# Patient Record
Sex: FEMALE | Race: WHITE | NOT HISPANIC OR LATINO
[De-identification: names, ages, dates, MRNs, and addresses within clinical notes are randomized per-mention and may not be internally consistent; named-entity substitution may affect disease eponyms.]

---

## 2017-04-19 ENCOUNTER — APPOINTMENT (OUTPATIENT)
Dept: VASCULAR SURGERY | Facility: CLINIC | Age: 62
End: 2017-04-19

## 2017-04-19 VITALS
WEIGHT: 164 LBS | HEIGHT: 72 IN | TEMPERATURE: 98.4 F | BODY MASS INDEX: 22.21 KG/M2 | HEART RATE: 90 BPM | DIASTOLIC BLOOD PRESSURE: 82 MMHG | SYSTOLIC BLOOD PRESSURE: 131 MMHG

## 2017-11-16 ENCOUNTER — RX RENEWAL (OUTPATIENT)
Age: 62
End: 2017-11-16

## 2017-11-16 ENCOUNTER — APPOINTMENT (OUTPATIENT)
Dept: ORTHOPEDIC SURGERY | Facility: CLINIC | Age: 62
End: 2017-11-16
Payer: COMMERCIAL

## 2017-11-16 VITALS
WEIGHT: 165 LBS | HEIGHT: 63 IN | DIASTOLIC BLOOD PRESSURE: 79 MMHG | BODY MASS INDEX: 29.23 KG/M2 | SYSTOLIC BLOOD PRESSURE: 134 MMHG | HEART RATE: 88 BPM

## 2017-11-16 PROCEDURE — 99204 OFFICE O/P NEW MOD 45 MIN: CPT

## 2017-11-16 PROCEDURE — 72100 X-RAY EXAM L-S SPINE 2/3 VWS: CPT

## 2017-11-16 PROCEDURE — 73502 X-RAY EXAM HIP UNI 2-3 VIEWS: CPT | Mod: RT

## 2017-11-16 PROCEDURE — 73562 X-RAY EXAM OF KNEE 3: CPT | Mod: RT

## 2017-12-07 ENCOUNTER — APPOINTMENT (OUTPATIENT)
Dept: CT IMAGING | Facility: IMAGING CENTER | Age: 62
End: 2017-12-07

## 2017-12-19 ENCOUNTER — APPOINTMENT (OUTPATIENT)
Dept: VASCULAR SURGERY | Facility: CLINIC | Age: 62
End: 2017-12-19
Payer: COMMERCIAL

## 2017-12-19 VITALS
DIASTOLIC BLOOD PRESSURE: 78 MMHG | BODY MASS INDEX: 29.23 KG/M2 | TEMPERATURE: 98 F | HEART RATE: 90 BPM | HEIGHT: 63 IN | WEIGHT: 165 LBS | SYSTOLIC BLOOD PRESSURE: 145 MMHG

## 2017-12-19 DIAGNOSIS — I65.23 OCCLUSION AND STENOSIS OF BILATERAL CAROTID ARTERIES: ICD-10-CM

## 2017-12-19 PROCEDURE — 93880 EXTRACRANIAL BILAT STUDY: CPT

## 2017-12-19 PROCEDURE — 99212 OFFICE O/P EST SF 10 MIN: CPT

## 2017-12-26 ENCOUNTER — OUTPATIENT (OUTPATIENT)
Dept: OUTPATIENT SERVICES | Facility: HOSPITAL | Age: 62
LOS: 1 days | End: 2017-12-26
Payer: COMMERCIAL

## 2017-12-26 VITALS
HEART RATE: 84 BPM | SYSTOLIC BLOOD PRESSURE: 140 MMHG | WEIGHT: 169.98 LBS | DIASTOLIC BLOOD PRESSURE: 80 MMHG | HEIGHT: 60.5 IN | RESPIRATION RATE: 16 BRPM | TEMPERATURE: 99 F

## 2017-12-26 DIAGNOSIS — M19.90 UNSPECIFIED OSTEOARTHRITIS, UNSPECIFIED SITE: ICD-10-CM

## 2017-12-26 DIAGNOSIS — I77.9 DISORDER OF ARTERIES AND ARTERIOLES, UNSPECIFIED: ICD-10-CM

## 2017-12-26 DIAGNOSIS — M16.11 UNILATERAL PRIMARY OSTEOARTHRITIS, RIGHT HIP: ICD-10-CM

## 2017-12-26 DIAGNOSIS — I10 ESSENTIAL (PRIMARY) HYPERTENSION: ICD-10-CM

## 2017-12-26 DIAGNOSIS — R06.02 SHORTNESS OF BREATH: ICD-10-CM

## 2017-12-26 DIAGNOSIS — Z98.891 HISTORY OF UTERINE SCAR FROM PREVIOUS SURGERY: Chronic | ICD-10-CM

## 2017-12-26 LAB
APPEARANCE UR: SIGNIFICANT CHANGE UP
BACTERIA # UR AUTO: SIGNIFICANT CHANGE UP
BILIRUB UR-MCNC: NEGATIVE — SIGNIFICANT CHANGE UP
BLD GP AB SCN SERPL QL: NEGATIVE — SIGNIFICANT CHANGE UP
BLOOD UR QL VISUAL: NEGATIVE — SIGNIFICANT CHANGE UP
BUN SERPL-MCNC: 20 MG/DL — SIGNIFICANT CHANGE UP (ref 7–23)
CALCIUM SERPL-MCNC: 9.1 MG/DL — SIGNIFICANT CHANGE UP (ref 8.4–10.5)
CHLORIDE SERPL-SCNC: 99 MMOL/L — SIGNIFICANT CHANGE UP (ref 98–107)
CO2 SERPL-SCNC: 25 MMOL/L — SIGNIFICANT CHANGE UP (ref 22–31)
COLOR SPEC: YELLOW — SIGNIFICANT CHANGE UP
CREAT SERPL-MCNC: 0.92 MG/DL — SIGNIFICANT CHANGE UP (ref 0.5–1.3)
EPI CELLS # UR: SIGNIFICANT CHANGE UP
GLUCOSE SERPL-MCNC: 90 MG/DL — SIGNIFICANT CHANGE UP (ref 70–99)
GLUCOSE UR-MCNC: NEGATIVE — SIGNIFICANT CHANGE UP
HBA1C BLD-MCNC: 5.6 % — SIGNIFICANT CHANGE UP (ref 4–5.6)
HCT VFR BLD CALC: 40.8 % — SIGNIFICANT CHANGE UP (ref 34.5–45)
HGB BLD-MCNC: 13.9 G/DL — SIGNIFICANT CHANGE UP (ref 11.5–15.5)
KETONES UR-MCNC: NEGATIVE — SIGNIFICANT CHANGE UP
LEUKOCYTE ESTERASE UR-ACNC: HIGH
MCHC RBC-ENTMCNC: 32.7 PG — SIGNIFICANT CHANGE UP (ref 27–34)
MCHC RBC-ENTMCNC: 34.1 % — SIGNIFICANT CHANGE UP (ref 32–36)
MCV RBC AUTO: 96 FL — SIGNIFICANT CHANGE UP (ref 80–100)
MUCOUS THREADS # UR AUTO: SIGNIFICANT CHANGE UP
NITRITE UR-MCNC: NEGATIVE — SIGNIFICANT CHANGE UP
NRBC # FLD: 0 — SIGNIFICANT CHANGE UP
PH UR: 6.5 — SIGNIFICANT CHANGE UP (ref 4.6–8)
PLATELET # BLD AUTO: 288 K/UL — SIGNIFICANT CHANGE UP (ref 150–400)
PMV BLD: 10.5 FL — SIGNIFICANT CHANGE UP (ref 7–13)
POTASSIUM SERPL-MCNC: 4.6 MMOL/L — SIGNIFICANT CHANGE UP (ref 3.5–5.3)
POTASSIUM SERPL-SCNC: 4.6 MMOL/L — SIGNIFICANT CHANGE UP (ref 3.5–5.3)
PROT UR-MCNC: 30 MG/DL — HIGH
RBC # BLD: 4.25 M/UL — SIGNIFICANT CHANGE UP (ref 3.8–5.2)
RBC # FLD: 11.6 % — SIGNIFICANT CHANGE UP (ref 10.3–14.5)
RH IG SCN BLD-IMP: POSITIVE — SIGNIFICANT CHANGE UP
SODIUM SERPL-SCNC: 137 MMOL/L — SIGNIFICANT CHANGE UP (ref 135–145)
SP GR SPEC: 1.02 — SIGNIFICANT CHANGE UP (ref 1–1.04)
UROBILINOGEN FLD QL: NORMAL MG/DL — SIGNIFICANT CHANGE UP
WBC # BLD: 7.36 K/UL — SIGNIFICANT CHANGE UP (ref 3.8–10.5)
WBC # FLD AUTO: 7.36 K/UL — SIGNIFICANT CHANGE UP (ref 3.8–10.5)
WBC UR QL: HIGH (ref 0–?)

## 2017-12-26 PROCEDURE — 93010 ELECTROCARDIOGRAM REPORT: CPT

## 2017-12-26 NOTE — H&P PST ADULT - HISTORY OF PRESENT ILLNESS
62 year old female with c/o right hip pain worsening since 8/2017. Pt had MRI which revealed osteoarthritis. Pt presents today for presurgical evalution for ... 62 year old female with c/o right hip pain worsening since 8/2017. Pt had MRI which revealed osteoarthritis. Pt presents today for presurgical evaluation for Right Hip Replacement Direct Anterior Approach scheduled on 1/8/17.

## 2017-12-26 NOTE — H&P PST ADULT - NEGATIVE ENMT SYMPTOMS
no dysphagia/no ear pain/no vertigo/no sinus symptoms/no throat pain/no hearing difficulty/no tinnitus

## 2017-12-26 NOTE — H&P PST ADULT - RESPIRATORY AND THORAX COMMENTS
hx of pulmonary nodules - going for repeat CT tomorrow. Hx of emhysema - states she was told it is mild,  only uses inhalers when she is sick - does not have pulmonologist (managed by PMD) hx of pulmonary nodules - going for repeat CT tomorrow. Hx of emphysema - states she was told it is mild,  only uses inhalers when she is sick - does not have pulmonologist (managed by PMD)

## 2017-12-26 NOTE — H&P PST ADULT - PMH
Carotid artery disease    Emphysema    Hyperlipidemia    Hypertension    Pulmonary nodules  yearly CT scans

## 2017-12-26 NOTE — H&P PST ADULT - MUSCULOSKELETAL
details… detailed exam ROM intact/no joint warmth/no calf tenderness/no joint swelling/no joint erythema/diminished strength

## 2017-12-26 NOTE — H&P PST ADULT - PROBLEM SELECTOR PLAN 1
Right Hip Replacement Direct Anterior Approach scheduled on 1/8/17.  Pre-op instructions provided. Pt verbalized understanding.   Pepcid provided for GI prophylaxis.   Chlorhexidine wash provided and instructions given.

## 2017-12-26 NOTE — H&P PST ADULT - CARDIOVASCULAR COMMENTS
hx of corotid artery diease - went for carotid doppler last week hx of carotid artery diease - went for carotid doppler last week

## 2017-12-27 LAB
BACTERIA UR CULT: SIGNIFICANT CHANGE UP
SPECIMEN SOURCE: SIGNIFICANT CHANGE UP
SPECIMEN SOURCE: SIGNIFICANT CHANGE UP

## 2017-12-28 LAB — BACTERIA NPH CULT: SIGNIFICANT CHANGE UP

## 2018-01-03 ENCOUNTER — OTHER (OUTPATIENT)
Age: 63
End: 2018-01-03

## 2018-01-08 ENCOUNTER — APPOINTMENT (OUTPATIENT)
Dept: ORTHOPEDIC SURGERY | Facility: HOSPITAL | Age: 63
End: 2018-01-08

## 2018-01-09 ENCOUNTER — INPATIENT (INPATIENT)
Facility: HOSPITAL | Age: 63
LOS: 0 days | Discharge: HOME | End: 2018-01-10
Attending: INTERNAL MEDICINE | Admitting: INTERNAL MEDICINE
Payer: COMMERCIAL

## 2018-01-09 VITALS
SYSTOLIC BLOOD PRESSURE: 164 MMHG | TEMPERATURE: 98 F | DIASTOLIC BLOOD PRESSURE: 79 MMHG | HEART RATE: 92 BPM | RESPIRATION RATE: 16 BRPM | OXYGEN SATURATION: 99 %

## 2018-01-09 DIAGNOSIS — Z98.891 HISTORY OF UTERINE SCAR FROM PREVIOUS SURGERY: Chronic | ICD-10-CM

## 2018-01-09 DIAGNOSIS — I20.0 UNSTABLE ANGINA: ICD-10-CM

## 2018-01-09 LAB
ALBUMIN SERPL ELPH-MCNC: 4 G/DL — SIGNIFICANT CHANGE UP (ref 3.3–5)
ALP SERPL-CCNC: 87 U/L — SIGNIFICANT CHANGE UP (ref 40–120)
ALT FLD-CCNC: 18 U/L — SIGNIFICANT CHANGE UP (ref 4–33)
APTT BLD: 33.9 SEC — SIGNIFICANT CHANGE UP (ref 27.5–37.4)
AST SERPL-CCNC: 23 U/L — SIGNIFICANT CHANGE UP (ref 4–32)
BASOPHILS # BLD AUTO: 0.06 K/UL — SIGNIFICANT CHANGE UP (ref 0–0.2)
BASOPHILS NFR BLD AUTO: 0.7 % — SIGNIFICANT CHANGE UP (ref 0–2)
BILIRUB SERPL-MCNC: 0.3 MG/DL — SIGNIFICANT CHANGE UP (ref 0.2–1.2)
BLD GP AB SCN SERPL QL: NEGATIVE — SIGNIFICANT CHANGE UP
BUN SERPL-MCNC: 17 MG/DL — SIGNIFICANT CHANGE UP (ref 7–23)
CALCIUM SERPL-MCNC: 8.8 MG/DL — SIGNIFICANT CHANGE UP (ref 8.4–10.5)
CHLORIDE SERPL-SCNC: 102 MMOL/L — SIGNIFICANT CHANGE UP (ref 98–107)
CK MB BLD-MCNC: 1.96 NG/ML — SIGNIFICANT CHANGE UP (ref 1–4.7)
CK SERPL-CCNC: 54 U/L — SIGNIFICANT CHANGE UP (ref 25–170)
CO2 SERPL-SCNC: 25 MMOL/L — SIGNIFICANT CHANGE UP (ref 22–31)
CREAT SERPL-MCNC: 0.92 MG/DL — SIGNIFICANT CHANGE UP (ref 0.5–1.3)
EOSINOPHIL # BLD AUTO: 0.09 K/UL — SIGNIFICANT CHANGE UP (ref 0–0.5)
EOSINOPHIL NFR BLD AUTO: 1.1 % — SIGNIFICANT CHANGE UP (ref 0–6)
GLUCOSE SERPL-MCNC: 98 MG/DL — SIGNIFICANT CHANGE UP (ref 70–99)
HCT VFR BLD CALC: 39.1 % — SIGNIFICANT CHANGE UP (ref 34.5–45)
HGB BLD-MCNC: 13.8 G/DL — SIGNIFICANT CHANGE UP (ref 11.5–15.5)
IMM GRANULOCYTES # BLD AUTO: 0.02 # — SIGNIFICANT CHANGE UP
IMM GRANULOCYTES NFR BLD AUTO: 0.2 % — SIGNIFICANT CHANGE UP (ref 0–1.5)
INR BLD: 0.96 — SIGNIFICANT CHANGE UP (ref 0.88–1.17)
LYMPHOCYTES # BLD AUTO: 1.89 K/UL — SIGNIFICANT CHANGE UP (ref 1–3.3)
LYMPHOCYTES # BLD AUTO: 22.1 % — SIGNIFICANT CHANGE UP (ref 13–44)
MCHC RBC-ENTMCNC: 34.2 PG — HIGH (ref 27–34)
MCHC RBC-ENTMCNC: 35.3 % — SIGNIFICANT CHANGE UP (ref 32–36)
MCV RBC AUTO: 97 FL — SIGNIFICANT CHANGE UP (ref 80–100)
MONOCYTES # BLD AUTO: 0.6 K/UL — SIGNIFICANT CHANGE UP (ref 0–0.9)
MONOCYTES NFR BLD AUTO: 7 % — SIGNIFICANT CHANGE UP (ref 2–14)
NEUTROPHILS # BLD AUTO: 5.91 K/UL — SIGNIFICANT CHANGE UP (ref 1.8–7.4)
NEUTROPHILS NFR BLD AUTO: 68.9 % — SIGNIFICANT CHANGE UP (ref 43–77)
NRBC # FLD: 0 — SIGNIFICANT CHANGE UP
PLATELET # BLD AUTO: 246 K/UL — SIGNIFICANT CHANGE UP (ref 150–400)
PMV BLD: 10.4 FL — SIGNIFICANT CHANGE UP (ref 7–13)
POTASSIUM SERPL-MCNC: 4.4 MMOL/L — SIGNIFICANT CHANGE UP (ref 3.5–5.3)
POTASSIUM SERPL-SCNC: 4.4 MMOL/L — SIGNIFICANT CHANGE UP (ref 3.5–5.3)
PROT SERPL-MCNC: 7.6 G/DL — SIGNIFICANT CHANGE UP (ref 6–8.3)
PROTHROM AB SERPL-ACNC: 11 SEC — SIGNIFICANT CHANGE UP (ref 9.8–13.1)
RBC # BLD: 4.03 M/UL — SIGNIFICANT CHANGE UP (ref 3.8–5.2)
RBC # FLD: 11.4 % — SIGNIFICANT CHANGE UP (ref 10.3–14.5)
RH IG SCN BLD-IMP: POSITIVE — SIGNIFICANT CHANGE UP
SODIUM SERPL-SCNC: 139 MMOL/L — SIGNIFICANT CHANGE UP (ref 135–145)
TROPONIN T SERPL-MCNC: < 0.06 NG/ML — SIGNIFICANT CHANGE UP (ref 0–0.06)
WBC # BLD: 8.57 K/UL — SIGNIFICANT CHANGE UP (ref 3.8–10.5)
WBC # FLD AUTO: 8.57 K/UL — SIGNIFICANT CHANGE UP (ref 3.8–10.5)

## 2018-01-09 PROCEDURE — 93010 ELECTROCARDIOGRAM REPORT: CPT

## 2018-01-09 PROCEDURE — 71046 X-RAY EXAM CHEST 2 VIEWS: CPT | Mod: 26

## 2018-01-09 RX ORDER — LABETALOL HCL 100 MG
10 TABLET ORAL ONCE
Qty: 0 | Refills: 0 | Status: COMPLETED | OUTPATIENT
Start: 2018-01-09 | End: 2018-01-09

## 2018-01-09 RX ORDER — CLOPIDOGREL BISULFATE 75 MG/1
75 TABLET, FILM COATED ORAL DAILY
Qty: 0 | Refills: 0 | Status: DISCONTINUED | OUTPATIENT
Start: 2018-01-10 | End: 2018-01-10

## 2018-01-09 RX ORDER — FLUTICASONE FUROATE AND VILANTEROL TRIFENATATE 100; 25 UG/1; UG/1
1 POWDER RESPIRATORY (INHALATION)
Qty: 0 | Refills: 0 | COMMUNITY

## 2018-01-09 RX ORDER — VALSARTAN 80 MG/1
160 TABLET ORAL DAILY
Qty: 0 | Refills: 0 | Status: DISCONTINUED | OUTPATIENT
Start: 2018-01-09 | End: 2018-01-10

## 2018-01-09 RX ORDER — ASPIRIN/CALCIUM CARB/MAGNESIUM 324 MG
81 TABLET ORAL DAILY
Qty: 0 | Refills: 0 | Status: DISCONTINUED | OUTPATIENT
Start: 2018-01-10 | End: 2018-01-10

## 2018-01-09 RX ORDER — ASPIRIN/CALCIUM CARB/MAGNESIUM 324 MG
1 TABLET ORAL
Qty: 0 | Refills: 0 | COMMUNITY

## 2018-01-09 RX ORDER — ASPIRIN/CALCIUM CARB/MAGNESIUM 324 MG
324 TABLET ORAL ONCE
Qty: 0 | Refills: 0 | Status: COMPLETED | OUTPATIENT
Start: 2018-01-09 | End: 2018-01-09

## 2018-01-09 RX ORDER — DILTIAZEM HCL 120 MG
240 CAPSULE, EXT RELEASE 24 HR ORAL DAILY
Qty: 0 | Refills: 0 | Status: DISCONTINUED | OUTPATIENT
Start: 2018-01-09 | End: 2018-01-10

## 2018-01-09 RX ORDER — SIMVASTATIN 20 MG/1
40 TABLET, FILM COATED ORAL AT BEDTIME
Qty: 0 | Refills: 0 | Status: DISCONTINUED | OUTPATIENT
Start: 2018-01-09 | End: 2018-01-10

## 2018-01-09 RX ORDER — INFLUENZA VIRUS VACCINE 15; 15; 15; 15 UG/.5ML; UG/.5ML; UG/.5ML; UG/.5ML
0.5 SUSPENSION INTRAMUSCULAR ONCE
Qty: 0 | Refills: 0 | Status: DISCONTINUED | OUTPATIENT
Start: 2018-01-09 | End: 2018-01-10

## 2018-01-09 RX ADMIN — Medication 10 MILLIGRAM(S): at 17:30

## 2018-01-09 RX ADMIN — SIMVASTATIN 40 MILLIGRAM(S): 20 TABLET, FILM COATED ORAL at 21:57

## 2018-01-09 RX ADMIN — Medication 324 MILLIGRAM(S): at 10:22

## 2018-01-09 NOTE — H&P CARDIOLOGY - FAMILY HISTORY
Mother  Still living? Unknown  Family history of CHF (congestive heart failure), Age at diagnosis: Age Unknown     Aunt  Still living? No  Family history of CHF (congestive heart failure), Age at diagnosis: Age Unknown

## 2018-01-09 NOTE — H&P CARDIOLOGY - PMH
Carotid artery disease  monitored by vascluar docotor Doscher  Emphysema    Hip pain    Hyperlipidemia    Hypertension    Pulmonary nodules  yearly CT scans

## 2018-01-09 NOTE — ED PROVIDER NOTE - OBJECTIVE STATEMENT
ATTG NOTE DR. VILLARREAL 62F presents to the ED for abnormal stress test and requiring angiogram.  Pt denies any chest pain or SOB currently.  No diaphoresis, no vomiting, no dizziness, no palpitations.  Pt has been c/o SOB intermittently for some time now with exertion, and worse over the past 3 days.  Dr. Campbell at bedside requesting admission to his service for angiogram. 63 y/o F hx COPD (not on home O2), HTN, HLD, here c/o exertional shortness of breath x 3 days. Was seen at presurgical testing yesterday for clearance for hip replacement, was then referred to her cardiologist Dr. Porras for cardiac clearance. Pt had an abnormal stress test and was told to go to the ER at that time, however pt waited until this am. Currently denies chest pain or shortness of breath. No lower ext edema, calf pain, or any other complaints. Did not take her ASA today.    ATTG NOTE DR. VILLARREAL 62F presents to the ED for abnormal stress test and requiring angiogram.  Pt denies any chest pain or SOB currently.  No diaphoresis, no vomiting, no dizziness, no palpitations.  Pt has been c/o SOB intermittently for some time now with exertion, and worse over the past 3 days.  Dr. Campbell at bedside requesting admission to his service for angiogram.

## 2018-01-09 NOTE — ED ADULT TRIAGE NOTE - CHIEF COMPLAINT QUOTE
Pt underwent pre surgical testing for a right hip replacement, had stress test yesterday, told to come to the ED for failed stress test and get an angiogram.  pt c/o mild chest pain, cough, and shortness of breath x 2-3 day. pmhx of HTN, "mild emphysema"

## 2018-01-09 NOTE — CHART NOTE - NSCHARTNOTEFT_GEN_A_CORE
Status post Cath, Right radial and femoral sites without bleeding or hematoma.  Dressing are intact.  Positive Pulses.  Will continue to monitor.                                                                                                                                                  KAYLAN Mcghee, RPA-C [id number]

## 2018-01-09 NOTE — ED ADULT NURSE NOTE - OBJECTIVE STATEMENT
61 y/o female presents to ED for poss cardiac cath.  Pt states that she had a stress test yesterday as past of pre surgical testing for hip replacement.  Pt states that she was able to complete test but was told by her cardiologist to come to ED because the test was abnormal.  Pt states that she has had some increased exertional SOB and mild chest discomfort over the past 2 days but attributed the sx to her COPD and having a URI.  Pt rec'd awake alert in NAD speaking full sentences, denies CP SOB at this time, placed on CM, NSR, IV established labs drawn and sent, providers evaluating.

## 2018-01-09 NOTE — H&P CARDIOLOGY - ATTENDING COMMENTS
Patient seen and examined.  Agree with above.   -Admitted with worsening dyspnea and chest pain concerning for new onset angina  -Cath with severe stenosis in RCA now s/p ANETTE X 2.   -DAPT with asa and plavix  -dc home tomorrow if medically stable    Tip Hernandez MD  [facility]  [address], [address]  [place] [address]  office: [phone]  pager: [phone]

## 2018-01-09 NOTE — H&P CARDIOLOGY - HISTORY OF PRESENT ILLNESS
62 year old female current smoker with HTN, hyperlipidemia, PAD who presented to her Cardiologist for pre-operative cardiac clearance prior to hip replacement. Patient describes symptoms of 2-3 days of dyspnea and chest discomfort.  Underwent a Nuclear Stress test with reported new LV dysfunction with evidence of infarct and natividad-infarct ischemia.   She denies palpitations nor syncope. Subsequently presented to [facility] ED 1/9/18 with labs pending, tele in the ER show NSR without any ventricular ectopy.  In light of patients cardiac risk factors, symptoms and abnormal noninvasive test findings there is high suspicion for CAD. Patient is now referred for a cardiac catheterization with possible PTCA/stent.

## 2018-01-09 NOTE — CONSULT NOTE ADULT - SUBJECTIVE AND OBJECTIVE BOX
HISTORY OF PRESENT ILLNESS:  62 year old female with PMH HTN, HLD, tobacco abuse, known Carotid disease being followed, who has OA of the HIP and was planned for THR, but her pre-op NST was abnormal, so she was referred for cardiac cath.  Given her PMH and abnormal NST, R/B/A of cath explained to her and agreed to proceed.    PAST MEDICAL & SURGICAL HISTORY:  Hip pain  Emphysema  Carotid artery disease: monitored by vascular DR Baker  Pulmonary nodules: yearly CT scans  Hyperlipidemia  Hypertension  History of  section: x2      FAMILY HISTORY:  Family history of CHF (congestive heart failure) (Mother, Aunt)      SOCIAL HISTORY:    ( ) Non-smoker (x ) Smoker ( ) Alcohol Abuse ( ) IVDA    Allergies    penicillin (Short breath; Hives)  tetracycline (Short breath; Hives)    MEDICATIONS  (STANDING):  diltiazem    milliGRAM(s) Oral daily  simvastatin 40 milliGRAM(s) Oral at bedtime  valsartan 160 milliGRAM(s) Oral daily    REVIEW OF SYSTEMS:     CONSTITUTIONAL: No fever, chills, weight loss, or fatigue  RESPIRATORY: No cough, wheezing, chills or hemoptysis; No Shortness of Breath  CARDIOVASCULAR: see HPI  GASTROINTESTINAL: No abdominal pain. No nausea, vomiting, diarrhea or constipation. No melena or hematochezia.  GENITOURINARY: No dysuria, frequency, hematuria, or incontinence  NEUROLOGICAL: No headaches, memory loss, loss of strength, numbness, or tremors  SKIN: No itching, burning, rashes, or lesions   	  [x ] All others negative	  [ ] Unable to obtain    PHYSICAL EXAM:  Vital Signs Last 24 Hrs  T(C): 36.6 (2018 09:10), Max: 36.6 (2018 09:10)  T(F): 97.8 (2018 09:10), Max: 97.8 (2018 09:10)  HR: 92 (2018 09:10) (92 - 92)  BP: 164/79 (2018 09:10) (164/79 - 164/79)  RR: 16 (2018 09:10) (16 - 16)  SpO2: 99% (2018 09:10) (99% - 99%)    Cardiovascular:  S1 S2 RRR, No JVD, No murmurs  Respiratory: Lungs CTA B/L, No wheeze, rales, or rhonchi	  Psychiatry: A & O x 3, Mood & affect appropriate  Gastrointestinal:  Soft, Non-tender, + BS	  Extremities:  No clubbing, cyanosis or edema    DATA:    ECG:  	NSR    PREVIOUS DIAGNOSTIC TESTING:    ECHO in my office : Normal LV RV function  NST in my office 18:  Inferior wall MI with moderate infero-apical natividad-infarct ischemia. , EF 43%    LABS:	 	    CARDIAC MARKERS ( 2018 09:40 )  x     / < 0.06 ng/mL / 54 u/L / 1.96 ng/mL / x                          13.8   8.57  )-----------( 246      ( 2018 09:40 )             39.1     139  |  102  |  17  ----------------------------<  98  4.4   |  25  |  0.92    Ca    8.8      2018 09:40    TPro  7.6  /  Alb  4.0  /  TBili  0.3  /  DBili  x   /  AST  23  /  ALT  18  /  AlkPhos  87  01-09    PT/INR - ( 2018 09:40 )   PT: 11.0 SEC;   INR: 0.96     PTT - ( 2018 09:40 )  PTT:33.9 SEC    ASSESSMENT/PLAN: 	  62 year old female with PMH HTN, HLD, tobacco abuse, known Carotid disease being followed, who has OA of the HIP and was planned for THR, but her pre-op NST was abnormal, so she was referred for cardiac cath.      --Given her PMH and abnormal NST, R/B/A of cath explained to her and she agreed to proceed.  --further reccs pending above  --after DC, f/u Dr Benito on 1/15/18 at 11:00 AM    Kristan Fernández PA-C  [facility]   [address]   [place] [address]  office [phone]  pager [phone]
EP ATTENDING    History: She is a very pleasant 62 year old female with a past medical history of hypertension, hyperlipidemia, active smoker and PAD who is now admitted with 2-3 days of dyspnea and chest discomfort. A NST in the office showed new LV dysfunction with evidence of infarct and natividad-infarct ischemia. She denies palpitations nor syncope. EKG/tele in the ER show NSR without any ventricular ectopy.    PMH: as above, HTN, hyperlipidemia, PAD  PShx:  x 2  Medications: Aspirin 81 mg daily, simvastatin 40 mg QHS, diltiazem 240 mg daily, and valsartan 160 mg daily.   Allergies: penicillin and tetracycline  Review of systems: No fevers, chills. No dizziness or diplopia. No rashes. No depression. No anxiety. No abdominal pain. No BRBPR or melena. No confusion. No sore throat. No ear pain. Otherwise, negative.       T(C): 36.6 (18 @ 09:10), Max: 36.6 (18 @ 09:10)  HR: 92 (18 @ 09:10) (92 - 92)  BP: 164/79 (18 @ 09:10) (164/79 - 164/79)  RR: 16 (18 @ 09:10) (16 - 16)  SpO2: 99% (18 @ 09:10) (99% - 99%)  Wt(kg): --    no JVD  RRR, no murmurs  CTAB  soft nt/nd  no c/c/ce    labs pending    EKG: NSR, normal EKG    NST as above      A/P) She is a very pleasant 62 year old female with a past medical history of hypertension, hyperlipidemia, active smoker and PAD who is now admitted with 2-3 days of dyspnea and chest discomfort. A NST in the office showed new LV dysfunction with evidence of infarct and natividad-infarct ischemia. She denies palpitations nor syncope. EKG/tele in the ER show NSR without any ventricular ectopy.    -given the above (new LV dysfunction with abnormal NST) I agree with diagnostic cath  -get repeat echo to quantify LV dysfunction  -final EP reccs re LifeVest pending cath and echo  -admit to telemetry under David  -cath today as per Dr. Porras (interventional cardiology)

## 2018-01-09 NOTE — ED PROVIDER NOTE - PHYSICAL EXAMINATION
ATTENDING PHYSICAL EXAM DR. VILLARREAL ***GEN - NAD; well appearing; A+O x3 ***HEAD - NC/AT ***EYES/NOSE - PERRL, EOMI, mucous membranes moist, no discharge ***THROAT: Oral cavity and pharynx normal. No inflammation, swelling, exudate, or lesions.  ***NECK: Neck supple, non-tender without lymphadenopathy, no masses, no thyromegaly.   ***PULMONARY - CTA b/l, symmetric breath sounds. ***CARDIAC -s1s2, RRR, no M,G,R  ***ABDOMEN - +BS, ND, NT, soft, no guarding, no rebound, no masses   ***BACK - no CVA tenderness, Normal  spine ***EXTREMITIES - symmetric pulses, 2+ dp, capillary refill < 2 seconds, no clubbing, no cyanosis, no edema ***SKIN - no rash or bruising   ***NEUROLOGIC - alert

## 2018-01-09 NOTE — ED PROVIDER NOTE - MEDICAL DECISION MAKING DETAILS
SOB concerning for ACS with CAD risk factors and abnormal stress test  1) serial EKGs/CXR/ASA/cardiac monitor/LABS  2) reassess  3) Admit to telemetry

## 2018-01-10 ENCOUNTER — TRANSCRIPTION ENCOUNTER (OUTPATIENT)
Age: 63
End: 2018-01-10

## 2018-01-10 VITALS — WEIGHT: 163.8 LBS

## 2018-01-10 LAB
BUN SERPL-MCNC: 25 MG/DL — HIGH (ref 7–23)
CALCIUM SERPL-MCNC: 8.8 MG/DL — SIGNIFICANT CHANGE UP (ref 8.4–10.5)
CHLORIDE SERPL-SCNC: 101 MMOL/L — SIGNIFICANT CHANGE UP (ref 98–107)
CO2 SERPL-SCNC: 23 MMOL/L — SIGNIFICANT CHANGE UP (ref 22–31)
CREAT SERPL-MCNC: 1.03 MG/DL — SIGNIFICANT CHANGE UP (ref 0.5–1.3)
GLUCOSE SERPL-MCNC: 103 MG/DL — HIGH (ref 70–99)
HCT VFR BLD CALC: 37.9 % — SIGNIFICANT CHANGE UP (ref 34.5–45)
HGB BLD-MCNC: 12.7 G/DL — SIGNIFICANT CHANGE UP (ref 11.5–15.5)
MCHC RBC-ENTMCNC: 33.5 % — SIGNIFICANT CHANGE UP (ref 32–36)
MCHC RBC-ENTMCNC: 34.5 PG — HIGH (ref 27–34)
MCV RBC AUTO: 103 FL — HIGH (ref 80–100)
NRBC # FLD: 0 — SIGNIFICANT CHANGE UP
PLATELET # BLD AUTO: 221 K/UL — SIGNIFICANT CHANGE UP (ref 150–400)
PMV BLD: 10.7 FL — SIGNIFICANT CHANGE UP (ref 7–13)
POTASSIUM SERPL-MCNC: 4.2 MMOL/L — SIGNIFICANT CHANGE UP (ref 3.5–5.3)
POTASSIUM SERPL-SCNC: 4.2 MMOL/L — SIGNIFICANT CHANGE UP (ref 3.5–5.3)
RBC # BLD: 3.68 M/UL — LOW (ref 3.8–5.2)
RBC # FLD: 11.5 % — SIGNIFICANT CHANGE UP (ref 10.3–14.5)
SODIUM SERPL-SCNC: 138 MMOL/L — SIGNIFICANT CHANGE UP (ref 135–145)
WBC # BLD: 8.98 K/UL — SIGNIFICANT CHANGE UP (ref 3.8–10.5)
WBC # FLD AUTO: 8.98 K/UL — SIGNIFICANT CHANGE UP (ref 3.8–10.5)

## 2018-01-10 RX ORDER — METOPROLOL TARTRATE 50 MG
1 TABLET ORAL
Qty: 30 | Refills: 0
Start: 2018-01-10 | End: 2018-02-08

## 2018-01-10 RX ORDER — CLOPIDOGREL BISULFATE 75 MG/1
1 TABLET, FILM COATED ORAL
Qty: 30 | Refills: 0
Start: 2018-01-10 | End: 2018-02-08

## 2018-01-10 RX ORDER — DILTIAZEM HCL 120 MG
1 CAPSULE, EXT RELEASE 24 HR ORAL
Qty: 0 | Refills: 0 | COMMUNITY

## 2018-01-10 RX ADMIN — CLOPIDOGREL BISULFATE 75 MILLIGRAM(S): 75 TABLET, FILM COATED ORAL at 11:08

## 2018-01-10 RX ADMIN — VALSARTAN 160 MILLIGRAM(S): 80 TABLET ORAL at 06:15

## 2018-01-10 RX ADMIN — Medication 240 MILLIGRAM(S): at 06:15

## 2018-01-10 RX ADMIN — Medication 81 MILLIGRAM(S): at 11:08

## 2018-01-10 NOTE — DISCHARGE NOTE ADULT - MEDICATION SUMMARY - MEDICATIONS TO STOP TAKING
I will STOP taking the medications listed below when I get home from the hospital:    dilTIAZem 240 mg/24 hours oral capsule, extended release  -- 1 cap(s) by mouth once a day

## 2018-01-10 NOTE — DISCHARGE NOTE ADULT - NS AS ACTIVITY OBS
As tolerated/No Heavy lifting/straining/Do not drive or operate machinery/Bathing allowed/Walking-Outdoors allowed/Do not make important decisions/Showering allowed/Walking-Indoors allowed

## 2018-01-10 NOTE — DISCHARGE NOTE ADULT - HOSPITAL COURSE
61 y/o female with a PMHx of COPD, HTN and HLD presented to a cardiologist for pre-operative clearance where she was found to have an ejection fraction of 43% with mild left ventricular dysfunction. Pt was admitted to [facility] on 1/9 for cardiac cath, which revealed 100% RCA disease S/P successful stent placement. Pt was monitored on telemetry with no events. Pt can undergo repeat echocardiogram as outpatient. Cardizem was discontinued and Toprol was prescribed. Case discussed with cardiology attending and primary team on 1/10. Pt now medically stable for discharge home.

## 2018-01-10 NOTE — DISCHARGE NOTE ADULT - CARE PLAN
Principal Discharge DX:	CAD (coronary artery disease)  Goal:	Prevent progression of disease. Ensure compliance with medications. Continue Aspirin and Plavix. It is essential to continue your Plavix.  Instructions for follow-up, activity and diet:	Follow up with cardiologist within one week of discharge. Call for appointment. Return to ED for any concerning symptoms. Continue medications as prescribed. Low salt, low fat, low cholesterol diet.  Secondary Diagnosis:	CHF (congestive heart failure)  Goal:	Ensure compliance with medications. Start Toprol and continue Valsartan.  Instructions for follow-up, activity and diet:	Follow up with cardiologist and electrophysiologist within one week of discharge. Call for appointment. Return to ED for any concerning symptoms. Continue medications as prescribed. Low salt diet. You will need an echo in several months to see if your cardiac function has improved.  Secondary Diagnosis:	COPD (chronic obstructive pulmonary disease)  Goal:	Prevent exacerbations and continue inhalers as prescribed.  Instructions for follow-up, activity and diet:	Follow up with PCP and/or pulmonologist for ongoing medical management of your COPD. Continue inhalers.  Secondary Diagnosis:	HTN (hypertension)  Goal:	Maintain adequate control of your blood pressure. Goal BP < 130/80. Continue low sodium diet.  Instructions for follow-up, activity and diet:	Follow up with PCP and/or cardiologist for ongoing medical management of your hypertension. Continue medications as prescribed. Low salt diet.  Secondary Diagnosis:	HLD (hyperlipidemia)  Goal:	Maintain adequate control of your cholesterol levels. Goal LDL < 70.  Instructions for follow-up, activity and diet:	Follow up with PCP for ongoing medical management. Continue medications as prescribed. Low cholesterol diet.

## 2018-01-10 NOTE — DISCHARGE NOTE ADULT - PLAN OF CARE
Prevent progression of disease. Ensure compliance with medications. Continue Aspirin and Plavix. It is essential to continue your Plavix. Follow up with cardiologist within one week of discharge. Call for appointment. Return to ED for any concerning symptoms. Continue medications as prescribed. Low salt, low fat, low cholesterol diet. Ensure compliance with medications. Start Toprol and continue Valsartan. Follow up with cardiologist and electrophysiologist within one week of discharge. Call for appointment. Return to ED for any concerning symptoms. Continue medications as prescribed. Low salt diet. You will need an echo in several months to see if your cardiac function has improved. Prevent exacerbations and continue inhalers as prescribed. Follow up with PCP and/or pulmonologist for ongoing medical management of your COPD. Continue inhalers. Maintain adequate control of your blood pressure. Goal BP < 130/80. Continue low sodium diet. Follow up with PCP and/or cardiologist for ongoing medical management of your hypertension. Continue medications as prescribed. Low salt diet. Maintain adequate control of your cholesterol levels. Goal LDL < 70. Follow up with PCP for ongoing medical management. Continue medications as prescribed. Low cholesterol diet.

## 2018-01-10 NOTE — DISCHARGE NOTE ADULT - CARE PROVIDER_API CALL
Prince Benito), Cardiology  [address]  [place] [address]  Phone: [phone]  Fax: [phone]    Rodrigo Campbell), Cardiac Electrophysiology; Cardiovascular Disease; Internal Medicine; Nuclear Cardiology  [address]  [address]  [place] [address]  Phone: [phone]  Fax: [phone]

## 2018-01-10 NOTE — PROGRESS NOTE ADULT - SUBJECTIVE AND OBJECTIVE BOX
SUBJECTIVE: No CP or SOB    MEDICATIONS  (STANDING):  aspirin enteric coated 81 milliGRAM(s) Oral daily  clopidogrel Tablet 75 milliGRAM(s) Oral daily  diltiazem    milliGRAM(s) Oral daily  influenza   Vaccine 0.5 milliLiter(s) IntraMuscular once  simvastatin 40 milliGRAM(s) Oral at bedtime  valsartan 160 milliGRAM(s) Oral daily    LABS:                        12.7   8.98  )-----------( 221      ( 10 Kwame 2018 06:20 )             37.9     138  |  101  |  25<H>  ----------------------------<  103<H>  4.2   |  23  |  1.03    Ca    8.8      10 Kwame 2018 06:20    TPro  7.6  /  Alb  4.0  /  TBili  0.3  /  DBili  x   /  AST  23  /  ALT  18  /  AlkPhos  87  01-09    CARDIAC MARKERS ( 09 Jan 2018 09:40 )  x     / < 0.06 ng/mL / 54 u/L / 1.96 ng/mL / x        PHYSICAL EXAM:  Vital Signs Last 24 Hrs  T(C): 36.8 (10 Kwame 2018 06:16), Max: 36.8 (10 Kwame 2018 06:16)  T(F): 98.3 (10 Kwame 2018 06:16), Max: 98.3 (10 Kwame 2018 06:16)  HR: 85 (10 Kwame 2018 06:16) (74 - 85)  BP: 128/66 (10 Kawme 2018 06:16) (128/66 - 138/71)  RR: 18 (10 Kwame 2018 06:16) (18 - 18)  SpO2: 98% (10 Kwame 2018 06:16) (97% - 98%)    Cardiovascular:  S1S2 RRR, No JVD, 1/6 MIKO   Respiratory: Lungs clear to auscultation, normal effort  Gastrointestinal: Abdomen soft, ND, NT, +BS  Skin: Warm, dry, intact. No rash.  Musculoskeletal: Normal ROM, normal strength  Psychiatric: Appropriate Mood and Affect  Vascular: Peripheral pulses palpable 2+ B/L    DIAGNOSTIC DATA  TELEMETRY: SR    ASSESSMENT AND PLAN:  She is a very pleasant 62 year old female with a past medical history of hypertension, hyperlipidemia, active smoker and PAD who is now admitted with 2-3 days of dyspnea and chest discomfort. A NST in the office showed new LV dysfunction with evidence of infarct and natividad-infarct ischemia. She denies palpitations nor syncope. EKG/tele in the ER show NSR without any ventricular ectopy.  --Trop T negative  --s/p PCI to  of RCA   --check TTE in my office next week at her f/u appt with Dr Benito on 1/15/18 at 11:00 AM  --Repeat TTE in 3 months  --no plans for ICD or Lifevest at this time.    Kristan Fernández PA-C  [facility]  [address], [address]   [place] [address]  office [phone]  pager [phone] SUBJECTIVE: No CP or SOB    MEDICATIONS  (STANDING):  aspirin enteric coated 81 milliGRAM(s) Oral daily  clopidogrel Tablet 75 milliGRAM(s) Oral daily  diltiazem    milliGRAM(s) Oral daily  influenza   Vaccine 0.5 milliLiter(s) IntraMuscular once  simvastatin 40 milliGRAM(s) Oral at bedtime  valsartan 160 milliGRAM(s) Oral daily    LABS:                        12.7   8.98  )-----------( 221      ( 10 Kwame 2018 06:20 )             37.9     138  |  101  |  25<H>  ----------------------------<  103<H>  4.2   |  23  |  1.03    Ca    8.8      10 Kwame 2018 06:20    TPro  7.6  /  Alb  4.0  /  TBili  0.3  /  DBili  x   /  AST  23  /  ALT  18  /  AlkPhos  87  01-09    CARDIAC MARKERS ( 09 Jan 2018 09:40 )  x     / < 0.06 ng/mL / 54 u/L / 1.96 ng/mL / x        PHYSICAL EXAM:  Vital Signs Last 24 Hrs  T(C): 36.8 (10 Kwame 2018 06:16), Max: 36.8 (10 Kwame 2018 06:16)  T(F): 98.3 (10 Kwame 2018 06:16), Max: 98.3 (10 Kwame 2018 06:16)  HR: 85 (10 Kwame 2018 06:16) (74 - 85)  BP: 128/66 (10 Kwame 2018 06:16) (128/66 - 138/71)  RR: 18 (10 Kwame 2018 06:16) (18 - 18)  SpO2: 98% (10 Kwame 2018 06:16) (97% - 98%)    Cardiovascular:  S1S2 RRR, No JVD, 1/6 MIKO   Respiratory: Lungs clear to auscultation, normal effort  Gastrointestinal: Abdomen soft, ND, NT, +BS  Skin: Warm, dry, intact. No rash.  Musculoskeletal: Normal ROM, normal strength  Psychiatric: Appropriate Mood and Affect  Vascular: Peripheral pulses palpable 2+ B/L    DIAGNOSTIC DATA  TELEMETRY: SR    ASSESSMENT AND PLAN:  She is a very pleasant 62 year old female with a past medical history of hypertension, hyperlipidemia, active smoker and PAD who is now admitted with 2-3 days of dyspnea and chest discomfort. A NST in the office showed new LV dysfunction with evidence of infarct and natividad-infarct ischemia. She denies palpitations nor syncope. EKG/tele in the ER show NSR without any ventricular ectopy.  --Trop T negative  --s/p PCI to  of RCA   --f/u appt with Dr Benito on 1/15/18 at 11:00 AM  --Repeat TTE in 3 months  --no plans for ICD or Lifevest at this time.    Kristan Fernández PA-C  [facility]  [address], [address]   [place] [address]  office [phone]  pager [phone]

## 2018-01-10 NOTE — DISCHARGE NOTE ADULT - ADDITIONAL INSTRUCTIONS
Follow up with Dr. Benito on 1/11/18 at 11:00AM. Continue your medications as prescribed. Return to the ER for any concerning symptoms.

## 2018-01-10 NOTE — PROGRESS NOTE ADULT - SUBJECTIVE AND OBJECTIVE BOX
Subjective: No chest pain or sob   	  MEDICATIONS:  MEDICATIONS  (STANDING):  aspirin enteric coated 81 milliGRAM(s) Oral daily  clopidogrel Tablet 75 milliGRAM(s) Oral daily  diltiazem    milliGRAM(s) Oral daily  influenza   Vaccine 0.5 milliLiter(s) IntraMuscular once  simvastatin 40 milliGRAM(s) Oral at bedtime  valsartan 160 milliGRAM(s) Oral daily      LABS:	 	    CARDIAC MARKERS:  CARDIAC MARKERS ( 09 Jan 2018 09:40 )  x     / < 0.06 ng/mL / 54 u/L / 1.96 ng/mL / x                                    12.7   8.98  )-----------( 221      ( 10 Kwame 2018 06:20 )             37.9     01-10    138  |  101  |  25<H>  ----------------------------<  103<H>  4.2   |  23  |  1.03    Ca    8.8      10 Kwame 2018 06:20    TPro  7.6  /  Alb  4.0  /  TBili  0.3  /  DBili  x   /  AST  23  /  ALT  18  /  AlkPhos  87  01-09    proBNP:   Lipid Profile:   HgA1c:   TSH:       PHYSICAL EXAM:  T(C): 36.8 (01-10-18 @ 06:16), Max: 36.8 (01-10-18 @ 06:16)  HR: 85 (01-10-18 @ 06:16) (74 - 85)  BP: 128/66 (01-10-18 @ 06:16) (128/66 - 138/71)  RR: 18 (01-10-18 @ 06:16) (18 - 18)  SpO2: 98% (01-10-18 @ 06:16) (97% - 98%)  Wt(kg): --  I&O's Summary    Height (cm): 160.02 (01-10 @ 06:16)  Weight (kg): 74.8 (01-10 @ 06:16)  BMI (kg/m2): 29.2 (01-10 @ 06:16)  BSA (m2): 1.78 (01-10 @ 06:16)    Heart: normal S1, S2, RRR, no m/r/g  Lungs: cta b/l  Abd: soft nT, nD  Ext: no edema; 2+ pulses in right radial artery with no hematoma; no groin hematoma, 2+ pulses     TELEMETRY:SR 	    ECG:  	  RADIOLOGY:   DIAGNOSTIC TESTING:  [ ] Echocardiogram:  [ ]  Catheterization:  [ ] Stress Test:    OTHER: 	      ASSESSMENT/PLAN: 	62y Female with HTN, HLD admitted post ANETTE to RCA.   -Continue with dapt with asa and plavix  -continue with statin  -no further cardiac workup needed at this time  -dc home    Tip Hernandez MD  [facility]  [address] [place] [address], [id number]  [place] [address]  office: [phone]  pager: [phone]

## 2018-01-10 NOTE — DISCHARGE NOTE ADULT - PATIENT PORTAL LINK FT
“You can access the Follow[facility] Patient Portal, offered by [facility], by registering with the following website: [email or web address]”

## 2018-01-10 NOTE — PROGRESS NOTE ADULT - ATTENDING COMMENTS
EP ATTENDING    Agree with above. Now s/p PCI of  of RCA. Enzymes negative.    Plan  -medical therapy for CAD with low LVEF (42%) as per cardiology  -no indications for ICD nor LifeVest at this time  -repeat echo in 3 months after PCI to assure LVEF remains > 35%  -continue beta blockers

## 2018-01-10 NOTE — DISCHARGE NOTE ADULT - SECONDARY DIAGNOSIS.
CHF (congestive heart failure) COPD (chronic obstructive pulmonary disease) HTN (hypertension) HLD (hyperlipidemia)

## 2018-06-26 ENCOUNTER — APPOINTMENT (OUTPATIENT)
Dept: VASCULAR SURGERY | Facility: CLINIC | Age: 63
End: 2018-06-26

## 2018-10-02 PROBLEM — J43.9 EMPHYSEMA, UNSPECIFIED: Chronic | Status: ACTIVE | Noted: 2017-12-26

## 2018-10-02 PROBLEM — E78.5 HYPERLIPIDEMIA, UNSPECIFIED: Chronic | Status: ACTIVE | Noted: 2017-12-26

## 2018-10-02 PROBLEM — M25.559 PAIN IN UNSPECIFIED HIP: Chronic | Status: ACTIVE | Noted: 2018-01-09

## 2018-10-02 PROBLEM — I10 ESSENTIAL (PRIMARY) HYPERTENSION: Chronic | Status: ACTIVE | Noted: 2017-12-26

## 2018-10-15 ENCOUNTER — APPOINTMENT (OUTPATIENT)
Dept: THORACIC SURGERY | Facility: CLINIC | Age: 63
End: 2018-10-15
Payer: COMMERCIAL

## 2018-10-15 VITALS
DIASTOLIC BLOOD PRESSURE: 78 MMHG | BODY MASS INDEX: 31.89 KG/M2 | WEIGHT: 180 LBS | SYSTOLIC BLOOD PRESSURE: 160 MMHG | HEIGHT: 63 IN | HEART RATE: 80 BPM | RESPIRATION RATE: 16 BRPM | OXYGEN SATURATION: 96 %

## 2018-10-15 DIAGNOSIS — Z86.79 PERSONAL HISTORY OF OTHER DISEASES OF THE CIRCULATORY SYSTEM: ICD-10-CM

## 2018-10-15 DIAGNOSIS — Z87.09 PERSONAL HISTORY OF OTHER DISEASES OF THE RESPIRATORY SYSTEM: ICD-10-CM

## 2018-10-15 DIAGNOSIS — E78.00 PURE HYPERCHOLESTEROLEMIA, UNSPECIFIED: ICD-10-CM

## 2018-10-15 DIAGNOSIS — Z87.39 PERSONAL HISTORY OF OTHER DISEASES OF THE MUSCULOSKELETAL SYSTEM AND CONNECTIVE TISSUE: ICD-10-CM

## 2018-10-15 DIAGNOSIS — Z82.61 FAMILY HISTORY OF ARTHRITIS: ICD-10-CM

## 2018-10-15 DIAGNOSIS — Z87.891 PERSONAL HISTORY OF NICOTINE DEPENDENCE: ICD-10-CM

## 2018-10-15 DIAGNOSIS — Z86.39 PERSONAL HISTORY OF OTHER ENDOCRINE, NUTRITIONAL AND METABOLIC DISEASE: ICD-10-CM

## 2018-10-15 PROCEDURE — 99205 OFFICE O/P NEW HI 60 MIN: CPT

## 2018-10-15 RX ORDER — ATORVASTATIN CALCIUM 40 MG/1
40 TABLET, FILM COATED ORAL
Refills: 0 | Status: ACTIVE | COMMUNITY

## 2018-10-15 RX ORDER — TRAMADOL HYDROCHLORIDE 50 MG/1
50 TABLET, COATED ORAL
Qty: 90 | Refills: 0 | Status: DISCONTINUED | COMMUNITY
Start: 2017-11-16 | End: 2018-10-15

## 2018-10-15 RX ORDER — CIPROFLOXACIN HYDROCHLORIDE 500 MG/1
500 TABLET, FILM COATED ORAL
Qty: 10 | Refills: 0 | Status: DISCONTINUED | COMMUNITY
Start: 2018-01-03 | End: 2018-10-15

## 2018-10-15 RX ORDER — MUPIROCIN CALCIUM 20 MG/G
2 OINTMENT TOPICAL
Qty: 2 | Refills: 0 | Status: DISCONTINUED | COMMUNITY
Start: 2018-01-02 | End: 2018-10-15

## 2018-12-19 ENCOUNTER — OTHER (OUTPATIENT)
Age: 63
End: 2018-12-19

## 2019-01-02 PROBLEM — Z86.79 HISTORY OF CORONARY ARTERY DISEASE: Status: RESOLVED | Noted: 2019-01-02 | Resolved: 2019-01-02

## 2019-01-07 ENCOUNTER — APPOINTMENT (OUTPATIENT)
Dept: THORACIC SURGERY | Facility: CLINIC | Age: 64
End: 2019-01-07
Payer: COMMERCIAL

## 2019-01-07 VITALS
OXYGEN SATURATION: 98 % | HEART RATE: 78 BPM | SYSTOLIC BLOOD PRESSURE: 150 MMHG | BODY MASS INDEX: 33.49 KG/M2 | DIASTOLIC BLOOD PRESSURE: 70 MMHG | HEIGHT: 63 IN | WEIGHT: 189 LBS | RESPIRATION RATE: 16 BRPM

## 2019-01-07 DIAGNOSIS — Z86.79 PERSONAL HISTORY OF OTHER DISEASES OF THE CIRCULATORY SYSTEM: ICD-10-CM

## 2019-01-07 PROCEDURE — 99214 OFFICE O/P EST MOD 30 MIN: CPT

## 2019-04-18 ENCOUNTER — OTHER (OUTPATIENT)
Age: 64
End: 2019-04-18

## 2019-05-06 ENCOUNTER — APPOINTMENT (OUTPATIENT)
Dept: THORACIC SURGERY | Facility: CLINIC | Age: 64
End: 2019-05-06
Payer: COMMERCIAL

## 2019-05-06 VITALS
OXYGEN SATURATION: 97 % | HEART RATE: 84 BPM | SYSTOLIC BLOOD PRESSURE: 143 MMHG | RESPIRATION RATE: 18 BRPM | WEIGHT: 190 LBS | DIASTOLIC BLOOD PRESSURE: 79 MMHG | BODY MASS INDEX: 33.66 KG/M2

## 2019-05-06 PROCEDURE — 99215 OFFICE O/P EST HI 40 MIN: CPT

## 2019-05-06 NOTE — CONSULT LETTER
[Courtesy Letter:] : I had the pleasure of seeing your patient, [unfilled], in my office today. [Dear  ___] : Dear  [unfilled], [Please see my note below.] : Please see my note below. [Sincerely,] : Sincerely, [FreeTextEntry2] : Dr. Boyd (PCP)\par Dr. Garg (Cardiologist)\par Dr. Wesley Joshi (Pulm) \par   [FreeTextEntry3] : Yaw Clement MD\par Department of Cardiovascular and Thoracic Surgery\par  \par [facility] [employer] [facility]

## 2019-05-06 NOTE — DATA REVIEWED
[FreeTextEntry1] : Chest CT done 5/1/19 revealed:\par - mild progression of right paratracheal adenopathy compared to previous scans\par - RUL apical 10 x 8 x 9 mm irregular nodule, previously measured 9 x 7 x 7 mm on previous scan and currently appears more rounded\par - Stable RLL groundglass nodule

## 2019-05-06 NOTE — ASSESSMENT
[FreeTextEntry1] : 63 year old female with a history of lung nodules.\par \par Previous Chest CT done 1/4/2019 revealed:\par - 10 mm GGO in the RLL, unchanged\par - Stable GGO is noted laterally in the posterior segment of the RUL (series 5 image 150)\par - Interval enlargement of the right paratracheal lymph node, 1.7 x 2.4 cm (previously 1.2 x 1.4 cm)\par \par Chest CT done 5/1/19 revealed:\par - mild progression of right paratracheal adenopathy compared to previous scans\par - RUL apical 10 x 8 x 9 mm irregular nodule, previously measured 9 x 7 x 7 mm on previous scan and currently appears more rounded\par - Stable RLL groundglass nodule\par \par I have reviewed the patient's medical records and diagnostic images during the time of this office visit, and I have made the following recommendation:\par 1. CT finding reviewed with pt. Pt has recent sinus infection, RTC in 3 months with CT chest w/o contrast. \par \par Written by Hanh Sung NP, acting as a scribe for Dr. Yaw Bates. \par \par The documentation recorded by the scribe accurately reflects the service I personally performed and the decisions made by me. YAW BATES MD

## 2019-05-06 NOTE — PHYSICAL EXAM
[Auscultation Breath Sounds / Voice Sounds] : lungs were clear to auscultation bilaterally [Heart Rate And Rhythm] : heart rate was normal and rhythm regular [Murmurs] : no murmurs [Heart Sounds] : normal S1 and S2 [Heart Sounds Gallop] : no gallops [Chest Visual Inspection Thoracic Asymmetry] : no chest asymmetry [Heart Sounds Pericardial Friction Rub] : no pericardial rub [Examination Of The Chest] : the chest was normal in appearance [Diminished Respiratory Excursion] : normal chest expansion [2+] : left 2+ [Bowel Sounds] : normal bowel sounds [Abdomen Soft] : soft [Abdomen Tenderness] : non-tender [Cervical Lymph Nodes Enlarged Posterior Bilaterally] : posterior cervical [Abdomen Mass (___ Cm)] : no abdominal mass palpated [Cervical Lymph Nodes Enlarged Anterior Bilaterally] : anterior cervical [Supraclavicular Lymph Nodes Enlarged Bilaterally] : supraclavicular [No CVA Tenderness] : no ~M costovertebral angle tenderness [Abnormal Walk] : normal gait [No Spinal Tenderness] : no spinal tenderness [Musculoskeletal - Swelling] : no joint swelling seen [Nail Clubbing] : no clubbing  or cyanosis of the fingernails [Motor Tone] : muscle strength and tone were normal [Skin Color & Pigmentation] : normal skin color and pigmentation [] : no rash [Skin Turgor] : normal skin turgor [Sensation] : the sensory exam was normal to light touch and pinprick [Deep Tendon Reflexes (DTR)] : deep tendon reflexes were 2+ and symmetric [No Focal Deficits] : no focal deficits [Oriented To Time, Place, And Person] : oriented to person, place, and time [Impaired Insight] : insight and judgment were intact [Affect] : the affect was normal [Neck Appearance] : the appearance of the neck was normal [Neck Cervical Mass (___cm)] : no neck mass was observed [Jugular Venous Distention Increased] : there was no jugular-venous distention [Thyroid Diffuse Enlargement] : the thyroid was not enlarged [Thyroid Nodule] : there were no palpable thyroid nodules

## 2019-05-06 NOTE — REVIEW OF SYSTEMS
[Negative] : Heme/Lymph [As Noted in HPI] : as noted in HPI [SOB on Exertion] : shortness of breath during exertion [Cough] : no cough [Wheezing] : no wheezing

## 2019-05-06 NOTE — HISTORY OF PRESENT ILLNESS
[FreeTextEntry1] : 63 year old female with a history of lung nodules.\par \par Previous Chest CT done 1/4/2019 revealed:\par - 10 mm GGO in the RLL, unchanged\par - Stable GGO is noted laterally in the posterior segment of the RUL (series 5 image 150)\par - Interval enlargement of the right paratracheal lymph node, 1.7 x 2.4 cm (previously 1.2 x 1.4 cm)\par \par Chest CT done 5/1/19 revealed:\par - mild progression of right paratracheal adenopathy compared to previous scans\par - RUL apical 10 x 8 x 9 mm irregular nodule, previously measured 9 x 7 x 7 mm on previous scan and currently appears more rounded\par - Stable RLL groundglass nodule\par \par Pt presents today for follow up. Pt reports had recent sinus infection and SOB on exertion, denies fever, chills, cough or CP. \par

## 2019-06-17 ENCOUNTER — OTHER (OUTPATIENT)
Age: 64
End: 2019-06-17

## 2019-07-18 ENCOUNTER — OTHER (OUTPATIENT)
Age: 64
End: 2019-07-18

## 2019-08-05 ENCOUNTER — APPOINTMENT (OUTPATIENT)
Dept: THORACIC SURGERY | Facility: CLINIC | Age: 64
End: 2019-08-05
Payer: COMMERCIAL

## 2019-08-05 VITALS
RESPIRATION RATE: 17 BRPM | WEIGHT: 188 LBS | DIASTOLIC BLOOD PRESSURE: 80 MMHG | HEIGHT: 63 IN | TEMPERATURE: 97.9 F | OXYGEN SATURATION: 98 % | BODY MASS INDEX: 33.31 KG/M2 | SYSTOLIC BLOOD PRESSURE: 168 MMHG | HEART RATE: 84 BPM

## 2019-08-05 PROCEDURE — 99215 OFFICE O/P EST HI 40 MIN: CPT

## 2019-08-05 NOTE — ASSESSMENT
[FreeTextEntry1] : 63 year old female with a history of lung nodules.  Past medical history to include COPD, HTN, HLD, CAD, S/P PCT - Stent to the RCA 1/9/2018.\par \par CT Chest on 8/1/19 reveals:\par - RUL apical nodule with interveal enlargement, currently 1.7 x 0.9 cm, previously 1.0 x 0.8 x 0.9 cm in 5/2019 and 0.9 x 0.7 x 0.7 cm in  1/2019\par - Right paratracheal adenopathy, 2.6 cm, stable since 5/2019 (previously 1.1 cm in 09/2018)\par - Stable RUL groundglass nodule\par \par I have reviewed the patient's medical records and diagnostic images during the time of this office visit, and I have made the following recommendation:\par 1.  We discussed several treatment options including: a. continue to monitor, b, biopsy by either needle biopsy vs. navigational bronchoscopy c.  surgical resection.  After discussing these options, she decided to proceed with surgery and she will be booked for flexible bronchoscopy, right robotic assisted lung resection.  The risks, benefits, and alternatives to the procedure were discussed with the patient at length. She verbalized understanding, and are in agreement with the above treatment plan.  \par 2. Prior to surgery she will require both medical and cardiac clearance.  \par 3. She also needs Complete PFT's (with Dr. Wesley Richey).\par \par Written by Sydney Natarajan NP, acting as a scribe for Yaw Bates MD.\par \par The documentation recorded by the scribe accurately reflects the service I personally performed and the decisions made by me. YAW BATES MD\par  \par \par

## 2019-08-05 NOTE — HISTORY OF PRESENT ILLNESS
[FreeTextEntry1] : 63 year old female former smoker (1/2 PPD x 30 years, quit Jan 2018) returns today for follow up for history of lung nodules.  \par \par PMHx includes COPD, HTN, HLD, and CAD.  She is still awaiting right THR.  She was scheduled to have the THR in January 2019, but surgery was cancelled due to abnormal stress test which prompted Cardiac Cath S/P with Stent to the RCA on 1/9/2018.\par \par CT Chest on 8/1/19 reveals:\par - RUL apical nodule with interval enlargement, currently 1.7 x 0.9 cm, previously 1.0 x 0.8 x 0.9 cm in 5/2019 and 0.9 x 0.7 x 0.7 cm in  1/2019\par - Right paratracheal adenopathy, 2.6 cm, stable since 5/2019 (previously 1.1 cm in 09/2018)\par - Stable RUL groundglass nodule\par \par Previous Chest CT done 5/1/19 revealed:\par - mild progression of right paratracheal adenopathy compared to previous scans\par - RUL apical 10 x 8 x 9 mm irregular nodule, previously measured 9 x 7 x 7 mm on previous scan and currently appears more rounded\par - Stable RLL groundglass nodule\par \par She presents today with complaint of shortness of breath with exertion and a recent cold which caused her to have a sore throat, funny nose and chills.  She reports that her symptoms have improved.  She also complains of right hip pain, but does not require any pain medication.  She denies any fever, chills, cough, chest pain, or hemoptysis.

## 2019-08-05 NOTE — CONSULT LETTER
[Courtesy Letter:] : I had the pleasure of seeing your patient, [unfilled], in my office today. [Please see my note below.] : Please see my note below. [Sincerely,] : Sincerely, [Dear  ___] : Dear  [unfilled], [FreeTextEntry2] : Dr. Boyd (PCP)\par Dr. Porras (Cardiologist)\par Dr. Wesley Joshi (Pulm/Ref)\par \par  [FreeTextEntry3] : \par \par \par Yaw Clement MD \par Department of Cardiovascular and Thoracic Surgery \par  \par [facility]

## 2019-08-05 NOTE — REVIEW OF SYSTEMS
[Chills] : chills [Fever] : no fever [Feeling Tired] : feeling tired [Feeling Poorly] : not feeling poorly [Recent Weight Gain (___ Lbs)] : no recent weight gain [Heart Rate Is Slow] : the heart rate was not slow [Recent Weight Loss (___ Lbs)] : recent [unfilled] ~Ulb weight loss [Chest Pain] : no chest pain [Heart Rate Is Fast] : the heart rate was not fast [Palpitations] : no palpitations [Leg Claudication] : no intermittent leg claudication [Lower Ext Edema] : lower extremity edema [Wheezing] : no wheezing [Cough] : no cough [Arthralgias] : arthralgias [SOB on Exertion] : shortness of breath during exertion [Joint Pain] : joint pain [Joint Swelling] : joint swelling [Negative] : Heme/Lymph [FreeTextEntry2] : She intentionally lost 11 lbs (on Nutrasystem) [FreeTextEntry9] : Right hip pain

## 2019-08-05 NOTE — PHYSICAL EXAM
[Sclera] : the sclera and conjunctiva were normal [] : the neck was supple [Respiration, Rhythm And Depth] : normal respiratory rhythm and effort [Neck Cervical Mass (___cm)] : no neck mass was observed [Heart Rate And Rhythm] : heart rate was normal and rhythm regular [Auscultation Breath Sounds / Voice Sounds] : lungs were clear to auscultation bilaterally [Examination Of The Chest] : the chest was normal in appearance [Heart Sounds] : normal S1 and S2 [Abdomen Tenderness] : non-tender [Abdomen Soft] : soft [Cervical Lymph Nodes Enlarged Anterior Bilaterally] : anterior cervical [Cervical Lymph Nodes Enlarged Posterior Bilaterally] : posterior cervical [No CVA Tenderness] : no ~M costovertebral angle tenderness [Supraclavicular Lymph Nodes Enlarged Bilaterally] : supraclavicular [Skin Color & Pigmentation] : normal skin color and pigmentation [FreeTextEntry1] : ambulates with walker [No Focal Deficits] : no focal deficits [Oriented To Time, Place, And Person] : oriented to person, place, and time [Affect] : the affect was normal [Impaired Insight] : insight and judgment were intact

## 2019-08-21 ENCOUNTER — TRANSCRIPTION ENCOUNTER (OUTPATIENT)
Age: 64
End: 2019-08-21

## 2019-08-26 ENCOUNTER — OUTPATIENT (OUTPATIENT)
Dept: OUTPATIENT SERVICES | Facility: HOSPITAL | Age: 64
LOS: 1 days | End: 2019-08-26
Payer: COMMERCIAL

## 2019-08-26 VITALS
RESPIRATION RATE: 16 BRPM | DIASTOLIC BLOOD PRESSURE: 60 MMHG | HEIGHT: 65 IN | TEMPERATURE: 98 F | SYSTOLIC BLOOD PRESSURE: 146 MMHG | WEIGHT: 190.04 LBS | HEART RATE: 76 BPM

## 2019-08-26 DIAGNOSIS — Z98.891 HISTORY OF UTERINE SCAR FROM PREVIOUS SURGERY: Chronic | ICD-10-CM

## 2019-08-26 DIAGNOSIS — I25.10 ATHEROSCLEROTIC HEART DISEASE OF NATIVE CORONARY ARTERY WITHOUT ANGINA PECTORIS: ICD-10-CM

## 2019-08-26 DIAGNOSIS — I10 ESSENTIAL (PRIMARY) HYPERTENSION: ICD-10-CM

## 2019-08-26 DIAGNOSIS — R91.1 SOLITARY PULMONARY NODULE: ICD-10-CM

## 2019-08-26 DIAGNOSIS — T78.40XA ALLERGY, UNSPECIFIED, INITIAL ENCOUNTER: ICD-10-CM

## 2019-08-26 DIAGNOSIS — R91.8 OTHER NONSPECIFIC ABNORMAL FINDING OF LUNG FIELD: ICD-10-CM

## 2019-08-26 LAB
ANION GAP SERPL CALC-SCNC: 16 MMO/L — HIGH (ref 7–14)
BUN SERPL-MCNC: 28 MG/DL — HIGH (ref 7–23)
CALCIUM SERPL-MCNC: 9.9 MG/DL — SIGNIFICANT CHANGE UP (ref 8.4–10.5)
CHLORIDE SERPL-SCNC: 100 MMOL/L — SIGNIFICANT CHANGE UP (ref 98–107)
CO2 SERPL-SCNC: 24 MMOL/L — SIGNIFICANT CHANGE UP (ref 22–31)
CREAT SERPL-MCNC: 1.1 MG/DL — SIGNIFICANT CHANGE UP (ref 0.5–1.3)
GLUCOSE SERPL-MCNC: 91 MG/DL — SIGNIFICANT CHANGE UP (ref 70–99)
HCT VFR BLD CALC: 38.7 % — SIGNIFICANT CHANGE UP (ref 34.5–45)
HGB BLD-MCNC: 12.4 G/DL — SIGNIFICANT CHANGE UP (ref 11.5–15.5)
MCHC RBC-ENTMCNC: 31.1 PG — SIGNIFICANT CHANGE UP (ref 27–34)
MCHC RBC-ENTMCNC: 32 % — SIGNIFICANT CHANGE UP (ref 32–36)
MCV RBC AUTO: 97 FL — SIGNIFICANT CHANGE UP (ref 80–100)
NRBC # FLD: 0 K/UL — SIGNIFICANT CHANGE UP (ref 0–0)
PLATELET # BLD AUTO: 326 K/UL — SIGNIFICANT CHANGE UP (ref 150–400)
PMV BLD: 11 FL — SIGNIFICANT CHANGE UP (ref 7–13)
POTASSIUM SERPL-MCNC: 4.6 MMOL/L — SIGNIFICANT CHANGE UP (ref 3.5–5.3)
POTASSIUM SERPL-SCNC: 4.6 MMOL/L — SIGNIFICANT CHANGE UP (ref 3.5–5.3)
RBC # BLD: 3.99 M/UL — SIGNIFICANT CHANGE UP (ref 3.8–5.2)
RBC # FLD: 11.7 % — SIGNIFICANT CHANGE UP (ref 10.3–14.5)
SODIUM SERPL-SCNC: 140 MMOL/L — SIGNIFICANT CHANGE UP (ref 135–145)
WBC # BLD: 10.8 K/UL — HIGH (ref 3.8–10.5)
WBC # FLD AUTO: 10.8 K/UL — HIGH (ref 3.8–10.5)

## 2019-08-26 PROCEDURE — 93010 ELECTROCARDIOGRAM REPORT: CPT

## 2019-08-26 RX ORDER — SIMVASTATIN 20 MG/1
1 TABLET, FILM COATED ORAL
Qty: 0 | Refills: 0 | DISCHARGE

## 2019-08-26 RX ORDER — FLUTICASONE FUROATE AND VILANTEROL TRIFENATATE 100; 25 UG/1; UG/1
1 POWDER RESPIRATORY (INHALATION)
Qty: 0 | Refills: 0 | DISCHARGE

## 2019-08-26 RX ORDER — SODIUM CHLORIDE 9 MG/ML
1000 INJECTION, SOLUTION INTRAVENOUS
Refills: 0 | Status: DISCONTINUED | OUTPATIENT
Start: 2019-09-03 | End: 2019-09-08

## 2019-08-26 RX ORDER — IBUPROFEN 200 MG
1 TABLET ORAL
Qty: 0 | Refills: 0 | DISCHARGE

## 2019-08-26 RX ORDER — VALSARTAN 80 MG/1
1 TABLET ORAL
Qty: 0 | Refills: 0 | DISCHARGE

## 2019-08-26 NOTE — H&P PST ADULT - MUSCULOSKELETAL
details… detailed exam no joint swelling/diminished strength/no joint erythema/no joint warmth/ROM intact/no calf tenderness

## 2019-08-26 NOTE — H&P PST ADULT - RESPIRATORY AND THORAX COMMENTS
hx of pulmonary nodules -Hx of emphysema - states she was told it is mild,  only uses inhalers when she is sick - d

## 2019-08-26 NOTE — H&P PST ADULT - NSICDXFAMILYHX_GEN_ALL_CORE_FT
FAMILY HISTORY:  Mother  Still living? Unknown  Family history of CHF (congestive heart failure), Age at diagnosis: Age Unknown    Aunt  Still living? No  Family history of CHF (congestive heart failure), Age at diagnosis: Age Unknown

## 2019-08-26 NOTE — H&P PST ADULT - NSICDXPASTMEDICALHX_GEN_ALL_CORE_FT
PAST MEDICAL HISTORY:  Carotid artery disease monitored by javier hardyotor Doscher    Emphysema     Hip pain     Hyperlipidemia     Hypertension     Pulmonary nodules yearly CT scans PAST MEDICAL HISTORY:  Carotid artery disease monitored by vascluar doctor Doscher    Emphysema     Hip pain     Hyperlipidemia     Hypertension     Pulmonary nodules yearly CT scans

## 2019-08-26 NOTE — H&P PST ADULT - NSICDXPROBLEM_GEN_ALL_CORE_FT
PROBLEM DIAGNOSES  Problem: Pulmonary nodules  Assessment and Plan: scheduled for flexible bronchoscopy, right robotic assisted lung resection on 09/03/2019.   Verbal and written pre-op instructions provided to patient. Patient verbalized understanding.   Pepcid for GI prophylaxis provided.   patient given verbal and written instruction with teach back on chlorhexidine shampoo, and the patient verbalized understanding with return demonstration.     Problem: CAD (coronary artery disease)  Assessment and Plan: Pending Cardiac eval as per surgeon request-copy requested.   Continue ASA     Problem: Allergy  Assessment and Plan: OR booking notified of allergies.     Problem: Hypertension  Assessment and Plan: Pt. instructed to continue medications as prescribed.

## 2019-08-26 NOTE — H&P PST ADULT - NECK DETAILS
"Per report GENE Henderson received a verbal order from Dr. Sparks yesterday to discharge pt on 2/18/17 in the am. GENE Henderson placed a nursing communication order stating, \"Discharge in AM if patient stable (awake, no nausea or vomiting, pain control adequate for patient) Saline lock IV when taking in good PO intake\". Unable put in discharge orders per Dr. Sparks due to the admission order not being signed by Dr. Sparks. Pt stable and ready for discharge. Pt denies nausea and  vomiting and states pain is under control. Will discharge pt home with son today.   " supple

## 2019-08-26 NOTE — H&P PST ADULT - HISTORY OF PRESENT ILLNESS
64 yo female  former 38-pack year smoker with history of emphysema, HTN, HLD, CAD with chronic pulmonary nodules presents to PST unit with pre-op diagnosis of solitary pulmonary nodule scheduled for flexible bronchoscopy, right robotic assisted lung resection on 09/03/2019. She reports long term monitoring of lung nodules over 15 years with abnormality and lymphadenopathy on six months ago followed by CT scan 3 months later with enlarging nodule in the right lung.

## 2019-08-26 NOTE — H&P PST ADULT - NEGATIVE ENMT SYMPTOMS
no throat pain/no dysphagia/no hearing difficulty/no ear pain/no vertigo/no sinus symptoms/no tinnitus

## 2019-08-26 NOTE — H&P PST ADULT - ASSESSMENT
Osteoarthritis 62 yo female with  pre-op diagnosis of solitary pulmonary nodule scheduled for flexible bronchoscopy, right robotic assisted lung resection on 09/03/2019. S 62 yo female with  pre-op diagnosis of solitary pulmonary nodule scheduled for flexible bronchoscopy, right robotic assisted lung resection on 09/03/2019.

## 2019-08-27 LAB
BLD GP AB SCN SERPL QL: NEGATIVE — SIGNIFICANT CHANGE UP
RH IG SCN BLD-IMP: POSITIVE — SIGNIFICANT CHANGE UP

## 2019-08-30 NOTE — ASU PATIENT PROFILE, ADULT - PMH
Carotid artery disease  monitored by vascluar doctor Doscher  Emphysema    Hip pain    Hyperlipidemia    Hypertension    Pulmonary nodules  yearly CT scans

## 2019-09-02 ENCOUNTER — TRANSCRIPTION ENCOUNTER (OUTPATIENT)
Age: 64
End: 2019-09-02

## 2019-09-03 ENCOUNTER — RESULT REVIEW (OUTPATIENT)
Age: 64
End: 2019-09-03

## 2019-09-03 ENCOUNTER — APPOINTMENT (OUTPATIENT)
Dept: THORACIC SURGERY | Facility: HOSPITAL | Age: 64
End: 2019-09-03

## 2019-09-03 ENCOUNTER — INPATIENT (INPATIENT)
Facility: HOSPITAL | Age: 64
LOS: 4 days | Discharge: HOME | End: 2019-09-08
Attending: THORACIC SURGERY (CARDIOTHORACIC VASCULAR SURGERY) | Admitting: THORACIC SURGERY (CARDIOTHORACIC VASCULAR SURGERY)
Payer: COMMERCIAL

## 2019-09-03 VITALS
WEIGHT: 188.05 LBS | OXYGEN SATURATION: 96 % | HEART RATE: 83 BPM | TEMPERATURE: 98 F | RESPIRATION RATE: 14 BRPM | DIASTOLIC BLOOD PRESSURE: 72 MMHG | HEIGHT: 63 IN | SYSTOLIC BLOOD PRESSURE: 152 MMHG

## 2019-09-03 DIAGNOSIS — Z98.891 HISTORY OF UTERINE SCAR FROM PREVIOUS SURGERY: Chronic | ICD-10-CM

## 2019-09-03 DIAGNOSIS — R91.1 SOLITARY PULMONARY NODULE: ICD-10-CM

## 2019-09-03 LAB
GRAM STN WND: SIGNIFICANT CHANGE UP
SPECIMEN SOURCE: SIGNIFICANT CHANGE UP

## 2019-09-03 PROCEDURE — 88342 IMHCHEM/IMCYTCHM 1ST ANTB: CPT | Mod: 26,59

## 2019-09-03 PROCEDURE — 88313 SPECIAL STAINS GROUP 2: CPT | Mod: 26

## 2019-09-03 PROCEDURE — 31622 DX BRONCHOSCOPE/WASH: CPT

## 2019-09-03 PROCEDURE — 88341 IMHCHEM/IMCYTCHM EA ADD ANTB: CPT | Mod: 26

## 2019-09-03 PROCEDURE — 32663 THORACOSCOPY W/LOBECTOMY: CPT

## 2019-09-03 PROCEDURE — 88305 TISSUE EXAM BY PATHOLOGIST: CPT | Mod: 26

## 2019-09-03 PROCEDURE — 32674 THORACOSCOPY LYMPH NODE EXC: CPT | Mod: 80

## 2019-09-03 PROCEDURE — 88300 SURGICAL PATH GROSS: CPT | Mod: 26,59

## 2019-09-03 PROCEDURE — 88331 PATH CONSLTJ SURG 1 BLK 1SPC: CPT | Mod: 26

## 2019-09-03 PROCEDURE — 71045 X-RAY EXAM CHEST 1 VIEW: CPT | Mod: 26

## 2019-09-03 PROCEDURE — 32674 THORACOSCOPY LYMPH NODE EXC: CPT

## 2019-09-03 PROCEDURE — 88309 TISSUE EXAM BY PATHOLOGIST: CPT | Mod: 26

## 2019-09-03 PROCEDURE — 32663 THORACOSCOPY W/LOBECTOMY: CPT | Mod: 80

## 2019-09-03 PROCEDURE — 99233 SBSQ HOSP IP/OBS HIGH 50: CPT

## 2019-09-03 PROCEDURE — 88307 TISSUE EXAM BY PATHOLOGIST: CPT | Mod: 26

## 2019-09-03 RX ORDER — DOCUSATE SODIUM 100 MG
100 CAPSULE ORAL THREE TIMES A DAY
Refills: 0 | Status: DISCONTINUED | OUTPATIENT
Start: 2019-09-03 | End: 2019-09-08

## 2019-09-03 RX ORDER — NALOXONE HYDROCHLORIDE 4 MG/.1ML
0.1 SPRAY NASAL
Refills: 0 | Status: DISCONTINUED | OUTPATIENT
Start: 2019-09-03 | End: 2019-09-06

## 2019-09-03 RX ORDER — BUDESONIDE AND FORMOTEROL FUMARATE DIHYDRATE 160; 4.5 UG/1; UG/1
2 AEROSOL RESPIRATORY (INHALATION)
Refills: 0 | Status: DISCONTINUED | OUTPATIENT
Start: 2019-09-03 | End: 2019-09-08

## 2019-09-03 RX ORDER — IPRATROPIUM/ALBUTEROL SULFATE 18-103MCG
3 AEROSOL WITH ADAPTER (GRAM) INHALATION EVERY 6 HOURS
Refills: 0 | Status: COMPLETED | OUTPATIENT
Start: 2019-09-03 | End: 2019-09-06

## 2019-09-03 RX ORDER — ONDANSETRON 8 MG/1
4 TABLET, FILM COATED ORAL EVERY 6 HOURS
Refills: 0 | Status: DISCONTINUED | OUTPATIENT
Start: 2019-09-03 | End: 2019-09-06

## 2019-09-03 RX ORDER — HEPARIN SODIUM 5000 [USP'U]/ML
5000 INJECTION INTRAVENOUS; SUBCUTANEOUS EVERY 8 HOURS
Refills: 0 | Status: DISCONTINUED | OUTPATIENT
Start: 2019-09-03 | End: 2019-09-08

## 2019-09-03 RX ORDER — INFLUENZA VIRUS VACCINE 15; 15; 15; 15 UG/.5ML; UG/.5ML; UG/.5ML; UG/.5ML
0.5 SUSPENSION INTRAMUSCULAR ONCE
Refills: 0 | Status: DISCONTINUED | OUTPATIENT
Start: 2019-09-03 | End: 2019-09-07

## 2019-09-03 RX ORDER — ATORVASTATIN CALCIUM 80 MG/1
40 TABLET, FILM COATED ORAL AT BEDTIME
Refills: 0 | Status: DISCONTINUED | OUTPATIENT
Start: 2019-09-03 | End: 2019-09-08

## 2019-09-03 RX ORDER — HYDROMORPHONE HYDROCHLORIDE 2 MG/ML
30 INJECTION INTRAMUSCULAR; INTRAVENOUS; SUBCUTANEOUS
Refills: 0 | Status: DISCONTINUED | OUTPATIENT
Start: 2019-09-03 | End: 2019-09-06

## 2019-09-03 RX ORDER — ACETAMINOPHEN 500 MG
1000 TABLET ORAL ONCE
Refills: 0 | Status: COMPLETED | OUTPATIENT
Start: 2019-09-03 | End: 2019-09-03

## 2019-09-03 RX ORDER — HYDROMORPHONE HYDROCHLORIDE 2 MG/ML
0.5 INJECTION INTRAMUSCULAR; INTRAVENOUS; SUBCUTANEOUS
Refills: 0 | Status: DISCONTINUED | OUTPATIENT
Start: 2019-09-03 | End: 2019-09-06

## 2019-09-03 RX ORDER — ASPIRIN/CALCIUM CARB/MAGNESIUM 324 MG
81 TABLET ORAL DAILY
Refills: 0 | Status: DISCONTINUED | OUTPATIENT
Start: 2019-09-04 | End: 2019-09-08

## 2019-09-03 RX ORDER — PANTOPRAZOLE SODIUM 20 MG/1
40 TABLET, DELAYED RELEASE ORAL
Refills: 0 | Status: DISCONTINUED | OUTPATIENT
Start: 2019-09-03 | End: 2019-09-08

## 2019-09-03 RX ORDER — SENNA PLUS 8.6 MG/1
2 TABLET ORAL AT BEDTIME
Refills: 0 | Status: DISCONTINUED | OUTPATIENT
Start: 2019-09-03 | End: 2019-09-08

## 2019-09-03 RX ADMIN — SENNA PLUS 2 TABLET(S): 8.6 TABLET ORAL at 21:13

## 2019-09-03 RX ADMIN — Medication 400 MILLIGRAM(S): at 23:00

## 2019-09-03 RX ADMIN — Medication 100 MILLIGRAM(S): at 21:13

## 2019-09-03 RX ADMIN — Medication 3 MILLILITER(S): at 22:26

## 2019-09-03 RX ADMIN — HYDROMORPHONE HYDROCHLORIDE 30 MILLILITER(S): 2 INJECTION INTRAMUSCULAR; INTRAVENOUS; SUBCUTANEOUS at 21:08

## 2019-09-03 RX ADMIN — ATORVASTATIN CALCIUM 40 MILLIGRAM(S): 80 TABLET, FILM COATED ORAL at 21:14

## 2019-09-03 RX ADMIN — Medication 500 MILLILITER(S): at 23:00

## 2019-09-03 RX ADMIN — SODIUM CHLORIDE 30 MILLILITER(S): 9 INJECTION, SOLUTION INTRAVENOUS at 21:13

## 2019-09-03 RX ADMIN — HEPARIN SODIUM 5000 UNIT(S): 5000 INJECTION INTRAVENOUS; SUBCUTANEOUS at 21:13

## 2019-09-03 RX ADMIN — ONDANSETRON 4 MILLIGRAM(S): 8 TABLET, FILM COATED ORAL at 18:18

## 2019-09-03 RX ADMIN — Medication 1000 MILLIGRAM(S): at 23:30

## 2019-09-03 NOTE — BRIEF OPERATIVE NOTE - NSICDXBRIEFPROCEDURE_GEN_ALL_CORE_FT
PROCEDURES:  Thoracoscopic robotic assisted procedure 03-Sep-2019 16:23:44 Robotic right VATS converted to thoracotomy, RUL lobectomy, MLND, Repair of pulmonary artery x2 Ronda Garcia

## 2019-09-03 NOTE — BRIEF OPERATIVE NOTE - OPERATION/FINDINGS
RUL nodule - wedge resection positive for malignancy.  Injury to posterior segmental PA branches x2 repaired

## 2019-09-03 NOTE — PROGRESS NOTE ADULT - SUBJECTIVE AND OBJECTIVE BOX
ERIKA CORRIGAN                     [id number]    HPI:  64 yo female  former 38-pack year smoker with history of emphysema, HTN, HLD, CAD with chronic pulmonary nodules presents to PST unit with pre-op diagnosis of solitary pulmonary nodule scheduled for flexible bronchoscopy, right robotic assisted lung resection on 2019. She reports long term monitoring of lung nodules over 15 years with abnormality and lymphadenopathy on six months ago followed by CT scan 3 months later with enlarging nodule in the right lung. (26 Aug 2019 18:29)      Procedure: Thoracoscopic robotic assisted procedure 03-Sep-2019 16:23:44 Robotic right VATS converted to thoracotomy, RUL lobectomy, MLND, Repair of pulmonary artery x2      Issues:  Lung nodule   Hyperlipidemia  Hypertension  Post op pain  Emphysema      PAST MEDICAL & SURGICAL HISTORY:  Hip pain  Emphysema  Carotid artery disease: monitored by vascluar doctor Samuel  Pulmonary nodules: yearly CT scans  Hyperlipidemia  Hypertension  History of  section: x2            VITAL SIGNS:  Vital Signs Last 24 Hrs  T(C): 36.9 (03 Sep 2019 09:45), Max: 36.9 (03 Sep 2019 09:45)  T(F): 98.5 (03 Sep 2019 09:45), Max: 98.5 (03 Sep 2019 09:45)  HR: 83 (03 Sep 2019 09:45) (83 - 83)  BP: 152/72 (03 Sep 2019 09:45) (152/72 - 152/72)  BP(mean): --  RR: 14 (03 Sep 2019 09:45) (14 - 14)  SpO2: 96% (03 Sep 2019 09:45) (96% - 96%)    I/Os:   I&O's Detail      CAPILLARY BLOOD GLUCOSE          =======================MEDICATIONS===================  MEDICATIONS  (STANDING):  atorvastatin 40 milliGRAM(s) Oral at bedtime  docusate sodium 100 milliGRAM(s) Oral three times a day  heparin  Injectable 5000 Unit(s) SubCutaneous every 8 hours  HYDROmorphone PCA (1 mG/mL) 30 milliLiter(s) PCA Continuous PCA Continuous  lactated ringers. 1000 milliLiter(s) (30 mL/Hr) IV Continuous <Continuous>  senna 2 Tablet(s) Oral at bedtime    MEDICATIONS  (PRN):  HYDROmorphone PCA (1 mG/mL) Rescue Clinician Bolus 0.5 milliGRAM(s) IV Push every 15 minutes PRN for Pain Scale GREATER THAN 6  naloxone Injectable 0.1 milliGRAM(s) IV Push every 3 minutes PRN For ANY of the following changes in patient status:  A. RR LESS THAN 10 breaths per minute, B. Oxygen saturation LESS THAN 90%, C. Sedation score of 6  ondansetron Injectable 4 milliGRAM(s) IV Push every 6 hours PRN Nausea      =======================VENTILATOR SETTINGS===================      =======================PATIENT CARE ACCESS DEVICES===================  Peripheral IV  Arterial Line	R	L	PT	DP	Fem	Rad	Ax	Placed:		  Urinary Catheter, Date Placed:   Necessity of urinary, arterial, and venous catheters discussed    PHYSICAL EXAM============================  General:                         Awake, alert, not in any distress  Neuro:                            Moving all extremities to commands.   Respiratory:	Air entry fair and  bilateral conducted sounds                                           Effort even and unlabored.  CV:		Regular rate and rhythm. Normal S1/S2                                          Distal pulses present.  Abdomen:	                     Soft, non-distended. Bowel sounds present   Skin:		No rash.  Extremities:	Warm, no cyanosis or edema.  Palpable pulses        =============================NEUROLOGY============================  Pain control with PCA /  Tylenol IV     ==============================RESPIRATORY========================  Pt is on   2   L nasal canula   Comfortable, not in any distress.  Using incentive spirometry l  Monitor chest tube output  Chest tube to suction /	  Continue bronchodilators, pulmonary toilet    ============================CARDIOVASCULAR======================  Continue hemodynamic monitoring.  Not on any pressors  HTN: Restart home meds in AM  =====================RENAL===================  Continue LR 30CC/hr    Monitor I/Os and electrolytes    ====================GASTROINTESTINAL===================  On clears, tolerating  Continue GI prophylaxis with  Protonix  Continue Zofran / Reglan for nausea - PRN	    ========================HEMATOLOGIC/ONCOLOGIC====================  Monitor chest tube output. No signs of active bleeding.   Follow CBC in AM    ============================INFECTIOUS DISEASE========================  Monitor for fever / leukocytosis.  All surgical incision / chest tube  sites look clean      Pt is on GI & DVT prophylaxis  OOB & ambulate       Pertinent clinical, laboratory, radiographic, hemodynamic, echocardiographic, respiratory data, microbiologic data and chart were reviewed and analyzed frequently throughout the course of the day and night  Patient seen, examined and plan discussed with CT Surgery / CTICU team during rounds.    Pt's status discussed with family at bedside, updated status        Allie Buchanan DO FACEP

## 2019-09-03 NOTE — CONSULT NOTE ADULT - SUBJECTIVE AND OBJECTIVE BOX
HISTORY OF PRESENT ILLNESS: HPI:    62 yo female  former 38-pack year smoker with history of emphysema, HTN, HLD, CAD with chronic pulmonary nodules presents to [facility] for scheduled VATS of right lung,  solitary pulmonary nodule scheduled for flexible bronchoscopy, right robotic assisted lung resection. She reports long term monitoring of lung nodules over 15 years with abnormality and lymphadenopathy on six months ago followed by CT scan 3 months later with enlarging nodule in the right lung. Denies cp, sob, palpitations, n/v, abd pain, le edema or weakness. Cardiology consulted for post op tachycardia.     PAST MEDICAL & SURGICAL HISTORY:  Hip pain  Emphysema  Carotid artery disease: monitored by vascluar doctor Samuel  Pulmonary nodules: yearly CT scans  Hyperlipidemia  Hypertension  History of  section: x2    MEDICATIONS:  MEDICATIONS  (STANDING):  ALBUTerol/ipratropium for Nebulization 3 milliLiter(s) Nebulizer every 6 hours  atorvastatin 40 milliGRAM(s) Oral at bedtime  buDESOnide  80 MICROgram(s)/formoterol 4.5 MICROgram(s) Inhaler 2 Puff(s) Inhalation two times a day  docusate sodium 100 milliGRAM(s) Oral three times a day  heparin  Injectable 5000 Unit(s) SubCutaneous every 8 hours  HYDROmorphone PCA (1 mG/mL) 30 milliLiter(s) PCA Continuous PCA Continuous  lactated ringers. 1000 milliLiter(s) (30 mL/Hr) IV Continuous <Continuous>  pantoprazole    Tablet 40 milliGRAM(s) Oral before breakfast  senna 2 Tablet(s) Oral at bedtime      Allergies    penicillin (Short breath; Hives)  tetracycline (Short breath; Hives)    Intolerances        FAMILY HISTORY:  Family history of CHF (congestive heart failure) (Mother, Aunt)    Non-contributary for premature coronary disease or sudden cardiac death    SOCIAL HISTORY:    [X ] Non-smoker  [ ] Smoker  [ ] Alcohol      REVIEW OF SYSTEMS:  [ ]chest pain  [  ]shortness of breath  [  ]palpitations  [  ]syncope  [ ]near syncope [ ]upper extremity weakness   [ ] lower extremity weakness  [  ]diplopia  [  ]altered mental status   [  ]fevers  [ ]chills [ ]nausea  [ ]vomitting  [  ]dysphagia    [ ]abdominal pain  [ ]melena  [ ]BRBPR    [  ]epistaxis  [  ]rash     [X  Tachycardia   [ ]lower extremity edema        [ X] All others negative	  [ ] Unable to obtain    LABS:	 	    CARDIAC MARKERS:  Hb Trend:     Creatinine Trend: 1.10<--    Coags:      proBNP:   Lipid Profile:   HgA1c:   TSH:     PHYSICAL EXAM:  T(C): 36.9 (19 @ 09:45), Max: 36.9 (19 @ 09:45)  HR: 83 (19 @ 09:45) (83 - 83)  BP: 152/72 (19 @ 09:45) (152/72 - 152/72)  RR: 14 (19 @ 09:45) (14 - 14)  SpO2: 96% (19 @ 09:45) (96% - 96%)  Wt(kg): --  I&O's Summary      Gen: Appears well in NAD  HEENT:  (-)icterus (-)pallor  CV: N S1 S2 1/6 MIKO (+)2 Pulses B/l  Resp:  diminished B/L, normal effort  GI: (+) BS Soft, NT, ND  Lymph:  (-)Edema, (-)obvious lymphadenopathy  Skin: Warm to touch, Normal turgor  Psych: Appropriate mood and affect        TELEMETRY: 	  NSR     ECG:  	  < from: 12 Lead ECG (19 @ 18:45) >  Normal sinus rhythm  Normal ECG    < end of copied text >    RADIOLOGY:         CXR:       ASSESSMENT/PLAN:     62 yo female  former 38-pack year smoker with history of emphysema, HTN, HLD, CAD with chronic pulmonary nodules presents to [facility] for scheduled VATS of right lung,  solitary pulmonary nodule scheduled for flexible bronchoscopy, right robotic assisted lung resection. Cardiology consulted for post op tachycardia.     no cp or sob, tolerating nc   s/p VATS right lung, with nodule resection  chest tube in place x2  follow up per CTICU  appreciate care   pain management per primary team   will follow with you HISTORY OF PRESENT ILLNESS: HPI:    62 yo female  former 38-pack year smoker with history of emphysema, HTN, HLD, CAD with chronic pulmonary nodules presents to [facility] for scheduled VATS of right lung,  solitary pulmonary nodule scheduled for flexible bronchoscopy, right robotic assisted lung resection. She reports long term monitoring of lung nodules over 15 years with abnormality and lymphadenopathy on six months ago followed by CT scan 3 months later with enlarging nodule in the right lung. Denies cp, sob, palpitations, n/v, abd pain, le edema or weakness. Cardiology consulted for post op tachycardia.     PAST MEDICAL & SURGICAL HISTORY:  Hip pain  Emphysema  Carotid artery disease: monitored by vascluar doctor Samuel  Pulmonary nodules: yearly CT scans  Hyperlipidemia  Hypertension  History of  section: x2    MEDICATIONS:  MEDICATIONS  (STANDING):  ALBUTerol/ipratropium for Nebulization 3 milliLiter(s) Nebulizer every 6 hours  atorvastatin 40 milliGRAM(s) Oral at bedtime  buDESOnide  80 MICROgram(s)/formoterol 4.5 MICROgram(s) Inhaler 2 Puff(s) Inhalation two times a day  docusate sodium 100 milliGRAM(s) Oral three times a day  heparin  Injectable 5000 Unit(s) SubCutaneous every 8 hours  HYDROmorphone PCA (1 mG/mL) 30 milliLiter(s) PCA Continuous PCA Continuous  lactated ringers. 1000 milliLiter(s) (30 mL/Hr) IV Continuous <Continuous>  pantoprazole    Tablet 40 milliGRAM(s) Oral before breakfast  senna 2 Tablet(s) Oral at bedtime      Allergies    penicillin (Short breath; Hives)  tetracycline (Short breath; Hives)    Intolerances        FAMILY HISTORY:  Family history of CHF (congestive heart failure) (Mother, Aunt)    Non-contributary for premature coronary disease or sudden cardiac death    SOCIAL HISTORY:    [X ] Non-smoker  [ ] Smoker  [ ] Alcohol      REVIEW OF SYSTEMS:  [ ]chest pain  [  ]shortness of breath  [  ]palpitations  [  ]syncope  [ ]near syncope [ ]upper extremity weakness   [ ] lower extremity weakness  [  ]diplopia  [  ]altered mental status   [  ]fevers  [ ]chills [ ]nausea  [ ]vomitting  [  ]dysphagia    [ ]abdominal pain  [ ]melena  [ ]BRBPR    [  ]epistaxis  [  ]rash     [X  Tachycardia   [ ]lower extremity edema        [ X] All others negative	  [ ] Unable to obtain    LABS:	 	    CARDIAC MARKERS:  Hb Trend:     Creatinine Trend: 1.10<--    Coags:      proBNP:   Lipid Profile:   HgA1c:   TSH:     PHYSICAL EXAM:  T(C): 36.9 (19 @ 09:45), Max: 36.9 (19 @ 09:45)  HR: 83 (19 @ 09:45) (83 - 83)  BP: 152/72 (19 @ 09:45) (152/72 - 152/72)  RR: 14 (19 @ 09:45) (14 - 14)  SpO2: 96% (19 @ 09:45) (96% - 96%)  Wt(kg): --  I&O's Summary      Gen: Appears well in NAD  HEENT:  (-)icterus (-)pallor  CV: N S1 S2 1/6 MIKO (+)2 Pulses B/l  Resp:  diminished B/L, normal effort  GI: (+) BS Soft, NT, ND  Lymph:  (-)Edema, (-)obvious lymphadenopathy  Skin: Warm to touch, Normal turgor  Psych: Appropriate mood and affect        TELEMETRY: 	  NSR     ECG:  	  < from: 12 Lead ECG (19 @ 18:45) >  Normal sinus rhythm  Normal ECG    < end of copied text >    RADIOLOGY:         CXR:       ASSESSMENT/PLAN:     62 yo female  former 38-pack year smoker with history of emphysema, HTN, HLD, CAD with chronic pulmonary nodules presents to [facility] for scheduled VATS of right lung,  solitary pulmonary nodule scheduled for flexible bronchoscopy, right robotic assisted lung resection. Cardiology consulted for post op tachycardia.     no cp or sob, tolerating nc   would check EKG with cp or change in heart rhythm   s/p VATS right lung, with nodule resection  chest tube in place x2  follow up per CTICU  appreciate care   pain management per primary team   will follow with you HISTORY OF PRESENT ILLNESS: HPI:    62 yo female  former 38-pack year smoker with history of emphysema, HTN, HLD, CAD with chronic pulmonary nodules presents to [facility] for scheduled VATS of right lung,  solitary pulmonary nodule scheduled for flexible bronchoscopy, right robotic assisted lung resection. She reports long term monitoring of lung nodules over 15 years with abnormality and lymphadenopathy on six months ago followed by CT scan 3 months later with enlarging nodule in the right lung. Denies cp, sob, palpitations, n/v, abd pain, le edema or weakness. Cardiology consulted for post op tachycardia.     PAST MEDICAL & SURGICAL HISTORY:  Hip pain  Emphysema  Carotid artery disease: monitored by vascluar doctor Samuel  Pulmonary nodules: yearly CT scans  Hyperlipidemia  Hypertension  History of  section: x2    MEDICATIONS:  MEDICATIONS  (STANDING):  ALBUTerol/ipratropium for Nebulization 3 milliLiter(s) Nebulizer every 6 hours  atorvastatin 40 milliGRAM(s) Oral at bedtime  buDESOnide  80 MICROgram(s)/formoterol 4.5 MICROgram(s) Inhaler 2 Puff(s) Inhalation two times a day  docusate sodium 100 milliGRAM(s) Oral three times a day  heparin  Injectable 5000 Unit(s) SubCutaneous every 8 hours  HYDROmorphone PCA (1 mG/mL) 30 milliLiter(s) PCA Continuous PCA Continuous  lactated ringers. 1000 milliLiter(s) (30 mL/Hr) IV Continuous <Continuous>  pantoprazole    Tablet 40 milliGRAM(s) Oral before breakfast  senna 2 Tablet(s) Oral at bedtime      Allergies    penicillin (Short breath; Hives)  tetracycline (Short breath; Hives)    Intolerances        FAMILY HISTORY:  Family history of CHF (congestive heart failure) (Mother, Aunt)    Non-contributary for premature coronary disease or sudden cardiac death    SOCIAL HISTORY:    [X ] Non-smoker  [ ] Smoker  [ ] Alcohol      REVIEW OF SYSTEMS:  [ ]chest pain  [  ]shortness of breath  [  ]palpitations  [  ]syncope  [ ]near syncope [ ]upper extremity weakness   [ ] lower extremity weakness  [  ]diplopia  [  ]altered mental status   [  ]fevers  [ ]chills [ ]nausea  [ ]vomitting  [  ]dysphagia    [ ]abdominal pain  [ ]melena  [ ]BRBPR    [  ]epistaxis  [  ]rash     [X  Tachycardia   [ ]lower extremity edema        [ X] All others negative	  [ ] Unable to obtain    LABS:	 	    CARDIAC MARKERS:  Hb Trend:     Creatinine Trend: 1.10<--    Coags:      proBNP:   Lipid Profile:   HgA1c:   TSH:     PHYSICAL EXAM:  T(C): 36.9 (19 @ 09:45), Max: 36.9 (19 @ 09:45)  HR: 83 (19 @ 09:45) (83 - 83)  BP: 152/72 (19 @ 09:45) (152/72 - 152/72)  RR: 14 (19 @ 09:45) (14 - 14)  SpO2: 96% (19 @ 09:45) (96% - 96%)  Wt(kg): --  I&O's Summary      Gen: Appears well in NAD  HEENT:  (-)icterus (-)pallor  CV: N S1 S2 1/6 MIKO (+)2 Pulses B/l  Resp:  diminished B/L, normal effort  GI: (+) BS Soft, NT, ND  Lymph:  (-)Edema, (-)obvious lymphadenopathy  Skin: Warm to touch, Normal turgor  Psych: Appropriate mood and affect        TELEMETRY: 	  NSR     ECG:  	  < from: 12 Lead ECG (19 @ 18:45) >  Normal sinus rhythm  Normal ECG    < end of copied text >    RADIOLOGY:         CXR:       ASSESSMENT/PLAN:     62 yo female  former 38-pack year smoker with history of emphysema, HTN, HLD, CAD with chronic pulmonary nodules presents to [facility] for scheduled VATS of right lung,  solitary pulmonary nodule scheduled for flexible bronchoscopy, right robotic assisted lung resection. Cardiology consulted for post op tachycardia.     no cp or sob, tolerating nc   would check EKG with cp or change in heart rhythm   s/p VATS right lung, with nodule resection  right chest tube in place x2  follow up per CTICU  appreciate care   pain management per primary team   will follow with you

## 2019-09-04 DIAGNOSIS — J44.9 CHRONIC OBSTRUCTIVE PULMONARY DISEASE, UNSPECIFIED: ICD-10-CM

## 2019-09-04 LAB
ANION GAP SERPL CALC-SCNC: 15 MMO/L — HIGH (ref 7–14)
BASOPHILS # BLD AUTO: 0.02 K/UL — SIGNIFICANT CHANGE UP (ref 0–0.2)
BASOPHILS NFR BLD AUTO: 0.1 % — SIGNIFICANT CHANGE UP (ref 0–2)
BLD GP AB SCN SERPL QL: NEGATIVE — SIGNIFICANT CHANGE UP
BUN SERPL-MCNC: 28 MG/DL — HIGH (ref 7–23)
CALCIUM SERPL-MCNC: 8.5 MG/DL — SIGNIFICANT CHANGE UP (ref 8.4–10.5)
CHLORIDE SERPL-SCNC: 101 MMOL/L — SIGNIFICANT CHANGE UP (ref 98–107)
CO2 SERPL-SCNC: 19 MMOL/L — LOW (ref 22–31)
CREAT SERPL-MCNC: 1.15 MG/DL — SIGNIFICANT CHANGE UP (ref 0.5–1.3)
CULTURE - ACID FAST SMEAR CONCENTRATED: SIGNIFICANT CHANGE UP
EOSINOPHIL # BLD AUTO: 0.34 K/UL — SIGNIFICANT CHANGE UP (ref 0–0.5)
EOSINOPHIL NFR BLD AUTO: 2.1 % — SIGNIFICANT CHANGE UP (ref 0–6)
GLUCOSE SERPL-MCNC: 171 MG/DL — HIGH (ref 70–99)
HCT VFR BLD CALC: 35.5 % — SIGNIFICANT CHANGE UP (ref 34.5–45)
HGB BLD-MCNC: 12.1 G/DL — SIGNIFICANT CHANGE UP (ref 11.5–15.5)
IMM GRANULOCYTES NFR BLD AUTO: 0.2 % — SIGNIFICANT CHANGE UP (ref 0–1.5)
LYMPHOCYTES # BLD AUTO: 0.77 K/UL — LOW (ref 1–3.3)
LYMPHOCYTES # BLD AUTO: 4.8 % — LOW (ref 13–44)
MCHC RBC-ENTMCNC: 31.8 PG — SIGNIFICANT CHANGE UP (ref 27–34)
MCHC RBC-ENTMCNC: 34.1 % — SIGNIFICANT CHANGE UP (ref 32–36)
MCV RBC AUTO: 93.2 FL — SIGNIFICANT CHANGE UP (ref 80–100)
MONOCYTES # BLD AUTO: 0.78 K/UL — SIGNIFICANT CHANGE UP (ref 0–0.9)
MONOCYTES NFR BLD AUTO: 4.9 % — SIGNIFICANT CHANGE UP (ref 2–14)
NEUTROPHILS # BLD AUTO: 14.1 K/UL — HIGH (ref 1.8–7.4)
NEUTROPHILS NFR BLD AUTO: 87.9 % — HIGH (ref 43–77)
NRBC # FLD: 0 K/UL — SIGNIFICANT CHANGE UP (ref 0–0)
PLATELET # BLD AUTO: 231 K/UL — SIGNIFICANT CHANGE UP (ref 150–400)
PMV BLD: 10.5 FL — SIGNIFICANT CHANGE UP (ref 7–13)
POTASSIUM SERPL-MCNC: 5.3 MMOL/L — SIGNIFICANT CHANGE UP (ref 3.5–5.3)
POTASSIUM SERPL-SCNC: 5.3 MMOL/L — SIGNIFICANT CHANGE UP (ref 3.5–5.3)
RBC # BLD: 3.81 M/UL — SIGNIFICANT CHANGE UP (ref 3.8–5.2)
RBC # FLD: 13.5 % — SIGNIFICANT CHANGE UP (ref 10.3–14.5)
RH IG SCN BLD-IMP: POSITIVE — SIGNIFICANT CHANGE UP
SODIUM SERPL-SCNC: 135 MMOL/L — SIGNIFICANT CHANGE UP (ref 135–145)
SPECIMEN SOURCE: SIGNIFICANT CHANGE UP
WBC # BLD: 16.04 K/UL — HIGH (ref 3.8–10.5)
WBC # FLD AUTO: 16.04 K/UL — HIGH (ref 3.8–10.5)

## 2019-09-04 PROCEDURE — 99233 SBSQ HOSP IP/OBS HIGH 50: CPT

## 2019-09-04 PROCEDURE — 71045 X-RAY EXAM CHEST 1 VIEW: CPT | Mod: 26

## 2019-09-04 RX ORDER — ACETAMINOPHEN 500 MG
1000 TABLET ORAL ONCE
Refills: 0 | Status: COMPLETED | OUTPATIENT
Start: 2019-09-04 | End: 2019-09-04

## 2019-09-04 RX ORDER — ALBUMIN HUMAN 25 %
250 VIAL (ML) INTRAVENOUS ONCE
Refills: 0 | Status: COMPLETED | OUTPATIENT
Start: 2019-09-04 | End: 2019-09-04

## 2019-09-04 RX ORDER — ALBUMIN HUMAN 25 %
250 VIAL (ML) INTRAVENOUS ONCE
Refills: 0 | Status: COMPLETED | OUTPATIENT
Start: 2019-09-04 | End: 2019-09-03

## 2019-09-04 RX ORDER — BENZOCAINE AND MENTHOL 5; 1 G/100ML; G/100ML
1 LIQUID ORAL EVERY 4 HOURS
Refills: 0 | Status: DISCONTINUED | OUTPATIENT
Start: 2019-09-04 | End: 2019-09-04

## 2019-09-04 RX ORDER — ACETAMINOPHEN 500 MG
1000 TABLET ORAL ONCE
Refills: 0 | Status: DISCONTINUED | OUTPATIENT
Start: 2019-09-05 | End: 2019-09-06

## 2019-09-04 RX ORDER — BENZOCAINE AND MENTHOL 5; 1 G/100ML; G/100ML
1 LIQUID ORAL EVERY 4 HOURS
Refills: 0 | Status: DISCONTINUED | OUTPATIENT
Start: 2019-09-04 | End: 2019-09-08

## 2019-09-04 RX ADMIN — Medication 3 MILLILITER(S): at 04:50

## 2019-09-04 RX ADMIN — Medication 81 MILLIGRAM(S): at 11:45

## 2019-09-04 RX ADMIN — Medication 125 MILLILITER(S): at 22:50

## 2019-09-04 RX ADMIN — Medication 3 MILLILITER(S): at 16:13

## 2019-09-04 RX ADMIN — Medication 1000 MILLIGRAM(S): at 22:15

## 2019-09-04 RX ADMIN — BENZOCAINE AND MENTHOL 1 LOZENGE: 5; 1 LIQUID ORAL at 22:46

## 2019-09-04 RX ADMIN — ATORVASTATIN CALCIUM 40 MILLIGRAM(S): 80 TABLET, FILM COATED ORAL at 21:45

## 2019-09-04 RX ADMIN — Medication 100 MILLIGRAM(S): at 21:45

## 2019-09-04 RX ADMIN — Medication 100 MILLIGRAM(S): at 06:18

## 2019-09-04 RX ADMIN — HYDROMORPHONE HYDROCHLORIDE 30 MILLILITER(S): 2 INJECTION INTRAMUSCULAR; INTRAVENOUS; SUBCUTANEOUS at 01:12

## 2019-09-04 RX ADMIN — BUDESONIDE AND FORMOTEROL FUMARATE DIHYDRATE 2 PUFF(S): 160; 4.5 AEROSOL RESPIRATORY (INHALATION) at 23:07

## 2019-09-04 RX ADMIN — SENNA PLUS 2 TABLET(S): 8.6 TABLET ORAL at 21:44

## 2019-09-04 RX ADMIN — PANTOPRAZOLE SODIUM 40 MILLIGRAM(S): 20 TABLET, DELAYED RELEASE ORAL at 06:18

## 2019-09-04 RX ADMIN — BUDESONIDE AND FORMOTEROL FUMARATE DIHYDRATE 2 PUFF(S): 160; 4.5 AEROSOL RESPIRATORY (INHALATION) at 10:27

## 2019-09-04 RX ADMIN — HYDROMORPHONE HYDROCHLORIDE 30 MILLILITER(S): 2 INJECTION INTRAMUSCULAR; INTRAVENOUS; SUBCUTANEOUS at 20:55

## 2019-09-04 RX ADMIN — Medication 3 MILLILITER(S): at 10:29

## 2019-09-04 RX ADMIN — HEPARIN SODIUM 5000 UNIT(S): 5000 INJECTION INTRAVENOUS; SUBCUTANEOUS at 06:18

## 2019-09-04 RX ADMIN — HYDROMORPHONE HYDROCHLORIDE 0.5 MILLIGRAM(S): 2 INJECTION INTRAMUSCULAR; INTRAVENOUS; SUBCUTANEOUS at 20:09

## 2019-09-04 RX ADMIN — HYDROMORPHONE HYDROCHLORIDE 30 MILLILITER(S): 2 INJECTION INTRAMUSCULAR; INTRAVENOUS; SUBCUTANEOUS at 07:18

## 2019-09-04 RX ADMIN — Medication 400 MILLIGRAM(S): at 21:44

## 2019-09-04 RX ADMIN — Medication 500 MILLILITER(S): at 06:00

## 2019-09-04 RX ADMIN — Medication 3 MILLILITER(S): at 23:07

## 2019-09-04 RX ADMIN — Medication 100 MILLIGRAM(S): at 13:20

## 2019-09-04 RX ADMIN — SODIUM CHLORIDE 30 MILLILITER(S): 9 INJECTION, SOLUTION INTRAVENOUS at 07:20

## 2019-09-04 RX ADMIN — SODIUM CHLORIDE 30 MILLILITER(S): 9 INJECTION, SOLUTION INTRAVENOUS at 21:45

## 2019-09-04 RX ADMIN — HEPARIN SODIUM 5000 UNIT(S): 5000 INJECTION INTRAVENOUS; SUBCUTANEOUS at 13:20

## 2019-09-04 RX ADMIN — HEPARIN SODIUM 5000 UNIT(S): 5000 INJECTION INTRAVENOUS; SUBCUTANEOUS at 21:44

## 2019-09-04 NOTE — PROGRESS NOTE ADULT - SUBJECTIVE AND OBJECTIVE BOX
SUBJECTIVE: no CP or SOB      MEDICATIONS  (STANDING):  ALBUTerol/ipratropium for Nebulization 3 milliLiter(s) Nebulizer every 6 hours  aspirin enteric coated 81 milliGRAM(s) Oral daily  atorvastatin 40 milliGRAM(s) Oral at bedtime  buDESOnide  80 MICROgram(s)/formoterol 4.5 MICROgram(s) Inhaler 2 Puff(s) Inhalation two times a day  docusate sodium 100 milliGRAM(s) Oral three times a day  heparin  Injectable 5000 Unit(s) SubCutaneous every 8 hours  HYDROmorphone PCA (1 mG/mL) 30 milliLiter(s) PCA Continuous PCA Continuous  influenza   Vaccine 0.5 milliLiter(s) IntraMuscular once  lactated ringers. 1000 milliLiter(s) (30 mL/Hr) IV Continuous <Continuous>  pantoprazole    Tablet 40 milliGRAM(s) Oral before breakfast  senna 2 Tablet(s) Oral at bedtime    MEDICATIONS  (PRN):  HYDROmorphone PCA (1 mG/mL) Rescue Clinician Bolus 0.5 milliGRAM(s) IV Push every 15 minutes PRN for Pain Scale GREATER THAN 6  naloxone Injectable 0.1 milliGRAM(s) IV Push every 3 minutes PRN For ANY of the following changes in patient status:  A. RR LESS THAN 10 breaths per minute, B. Oxygen saturation LESS THAN 90%, C. Sedation score of 6  ondansetron Injectable 4 milliGRAM(s) IV Push every 6 hours PRN Nausea      LABS:                            12.1   16.04 )-----------( 231      ( 04 Sep 2019 03:40 )             35.5     135  |  101  |  28<H>  ----------------------------<  171<H>  5.3   |  19<L>  |  1.15    Ca    8.5      04 Sep 2019 03:40      PHYSICAL EXAM:  Vital Signs Last 24 Hrs  T(C): 37.2 (04 Sep 2019 16:00), Max: 37.2 (04 Sep 2019 16:00)  T(F): 98.9 (04 Sep 2019 16:00), Max: 98.9 (04 Sep 2019 16:00)  HR: 94 (04 Sep 2019 16:14) (68 - 95)  BP: 128/54 (04 Sep 2019 16:00) (99/59 - 149/50)  BP(mean): 71 (04 Sep 2019 16:00) (55 - 79)  RR: 20 (04 Sep 2019 16:00) (11 - 27)  SpO2: 100% (04 Sep 2019 16:00) (95% - 100%)    Cardiovascular:  S1S2 RRR, No JVD  Respiratory: Lungs clear to auscultation, normal effort  Gastrointestinal: Abdomen soft, ND, NT, +BS  Skin: Warm, dry, intact. No rash.  Musculoskeletal: Normal ROM, normal strength  Ext: No C/C/E B/L LE    DIAGNOSTIC DATA  TELEMETRY: SR    ASSESSMENT AND PLAN:  62 yo female, former 38-pack year smoker with history of emphysema, HTN, HLD, with chronic pulmonary nodules. admitted s/p elective R Vats, converted to thoracotomy, RUL lobectomy, repair of Pulm artery    --tolerated procedure well from CV perspective  --BP/HR stable  --no further cardiac work up planned post op  --will follow with you

## 2019-09-04 NOTE — PROGRESS NOTE ADULT - SUBJECTIVE AND OBJECTIVE BOX
ERIKA CORRIGAN                     [id number]    HPI:  62 yo female  former 38-pack year smoker with history of emphysema, HTN, HLD, CAD with chronic pulmonary nodules presents to [facility] unit with pre-op diagnosis of solitary pulmonary nodule scheduled for flexible bronchoscopy, right robotic assisted lung resection on 2019. She reports long term monitoring of lung nodules over 15 years with abnormality and lymphadenopathy on six months ago followed by CT scan 3 months later with enlarging nodule in the right lung. (26 Aug 2019 18:29)    Procedure: Thoracoscopic robotic assisted procedure 03-Sep-2019 16:23:44 Robotic right VATS converted to thoracotomy, RUL lobectomy, MLND, Repair of pulmonary artery x2      Issues:  Lung nodule   Hyperlipidemia  Hypertension  Post op pain  Emphysema        PAST MEDICAL & SURGICAL HISTORY:  Hip pain  Emphysema  Carotid artery disease: monitored by vascluar doctor Samuel  Pulmonary nodules: yearly CT scans  Hyperlipidemia  Hypertension  History of  section: x2            VITAL SIGNS:  Vital Signs Last 24 Hrs  T(C): 36.7 (04 Sep 2019 04:00), Max: 36.9 (03 Sep 2019 09:45)  T(F): 98 (04 Sep 2019 04:00), Max: 98.5 (03 Sep 2019 09:45)  HR: 72 (04 Sep 2019 06:00) (68 - 85)  BP: 100/38 (04 Sep 2019 06:00) (99/59 - 152/72)  BP(mean): 55 (04 Sep 2019 06:00) (55 - 79)  RR: 16 (04 Sep 2019 06:00) (10 - 22)  SpO2: 96% (04 Sep 2019 06:00) (95% - 100%)    I/Os:   I&O's Detail    03 Sep 2019 07:01  -  04 Sep 2019 07:00  --------------------------------------------------------  IN:    IV PiggyBack: 600 mL    lactated ringers.: 480 mL  Total IN: 1080 mL    OUT:    Chest Tube: 245 mL    Chest Tube: 130 mL    Indwelling Catheter - Urethral: 660 mL  Total OUT: 1035 mL    Total NET: 45 mL          CAPILLARY BLOOD GLUCOSE          =======================MEDICATIONS===================  MEDICATIONS  (STANDING):  ALBUTerol/ipratropium for Nebulization 3 milliLiter(s) Nebulizer every 6 hours  aspirin enteric coated 81 milliGRAM(s) Oral daily  atorvastatin 40 milliGRAM(s) Oral at bedtime  buDESOnide  80 MICROgram(s)/formoterol 4.5 MICROgram(s) Inhaler 2 Puff(s) Inhalation two times a day  docusate sodium 100 milliGRAM(s) Oral three times a day  heparin  Injectable 5000 Unit(s) SubCutaneous every 8 hours  HYDROmorphone PCA (1 mG/mL) 30 milliLiter(s) PCA Continuous PCA Continuous  influenza   Vaccine 0.5 milliLiter(s) IntraMuscular once  lactated ringers. 1000 milliLiter(s) (30 mL/Hr) IV Continuous <Continuous>  pantoprazole    Tablet 40 milliGRAM(s) Oral before breakfast  senna 2 Tablet(s) Oral at bedtime    MEDICATIONS  (PRN):  HYDROmorphone PCA (1 mG/mL) Rescue Clinician Bolus 0.5 milliGRAM(s) IV Push every 15 minutes PRN for Pain Scale GREATER THAN 6  naloxone Injectable 0.1 milliGRAM(s) IV Push every 3 minutes PRN For ANY of the following changes in patient status:  A. RR LESS THAN 10 breaths per minute, B. Oxygen saturation LESS THAN 90%, C. Sedation score of 6  ondansetron Injectable 4 milliGRAM(s) IV Push every 6 hours PRN Nausea        PHYSICAL EXAM============================  General:                         Awake, alert, not in any distress  Neuro:                            Moving all extremities to commands.   Respiratory:	Air entry fair and  bilateral conducted sounds                                           Effort even and unlabored.  CV:		Regular rate and rhythm. Normal S1/S2                                          Distal pulses present.  Abdomen:	                     Soft, non-distended. Bowel sounds present   Skin:		No rash.  Extremities:	Warm, no cyanosis or edema.  Palpable pulses    ============================LABS=========================                        12.1   16.04 )-----------( 231      ( 04 Sep 2019 03:40 )             35.5     09-04    135  |  101  |  28<H>  ----------------------------<  171<H>  5.3   |  19<L>  |  1.15    Ca    8.5      04 Sep 2019 03:40          Pt's status discussed with family at bedside, updated status    A/P:    =============================NEUROLOGY============================  Pain control with PCA /  Tylenol IV     ==============================RESPIRATORY========================  Pt is on   2   L nasal canula   Comfortable, not in any distress.  Using incentive spirometry l  Monitor chest tube output  Chest tube to suction /	  Continue bronchodilators, pulmonary toilet    ============================CARDIOVASCULAR======================  Continue hemodynamic monitoring.  Not on any pressors  HTN: Restart home meds in AM  =====================RENAL===================  Continue LR 30CC/hr    Monitor I/Os and electrolytes    ====================GASTROINTESTINAL===================  On regs, tolerating  Continue GI prophylaxis with  Protonix  Continue Zofran / Reglan for nausea - PRN	    ========================HEMATOLOGIC/ONCOLOGIC====================  Monitor chest tube output. No signs of active bleeding.   Follow CBC in AM    ============================INFECTIOUS DISEASE========================  Monitor for fever / leukocytosis.  All surgical incision / chest tube  sites look clean      Pt is on GI & DVT prophylaxis  OOB & ambulate       Pertinent clinical, laboratory, radiographic, hemodynamic, echocardiographic, respiratory data, microbiologic data and chart were reviewed and analyzed frequently throughout the course of the day and night  Patient seen, examined and plan discussed with CT Surgery / CTICU team during rounds.    Pt's status discussed with family at bedside, updated status        Allie SMITHP

## 2019-09-04 NOTE — PROGRESS NOTE ADULT - SUBJECTIVE AND OBJECTIVE BOX
Day __2_ of Anesthesia Pain Management Service    SUBJECTIVE:    Therapy:	  [ x] IV PCA	   [ ] Epidural           [ ] s/p Spinal Opoid              [ ] Postpartum infusion	  [ ] Patient controlled regional anesthesia (PCRA)    [ ] prn Analgesics    OBJECTIVE:   [x ] No new signs     [ ] Other:    Side Effects:  [x ] None			[ ] Other:    Assessment of Catheter Site:		[ ] Intact		[ ] Other:    ASSESSMENT/PLAN  [ x] Continue current therapy    [ ] Therapy changed to:    [ ] IV PCA       [ ] Epidural     [ ] prn Analgesics     Comments:

## 2019-09-04 NOTE — PROGRESS NOTE ADULT - SUBJECTIVE AND OBJECTIVE BOX
Anesthesia Pain Management Service    SUBJECTIVE: Patient is doing well with IV PCA and no significant problems reported.    Pain Scale Score	At rest: __3_ 	With Activity: ___ 	[X ] Refer to charted pain scores    THERAPY:    [ ] IV PCA Morphine		[ ] 5 mg/mL	[ ] 1 mg/mL  [X ] IV PCA Hydromorphone	[ ] 5 mg/mL	[X ] 1 mg/mL  [ ] IV PCA Fentanyl		[ ] 50 micrograms/mL    Demand dose __0.2_ lockout __6_ (minutes) Continuous Rate _0__ Total: _0.4__  mg used (in past 24 hours)      MEDICATIONS  (STANDING):  ALBUTerol/ipratropium for Nebulization 3 milliLiter(s) Nebulizer every 6 hours  aspirin enteric coated 81 milliGRAM(s) Oral daily  atorvastatin 40 milliGRAM(s) Oral at bedtime  buDESOnide  80 MICROgram(s)/formoterol 4.5 MICROgram(s) Inhaler 2 Puff(s) Inhalation two times a day  docusate sodium 100 milliGRAM(s) Oral three times a day  heparin  Injectable 5000 Unit(s) SubCutaneous every 8 hours  HYDROmorphone PCA (1 mG/mL) 30 milliLiter(s) PCA Continuous PCA Continuous  influenza   Vaccine 0.5 milliLiter(s) IntraMuscular once  lactated ringers. 1000 milliLiter(s) (30 mL/Hr) IV Continuous <Continuous>  pantoprazole    Tablet 40 milliGRAM(s) Oral before breakfast  senna 2 Tablet(s) Oral at bedtime    MEDICATIONS  (PRN):  HYDROmorphone PCA (1 mG/mL) Rescue Clinician Bolus 0.5 milliGRAM(s) IV Push every 15 minutes PRN for Pain Scale GREATER THAN 6  naloxone Injectable 0.1 milliGRAM(s) IV Push every 3 minutes PRN For ANY of the following changes in patient status:  A. RR LESS THAN 10 breaths per minute, B. Oxygen saturation LESS THAN 90%, C. Sedation score of 6  ondansetron Injectable 4 milliGRAM(s) IV Push every 6 hours PRN Nausea      OBJECTIVE: sitting in chair     Sedation Score:	[ X] Alert	[ ] Drowsy 	[ ] Arousable	[ ] Asleep	[ ] Unresponsive    Side Effects:	[X ] None	[ ] Nausea	[ ] Vomiting	[ ] Pruritus  		[ ] Other:    Vital Signs Last 24 Hrs  T(C): 36.5 (04 Sep 2019 08:00), Max: 36.7 (03 Sep 2019 16:35)  T(F): 97.7 (04 Sep 2019 08:00), Max: 98.1 (03 Sep 2019 16:35)  HR: 74 (04 Sep 2019 10:30) (68 - 85)  BP: 118/43 (04 Sep 2019 10:00) (99/59 - 140/59)  BP(mean): 60 (04 Sep 2019 10:00) (55 - 79)  RR: 18 (04 Sep 2019 10:00) (10 - 27)  SpO2: 95% (04 Sep 2019 10:00) (95% - 100%)    ASSESSMENT/ PLAN    Therapy to  be:	[ X] Continue   [ ] Discontinued   [ ] Change to prn Analgesics    Documentation and Verification of current medications:   [X] Done	[ ] Not done, not elligible    Comments: CT x2, continue current therapy    Progress Note written now but Patient was seen earlier.

## 2019-09-04 NOTE — CONSULT NOTE ADULT - ASSESSMENT
64 yo female  former 38-pack year smoker with history of emphysema, HTN, HLD, CAD with chronic pulmonary nodules presents to PST unit with pre-op diagnosis of solitary pulmonary nodule scheduled for flexible bronchoscopy, right robotic assisted lung resection on 09/03/2019. She reports long term monitoring of lung nodules over 15 years with abnormality and lymphadenopathy on six months ago followed by CT scan 3 months later with enlarging nodule in the right lung. (26 Aug 2019 18:29)    she had rul throacotomy aas she was found to have malignancy pulmonary nodule: Currently she is doing well: has chest pain at chest tube site:

## 2019-09-04 NOTE — CONSULT NOTE ADULT - SUBJECTIVE AND OBJECTIVE BOX
Patient is a 63y old  Female who presents with a chief complaint of VATS  Right Lung Resection (03 Sep 2019 16:51)      HPI:  62 yo female  former 38-pack year smoker with history of emphysema, HTN, HLD, CAD with chronic pulmonary nodules presents to PST unit with pre-op diagnosis of solitary pulmonary nodule scheduled for flexible bronchoscopy, right robotic assisted lung resection on 2019. She reports long term monitoring of lung nodules over 15 years with abnormality and lymphadenopathy on six months ago followed by CT scan 3 months later with enlarging nodule in the right lung. (26 Aug 2019 18:29)    she had rul throacotomy aas she was found to have malignancy pulmonary nodule: Currently she is doing well: has chest pain at chest tube site:       ?FOLLOWING PRESENT  [ x] Hx of PE/DVT, [y ] Hx COPD, [x ] Hx of Asthma, [x] Hx of Hospitalization, x[ ]  Hx of BiPAP/CPAP use, [ x] Hx of DOMINIC    Allergies    penicillin (Short breath; Hives)  tetracycline (Short breath; Hives)    Intolerances        PAST MEDICAL & SURGICAL HISTORY:  Hip pain  Emphysema  Carotid artery disease: monitored by vascluar doctor Samuel  Pulmonary nodules: yearly CT scans  Hyperlipidemia  Hypertension  History of  section: x2      FAMILY HISTORY:  Family history of CHF (congestive heart failure) (Mother, Aunt)      Social History: [ 30 pk years: quit 5 yars ago  ] TOBACCO                  [ x ] ETOH                                 [ x ] IVDA/DRUGS    REVIEW OF SYSTEMS      General:	x    Skin/Breast:x  	  Ophthalmologic:x  	  ENMT:	x    Respiratory and Thorax: c hest pain   	  Cardiovascular:	x    Gastrointestinal:	x    Genitourinary:	  x  Musculoskeletal:	x  x  Neurological:	    Psychiatric:	x    Hematology/Lymphatics:	x    Endocrine:	x    Allergic/Immunologic:x    MEDICATIONS  (STANDING):  ALBUTerol/ipratropium for Nebulization 3 milliLiter(s) Nebulizer every 6 hours  aspirin enteric coated 81 milliGRAM(s) Oral daily  atorvastatin 40 milliGRAM(s) Oral at bedtime  buDESOnide  80 MICROgram(s)/formoterol 4.5 MICROgram(s) Inhaler 2 Puff(s) Inhalation two times a day  docusate sodium 100 milliGRAM(s) Oral three times a day  heparin  Injectable 5000 Unit(s) SubCutaneous every 8 hours  HYDROmorphone PCA (1 mG/mL) 30 milliLiter(s) PCA Continuous PCA Continuous  influenza   Vaccine 0.5 milliLiter(s) IntraMuscular once  lactated ringers. 1000 milliLiter(s) (30 mL/Hr) IV Continuous <Continuous>  pantoprazole    Tablet 40 milliGRAM(s) Oral before breakfast  senna 2 Tablet(s) Oral at bedtime    MEDICATIONS  (PRN):  HYDROmorphone PCA (1 mG/mL) Rescue Clinician Bolus 0.5 milliGRAM(s) IV Push every 15 minutes PRN for Pain Scale GREATER THAN 6  naloxone Injectable 0.1 milliGRAM(s) IV Push every 3 minutes PRN For ANY of the following changes in patient status:  A. RR LESS THAN 10 breaths per minute, B. Oxygen saturation LESS THAN 90%, C. Sedation score of 6  ondansetron Injectable 4 milliGRAM(s) IV Push every 6 hours PRN Nausea       Vital Signs Last 24 Hrs  T(C): 36.5 (04 Sep 2019 08:00), Max: 36.7 (03 Sep 2019 16:35)  T(F): 97.7 (04 Sep 2019 08:00), Max: 98.1 (03 Sep 2019 16:35)  HR: 71 (04 Sep 2019 09:00) (68 - 85)  BP: 110/43 (04 Sep 2019 09:00) (99/59 - 140/59)  BP(mean): 59 (04 Sep 2019 09:00) (55 - 79)  RR: 18 (04 Sep 2019 09:00) (10 - 27)  SpO2: 95% (04 Sep 2019 09:00) (95% - 100%)        I&O's Summary    03 Sep 2019 07:  -  04 Sep 2019 07:00  --------------------------------------------------------  IN: 1080 mL / OUT: 1035 mL / NET: 45 mL    04 Sep 2019 07:  -  04 Sep 2019 10:15  --------------------------------------------------------  IN: 60 mL / OUT: 55 mL / NET: 5 mL        Physical Exam:   GENERAL: NAD, well-groomed, well-developed  HEENT: BAYLEE/   Atraumatic, Normocephalic  ENMT: No tonsillar erythema, exudates, or enlargement; Moist mucous membranes, Good dentition, No lesions  NECK: Supple, No JVD, Normal thyroid  CHEST/LUNG: Clear to auscultation bilaterally  CVS: Regular rate and rhythm; No murmurs, rubs, or gallops  GI: : Soft, Nontender, Nondistended; Bowel sounds present  NERVOUS SYSTEM:  Alert & Oriented X3  EXTREMITIES:  2+ Peripheral Pulses, No clubbing, cyanosis, or edema  LYMPH: No lymphadenopathy noted  SKIN: No rashes or lesions  ENDOCRINOLOGY: No Thyromegaly  PSYCH: Appropriate    Labs:                              12.1   16.04 )-----------( 231      ( 04 Sep 2019 03:40 )             35.5     09-04    135  |  101  |  28<H>  ----------------------------<  171<H>  5.3   |  19<L>  |  1.15    Ca    8.5      04 Sep 2019 03:40      CAPILLARY BLOOD GLUCOSE          < from: Xray Chest 2 Views PA/Lat (18 @ 10:30) >  EXAM:  XR CHEST PA LAT 2V        PROCEDURE DATE:  2018         INTERPRETATION:  EXAMINATION: XR CHEST PA LAT 2V    CLINICAL INDICATION:  shortness of breath.     TECHNIQUE: PA and lateral radiographs of the chest were obtained on   2018 at 10:20 AM     COMPARISON: Chest radiograph 2011 at 8:02 AM.    FINDINGS:     The cardiac silhouette is normal in size. There is no pleural effusion.   There is no pneumothorax. No focal consolidation identified. No acute   osseous abnormality is seen. Aortic calcifications are noted.     IMPRESSION:   Clear lungs.               TAYLOR AWAN M.D., RADIOLOGY RESIDENT  This document has been electronically signed.  BRUCE RED M.D., ATTENDING RADIOLOGIST  This document has been electronically signed. 2018 11:08AM    < end of copied text >      D DImer      Studies  Chest X-RAY  CT SCAN Chest   CT Abdomen  Venous Dopplers: LE:   Others

## 2019-09-04 NOTE — PROGRESS NOTE ADULT - SUBJECTIVE AND OBJECTIVE BOX
ANESTHESIA POSTOP CHECK    63y Female POSTOP DAY 1 S/P   [x ] General Anesthesia  [ ] [facility]  [ ] MAC    Vital Signs Last 24 Hrs  T(C): 36.5 (04 Sep 2019 08:00), Max: 36.9 (03 Sep 2019 09:45)  T(F): 97.7 (04 Sep 2019 08:00), Max: 98.5 (03 Sep 2019 09:45)  HR: 71 (04 Sep 2019 09:00) (68 - 85)  BP: 110/43 (04 Sep 2019 09:00) (99/59 - 152/72)  BP(mean): 59 (04 Sep 2019 09:00) (55 - 79)  RR: 18 (04 Sep 2019 09:00) (10 - 27)  SpO2: 95% (04 Sep 2019 09:00) (95% - 100%)  I&O's Summary    03 Sep 2019 07:01  -  04 Sep 2019 07:00  --------------------------------------------------------  IN: 1080 mL / OUT: 1035 mL / NET: 45 mL    04 Sep 2019 07:01  -  04 Sep 2019 09:21  --------------------------------------------------------  IN: 60 mL / OUT: 40 mL / NET: 20 mL        [x ] NO APPARENT ANESTHESIA COMPLICATIONS      Comments:

## 2019-09-05 ENCOUNTER — TRANSCRIPTION ENCOUNTER (OUTPATIENT)
Age: 64
End: 2019-09-05

## 2019-09-05 LAB
ANION GAP SERPL CALC-SCNC: 15 MMO/L — HIGH (ref 7–14)
BUN SERPL-MCNC: 29 MG/DL — HIGH (ref 7–23)
CALCIUM SERPL-MCNC: 8.4 MG/DL — SIGNIFICANT CHANGE UP (ref 8.4–10.5)
CHLORIDE SERPL-SCNC: 95 MMOL/L — LOW (ref 98–107)
CO2 SERPL-SCNC: 20 MMOL/L — LOW (ref 22–31)
CREAT SERPL-MCNC: 1.06 MG/DL — SIGNIFICANT CHANGE UP (ref 0.5–1.3)
GLUCOSE SERPL-MCNC: 142 MG/DL — HIGH (ref 70–99)
HCT VFR BLD CALC: 27.9 % — LOW (ref 34.5–45)
HGB BLD-MCNC: 9.3 G/DL — LOW (ref 11.5–15.5)
MCHC RBC-ENTMCNC: 31 PG — SIGNIFICANT CHANGE UP (ref 27–34)
MCHC RBC-ENTMCNC: 33.3 % — SIGNIFICANT CHANGE UP (ref 32–36)
MCV RBC AUTO: 93 FL — SIGNIFICANT CHANGE UP (ref 80–100)
NRBC # FLD: 0 K/UL — SIGNIFICANT CHANGE UP (ref 0–0)
PLATELET # BLD AUTO: 223 K/UL — SIGNIFICANT CHANGE UP (ref 150–400)
PMV BLD: 10.7 FL — SIGNIFICANT CHANGE UP (ref 7–13)
POTASSIUM SERPL-MCNC: 4.4 MMOL/L — SIGNIFICANT CHANGE UP (ref 3.5–5.3)
POTASSIUM SERPL-SCNC: 4.4 MMOL/L — SIGNIFICANT CHANGE UP (ref 3.5–5.3)
RBC # BLD: 3 M/UL — LOW (ref 3.8–5.2)
RBC # FLD: 13.5 % — SIGNIFICANT CHANGE UP (ref 10.3–14.5)
SODIUM SERPL-SCNC: 130 MMOL/L — LOW (ref 135–145)
SPECIMEN SOURCE: SIGNIFICANT CHANGE UP
WBC # BLD: 14.83 K/UL — HIGH (ref 3.8–10.5)
WBC # FLD AUTO: 14.83 K/UL — HIGH (ref 3.8–10.5)

## 2019-09-05 PROCEDURE — 99233 SBSQ HOSP IP/OBS HIGH 50: CPT

## 2019-09-05 PROCEDURE — 71045 X-RAY EXAM CHEST 1 VIEW: CPT | Mod: 26

## 2019-09-05 RX ORDER — ALBUMIN HUMAN 25 %
250 VIAL (ML) INTRAVENOUS ONCE
Refills: 0 | Status: COMPLETED | OUTPATIENT
Start: 2019-09-05 | End: 2019-09-05

## 2019-09-05 RX ADMIN — Medication 3 MILLILITER(S): at 03:28

## 2019-09-05 RX ADMIN — Medication 100 MILLIGRAM(S): at 20:48

## 2019-09-05 RX ADMIN — Medication 3 MILLILITER(S): at 21:46

## 2019-09-05 RX ADMIN — Medication 100 MILLIGRAM(S): at 13:12

## 2019-09-05 RX ADMIN — ATORVASTATIN CALCIUM 40 MILLIGRAM(S): 80 TABLET, FILM COATED ORAL at 20:48

## 2019-09-05 RX ADMIN — Medication 100 MILLIGRAM(S): at 05:35

## 2019-09-05 RX ADMIN — BENZOCAINE AND MENTHOL 1 LOZENGE: 5; 1 LIQUID ORAL at 09:14

## 2019-09-05 RX ADMIN — Medication 125 MILLILITER(S): at 10:12

## 2019-09-05 RX ADMIN — SENNA PLUS 2 TABLET(S): 8.6 TABLET ORAL at 20:48

## 2019-09-05 RX ADMIN — HEPARIN SODIUM 5000 UNIT(S): 5000 INJECTION INTRAVENOUS; SUBCUTANEOUS at 13:12

## 2019-09-05 RX ADMIN — PANTOPRAZOLE SODIUM 40 MILLIGRAM(S): 20 TABLET, DELAYED RELEASE ORAL at 06:03

## 2019-09-05 RX ADMIN — HYDROMORPHONE HYDROCHLORIDE 30 MILLILITER(S): 2 INJECTION INTRAMUSCULAR; INTRAVENOUS; SUBCUTANEOUS at 19:28

## 2019-09-05 RX ADMIN — Medication 3 MILLILITER(S): at 09:29

## 2019-09-05 RX ADMIN — BUDESONIDE AND FORMOTEROL FUMARATE DIHYDRATE 2 PUFF(S): 160; 4.5 AEROSOL RESPIRATORY (INHALATION) at 22:40

## 2019-09-05 RX ADMIN — HYDROMORPHONE HYDROCHLORIDE 30 MILLILITER(S): 2 INJECTION INTRAMUSCULAR; INTRAVENOUS; SUBCUTANEOUS at 07:18

## 2019-09-05 RX ADMIN — Medication 250 MILLILITER(S): at 03:10

## 2019-09-05 RX ADMIN — BUDESONIDE AND FORMOTEROL FUMARATE DIHYDRATE 2 PUFF(S): 160; 4.5 AEROSOL RESPIRATORY (INHALATION) at 09:29

## 2019-09-05 RX ADMIN — Medication 3 MILLILITER(S): at 15:58

## 2019-09-05 RX ADMIN — Medication 81 MILLIGRAM(S): at 12:47

## 2019-09-05 RX ADMIN — HEPARIN SODIUM 5000 UNIT(S): 5000 INJECTION INTRAVENOUS; SUBCUTANEOUS at 05:35

## 2019-09-05 RX ADMIN — HEPARIN SODIUM 5000 UNIT(S): 5000 INJECTION INTRAVENOUS; SUBCUTANEOUS at 20:48

## 2019-09-05 RX ADMIN — SODIUM CHLORIDE 30 MILLILITER(S): 9 INJECTION, SOLUTION INTRAVENOUS at 07:00

## 2019-09-05 NOTE — PROGRESS NOTE ADULT - SUBJECTIVE AND OBJECTIVE BOX
Anesthesia Pain Management Service    SUBJECTIVE: Pt doing well with IV PCA without problems reported.    Therapy:	  [ X] IV PCA	   [ ] Epidural           [ ] s/p Spinal Opoid              [ ] Postpartum infusion	  [ ] Patient controlled regional anesthesia (PCRA)    [ ] prn Analgesics    Allergies    penicillin (Short breath; Hives)  tetracycline (Short breath; Hives)    Intolerances      MEDICATIONS  (STANDING):  ALBUTerol/ipratropium for Nebulization 3 milliLiter(s) Nebulizer every 6 hours  aspirin enteric coated 81 milliGRAM(s) Oral daily  atorvastatin 40 milliGRAM(s) Oral at bedtime  buDESOnide  80 MICROgram(s)/formoterol 4.5 MICROgram(s) Inhaler 2 Puff(s) Inhalation two times a day  docusate sodium 100 milliGRAM(s) Oral three times a day  heparin  Injectable 5000 Unit(s) SubCutaneous every 8 hours  HYDROmorphone PCA (1 mG/mL) 30 milliLiter(s) PCA Continuous PCA Continuous  influenza   Vaccine 0.5 milliLiter(s) IntraMuscular once  lactated ringers. 1000 milliLiter(s) (30 mL/Hr) IV Continuous <Continuous>  pantoprazole    Tablet 40 milliGRAM(s) Oral before breakfast  senna 2 Tablet(s) Oral at bedtime    MEDICATIONS  (PRN):  acetaminophen  IVPB .. 1000 milliGRAM(s) IV Intermittent once PRN Temp greater or equal to 38C (100.4F), Moderate Pain (4 - 6)  benzocaine 15 mG/menthol 3.6 mG (Sugar-Free) Lozenge 1 Lozenge Oral every 4 hours PRN Sore Throat  HYDROmorphone PCA (1 mG/mL) Rescue Clinician Bolus 0.5 milliGRAM(s) IV Push every 15 minutes PRN for Pain Scale GREATER THAN 6  naloxone Injectable 0.1 milliGRAM(s) IV Push every 3 minutes PRN For ANY of the following changes in patient status:  A. RR LESS THAN 10 breaths per minute, B. Oxygen saturation LESS THAN 90%, C. Sedation score of 6  ondansetron Injectable 4 milliGRAM(s) IV Push every 6 hours PRN Nausea      OBJECTIVE:   [X] No new signs     [ ] Other:    Side Effects:  [X ] None			[ ] Other:    Assessment of Catheter Site:		[ ] Intact		[ ] Other:    ASSESSMENT/PLAN  [ X] Continue current therapy    [ ] Therapy changed to:    [ ] IV PCA       [ ] Epidural     [ ] prn Analgesics     Comments:

## 2019-09-05 NOTE — PROGRESS NOTE ADULT - SUBJECTIVE AND OBJECTIVE BOX
ERIKA CORRIGAN            MRN-[id number]         penicillin (Short breath; Hives)  tetracycline (Short breath; Hives)                 HPI:  62 yo female  former 38-pack year smoker with history of emphysema, HTN, HLD, CAD with chronic pulmonary nodules presents to PST unit with pre-op diagnosis of solitary pulmonary nodule scheduled for flexible bronchoscopy, right robotic assisted lung resection on 2019. She reports long term monitoring of lung nodules over 15 years with abnormality and lymphadenopathy on six months ago followed by CT scan 3 months later with enlarging nodule in the right lung. (26 Aug 2019 18:29)      Procedure: Robotic right VATS converted to thoracotomy, RUL lobectomy, MLND, Repair of pulmonary artery x2  9/3/2019                Issues:   Lung nodule    Hyperlipidemia   Hypertension   Post op pain   Emphysema               Home Medications:  amlodipine-olmesartan 5 mg-20 mg oral tablet: 1 tab(s) orally once a day (03 Sep 2019 10:06)  aspirin 81 mg oral delayed release tablet: 1 tab(s) orally once a day (03 Sep 2019 10:06)  atorvastatin 40 mg oral tablet: 1 tab(s) orally once a day (03 Sep 2019 10:06)  metoprolol succinate 50 mg oral tablet, extended release: 1 tab(s) orally once a day (03 Sep 2019 10:06)  Trelegy Ellipta inhalation powder: 1 puff(s) inhaled once a day (03 Sep 2019 10:06)      PAST MEDICAL & SURGICAL HISTORY:  Hip pain  Emphysema  Carotid artery disease: monitored by vascluar doctor Doscher  Pulmonary nodules: yearly CT scans  Hyperlipidemia  Hypertension  History of  section: x2        ICU Vital Signs Last 24 Hrs  T(C): 37.1 (05 Sep 2019 00:00), Max: 37.2 (04 Sep 2019 16:00)  T(F): 98.8 (05 Sep 2019 00:00), Max: 98.9 (04 Sep 2019 16:00)  HR: 98 (05 Sep 2019 04:00) (71 - 98)  BP: 95/51 (05 Sep 2019 04:00) (73/39 - 149/50)  BP(mean): 60 (05 Sep 2019 04:00) (48 - 74)  ABP: --  ABP(mean): --  RR: 21 (05 Sep 2019 04:00) (13 - 27)  SpO2: 97% (05 Sep 2019 04:00) (89% - 100%)    I&O's Detail    03 Sep 2019 07:01  -  04 Sep 2019 07:00  --------------------------------------------------------  IN:    IV PiggyBack: 600 mL    lactated ringers.: 480 mL  Total IN: 1080 mL    OUT:    Chest Tube: 130 mL    Chest Tube: 245 mL    Indwelling Catheter - Urethral: 660 mL  Total OUT: 1035 mL    Total NET: 45 mL      04 Sep 2019 07:01  -  05 Sep 2019 05:18  --------------------------------------------------------  IN:    IV PiggyBack: 500 mL    lactated ringers.: 630 mL    Oral Fluid: 720 mL  Total IN: 1850 mL    OUT:    Chest Tube: 20 mL    Chest Tube: 250 mL    Voided: 1150 mL  Total OUT: 1420 mL    Total NET: 430 mL        CAPILLARY BLOOD GLUCOSE          Home Medications:  amlodipine-olmesartan 5 mg-20 mg oral tablet: 1 tab(s) orally once a day (03 Sep 2019 10:06)  aspirin 81 mg oral delayed release tablet: 1 tab(s) orally once a day (03 Sep 2019 10:06)  atorvastatin 40 mg oral tablet: 1 tab(s) orally once a day (03 Sep 2019 10:06)  metoprolol succinate 50 mg oral tablet, extended release: 1 tab(s) orally once a day (03 Sep 2019 10:06)  Trelegy Ellipta inhalation powder: 1 puff(s) inhaled once a day (03 Sep 2019 10:06)      MEDICATIONS  (STANDING):  albumin human  5% IVPB 250 milliLiter(s) IV Intermittent once  ALBUTerol/ipratropium for Nebulization 3 milliLiter(s) Nebulizer every 6 hours  aspirin enteric coated 81 milliGRAM(s) Oral daily  atorvastatin 40 milliGRAM(s) Oral at bedtime  buDESOnide  80 MICROgram(s)/formoterol 4.5 MICROgram(s) Inhaler 2 Puff(s) Inhalation two times a day  docusate sodium 100 milliGRAM(s) Oral three times a day  heparin  Injectable 5000 Unit(s) SubCutaneous every 8 hours  HYDROmorphone PCA (1 mG/mL) 30 milliLiter(s) PCA Continuous PCA Continuous  influenza   Vaccine 0.5 milliLiter(s) IntraMuscular once  lactated ringers. 1000 milliLiter(s) (30 mL/Hr) IV Continuous <Continuous>  pantoprazole    Tablet 40 milliGRAM(s) Oral before breakfast  senna 2 Tablet(s) Oral at bedtime    MEDICATIONS  (PRN):  acetaminophen  IVPB .. 1000 milliGRAM(s) IV Intermittent once PRN Temp greater or equal to 38C (100.4F), Moderate Pain (4 - 6)  benzocaine 15 mG/menthol 3.6 mG (Sugar-Free) Lozenge 1 Lozenge Oral every 4 hours PRN Sore Throat  HYDROmorphone PCA (1 mG/mL) Rescue Clinician Bolus 0.5 milliGRAM(s) IV Push every 15 minutes PRN for Pain Scale GREATER THAN 6  naloxone Injectable 0.1 milliGRAM(s) IV Push every 3 minutes PRN For ANY of the following changes in patient status:  A. RR LESS THAN 10 breaths per minute, B. Oxygen saturation LESS THAN 90%, C. Sedation score of 6  ondansetron Injectable 4 milliGRAM(s) IV Push every 6 hours PRN Nausea          Physical exam:                             General:               Pt is awake, alert, appears to be in pain but not in distress                                                 Neuro:                  Nonfocal                             Cardiovascular:   S1 & S2, regular                           Respiratory:         Air entry is fair and equal on both sides, has bilateral conducted sounds                           GI:                          Soft, nondistended and nontender, Bowel sounds active                            Ext:                        No cyanosis or edema     Labs:                                                                           9.3    14.83 )-----------( 223      ( 05 Sep 2019 03:45 )             27.9             09-05    130<L>  |  95<L>  |  29<H>  ----------------------------<  142<H>  4.4   |  20<L>  |  1.06    Ca    8.4      05 Sep 2019 03:45                       CXR:    < from: Xray Chest 1 View AP/PA (19 @ 07:28) >  Right-sided chest tubes, one low down and the other has its tip overlying   the apex of this hemithorax. Lungs are free of focal consolidations.   Right apical pneumothorax may be present.     at 6:57 AM:   No interval change in the 2 right-sided chest tubes. Mild loss of volume   in the right lung. Lungs are clear. Pneumothorax not appreciated on this   exam.    COMPARISON:  Chest radiograph 2018      IMPRESSION:  Status post right thoracotomy with 2 chest tubes and no   complications.          Plan:    General: 63yFemale s/p Robotic right VATS converted to thoracotomy, RUL lobectomy, MLND, Repair of pulmonary artery x2  9/3/2019, experiencing  pain with deep breathing. Events overnight significant for transient hypotension that responded to Fluid bolus                            Neuro:                                         Pain control with PCA / Tylenol IV                            Cardiovascular:                                          Continue hemodynamic monitoring.    HTN: Hold Lopressor / Norvasc / ARB because of episodes of hypotension    HLD: On Lipitor.                            Respiratory:                                         Pt is on 2L  nasal canula, wean off as tolerated                                          Comfortable, not in any distress.                                         Using incentive spirometry                                          Monitor chest tube output                                         Chest tube to suction, no air leak                                                                 Emphysema: Continue bronchodilators, pulmonary toilet                            GI                                         On DASH diet as tolerated                                         Continue Zofran / Reglan for nausea - PRN	                                                                 Renal:                                         Continue LR 30cc/hr                                         Monitor I/Os and electrolytes                                                                                        Hem/ Onc:                                                                                  Monitor chest tube output &  signs of bleeding.                                          Follow CBC in AM                           Infectious disease:                                            No signs of infection. Monitor for fever / leukocytosis.                                          All surgical incision / chest tube  sites look clean                            Endocrine                                             Continue Accu-Checks with coverage.     Pt is on SQ Heparin and Venodyne boots for DVT prophylaxis.     Pertinent clinical, laboratory, radiographic, hemodynamic, echocardiographic, respiratory data, microbiologic data and chart were reviewed and analyzed frequently throughout the course of the day and night  Patient seen, examined and plan discussed with CT Surgeon Dr. Clement  / CTICU team during rounds.    Pt's status discussed with family at bedside, updated status          Papi Lopez MD

## 2019-09-05 NOTE — PROGRESS NOTE ADULT - SUBJECTIVE AND OBJECTIVE BOX
Anesthesia Pain Management Service    SUBJECTIVE: Patient is doing well with IV PCA and no significant problems reported.     Pain Scale Score	At rest: 8/10___ 	With Activity: ___ 	[X ] Refer to charted pain scores    THERAPY:    [ ] IV PCA Morphine		[ ] 5 mg/mL	[ ] 1 mg/mL  [X ] IV PCA Hydromorphone	[ ] 5 mg/mL	[X ] 1 mg/mL  [ ] IV PCA Fentanyl		[ ] 50 micrograms/mL    Demand dose __0.2_ lockout __6_ (minutes) Continuous Rate _0__ Total: _3.7__  mg used (in past 24 hours)      MEDICATIONS  (STANDING):  ALBUTerol/ipratropium for Nebulization 3 milliLiter(s) Nebulizer every 6 hours  aspirin enteric coated 81 milliGRAM(s) Oral daily  atorvastatin 40 milliGRAM(s) Oral at bedtime  buDESOnide  80 MICROgram(s)/formoterol 4.5 MICROgram(s) Inhaler 2 Puff(s) Inhalation two times a day  docusate sodium 100 milliGRAM(s) Oral three times a day  heparin  Injectable 5000 Unit(s) SubCutaneous every 8 hours  HYDROmorphone PCA (1 mG/mL) 30 milliLiter(s) PCA Continuous PCA Continuous  influenza   Vaccine 0.5 milliLiter(s) IntraMuscular once  lactated ringers. 1000 milliLiter(s) (30 mL/Hr) IV Continuous <Continuous>  pantoprazole    Tablet 40 milliGRAM(s) Oral before breakfast  senna 2 Tablet(s) Oral at bedtime    MEDICATIONS  (PRN):  acetaminophen  IVPB .. 1000 milliGRAM(s) IV Intermittent once PRN Temp greater or equal to 38C (100.4F), Moderate Pain (4 - 6)  benzocaine 15 mG/menthol 3.6 mG (Sugar-Free) Lozenge 1 Lozenge Oral every 4 hours PRN Sore Throat  HYDROmorphone PCA (1 mG/mL) Rescue Clinician Bolus 0.5 milliGRAM(s) IV Push every 15 minutes PRN for Pain Scale GREATER THAN 6  naloxone Injectable 0.1 milliGRAM(s) IV Push every 3 minutes PRN For ANY of the following changes in patient status:  A. RR LESS THAN 10 breaths per minute, B. Oxygen saturation LESS THAN 90%, C. Sedation score of 6  ondansetron Injectable 4 milliGRAM(s) IV Push every 6 hours PRN Nausea      OBJECTIVE:  Patient is sitting up in chair, with CT.    Sedation Score:	[ X] Alert	[ ] Drowsy 	[ ] Arousable	[ ] Asleep	[ ] Unresponsive    Side Effects:	[X ] None	[ ] Nausea	[ ] Vomiting	[ ] Pruritus  		[ ] Other:    Vital Signs Last 24 Hrs  T(C): 37.5 (05 Sep 2019 10:00), Max: 37.5 (05 Sep 2019 10:00)  T(F): 99.5 (05 Sep 2019 10:00), Max: 99.5 (05 Sep 2019 10:00)  HR: 110 (05 Sep 2019 11:00) (83 - 120)  BP: 122/45 (05 Sep 2019 11:00) (73/39 - 149/50)  BP(mean): 65 (05 Sep 2019 11:00) (48 - 74)  RR: 27 (05 Sep 2019 11:00) (13 - 27)  SpO2: 94% (05 Sep 2019 11:00) (89% - 100%)    ASSESSMENT/ PLAN    Therapy to  be:	[ X] Continue   [ ] Discontinued   [ ] Change to prn Analgesics    Documentation and Verification of current medications:   [X] Done	[ ] Not done, not elligible    Comments:  Discussed with CTICU Team and wants to continue pain regimen with IV PCA.

## 2019-09-05 NOTE — PROGRESS NOTE ADULT - SUBJECTIVE AND OBJECTIVE BOX
SUBJECTIVE: no CP or SOB      MEDICATIONS  (STANDING):  ALBUTerol/ipratropium for Nebulization 3 milliLiter(s) Nebulizer every 6 hours  aspirin enteric coated 81 milliGRAM(s) Oral daily  atorvastatin 40 milliGRAM(s) Oral at bedtime  buDESOnide  80 MICROgram(s)/formoterol 4.5 MICROgram(s) Inhaler 2 Puff(s) Inhalation two times a day  docusate sodium 100 milliGRAM(s) Oral three times a day  heparin  Injectable 5000 Unit(s) SubCutaneous every 8 hours  HYDROmorphone PCA (1 mG/mL) 30 milliLiter(s) PCA Continuous PCA Continuous  influenza   Vaccine 0.5 milliLiter(s) IntraMuscular once  lactated ringers. 1000 milliLiter(s) (30 mL/Hr) IV Continuous <Continuous>  pantoprazole    Tablet 40 milliGRAM(s) Oral before breakfast  senna 2 Tablet(s) Oral at bedtime    MEDICATIONS  (PRN):  acetaminophen  IVPB .. 1000 milliGRAM(s) IV Intermittent once PRN Temp greater or equal to 38C (100.4F), Moderate Pain (4 - 6)  benzocaine 15 mG/menthol 3.6 mG (Sugar-Free) Lozenge 1 Lozenge Oral every 4 hours PRN Sore Throat  HYDROmorphone PCA (1 mG/mL) Rescue Clinician Bolus 0.5 milliGRAM(s) IV Push every 15 minutes PRN for Pain Scale GREATER THAN 6  naloxone Injectable 0.1 milliGRAM(s) IV Push every 3 minutes PRN For ANY of the following changes in patient status:  A. RR LESS THAN 10 breaths per minute, B. Oxygen saturation LESS THAN 90%, C. Sedation score of 6  ondansetron Injectable 4 milliGRAM(s) IV Push every 6 hours PRN Nausea      LABS:                     9.3    14.83 )-----------( 223      ( 05 Sep 2019 03:45 )             27.9     130<L>  |  95<L>  |  29<H>  ----------------------------<  142<H>  4.4   |  20<L>  |  1.06    Ca    8.4      05 Sep 2019 03:45    Creatinine Trend: 1.06<--, 1.15<--, 1.10<--     PHYSICAL EXAM  Vital Signs Last 24 Hrs  T(C): 37.5 (05 Sep 2019 10:00), Max: 37.5 (05 Sep 2019 10:00)  T(F): 99.5 (05 Sep 2019 10:00), Max: 99.5 (05 Sep 2019 10:00)  HR: 109 (05 Sep 2019 12:00) (83 - 120)  BP: 156/53 (05 Sep 2019 12:00) (73/39 - 156/53)  BP(mean): 79 (05 Sep 2019 12:00) (48 - 79)  RR: 21 (05 Sep 2019 12:00) (13 - 27)  SpO2: 95% (05 Sep 2019 12:00) (89% - 100%)      Cardiovascular:  S1S2 RRR, No JVD  Respiratory: Lungs clear to auscultation, normal effort  Gastrointestinal: Abdomen soft, ND, NT, +BS  Skin: Warm, dry, intact. No rash.  Musculoskeletal: Normal ROM, normal strength  Ext: No C/C/E B/L LE    DIAGNOSTIC DATA  TELEMETRY: SR    ASSESSMENT AND PLAN:  62 yo female, former 38-pack year smoker with history of emphysema, HTN, HLD, with chronic pulmonary nodules. admitted s/p elective R Vats, converted to thoracotomy, RUL lobectomy, repair of Pulm artery    --doing well post op  --BP/HR stable  --no further cardiac work up planned   --will follow with you

## 2019-09-05 NOTE — PROGRESS NOTE ADULT - SUBJECTIVE AND OBJECTIVE BOX
Patient is a 63y old  Female who presents with a chief complaint of pulm nodule (04 Sep 2019 10:14)      Any change in ROS: Doingok : no SOB     MEDICATIONS  (STANDING):  ALBUTerol/ipratropium for Nebulization 3 milliLiter(s) Nebulizer every 6 hours  aspirin enteric coated 81 milliGRAM(s) Oral daily  atorvastatin 40 milliGRAM(s) Oral at bedtime  buDESOnide  80 MICROgram(s)/formoterol 4.5 MICROgram(s) Inhaler 2 Puff(s) Inhalation two times a day  docusate sodium 100 milliGRAM(s) Oral three times a day  heparin  Injectable 5000 Unit(s) SubCutaneous every 8 hours  HYDROmorphone PCA (1 mG/mL) 30 milliLiter(s) PCA Continuous PCA Continuous  influenza   Vaccine 0.5 milliLiter(s) IntraMuscular once  lactated ringers. 1000 milliLiter(s) (30 mL/Hr) IV Continuous <Continuous>  pantoprazole    Tablet 40 milliGRAM(s) Oral before breakfast  senna 2 Tablet(s) Oral at bedtime    MEDICATIONS  (PRN):  acetaminophen  IVPB .. 1000 milliGRAM(s) IV Intermittent once PRN Temp greater or equal to 38C (100.4F), Moderate Pain (4 - 6)  benzocaine 15 mG/menthol 3.6 mG (Sugar-Free) Lozenge 1 Lozenge Oral every 4 hours PRN Sore Throat  HYDROmorphone PCA (1 mG/mL) Rescue Clinician Bolus 0.5 milliGRAM(s) IV Push every 15 minutes PRN for Pain Scale GREATER THAN 6  naloxone Injectable 0.1 milliGRAM(s) IV Push every 3 minutes PRN For ANY of the following changes in patient status:  A. RR LESS THAN 10 breaths per minute, B. Oxygen saturation LESS THAN 90%, C. Sedation score of 6  ondansetron Injectable 4 milliGRAM(s) IV Push every 6 hours PRN Nausea    Vital Signs Last 24 Hrs  T(C): 37.5 (05 Sep 2019 10:00), Max: 37.5 (05 Sep 2019 10:00)  T(F): 99.5 (05 Sep 2019 10:00), Max: 99.5 (05 Sep 2019 10:00)  HR: 110 (05 Sep 2019 11:00) (83 - 120)  BP: 122/45 (05 Sep 2019 11:00) (73/39 - 149/50)  BP(mean): 65 (05 Sep 2019 11:00) (48 - 74)  RR: 27 (05 Sep 2019 11:00) (13 - 27)  SpO2: 94% (05 Sep 2019 11:00) (89% - 100%)    I&O's Summary    04 Sep 2019 07:01  -  05 Sep 2019 07:00  --------------------------------------------------------  IN: 1910 mL / OUT: 1970 mL / NET: -60 mL    05 Sep 2019 07:01  -  05 Sep 2019 11:32  --------------------------------------------------------  IN: 90 mL / OUT: 300 mL / NET: -210 mL          Physical Exam:   GENERAL: NAD, well-groomed, well-developed  HEENT: BAYLEE/   Atraumatic, Normocephalic  ENMT: No tonsillar erythema, exudates, or enlargement; Moist mucous membranes, Good dentition, No lesions  NECK: Supple, No JVD, Normal thyroid  CHEST/LUNG: Clear to auscultaions  CVS: Regular rate and rhythm; No murmurs, rubs, or gallops  GI: : Soft, Nontender, Nondistended; Bowel sounds present  NERVOUS SYSTEM:  Alert & Oriented X3  EXTREMITIES:  2+ Peripheral Pulses, No clubbing, cyanosis, or edema  LYMPH: No lymphadenopathy noted  SKIN: No rashes or lesions  ENDOCRINOLOGY: No Thyromegaly  PSYCH: Appropriate    Labs:                              9.3    14.83 )-----------( 223      ( 05 Sep 2019 03:45 )             27.9                         12.1   16.04 )-----------( 231      ( 04 Sep 2019 03:40 )             35.5     09-05    130<L>  |  95<L>  |  29<H>  ----------------------------<  142<H>  4.4   |  20<L>  |  1.06  09-04    135  |  101  |  28<H>  ----------------------------<  171<H>  5.3   |  19<L>  |  1.15    Ca    8.4      05 Sep 2019 03:45  Ca    8.5      04 Sep 2019 03:40      CAPILLARY BLOOD GLUCOSE        < from: Xray Chest 1 View- PORTABLE-Routine (09.05.19 @ 06:50) >    EXAM:  XR CHEST PORTABLE ROUTINE 1V        PROCEDURE DATE:  Sep  5 2019         INTERPRETATION:  TIME OF EXAM: September 5, 2019 at 6:25 AM    CLINICAL INFORMATION: Follow-up post right thoracotomy.    TECHNIQUE:   Portable chest    INTERPRETATION:    The upper of the 2 right-sided chest tubes have been removed since the   last study. Lungs remain free of focal consolidations, the heart is not   enlarged and no effusion or pneumothorax.      COMPARISON:  September 4      IMPRESSION:  Status post right thoracotomy with chest tube removal.                  DARRYL WHITTAKER M.D., ATTENDING RADIOLOGIST  This document has been electronically signed. Sep  5 2019  7:24AM        < end of copied text >                RECENT CULTURES:  09-03 @ 15:04 OTHER                        RESPIRATORY CULTURES:          Studies  Chest X-RAY  CT SCAN Chest   Venous Dopplers: LE:   CT Abdomen  Others

## 2019-09-06 LAB
ANION GAP SERPL CALC-SCNC: 12 MMO/L — SIGNIFICANT CHANGE UP (ref 7–14)
BUN SERPL-MCNC: 17 MG/DL — SIGNIFICANT CHANGE UP (ref 7–23)
CALCIUM SERPL-MCNC: 8.8 MG/DL — SIGNIFICANT CHANGE UP (ref 8.4–10.5)
CHLORIDE SERPL-SCNC: 98 MMOL/L — SIGNIFICANT CHANGE UP (ref 98–107)
CO2 SERPL-SCNC: 23 MMOL/L — SIGNIFICANT CHANGE UP (ref 22–31)
CREAT SERPL-MCNC: 0.85 MG/DL — SIGNIFICANT CHANGE UP (ref 0.5–1.3)
GLUCOSE SERPL-MCNC: 139 MG/DL — HIGH (ref 70–99)
HCT VFR BLD CALC: 29.5 % — LOW (ref 34.5–45)
HGB BLD-MCNC: 9.9 G/DL — LOW (ref 11.5–15.5)
MCHC RBC-ENTMCNC: 31.7 PG — SIGNIFICANT CHANGE UP (ref 27–34)
MCHC RBC-ENTMCNC: 33.6 % — SIGNIFICANT CHANGE UP (ref 32–36)
MCV RBC AUTO: 94.6 FL — SIGNIFICANT CHANGE UP (ref 80–100)
NRBC # FLD: 0.02 K/UL — SIGNIFICANT CHANGE UP (ref 0–0)
PLATELET # BLD AUTO: 265 K/UL — SIGNIFICANT CHANGE UP (ref 150–400)
PMV BLD: 10.6 FL — SIGNIFICANT CHANGE UP (ref 7–13)
POTASSIUM SERPL-MCNC: 4.4 MMOL/L — SIGNIFICANT CHANGE UP (ref 3.5–5.3)
POTASSIUM SERPL-SCNC: 4.4 MMOL/L — SIGNIFICANT CHANGE UP (ref 3.5–5.3)
RBC # BLD: 3.12 M/UL — LOW (ref 3.8–5.2)
RBC # FLD: 13.8 % — SIGNIFICANT CHANGE UP (ref 10.3–14.5)
SODIUM SERPL-SCNC: 133 MMOL/L — LOW (ref 135–145)
WBC # BLD: 14.28 K/UL — HIGH (ref 3.8–10.5)
WBC # FLD AUTO: 14.28 K/UL — HIGH (ref 3.8–10.5)

## 2019-09-06 PROCEDURE — 71045 X-RAY EXAM CHEST 1 VIEW: CPT | Mod: 26

## 2019-09-06 RX ORDER — OXYCODONE AND ACETAMINOPHEN 5; 325 MG/1; MG/1
1 TABLET ORAL EVERY 4 HOURS
Refills: 0 | Status: DISCONTINUED | OUTPATIENT
Start: 2019-09-06 | End: 2019-09-08

## 2019-09-06 RX ADMIN — OXYCODONE AND ACETAMINOPHEN 1 TABLET(S): 5; 325 TABLET ORAL at 12:55

## 2019-09-06 RX ADMIN — Medication 3 MILLILITER(S): at 09:47

## 2019-09-06 RX ADMIN — HEPARIN SODIUM 5000 UNIT(S): 5000 INJECTION INTRAVENOUS; SUBCUTANEOUS at 21:49

## 2019-09-06 RX ADMIN — OXYCODONE AND ACETAMINOPHEN 1 TABLET(S): 5; 325 TABLET ORAL at 19:51

## 2019-09-06 RX ADMIN — SODIUM CHLORIDE 30 MILLILITER(S): 9 INJECTION, SOLUTION INTRAVENOUS at 05:37

## 2019-09-06 RX ADMIN — ATORVASTATIN CALCIUM 40 MILLIGRAM(S): 80 TABLET, FILM COATED ORAL at 21:49

## 2019-09-06 RX ADMIN — OXYCODONE AND ACETAMINOPHEN 1 TABLET(S): 5; 325 TABLET ORAL at 20:00

## 2019-09-06 RX ADMIN — Medication 81 MILLIGRAM(S): at 12:06

## 2019-09-06 RX ADMIN — SODIUM CHLORIDE 30 MILLILITER(S): 9 INJECTION, SOLUTION INTRAVENOUS at 09:00

## 2019-09-06 RX ADMIN — Medication 100 MILLIGRAM(S): at 05:36

## 2019-09-06 RX ADMIN — PANTOPRAZOLE SODIUM 40 MILLIGRAM(S): 20 TABLET, DELAYED RELEASE ORAL at 05:37

## 2019-09-06 RX ADMIN — OXYCODONE AND ACETAMINOPHEN 1 TABLET(S): 5; 325 TABLET ORAL at 12:06

## 2019-09-06 RX ADMIN — BUDESONIDE AND FORMOTEROL FUMARATE DIHYDRATE 2 PUFF(S): 160; 4.5 AEROSOL RESPIRATORY (INHALATION) at 09:00

## 2019-09-06 RX ADMIN — HEPARIN SODIUM 5000 UNIT(S): 5000 INJECTION INTRAVENOUS; SUBCUTANEOUS at 05:36

## 2019-09-06 RX ADMIN — SENNA PLUS 2 TABLET(S): 8.6 TABLET ORAL at 21:49

## 2019-09-06 RX ADMIN — HEPARIN SODIUM 5000 UNIT(S): 5000 INJECTION INTRAVENOUS; SUBCUTANEOUS at 12:06

## 2019-09-06 RX ADMIN — Medication 3 MILLILITER(S): at 03:36

## 2019-09-06 RX ADMIN — HYDROMORPHONE HYDROCHLORIDE 30 MILLILITER(S): 2 INJECTION INTRAMUSCULAR; INTRAVENOUS; SUBCUTANEOUS at 07:29

## 2019-09-06 RX ADMIN — Medication 100 MILLIGRAM(S): at 21:49

## 2019-09-06 RX ADMIN — BUDESONIDE AND FORMOTEROL FUMARATE DIHYDRATE 2 PUFF(S): 160; 4.5 AEROSOL RESPIRATORY (INHALATION) at 21:49

## 2019-09-06 RX ADMIN — Medication 3 MILLILITER(S): at 16:11

## 2019-09-06 RX ADMIN — Medication 100 MILLIGRAM(S): at 12:06

## 2019-09-06 NOTE — PROGRESS NOTE ADULT - SUBJECTIVE AND OBJECTIVE BOX
Anesthesia Pain Management Service    SUBJECTIVE: Patient is doing well with IV PCA and no significant problems reported.    Pain Scale Score	At rest: _2__ 	With Activity: ___ 	[X ] Refer to charted pain scores    THERAPY:    [ ] IV PCA Morphine		[ ] 5 mg/mL	[ ] 1 mg/mL  [X ] IV PCA Hydromorphone	[ ] 5 mg/mL	[X ] 1 mg/mL  [ ] IV PCA Fentanyl		[ ] 50 micrograms/mL    Demand dose __0.2_ lockout __6_ (minutes) Continuous Rate _0__ Total: _3.4__   mg used (in past 24 hrs)      MEDICATIONS  (STANDING):  ALBUTerol/ipratropium for Nebulization 3 milliLiter(s) Nebulizer every 6 hours  aspirin enteric coated 81 milliGRAM(s) Oral daily  atorvastatin 40 milliGRAM(s) Oral at bedtime  buDESOnide  80 MICROgram(s)/formoterol 4.5 MICROgram(s) Inhaler 2 Puff(s) Inhalation two times a day  docusate sodium 100 milliGRAM(s) Oral three times a day  heparin  Injectable 5000 Unit(s) SubCutaneous every 8 hours  influenza   Vaccine 0.5 milliLiter(s) IntraMuscular once  lactated ringers. 1000 milliLiter(s) (30 mL/Hr) IV Continuous <Continuous>  pantoprazole    Tablet 40 milliGRAM(s) Oral before breakfast  senna 2 Tablet(s) Oral at bedtime    MEDICATIONS  (PRN):  benzocaine 15 mG/menthol 3.6 mG (Sugar-Free) Lozenge 1 Lozenge Oral every 4 hours PRN Sore Throat  oxyCODONE    5 mG/acetaminophen 325 mG 1 Tablet(s) Oral every 4 hours PRN Moderate Pain (4 - 6)      OBJECTIVE: laying in bed     Sedation Score:	[ X] Alert	[ ] Drowsy 	[ ] Arousable	[ ] Asleep	[ ] Unresponsive    Side Effects:	[X ] None	[ ] Nausea	[ ] Vomiting	[ ] Pruritus  		[ ] Other:    Vital Signs Last 24 Hrs  T(C): 37.1 (06 Sep 2019 07:53), Max: 37.8 (05 Sep 2019 12:00)  T(F): 98.7 (06 Sep 2019 07:53), Max: 100.1 (05 Sep 2019 12:00)  HR: 94 (06 Sep 2019 07:53) (92 - 115)  BP: 119/51 (06 Sep 2019 07:53) (97/59 - 156/53)  BP(mean): 90 (05 Sep 2019 15:00) (65 - 90)  RR: 17 (06 Sep 2019 07:53) (17 - 27)  SpO2: 96% (06 Sep 2019 07:53) (93% - 100%)    ASSESSMENT/ PLAN    Therapy to  be:	[ ] Continue   [ X] Discontinued   [X ] Change to prn Analgesics    Documentation and Verification of current medications:   [X] Done	[ ] Not done, not elligible    Comments: PRN Oral/IV opioids and/or Adjuvant medication to be ordered at this point. Orders written by team

## 2019-09-06 NOTE — PROGRESS NOTE ADULT - SUBJECTIVE AND OBJECTIVE BOX
Anesthesia Pain Management Service- Attending Addendum    SUBJECTIVE: Patient's pain control adequate    Therapy:	  [ X] IV PCA	   [ ] Epidural           [ ] s/p Spinal Opoid              [ ] Postpartum infusion	  [ ] Patient controlled regional anesthesia (PCRA)    [ ] prn Analgesics    Allergies    penicillin (Short breath; Hives)  tetracycline (Short breath; Hives)    Intolerances      MEDICATIONS  (STANDING):  ALBUTerol/ipratropium for Nebulization 3 milliLiter(s) Nebulizer every 6 hours  aspirin enteric coated 81 milliGRAM(s) Oral daily  atorvastatin 40 milliGRAM(s) Oral at bedtime  buDESOnide  80 MICROgram(s)/formoterol 4.5 MICROgram(s) Inhaler 2 Puff(s) Inhalation two times a day  docusate sodium 100 milliGRAM(s) Oral three times a day  heparin  Injectable 5000 Unit(s) SubCutaneous every 8 hours  influenza   Vaccine 0.5 milliLiter(s) IntraMuscular once  lactated ringers. 1000 milliLiter(s) (30 mL/Hr) IV Continuous <Continuous>  pantoprazole    Tablet 40 milliGRAM(s) Oral before breakfast  senna 2 Tablet(s) Oral at bedtime    MEDICATIONS  (PRN):  benzocaine 15 mG/menthol 3.6 mG (Sugar-Free) Lozenge 1 Lozenge Oral every 4 hours PRN Sore Throat  oxyCODONE    5 mG/acetaminophen 325 mG 1 Tablet(s) Oral every 4 hours PRN Moderate Pain (4 - 6)      OBJECTIVE:   [X] No new signs     [ ] Other:    Side Effects:  [X ] None			[ ] Other:      ASSESSMENT/PLAN  -Discontinue current therapy    [ ] Therapy changed to:    [ ] IV PCA       [ ] Epidural     [ X] prn Analgesics     Comments: Pain management per primary team, APS to sign off

## 2019-09-06 NOTE — PROVIDER CONTACT NOTE (OTHER) - ASSESSMENT
Pt sitting up in chair, dressing on R midaxillary is saturated with serous drainage, saturating gown as well. PA notified. Pt sitting up in chair, dressing on R midaxillary is saturated with serous drainage, saturating gown as well. Dsg changed.  PA notified.

## 2019-09-06 NOTE — PROGRESS NOTE ADULT - SUBJECTIVE AND OBJECTIVE BOX
Patient is a 63y old  Female who presents with a chief complaint of s/p thoracotomy: (05 Sep 2019 11:32)      Any change in ROS: Pt is doing well: had some trouble breathing last night with phlegm : got nebs and felt better     MEDICATIONS  (STANDING):  ALBUTerol/ipratropium for Nebulization 3 milliLiter(s) Nebulizer every 6 hours  aspirin enteric coated 81 milliGRAM(s) Oral daily  atorvastatin 40 milliGRAM(s) Oral at bedtime  buDESOnide  80 MICROgram(s)/formoterol 4.5 MICROgram(s) Inhaler 2 Puff(s) Inhalation two times a day  docusate sodium 100 milliGRAM(s) Oral three times a day  heparin  Injectable 5000 Unit(s) SubCutaneous every 8 hours  influenza   Vaccine 0.5 milliLiter(s) IntraMuscular once  lactated ringers. 1000 milliLiter(s) (30 mL/Hr) IV Continuous <Continuous>  pantoprazole    Tablet 40 milliGRAM(s) Oral before breakfast  senna 2 Tablet(s) Oral at bedtime    MEDICATIONS  (PRN):  benzocaine 15 mG/menthol 3.6 mG (Sugar-Free) Lozenge 1 Lozenge Oral every 4 hours PRN Sore Throat  oxyCODONE    5 mG/acetaminophen 325 mG 1 Tablet(s) Oral every 4 hours PRN Moderate Pain (4 - 6)    Vital Signs Last 24 Hrs  T(C): 37.1 (06 Sep 2019 07:53), Max: 37.8 (05 Sep 2019 12:00)  T(F): 98.7 (06 Sep 2019 07:53), Max: 100.1 (05 Sep 2019 12:00)  HR: 100 (06 Sep 2019 09:49) (92 - 109)  BP: 119/51 (06 Sep 2019 07:53) (112/40 - 156/53)  BP(mean): 90 (05 Sep 2019 15:00) (65 - 90)  RR: 17 (06 Sep 2019 07:53) (17 - 25)  SpO2: 99% (06 Sep 2019 09:49) (93% - 100%)    I&O's Summary    05 Sep 2019 07:01  -  06 Sep 2019 07:00  --------------------------------------------------------  IN: 210 mL / OUT: 1190 mL / NET: -980 mL    06 Sep 2019 07:01  -  06 Sep 2019 11:37  --------------------------------------------------------  IN: 540 mL / OUT: 20 mL / NET: 520 mL          Physical Exam:   GENERAL: NAD, well-groomed, well-developed  HEENT: BAYLEE/   Atraumatic, Normocephalic  ENMT: No tonsillar erythema, exudates, or enlargement; Moist mucous membranes, Good dentition, No lesions  NECK: Supple, No JVD, Normal thyroid  CHEST/LUNG: Clear to auscultaion  CVS: Regular rate and rhythm; No murmurs, rubs, or gallops  GI: : Soft, Nontender, Nondistended; Bowel sounds present  NERVOUS SYSTEM:  Alert & Oriented X3  EXTREMITIES:  2+ Peripheral Pulses, No clubbing, cyanosis, or edema  LYMPH: No lymphadenopathy noted  SKIN: No rashes or lesions  ENDOCRINOLOGY: No Thyromegaly  PSYCH: Appropriate    Labs:                              9.9    14.28 )-----------( 265      ( 06 Sep 2019 06:58 )             29.5                         9.3    14.83 )-----------( 223      ( 05 Sep 2019 03:45 )             27.9                         12.1   16.04 )-----------( 231      ( 04 Sep 2019 03:40 )             35.5     09-06    133<L>  |  98  |  17  ----------------------------<  139<H>  4.4   |  23  |  0.85  09-05    130<L>  |  95<L>  |  29<H>  ----------------------------<  142<H>  4.4   |  20<L>  |  1.06  09-04    135  |  101  |  28<H>  ----------------------------<  171<H>  5.3   |  19<L>  |  1.15    Ca    8.8      06 Sep 2019 06:58  Ca    8.4      05 Sep 2019 03:45      CAPILLARY BLOOD GLUCOSE              < from: Xray Chest 1 View- PORTABLE-Routine (09.06.19 @ 07:09) >      PROCEDURE DATE:  Sep  6 2019         INTERPRETATION:  CLINICAL INDICATION: Status post right VATS.    EXAM: Frontal view of the chest with comparison made to chest radiograph   on 9/5/2019    Impression:    Surgical clips overlie the right mid lung. Stable postsurgical changes   within the right midlung. Stable small bilateral pleural effusions. No   pneumothorax.     Unchanged position of a right-sided chest tube which terminates in the   right lower hemithorax.     Trace subcutaneous right chest wall emphysema. No abnormalities              ESTER SHELTON M.D., RADIOLOGY RESIDENT  This document has been electronically signed.  JOSH ARGUETA M.D., ATTENDING RADIOLOGIST  This document has been electronically signed. Sep  6 2019 11:20AM    < end of copied text >          RECENT CULTURES:  09-03 @ 15:04 OTHER     < from: Xray Chest 1 View- PORTABLE-Routine (09.06.19 @ 07:09) >  PROCEDURE DATE:  Sep  6 2019         INTERPRETATION:  CLINICAL INDICATION: Status post right VATS.    EXAM: Frontal view of the chest with comparison made to chest radiograph   on 9/5/2019    Impression:    Surgical clips overlie the right mid lung. Stable postsurgical changes   within the right midlung. Stable small bilateral pleural effusions. No   pneumothorax.     Unchanged position of a right-sided chest tube which terminates in the   right lower hemithorax.     Trace subcutaneous right chest wall emphysema. No abnormalities              ESTER SHELTON M.D., RADIOLOGY RESIDENT  This document has been electronically signed.  JOSH ARGUETA M.D., ATTENDING RADIOLOGIST  This document has been electronically signed. Sep  6 2019 11:20AM        < end of copied text >                     RESPIRATORY CULTURES:          Studies  Chest X-RAY  CT SCAN Chest   Venous Dopplers: LE:   CT Abdomen  Others

## 2019-09-06 NOTE — DISCHARGE NOTE PROVIDER - NSDCACTIVITY_GEN_ALL_CORE
Showering allowed/Sex allowed/Do not drive or operate machinery/Stairs allowed/Walking - Outdoors allowed/Do not make important decisions/Walking - Indoors allowed/No heavy lifting/straining

## 2019-09-06 NOTE — PROGRESS NOTE ADULT - SUBJECTIVE AND OBJECTIVE BOX
Patient is a 63y old  Female who presents with a chief complaint of Right lung resection (06 Sep 2019 12:20)      INTERVAL HPI/OVERNIGHT EVENTS:  T(C): 37.2 (09-06-19 @ 16:41), Max: 37.2 (09-06-19 @ 12:00)  HR: 103 (09-06-19 @ 16:41) (88 - 107)  BP: 123/48 (09-06-19 @ 16:41) (108/42 - 131/54)  RR: 18 (09-06-19 @ 16:41) (17 - 18)  SpO2: 94% (09-06-19 @ 16:41) (93% - 100%)  Wt(kg): --  I&O's Summary    05 Sep 2019 07:01  -  06 Sep 2019 07:00  --------------------------------------------------------  IN: 210 mL / OUT: 1190 mL / NET: -980 mL    06 Sep 2019 07:01  -  06 Sep 2019 18:25  --------------------------------------------------------  IN: 1500 mL / OUT: 570 mL / NET: 930 mL        LABS:                        9.9    14.28 )-----------( 265      ( 06 Sep 2019 06:58 )             29.5     09-06    133<L>  |  98  |  17  ----------------------------<  139<H>  4.4   |  23  |  0.85    Ca    8.8      06 Sep 2019 06:58          CAPILLARY BLOOD GLUCOSE                MEDICATIONS  (STANDING):  aspirin enteric coated 81 milliGRAM(s) Oral daily  atorvastatin 40 milliGRAM(s) Oral at bedtime  buDESOnide  80 MICROgram(s)/formoterol 4.5 MICROgram(s) Inhaler 2 Puff(s) Inhalation two times a day  docusate sodium 100 milliGRAM(s) Oral three times a day  heparin  Injectable 5000 Unit(s) SubCutaneous every 8 hours  influenza   Vaccine 0.5 milliLiter(s) IntraMuscular once  lactated ringers. 1000 milliLiter(s) (30 mL/Hr) IV Continuous <Continuous>  pantoprazole    Tablet 40 milliGRAM(s) Oral before breakfast  senna 2 Tablet(s) Oral at bedtime    MEDICATIONS  (PRN):  benzocaine 15 mG/menthol 3.6 mG (Sugar-Free) Lozenge 1 Lozenge Oral every 4 hours PRN Sore Throat  oxyCODONE    5 mG/acetaminophen 325 mG 1 Tablet(s) Oral every 4 hours PRN Moderate Pain (4 - 6)          PHYSICAL EXAM:  GENERAL: NAD, well-groomed, well-developed  HEAD:  Atraumatic, Normocephalic  CHEST/LUNG: Clear to percussion bilaterally; No rales, rhonchi, wheezing, or rubs, chest tube present   HEART: Regular rate and rhythm; No murmurs, rubs, or gallops  ABDOMEN: Soft, Nontender, Nondistended; Bowel sounds present  EXTREMITIES:  2+ Peripheral Pulses, No clubbing, cyanosis, or edema  LYMPH: No lymphadenopathy noted  SKIN: No rashes or lesions    Care Discussed with Consultants/Other Providers [ ] YES  [ ] NO

## 2019-09-06 NOTE — PROGRESS NOTE ADULT - SUBJECTIVE AND OBJECTIVE BOX
SUBJECTIVE: no CP or SOB        ALBUTerol/ipratropium for Nebulization 3 milliLiter(s) Nebulizer every 6 hours  aspirin enteric coated 81 milliGRAM(s) Oral daily  atorvastatin 40 milliGRAM(s) Oral at bedtime  benzocaine 15 mG/menthol 3.6 mG (Sugar-Free) Lozenge 1 Lozenge Oral every 4 hours PRN  buDESOnide  80 MICROgram(s)/formoterol 4.5 MICROgram(s) Inhaler 2 Puff(s) Inhalation two times a day  docusate sodium 100 milliGRAM(s) Oral three times a day  heparin  Injectable 5000 Unit(s) SubCutaneous every 8 hours  influenza   Vaccine 0.5 milliLiter(s) IntraMuscular once  lactated ringers. 1000 milliLiter(s) IV Continuous <Continuous>  oxyCODONE    5 mG/acetaminophen 325 mG 1 Tablet(s) Oral every 4 hours PRN  pantoprazole    Tablet 40 milliGRAM(s) Oral before breakfast  senna 2 Tablet(s) Oral at bedtime                            9.9    14.28 )-----------( 265      ( 06 Sep 2019 06:58 )             29.5       09-06    133<L>  |  98  |  17  ----------------------------<  139<H>  4.4   |  23  |  0.85    Ca    8.8      06 Sep 2019 06:58              T(C): 37.1 (09-06-19 @ 07:53), Max: 37.8 (09-05-19 @ 12:00)  HR: 100 (09-06-19 @ 09:49) (92 - 110)  BP: 119/51 (09-06-19 @ 07:53) (112/40 - 156/53)  RR: 17 (09-06-19 @ 07:53) (17 - 27)  SpO2: 99% (09-06-19 @ 09:49) (93% - 100%)  Wt(kg): --    I&O's Summary    05 Sep 2019 07:01  -  06 Sep 2019 07:00  --------------------------------------------------------  IN: 210 mL / OUT: 1190 mL / NET: -980 mL    06 Sep 2019 07:01  -  06 Sep 2019 10:16  --------------------------------------------------------  IN: 540 mL / OUT: 20 mL / NET: 520 mL          Cardiovascular:  S1S2 RRR, No JVD  Respiratory: Lungs clear to auscultation, normal effort  Gastrointestinal: Abdomen soft, ND, NT, +BS  Skin: Warm, dry, intact. No rash.  Musculoskeletal: Normal ROM, normal strength  Ext: No C/C/E B/L LE    DIAGNOSTIC DATA  TELEMETRY: SR    ASSESSMENT AND PLAN:  64 yo female, former 38-pack year smoker with history of emphysema, HTN, HLD, with chronic pulmonary nodules. admitted s/p elective R Vats, converted to thoracotomy, RUL lobectomy, repair of Pulm artery.    -tolerated procedure well in terms of cv perspective  -no clinical heart failure or anginal symptoms  -no further cardiac workup anticipated at this time  -drain management per thoracic surgery  -follow up with Dr. Benito on 09/16/19 at 2:15pm at [address] [facility] [address] [place], [place]    Tip Hernandez MD

## 2019-09-06 NOTE — DISCHARGE NOTE PROVIDER - NSDCCPTREATMENT_GEN_ALL_CORE_FT
PRINCIPAL PROCEDURE  Procedure: Thoracoscopic robotic assisted procedure  Findings and Treatment: Robotic right VATS converted to thoracotomy, RUL lobectomy, MLND, Repair of pulmonary artery x2

## 2019-09-06 NOTE — DISCHARGE NOTE PROVIDER - PROVIDER TOKENS
FREE:[LAST:[Urbano],FIRST:[Yaw],PHONE:[phone]],FAX:[phone]],ADDRESS:[[facility]  Oncology [facility]  Level C]]

## 2019-09-06 NOTE — DISCHARGE NOTE PROVIDER - NSDCFUADDINST_GEN_ALL_CORE_FT
Keep the wound clean with soap and water and allow it to dry.  Keep it open to air.  If you notice pus or increasing redness or fevers, call Dr Clement

## 2019-09-06 NOTE — DISCHARGE NOTE PROVIDER - CARE PROVIDER_API CALL
Yaw Clement  Oncology Building  Level C  Phone: [phone]  Fax: [phone]  Follow Up Time:

## 2019-09-06 NOTE — DISCHARGE NOTE PROVIDER - NSDCFUADDAPPT_GEN_ALL_CORE_FT
See your PCP  See Dr Clement in about a week- call for an appointment and bring a new chest X-ray when you come.

## 2019-09-06 NOTE — DISCHARGE NOTE PROVIDER - HOSPITAL COURSE
64 yo female  former 38-pack year smoker with history of emphysema, HTN, HLD, CAD with chronic pulmonary nodules underwent a flexible bronchoscopy, right VATS, RUL wedge converted to thoracotomy for control of bleeding, RULobectomy, MLND on 09/03/2019. She reports long term monitoring of lung nodules over 15 years with an abnormality and lymphadenopathy seen six months ago followed by a CT scan 3 months later that showed an enlarging nodule in the right lung.  In the OR, she required 2 u of PRBC for blood loss.  By 9/4, her straight chest tube was able to be removed.  and she was discharged home after the angled chest tube was removed

## 2019-09-06 NOTE — PROGRESS NOTE ADULT - SUBJECTIVE AND OBJECTIVE BOX
Subjective: "I have some pain and mucus in my throat"  No events overnight    Vital Signs:  Vital Signs Last 24 Hrs  T(C): 37.2 (19 @ 12:00), Max: 37.6 (19 @ 15:00)  T(F): 98.9 (19 @ 12:00), Max: 99.7 (19 @ 15:00)  HR: 107 (19 @ 12:00) (92 - 108)  BP: 108/42 (19 @ 12:00) (108/42 - 152/85)  RR: 17 (19 @ 12:00) (17 - 25)  SpO2: 97% (19 @ 12:00) (93% - 100%) on (O2)    Telemetry/Alarms:  General: WN/WD NAD  Neurology: Awake, nonfocal, ZENG x 4. Will d/c IVPCA and start Percocet Oral  Eyes: Scleras clear, PERRLA/ EOMI, Gross vision intact  ENT: Gross hearing intact, grossly patent pharynx, no stridor. Pt has some retained upper airway secretions  Neck: Neck supple, trachea midline, No JVD,   Respiratory: Decreased R side LS, No wheezing, rales, rhonchi  CV: RRR, S1S2, no murmurs, rubs or gallops  Abdominal: Soft, NT, ND +BS,   Extremities: No edema, + peripheral pulses  Skin: No Rashes, Hematoma, Ecchymosis  Lymphatic: No Neck, axilla, groin LAD  Psych: Oriented x 3, normal affect  Incisions: C/D/I  Tubes: Left CT draine 90ml of serosanguinous fluid overnight. Pt has a 1 chamber forced expiratory airleak. Will keep chest tube in today.  Relevant labs, radiology and Medications reviewed                        9.9    14.28 )-----------( 265      ( 06 Sep 2019 06:58 )             29.5     -    133<L>  |  98  |  17  ----------------------------<  139<H>  4.4   |  23  |  0.85    Ca    8.8      06 Sep 2019 06:58        MEDICATIONS  (STANDING):  ALBUTerol/ipratropium for Nebulization 3 milliLiter(s) Nebulizer every 6 hours  aspirin enteric coated 81 milliGRAM(s) Oral daily  atorvastatin 40 milliGRAM(s) Oral at bedtime  buDESOnide  80 MICROgram(s)/formoterol 4.5 MICROgram(s) Inhaler 2 Puff(s) Inhalation two times a day  docusate sodium 100 milliGRAM(s) Oral three times a day  heparin  Injectable 5000 Unit(s) SubCutaneous every 8 hours  influenza   Vaccine 0.5 milliLiter(s) IntraMuscular once  lactated ringers. 1000 milliLiter(s) (30 mL/Hr) IV Continuous <Continuous>  pantoprazole    Tablet 40 milliGRAM(s) Oral before breakfast  senna 2 Tablet(s) Oral at bedtime    MEDICATIONS  (PRN):  benzocaine 15 mG/menthol 3.6 mG (Sugar-Free) Lozenge 1 Lozenge Oral every 4 hours PRN Sore Throat  oxyCODONE    5 mG/acetaminophen 325 mG 1 Tablet(s) Oral every 4 hours PRN Moderate Pain (4 - 6)    Pertinent Physical Exam  I&O's Summary    05 Sep 2019 07:  -  06 Sep 2019 07:00  --------------------------------------------------------  IN: 210 mL / OUT: 1190 mL / NET: -980 mL    06 Sep 2019 07:  -  06 Sep 2019 12:21  --------------------------------------------------------  IN: 540 mL / OUT: 20 mL / NET: 520 mL        Assessment  63y Female  w/ PAST MEDICAL & SURGICAL HISTORY:  Hip pain  Emphysema  Carotid artery disease: monitored by vascluar doctor Samuel  Pulmonary nodules: yearly CT scans  Hyperlipidemia  Hypertension  History of  section: x2  admitted with complaints of Patient is a 63y old  Female who presents with a chief complaint of s/p throacotomy (06 Sep 2019 11:37)  .  On (Date), patient underwent Thoracoscopic robotic assisted procedure  . Postoperative course/issues:    PLAN  Neuro: Pain management  Pulm: Encourage coughing, deep breathing and use of incentive spirometry. Wean off supplemental oxygen as able. Daily CXR. Aggressive chest PT  Cardio: Monitor telemetry/alarms  GI: Tolerating diet. Continue stool softeners.  Renal: monitor urine output, supplement electrolytes as needed  Vasc: Heparin SC/SCDs for DVT prophylaxis  Heme: Stable H/H. .   ID: Off antibiotics. Stable.  Therapy: OOB/ambulate  Tubes: Monitor Chest tube output  Disposition: Aim to D/C to home when chest tube is removed and cleared for discharge  Discussed with Cardiothoracic Team at AM rounds.

## 2019-09-06 NOTE — DISCHARGE NOTE PROVIDER - NSDCCPCAREPLAN_GEN_ALL_CORE_FT
PRINCIPAL DISCHARGE DIAGNOSIS  Diagnosis: Pulmonary nodules  Assessment and Plan of Treatment: yearly CT scans

## 2019-09-07 LAB
ANION GAP SERPL CALC-SCNC: 12 MMO/L — SIGNIFICANT CHANGE UP (ref 7–14)
BUN SERPL-MCNC: 20 MG/DL — SIGNIFICANT CHANGE UP (ref 7–23)
CALCIUM SERPL-MCNC: 8.7 MG/DL — SIGNIFICANT CHANGE UP (ref 8.4–10.5)
CHLORIDE SERPL-SCNC: 98 MMOL/L — SIGNIFICANT CHANGE UP (ref 98–107)
CO2 SERPL-SCNC: 23 MMOL/L — SIGNIFICANT CHANGE UP (ref 22–31)
CREAT SERPL-MCNC: 0.88 MG/DL — SIGNIFICANT CHANGE UP (ref 0.5–1.3)
GLUCOSE SERPL-MCNC: 120 MG/DL — HIGH (ref 70–99)
HCT VFR BLD CALC: 29.1 % — LOW (ref 34.5–45)
HGB BLD-MCNC: 9.4 G/DL — LOW (ref 11.5–15.5)
MCHC RBC-ENTMCNC: 31.2 PG — SIGNIFICANT CHANGE UP (ref 27–34)
MCHC RBC-ENTMCNC: 32.3 % — SIGNIFICANT CHANGE UP (ref 32–36)
MCV RBC AUTO: 96.7 FL — SIGNIFICANT CHANGE UP (ref 80–100)
NRBC # FLD: 0.04 K/UL — SIGNIFICANT CHANGE UP (ref 0–0)
PLATELET # BLD AUTO: 273 K/UL — SIGNIFICANT CHANGE UP (ref 150–400)
PMV BLD: 10.9 FL — SIGNIFICANT CHANGE UP (ref 7–13)
POTASSIUM SERPL-MCNC: 4.3 MMOL/L — SIGNIFICANT CHANGE UP (ref 3.5–5.3)
POTASSIUM SERPL-SCNC: 4.3 MMOL/L — SIGNIFICANT CHANGE UP (ref 3.5–5.3)
RBC # BLD: 3.01 M/UL — LOW (ref 3.8–5.2)
RBC # FLD: 13.6 % — SIGNIFICANT CHANGE UP (ref 10.3–14.5)
SODIUM SERPL-SCNC: 133 MMOL/L — LOW (ref 135–145)
WBC # BLD: 10.17 K/UL — SIGNIFICANT CHANGE UP (ref 3.8–10.5)
WBC # FLD AUTO: 10.17 K/UL — SIGNIFICANT CHANGE UP (ref 3.8–10.5)

## 2019-09-07 PROCEDURE — 71045 X-RAY EXAM CHEST 1 VIEW: CPT | Mod: 26

## 2019-09-07 RX ORDER — FUROSEMIDE 40 MG
20 TABLET ORAL DAILY
Refills: 0 | Status: DISCONTINUED | OUTPATIENT
Start: 2019-09-07 | End: 2019-09-08

## 2019-09-07 RX ADMIN — HEPARIN SODIUM 5000 UNIT(S): 5000 INJECTION INTRAVENOUS; SUBCUTANEOUS at 21:04

## 2019-09-07 RX ADMIN — HEPARIN SODIUM 5000 UNIT(S): 5000 INJECTION INTRAVENOUS; SUBCUTANEOUS at 13:11

## 2019-09-07 RX ADMIN — Medication 81 MILLIGRAM(S): at 13:11

## 2019-09-07 RX ADMIN — BUDESONIDE AND FORMOTEROL FUMARATE DIHYDRATE 2 PUFF(S): 160; 4.5 AEROSOL RESPIRATORY (INHALATION) at 08:55

## 2019-09-07 RX ADMIN — Medication 100 MILLIGRAM(S): at 04:38

## 2019-09-07 RX ADMIN — PANTOPRAZOLE SODIUM 40 MILLIGRAM(S): 20 TABLET, DELAYED RELEASE ORAL at 04:38

## 2019-09-07 RX ADMIN — HEPARIN SODIUM 5000 UNIT(S): 5000 INJECTION INTRAVENOUS; SUBCUTANEOUS at 04:38

## 2019-09-07 RX ADMIN — Medication 100 MILLIGRAM(S): at 13:11

## 2019-09-07 RX ADMIN — Medication 100 MILLIGRAM(S): at 21:04

## 2019-09-07 RX ADMIN — OXYCODONE AND ACETAMINOPHEN 1 TABLET(S): 5; 325 TABLET ORAL at 13:11

## 2019-09-07 RX ADMIN — OXYCODONE AND ACETAMINOPHEN 1 TABLET(S): 5; 325 TABLET ORAL at 14:00

## 2019-09-07 RX ADMIN — OXYCODONE AND ACETAMINOPHEN 1 TABLET(S): 5; 325 TABLET ORAL at 21:04

## 2019-09-07 RX ADMIN — Medication 20 MILLIGRAM(S): at 15:25

## 2019-09-07 RX ADMIN — OXYCODONE AND ACETAMINOPHEN 1 TABLET(S): 5; 325 TABLET ORAL at 05:10

## 2019-09-07 RX ADMIN — OXYCODONE AND ACETAMINOPHEN 1 TABLET(S): 5; 325 TABLET ORAL at 04:40

## 2019-09-07 RX ADMIN — ATORVASTATIN CALCIUM 40 MILLIGRAM(S): 80 TABLET, FILM COATED ORAL at 21:04

## 2019-09-07 RX ADMIN — OXYCODONE AND ACETAMINOPHEN 1 TABLET(S): 5; 325 TABLET ORAL at 21:52

## 2019-09-07 RX ADMIN — BUDESONIDE AND FORMOTEROL FUMARATE DIHYDRATE 2 PUFF(S): 160; 4.5 AEROSOL RESPIRATORY (INHALATION) at 21:04

## 2019-09-07 RX ADMIN — SENNA PLUS 2 TABLET(S): 8.6 TABLET ORAL at 21:04

## 2019-09-07 NOTE — PROGRESS NOTE ADULT - ATTENDING COMMENTS
Agree with above  Tolerated procedure well in terms of cv perspective  Continue with supportive care per thoracic surgery    Tip Hernandez MD
Patient seen and examined, agree with above assessment and plan as transcribed above.    - Chest tubes per CTICU]    Vern Medrano MD, [facility]  BEEPER [phone]
doing ok: chest tube removed one:  9/7: Ambulation and cont IS
doing ok: chest tube removed one:

## 2019-09-07 NOTE — PROGRESS NOTE ADULT - PROBLEM SELECTOR PLAN 1
s/p resection: HOLLY lobectomy for malignant pulmonary nodule: seems to be doing great :
s/p resection: HOLLY lobectomy for malignant pulmonary nodule: seems to be doing great :  : doing ok : no SOB and no wheezin/7: s/p resection: follow final path
s/p resection: HOLLY lobectomy for malignant pulmonary nodule: seems to be doing great :  9/6: doing ok : no SOB and no wheezing:

## 2019-09-07 NOTE — PROGRESS NOTE ADULT - I WAS PHYSICALLY PRESENT FOR THE KEY PORTIONS OF THE EVALUATION AND MANAGEMENT (E/M) SERVICE PROVIDED.  I AGREE WITH THE ABOVE HISTORY, PHYSICAL, AND PLAN WHICH I HAVE REVIEWED AND EDITED WHERE APPROPRIATE
How Many Skin Cancers Have You Had?: one
What Is The Reason For Today's Visit?: History of Non-Melanoma Skin Cancer
Statement Selected

## 2019-09-07 NOTE — PROGRESS NOTE ADULT - SUBJECTIVE AND OBJECTIVE BOX
Subjective: "I'm coughing so much, trying to get up the phlegm. Sometimes I feel short of breath" pt using IS to 750. C/o pain at thoracotomy site, encouraged to take pain meds. No CP. Amb w minimal assist.     Vital Signs:  Vital Signs Last 24 Hrs  T(C): 37.1 (19 @ 12:56), Max: 37.4 (19 @ 19:47)  T(F): 98.7 (19 @ 12:56), Max: 99.4 (19 @ 19:47)  HR: 98 (19 @ 12:56) (88 - 108)  BP: 106/50 (19 @ 12:56) (106/50 - 123/55)  RR: 18 (19 @ 12:56) (18 - 18)  SpO2: 99% (19 @ 12:56) (94% - 99%) on (O2)    Telemetry/Alarms:  General: WN/WD NAD  Neurology: Awake, nonfocal, ZENG x 4  Eyes: Scleras clear, PERRLA/ EOMI, Gross vision intact  ENT:Gross hearing intact, grossly patent pharynx, no stridor  Neck: Neck supple, trachea midline, No JVD,   Respiratory:Coarse BS w wheeze and rhonchi to bilat UL  CV: RRR, S1S2, no murmurs, rubs or gallops  Abdominal: Soft, NT, ND +BS, +BM voiding.   Extremities: +pitting edema to bilat UE and throughout bilat LE, 2+ to ankles.  + peripheral pulses  Skin: No Rashes, Hematoma, Ecchymosis  Lymphatic: No Neck, axilla, groin LAD  Psych: Oriented x 3, normal affect  Incisions: Rt Thoracotomy BABAK< c/d/i.   Tubes: Rt CT 20cc/24hs on WS, no AL. Removed at bedside earlier today. FU CXR pending.   Relevant labs, radiology and Medications reviewed           CXR this am stable.              9.4    10.17 )-----------( 273      ( 07 Sep 2019 05:45 )             29.1     09-07    133<L>  |  98  |  20  ----------------------------<  120<H>  4.3   |  23  |  0.88    Ca    8.7      07 Sep 2019 05:45        MEDICATIONS  (STANDING):  aspirin enteric coated 81 milliGRAM(s) Oral daily  atorvastatin 40 milliGRAM(s) Oral at bedtime  buDESOnide  80 MICROgram(s)/formoterol 4.5 MICROgram(s) Inhaler 2 Puff(s) Inhalation two times a day  docusate sodium 100 milliGRAM(s) Oral three times a day  furosemide   Injectable 20 milliGRAM(s) IV Push daily  heparin  Injectable 5000 Unit(s) SubCutaneous every 8 hours  lactated ringers. 1000 milliLiter(s) (30 mL/Hr) IV Continuous <Continuous>  pantoprazole    Tablet 40 milliGRAM(s) Oral before breakfast  senna 2 Tablet(s) Oral at bedtime    MEDICATIONS  (PRN):  benzocaine 15 mG/menthol 3.6 mG (Sugar-Free) Lozenge 1 Lozenge Oral every 4 hours PRN Sore Throat  oxyCODONE    5 mG/acetaminophen 325 mG 1 Tablet(s) Oral every 4 hours PRN Moderate Pain (4 - 6)    Pertinent Physical Exam  I&O's Summary    06 Sep 2019 07:  -  07 Sep 2019 07:00  --------------------------------------------------------  IN: 1700 mL / OUT: 820 mL / NET: 880 mL    07 Sep 2019 07:  -  07 Sep 2019 15:08  --------------------------------------------------------  IN: 0 mL / OUT: 500 mL / NET: -500 mL        Assessment  63y Female  w/ PAST MEDICAL & SURGICAL HISTORY:  Hip pain  Emphysema  Carotid artery disease: monitored by javier doctor Samuel  Pulmonary nodules: yearly CT scans  Hyperlipidemia  Hypertension  History of  section: x2  admitted with complaints of Patient is a 63y old  Female who presents with a chief complaint of nodule (07 Sep 2019 14:19)  HPI:  64 yo female  former 38-pack year smoker with history of emphysema, HTN, HLD, CAD with chronic pulmonary nodules presents to PST unit with pre-op diagnosis of solitary pulmonary nodule scheduled for flexible bronchoscopy, right robotic assisted lung resection on 2019. She reports long term monitoring of lung nodules over 15 years with abnormality and lymphadenopathy on six months ago followed by CT scan 3 months later with enlarging nodule in the right lung. (26 Aug 2019 18:29)      Procedure: Robotic right VATS converted to thoracotomy, RUL lobectomy, MLND, Repair of pulmonary artery x2  9/3/2019                Issues:   Lung nodule    Hyperlipidemia   Hypertension   Post op pain   Emphysema           Post op required blood products, albumin. On  straight CT removed. On  pt with some tachycardia, hypotension. -hemodynamics improved. To 8Tower. Today -Angled CT removed. Started on daily diuretic for volume overload       PLAN  Neuro: Pain management  Pulm: Encourage coughing, deep breathing and use of incentive spirometry. Wean off supplemental oxygen as able. Daily CXR.   Cardio: Monitor telemetry/alarms. Agree with Cardiolgy ordering of Lasix IV daily.   GI: Tolerating diet. Continue stool softeners.  Renal: monitor urine output, supplement electrolytes as needed  Vasc: Heparin SC/SCDs for DVT prophylaxis  Heme: Stable H/H. .   ID: Off antibiotics. Stable.  Therapy: OOB/ambulate  Disposition: Aim to D/C to home tomorrow if pain and pulmonary status improved.   Discussed with Cardiothoracic Team at AM rounds.

## 2019-09-07 NOTE — PROGRESS NOTE ADULT - ASSESSMENT
62 yo female, former 38-pack year smoker with history of emphysema, HTN, HLD, with chronic pulmonary nodules. admitted s/p elective R Vats, converted to thoracotomy, RUL lobectomy, repair of Pulm artery  management as per CTS
64 yo female  former 38-pack year smoker with history of emphysema, HTN, HLD, CAD with chronic pulmonary nodules presents to PST unit with pre-op diagnosis of solitary pulmonary nodule scheduled for flexible bronchoscopy, right robotic assisted lung resection on 09/03/2019. She reports long term monitoring of lung nodules over 15 years with abnormality and lymphadenopathy on six months ago followed by CT scan 3 months later with enlarging nodule in the right lung. (26 Aug 2019 18:29)    she had rul throacotomy aas she was found to have malignancy pulmonary nodule: Currently she is doing well: has chest pain at chest tube site:
64 yo female  former 38-pack year smoker with history of emphysema, HTN, HLD, CAD with chronic pulmonary nodules presents to PST unit with pre-op diagnosis of solitary pulmonary nodule scheduled for flexible bronchoscopy, right robotic assisted lung resection on 09/03/2019. She reports long term monitoring of lung nodules over 15 years with abnormality and lymphadenopathy on six months ago followed by CT scan 3 months later with enlarging nodule in the right lung. (26 Aug 2019 18:29)    she had rul throacotomy aas she was found to have malignancy pulmonary nodule: Currently she is doing well: has chest pain at chest tube site:
62 yo female  former 38-pack year smoker with history of emphysema, HTN, HLD, CAD with chronic pulmonary nodules presents to PST unit with pre-op diagnosis of solitary pulmonary nodule scheduled for flexible bronchoscopy, right robotic assisted lung resection on 09/03/2019. She reports long term monitoring of lung nodules over 15 years with abnormality and lymphadenopathy on six months ago followed by CT scan 3 months later with enlarging nodule in the right lung. (26 Aug 2019 18:29)    she had rul throacotomy aas she was found to have malignancy pulmonary nodule: Currently she is doing well: has chest pain at chest tube site:

## 2019-09-07 NOTE — PROGRESS NOTE ADULT - SUBJECTIVE AND OBJECTIVE BOX
SUBJECTIVE: no CP or SOB        aspirin enteric coated 81 milliGRAM(s) Oral daily  atorvastatin 40 milliGRAM(s) Oral at bedtime  benzocaine 15 mG/menthol 3.6 mG (Sugar-Free) Lozenge 1 Lozenge Oral every 4 hours PRN  buDESOnide  80 MICROgram(s)/formoterol 4.5 MICROgram(s) Inhaler 2 Puff(s) Inhalation two times a day  docusate sodium 100 milliGRAM(s) Oral three times a day  heparin  Injectable 5000 Unit(s) SubCutaneous every 8 hours  influenza   Vaccine 0.5 milliLiter(s) IntraMuscular once  lactated ringers. 1000 milliLiter(s) IV Continuous <Continuous>  oxyCODONE    5 mG/acetaminophen 325 mG 1 Tablet(s) Oral every 4 hours PRN  pantoprazole    Tablet 40 milliGRAM(s) Oral before breakfast  senna 2 Tablet(s) Oral at bedtime                       9.4    10.17 )-----------( 273      ( 07 Sep 2019 05:45 )             29.1     Hemoglobin: 9.4 g/dL (09-07 @ 05:45)  Hemoglobin: 9.9 g/dL (09-06 @ 06:58)  Hemoglobin: 9.3 g/dL (09-05 @ 03:45)  Hemoglobin: 12.1 g/dL (09-04 @ 03:40)    09-07    133<L>  |  98  |  20  ----------------------------<  120<H>  4.3   |  23  |  0.88    Ca    8.7      07 Sep 2019 05:45    Creatinine Trend: 0.88<--, 0.85<--, 1.06<--, 1.15<--, 1.10<--    COAGS:     T(C): 36.8 (09-07-19 @ 04:35), Max: 37.4 (09-06-19 @ 19:47)  HR: 96 (09-07-19 @ 04:35) (88 - 108)  BP: 123/55 (09-07-19 @ 04:35) (108/42 - 123/55)  RR: 18 (09-07-19 @ 04:35) (17 - 18)  SpO2: 94% (09-07-19 @ 04:35) (94% - 97%)  Wt(kg): --    I&O's Summary    06 Sep 2019 07:01  -  07 Sep 2019 07:00  --------------------------------------------------------  IN: 1700 mL / OUT: 820 mL / NET: 880 mL    07 Sep 2019 07:01  -  07 Sep 2019 11:17  --------------------------------------------------------  IN: 0 mL / OUT: 500 mL / NET: -500 mL      Cardiovascular:  S1S2 RRR, No JVD  Respiratory: Lungs clear to auscultation, normal effort  Gastrointestinal: Abdomen soft, ND, NT, +BS  Skin: Warm, dry, intact. No rash.  Musculoskeletal: Normal ROM, normal strength  Ext: No C/C/E B/L LE    DIAGNOSTIC DATA  TELEMETRY: SR    ASSESSMENT AND PLAN:  64 yo female, former 38-pack year smoker with history of emphysema, HTN, HLD, with chronic pulmonary nodules. admitted s/p elective R Vats, converted to thoracotomy, RUL lobectomy, repair of Pulm artery.    -tolerated procedure well in terms of cv perspective  -no clinical heart failure or anginal symptoms  -no further cardiac workup anticipated at this time  -drain management per thoracic surgery  -appreciate CTS notes, chest tube management per team   -follow up with Dr. Benito on 09/16/19 at 2:15pm at [address] [facility] [address] [place], [place] SUBJECTIVE: no CP or SOB        aspirin enteric coated 81 milliGRAM(s) Oral daily  atorvastatin 40 milliGRAM(s) Oral at bedtime  benzocaine 15 mG/menthol 3.6 mG (Sugar-Free) Lozenge 1 Lozenge Oral every 4 hours PRN  buDESOnide  80 MICROgram(s)/formoterol 4.5 MICROgram(s) Inhaler 2 Puff(s) Inhalation two times a day  docusate sodium 100 milliGRAM(s) Oral three times a day  heparin  Injectable 5000 Unit(s) SubCutaneous every 8 hours  influenza   Vaccine 0.5 milliLiter(s) IntraMuscular once  lactated ringers. 1000 milliLiter(s) IV Continuous <Continuous>  oxyCODONE    5 mG/acetaminophen 325 mG 1 Tablet(s) Oral every 4 hours PRN  pantoprazole    Tablet 40 milliGRAM(s) Oral before breakfast  senna 2 Tablet(s) Oral at bedtime                       9.4    10.17 )-----------( 273      ( 07 Sep 2019 05:45 )             29.1     Hemoglobin: 9.4 g/dL (09-07 @ 05:45)  Hemoglobin: 9.9 g/dL (09-06 @ 06:58)  Hemoglobin: 9.3 g/dL (09-05 @ 03:45)  Hemoglobin: 12.1 g/dL (09-04 @ 03:40)    09-07    133<L>  |  98  |  20  ----------------------------<  120<H>  4.3   |  23  |  0.88    Ca    8.7      07 Sep 2019 05:45    Creatinine Trend: 0.88<--, 0.85<--, 1.06<--, 1.15<--, 1.10<--    COAGS:     T(C): 36.8 (09-07-19 @ 04:35), Max: 37.4 (09-06-19 @ 19:47)  HR: 96 (09-07-19 @ 04:35) (88 - 108)  BP: 123/55 (09-07-19 @ 04:35) (108/42 - 123/55)  RR: 18 (09-07-19 @ 04:35) (17 - 18)  SpO2: 94% (09-07-19 @ 04:35) (94% - 97%)  Wt(kg): --    I&O's Summary    06 Sep 2019 07:01  -  07 Sep 2019 07:00  --------------------------------------------------------  IN: 1700 mL / OUT: 820 mL / NET: 880 mL    07 Sep 2019 07:01  -  07 Sep 2019 11:17  --------------------------------------------------------  IN: 0 mL / OUT: 500 mL / NET: -500 mL      Cardiovascular:  S1S2 RRR, No JVD  Respiratory: Lungs clear to auscultation, normal effort  Gastrointestinal: Abdomen soft, ND, NT, +BS  Skin: Warm, dry, intact. No rash.  Musculoskeletal: Normal ROM, normal strength  Ext: No C/C/E B/L LE    DIAGNOSTIC DATA      TELEMETRY:   SR  - low      ASSESSMENT AND PLAN:  62 yo female, former 38-pack year smoker with history of emphysema, HTN, HLD, with chronic pulmonary nodules. admitted s/p elective R Vats, converted to thoracotomy, RUL lobectomy, repair of Pulm artery.    -tolerated procedure well in terms of cv perspective  -no clinical heart failure or anginal symptoms  -no further cardiac workup anticipated at this time  -drain management per thoracic surgery  -appreciate CTS notes, chest tube management per team   -follow up with Dr. Benito on 09/16/19 at 2:15pm at [address] [place], [place]

## 2019-09-07 NOTE — PROGRESS NOTE ADULT - PROBLEM SELECTOR PLAN 2
stable: no wheezing: Cont BD and wean off oxygen as tolerated
stable: no wheezing: Cont BD and wean off oxygen as tolerated  9/6: tolerated; cont BD  9/7: Cont BD
stable: no wheezing: Cont BD and wean off oxygen as tolerated  9/6: tolerated; cont BD

## 2019-09-07 NOTE — PROGRESS NOTE ADULT - SUBJECTIVE AND OBJECTIVE BOX
Patient is a 63y old  Female who presents with a chief complaint of Right lung resection (06 Sep 2019 12:20)      Any change in ROS: chest tube remvoed:     MEDICATIONS  (STANDING):  aspirin enteric coated 81 milliGRAM(s) Oral daily  atorvastatin 40 milliGRAM(s) Oral at bedtime  buDESOnide  80 MICROgram(s)/formoterol 4.5 MICROgram(s) Inhaler 2 Puff(s) Inhalation two times a day  docusate sodium 100 milliGRAM(s) Oral three times a day  furosemide   Injectable 20 milliGRAM(s) IV Push daily  heparin  Injectable 5000 Unit(s) SubCutaneous every 8 hours  lactated ringers. 1000 milliLiter(s) (30 mL/Hr) IV Continuous <Continuous>  pantoprazole    Tablet 40 milliGRAM(s) Oral before breakfast  senna 2 Tablet(s) Oral at bedtime    MEDICATIONS  (PRN):  benzocaine 15 mG/menthol 3.6 mG (Sugar-Free) Lozenge 1 Lozenge Oral every 4 hours PRN Sore Throat  oxyCODONE    5 mG/acetaminophen 325 mG 1 Tablet(s) Oral every 4 hours PRN Moderate Pain (4 - 6)    Vital Signs Last 24 Hrs  T(C): 37.1 (07 Sep 2019 12:56), Max: 37.4 (06 Sep 2019 19:47)  T(F): 98.7 (07 Sep 2019 12:56), Max: 99.4 (06 Sep 2019 19:47)  HR: 98 (07 Sep 2019 12:56) (88 - 108)  BP: 106/50 (07 Sep 2019 12:56) (106/50 - 123/55)  BP(mean): --  RR: 18 (07 Sep 2019 12:56) (18 - 18)  SpO2: 99% (07 Sep 2019 12:56) (94% - 99%)    I&O's Summary    06 Sep 2019 07:01  -  07 Sep 2019 07:00  --------------------------------------------------------  IN: 1700 mL / OUT: 820 mL / NET: 880 mL    07 Sep 2019 07:01  -  07 Sep 2019 14:19  --------------------------------------------------------  IN: 0 mL / OUT: 500 mL / NET: -500 mL          Physical Exam:   GENERAL: NAD, well-groomed, well-developed  HEENT: BAYLEE/   Atraumatic, Normocephalic  ENMT: No tonsillar erythema, exudates, or enlargement; Moist mucous membranes, Good dentition, No lesions  NECK: Supple, No JVD, Normal thyroid  CHEST/LUNG: CMild coarse rhonchi  CVS: Regular rate and rhythm; No murmurs, rubs, or gallops  GI: : Soft, Nontender, Nondistended; Bowel sounds present  NERVOUS SYSTEM:  Alert & Oriented [id number]  EXTREMITIES:  2+ Peripheral Pulses, No clubbing, cyanosis, or edema  LYMPH: No lymphadenopathy noted  SKIN: No rashes or lesions  ENDOCRINOLOGY: No Thyromegaly  PSYCH: Appropriate    Labs:                              9.4    10.17 )-----------( 273      ( 07 Sep 2019 05:45 )             29.1                         9.9    14.28 )-----------( 265      ( 06 Sep 2019 06:58 )             29.5                         9.3    14.83 )-----------( 223      ( 05 Sep 2019 03:45 )             27.9                         12.1   16.04 )-----------( 231      ( 04 Sep 2019 03:40 )             35.5     09-07    133<L>  |  98  |  20  ----------------------------<  120<H>  4.3   |  23  |  0.88  09-06    133<L>  |  98  |  17  ----------------------------<  139<H>  4.4   |  23  |  0.85  09-05    130<L>  |  95<L>  |  29<H>  ----------------------------<  142<H>  4.4   |  20<L>  |  1.06  09-04    135  |  101  |  28<H>  ----------------------------<  171<H>  5.3   |  19<L>  |  1.15    Ca    8.7      07 Sep 2019 05:45  Ca    8.8      06 Sep 2019 06:58      CAPILLARY BLOOD GLUCOSE            < from: Xray Chest 1 View- PORTABLE-Routine (09.06.19 @ 07:09) >    PROCEDURE DATE:  Sep  6 2019         INTERPRETATION:  CLINICAL INDICATION: Status post right VATS.    EXAM: Frontal view of the chest with comparison made to chest radiograph   on 9/5/2019    Impression:    Surgical clips overlie the right mid lung. Stable postsurgical changes   within the right midlung. Stable small bilateral pleural effusions. No   pneumothorax.     Unchanged position of a right-sided chest tube which terminates in the   right lower hemithorax.     Trace subcutaneous right chest wall emphysema. No abnormalities              ESTER SHELTON M.D., RADIOLOGY RESIDENT  This document has been electronically signed.  JOSH ARGUETA M.D., ATTENDING RADIOLOGIST  This document has been electronically signed. Sep  6 2019 11:20AM        < end of copied text >            RECENT CULTURES:  09-03 @ 15:04 OTHER                        RESPIRATORY CULTURES:          Studies  Chest X-RAY  CT SCAN Chest   Venous Dopplers: LE:   CT Abdomen  Others

## 2019-09-08 ENCOUNTER — TRANSCRIPTION ENCOUNTER (OUTPATIENT)
Age: 64
End: 2019-09-08

## 2019-09-08 VITALS
SYSTOLIC BLOOD PRESSURE: 121 MMHG | OXYGEN SATURATION: 99 % | TEMPERATURE: 98 F | HEART RATE: 90 BPM | DIASTOLIC BLOOD PRESSURE: 57 MMHG | RESPIRATION RATE: 18 BRPM

## 2019-09-08 PROCEDURE — 71045 X-RAY EXAM CHEST 1 VIEW: CPT | Mod: 26

## 2019-09-08 RX ORDER — METOPROLOL TARTRATE 50 MG
1 TABLET ORAL
Qty: 60 | Refills: 0
Start: 2019-09-08 | End: 2019-10-07

## 2019-09-08 RX ORDER — AMLODIPINE BESYLATE AND OLMESARTRAN MEDOXOMIL 10; 40 MG/1; MG/1
1 TABLET, FILM COATED ORAL
Qty: 0 | Refills: 0 | DISCHARGE

## 2019-09-08 RX ORDER — FUROSEMIDE 40 MG
1 TABLET ORAL
Qty: 5 | Refills: 0
Start: 2019-09-08 | End: 2019-09-12

## 2019-09-08 RX ORDER — SENNA PLUS 8.6 MG/1
2 TABLET ORAL
Qty: 0 | Refills: 0 | DISCHARGE
Start: 2019-09-08

## 2019-09-08 RX ORDER — METOPROLOL TARTRATE 50 MG
1 TABLET ORAL
Qty: 0 | Refills: 0 | DISCHARGE

## 2019-09-08 RX ORDER — FUROSEMIDE 40 MG
1 TABLET ORAL
Qty: 30 | Refills: 0
Start: 2019-09-08 | End: 2019-10-07

## 2019-09-08 RX ORDER — DOCUSATE SODIUM 100 MG
1 CAPSULE ORAL
Qty: 0 | Refills: 0 | DISCHARGE
Start: 2019-09-08

## 2019-09-08 RX ADMIN — OXYCODONE AND ACETAMINOPHEN 1 TABLET(S): 5; 325 TABLET ORAL at 06:36

## 2019-09-08 RX ADMIN — BUDESONIDE AND FORMOTEROL FUMARATE DIHYDRATE 2 PUFF(S): 160; 4.5 AEROSOL RESPIRATORY (INHALATION) at 09:45

## 2019-09-08 RX ADMIN — OXYCODONE AND ACETAMINOPHEN 1 TABLET(S): 5; 325 TABLET ORAL at 06:06

## 2019-09-08 RX ADMIN — Medication 100 MILLIGRAM(S): at 04:40

## 2019-09-08 RX ADMIN — BENZOCAINE AND MENTHOL 1 LOZENGE: 5; 1 LIQUID ORAL at 04:48

## 2019-09-08 RX ADMIN — PANTOPRAZOLE SODIUM 40 MILLIGRAM(S): 20 TABLET, DELAYED RELEASE ORAL at 04:40

## 2019-09-08 NOTE — PROGRESS NOTE ADULT - SUBJECTIVE AND OBJECTIVE BOX
SUBJECTIVE: no CP or SOB        aspirin enteric coated 81 milliGRAM(s) Oral daily  atorvastatin 40 milliGRAM(s) Oral at bedtime  benzocaine 15 mG/menthol 3.6 mG (Sugar-Free) Lozenge 1 Lozenge Oral every 4 hours PRN  buDESOnide  80 MICROgram(s)/formoterol 4.5 MICROgram(s) Inhaler 2 Puff(s) Inhalation two times a day  docusate sodium 100 milliGRAM(s) Oral three times a day  furosemide   Injectable 20 milliGRAM(s) IV Push daily  heparin  Injectable 5000 Unit(s) SubCutaneous every 8 hours  lactated ringers. 1000 milliLiter(s) IV Continuous <Continuous>  oxyCODONE    5 mG/acetaminophen 325 mG 1 Tablet(s) Oral every 4 hours PRN  pantoprazole    Tablet 40 milliGRAM(s) Oral before breakfast  senna 2 Tablet(s) Oral at bedtime                            9.4    10.17 )-----------( 273      ( 07 Sep 2019 05:45 )             29.1       Hemoglobin: 9.4 g/dL (09-07 @ 05:45)  Hemoglobin: 9.9 g/dL (09-06 @ 06:58)  Hemoglobin: 9.3 g/dL (09-05 @ 03:45)  Hemoglobin: 12.1 g/dL (09-04 @ 03:40)    09-07    133<L>  |  98  |  20  ----------------------------<  120<H>  4.3   |  23  |  0.88    Ca    8.7      07 Sep 2019 05:45      Creatinine Trend: 0.88<--, 0.85<--, 1.06<--, 1.15<--, 1.10<--    COAGS:     T(C): 36.9 (09-08-19 @ 04:38), Max: 37.1 (09-07-19 @ 12:56)  HR: 92 (09-08-19 @ 04:38) (92 - 100)  BP: 116/57 (09-08-19 @ 04:38) (100/49 - 116/57)  RR: 16 (09-08-19 @ 04:38) (16 - 18)  SpO2: 96% (09-08-19 @ 04:38) (94% - 99%)  Wt(kg): --    I&O's Summary    07 Sep 2019 07:01  -  08 Sep 2019 07:00  --------------------------------------------------------  IN: 200 mL / OUT: 500 mL / NET: -300 mL      Cardiovascular:  S1S2 RRR, No JVD  Respiratory: Lungs clear to auscultation, normal effort  Gastrointestinal: Abdomen soft, ND, NT, +BS  Skin: Warm, dry, intact. No rash.  Musculoskeletal: Normal ROM, normal strength  Ext: No C/C/E B/L LE    DIAGNOSTIC DATA      TELEMETRY:   SR  98, PVCs     ASSESSMENT AND PLAN:  64 yo female, former 38-pack year smoker with history of emphysema, HTN, HLD, with chronic pulmonary nodules. admitted s/p elective R Vats, converted to thoracotomy, RUL lobectomy, repair of Pulm artery.    -tolerated procedure well in terms of cv perspective  -no clinical heart failure or anginal symptoms  -no further cardiac workup anticipated at this time  -le edema responded well to iv lasix, will d/c on lasix 40 mg po daily x5 days, plan reviewed with CTS   -follow up with Dr. Benito on 09/16/19 at 2:15pm at [address] [place], [place]

## 2019-09-08 NOTE — PROGRESS NOTE ADULT - PROVIDER SPECIALTY LIST ADULT
Anesthesia
Anesthesia
CT Surgery
CT Surgery
Cardiology
Critical Care
Pain Medicine
Pulmonology
Cardiology
Hospitalist
Pain Medicine
Pulmonology
Pulmonology

## 2019-09-08 NOTE — DISCHARGE NOTE NURSING/CASE MANAGEMENT/SOCIAL WORK - NSDCPNINST_GEN_ALL_CORE
follow up with all doctors appointments ,report to your doctor any sign of infection such as fever,redness,pus at the surgical site.If you experience chest pain and shortness of breath call 911.

## 2019-09-08 NOTE — DISCHARGE NOTE NURSING/CASE MANAGEMENT/SOCIAL WORK - PATIENT PORTAL LINK FT
You can access the FollowMyHealth Patient Portal offered by [facility] by registering at the following website: [email or web address]. By joining [email or web address]’s FollowMyHealth portal, you will also be able to view your health information using other applications (apps) compatible with our system.

## 2019-09-09 LAB — BACTERIA SKIN AEROBE CULT: SIGNIFICANT CHANGE UP

## 2019-09-10 PROBLEM — I77.9 DISORDER OF ARTERIES AND ARTERIOLES, UNSPECIFIED: Chronic | Status: ACTIVE | Noted: 2017-12-26

## 2019-09-10 LAB — SPECIMEN SOURCE: SIGNIFICANT CHANGE UP

## 2019-09-12 ENCOUNTER — APPOINTMENT (OUTPATIENT)
Dept: THORACIC SURGERY | Facility: CLINIC | Age: 64
End: 2019-09-12
Payer: COMMERCIAL

## 2019-09-16 ENCOUNTER — APPOINTMENT (OUTPATIENT)
Dept: THORACIC SURGERY | Facility: CLINIC | Age: 64
End: 2019-09-16
Payer: COMMERCIAL

## 2019-09-16 VITALS
OXYGEN SATURATION: 97 % | RESPIRATION RATE: 16 BRPM | BODY MASS INDEX: 33.3 KG/M2 | SYSTOLIC BLOOD PRESSURE: 164 MMHG | WEIGHT: 188 LBS | TEMPERATURE: 98.9 F | DIASTOLIC BLOOD PRESSURE: 86 MMHG | HEART RATE: 97 BPM

## 2019-09-16 PROCEDURE — 99024 POSTOP FOLLOW-UP VISIT: CPT

## 2019-09-18 ENCOUNTER — INPATIENT (INPATIENT)
Facility: HOSPITAL | Age: 64
LOS: 2 days | Discharge: HOME | End: 2019-09-21
Attending: INTERNAL MEDICINE | Admitting: INTERNAL MEDICINE
Payer: COMMERCIAL

## 2019-09-18 VITALS
RESPIRATION RATE: 18 BRPM | OXYGEN SATURATION: 98 % | TEMPERATURE: 98 F | HEART RATE: 96 BPM | DIASTOLIC BLOOD PRESSURE: 88 MMHG | SYSTOLIC BLOOD PRESSURE: 168 MMHG

## 2019-09-18 DIAGNOSIS — I50.9 HEART FAILURE, UNSPECIFIED: ICD-10-CM

## 2019-09-18 DIAGNOSIS — Z98.891 HISTORY OF UTERINE SCAR FROM PREVIOUS SURGERY: Chronic | ICD-10-CM

## 2019-09-18 LAB
ALBUMIN SERPL ELPH-MCNC: 4 G/DL — SIGNIFICANT CHANGE UP (ref 3.3–5)
ALP SERPL-CCNC: 135 U/L — HIGH (ref 40–120)
ALT FLD-CCNC: 36 U/L — HIGH (ref 4–33)
ANION GAP SERPL CALC-SCNC: 14 MMO/L — SIGNIFICANT CHANGE UP (ref 7–14)
APTT BLD: 33.6 SEC — SIGNIFICANT CHANGE UP (ref 27.5–36.3)
AST SERPL-CCNC: 21 U/L — SIGNIFICANT CHANGE UP (ref 4–32)
BASOPHILS # BLD AUTO: 0.05 K/UL — SIGNIFICANT CHANGE UP (ref 0–0.2)
BASOPHILS NFR BLD AUTO: 0.5 % — SIGNIFICANT CHANGE UP (ref 0–2)
BILIRUB SERPL-MCNC: 0.4 MG/DL — SIGNIFICANT CHANGE UP (ref 0.2–1.2)
BUN SERPL-MCNC: 14 MG/DL — SIGNIFICANT CHANGE UP (ref 7–23)
CALCIUM SERPL-MCNC: 9.5 MG/DL — SIGNIFICANT CHANGE UP (ref 8.4–10.5)
CHLORIDE SERPL-SCNC: 94 MMOL/L — LOW (ref 98–107)
CO2 SERPL-SCNC: 27 MMOL/L — SIGNIFICANT CHANGE UP (ref 22–31)
CREAT SERPL-MCNC: 0.97 MG/DL — SIGNIFICANT CHANGE UP (ref 0.5–1.3)
EOSINOPHIL # BLD AUTO: 0.08 K/UL — SIGNIFICANT CHANGE UP (ref 0–0.5)
EOSINOPHIL NFR BLD AUTO: 0.7 % — SIGNIFICANT CHANGE UP (ref 0–6)
GLUCOSE SERPL-MCNC: 107 MG/DL — HIGH (ref 70–99)
HCT VFR BLD CALC: 34.5 % — SIGNIFICANT CHANGE UP (ref 34.5–45)
HGB BLD-MCNC: 11.1 G/DL — LOW (ref 11.5–15.5)
IMM GRANULOCYTES NFR BLD AUTO: 0.5 % — SIGNIFICANT CHANGE UP (ref 0–1.5)
INR BLD: 1.07 — SIGNIFICANT CHANGE UP (ref 0.88–1.17)
LYMPHOCYTES # BLD AUTO: 1.81 K/UL — SIGNIFICANT CHANGE UP (ref 1–3.3)
LYMPHOCYTES # BLD AUTO: 16.5 % — SIGNIFICANT CHANGE UP (ref 13–44)
MCHC RBC-ENTMCNC: 30.7 PG — SIGNIFICANT CHANGE UP (ref 27–34)
MCHC RBC-ENTMCNC: 32.2 % — SIGNIFICANT CHANGE UP (ref 32–36)
MCV RBC AUTO: 95.6 FL — SIGNIFICANT CHANGE UP (ref 80–100)
MONOCYTES # BLD AUTO: 0.84 K/UL — SIGNIFICANT CHANGE UP (ref 0–0.9)
MONOCYTES NFR BLD AUTO: 7.6 % — SIGNIFICANT CHANGE UP (ref 2–14)
NEUTROPHILS # BLD AUTO: 8.16 K/UL — HIGH (ref 1.8–7.4)
NEUTROPHILS NFR BLD AUTO: 74.2 % — SIGNIFICANT CHANGE UP (ref 43–77)
NRBC # FLD: 0 K/UL — SIGNIFICANT CHANGE UP (ref 0–0)
NT-PROBNP SERPL-SCNC: 673.4 PG/ML — SIGNIFICANT CHANGE UP
PLATELET # BLD AUTO: 438 K/UL — HIGH (ref 150–400)
PMV BLD: 9.9 FL — SIGNIFICANT CHANGE UP (ref 7–13)
POTASSIUM SERPL-MCNC: 3.8 MMOL/L — SIGNIFICANT CHANGE UP (ref 3.5–5.3)
POTASSIUM SERPL-SCNC: 3.8 MMOL/L — SIGNIFICANT CHANGE UP (ref 3.5–5.3)
PROT SERPL-MCNC: 7.8 G/DL — SIGNIFICANT CHANGE UP (ref 6–8.3)
PROTHROM AB SERPL-ACNC: 11.9 SEC — SIGNIFICANT CHANGE UP (ref 9.8–13.1)
RBC # BLD: 3.61 M/UL — LOW (ref 3.8–5.2)
RBC # FLD: 12.7 % — SIGNIFICANT CHANGE UP (ref 10.3–14.5)
SODIUM SERPL-SCNC: 135 MMOL/L — SIGNIFICANT CHANGE UP (ref 135–145)
TROPONIN T, HIGH SENSITIVITY: 35 NG/L — SIGNIFICANT CHANGE UP (ref ?–14)
TROPONIN T, HIGH SENSITIVITY: 44 NG/L — SIGNIFICANT CHANGE UP (ref ?–14)
WBC # BLD: 10.99 K/UL — HIGH (ref 3.8–10.5)
WBC # FLD AUTO: 10.99 K/UL — HIGH (ref 3.8–10.5)

## 2019-09-18 PROCEDURE — 71046 X-RAY EXAM CHEST 2 VIEWS: CPT | Mod: 26

## 2019-09-18 PROCEDURE — 99223 1ST HOSP IP/OBS HIGH 75: CPT

## 2019-09-18 RX ORDER — FUROSEMIDE 40 MG
40 TABLET ORAL ONCE
Refills: 0 | Status: COMPLETED | OUTPATIENT
Start: 2019-09-18 | End: 2019-09-18

## 2019-09-18 RX ORDER — OXYCODONE AND ACETAMINOPHEN 5; 325 MG/1; MG/1
1 TABLET ORAL ONCE
Refills: 0 | Status: DISCONTINUED | OUTPATIENT
Start: 2019-09-18 | End: 2019-09-18

## 2019-09-18 RX ORDER — HEPARIN SODIUM 5000 [USP'U]/ML
5000 INJECTION INTRAVENOUS; SUBCUTANEOUS EVERY 8 HOURS
Refills: 0 | Status: DISCONTINUED | OUTPATIENT
Start: 2019-09-18 | End: 2019-09-21

## 2019-09-18 RX ADMIN — Medication 40 MILLIGRAM(S): at 21:33

## 2019-09-18 RX ADMIN — OXYCODONE AND ACETAMINOPHEN 1 TABLET(S): 5; 325 TABLET ORAL at 21:39

## 2019-09-18 RX ADMIN — OXYCODONE AND ACETAMINOPHEN 1 TABLET(S): 5; 325 TABLET ORAL at 22:35

## 2019-09-18 NOTE — ED PROVIDER NOTE - OBJECTIVE STATEMENT
63 yoF, PMHx of coronary stents Jan 2018, recent lung surgery to remove nodule that had been growing, recently started on lasix, HTN, HLD, emphysema sent from cardiology office after rountine visit and bedside echo showed pericardial effusion. Sent in for formal echo and to r/o signs of tamponade. Of note pt noticed increasing BLE edema over last 2 weeks. Started on lasix 63 yoF, PMHx of coronary stents Jan 2018, recent lung surgery to remove nodule that had been growing, recently started on lasix, HTN, HLD, emphysema sent from cardiology office after routine visit and bedside echo showed pericardial effusion. Sent in for formal echo and to r/o signs of tamponade. Of note pt noticed increasing BLE edema over last 2 weeks. Started on lasix which is new for her. No formal dx of HF. No CP. No fever or cough. No SOB. Chronic back pain.  Cards: ALEX quach cards  PCP: Dr. Clement

## 2019-09-18 NOTE — ED PROVIDER NOTE - CARE PLAN
Principal Discharge DX:	CHF (congestive heart failure)  Secondary Diagnosis:	Pericardial effusion without cardiac tamponade

## 2019-09-18 NOTE — H&P ADULT - NSICDXFAMILYHX_GEN_ALL_CORE_FT
FAMILY HISTORY:  Aunt  Still living? No  Family history of CHF (congestive heart failure), Age at diagnosis: Age Unknown

## 2019-09-18 NOTE — ED PROVIDER NOTE - ATTENDING CONTRIBUTION TO CARE
63 yoF, PMHx of coronary stents Jan 2018, recent lobectomy with post op course c/b LE edema, recently started on lasix, HTN, HLD, emphysema sent to ED from cardiology office after routine visit and bedside echo showed pericardial effusion. On arrival, pt well appearing, HDS, EKG w/o e/o ischemia or electrical alternans. She denies chest pain or dyspnea but does note swelling of legs not responding to lasix. Bedside echo performed with residence showing no e/o tamponade, ?mild pericardial effusion and pulmonary B-lines. Labs, CXR ordered, will give lasix. Likely admission for diuresis, echo and further management.

## 2019-09-18 NOTE — ED PROVIDER NOTE - PHYSICAL EXAMINATION
PHYSICAL EXAM:  GENERAL: NAD, well-groomed, well-developed  HEAD:  Atraumatic, Normocephalic  EYES: EOMI, PERRLA, conjunctiva and sclera clear  ENMT: No tonsillar erythema, exudates, or enlargement; Moist mucous membranes  NECK: Supple, No JVD,  HEART: Regular rate and rhythm; No murmurs, rubs, or gallops  RESPIRATORY: CTA B/L, No W/R/R  ABDOMEN: Soft, Nontender, Nondistended  BACK: normal ROM, no cva/midline tenderness   NEURO: A&Ox3, nonfocal, moving all extremities  EXTREMITIES:  normal ROM, no cyanosis, 2+ pitting edema, b/l, to mid lower leg, palpable pulses   SKIN: warm, dry, normal color, no rash

## 2019-09-18 NOTE — H&P ADULT - NSICDXPASTMEDICALHX_GEN_ALL_CORE_FT
PAST MEDICAL HISTORY:  Carotid artery disease monitored by vascluar doctor Doscher    Emphysema     Hip pain     Hyperlipidemia     Hypertension     Pulmonary nodules yearly CT scans PAST MEDICAL HISTORY:  Carotid artery disease monitored by vascluar doctor Doscher    Edema of both legs     Emphysema     Hip pain     Hyperlipidemia     Hypertension     Lung cancer     Obesity     Pulmonary nodules yearly CT scans    Stented coronary artery

## 2019-09-18 NOTE — H&P ADULT - PROBLEM SELECTOR PLAN 1
on TELE, Cardiology Care in AM, Lasix 40 mg IVP Daily for now, ECHO, HX of Recent Lung Surgery ( Lobectomy ) for Lung Cancer, F/U CBC, BMP, BNP,    Fall/aspiration precaution,  PT consult,

## 2019-09-18 NOTE — H&P ADULT - ATTENDING COMMENTS
Pt was seen & examined by me, Dr. ROCIO Carrizales , Hospitalist on CALL, H+P was done by me on 9/18/19.    Dr. Medrano will Resume the care of pt in AM.

## 2019-09-18 NOTE — H&P ADULT - HISTORY OF PRESENT ILLNESS
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 Sent in for formal echo and to r/o signs of tamponade. Of note pt noticed increasing B/L LE edema over last 2 weeks. Started on Lasix  which is new for her. No formal dx of HF. No CP. No fever or cough. No SOB. Chronic back pain. 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 Sent in for formal echo and to r/o signs of tamponade. Of note pt noticed increasing B/L LE edema over last 2 weeks. Started on Lasix  which is new for her. No formal dx of HF. No CP. No fever or cough. No SOB. Chronic back pain. 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 Sent in for formal echo and to r/o signs of tamponade. Of note pt noticed increasing B/L LE edema over last 2 weeks. Started on Lasix  which is new for her. No formal dx of CHF. No fever or cough. No SOB. Chronic back pain. C/O COLÓN, uses cane/walker to ambulate, Pt had a Venous Doppler of legs today done by cardiology, NO DVT as per pt, pt may need Chemo for Newly  DX of Lung Cancer , has an appointment with Oncology  as outpatient , no signs of tamponade of bedside POCUS, no hypotension, as per ER note tonight, I called CT Surgery consult tonight, Pt awake, A+O x 4, no distress ,  no cough, no N/V, No HA, no dysuria, no diarrhea, no abdominal pain, + Constipation, No N/V, + Rt sided CP at the site of Recent surgery, Pt S/P IV Lasix 40 mg x 1 given by ER, Percocet x 1 for pain, elevated BP in the ER, I started the pt on Metoprolol 25 mg PO BID from tonight,     Labs: Troponin HS: 35/44, Na 135, K+ 3.8, BUN 14, Creatinine 0.97, glucose 107, alk Phos 135, ALT 36, .4, WBC 10.99, Hgb 11.1, platelet 438, PT 11.9, INR 1.07, PTT 33.6,     Vitals: Tem 98.1, , RR 16, /74 ,

## 2019-09-18 NOTE — ED ADULT NURSE NOTE - NSIMPLEMENTINTERV_GEN_ALL_ED
Implemented All Universal Safety Interventions:  Nettie to call system. Call bell, personal items and telephone within reach. Instruct patient to call for assistance. Room bathroom lighting operational. Non-slip footwear when patient is off stretcher. Physically safe environment: no spills, clutter or unnecessary equipment. Stretcher in lowest position, wheels locked, appropriate side rails in place.

## 2019-09-18 NOTE — H&P ADULT - NSICDXPASTSURGICALHX_GEN_ALL_CORE_FT
PAST SURGICAL HISTORY:  History of  section x2 PAST SURGICAL HISTORY:  H/O: lung cancer     History of  section x2    S/P lobectomy of lung

## 2019-09-18 NOTE — H&P ADULT - PROBLEM SELECTOR PLAN 2
S/P Recent Rt Lung Surgery, CT Surgery consult was called tonight by me,   Rt sided CP: Percocet PRN, Senna, Colace,

## 2019-09-18 NOTE — H&P ADULT - ASSESSMENT
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 Sent in for formal echo and to r/o signs of tamponade. Of note pt noticed increasing B/L LE edema over last 2 weeks. Started on Lasix  which is new for her. No formal dx of CHF. No fever or cough. No SOB. Chronic back pain. C/O COLÓN, uses cane/walker to ambulate, Pt had a Venous Doppler of legs today done by cardiology, NO DVT as per pt, pt may need Chemo for Newly  DX of Lung Cancer , has an appointment with Oncology  as outpatient , no signs of tamponade of bedside POCUS, no hypotension, as per ER note tonight, I called CT Surgery consult tonight, Pt awake, A+O x 4, no distress ,  no cough, no N/V, No HA, no dysuria, no diarrhea, no abdominal pain, + Constipation, No N/V, + Rt sided CP at the site of Recent surgery, Pt S/P IV Lasix 40 mg x 1 given by ER, Percocet x 1 for pain, elevated BP in the ER, I started the pt on Metoprolol 25 mg PO BID from tonight,

## 2019-09-18 NOTE — ED PROVIDER NOTE - PROGRESS NOTE DETAILS
Jeremiah PGY2- no signs of tamponade of bedside POCUS, no hypotension, signs of HF with peripheral edema, known CAD with stents, admit to hospital under Dr. ELENO Medrano service. Pt's cardiologist is Dr. ALEX Porras of [facility], spoke with overnight doctor Dr. Hobbs Jeremiah PGY2- no signs of tamponade of bedside POCUS, no hypotension, signs of HF with peripheral edema, known CAD with stents, admit to hospital under Dr. ELENO Medrano service. Pt's cardiologist is Dr. ALEX Porras of [facility], spoke with overnight doctor Dr. Hobbs (spelling?)

## 2019-09-18 NOTE — ED PROVIDER NOTE - CLINICAL SUMMARY MEDICAL DECISION MAKING FREE TEXT BOX
Haverty PGY2- sent for concern pericardial effusion on echo at cards office, no hypotension, no sig resp sx, recent VATS procedure from lung nodule removal  does not new BLE edema, recently started lasix, no dx of CHF, does have CAD with stenting in past, no resp distress  BLE edema on exam, lung sounds cta, no distress, abd soft, beside echo with ?anterior effusion, difficult to see posteriorly due to poor windows, no signs of RV collapse   likely admit for new HF, formal echo and CT consult in am

## 2019-09-18 NOTE — ED ADULT TRIAGE NOTE - CHIEF COMPLAINT QUOTE
patient c/o SOB since few days , sent in by PMD with pericardial effusion and r/o cardiac tamponade. h/o nodules in her right upper lobe with lobectomy done on September 3.

## 2019-09-18 NOTE — H&P ADULT - PROBLEM SELECTOR PLAN 8
DVT Prophylaxis: SQ Heparin,     PT consult,  Iron Studies, Ferritin, Vit B12, Folate, TSH, Free T4, UA, Fasting Lipid, Hep A,B,C profile,

## 2019-09-19 DIAGNOSIS — E78.5 HYPERLIPIDEMIA, UNSPECIFIED: ICD-10-CM

## 2019-09-19 DIAGNOSIS — I25.10 ATHEROSCLEROTIC HEART DISEASE OF NATIVE CORONARY ARTERY WITHOUT ANGINA PECTORIS: ICD-10-CM

## 2019-09-19 DIAGNOSIS — I10 ESSENTIAL (PRIMARY) HYPERTENSION: ICD-10-CM

## 2019-09-19 DIAGNOSIS — C34.90 MALIGNANT NEOPLASM OF UNSPECIFIED PART OF UNSPECIFIED BRONCHUS OR LUNG: ICD-10-CM

## 2019-09-19 DIAGNOSIS — C34.11 MALIGNANT NEOPLASM OF UPPER LOBE, RIGHT BRONCHUS OR LUNG: ICD-10-CM

## 2019-09-19 DIAGNOSIS — R60.0 LOCALIZED EDEMA: ICD-10-CM

## 2019-09-19 DIAGNOSIS — Z29.9 ENCOUNTER FOR PROPHYLACTIC MEASURES, UNSPECIFIED: ICD-10-CM

## 2019-09-19 DIAGNOSIS — J43.9 EMPHYSEMA, UNSPECIFIED: ICD-10-CM

## 2019-09-19 DIAGNOSIS — Z85.118 PERSONAL HISTORY OF OTHER MALIGNANT NEOPLASM OF BRONCHUS AND LUNG: Chronic | ICD-10-CM

## 2019-09-19 DIAGNOSIS — Z95.5 PRESENCE OF CORONARY ANGIOPLASTY IMPLANT AND GRAFT: ICD-10-CM

## 2019-09-19 DIAGNOSIS — Z90.2 ACQUIRED ABSENCE OF LUNG [PART OF]: Chronic | ICD-10-CM

## 2019-09-19 LAB
ALBUMIN SERPL ELPH-MCNC: 4.2 G/DL — SIGNIFICANT CHANGE UP (ref 3.3–5)
ALP SERPL-CCNC: 143 U/L — HIGH (ref 40–120)
ALT FLD-CCNC: 38 U/L — HIGH (ref 4–33)
ANION GAP SERPL CALC-SCNC: 17 MMO/L — HIGH (ref 7–14)
AST SERPL-CCNC: 20 U/L — SIGNIFICANT CHANGE UP (ref 4–32)
BASOPHILS # BLD AUTO: 0.07 K/UL — SIGNIFICANT CHANGE UP (ref 0–0.2)
BASOPHILS NFR BLD AUTO: 0.6 % — SIGNIFICANT CHANGE UP (ref 0–2)
BILIRUB SERPL-MCNC: 0.4 MG/DL — SIGNIFICANT CHANGE UP (ref 0.2–1.2)
BUN SERPL-MCNC: 14 MG/DL — SIGNIFICANT CHANGE UP (ref 7–23)
CALCIUM SERPL-MCNC: 9.7 MG/DL — SIGNIFICANT CHANGE UP (ref 8.4–10.5)
CHLORIDE SERPL-SCNC: 93 MMOL/L — LOW (ref 98–107)
CHOLEST SERPL-MCNC: 217 MG/DL — HIGH (ref 120–199)
CO2 SERPL-SCNC: 24 MMOL/L — SIGNIFICANT CHANGE UP (ref 22–31)
CREAT SERPL-MCNC: 1.06 MG/DL — SIGNIFICANT CHANGE UP (ref 0.5–1.3)
EOSINOPHIL # BLD AUTO: 0.07 K/UL — SIGNIFICANT CHANGE UP (ref 0–0.5)
EOSINOPHIL NFR BLD AUTO: 0.6 % — SIGNIFICANT CHANGE UP (ref 0–6)
FERRITIN SERPL-MCNC: 460.1 NG/ML — HIGH (ref 15–150)
FOLATE SERPL-MCNC: 15.3 NG/ML — SIGNIFICANT CHANGE UP (ref 4.7–20)
GLUCOSE SERPL-MCNC: 115 MG/DL — HIGH (ref 70–99)
HAV IGM SER-ACNC: NONREACTIVE — SIGNIFICANT CHANGE UP
HBA1C BLD-MCNC: 5.6 % — SIGNIFICANT CHANGE UP (ref 4–5.6)
HBV CORE IGM SER-ACNC: NONREACTIVE — SIGNIFICANT CHANGE UP
HBV SURFACE AG SER-ACNC: NONREACTIVE — SIGNIFICANT CHANGE UP
HCT VFR BLD CALC: 35.3 % — SIGNIFICANT CHANGE UP (ref 34.5–45)
HCV AB S/CO SERPL IA: 0.24 S/CO — SIGNIFICANT CHANGE UP (ref 0–0.99)
HCV AB SERPL-IMP: SIGNIFICANT CHANGE UP
HDLC SERPL-MCNC: 46 MG/DL — SIGNIFICANT CHANGE UP (ref 45–65)
HGB BLD-MCNC: 11.3 G/DL — LOW (ref 11.5–15.5)
IMM GRANULOCYTES NFR BLD AUTO: 0.6 % — SIGNIFICANT CHANGE UP (ref 0–1.5)
IRON SATN MFR SERPL: 285 UG/DL — SIGNIFICANT CHANGE UP (ref 140–530)
IRON SATN MFR SERPL: 47 UG/DL — SIGNIFICANT CHANGE UP (ref 30–160)
LIPID PNL WITH DIRECT LDL SERPL: 161 MG/DL — SIGNIFICANT CHANGE UP
LYMPHOCYTES # BLD AUTO: 1.79 K/UL — SIGNIFICANT CHANGE UP (ref 1–3.3)
LYMPHOCYTES # BLD AUTO: 16.5 % — SIGNIFICANT CHANGE UP (ref 13–44)
MAGNESIUM SERPL-MCNC: 2 MG/DL — SIGNIFICANT CHANGE UP (ref 1.6–2.6)
MCHC RBC-ENTMCNC: 31 PG — SIGNIFICANT CHANGE UP (ref 27–34)
MCHC RBC-ENTMCNC: 32 % — SIGNIFICANT CHANGE UP (ref 32–36)
MCV RBC AUTO: 96.7 FL — SIGNIFICANT CHANGE UP (ref 80–100)
MONOCYTES # BLD AUTO: 0.85 K/UL — SIGNIFICANT CHANGE UP (ref 0–0.9)
MONOCYTES NFR BLD AUTO: 7.8 % — SIGNIFICANT CHANGE UP (ref 2–14)
NEUTROPHILS # BLD AUTO: 8.04 K/UL — HIGH (ref 1.8–7.4)
NEUTROPHILS NFR BLD AUTO: 73.9 % — SIGNIFICANT CHANGE UP (ref 43–77)
NRBC # FLD: 0 K/UL — SIGNIFICANT CHANGE UP (ref 0–0)
NT-PROBNP SERPL-SCNC: 837.6 PG/ML — SIGNIFICANT CHANGE UP
PHOSPHATE SERPL-MCNC: 4.2 MG/DL — SIGNIFICANT CHANGE UP (ref 2.5–4.5)
PLATELET # BLD AUTO: 505 K/UL — HIGH (ref 150–400)
PMV BLD: 10.3 FL — SIGNIFICANT CHANGE UP (ref 7–13)
POTASSIUM SERPL-MCNC: 3.9 MMOL/L — SIGNIFICANT CHANGE UP (ref 3.5–5.3)
POTASSIUM SERPL-SCNC: 3.9 MMOL/L — SIGNIFICANT CHANGE UP (ref 3.5–5.3)
PROT SERPL-MCNC: 8 G/DL — SIGNIFICANT CHANGE UP (ref 6–8.3)
RBC # BLD: 3.65 M/UL — LOW (ref 3.8–5.2)
RBC # FLD: 12.7 % — SIGNIFICANT CHANGE UP (ref 10.3–14.5)
SODIUM SERPL-SCNC: 134 MMOL/L — LOW (ref 135–145)
T4 FREE SERPL-MCNC: 1.59 NG/DL — SIGNIFICANT CHANGE UP (ref 0.9–1.8)
TRIGL SERPL-MCNC: 146 MG/DL — SIGNIFICANT CHANGE UP (ref 10–149)
TROPONIN T, HIGH SENSITIVITY: 45 NG/L — SIGNIFICANT CHANGE UP (ref ?–14)
TSH SERPL-MCNC: 1.73 UIU/ML — SIGNIFICANT CHANGE UP (ref 0.27–4.2)
UIBC SERPL-MCNC: 237.9 UG/DL — SIGNIFICANT CHANGE UP (ref 110–370)
VIT B12 SERPL-MCNC: 704 PG/ML — SIGNIFICANT CHANGE UP (ref 200–900)
WBC # BLD: 10.88 K/UL — HIGH (ref 3.8–10.5)
WBC # FLD AUTO: 10.88 K/UL — HIGH (ref 3.8–10.5)

## 2019-09-19 PROCEDURE — 99253 IP/OBS CNSLTJ NEW/EST LOW 45: CPT

## 2019-09-19 PROCEDURE — 93306 TTE W/DOPPLER COMPLETE: CPT | Mod: 26

## 2019-09-19 RX ORDER — FUROSEMIDE 40 MG
40 TABLET ORAL DAILY
Refills: 0 | Status: DISCONTINUED | OUTPATIENT
Start: 2019-09-19 | End: 2019-09-20

## 2019-09-19 RX ORDER — ATORVASTATIN CALCIUM 80 MG/1
40 TABLET, FILM COATED ORAL AT BEDTIME
Refills: 0 | Status: DISCONTINUED | OUTPATIENT
Start: 2019-09-19 | End: 2019-09-21

## 2019-09-19 RX ORDER — SENNA PLUS 8.6 MG/1
2 TABLET ORAL AT BEDTIME
Refills: 0 | Status: DISCONTINUED | OUTPATIENT
Start: 2019-09-19 | End: 2019-09-21

## 2019-09-19 RX ORDER — OXYCODONE AND ACETAMINOPHEN 5; 325 MG/1; MG/1
2 TABLET ORAL EVERY 8 HOURS
Refills: 0 | Status: DISCONTINUED | OUTPATIENT
Start: 2019-09-19 | End: 2019-09-21

## 2019-09-19 RX ORDER — BUDESONIDE AND FORMOTEROL FUMARATE DIHYDRATE 160; 4.5 UG/1; UG/1
2 AEROSOL RESPIRATORY (INHALATION)
Refills: 0 | Status: DISCONTINUED | OUTPATIENT
Start: 2019-09-19 | End: 2019-09-21

## 2019-09-19 RX ORDER — DOCUSATE SODIUM 100 MG
100 CAPSULE ORAL THREE TIMES A DAY
Refills: 0 | Status: DISCONTINUED | OUTPATIENT
Start: 2019-09-19 | End: 2019-09-21

## 2019-09-19 RX ORDER — OXYCODONE AND ACETAMINOPHEN 5; 325 MG/1; MG/1
1 TABLET ORAL EVERY 8 HOURS
Refills: 0 | Status: DISCONTINUED | OUTPATIENT
Start: 2019-09-19 | End: 2019-09-21

## 2019-09-19 RX ORDER — METOPROLOL TARTRATE 50 MG
25 TABLET ORAL ONCE
Refills: 0 | Status: COMPLETED | OUTPATIENT
Start: 2019-09-19 | End: 2019-09-19

## 2019-09-19 RX ORDER — ASPIRIN/CALCIUM CARB/MAGNESIUM 324 MG
81 TABLET ORAL DAILY
Refills: 0 | Status: DISCONTINUED | OUTPATIENT
Start: 2019-09-19 | End: 2019-09-21

## 2019-09-19 RX ORDER — OXYCODONE AND ACETAMINOPHEN 5; 325 MG/1; MG/1
1 TABLET ORAL EVERY 8 HOURS
Refills: 0 | Status: DISCONTINUED | OUTPATIENT
Start: 2019-09-19 | End: 2019-09-19

## 2019-09-19 RX ORDER — OXYCODONE AND ACETAMINOPHEN 5; 325 MG/1; MG/1
2 TABLET ORAL EVERY 8 HOURS
Refills: 0 | Status: DISCONTINUED | OUTPATIENT
Start: 2019-09-19 | End: 2019-09-19

## 2019-09-19 RX ORDER — INFLUENZA VIRUS VACCINE 15; 15; 15; 15 UG/.5ML; UG/.5ML; UG/.5ML; UG/.5ML
0.5 SUSPENSION INTRAMUSCULAR ONCE
Refills: 0 | Status: DISCONTINUED | OUTPATIENT
Start: 2019-09-19 | End: 2019-09-21

## 2019-09-19 RX ORDER — METOPROLOL TARTRATE 50 MG
25 TABLET ORAL
Refills: 0 | Status: DISCONTINUED | OUTPATIENT
Start: 2019-09-19 | End: 2019-09-21

## 2019-09-19 RX ADMIN — Medication 81 MILLIGRAM(S): at 14:37

## 2019-09-19 RX ADMIN — ATORVASTATIN CALCIUM 40 MILLIGRAM(S): 80 TABLET, FILM COATED ORAL at 21:14

## 2019-09-19 RX ADMIN — OXYCODONE AND ACETAMINOPHEN 1 TABLET(S): 5; 325 TABLET ORAL at 15:11

## 2019-09-19 RX ADMIN — BUDESONIDE AND FORMOTEROL FUMARATE DIHYDRATE 2 PUFF(S): 160; 4.5 AEROSOL RESPIRATORY (INHALATION) at 10:00

## 2019-09-19 RX ADMIN — SENNA PLUS 2 TABLET(S): 8.6 TABLET ORAL at 21:14

## 2019-09-19 RX ADMIN — HEPARIN SODIUM 5000 UNIT(S): 5000 INJECTION INTRAVENOUS; SUBCUTANEOUS at 05:02

## 2019-09-19 RX ADMIN — HEPARIN SODIUM 5000 UNIT(S): 5000 INJECTION INTRAVENOUS; SUBCUTANEOUS at 21:15

## 2019-09-19 RX ADMIN — OXYCODONE AND ACETAMINOPHEN 1 TABLET(S): 5; 325 TABLET ORAL at 04:53

## 2019-09-19 RX ADMIN — Medication 25 MILLIGRAM(S): at 18:44

## 2019-09-19 RX ADMIN — Medication 25 MILLIGRAM(S): at 05:02

## 2019-09-19 RX ADMIN — Medication 100 MILLIGRAM(S): at 05:02

## 2019-09-19 RX ADMIN — Medication 25 MILLIGRAM(S): at 01:26

## 2019-09-19 RX ADMIN — Medication 100 MILLIGRAM(S): at 21:15

## 2019-09-19 RX ADMIN — OXYCODONE AND ACETAMINOPHEN 1 TABLET(S): 5; 325 TABLET ORAL at 14:41

## 2019-09-19 RX ADMIN — Medication 40 MILLIGRAM(S): at 05:02

## 2019-09-19 RX ADMIN — BUDESONIDE AND FORMOTEROL FUMARATE DIHYDRATE 2 PUFF(S): 160; 4.5 AEROSOL RESPIRATORY (INHALATION) at 23:00

## 2019-09-19 NOTE — CONSULT NOTE ADULT - SUBJECTIVE AND OBJECTIVE BOX
Requesting Physician : Dr. Godwin     Reason for Consultation: CAD    HISTORY OF PRESENT ILLNESS:  64 yo F with history of CAD s/p ANETTE to proximal RCA in , lung adenocarcinoma s/p VATS/RUL lobectomy, HTN, HLD who is being seen for management of CAD.  The patient was sent to the ED afte routine TTE showed possible pericardial effusion.  The patient has been complaining of LE swelling over the last several weeks.  No dyspnea, orthopnea, or PND.  The patient denies chest pain or anginal symptoms.  The patient was admitted and TTE was performed showing hyperdynamic LV with no pericardial effusion.  Interventional cardiology consulted to evaluate CAD.       PAST MEDICAL & SURGICAL HISTORY:  Stented coronary artery  Lung cancer  Edema of both legs  Obesity  Hip pain  Emphysema  Carotid artery disease: monitored by javier doctor Samuel  Pulmonary nodules: yearly CT scans  Hyperlipidemia  Hypertension  S/P lobectomy of lung  H/O: lung cancer  History of  section: x2          MEDICATIONS:  MEDICATIONS  (STANDING):  aspirin enteric coated 81 milliGRAM(s) Oral daily  atorvastatin 40 milliGRAM(s) Oral at bedtime  buDESOnide 160 MICROgram(s)/formoterol 4.5 MICROgram(s) Inhaler 2 Puff(s) Inhalation two times a day  docusate sodium 100 milliGRAM(s) Oral three times a day  furosemide   Injectable 40 milliGRAM(s) IV Push daily  heparin  Injectable 5000 Unit(s) SubCutaneous every 8 hours  influenza   Vaccine 0.5 milliLiter(s) IntraMuscular once  metoprolol tartrate 25 milliGRAM(s) Oral two times a day  senna 2 Tablet(s) Oral at bedtime      Allergies    penicillin (Short breath; Hives)  tetracycline (Short breath; Hives)    Intolerances        FAMILY HISTORY:  Family history of CHF (congestive heart failure) (Aunt)    Non-contributary for premature coronary disease or sudden cardiac death    SOCIAL HISTORY:    [x ] Non-smoker  [ ] Smoker  [ ] Alcohol      REVIEW OF SYSTEMS:  [ ]chest pain  [  ]shortness of breath  [  ]palpitations  [  ]syncope  [ ]near syncope [ ]upper extremity weakness   [ ] lower extremity weakness  [  ]diplopia  [  ]altered mental status   [  ]fevers  [ ]chills [ ]nausea  [ ]vomitting  [  ]dysphagia    [ ]abdominal pain  [ ]melena  [ ]BRBPR    [  ]epistaxis  [  ]rash    [x ]lower extremity edema        [x ] All others negative	  [ ] Unable to obtain    PHYSICAL EXAM:  T(C): 36.8 (19 @ 06:28), Max: 36.8 (19 @ 18:38)  HR: 89 (19 @ 06:28) (89 - 104)  BP: 129/73 (19 @ 06:28) (123/86 - 182/77)  RR: 18 (19 @ 06:28) (16 - 18)  SpO2: 99% (19 @ 06:28) (98% - 100%)  Wt(kg): --  I&O's Summary        HEENT:   Normal oral mucosa, PERRL, EOMI	  Lymphatic: No lymphadenopathy , + edema in LE bilaterally   Cardiovascular: Normal S1 S2, No JVD, No murmurs , Peripheral pulses palpable 2+ bilaterally  Respiratory: Lungs clear to auscultation, normal effort 	  Gastrointestinal:  Soft, Non-tender, + BS	  Skin: No rashes, No ecchymoses, No cyanosis, warm to touch  Musculoskeletal: Normal range of motion, normal strength  Psychiatry:  Mood & affect appropriate      TELEMETRY: 	    ECG: < from: 12 Lead ECG (19 @ 18:45) >  Diagnosis Line Normal sinus rhythm  Normal ECG    < end of copied text >  	  RADIOLOGY:  OTHER:     DIAGNOSTIC TESTING:  [ ] Echocardiogram: < from: Transthoracic Echocardiogram (19 @ 10:37) >  CONCLUSIONS:  1. Mitral valve not well visualized, probably normal.  2. Hyperdynamic left ventricle.  3. The right ventricle is not well visualized; grossly  normal right ventricular systolic function.  4. Normal pericardium with no pericardial effusion.  5. Right pleural effusion.  6. Pericardial fat pad seen.    < end of copied text >    [ ]  Catheterization: < from: Cardiac Cath Lab - Adult (18 @ 12:31) >  LM:   --  LM: Angiography showed minor luminal irregularities with no flow  limiting lesions.  LAD:   --  Proximal LAD: Angiography showed minor luminal irregularities with  no flow limiting lesions.  --  Mid LAD: There was a discrete 20% stenosis.  --  Distal LAD: Angiography showed minor luminal irregularities with no flow  limiting lesions.  CX:   --  Proximal circumflex: Angiography showed mild atherosclerosis with no  flow limiting lesions.  --  Mid circumflex: There was a discrete 20 % stenosis.  --  Distal circumflex: Angiography showed minor luminal irregularities with no  flow limiting lesions. --  OM1: Angiography showed minor luminal irregularities  with no flow limiting lesions. --  OM2: Angiography showed minor luminal  irregularities with no flow limiting lesions.  RCA:   --  Proximal RCA: There was a 100 % stenosis with the distal vessel  being filled via collaterals. This lesion is a chronic total occlusion.  COMPLICATIONS: There were no complications.  DIAGNOSTIC RECOMMENDATIONS: Cardiac catheterization demonstrates a 100%  stenosis in the RCA that is the culprit of the patient's symptoms. Will  therefore perform PCI to the RCA.  INTERVENTIONAL RECOMMENDATIONS: S/p successful ANETTE to the RCA. The patient  should continue with dual antiplatelet therapy for at least 12 months.  Prepared and signed by  Tip Hernandez M.D.  Signed 01/10/2018 17:08:3    < end of copied text >    [ ] Stress Test:    	  	  LABS:	 	    CARDIAC MARKERS:                              11.3   10.88 )-----------( 505      ( 19 Sep 2019 05:46 )             35.3         134<L>  |  93<L>  |  14  ----------------------------<  115<H>  3.9   |  24  |  1.06    Ca    9.7      19 Sep 2019 05:46  Phos  4.2       Mg     2.0         TPro  8.0  /  Alb  4.2  /  TBili  0.4  /  DBili  x   /  AST  20  /  ALT  38<H>  /  AlkPhos  143<H>      proBNP: Serum Pro-Brain Natriuretic Peptide: 837.6 pg/mL ( @ 05:46)  Serum Pro-Brain Natriuretic Peptide: 673.4 pg/mL ( @ 21:10)    Lipid Profile:   HgA1c: Hemoglobin A1C, Whole Blood: 5.6 % ( @ 05:46)    TSH: Thyroid Stimulating Hormone, Serum: 1.73 uIU/mL ( @ 05:46)      ASSESSMENT/PLAN: 64 yo F with history of CAD s/p ANETTE to proximal RCA in , lung adenocarcinoma s/p VATS/RUL lobectomy, HTN, HLD who is being seen for management of CAD.    -pt. with no chest pain or anginal symptoms currently  -troponin indeterminate - would check CPK  -do not suspect acs at this time  -no urgent cath needed at this time  -would continue with medical management of cad - would continue with asa 81 for history of ANETTE > 1 year ago  -continue with beta blockers and statin  -IV lasix to keep O > I  -check LE dopplers  -further workup pending above    Tip Hernandez MD

## 2019-09-19 NOTE — PHYSICAL THERAPY INITIAL EVALUATION ADULT - PERTINENT HX OF CURRENT PROBLEM, REHAB EVAL
63 year old Female with ,PMH: of coronary stents Jan 2018, Obesity , S/P recent lung surgery to remove Lung nodule that had been growing, (  s/p elective R Vats, converted to thoracotomy, RUL lobectomy, repair of Pulm artery ), HTN, HLD, emphysema sent from cardiology office after routine visit and bedside echo showed pericardial effusion.

## 2019-09-19 NOTE — CONSULT NOTE ADULT - SUBJECTIVE AND OBJECTIVE BOX
HISTORY OF PRESENT ILLNESS: HPI:  62 yo female, hx CAD, PCI 2018, on Asa, former 38-pack year smoker with history of emphysema, HTN, HLD, with chronic pulmonary nodules s/p elective R Vats, converted to thoracotomy, RUL lobectomy, repair of Pulm artery on 9/3/19, HTN, HLD, emphysema, admitted with increasing LE edema and pericardial effusion found on office echo.  Denies CP, SOB, Palps.  Reportedly path c/w adenocarcinoma and she is scheduled to see Oncology as OP.  Denies fever, chills, Nausea, vomiting, bleeding, cough.    PAST MEDICAL & SURGICAL HISTORY:  Stented coronary artery  Lung cancer  Edema of both legs  Obesity  Hip pain  Emphysema  Carotid artery disease: monitored by vascluar doctor Samuel  Pulmonary nodules: yearly CT scans  Hyperlipidemia  Hypertension  S/P lobectomy of lung  H/O: lung cancer  History of  section: x2    MEDICATIONS  (STANDING):  aspirin enteric coated 81 milliGRAM(s) Oral daily  atorvastatin 40 milliGRAM(s) Oral at bedtime  buDESOnide 160 MICROgram(s)/formoterol 4.5 MICROgram(s) Inhaler 2 Puff(s) Inhalation two times a day  docusate sodium 100 milliGRAM(s) Oral three times a day  furosemide   Injectable 40 milliGRAM(s) IV Push daily  heparin  Injectable 5000 Unit(s) SubCutaneous every 8 hours  influenza   Vaccine 0.5 milliLiter(s) IntraMuscular once  metoprolol tartrate 25 milliGRAM(s) Oral two times a day  senna 2 Tablet(s) Oral at bedtime    Allergies  penicillin (Short breath; Hives)  tetracycline (Short breath; Hives)      FAMILY HISTORY:  Family history of CHF (congestive heart failure) (Aunt)  Noncontributory for premature coronary disease or sudden cardiac death    SOCIAL HISTORY:    [x ] Non-smoker  [ ] Smoker  [ ] Alcohol      REVIEW OF SYSTEMS:  [ ]chest pain  [ x ]shortness of breath  [  ]palpitations  [  ]syncope  [ ]near syncope [ ]upper extremity weakness   [ ] lower extremity weakness  [  ]diplopia  [  ]altered mental status   [  ]fevers  [ ]chills [ ]nausea  [ ]vomitting  [  ]dysphagia    [ ]abdominal pain  [ ]melena  [ ]BRBPR    [  ]epistaxis  [  ]rash    [x ]lower extremity edema        [ x] All others negative	  [ ] Unable to obtain      LABS:	 	                        11.3   10.88 )-----------( 505      ( 19 Sep 2019 05:46 )             35.3     134<L>  |  93<L>  |  14  ----------------------------<  115<H>  3.9   |  24  |  1.06    Ca    9.7      19 Sep 2019 05:46  Phos  4.2       Mg     2.0         TPro  8.0  /  Alb  4.2  /  TBili  0.4  /  DBili  x   /  AST  20  /  ALT  38<H>  /  AlkPhos  143<H>      Creatinine Trend: 1.06<--, 0.97<--, 0.88<--, 0.85<--, 1.06<--, 1.15<--    Coags:  PT/INR - ( 18 Sep 2019 21:10 )   PT: 11.9 SEC;   INR: 1.07       PTT - ( 18 Sep 2019 21:10 )  PTT:33.6 SEC    proBNP: Serum Pro-Brain Natriuretic Peptide: 837.6 pg/mL ( @ 05:46)  Serum Pro-Brain Natriuretic Peptide: 673.4 pg/mL ( 21:10)  HgA1c: Hemoglobin A1C, Whole Blood: 5.6 % ( @ 05:46)    TSH: Thyroid Stimulating Hormone, Serum: 1.73 uIU/mL ( @ 05:46)    PHYSICAL EXAM:  T(C): 36.7 (19 @ 12:00), Max: 36.8 (19 @ 18:38)  HR: 90 (19 @ 12:00) (89 - 104)  BP: 131/55 (19 @ 12:00) (123/86 - 182/77)  RR: 18 (19 @ 12:00) (16 - 18)  SpO2: 99% (19 @ 12:00) (98% - 100%)    Gen: Appears well in NAD  HEENT:  (-)icterus (-)pallor  CV: N S1 S2 1/6 MIKO (+)2 Pulses B/l  Resp:  Clear to ausculatation B/L, normal effort  GI: (+) BS Soft, NT, ND  Lymph:  (-)Edema, (-)obvious lymphadenopathy  Skin: Warm to touch, Normal turgor  Psych: Appropriate mood and affect    EKG: SR, vrate 93bpm, LAE, AZ 170ms, [id number], QTC 457ms      < from: Transthoracic Echocardiogram (19 @ 10:37) >  CONCLUSIONS:  1. Mitral valve not well visualized, probably normal.  2. Hyperdynamic left ventricle.  3. The right ventricle is not well visualized; grossly  normal right ventricular systolic function.  4. Normal pericardium with no pericardial effusion.  5. Right pleural effusion.  6. Pericardial fat pad seen.  ------------------------------------------------------------------------  Confirmed on  2019 - 13:28:53 by BENY Roman    < end of copied text >      ASSESSMENT/PLAN: 	62 yo female, hx CAD, PCI 2018, on Asa, former 38-pack year smoker with history of emphysema, HTN, HLD, with chronic pulmonary nodules s/p elective R Vats, converted to thoracotomy, RUL lobectomy, repair of Pulm artery on 9/3/19, HTN, HLD, emphysema, admitted with increasing LE edema and pericardial effusion found on office echo.    --TTE inpatient with Hyperdynamic LV, no pericardial effusion  --check LE dopplers  --TSH WNL  --suspect sinus tachycardia due to underlying medical problems HISTORY OF PRESENT ILLNESS: HPI:  64 yo female, hx CAD, PCI 2018, on Asa, former 38-pack year smoker with history of emphysema, HTN, HLD, with chronic pulmonary nodules s/p elective R Vats, converted to thoracotomy, RUL lobectomy, repair of Pulm artery on 9/3/19, HTN, HLD, emphysema, admitted with increasing LE edema and pericardial effusion found on office echo.  Denies CP, SOB, Palps.  Reportedly path c/w adenocarcinoma and she is scheduled to see Oncology as OP.  Denies fever, chills, Nausea, vomiting, bleeding, cough.    PAST MEDICAL & SURGICAL HISTORY:  Stented coronary artery  Lung cancer  Edema of both legs  Obesity  Hip pain  Emphysema  Carotid artery disease: monitored by vascluar doctor Samuel  Pulmonary nodules: yearly CT scans  Hyperlipidemia  Hypertension  S/P lobectomy of lung  H/O: lung cancer  History of  section: x2    MEDICATIONS  (STANDING):  aspirin enteric coated 81 milliGRAM(s) Oral daily  atorvastatin 40 milliGRAM(s) Oral at bedtime  buDESOnide 160 MICROgram(s)/formoterol 4.5 MICROgram(s) Inhaler 2 Puff(s) Inhalation two times a day  docusate sodium 100 milliGRAM(s) Oral three times a day  furosemide   Injectable 40 milliGRAM(s) IV Push daily  heparin  Injectable 5000 Unit(s) SubCutaneous every 8 hours  influenza   Vaccine 0.5 milliLiter(s) IntraMuscular once  metoprolol tartrate 25 milliGRAM(s) Oral two times a day  senna 2 Tablet(s) Oral at bedtime    Allergies  penicillin (Short breath; Hives)  tetracycline (Short breath; Hives)      FAMILY HISTORY:  Family history of CHF (congestive heart failure) (Aunt)  Noncontributory for premature coronary disease or sudden cardiac death    SOCIAL HISTORY:    [x ] Non-smoker  [ ] Smoker  [ ] Alcohol      REVIEW OF SYSTEMS:  [ ]chest pain  [ x ]shortness of breath  [  ]palpitations  [  ]syncope  [ ]near syncope [ ]upper extremity weakness   [ ] lower extremity weakness  [  ]diplopia  [  ]altered mental status   [  ]fevers  [ ]chills [ ]nausea  [ ]vomitting  [  ]dysphagia    [ ]abdominal pain  [ ]melena  [ ]BRBPR    [  ]epistaxis  [  ]rash    [x ]lower extremity edema        [ x] All others negative	  [ ] Unable to obtain      LABS:	 	                        11.3   10.88 )-----------( 505      ( 19 Sep 2019 05:46 )             35.3     134<L>  |  93<L>  |  14  ----------------------------<  115<H>  3.9   |  24  |  1.06    Ca    9.7      19 Sep 2019 05:46  Phos  4.2       Mg     2.0         TPro  8.0  /  Alb  4.2  /  TBili  0.4  /  DBili  x   /  AST  20  /  ALT  38<H>  /  AlkPhos  143<H>      Creatinine Trend: 1.06<--, 0.97<--, 0.88<--, 0.85<--, 1.06<--, 1.15<--    Coags:  PT/INR - ( 18 Sep 2019 21:10 )   PT: 11.9 SEC;   INR: 1.07       PTT - ( 18 Sep 2019 21:10 )  PTT:33.6 SEC    proBNP: Serum Pro-Brain Natriuretic Peptide: 837.6 pg/mL ( @ 05:46)  Serum Pro-Brain Natriuretic Peptide: 673.4 pg/mL ( 21:10)  HgA1c: Hemoglobin A1C, Whole Blood: 5.6 % ( @ 05:46)    TSH: Thyroid Stimulating Hormone, Serum: 1.73 uIU/mL ( @ 05:46)    PHYSICAL EXAM:  T(C): 36.7 (19 @ 12:00), Max: 36.8 (19 @ 18:38)  HR: 90 (19 @ 12:00) (89 - 104)  BP: 131/55 (19 @ 12:00) (123/86 - 182/77)  RR: 18 (19 @ 12:00) (16 - 18)  SpO2: 99% (19 @ 12:00) (98% - 100%)    Gen: Appears well in NAD  HEENT:  (-)icterus (-)pallor  CV: N S1 S2 1/6 MIKO (+)2 Pulses B/l  Resp:  Clear to ausculatation B/L, normal effort  GI: (+) BS Soft, NT, ND  Lymph:  (-)Edema, (-)obvious lymphadenopathy  Skin: Warm to touch, Normal turgor  Psych: Appropriate mood and affect    EKG: SR, vrate 93bpm, LAE, RI 170ms, [id number], QTC 457ms      < from: Transthoracic Echocardiogram (19 @ 10:37) >  CONCLUSIONS:  1. Mitral valve not well visualized, probably normal.  2. Hyperdynamic left ventricle.  3. The right ventricle is not well visualized; grossly  normal right ventricular systolic function.  4. Normal pericardium with no pericardial effusion.  5. Right pleural effusion.  6. Pericardial fat pad seen.  ------------------------------------------------------------------------  Confirmed on  2019 - 13:28:53 by Ruel Ceballos M.D. RPVI    < end of copied text >      ASSESSMENT/PLAN: 	64 yo female, hx CAD, PCI 2018, on Asa, former 38-pack year smoker with history of emphysema, HTN, HLD, with chronic pulmonary nodules s/p elective R Vats, converted to thoracotomy, RUL lobectomy, repair of Pulm artery on 9/3/19, HTN, HLD, emphysema, admitted with increasing LE edema and pericardial effusion found on office echo.    --TTE inpatient with Hyperdynamic LV, no pericardial effusion  --check LE dopplers  --TSH WNL  --suspect sinus tachycardia due to underlying medical problems  --keep net negative on Lasix

## 2019-09-19 NOTE — CONSULT NOTE ADULT - ATTENDING COMMENTS
Patient seen and examined, agree with above assessment and plan as transcribed above.    - IV lasix  - Lower extremity dopplers    Vern Medrano MD, [facility]  BEEPER [phone]

## 2019-09-19 NOTE — CONSULT NOTE ADULT - SUBJECTIVE AND OBJECTIVE BOX
64 y/o Female with , PMHx of coronary stents 2018 on ASA now, Obesity , S/P R Vats, converted to thoracotomy, RUL lobectomy, repair of Pulm artery,  recently started on Lasix for Legs edema post surgery ,   HTN, HLD, emphysema sent from cardiology office after routine visit and bedside echo showed pericardial effusion. Sent in for formal echo and to r/o signs of tamponade. Of note pt noticed increasing B/L LE edema over last 2 weeks.  No formal dx of CHF. No fever or cough. No SOB. Chronic back pain. C/O COLÓN, uses cane/walker to ambulate, Pt had a Venous Doppler of legs today done by cardiology, NO DVT as per pt, pt may need Chemo for Newly  DX of Lung Cancer , has an appointment with Oncology  as outpatient , no signs of tamponade of bedside POCUS, no hypotension, as per ER note tonigh+ Rt sided CP at the site of Recent surgery, Pt S/P IV Lasix 40 mg x 1 given by ER, Percocet x 1 for pain, elevated BP in the ER, started the pt on Metoprolol 25 mg PO BID  tonight.    PAST MEDICAL & SURGICAL HISTORY:  Stented coronary artery  Lung cancer  Edema of both legs  Obesity  Hip pain  Emphysema  Carotid artery disease: monitored by vascluar doctor Doselena  Pulmonary nodules: yearly CT scans  Hyperlipidemia  Hypertension  S/P lobectomy of lung  H/O: lung cancer  History of  section: x2    FAMILY HISTORY:  Family history of CHF (congestive heart failure) (Aunt)    Allergies    penicillin (Short breath; Hives)  tetracycline (Short breath; Hives)    Intolerances  Social History (marital status, living situation, occupation, tobacco use, alcohol and drug use, and sexual history): , 2 Children, Retired , EX smoker Stopped 5 years ago, NO ETOH,	      REVIEW OF SYSTEMS      General:No Weight change/ Fatigue/ HA/Dizzy	    Skin/Breast: No Rashes/ Lesions/ Masses  	  Ophthalmologic: No Blurry vision/ Glaucoma/ Blindness  	  ENMT: No Hearing loss/ Drainage/ Lesions	    Respiratory and Thorax: No Cough/ Wheezing/ SOB/ Hemoptysis/ Sputum production  	  Cardiovascular: +COLÓN    Gastrointestinal: No Nausea/ Vomiting/ Constipation/ Appetite Change	    Genitourinary: No Heamturia/ Dysuria/ Frequency change/ Impotence	    Musculoskeletal: No Pain/ Weakness/ Claudication	    Neurological: No Seizures/ TIA/CVA/ Parastesias	    Psychiatric: No Dementia/ Depression/ SI/HI	    Hematology/Lymphatics: No hx of bleeding/ + b/l lower extremity Edema	    Endocrine:	No Hyperglycemia/ Hypoglycemia    Allergic/Immunologic:	 No Anaphylaxis/ Intolerance/ Recent illnesses    OBJECTIVE:  Vital Signs Last 24 Hrs  T(C): 36.7 (19 Sep 2019 00:13), Max: 36.8 (18 Sep 2019 18:38)  T(F): 98.1 (19 Sep 2019 00:13), Max: 98.2 (18 Sep 2019 18:38)  HR: 104 (19 Sep 2019 00:13) (96 - 104)  BP: 167/74 (19 Sep 2019 00:13) (167/74 - 182/77)  BP(mean): --  RR: 16 (19 Sep 2019 00:13) (16 - 18)  SpO2: 98% (19 Sep 2019 00:13) (98% - 99%)  PHYSICAL EXAM:   General: WN/WD NAD  Neurology: A&Ox3, nonfocal, ZENG x 4  Eyes: PERRLA/ EOMI, Gross vision intact  ENT/Neck: Neck supple, trachea midline, No JVD, Gross hearing intact  Respiratory: CTA B/L, No wheezing, rales, rhonchi  CV: RRR, S1S2, no murmurs, rubs or gallops  Abdominal: Soft, NT, ND +BS,   Extremities: +B/L lower extremity edema, + peripheral pulses  Skin: No Rashes, Hematoma, Ecchymosis  Incisions: Right thoracotomy incision healing well, no redness or drainage noted.     ( @ 21:10)                      11.1  10.99 )-----------( 438                 34.5    Neutrophils = 8.16 (74.2%)  Lymphocytes = 1.81 (16.5%)  Eosinophils = 0.08 (0.7%)  Basophils = 0.05 (0.5%)  Monocytes = 0.84 (7.6%)  Bands = --%        135  |  94<L>  |  14  ----------------------------<  107<H>  3.8   |  27  |  0.97    Ca    9.5      18 Sep 2019 21:10    TPro  7.8  /  Alb  4.0  /  TBili  0.4  /  DBili  x   /  AST  21  /  ALT  36<H>  /  AlkPhos  135<H>      ( 18 Sep 2019 21:10 )   PT: 11.9 SEC;   INR: 1.07 ;       PTT:33.6 SEC

## 2019-09-19 NOTE — CONSULT NOTE ADULT - SUBJECTIVE AND OBJECTIVE BOX
Assessment/Plan:  1  Tarlov cyst, sacral  Ambulatory referral to Physical Therapy   2  Pain in the coccyx  Ambulatory referral to Physical Therapy       Stephanie Urias does appear to have a non operative cyst   She did have a coccyx issue as well but Neurosurgery has deemed both of those issues non operative  We will take a conservative stance and perform physical therapy  She still has some difficulty with frequency of urination and is still wearing a diaper for that  We will however continue to monitor that situation and she may end up requiring referral to Urology in the future if these issues do not improve  At this point, we will hopefully get her back to some sort of work in the next 4-8 weeks although it is difficult to tell based upon her severity of symptoms at this point  Subjective:   Janie Kumari is a 62 y o  female who presents with persistent low back pain that does radiate down the right leg  She was found to have a Tarlov cyst but Neurosurgery deemed non operative and stated that these are never operative  It did not think that this was the issue that needs to be fixed at this point although it certainly will was exacerbated by the fall  She states that her pain is improving somewhat although her urinary frequency has not changed that much  She still wears a diaper most of the time when she is out of the house  She denies any numbness today however she does intermittently gets some numbness into the right lower extremity  Review of Systems   Constitutional: Negative for chills, fever and unexpected weight change  HENT: Negative for hearing loss, nosebleeds and sore throat  Eyes: Negative for pain, redness and visual disturbance  Respiratory: Negative for cough, shortness of breath and wheezing  Cardiovascular: Negative for chest pain, palpitations and leg swelling  Gastrointestinal: Negative for abdominal pain, nausea and vomiting     Endocrine: Negative for polydipsia and polyuria  Genitourinary: Negative for dysuria and hematuria  Urinary frequency   Musculoskeletal:        See HPI   Skin: Negative for rash and wound  Neurological: Negative for dizziness, numbness and headaches  Psychiatric/Behavioral: Negative for decreased concentration and suicidal ideas  The patient is not nervous/anxious  Past Medical History:   Diagnosis Date    Abnormal findings on diagnostic imaging of breast     last assessed 4/17/14, resolved 5/18/16    Asthma     last assessed 6/5/15, resolved 11/5/15    Lyme disease     Migraine        Past Surgical History:   Procedure Laterality Date    ANTERIOR CRUCIATE LIGAMENT REPAIR      NASAL SEPTUM SURGERY      deviation repair, last assessed 5/12/15    ROTATOR CUFF REPAIR      last assessed 7/10/14    SHOULDER SURGERY Right     UVULECTOMY N/A     last assessed 5/12/15    VASCULAR SURGERY      VEIN LIGATION AND STRIPPING         Family History   Problem Relation Age of Onset    Breast cancer Mother     Diabetes Mother     Heart disease Mother     Pancreatic cancer Mother     Liver cancer Mother     Prostate cancer Father     Arthritis Family     Cancer Family     No Known Problems Sister     No Known Problems Brother     No Known Problems Maternal Aunt     No Known Problems Maternal Uncle     No Known Problems Paternal Aunt     No Known Problems Paternal Uncle     No Known Problems Maternal Grandmother     No Known Problems Maternal Grandfather     No Known Problems Paternal Grandmother     No Known Problems Paternal Grandfather     ADD / ADHD Neg Hx     Anesthesia problems Neg Hx     Clotting disorder Neg Hx     Collagen disease Neg Hx     Dislocations Neg Hx     Learning disabilities Neg Hx     Neurological problems Neg Hx     Osteoporosis Neg Hx     Rheumatologic disease Neg Hx     Scoliosis Neg Hx     Vascular Disease Neg Hx        Social History     Occupational History    Not on file       Social History Main Topics    Smoking status: Former Smoker    Smokeless tobacco: Never Used      Comment: Never smoker per [employer]    Alcohol use No      Comment: social drinker per [employer]     Drug use: No    Sexual activity: Not on file         Current Outpatient Prescriptions:     ADVAIR DISKUS 100-50 MCG/DOSE inhaler, USE 1 PUFF BY MOUTH DAILY (MAY INCREASE TO EVERY 12 HOURS WITH ASTHMA FLARE), Disp: , Rfl: 5    ALPRAZolam (XANAX) 1 mg tablet, Take 1 tablet (1 mg total) by mouth 3 (three) times a day as needed for anxiety, Disp: 270 tablet, Rfl: 0    fluticasone (FLONASE) 50 mcg/act nasal spray, 1 spray into each nostril daily, Disp: , Rfl:     Glucosamine-Chondroitin 250-200 MG TABS, daily, Disp: , Rfl:     methocarbamol (ROBAXIN) 750 mg tablet, Take 1 tablet by mouth 3 (three) times a day as needed, Disp: , Rfl:     Allergies   Allergen Reactions    Biaxin [Clarithromycin] GI Intolerance    Other Allergic Rhinitis       Objective:  Vitals:    02/01/19 1451   BP: 118/74   Pulse: 72       Back Exam     Range of Motion   Extension: abnormal   Flexion: abnormal     Muscle Strength   Right Quadriceps:  5/5   Left Quadriceps:  5/5   Right Hamstrings:  5/5   Left Hamstrings:  5/5     Comments:  Sensation light touch is intact throughout bilateral lower extremities from L2 through S1 which 5/5 strength in bilateral tibialis anterior gastrocsoleus and EHL  She does walk with a slightly hunched gait due to pain into her low back  Physical Exam   Constitutional: She is oriented to person, place, and time  She appears well-developed and well-nourished  HENT:   Head: Normocephalic and atraumatic  Eyes: Conjunctivae and EOM are normal    Neck: Normal range of motion  Cardiovascular: Intact distal pulses  Pulmonary/Chest: Effort normal  No respiratory distress  Neurological: She is alert and oriented to person, place, and time  Skin: Skin is warm and dry     Psychiatric: She has a normal mood and affect   Her behavior is normal  Patient is a 63y old  Female who presents with a chief complaint of Legs edema, R/O Cardiac Tamponade, (19 Sep 2019 16:24)      HPI:  62 y/o Female with , PMHx of coronary stents 2018 on ASA now, Obesity , S/P recent lung surgery to remove Lung nodule ( 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 Sent in for formal echo and to r/o signs of tamponade. Of note pt noticed increasing B/L LE edema over last 2 weeks. Started on Lasix  which is new for her. No formal dx of CHF. No fever or cough. No SOB. Chronic back pain. C/O COLÓN, uses cane/walker to ambulate, Pt had a Venous Doppler of legs today done by cardiology, NO DVT as per pt, pt may need Chemo for Newly  DX of Lung Cancer , has an appointment with Oncology  as outpatient , no signs of tamponade of bedside POCUS, no hypotension, as per ER note tonight, I called CT Surgery consult tonight, Pt awake, A+O x 4, no distress ,  no cough, no N/V, No HA, no dysuria, no diarrhea, no abdominal pain, + Constipation, No N/V, + Rt sided CP at the site of Recent surgery, Pt S/P IV Lasix 40 mg x 1 given by ER, Percocet x 1 for pain, elevated BP in the ER, I started the pt on Metoprolol 25 mg PO BID from tonight,     Labs: Troponin HS: 35/44, Na 135, K+ 3.8, BUN 14, Creatinine 0.97, glucose 107, alk Phos 135, ALT 36, .4, WBC 10.99, Hgb 11.1, platelet 438, PT 11.9, INR 1.07, PTT 33.6,     Vitals: Tem 98.1, , RR 16, /74 , (18 Sep 2019 23:15)      PAST MEDICAL & SURGICAL HISTORY:  Stented coronary artery  Lung cancer  Edema of both legs  Obesity  Hip pain  Emphysema  Carotid artery disease: monitored by vascluar doctor Samuel  Pulmonary nodules: yearly CT scans  Hyperlipidemia  Hypertension  S/P lobectomy of lung  H/O: lung cancer  History of  section: x2      Review of Systems:   CONSTITUTIONAL: No fever, weight loss, or fatigue  EYES: No eye pain, visual disturbances, or discharge  ENMT:  No difficulty hearing, tinnitus, vertigo; No sinus or throat pain  NECK: No pain or stiffness  BREASTS: No pain, masses, or nipple discharge  RESPIRATORY: No cough, wheezing, chills or hemoptysis; No shortness of breath  CARDIOVASCULAR: No chest pain, palpitations, dizziness, or leg swelling  GASTROINTESTINAL: No abdominal or epigastric pain. No nausea, vomiting, or hematemesis; No diarrhea or constipation. No melena or hematochezia.  GENITOURINARY: No dysuria, frequency, hematuria, or incontinence  NEUROLOGICAL: No headaches, memory loss, loss of strength, numbness, or tremors  SKIN: No itching, burning, rashes, or lesions   LYMPH NODES: No enlarged glands  ENDOCRINE: No heat or cold intolerance; No hair loss  MUSCULOSKELETAL: No joint pain or swelling; No muscle, back, or extremity pain  PSYCHIATRIC: No depression, anxiety, mood swings, or difficulty sleeping  HEME/LYMPH: No easy bruising, or bleeding gums  ALLERY AND IMMUNOLOGIC: No hives or eczema    Allergies    penicillin (Short breath; Hives)  tetracycline (Short breath; Hives)    Intolerances        Social History:     FAMILY HISTORY:  Family history of CHF (congestive heart failure) (Aunt)      MEDICATIONS  (STANDING):  aspirin enteric coated 81 milliGRAM(s) Oral daily  atorvastatin 40 milliGRAM(s) Oral at bedtime  buDESOnide 160 MICROgram(s)/formoterol 4.5 MICROgram(s) Inhaler 2 Puff(s) Inhalation two times a day  docusate sodium 100 milliGRAM(s) Oral three times a day  furosemide   Injectable 40 milliGRAM(s) IV Push daily  heparin  Injectable 5000 Unit(s) SubCutaneous every 8 hours  influenza   Vaccine 0.5 milliLiter(s) IntraMuscular once  metoprolol tartrate 25 milliGRAM(s) Oral two times a day  senna 2 Tablet(s) Oral at bedtime    MEDICATIONS  (PRN):  oxyCODONE    5 mG/acetaminophen 325 mG 1 Tablet(s) Oral every 8 hours PRN Moderate Pain (4 - 6)  oxyCODONE    5 mG/acetaminophen 325 mG 2 Tablet(s) Oral every 8 hours PRN Severe Pain (7 - 10)        CAPILLARY BLOOD GLUCOSE        I&O's Summary      PHYSICAL EXAM:  Vital Signs Last 24 Hrs  T(C): 36.6 (19 Sep 2019 18:00), Max: 36.8 (19 Sep 2019 04:21)  T(F): 97.9 (19 Sep 2019 18:00), Max: 98.3 (19 Sep 2019 06:28)  HR: 90 (19 Sep 2019 18:00) (89 - 104)  BP: 127/64 (19 Sep 2019 18:00) (123/86 - 182/77)  BP(mean): --  RR: 18 (19 Sep 2019 18:00) (16 - 18)  SpO2: 99% (19 Sep 2019 18:00) (98% - 100%)    GENERAL: NAD, well-developed  HEAD:  Atraumatic, Normocephalic  EYES: EOMI, PERRLA, conjunctiva and sclera clear  NECK: Supple, No JVD  CHEST/LUNG: Clear to auscultation bilaterally; No wheeze  HEART: Regular rate and rhythm; No murmurs, rubs, or gallops  ABDOMEN: Soft, Nontender, Nondistended; Bowel sounds present  EXTREMITIES:  2+ Peripheral Pulses, No clubbing, cyanosis, or edema  PSYCH: AAOx3  NEUROLOGY: non-focal  SKIN: No rashes or lesions    LABS:                        11.3   10.88 )-----------( 505      ( 19 Sep 2019 05:46 )             35.3         134<L>  |  93<L>  |  14  ----------------------------<  115<H>  3.9   |  24  |  1.06    Ca    9.7      19 Sep 2019 05:46  Phos  4.2       Mg     2.0         TPro  8.0  /  Alb  4.2  /  TBili  0.4  /  DBili  x   /  AST  20  /  ALT  38<H>  /  AlkPhos  143<H>      PT/INR - ( 18 Sep 2019 21:10 )   PT: 11.9 SEC;   INR: 1.07          PTT - ( 18 Sep 2019 21:10 )  PTT:33.6 SEC          RADIOLOGY & ADDITIONAL TESTS:    Imaging Personally Reviewed:    Consultant(s) Notes Reviewed:      Care Discussed with Consultants/Other Providers:

## 2019-09-19 NOTE — PHYSICAL THERAPY INITIAL EVALUATION ADULT - REHAB POTENTIAL, PT EVAL
none/Pt. not placed on skilled therapy services secondary to pt. performing at their baseline performance.

## 2019-09-20 LAB
ALBUMIN SERPL ELPH-MCNC: 3.9 G/DL — SIGNIFICANT CHANGE UP (ref 3.3–5)
ALP SERPL-CCNC: 133 U/L — HIGH (ref 40–120)
ALT FLD-CCNC: 36 U/L — HIGH (ref 4–33)
ANION GAP SERPL CALC-SCNC: 15 MMO/L — HIGH (ref 7–14)
APPEARANCE UR: CLEAR — SIGNIFICANT CHANGE UP
AST SERPL-CCNC: 23 U/L — SIGNIFICANT CHANGE UP (ref 4–32)
BACTERIA # UR AUTO: NEGATIVE — SIGNIFICANT CHANGE UP
BILIRUB SERPL-MCNC: 0.4 MG/DL — SIGNIFICANT CHANGE UP (ref 0.2–1.2)
BILIRUB UR-MCNC: NEGATIVE — SIGNIFICANT CHANGE UP
BLOOD UR QL VISUAL: NEGATIVE — SIGNIFICANT CHANGE UP
BUN SERPL-MCNC: 21 MG/DL — SIGNIFICANT CHANGE UP (ref 7–23)
CALCIUM SERPL-MCNC: 9.4 MG/DL — SIGNIFICANT CHANGE UP (ref 8.4–10.5)
CHLORIDE SERPL-SCNC: 94 MMOL/L — LOW (ref 98–107)
CK SERPL-CCNC: 44 U/L — SIGNIFICANT CHANGE UP (ref 25–170)
CO2 SERPL-SCNC: 26 MMOL/L — SIGNIFICANT CHANGE UP (ref 22–31)
COLOR SPEC: YELLOW — SIGNIFICANT CHANGE UP
CREAT SERPL-MCNC: 1.08 MG/DL — SIGNIFICANT CHANGE UP (ref 0.5–1.3)
GLUCOSE SERPL-MCNC: 122 MG/DL — HIGH (ref 70–99)
GLUCOSE UR-MCNC: NEGATIVE — SIGNIFICANT CHANGE UP
HYALINE CASTS # UR AUTO: NEGATIVE — SIGNIFICANT CHANGE UP
KETONES UR-MCNC: NEGATIVE — SIGNIFICANT CHANGE UP
LEUKOCYTE ESTERASE UR-ACNC: SIGNIFICANT CHANGE UP
MAGNESIUM SERPL-MCNC: 2.1 MG/DL — SIGNIFICANT CHANGE UP (ref 1.6–2.6)
NITRITE UR-MCNC: NEGATIVE — SIGNIFICANT CHANGE UP
PH UR: 6 — SIGNIFICANT CHANGE UP (ref 5–8)
PHOSPHATE SERPL-MCNC: 4.4 MG/DL — SIGNIFICANT CHANGE UP (ref 2.5–4.5)
POTASSIUM SERPL-MCNC: 3.8 MMOL/L — SIGNIFICANT CHANGE UP (ref 3.5–5.3)
POTASSIUM SERPL-SCNC: 3.8 MMOL/L — SIGNIFICANT CHANGE UP (ref 3.5–5.3)
PROT SERPL-MCNC: 7.5 G/DL — SIGNIFICANT CHANGE UP (ref 6–8.3)
PROT UR-MCNC: 10 — SIGNIFICANT CHANGE UP
RBC CASTS # UR COMP ASSIST: SIGNIFICANT CHANGE UP (ref 0–?)
SODIUM SERPL-SCNC: 135 MMOL/L — SIGNIFICANT CHANGE UP (ref 135–145)
SP GR SPEC: 1.02 — SIGNIFICANT CHANGE UP (ref 1–1.04)
SQUAMOUS # UR AUTO: SIGNIFICANT CHANGE UP
UROBILINOGEN FLD QL: NORMAL — SIGNIFICANT CHANGE UP
WBC UR QL: HIGH (ref 0–?)

## 2019-09-20 PROCEDURE — 93970 EXTREMITY STUDY: CPT | Mod: 26

## 2019-09-20 RX ORDER — FUROSEMIDE 40 MG
40 TABLET ORAL DAILY
Refills: 0 | Status: DISCONTINUED | OUTPATIENT
Start: 2019-09-21 | End: 2019-09-21

## 2019-09-20 RX ADMIN — Medication 100 MILLIGRAM(S): at 13:25

## 2019-09-20 RX ADMIN — ATORVASTATIN CALCIUM 40 MILLIGRAM(S): 80 TABLET, FILM COATED ORAL at 21:45

## 2019-09-20 RX ADMIN — Medication 40 MILLIGRAM(S): at 05:05

## 2019-09-20 RX ADMIN — HEPARIN SODIUM 5000 UNIT(S): 5000 INJECTION INTRAVENOUS; SUBCUTANEOUS at 05:04

## 2019-09-20 RX ADMIN — OXYCODONE AND ACETAMINOPHEN 1 TABLET(S): 5; 325 TABLET ORAL at 06:20

## 2019-09-20 RX ADMIN — HEPARIN SODIUM 5000 UNIT(S): 5000 INJECTION INTRAVENOUS; SUBCUTANEOUS at 13:26

## 2019-09-20 RX ADMIN — Medication 100 MILLIGRAM(S): at 21:45

## 2019-09-20 RX ADMIN — BUDESONIDE AND FORMOTEROL FUMARATE DIHYDRATE 2 PUFF(S): 160; 4.5 AEROSOL RESPIRATORY (INHALATION) at 22:23

## 2019-09-20 RX ADMIN — Medication 81 MILLIGRAM(S): at 12:01

## 2019-09-20 RX ADMIN — Medication 25 MILLIGRAM(S): at 19:44

## 2019-09-20 RX ADMIN — OXYCODONE AND ACETAMINOPHEN 1 TABLET(S): 5; 325 TABLET ORAL at 14:25

## 2019-09-20 RX ADMIN — OXYCODONE AND ACETAMINOPHEN 1 TABLET(S): 5; 325 TABLET ORAL at 05:20

## 2019-09-20 RX ADMIN — SENNA PLUS 2 TABLET(S): 8.6 TABLET ORAL at 21:45

## 2019-09-20 RX ADMIN — Medication 25 MILLIGRAM(S): at 05:05

## 2019-09-20 RX ADMIN — BUDESONIDE AND FORMOTEROL FUMARATE DIHYDRATE 2 PUFF(S): 160; 4.5 AEROSOL RESPIRATORY (INHALATION) at 08:00

## 2019-09-20 RX ADMIN — OXYCODONE AND ACETAMINOPHEN 2 TABLET(S): 5; 325 TABLET ORAL at 21:00

## 2019-09-20 RX ADMIN — OXYCODONE AND ACETAMINOPHEN 1 TABLET(S): 5; 325 TABLET ORAL at 13:25

## 2019-09-20 RX ADMIN — OXYCODONE AND ACETAMINOPHEN 2 TABLET(S): 5; 325 TABLET ORAL at 20:03

## 2019-09-20 RX ADMIN — Medication 100 MILLIGRAM(S): at 05:05

## 2019-09-20 NOTE — CONSULT NOTE ADULT - REASON FOR ADMISSION
Legs edema, R/O Cardiac Tamponade,

## 2019-09-20 NOTE — CONSULT NOTE ADULT - CONSULT REQUESTED DATE/TIME
19-Sep-2019 02:00
19-Sep-2019 13:57
19-Sep-2019 16:24
19-Sep-2019 16:30
19-Sep-2019
20-Sep-2019 13:20

## 2019-09-20 NOTE — PROGRESS NOTE ADULT - SUBJECTIVE AND OBJECTIVE BOX
Patient denies chest pain or shortness of breath.   Review of systems otherwise (-)  	  MEDICATIONS  (STANDING):  aspirin enteric coated 81 milliGRAM(s) Oral daily  atorvastatin 40 milliGRAM(s) Oral at bedtime  buDESOnide 160 MICROgram(s)/formoterol 4.5 MICROgram(s) Inhaler 2 Puff(s) Inhalation two times a day  docusate sodium 100 milliGRAM(s) Oral three times a day  furosemide   Injectable 40 milliGRAM(s) IV Push daily  heparin  Injectable 5000 Unit(s) SubCutaneous every 8 hours  influenza   Vaccine 0.5 milliLiter(s) IntraMuscular once  metoprolol tartrate 25 milliGRAM(s) Oral two times a day  senna 2 Tablet(s) Oral at bedtime    MEDICATIONS  (PRN):  oxyCODONE    5 mG/acetaminophen 325 mG 1 Tablet(s) Oral every 8 hours PRN Moderate Pain (4 - 6)  oxyCODONE    5 mG/acetaminophen 325 mG 2 Tablet(s) Oral every 8 hours PRN Severe Pain (7 - 10)        LABS:	 	                          11.3   10.88 )-----------( 505      ( 19 Sep 2019 05:46 )             35.3     Hemoglobin: 11.3 g/dL (09-19 @ 05:46)  Hemoglobin: 11.1 g/dL (09-18 @ 21:10)    09-20    135  |  94<L>  |  21  ----------------------------<  122<H>  3.8   |  26  |  1.08    Ca    9.4      20 Sep 2019 07:10  Phos  4.4     09-20  Mg     2.1     09-20    TPro  7.5  /  Alb  3.9  /  TBili  0.4  /  DBili  x   /  AST  23  /  ALT  36<H>  /  AlkPhos  133<H>  09-20    Creatinine Trend: 1.08<--, 1.06<--, 0.97<--, 0.88<--, 0.85<--, 1.06<--  COAGS:       proBNP:   Lipid Profile:   HgA1c:   TSH:     PHYSICAL EXAM:  T(C): 36.7 (09-20-19 @ 09:00), Max: 36.7 (09-20-19 @ 05:02)  HR: 86 (09-20-19 @ 09:00) (86 - 91)  BP: 130/80 (09-20-19 @ 09:00) (127/64 - 141/63)  RR: 18 (09-20-19 @ 09:00) (18 - 18)  SpO2: 100% (09-20-19 @ 09:00) (99% - 100%)  Wt(kg): --  I&O's Summary    19 Sep 2019 07:01  -  20 Sep 2019 07:00  --------------------------------------------------------  IN: 480 mL / OUT: 800 mL / NET: -320 mL      Height (cm): 160 (09-19 @ 21:02)    Gen: Appears well in NAD  HEENT:  (-)icterus (-)pallor  CV: N S1 S2 1/6 MIKO (+)2 Pulses B/l  Resp:  Clear to auscultation B/L, normal effort  GI: (+) BS Soft, NT, ND  Lymph:  (+) B/L LE pitting Edema, (-)obvious lymphadenopathy  Skin: Warm to touch, Normal turgor  Psych: Appropriate mood and affect      TELEMETRY: NSR 80s-90, PVC	      EKG: SR, vrate 93bpm, LAE, WY 170ms, UJK32pt, QTC 457ms    < from: Transthoracic Echocardiogram (09.19.19 @ 10:37) >  CONCLUSIONS:  1. Mitral valve not well visualized, probably normal.  2. Hyperdynamic left ventricle.  3. The right ventricle is not well visualized; grossly  normal right ventricular systolic function.  4. Normal pericardium with no pericardial effusion.  5. Right pleural effusion.  6. Pericardial fat pad seen.  ------------------------------------------------------------------------  Confirmed on  9/19/2019 - 13:28:53 by Badewattie Lin,  M.D. [facility]    < end of copied text >      ASSESSMENT/PLAN: 62 yo female, hx CAD, PCI 1/2018, on [facility], former 38-pack year smoker with history of emphysema, HTN, HLD, with chronic pulmonary nodules s/p elective R Vats, converted to thoracotomy, RUL lobectomy, repair of Pulm artery on 9/3/19, HTN, HLD, emphysema, admitted with increasing LE edema and pericardial effusion found on office echo.    --TTE inpatient with Hyperdynamic LV, no pericardial effusion  --LE dopplers negative for DVT  --TSH WNL  --Continue medical management of CAD - ASA/Statin/BB  --keep net negative on Lasix - transition to PO lasix  --Pulm/Heme consults appreciated  --No further inpatient cardiac w/u needed at this time  --Patient to Follow up with Dr. Porras on 10/01/19 at 3pm    Tyson Blanco PA-C  [facility]  Pager: [phone]

## 2019-09-20 NOTE — CONSULT NOTE ADULT - PROBLEM SELECTOR PROBLEM 3
Coronary artery disease involving native coronary artery of native heart without angina pectoris
Stented coronary artery

## 2019-09-20 NOTE — CONSULT NOTE ADULT - ASSESSMENT
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 Sent in for formal echo and to r/o signs of tamponade. Of note pt noticed increasing B/L LE edema over last 2 weeks. Started on Lasix  which is new for her. No formal dx of CHF. No fever or cough. No SOB. Chronic back pain. C/O COLÓN, uses cane/walker to ambulate, Pt had a Venous Doppler of legs today done by cardiology, NO DVT as per pt, pt may need Chemo for Newly  DX of Lung Cancer , has an appointment with Oncology  as outpatient , no signs of tamponade of bedside POCUS, no hypotension, as per ER note tonight, I called CT Surgery consult tonight, Pt awake, A+O x 4, no distress ,  no cough, no N/V, No HA, no dysuria, no diarrhea, no abdominal pain, + Constipation, No N/V, + Rt sided CP at the site of Recent surgery, Pt S/P IV Lasix 40 mg x 1 given by ER, Percocet x 1 for pain, elevated BP in the ER, I started the pt on Metoprolol 25 mg PO BID from tonight,
64 y/o Female with , PMHx of coronary stents Jan 2018 on ASA now, Obesity , S/P R Vats, converted to thoracotomy, RUL lobectomy, repair of Pulm artery,  recently started on Lasix for Legs edema post surgery ,   HTN, HLD, emphysema sent from cardiology office after routine visit and bedside echo showed pericardial effusion.     Plan:  Echo   Medical management   Dr. Clement aware of above   Continue to follow
63F with T2N1 adenocarcinoma of the lung, s/p resection, here with leg swelling and is being worked up by cardiology, clinically doing better, will recommend:  - continue current Rx  - venous dopplers of the legs  - incentive spirometry  - the role of adjuvant chemo for lung cancer discussed and questions answered, will recommend it once pt is stable  - DVT prophylaxis  - all other supportive Rx  - to f/u as out pt after discharge

## 2019-09-20 NOTE — PROGRESS NOTE ADULT - SUBJECTIVE AND OBJECTIVE BOX
Patient is a 63y old  Female who presents with a chief complaint of Legs edema, R/O Cardiac Tamponade, (19 Sep 2019 16:24)      Edema in legs + No SOB    Labs: Troponin HS: 35/44, Na 135, K+ 3.8, BUN 14, Creatinine 0.97, glucose 107, alk Phos 135, ALT 36, .4, WBC 10.99, Hgb 11.1, platelet 438, PT 11.9, INR 1.07, PTT 33.6,     Vitals: Tem 98.1, , RR 16, /74 , (18 Sep 2019 23:15)      PAST MEDICAL & SURGICAL HISTORY:  Stented coronary artery  Lung cancer  Edema of both legs  Obesity  Hip pain  Emphysema  Carotid artery disease: monitored by vascluar doctor Samuel  Pulmonary nodules: yearly CT scans  Hyperlipidemia  Hypertension  S/P lobectomy of lung  H/O: lung cancer  History of  section: x2      Review of Systems:   CONSTITUTIONAL: No fever, weight loss, or fatigue  EYES: No eye pain, visual disturbances, or discharge  ENMT:  No difficulty hearing, tinnitus, vertigo; No sinus or throat pain  NECK: No pain or stiffness  BREASTS: No pain, masses, or nipple discharge  RESPIRATORY: No cough, wheezing, chills or hemoptysis; No shortness of breath  CARDIOVASCULAR: No chest pain, palpitations, dizziness, or leg swelling  GASTROINTESTINAL: No abdominal or epigastric pain. No nausea, vomiting, or hematemesis; No diarrhea or constipation. No melena or hematochezia.  GENITOURINARY: No dysuria, frequency, hematuria, or incontinence  NEUROLOGICAL: No headaches, memory loss, loss of strength, numbness, or tremors  SKIN: No itching, burning, rashes, or lesions   LYMPH NODES: No enlarged glands  ENDOCRINE: No heat or cold intolerance; No hair loss  MUSCULOSKELETAL: No joint pain or swelling; No muscle, back, or extremity pain  PSYCHIATRIC: No depression, anxiety, mood swings, or difficulty sleeping  HEME/LYMPH: No easy bruising, or bleeding gums  ALLERY AND IMMUNOLOGIC: No hives or eczema    Allergies    penicillin (Short breath; Hives)  tetracycline (Short breath; Hives)    Intolerances        Social History:     FAMILY HISTORY:  Family history of CHF (congestive heart failure) (Aunt)      MEDICATIONS  (STANDING):  aspirin enteric coated 81 milliGRAM(s) Oral daily  atorvastatin 40 milliGRAM(s) Oral at bedtime  buDESOnide 160 MICROgram(s)/formoterol 4.5 MICROgram(s) Inhaler 2 Puff(s) Inhalation two times a day  docusate sodium 100 milliGRAM(s) Oral three times a day  furosemide   Injectable 40 milliGRAM(s) IV Push daily  heparin  Injectable 5000 Unit(s) SubCutaneous every 8 hours  influenza   Vaccine 0.5 milliLiter(s) IntraMuscular once  metoprolol tartrate 25 milliGRAM(s) Oral two times a day  senna 2 Tablet(s) Oral at bedtime    MEDICATIONS  (PRN):  oxyCODONE    5 mG/acetaminophen 325 mG 1 Tablet(s) Oral every 8 hours PRN Moderate Pain (4 - 6)  oxyCODONE    5 mG/acetaminophen 325 mG 2 Tablet(s) Oral every 8 hours PRN Severe Pain (7 - 10)        CAPILLARY BLOOD GLUCOSE        I&O's Summary      PHYSICAL EXAM:  Vital Signs Last 24 Hrs  T(C): 36.6 (19 Sep 2019 18:00), Max: 36.8 (19 Sep 2019 04:21)  T(F): 97.9 (19 Sep 2019 18:00), Max: 98.3 (19 Sep 2019 06:28)  HR: 90 (19 Sep 2019 18:00) (89 - 104)  BP: 127/64 (19 Sep 2019 18:00) (123/86 - 182/77)  BP(mean): --  RR: 18 (19 Sep 2019 18:00) (16 - 18)  SpO2: 99% (19 Sep 2019 18:00) (98% - 100%)    GENERAL: NAD, well-developed  HEAD:  Atraumatic, Normocephalic  EYES: EOMI, PERRLA, conjunctiva and sclera clear  NECK: Supple, No JVD  CHEST/LUNG: Clear to auscultation bilaterally; No wheeze  HEART: Regular rate and rhythm; No murmurs, rubs, or gallops  ABDOMEN: Soft, Nontender, Nondistended; Bowel sounds present  EXTREMITIES:  2+ Peripheral Pulses, No clubbing, cyanosis, or edema  PSYCH: AAOx3  NEUROLOGY: non-focal  SKIN: No rashes or lesions    LABS:                        11.3   10.88 )-----------( 505      ( 19 Sep 2019 05:46 )             35.3         134<L>  |  93<L>  |  14  ----------------------------<  115<H>  3.9   |  24  |  1.06    Ca    9.7      19 Sep 2019 05:46  Phos  4.2       Mg     2.0         TPro  8.0  /  Alb  4.2  /  TBili  0.4  /  DBili  x   /  AST  20  /  ALT  38<H>  /  AlkPhos  143<H>      PT/INR - ( 18 Sep 2019 21:10 )   PT: 11.9 SEC;   INR: 1.07          PTT - ( 18 Sep 2019 21:10 )  PTT:33.6 SEC          RADIOLOGY & ADDITIONAL TESTS:    Imaging Personally Reviewed:    Consultant(s) Notes Reviewed:      Care Discussed with Consultants/Other Providers:

## 2019-09-20 NOTE — CONSULT NOTE ADULT - SUBJECTIVE AND OBJECTIVE BOX
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 Sent in for formal echo and to r/o signs of tamponade. Of note pt noticed increasing B/L LE edema over last 2 weeks. Started on Lasix  which is new for her. No formal dx of CHF. No fever or cough. No SOB. Chronic back pain. C/O COLÓN, uses cane/walker to ambulate, Pt had a Venous Doppler of legs today done by cardiology, NO DVT as per pt, pt may need Chemo for Newly  DX of Lung Cancer , has an appointment with Oncology  as outpatient , no signs of tamponade of bedside POCUS, no hypotension, as per ER note tonight, I called CT Surgery consult tonight, Pt awake, A+O x 4, no distress ,  no cough, no N/V, No HA, no dysuria, no diarrhea, no abdominal pain, + Constipation, No N/V, + Rt sided CP at the site of Recent surgery, Pt S/P IV Lasix 40 mg x 1 given by ER, Percocet x 1 for pain, elevated BP in the ER, I started the pt on Metoprolol 25 mg PO BID from tonight,     Labs: Troponin HS: 35/44, Na 135, K+ 3.8, BUN 14, Creatinine 0.97, glucose 107, alk Phos 135, ALT 36, .4, WBC 10.99, Hgb 11.1, platelet 438, PT 11.9, INR 1.07, PTT 33.6,     Vitals: Tem 98.1, , RR 16, /74 , (18 Sep 2019 23:15)    she recently had lung surgery for lung caxcner: now coming here for pericardial effusion: she isnot SOB and has no infection      ?FOLLOWING PRESENT  [ x] Hx of PE/DVT, [y ] Hx COPD, [ x] Hx of Asthma, [y ] Hx of Hospitalization, [x ]  Hx of BiPAP/CPAP use, [x ] Hx of DOMINIC    Allergies    penicillin (Short breath; Hives)  tetracycline (Short breath; Hives)    Intolerances        PAST MEDICAL & SURGICAL HISTORY:  Stented coronary artery  Lung cancer  Edema of both legs  Obesity  Hip pain  Emphysema  Carotid artery disease: monitored by vascluar doctor Doselena  Pulmonary nodules: yearly CT scans  Hyperlipidemia  Hypertension  S/P lobectomy of lung  H/O: lung cancer  History of  section: x2      FAMILY HISTORY:  Family history of CHF (congestive heart failure) (Aunt)      Social History: [e smoker  ] TOBACCO                  [ x ] ETOH                                 [ x ] IVDA/DRUGS    REVIEW OF SYSTEMS      General:	x    Skin/Breast:x  	  Ophthalmologic:x  	  ENMT:	x    Respiratory and Thorax:x  	  Cardiovascular:	x    Gastrointestinal:	x    Genitourinary:	x    Musculoskeletal:	 leg swelling    Neurological:	x    Psychiatric:	x    Hematology/Lymphatics:	x    Endocrine:	x    Allergic/Immunologic:	x    MEDICATIONS  (STANDING):  aspirin enteric coated 81 milliGRAM(s) Oral daily  atorvastatin 40 milliGRAM(s) Oral at bedtime  buDESOnide 160 MICROgram(s)/formoterol 4.5 MICROgram(s) Inhaler 2 Puff(s) Inhalation two times a day  docusate sodium 100 milliGRAM(s) Oral three times a day  furosemide   Injectable 40 milliGRAM(s) IV Push daily  heparin  Injectable 5000 Unit(s) SubCutaneous every 8 hours  influenza   Vaccine 0.5 milliLiter(s) IntraMuscular once  metoprolol tartrate 25 milliGRAM(s) Oral two times a day  senna 2 Tablet(s) Oral at bedtime    MEDICATIONS  (PRN):  oxyCODONE    5 mG/acetaminophen 325 mG 1 Tablet(s) Oral every 8 hours PRN Moderate Pain (4 - 6)  oxyCODONE    5 mG/acetaminophen 325 mG 2 Tablet(s) Oral every 8 hours PRN Severe Pain (7 - 10)       Vital Signs Last 24 Hrs  T(C): 36.7 (20 Sep 2019 09:00), Max: 36.7 (20 Sep 2019 05:02)  T(F): 98.1 (20 Sep 2019 09:00), Max: 98.1 (20 Sep 2019 09:00)  HR: 86 (20 Sep 2019 09:00) (86 - 91)  BP: 130/80 (20 Sep 2019 09:00) (127/64 - 141/63)  BP(mean): --  RR: 18 (20 Sep 2019 09:00) (18 - 18)  SpO2: 100% (20 Sep 2019 09:00) (99% - 100%)        I&O's Summary    19 Sep 2019 07:01  -  20 Sep 2019 07:00  --------------------------------------------------------  IN: 480 mL / OUT: 800 mL / NET: -320 mL        Physical Exam:   GENERAL: NAD, well-groomed, well-developed  HEENT: BAYLEE/   Atraumatic, Normocephalic  ENMT: No tonsillar erythema, exudates, or enlargement; Moist mucous membranes, Good dentition, No lesions  NECK: Supple, No JVD, Normal thyroid  CHEST/LUNG: Clear to auscultation bilaterally; No rales, rhonchi, wheezing, or rubs  CVS: Regular rate and rhythm; No murmurs, rubs, or gallops  GI: : Soft, Nontender, Nondistended; Bowel sounds present  NERVOUS SYSTEM:  Alert & Oriented X3  EXTREMITIES:  2+ leg   edema  LYMPH: No lymphadenopathy noted  SKIN: No rashes or lesions  ENDOCRINOLOGY: No Thyromegaly  PSYCH: Appropriate    Labs:    CARDIAC MARKERS ( 20 Sep 2019 07:10 )  x     / x     / 44 u/L / x     / x                                11.3   10.88 )-----------( 505      ( 19 Sep 2019 05:46 )             35.3                         11.1   10.99 )-----------( 438      ( 18 Sep 2019 21:10 )             34.5     09-20    135  |  94<L>  |  21  ----------------------------<  122<H>  3.8   |  26  |  1.08  09-19    134<L>  |  93<L>  |  14  ----------------------------<  115<H>  3.9   |  24  |  1.06      135  |  94<L>  |  14  ----------------------------<  107<H>  3.8   |  27  |  0.97    Ca    9.4      20 Sep 2019 07:10  Ca    9.7      19 Sep 2019 05:46  Ca    9.5      18 Sep 2019 21:10  Phos  4.4       Phos  4.2       Mg     2.1       Mg     2.0         TPro  7.5  /  Alb  3.9  /  TBili  0.4  /  DBili  x   /  AST  23  /  ALT  36<H>  /  AlkPhos  133<H>    TPro  8.0  /  Alb  4.2  /  TBili  0.4  /  DBili  x   /  AST  20  /  ALT  38<H>  /  AlkPhos  143<H>    TPro  7.8  /  Alb  4.0  /  TBili  0.4  /  DBili  x   /  AST  21  /  ALT  36<H>  /  AlkPhos  135<H>      CAPILLARY BLOOD GLUCOSE        LIVER FUNCTIONS - ( 20 Sep 2019 07:10 )  Alb: 3.9 g/dL / Pro: 7.5 g/dL / ALK PHOS: 133 u/L / ALT: 36 u/L / AST: 23 u/L / GGT: x           PT/INR - ( 18 Sep 2019 21:10 )   PT: 11.9 SEC;   INR: 1.07          PTT - ( 18 Sep 2019 21:10 )  PTT:33.6 SEC    D DImer  Serum Pro-Brain Natriuretic Peptide: 837.6 pg/mL ( @ 05:46)  Serum Pro-Brain Natriuretic Peptide: 673.4 pg/mL ( @ 21:10)      Studies  Chest X-RAY  CT SCAN Chest   CT Abdomen  Venous Dopplers: LE:   Others      < from: US Duplex Venous Lower Ext Complete, Bilateral (19 @ 11:08) >    INTERPRETATION:  CLINICAL INFORMATION: Lower extremity swelling. Evaluate   for DVT.    COMPARISON: None available.    TECHNIQUE: Duplex sonography of the BILATERAL LOWER extremity veins with   color and spectral Doppler, with and without compression.      FINDINGS:    There is normal compressibility of the bilateral common femoral, femoral   and popliteal veins.     Doppler examination shows normal spontaneous and phasic flow.    No calf vein thrombosis is detected.    IMPRESSION:     No evidence of deep venous thrombosis in either lower extremity.      < end of copied text >      < from: Xray Chest 2 Views PA/Lat (19 @ 20:37) >  PROCEDURE DATE:  Sep 18 2019         INTERPRETATION:  CLINICAL INFORMATION: Bilateral lower extremity edema.   Pericardial effusion.    TECHNIQUE: Frontal and lateral radiographs of the chest dated 2019   8:37 PM.    COMPARISON: Chest radiograph 2019.    FINDINGS:  Right lung volume loss with postsurgical changes again noted. Coronary   artery stent.  No pleural effusions. No pneumothorax.  Cardiac size is within normal limits.     IMPRESSION: Clear lungs.    < end of copied text >

## 2019-09-20 NOTE — PROGRESS NOTE ADULT - SUBJECTIVE AND OBJECTIVE BOX
Subjective: no chest pain or sob; LE swelling improving; ROS otherwise negative   	  MEDICATIONS:  MEDICATIONS  (STANDING):  aspirin enteric coated 81 milliGRAM(s) Oral daily  atorvastatin 40 milliGRAM(s) Oral at bedtime  buDESOnide 160 MICROgram(s)/formoterol 4.5 MICROgram(s) Inhaler 2 Puff(s) Inhalation two times a day  docusate sodium 100 milliGRAM(s) Oral three times a day  furosemide   Injectable 40 milliGRAM(s) IV Push daily  heparin  Injectable 5000 Unit(s) SubCutaneous every 8 hours  influenza   Vaccine 0.5 milliLiter(s) IntraMuscular once  metoprolol tartrate 25 milliGRAM(s) Oral two times a day  senna 2 Tablet(s) Oral at bedtime      LABS:	 	    CARDIAC MARKERS:  CARDIAC MARKERS ( 20 Sep 2019 07:10 )  x     / x     / 44 u/L / x     / x                                    11.3   10.88 )-----------( 505      ( 19 Sep 2019 05:46 )             35.3     09-20    135  |  94<L>  |  21  ----------------------------<  122<H>  3.8   |  26  |  1.08    Ca    9.4      20 Sep 2019 07:10  Phos  4.4     09-20  Mg     2.1     09-20    TPro  7.5  /  Alb  3.9  /  TBili  0.4  /  DBili  x   /  AST  23  /  ALT  36<H>  /  AlkPhos  133<H>  09-20    proBNP:   Lipid Profile:   HgA1c:   TSH:       PHYSICAL EXAM:  T(C): 36.7 (09-20-19 @ 09:00), Max: 36.7 (09-20-19 @ 05:02)  HR: 86 (09-20-19 @ 09:00) (86 - 91)  BP: 130/80 (09-20-19 @ 09:00) (127/64 - 141/63)  RR: 18 (09-20-19 @ 09:00) (18 - 18)  SpO2: 100% (09-20-19 @ 09:00) (99% - 100%)  Wt(kg): --  I&O's Summary    19 Sep 2019 07:01  -  20 Sep 2019 07:00  --------------------------------------------------------  IN: 480 mL / OUT: 800 mL / NET: -320 mL      Height (cm): 160 (09-19 @ 21:02)    Appearance: Normal	  HEENT:   Normal oral mucosa, PERRL, EOMI	  Lymphatic: No lymphadenopathy , + edema in LE bilaterally   Cardiovascular: Normal S1 S2, No JVD, No murmurs , Peripheral pulses palpable 2+ bilaterally  Respiratory: decreased BS at right base 	  Gastrointestinal:  Soft, Non-tender, + BS	  Skin: No rashes, No ecchymoses, No cyanosis, warm to touch  Musculoskeletal: Normal range of motion, normal strength  Psychiatry:  Mood & affect appropriate    TELEMETRY: SR, couplets 	    ECG: < from: 12 Lead ECG (09.18.19 @ 18:51) >  Diagnosis Line Normal sinus rhythm  Possible Left atrial enlargement  Possible Inferior infarct , age undetermined  Abnormal ECG    < end of copied text >   	  RADIOLOGY:   DIAGNOSTIC TESTING:  [ ] Echocardiogram: < from: Transthoracic Echocardiogram (09.19.19 @ 10:37) >  CONCLUSIONS:  1. Mitral valve not well visualized, probably normal.  2. Hyperdynamic left ventricle.  3. The right ventricle is not well visualized; grossly  normal right ventricular systolic function.  4. Normal pericardium with no pericardial effusion.  5. Right pleural effusion.  6. Pericardial fat pad seen.    < end of copied text >    [ ]  Catheterization:  [ ] Stress Test:    OTHER: 	      ASSESSMENT/PLAN:  62 yo F with history of CAD s/p ANETTE to proximal RCA in Jan of 2018, lung adenocarcinoma s/p VATS/RUL lobectomy, HTN, HLD who is being seen for management of CAD.    -pt. with no chest pain or anginal symptoms currently  -troponin indeterminate with negative CPK inconsistent with acs  -no urgent cath needed at this time  -would continue with medical management of cad - would continue with asa 81 for history of ANETTE > 1 year ago  -continue with beta blockers and statin  -IV lasix to keep O > I - volume status improving  -LE dopplers negative for DVT  -No further interventional workup needed at this time  -Pulm eval with Dr. Joshi for pleural effusion (seen on TTE)  -Follow up thoracic  -DC home when euvolemic and ok with consultants  -Follow up with Dr. Porras on 10/01/19 at 3pm at [address] [phone]    Tip Hernandez MD Subjective: no chest pain or sob; LE swelling improving; ROS otherwise negative   	  MEDICATIONS:  MEDICATIONS  (STANDING):  aspirin enteric coated 81 milliGRAM(s) Oral daily  atorvastatin 40 milliGRAM(s) Oral at bedtime  buDESOnide 160 MICROgram(s)/formoterol 4.5 MICROgram(s) Inhaler 2 Puff(s) Inhalation two times a day  docusate sodium 100 milliGRAM(s) Oral three times a day  furosemide   Injectable 40 milliGRAM(s) IV Push daily  heparin  Injectable 5000 Unit(s) SubCutaneous every 8 hours  influenza   Vaccine 0.5 milliLiter(s) IntraMuscular once  metoprolol tartrate 25 milliGRAM(s) Oral two times a day  senna 2 Tablet(s) Oral at bedtime      LABS:	 	    CARDIAC MARKERS:  CARDIAC MARKERS ( 20 Sep 2019 07:10 )  x     / x     / 44 u/L / x     / x                                    11.3   10.88 )-----------( 505      ( 19 Sep 2019 05:46 )             35.3     09-20    135  |  94<L>  |  21  ----------------------------<  122<H>  3.8   |  26  |  1.08    Ca    9.4      20 Sep 2019 07:10  Phos  4.4     09-20  Mg     2.1     09-20    TPro  7.5  /  Alb  3.9  /  TBili  0.4  /  DBili  x   /  AST  23  /  ALT  36<H>  /  AlkPhos  133<H>  09-20    proBNP:   Lipid Profile:   HgA1c:   TSH:       PHYSICAL EXAM:  T(C): 36.7 (09-20-19 @ 09:00), Max: 36.7 (09-20-19 @ 05:02)  HR: 86 (09-20-19 @ 09:00) (86 - 91)  BP: 130/80 (09-20-19 @ 09:00) (127/64 - 141/63)  RR: 18 (09-20-19 @ 09:00) (18 - 18)  SpO2: 100% (09-20-19 @ 09:00) (99% - 100%)  Wt(kg): --  I&O's Summary    19 Sep 2019 07:01  -  20 Sep 2019 07:00  --------------------------------------------------------  IN: 480 mL / OUT: 800 mL / NET: -320 mL      Height (cm): 160 (09-19 @ 21:02)    Appearance: Normal	  HEENT:   Normal oral mucosa, PERRL, EOMI	  Lymphatic: No lymphadenopathy , + trace edema in LE bilaterally   Cardiovascular: Normal S1 S2, No JVD, No murmurs , Peripheral pulses palpable 2+ bilaterally  Respiratory: slightly decreased BS at right base 	  Gastrointestinal:  Soft, Non-tender, + BS	  Skin: No rashes, No ecchymoses, No cyanosis, warm to touch  Musculoskeletal: Normal range of motion, normal strength  Psychiatry:  Mood & affect appropriate    TELEMETRY: SR, couplets 	    ECG: < from: 12 Lead ECG (09.18.19 @ 18:51) >  Diagnosis Line Normal sinus rhythm  Possible Left atrial enlargement  Possible Inferior infarct , age undetermined  Abnormal ECG    < end of copied text >   	  RADIOLOGY:   DIAGNOSTIC TESTING:  [ ] Echocardiogram: < from: Transthoracic Echocardiogram (09.19.19 @ 10:37) >  CONCLUSIONS:  1. Mitral valve not well visualized, probably normal.  2. Hyperdynamic left ventricle.  3. The right ventricle is not well visualized; grossly  normal right ventricular systolic function.  4. Normal pericardium with no pericardial effusion.  5. Right pleural effusion.  6. Pericardial fat pad seen.    < end of copied text >    [ ]  Catheterization:  [ ] Stress Test:    OTHER: 	      ASSESSMENT/PLAN:  62 yo F with history of CAD s/p ANETTE to proximal RCA in Jan of 2018, lung adenocarcinoma s/p VATS/RUL lobectomy, HTN, HLD who is being seen for management of CAD.    -pt. with no chest pain or anginal symptoms currently  -troponin indeterminate with negative CPK inconsistent with acs  -no urgent cath needed at this time  -would continue with medical management of cad - would continue with asa 81 for history of ANETTE > 1 year ago  -continue with beta blockers and statin  -volume status has improved - can transition to oral lasix   -LE dopplers negative for DVT  -No further interventional workup needed at this time  -Pulm eval with Dr. Joshi for pleural effusion (seen on TTE)  -Follow up thoracic  -DC home if ok with consultants  -Follow up with Dr. Porras on 10/01/19 at 3pm at [address] [facility] [phone]    Tip Hernandez MD

## 2019-09-20 NOTE — CONSULT NOTE ADULT - SUBJECTIVE AND OBJECTIVE BOX
Patient is a 63y old  Female who presents with a chief complaint of Legs edema, R/O Cardiac Tamponade, (19 Sep 2019 19:30), s/p surgery on the lung for cancer, has h/o pulm nodules for a number of years and was being followed up, recent growth led to removal of the tumor. Pt is feeling better and is here with leg swelling. No cough, SOB and no weight loss, rest of the detailed ROS unremarkable.       PAST MEDICAL & SURGICAL HISTORY:  Stented coronary artery  Lung cancer  Edema of both legs  Obesity  Hip pain  Emphysema  Carotid artery disease: monitored by vascluar doctor Samuel  Pulmonary nodules: yearly CT scans  Hyperlipidemia  Hypertension  S/P lobectomy of lung  H/O: lung cancer  History of  section: x2      Meds:    aspirin enteric coated 81 milliGRAM(s) Oral daily  atorvastatin 40 milliGRAM(s) Oral at bedtime  buDESOnide 160 MICROgram(s)/formoterol 4.5 MICROgram(s) Inhaler 2 Puff(s) Inhalation two times a day  docusate sodium 100 milliGRAM(s) Oral three times a day  furosemide   Injectable 40 milliGRAM(s) IV Push daily  heparin  Injectable 5000 Unit(s) SubCutaneous every 8 hours  influenza   Vaccine 0.5 milliLiter(s) IntraMuscular once  metoprolol tartrate 25 milliGRAM(s) Oral two times a day  oxyCODONE    5 mG/acetaminophen 325 mG 1 Tablet(s) Oral every 8 hours PRN  oxyCODONE    5 mG/acetaminophen 325 mG 2 Tablet(s) Oral every 8 hours PRN  senna 2 Tablet(s) Oral at bedtime    Allergies:  penicillin (Short breath; Hives)  tetracycline (Short breath; Hives)      FAMILY HISTORY:  Family history of CHF (congestive heart failure) (Aunt)      Vital Signs Last 24 Hrs  T(C): 36.7 (20 Sep 2019 05:02), Max: 36.7 (19 Sep 2019 12:00)  T(F): 98 (20 Sep 2019 05:02), Max: 98 (19 Sep 2019 12:00)  HR: 91 (20 Sep 2019 05:02) (87 - 91)  BP: 127/77 (20 Sep 2019 05:02) (127/64 - 141/63)  BP(mean): --  RR: 18 (20 Sep 2019 05:02) (18 - 18)  SpO2: 99% (20 Sep 2019 05:02) (99% - 99%)                          11.3   10.88 )-----------( 505      ( 19 Sep 2019 05:46 )             35.3           135  |  94<L>  |  21  ----------------------------<  122<H>  3.8   |  26  |  1.08    Ca    9.4      20 Sep 2019 07:10  Phos  4.4       Mg     2.1         TPro  7.5  /  Alb  3.9  /  TBili  0.4  /  DBili  x   /  AST  23  /  ALT  36<H>  /  AlkPhos  133<H>        PT/INR - ( 18 Sep 2019 21:10 )   PT: 11.9 SEC;   INR: 1.07          PTT - ( 18 Sep 2019 21:10 )  PTT:33.6 SEC      Xray chest: FINDINGS:  Right lung volume loss with postsurgical changes again noted. Coronary   artery stent.  No pleural effusions. No pneumothorax.  Cardiac size is within normal limits.     IMPRESSION: Clear lungs.              BROOKE LOZOYA M.D., RADIOLOGY RESIDENT  This document has been electronically signed.  DARRYL WHITTAKER M.D., ATTENDING RADIOLOGIST  This document has been electronically signed. Sep 19 2019  9:28AM      Path: Surgical Pathology Report:   ACCESSION No:  [id number]    ERIKA CORRIGAN        Surgical Final Report          Final Diagnosis    1. Lung, right upper lobe, wedge resection  - Adenocarcinoma, solid predominant (see synoptic summary)    2. Lymph node,level 9, excision  - Negative for tumor (0/1)    3. Lymph node, level 7, excision  - Negative for tumor (0/1)    4. Lymph node, level 7#2, excision  - Negative for tumor (0/1)    5. Lymph node, level 7#3, excision  - Negative for tumor (0/1)    6. Lymph node, level 11, excision  - Negative for tumor (0/1)    7. Lymph node, level 11#2, excision  - Negative for tumor (0/1)    8. Lymph node, level 12, excision  - Positive for tumor (1/1)    9. Lymph node, level 12#2, excision  - Negative for tumor (0/1)    10.  Lung, right upper lobe, completion lobectomy  - Adenocarcinoma in situ (AIS) (see synoptic summary)  - One hilar lymph node, negative for tumor (0/1)    11. Rib, right, excision  -  Bone, gross examination only    Verified by: KRISTI CHANCE M.D.  (Electronic Signature)  Reported on: 09/10/19 15:47 EDT, [address] [place] [address]  [id number]  _________________________________________________________________    Intraoperative Consultation  1.  Right upper lobe wedge  - Non-small cellcarcinoma  By: Dr. CINDY Vines  _              ERIKA CORRIGAN                   5        Surgical Final Report          Frozen section Performed at [facility],  Department of Pathology, [address] [place] [address].    Synoptic Summary  Synoptic Summary Lung    Procedure:  Wedge resection and completion lobectomy  Specimen Laterality:  Right  Tumor Site: Right upper lobe  Tumor Size:  1.5cm and 0.5 cm  Total Tumor Size Inclusive of Invasive and Lepidic Components:  N/A  Invasive Tumor Size:  Not applicable  Tumor Focality: Multifocal  Histologic Type:  Adenocarcinoma, solid predominant with  additional acinar component (1.5 cm) and Adenocarcinoma in situ  (0.5 cm)  Histologic Grade:  G3  Spread Through Air Spaces (HYACINTH):  Present  Visceral Pleura Invasion:  Present  Lymph-Vascular Invasion: Absent  Direct Invasion of Adjacent Structures:  Not applicable  Margins:  Bronchial margin:  Negative  Vascular margin:  Negative  Parenchymal margin:  Not applicable  Parietal Pleural margin:  Not applicable  Chest wall margin:  Not applicable  Other attached tissue margin:  Not applicable  Distance to closest margin:  At least 1.3 cm  Treatment Effect: Not applicable  Lymph Nodes:  Hilar: 0/1  Level 7: 0/3  Level 9: 0/1  Level 11: 0/2  Level 12: 1/2  Lymph Node Extracapsular Extension: Absent  TNM Descriptor: m  Pathologic Staging (pTNM):  pT2a(m) N1  Additional Pathologic Findings: None  Ancillary Studies:  IHC is positive for TTF-1 in both the  glandular and solid components.  P40 is negative. This supports a  solid and acinar adenocarcinoma of the lung.  Comment(s): A focus of adenocarcinoma in situ (0.5 cm) is present  in slide 10D.              ERIKA CORRIGAN        SurgicalFinal Report          Slide(s) with built in immunohistochemical study control(s) and  negative control associated with this case has/have been verified  by the sign out pathologist.  For slide(s) without built in controls positive control slides  has/have been reviewed and approved by immunohistochemistry lab  These immunohistochemical/ in-situ hybridization tests have been  developed and their performance characteristics determined by  [facility], Department of Pathology,  Division of Immunopathology, [address] [place] [address].  It has not been cleared or approved by the U.[place]. Food  and Drug Administration.  The FDA has determined that such  clearance or approval is not necessary.  This test is used for  clinical purposes.  The laboratory is certified under the CLIA-88  as qualified to perform high complexity clinical testing.    Clinical History  63-year-old female with a history of smoking x38 years, robotic  converted upper lung resection, thoracotomy    Specimen(s) Submitted  1-Right upper lobe wedge  2-Level 9 lymph node  3-Level 7 lymph node  4-Level 7 # 2  5-Level 7 # 3  6-Level 11  7-Level 11 # 2  8-Level 12  9-Level 12 # 2  10-Completion of right upper lobectomy  11-Portion of right rib    Gross Description  1. The specimen is received fresh for intraoperative consultation  and the specimen container is labeled: Right upper lobe wedge  resection.  It consists of a 8.5 x 3.5 x 2 cm pulmonary wedge.  The margin is 8.5 cm long and is stapled (staple line removed and  pseudo-parenchymal margin inked blue).  Black ink is applied to  the pleura, which is tan to brown-purple, wrinkled and slightly  puckered. Sections reveal a 1.5 x 1 x 0.9 cm predominately well  circumscribed lesionwith a white to slightly gray firm cut  surface.  The lesion abuts the pleura in the area of puckering  and is measured to be 1.3 cm from the stapled margin at the time  of frozen section examination (upon examination within the gross  room, the lesion is grossly 0.5 cm from the pseudo-parenchymal  margin).  A portion of the lesion is submitted for frozen section  examination (cassette opened and tissue verified within).  The  uninvolved            ANJELICAERIKA LONGORIA        Surgical Final Report          pulmonary parenchyma is tan to brown and spongy with a diffuse  cystic and honeycomb appearance. Representative sections  submitted.  Four cassettes: 1FSA = frozen section remnant, EVG  stain ordered; 1A-1B = sections of 1.5 cm lesion with abutting  puckered pleura and closest pseudo-parenchymal margin, EVG stain  ordered; 1C = section of uninvolved parenchyma with cystic and  honeycomb appearance.  Specimen photographs are taken.    2. The specimen is received in formalin and the specimen  container is labeled: Level 9 lymph node.  It consists of a 1 x  0.5 x 0.3 cm gray lymph node with attached adipose tissue.  The  specimen is bisected and entirely submitted.  One cassette.    3. The specimen is received in formalin and the specimen  container is labeled: Level 7 lymph node.  It consists of a 0.7 x  0.4 x 0.3 cm gray lymph node with minimal attached adipose  tissue.  The specimen is bisected and entirely submitted.  One  cassette.    4. The specimen is received in formalin and the specimen  container is labeled: Level 7 #2.  It consists of a 0.7 x 0.5 x  0.2 cm tan-gray to yellow portion of soft and adipose tissue.  Entirely submitted.  One cassette.    5. The specimen is received in formalin and the specimen  container is labeled: Level 7 #3.  It consists of a 0.5 x 0.3 x  0.2 cm gray-black oval lymph node with minimal attached adipose  tissue.  Entirely submitted.  One cassette.    6. The specimen is received in formalin and the specimen  container is labeled: Level 11.  It consists of a 0.9 x 0.2 x 0.1  cm elongated fragment of gray-black to yellow soft and adipose  tissue.  Entirely submitted.  One cassette.    7. The specimen is received in formalin and the specimen  container is labeled: Level 11 #2.  It consists of a 0.6 x 0.3 x  0.2 cm gray-black to yellow fragment of soft and adipose tissue.  Entirely submitted.  One cassette.    8. The specimen is received in formalin and the specimen  container is labeled: Level 12.  It consists of a 0.4 x 0.2 x 0.2  cm gray-black portion of soft tissue and a 0.7 x 0.3 x 0.3 cm  yellow-gray portion of adipose and soft tissue.  The specimen is  entirely submitted.  One cassette.    9. The specimen is received in formalin and the specimen  container is labeled: Level 12 #2.  It consists of two gray-              ZARELLAHELD, ERIKA                   5        Surgical Final Report          black to focally yellow, irregular portions of soft tissue  measuring 0.7 x 0.3 x 0.2 cm and 0.7 x 0.5 x0.2 cm.  Entirely  submitted.  One cassette.    10. The specimen is received in formalin and the specimen  container is labeled: Completion of right upper lobectomy.  It  consists of a 17.5 x 8.5 x 7 cm, intact right upper pulmonary  lobe with a visible bronchovascular margin.  The pleura is gray-  purple, smooth to wrinkled and slightly congested.  There is a  4.5 cm staple line extending from the hilum and an 11 cm staple  line located 2.7 cm from the hilum (staple line is removed and  pseudo-parenchymal margin inked blue). The parenchyma is dark  brown to dark red, spongy and hemorrhagic with focal slight  cystic and honeycomb appearing areas.  No discrete mass is  identified.  A single possible hilar lymph node measuring 0.3 cm  in greatest dimension is identified.  Representative sections  submitted.  Four cassettes: 10A = en face bronchial margin; 10B =  en face vascular margins; 10C = possible hilar lymph node; 10D =  section of parenchyma including portion of blue inked pseudo-  parenchymal margin.    11. The specimen is received in formalin and the specimen  container is labeled: Portion of right rib.  It consists of a 2.7  x 1.5 x 1.4 cm rib segment with no visible lesion.  Diffuse  adherent soft and adipose tissue which is extensively cauterized  is present on the outer surface.  Gross examination only.  No  sections submitted.    In addition to other data that may appear on the specimen  containers, all labels have been inspected to confirm the  presence of the patient's name and date of birth.    Sarai Pace 2019 09:53 (19 @ 11:53)

## 2019-09-20 NOTE — CONSULT NOTE ADULT - PROBLEM SELECTOR RECOMMENDATION 9
No evidence of LV dysfunction. Suspect diastolic HF. Will FU with cardiology. Continue lasix for now
per cards: on lasix

## 2019-09-21 ENCOUNTER — TRANSCRIPTION ENCOUNTER (OUTPATIENT)
Age: 64
End: 2019-09-21

## 2019-09-21 VITALS
SYSTOLIC BLOOD PRESSURE: 135 MMHG | TEMPERATURE: 98 F | RESPIRATION RATE: 18 BRPM | OXYGEN SATURATION: 100 % | DIASTOLIC BLOOD PRESSURE: 60 MMHG | HEART RATE: 93 BPM

## 2019-09-21 LAB
ANION GAP SERPL CALC-SCNC: 17 MMO/L — HIGH (ref 7–14)
BUN SERPL-MCNC: 22 MG/DL — SIGNIFICANT CHANGE UP (ref 7–23)
CALCIUM SERPL-MCNC: 9.4 MG/DL — SIGNIFICANT CHANGE UP (ref 8.4–10.5)
CHLORIDE SERPL-SCNC: 91 MMOL/L — LOW (ref 98–107)
CO2 SERPL-SCNC: 28 MMOL/L — SIGNIFICANT CHANGE UP (ref 22–31)
CREAT SERPL-MCNC: 1.04 MG/DL — SIGNIFICANT CHANGE UP (ref 0.5–1.3)
GLUCOSE SERPL-MCNC: 114 MG/DL — HIGH (ref 70–99)
HCT VFR BLD CALC: 33.6 % — LOW (ref 34.5–45)
HGB BLD-MCNC: 10.6 G/DL — LOW (ref 11.5–15.5)
MAGNESIUM SERPL-MCNC: 2.2 MG/DL — SIGNIFICANT CHANGE UP (ref 1.6–2.6)
MCHC RBC-ENTMCNC: 30.6 PG — SIGNIFICANT CHANGE UP (ref 27–34)
MCHC RBC-ENTMCNC: 31.5 % — LOW (ref 32–36)
MCV RBC AUTO: 97.1 FL — SIGNIFICANT CHANGE UP (ref 80–100)
NRBC # FLD: 0 K/UL — SIGNIFICANT CHANGE UP (ref 0–0)
PLATELET # BLD AUTO: 398 K/UL — SIGNIFICANT CHANGE UP (ref 150–400)
PMV BLD: 10.5 FL — SIGNIFICANT CHANGE UP (ref 7–13)
POTASSIUM SERPL-MCNC: 3.6 MMOL/L — SIGNIFICANT CHANGE UP (ref 3.5–5.3)
POTASSIUM SERPL-SCNC: 3.6 MMOL/L — SIGNIFICANT CHANGE UP (ref 3.5–5.3)
RBC # BLD: 3.46 M/UL — LOW (ref 3.8–5.2)
RBC # FLD: 12.9 % — SIGNIFICANT CHANGE UP (ref 10.3–14.5)
SODIUM SERPL-SCNC: 136 MMOL/L — SIGNIFICANT CHANGE UP (ref 135–145)
WBC # BLD: 7.71 K/UL — SIGNIFICANT CHANGE UP (ref 3.8–10.5)
WBC # FLD AUTO: 7.71 K/UL — SIGNIFICANT CHANGE UP (ref 3.8–10.5)

## 2019-09-21 RX ORDER — ATORVASTATIN CALCIUM 80 MG/1
1 TABLET, FILM COATED ORAL
Qty: 30 | Refills: 0
Start: 2019-09-21 | End: 2019-10-20

## 2019-09-21 RX ORDER — POTASSIUM CHLORIDE 20 MEQ
40 PACKET (EA) ORAL ONCE
Refills: 0 | Status: COMPLETED | OUTPATIENT
Start: 2019-09-21 | End: 2019-09-21

## 2019-09-21 RX ORDER — FUROSEMIDE 40 MG
1 TABLET ORAL
Qty: 30 | Refills: 0
Start: 2019-09-21 | End: 2019-10-20

## 2019-09-21 RX ORDER — ATORVASTATIN CALCIUM 80 MG/1
1 TABLET, FILM COATED ORAL
Qty: 0 | Refills: 0 | DISCHARGE

## 2019-09-21 RX ADMIN — Medication 100 MILLIGRAM(S): at 05:46

## 2019-09-21 RX ADMIN — Medication 25 MILLIGRAM(S): at 05:46

## 2019-09-21 RX ADMIN — BUDESONIDE AND FORMOTEROL FUMARATE DIHYDRATE 2 PUFF(S): 160; 4.5 AEROSOL RESPIRATORY (INHALATION) at 09:40

## 2019-09-21 RX ADMIN — Medication 40 MILLIGRAM(S): at 05:46

## 2019-09-21 RX ADMIN — Medication 40 MILLIEQUIVALENT(S): at 09:43

## 2019-09-21 RX ADMIN — Medication 81 MILLIGRAM(S): at 11:40

## 2019-09-21 NOTE — PROGRESS NOTE ADULT - PROBLEM SELECTOR PLAN 7
NO WHEEZING: COTN SYMBICORT: Oxygenation is excellent.  9/21 stable. no wheezing. continue current medication

## 2019-09-21 NOTE — PROGRESS NOTE ADULT - SUBJECTIVE AND OBJECTIVE BOX
Patient denies chest pain or shortness of breath.   Review of systems otherwise (-)  	    MEDICATIONS  (STANDING):  aspirin enteric coated 81 milliGRAM(s) Oral daily  atorvastatin 40 milliGRAM(s) Oral at bedtime  buDESOnide 160 MICROgram(s)/formoterol 4.5 MICROgram(s) Inhaler 2 Puff(s) Inhalation two times a day  docusate sodium 100 milliGRAM(s) Oral three times a day  furosemide    Tablet 40 milliGRAM(s) Oral daily  heparin  Injectable 5000 Unit(s) SubCutaneous every 8 hours  influenza   Vaccine 0.5 milliLiter(s) IntraMuscular once  metoprolol tartrate 25 milliGRAM(s) Oral two times a day  senna 2 Tablet(s) Oral at bedtime    MEDICATIONS  (PRN):  oxyCODONE    5 mG/acetaminophen 325 mG 1 Tablet(s) Oral every 8 hours PRN Moderate Pain (4 - 6)  oxyCODONE    5 mG/acetaminophen 325 mG 2 Tablet(s) Oral every 8 hours PRN Severe Pain (7 - 10)      LABS:                            10.6   7.71  )-----------( 398      ( 21 Sep 2019 06:10 )             33.6     136  |  91<L>  |  22  ----------------------------<  114<H>  3.6   |  28  |  1.04    Ca    9.4      21 Sep 2019 06:10  Phos  4.4     09-20  Mg     2.2     09-21    TPro  7.5  /  Alb  3.9  /  TBili  0.4  /  DBili  x   /  AST  23  /  ALT  36<H>  /  AlkPhos  133<H>  09-20    Creatinine Trend: 1.04<--, 1.08<--, 1.06<--, 0.97<--, 0.88<--, 0.85<--     CARDIAC MARKERS ( 20 Sep 2019 07:10 )  x     / x     / 44 u/L / x     / x          PHYSICAL EXAM  Vital Signs Last 24 Hrs  T(C): 36.6 (21 Sep 2019 09:45), Max: 36.8 (20 Sep 2019 19:57)  T(F): 97.9 (21 Sep 2019 09:45), Max: 98.2 (20 Sep 2019 19:57)  HR: 99 (21 Sep 2019 09:45) (84 - 99)  BP: 139/64 (21 Sep 2019 09:45) (128/57 - 153/74)  BP(mean): 76 (20 Sep 2019 23:57) (76 - 76)  RR: 18 (21 Sep 2019 09:45) (16 - 18)  SpO2: 99% (21 Sep 2019 09:45) (96% - 99%)    Gen: Appears well in NAD  HEENT:  (-)icterus (-)pallor  CV: N S1 S2 1/6 MIKO (+)2 Pulses B/l  Resp:  Clear to auscultation B/L, normal effort  GI: (+) BS Soft, NT, ND  Lymph:  (+) B/L LE pitting Edema, (-)obvious lymphadenopathy  Skin: Warm to touch, Normal turgor  Psych: Appropriate mood and affect      TELEMETRY: NSR 80s-90, PVC	      EKG: SR, vrate 93bpm, LAE, SC 170ms, ADT75je, QTC 457ms    < from: Transthoracic Echocardiogram (09.19.19 @ 10:37) >  CONCLUSIONS:  1. Mitral valve not well visualized, probably normal.  2. Hyperdynamic left ventricle.  3. The right ventricle is not well visualized; grossly  normal right ventricular systolic function.  4. Normal pericardium with no pericardial effusion.  5. Right pleural effusion.  6. Pericardial fat pad seen.  ------------------------------------------------------------------------  Confirmed on  9/19/2019 - 13:28:53 by BENY Roman    < end of copied text >      ASSESSMENT/PLAN: 64 yo female, hx CAD, PCI 1/2018, on Asa, former 38-pack year smoker with history of emphysema, HTN, HLD, with chronic pulmonary nodules s/p elective R Vats, converted to thoracotomy, RUL lobectomy, repair of Pulm artery on 9/3/19, HTN, HLD, emphysema, admitted with increasing LE edema and pericardial effusion found on office echo.    --TTE inpatient with Hyperdynamic LV, no pericardial effusion  --LE dopplers negative for DVT  --TSH WNL  --Continue medical management of CAD - ASA/Statin/BB  --keep net negative on Lasix - transitioned to PO lasix  --Pulm/Heme consults appreciated  --No further inpatient cardiac w/u needed at this time  --Patient to Follow up with Dr. Porras on 10/01/19 at 3pm

## 2019-09-21 NOTE — DISCHARGE NOTE PROVIDER - NSDCCPCAREPLAN_GEN_ALL_CORE_FT
PRINCIPAL DISCHARGE DIAGNOSIS  Diagnosis: Pericardial effusion without cardiac tamponade  Assessment and Plan of Treatment: You were admitted for a pericardial effusion which is fluid in your lungs. You had an echo done that showed a pleural effusion. You had dopplers done that did not show a clot. You were given IV lasix and then transitioned to oral lasix. You will go home on lasix 40mg daily. Please follow up with Dr. Porras on 10/01/19 at 3pm at [address] [place] [address] [phone].      SECONDARY DISCHARGE DIAGNOSES  Diagnosis: Benign essential hypertension  Assessment and Plan of Treatment: Please continue to take Toprol 50mg at home for your blood pressure. Please monitor your blood pressures at home. Please follow up with Dr. Porras.    Diagnosis: Malignant neoplasm of upper lobe of right lung  Assessment and Plan of Treatment: You were being followed by Dr. Guevara (Oncologist) in the hospital. Please follow up with Dr. Guevara on Wednesday 9/25/19 at 11:30 am as scheduled.

## 2019-09-21 NOTE — PROGRESS NOTE ADULT - SUBJECTIVE AND OBJECTIVE BOX
Subjective: no chest pain or sob; LE swelling improved; ROS otherwise negative   	  aspirin enteric coated 81 milliGRAM(s) Oral daily  atorvastatin 40 milliGRAM(s) Oral at bedtime  buDESOnide 160 MICROgram(s)/formoterol 4.5 MICROgram(s) Inhaler 2 Puff(s) Inhalation two times a day  docusate sodium 100 milliGRAM(s) Oral three times a day  furosemide    Tablet 40 milliGRAM(s) Oral daily  heparin  Injectable 5000 Unit(s) SubCutaneous every 8 hours  influenza   Vaccine 0.5 milliLiter(s) IntraMuscular once  metoprolol tartrate 25 milliGRAM(s) Oral two times a day  oxyCODONE    5 mG/acetaminophen 325 mG 1 Tablet(s) Oral every 8 hours PRN  oxyCODONE    5 mG/acetaminophen 325 mG 2 Tablet(s) Oral every 8 hours PRN  senna 2 Tablet(s) Oral at bedtime                            10.6   7.71  )-----------( 398      ( 21 Sep 2019 06:10 )             33.6       09-21    136  |  91<L>  |  22  ----------------------------<  114<H>  3.6   |  28  |  1.04    Ca    9.4      21 Sep 2019 06:10  Phos  4.4     09-20  Mg     2.2     09-21    TPro  7.5  /  Alb  3.9  /  TBili  0.4  /  DBili  x   /  AST  23  /  ALT  36<H>  /  AlkPhos  133<H>  09-20      CARDIAC MARKERS ( 20 Sep 2019 07:10 )  x     / x     / 44 u/L / x     / x            T(C): 36.6 (09-21-19 @ 09:45), Max: 36.8 (09-20-19 @ 19:57)  HR: 99 (09-21-19 @ 09:45) (84 - 99)  BP: 139/64 (09-21-19 @ 09:45) (128/57 - 153/74)  RR: 18 (09-21-19 @ 09:45) (16 - 18)  SpO2: 99% (09-21-19 @ 09:45) (96% - 99%)  Wt(kg): --    I&O's Summary    20 Sep 2019 07:01  -  21 Sep 2019 07:00  --------------------------------------------------------  IN: 300 mL / OUT: 1440 mL / NET: -1140 mL    21 Sep 2019 07:01  -  21 Sep 2019 14:17  --------------------------------------------------------  IN: 0 mL / OUT: 1100 mL / NET: -1100 mL        Appearance: Normal	  HEENT:   Normal oral mucosa, PERRL, EOMI	  Lymphatic: No lymphadenopathy , no edema in LE bilaterally   Cardiovascular: Normal S1 S2, No JVD, No murmurs , Peripheral pulses palpable 2+ bilaterally  Respiratory: slightly decreased BS at right base 	  Gastrointestinal:  Soft, Non-tender, + BS	  Skin: No rashes, No ecchymoses, No cyanosis, warm to touch  Musculoskeletal: Normal range of motion, normal strength  Psychiatry:  Mood & affect appropriate    TELEMETRY: SR, couplets 	    ECG: < from: 12 Lead ECG (09.18.19 @ 18:51) >  Diagnosis Line Normal sinus rhythm  Possible Left atrial enlargement  Possible Inferior infarct , age undetermined  Abnormal ECG    < end of copied text >   	  RADIOLOGY:   DIAGNOSTIC TESTING:  [ ] Echocardiogram: < from: Transthoracic Echocardiogram (09.19.19 @ 10:37) >  CONCLUSIONS:  1. Mitral valve not well visualized, probably normal.  2. Hyperdynamic left ventricle.  3. The right ventricle is not well visualized; grossly  normal right ventricular systolic function.  4. Normal pericardium with no pericardial effusion.  5. Right pleural effusion.  6. Pericardial fat pad seen.    < end of copied text >    [ ]  Catheterization:  [ ] Stress Test:    OTHER: 	      ASSESSMENT/PLAN:  62 yo F with history of CAD s/p ANETTE to proximal RCA in Jan of 2018, lung adenocarcinoma s/p VATS/RUL lobectomy, HTN, HLD who is being seen for management of CAD.    -pt. with no chest pain or anginal symptoms currently  -troponin indeterminate with negative CPK inconsistent with acs  -no urgent cath needed at this time  -would continue with medical management of cad - would continue with asa 81 for history of ANETTE > 1 year ago  -continue with beta blockers and statin  -volume status has improved   -continue with po lasix  -LE dopplers negative for DVT  -No further interventional workup needed at this time  -no further pulmonary workup per pulm  -spoke with thoracic surgery - no further thoracic workup needed; no signs of infection around surgical site per thoracic   -DC home today   -Follow up with Dr. Porras on 10/01/19 at 3pm at [address] [facility] [phone]    Tip Hernandez MD

## 2019-09-21 NOTE — PROGRESS NOTE ADULT - ASSESSMENT
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 Sent in for formal echo and to r/o signs of tamponade. Of note pt noticed increasing B/L LE edema over last 2 weeks. Started on Lasix  which is new for her. No formal dx of CHF. No fever or cough. No SOB. Chronic back pain. C/O COLÓN, uses cane/walker to ambulate, Pt had a Venous Doppler of legs today done by cardiology, NO DVT as per pt, pt may need Chemo for Newly  DX of Lung Cancer , has an appointment with Oncology  as outpatient , no signs of tamponade of bedside POCUS, no hypotension, as per ER note tonight, I called CT Surgery consult tonight, Pt awake, A+O x 4, no distress ,  no cough, no N/V, No HA, no dysuria, no diarrhea, no abdominal pain, + Constipation, No N/V, + Rt sided CP at the site of Recent surgery, Pt S/P IV Lasix 40 mg x 1 given by ER, Percocet x 1 for pain, elevated BP in the ER, I started the pt on Metoprolol 25 mg PO BID from tonight,
63F with T2N1 adenocarcinoma of the lung, s/p resection, here with leg swelling and is being worked up by cardiology, clinically doing better, will recommend:  - continue current Rx as per medicine, cardiology  - venous dopplers of the legs - no DVT  - incentive spirometry  - the role of adjuvant chemo for lung cancer discussed and questions answered, will recommend it once pt is stable - will discuss with cardiology in detail as chemo comes with plenty of fluids necessary for cisplatin administartion  - DVT prophylaxis  - all other supportive Rx  - to f/u as out pt after discharge

## 2019-09-21 NOTE — PROGRESS NOTE ADULT - SUBJECTIVE AND OBJECTIVE BOX
Patient is a 63y old  Female who presents with a chief complaint of Legs edema, R/O Cardiac Tamponade, (21 Sep 2019 10:17)    Any change in ROS: feels good. denies 10 points ROS. on room air     MEDICATIONS  (STANDING):  aspirin enteric coated 81 milliGRAM(s) Oral daily  atorvastatin 40 milliGRAM(s) Oral at bedtime  buDESOnide 160 MICROgram(s)/formoterol 4.5 MICROgram(s) Inhaler 2 Puff(s) Inhalation two times a day  docusate sodium 100 milliGRAM(s) Oral three times a day  furosemide    Tablet 40 milliGRAM(s) Oral daily  heparin  Injectable 5000 Unit(s) SubCutaneous every 8 hours  influenza   Vaccine 0.5 milliLiter(s) IntraMuscular once  metoprolol tartrate 25 milliGRAM(s) Oral two times a day  senna 2 Tablet(s) Oral at bedtime    MEDICATIONS  (PRN):  oxyCODONE    5 mG/acetaminophen 325 mG 1 Tablet(s) Oral every 8 hours PRN Moderate Pain (4 - 6)  oxyCODONE    5 mG/acetaminophen 325 mG 2 Tablet(s) Oral every 8 hours PRN Severe Pain (7 - 10)    Vital Signs Last 24 Hrs  T(C): 36.6 (21 Sep 2019 09:45), Max: 36.8 (20 Sep 2019 19:57)  T(F): 97.9 (21 Sep 2019 09:45), Max: 98.2 (20 Sep 2019 19:57)  HR: 99 (21 Sep 2019 09:45) (84 - 99)  BP: 139/64 (21 Sep 2019 09:45) (128/57 - 153/74)  BP(mean): 76 (20 Sep 2019 23:57) (76 - 76)  RR: 18 (21 Sep 2019 09:45) (16 - 18)  SpO2: 99% (21 Sep 2019 09:45) (96% - 99%)    I&O's Summary    20 Sep 2019 07:  -  21 Sep 2019 07:00  --------------------------------------------------------  IN: 300 mL / OUT: 1440 mL / NET: -1140 mL    21 Sep 2019 07:01  -  21 Sep 2019 11:55  --------------------------------------------------------  IN: 0 mL / OUT: 600 mL / NET: -600 mL          Physical Exam:   GENERAL: The patient comfortable with no apparent distress.   HEENT: Head is normocephalic and atraumatic.    NECK: Supple with no elevated JVP.  LUNGS: Clear to auscultation without wheeze and rhonchi.  HEART: S1 and S2 present without murmur.  ABDOMEN: Soft, nontender, and nondistended.   EXTREMITIES: No edema or calf tenderness.  NEUROLOGIC: Grossly intact.    Labs:    CARDIAC MARKERS ( 20 Sep 2019 07:10 )  x     / x     / 44 u/L / x     / x                                10.6   7.71  )-----------( 398      ( 21 Sep 2019 06:10 )             33.6                         11.3   10.88 )-----------( 505      ( 19 Sep 2019 05:46 )             35.3                         11.1   10.99 )-----------( 438      ( 18 Sep 2019 21:10 )             34.5         136  |  91<L>  |  22  ----------------------------<  114<H>  3.6   |  28  |  1.04  -    135  |  94<L>  |  21  ----------------------------<  122<H>  3.8   |  26  |  1.08  -19    134<L>  |  93<L>  |  14  ----------------------------<  115<H>  3.9   |  24  |  1.06  -18    135  |  94<L>  |  14  ----------------------------<  107<H>  3.8   |  27  |  0.97    Ca    9.4      21 Sep 2019 06:10  Ca    9.4      20 Sep 2019 07:10  Phos  4.4       Mg     2.2       Mg     2.1         TPro  7.5  /  Alb  3.9  /  TBili  0.4  /  DBili  x   /  AST  23  /  ALT  36<H>  /  AlkPhos  133<H>    TPro  8.0  /  Alb  4.2  /  TBili  0.4  /  DBili  x   /  AST  20  /  ALT  38<H>  /  AlkPhos  143<H>    TPro  7.8  /  Alb  4.0  /  TBili  0.4  /  DBili  x   /  AST  21  /  ALT  36<H>  /  AlkPhos  135<H>      CAPILLARY BLOOD GLUCOSE          LIVER FUNCTIONS - ( 20 Sep 2019 07:10 )  Alb: 3.9 g/dL / Pro: 7.5 g/dL / ALK PHOS: 133 u/L / ALT: 36 u/L / AST: 23 u/L / GGT: x             Urinalysis Basic - ( 20 Sep 2019 20:45 )    Color: YELLOW / Appearance: CLEAR / S.024 / pH: 6.0  Gluc: NEGATIVE / Ketone: NEGATIVE  / Bili: NEGATIVE / Urobili: NORMAL   Blood: NEGATIVE / Protein: 10 / Nitrite: NEGATIVE   Leuk Esterase: MODERATE / RBC: 0-2 / WBC 26-50   Sq Epi: FEW / Non Sq Epi: x / Bacteria: NEGATIVE      Serum Pro-Brain Natriuretic Peptide: 837.6 pg/mL ( @ 05:46)  Serum Pro-Brain Natriuretic Peptide: 673.4 pg/mL ( @ 21:10)      Studies  Chest X-RAY  < from: Xray Chest 2 Views PA/Lat (19 @ 20:37) >  EXAM:  XR CHEST PA LAT 2V        PROCEDURE DATE:  Sep 18 2019         INTERPRETATION:  CLINICAL INFORMATION: Bilateral lower extremity edema.   Pericardial effusion.    TECHNIQUE: Frontal and lateral radiographs of the chest dated 2019   8:37 PM.    COMPARISON: Chest radiograph 2019.    FINDINGS:  Right lung volume loss with postsurgical changes again noted. Coronary   artery stent.  No pleural effusions. No pneumothorax.  Cardiac size is within normal limits.     IMPRESSION: Clear lungs.          < end of copied text >    CT SCAN Chest   n/a   Venous Dopplers: LE: < from: US Duplex Venous Lower Ext Complete, Bilateral (19 @ 11:08) >  EXAM:  US DPLX LWR EXT VEINS COMPL BI        PROCEDURE DATE:  Sep 20 2019         INTERPRETATION:  CLINICAL INFORMATION: Lower extremity swelling. Evaluate   for DVT.    COMPARISON: None available.    TECHNIQUE: Duplex sonography of the BILATERAL LOWER extremity veins with   color and spectral Doppler, with and without compression.      FINDINGS:    There is normal compressibility of the bilateral common femoral, femoral   and popliteal veins.     Doppler examination shows normal spontaneous and phasic flow.    No calf vein thrombosis is detected.    IMPRESSION:     No evidence of deep venous thrombosis in either lower extremity.    < end of copied text >    CT Abdomen  Others  < from: Transthoracic Echocardiogram (19 @ 10:37) >  Patient name: ERIKA CORRIGAN  YOB: 1955   Age: 63 (F)   MR#: [id number]  Study Date: 2019  Location: [id number]: Mesha Baca RDCS  Study quality: Technically good  Referring Physician: Vern Medrano MD  Blood Pressure: 136/75 mmHg  Height: 160 cm  Weight: 86 kg  BSA: 1.9 m2  ------------------------------------------------------------------------  PROCEDURE: Transthoracic echocardiogram with 2-D, M-Mode  and complete spectral and color flow Doppler.  INDICATION: Pericardial effusion (noninflammatory) (I31.3)  ------------------------------------------------------------------------  DIMENSIONS:  Dimensions:     Normal Values:  LA:     3.3 cm    2.0 - 4.0 cm  Ao:     2.9 cm    2.0 - 3.8 cm  SEPTUM: 0.7 cm  0.6 - 1.2 cm  PWT:    0.7 cm    0.6 - 1.1 cm  LVIDd:  4.8 cm    3.0 - 5.6 cm  LVIDs:    ---     1.8 - 4.0 cm  Derived Variables:  LVMI: 57 g/m2  RWT: 0.29  ------------------------------------------------------------------------  OBSERVATIONS:  Mitral Valve: Mitral valve not well visualized, probably  normal.  Aortic Root: Normal aortic root.  Aortic Valve: Aortic valve not well visualized.  Left Atrium: LA volume index = 14 cc/m2.  Left Ventricle: Hyperdynamic left ventricle. Normal left  ventricular internal dimensions and wall thicknesses.  Right Heart: Normal right atrium. The right ventricle is  not well visualized; grossly normal right ventricular  systolic function.  Pericardium/PleuraNormal pericardium with no pericardial  effusion.Right pleural effusion. Pericardial fat pad seen.  ------------------------------------------------------------------------  CONCLUSIONS:  1. Mitral valve not well visualized, probably normal.  2. Hyperdynamic left ventricle.  3. The right ventricle is not well visualized; grossly  normal right ventricular systolic function.  4. Normal pericardium with no pericardial effusion.  5. Right pleural effusion.  6. Pericardial fat pad seen.  ----------------------------------------------    < end of copied text >

## 2019-09-21 NOTE — PROGRESS NOTE ADULT - REASON FOR ADMISSION
Legs edema, R/O Cardiac Tamponade,

## 2019-09-21 NOTE — DISCHARGE NOTE PROVIDER - NSDCFUADDAPPT_GEN_ALL_CORE_FT
Please follow up with Dr. Porras on 10/01/19 at 3pm at [address].  Please follow up with Dr. Guevara on 9/25/19 at 11:30am as scheduled.  Please follow up with Dr. Joshi in 1-2 weeks.

## 2019-09-21 NOTE — DISCHARGE NOTE NURSING/CASE MANAGEMENT/SOCIAL WORK - PATIENT PORTAL LINK FT
You can access the FollowMyHealth Patient Portal offered by [facility] by registering at the following website: [email or web address]. By joining [facility]’s FollowMyHealth portal, you will also be able to view your health information using other applications (apps) compatible with our system.

## 2019-09-21 NOTE — DISCHARGE NOTE PROVIDER - PROVIDER TOKENS
PROVIDER:[TOKEN:[[id number]]],PROVIDER:[TOKEN:[[id number]]],PROVIDER:[TOKEN:[[id number]]]

## 2019-09-21 NOTE — PROGRESS NOTE ADULT - SUBJECTIVE AND OBJECTIVE BOX
Pt has been doing good, leg swelling is gone in am but comes back as the day goes by, no cough, SOB and rest of the ROS unchanged to unremarkable.       Meds:  aspirin enteric coated 81 milliGRAM(s) Oral daily  atorvastatin 40 milliGRAM(s) Oral at bedtime  buDESOnide 160 MICROgram(s)/formoterol 4.5 MICROgram(s) Inhaler 2 Puff(s) Inhalation two times a day  docusate sodium 100 milliGRAM(s) Oral three times a day  furosemide    Tablet 40 milliGRAM(s) Oral daily  heparin  Injectable 5000 Unit(s) SubCutaneous every 8 hours  influenza   Vaccine 0.5 milliLiter(s) IntraMuscular once  metoprolol tartrate 25 milliGRAM(s) Oral two times a day  oxyCODONE    5 mG/acetaminophen 325 mG 1 Tablet(s) Oral every 8 hours PRN  oxyCODONE    5 mG/acetaminophen 325 mG 2 Tablet(s) Oral every 8 hours PRN  senna 2 Tablet(s) Oral at bedtime      Vital Signs Last 24 Hrs  T(C): 36.6 (21 Sep 2019 09:45), Max: 36.8 (20 Sep 2019 19:57)  T(F): 97.9 (21 Sep 2019 09:45), Max: 98.2 (20 Sep 2019 19:57)  HR: 99 (21 Sep 2019 09:45) (84 - 99)  BP: 139/64 (21 Sep 2019 09:45) (128/57 - 153/74)  BP(mean): 76 (20 Sep 2019 23:57) (76 - 76)  RR: 18 (21 Sep 2019 09:45) (16 - 18)  SpO2: 99% (21 Sep 2019 09:45) (96% - 99%)                          10.6   7.71  )-----------( 398      ( 21 Sep 2019 06:10 )             33.6       09-21    136  |  91<L>  |  22  ----------------------------<  114<H>  3.6   |  28  |  1.04    Ca    9.4      21 Sep 2019 06:10  Phos  4.4     09-20  Mg     2.2     09-21    TPro  7.5  /  Alb  3.9  /  TBili  0.4  /  DBili  x   /  AST  23  /  ALT  36<H>  /  AlkPhos  133<H>  09-20          venous dopplers: IMPRESSION:     No evidence of deep venous thrombosis in either lower extremity.                        PRIMO PEÑALOZA M.D., ATTENDING RADIOLOGIST  This document has been electronically signed. Sep 20 2019 11:41AM      Echo: OBSERVATIONS:  Mitral Valve: Mitral valve not well visualized, probably  normal.  Aortic Root: Normal aortic root.  Aortic Valve: Aortic valve not well visualized.  Left Atrium: LA volume index = 14 cc/m2.  Left Ventricle: Hyperdynamic left ventricle. Normal left  ventricular internal dimensions and wall thicknesses.  Right Heart: Normal right atrium. The right ventricle is  not well visualized; grossly normal right ventricular  systolic function.  Pericardium/PleuraNormal pericardium with no pericardial  effusion.Right pleural effusion. Pericardial fat pad seen.  ------------------------------------------------------------------------  CONCLUSIONS:  1. Mitral valve not well visualized, probably normal.  2. Hyperdynamic left ventricle.  3. The right ventricle is not well visualized; grossly  normal right ventricular systolic function.  4. Normal pericardium with no pericardial effusion.  5. Right pleural effusion.  6. Pericardial fat pad seen.  ------------------------------------------------------------------------  Confirmed on  9/19/2019 - 13:28:53 by Ruel Ceballos M.D. RPVI  ------------------------------------------------------------------------

## 2019-09-21 NOTE — DISCHARGE NOTE PROVIDER - HOSPITAL COURSE
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 Sent in for formal echo and to r/o signs of tamponade. Of note pt noticed increasing B/L LE edema over last 2 weeks. Started on Lasix which is new for her. No signs of tamponade of bedside POCUS        1. Pericardial effusion- Pt was given IV lasix and then switched to PO. TTE (9/19) was consistent with R pleural effusion. LE doppler was negative.         2. Lung cancer - S/P Recent Rt Lung Surgery. CT surgery was following. Pt had right sided chest pain that was treated with  Percocet PRN, Senna, Colace. Oncology was following the patinet and stated that the role of adjuvant chemo for lung cancer will be recommended once patient is stable. Pt will follow as outpatient.         3. Hypertension.     - Started on Metoprolol 25 mg BID        Case discussed with Dr. Hernandez, pt medically stable for discharge. 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 Sent in for formal echo and to r/o signs of tamponade. Of note pt noticed increasing B/L LE edema over last 2 weeks. Started on Lasix which is new for her. No signs of tamponade of bedside POCUS        1. Pericardial effusion- Pt was given IV lasix and then switched to PO. TTE (9/19) was consistent with R pleural effusion. LE doppler was negative. Pt will go home on Lasix 40mg daily. Pt will follow up with Dr. Porras on 10/01/19 at 3pm at [address]        2. Lung cancer - S/P Recent Rt Lung Surgery. CT surgery was following. Pt had right sided chest pain that was treated with  Percocet PRN, Senna, Colace. Oncology was following the patinet and stated that the role of adjuvant chemo for lung cancer will be recommended once patient is stable. Pt will follow as outpatient with Dr. Guevara on 9/25/19 at 11:30am as scheduled.        3. Hypertension- pt takes Toprol 50mg daily at home. Pt will continue to take this.        Reviewed discharge medications with patient; no new medications required. Reviewed need for prescription for previous home medications and pt needed refills for Lasix and Atorvastatin. Refills sent to pharmacy of patients choice.        Case discussed with Dr. Hernandez, pt medically stable for discharge.

## 2019-09-21 NOTE — DISCHARGE NOTE PROVIDER - CARE PROVIDER_API CALL
Philomena Porras)  Interventional Cardiology  [address] [facility] [address]  [place] [address]  Phone: [phone]  Fax: [phone]  Follow Up Time:     Gera Guevara)  Hematology; Medical Oncology  [address]  [place] [address]  Phone: [phone]  Fax: [phone]  Follow Up Time:     Wesley Joshi)  Critical Care Medicine; Internal Medicine; Pulmonary Disease  [address] [place] [address]  Phone: [phone]  Fax: [phone]  Follow Up Time:

## 2019-10-02 LAB — FUNGUS SPEC QL CULT: SIGNIFICANT CHANGE UP

## 2019-10-07 ENCOUNTER — APPOINTMENT (OUTPATIENT)
Dept: THORACIC SURGERY | Facility: CLINIC | Age: 64
End: 2019-10-07

## 2019-10-15 LAB — ACID FAST STN SPEC: SIGNIFICANT CHANGE UP

## 2019-10-21 ENCOUNTER — APPOINTMENT (OUTPATIENT)
Dept: THORACIC SURGERY | Facility: CLINIC | Age: 64
End: 2019-10-21

## 2019-10-21 VITALS
OXYGEN SATURATION: 97 % | TEMPERATURE: 98.7 F | RESPIRATION RATE: 16 BRPM | WEIGHT: 188 LBS | BODY MASS INDEX: 33.3 KG/M2 | HEART RATE: 105 BPM | DIASTOLIC BLOOD PRESSURE: 84 MMHG | SYSTOLIC BLOOD PRESSURE: 139 MMHG

## 2019-10-21 PROBLEM — Z95.5 PRESENCE OF CORONARY ANGIOPLASTY IMPLANT AND GRAFT: Chronic | Status: ACTIVE | Noted: 2019-09-19

## 2019-10-21 PROBLEM — E66.9 OBESITY, UNSPECIFIED: Chronic | Status: ACTIVE | Noted: 2019-09-19

## 2019-10-21 PROBLEM — R60.0 LOCALIZED EDEMA: Chronic | Status: ACTIVE | Noted: 2019-09-19

## 2019-12-02 ENCOUNTER — APPOINTMENT (OUTPATIENT)
Dept: THORACIC SURGERY | Facility: CLINIC | Age: 64
End: 2019-12-02
Payer: COMMERCIAL

## 2019-12-02 VITALS
WEIGHT: 178 LBS | SYSTOLIC BLOOD PRESSURE: 117 MMHG | OXYGEN SATURATION: 97 % | RESPIRATION RATE: 17 BRPM | TEMPERATURE: 98.7 F | HEART RATE: 95 BPM | DIASTOLIC BLOOD PRESSURE: 73 MMHG | BODY MASS INDEX: 31.53 KG/M2

## 2019-12-02 PROCEDURE — 99024 POSTOP FOLLOW-UP VISIT: CPT

## 2019-12-02 RX ORDER — OXYCODONE AND ACETAMINOPHEN 5; 325 MG/1; MG/1
5-325 TABLET ORAL
Qty: 28 | Refills: 0 | Status: DISCONTINUED | COMMUNITY
Start: 2019-09-16 | End: 2019-12-02

## 2019-12-02 RX ORDER — FLUTICASONE FUROATE AND VILANTEROL TRIFENATATE 50; 25 UG/1; UG/1
POWDER RESPIRATORY (INHALATION)
Refills: 0 | Status: DISCONTINUED | COMMUNITY
End: 2019-12-02

## 2019-12-02 RX ORDER — FLUTICASONE FUROATE, UMECLIDINIUM BROMIDE AND VILANTEROL TRIFENATATE 100; 62.5; 25 UG/1; UG/1; UG/1
100-62.5-25 POWDER RESPIRATORY (INHALATION)
Refills: 0 | Status: ACTIVE | COMMUNITY

## 2020-01-06 ENCOUNTER — APPOINTMENT (OUTPATIENT)
Dept: THORACIC SURGERY | Facility: CLINIC | Age: 65
End: 2020-01-06
Payer: COMMERCIAL

## 2020-01-06 VITALS
SYSTOLIC BLOOD PRESSURE: 135 MMHG | TEMPERATURE: 98.4 F | DIASTOLIC BLOOD PRESSURE: 84 MMHG | RESPIRATION RATE: 16 BRPM | OXYGEN SATURATION: 98 % | BODY MASS INDEX: 30.65 KG/M2 | HEART RATE: 84 BPM | WEIGHT: 173 LBS

## 2020-01-06 PROCEDURE — 99213 OFFICE O/P EST LOW 20 MIN: CPT

## 2020-01-06 RX ORDER — FUROSEMIDE 40 MG/1
40 TABLET ORAL DAILY
Qty: 3 | Refills: 0 | Status: DISCONTINUED | COMMUNITY
Start: 2019-09-16 | End: 2020-01-06

## 2020-01-06 NOTE — HISTORY OF PRESENT ILLNESS
[FreeTextEntry1] : 64 year old female former smoker (1/2 PPD x 30 years, quit Jan 2018) returns today for follow up for history of lung nodules. \par \par PMHx includes COPD, HTN, HLD, and CAD. She is still awaiting right THR. She was scheduled to have the THR in January 2019, but surgery was cancelled due to abnormal stress test which prompted Cardiac Cath S/P with Stent to the RCA on 1/9/2018.\par \par CT Chest on 8/1/19 reveals:\par - RUL apical nodule with interval enlargement, currently 1.7 x 0.9 cm, previously 1.0 x 0.8 x 0.9 cm in 5/2019 and 0.9 x 0.7 x 0.7 cm in 1/2019\par - Right paratracheal adenopathy, 2.6 cm, stable since 5/2019 (previously 1.1 cm in 09/2018)\par - Stable RUL groundglass nodule\par \par S/p Flexible bronchoscopy, robotic-assisted, conversion to thoracotomy, RULobectomy with MLND on 9/3/19. Path of RUL wedge revealed AdenoCA, solid predominant and AdenoCA in situ, 1.5 cm, G3, +visceral pleura invasion and HYACINTH, (1/2) Levl 12 LNs are positive, pT2a(m)N1\par \par Pt finished chemo 12/10 with Dr. Guevara. Pt presents today for follow up. Patient reports increasing appetite and improving shortness of breath. The patient denies fever, chills, dysphagia or hemoptysis. \par \par

## 2020-01-06 NOTE — PHYSICAL EXAM
[Sclera] : the sclera and conjunctiva were normal [Extraocular Movements] : extraocular movements were intact [Neck Appearance] : the appearance of the neck was normal [Neck Cervical Mass (___cm)] : no neck mass was observed [Jugular Venous Distention Increased] : there was no jugular-venous distention [] : no respiratory distress [Respiration, Rhythm And Depth] : normal respiratory rhythm and effort [Exaggerated Use Of Accessory Muscles For Inspiration] : no accessory muscle use [Auscultation Breath Sounds / Voice Sounds] : lungs were clear to auscultation bilaterally [Heart Rate And Rhythm] : heart rate was normal and rhythm regular [Diminished Respiratory Excursion] : normal chest expansion [Bowel Sounds] : normal bowel sounds [Abdomen Soft] : soft [Abdomen Tenderness] : non-tender [Cervical Lymph Nodes Enlarged Posterior Bilaterally] : posterior cervical [Cervical Lymph Nodes Enlarged Anterior Bilaterally] : anterior cervical [Involuntary Movements] : no involuntary movements were seen [Skin Color & Pigmentation] : normal skin color and pigmentation [No Focal Deficits] : no focal deficits [Impaired Insight] : insight and judgment were intact [Oriented To Time, Place, And Person] : oriented to person, place, and time [Affect] : the affect was normal [Mood] : the mood was normal

## 2020-01-06 NOTE — ASSESSMENT
[FreeTextEntry1] : 64 year old female former smoker (1/2 PPD x 30 years, quit Jan 2018) returns today for follow up for history of lung nodules. \par \par PMHx includes COPD, HTN, HLD, and CAD. She is still awaiting right THR. She was scheduled to have the THR in January 2019, but surgery was cancelled due to abnormal stress test which prompted Cardiac Cath S/P with Stent to the RCA on 1/9/2018.\par \par CT Chest on 8/1/19 reveals:\par - RUL apical nodule with interval enlargement, currently 1.7 x 0.9 cm, previously 1.0 x 0.8 x 0.9 cm in 5/2019 and 0.9 x 0.7 x 0.7 cm in 1/2019\par - Right paratracheal adenopathy, 2.6 cm, stable since 5/2019 (previously 1.1 cm in 09/2018)\par - Stable RUL groundglass nodule\par \par S/p Flexible bronchoscopy, robotic-assisted, conversion to thoracotomy, RULobectomy with MLND on 9/3/19. Path of RUL wedge revealed AdenoCA, solid predominant and AdenoCA in situ, 1.5 cm, G3, +visceral pleura invasion and HYACINTH, (1/2) Levl 12 LNs are positive, pT2a(m)N1\par \par Pt finished chemo 12/10 with Dr. Guevara. Post treatment scan and f/u appt with Dr Guevara 1/30. \par \par I have reviewed the patient's medical records and diagnostic images at time of this office consultation and have made the following recommendation:\par 1. CT chest scan ordered by Oncology. RTO once completed approx the first week in February. \par \par Written by Lucinda Hernandez NP, acting as a scribe for Yaw Clement MD.\par The documentation recorded by the scribe accurately reflects the service I personally performed and the decisions made by me. Yaw Clement MD\par  \par  \par \par \par

## 2020-01-06 NOTE — REVIEW OF SYSTEMS
[As Noted in HPI] : as noted in HPI [Negative] : Heme/Lymph [FreeTextEntry9] : Right hip pending hip replacement in future

## 2020-01-06 NOTE — CONSULT LETTER
[FreeTextEntry2] : Wesley Joshi MD  [FreeTextEntry3] : Yaw Clement MD \par Department of Cardiovascular and Thoracic Surgery \par  \par [facility] [employer] [facility] \par \par \par

## 2020-02-03 ENCOUNTER — APPOINTMENT (OUTPATIENT)
Dept: THORACIC SURGERY | Facility: CLINIC | Age: 65
End: 2020-02-03
Payer: COMMERCIAL

## 2020-02-03 VITALS
BODY MASS INDEX: 30.83 KG/M2 | RESPIRATION RATE: 15 BRPM | SYSTOLIC BLOOD PRESSURE: 134 MMHG | WEIGHT: 174 LBS | HEART RATE: 88 BPM | DIASTOLIC BLOOD PRESSURE: 85 MMHG | TEMPERATURE: 98.7 F | OXYGEN SATURATION: 98 % | HEIGHT: 63 IN

## 2020-02-03 PROCEDURE — 99213 OFFICE O/P EST LOW 20 MIN: CPT

## 2020-02-04 NOTE — CONSULT LETTER
[Dear  ___] : Dear  [unfilled], [Courtesy Letter:] : I had the pleasure of seeing your patient, [unfilled], in my office today. [Please see my note below.] : Please see my note below. [Sincerely,] : Sincerely, [FreeTextEntry2] : Wesley Joshi MD  [FreeTextEntry3] : Yaw Clement MD\par Director of Thoracic, [facility]\par Cardiovascular & Thoracic Surgery\par [facility] \par [address] [place] [address]\[place] [address]

## 2020-02-04 NOTE — PHYSICAL EXAM
[Auscultation Breath Sounds / Voice Sounds] : lungs were clear to auscultation bilaterally [Respiration, Rhythm And Depth] : normal respiratory rhythm and effort [Sclera] : the sclera and conjunctiva were normal [PERRL With Normal Accommodation] : pupils were equal in size, round, and reactive to light [Neck Cervical Mass (___cm)] : no neck mass was observed [Neck Appearance] : the appearance of the neck was normal [Extraocular Movements] : extraocular movements were intact [Jugular Venous Distention Increased] : there was no jugular-venous distention [Thyroid Diffuse Enlargement] : the thyroid was not enlarged [Thyroid Nodule] : there were no palpable thyroid nodules [Heart Rate And Rhythm] : heart rate was normal and rhythm regular [FreeTextEntry1] : SOB on exertion. [Heart Sounds] : normal S1 and S2 [Murmurs] : no murmurs [Heart Sounds Pericardial Friction Rub] : no pericardial rub [Heart Sounds Gallop] : no gallops [Bowel Sounds] : normal bowel sounds [Abdomen Soft] : soft [Abdomen Tenderness] : non-tender [Cervical Lymph Nodes Enlarged Anterior Bilaterally] : anterior cervical [Supraclavicular Lymph Nodes Enlarged Bilaterally] : supraclavicular [Abdomen Mass (___ Cm)] : no abdominal mass palpated [Cervical Lymph Nodes Enlarged Posterior Bilaterally] : posterior cervical [Femoral Lymph Nodes Enlarged Bilaterally] : femoral [Inguinal Lymph Nodes Enlarged Bilaterally] : inguinal [Axillary Lymph Nodes Enlarged Bilaterally] : axillary [Abnormal Walk] : normal gait [Nail Clubbing] : no clubbing  or cyanosis of the fingernails [Motor Tone] : muscle strength and tone were normal [Skin Color & Pigmentation] : normal skin color and pigmentation [Musculoskeletal - Swelling] : no joint swelling seen [] : no rash [Skin Turgor] : normal skin turgor [Deep Tendon Reflexes (DTR)] : deep tendon reflexes were 2+ and symmetric [No Focal Deficits] : no focal deficits [Oriented To Time, Place, And Person] : oriented to person, place, and time [Sensation] : the sensory exam was normal to light touch and pinprick [Impaired Insight] : insight and judgment were intact [Affect] : the affect was normal

## 2020-02-04 NOTE — ASSESSMENT
[FreeTextEntry1] : 64 year old female former smoker with hx of Htn, HLD, CAD s/p coronary stent on 1/9/18, and COPD returns s/p FB; robotic-assisted, conversion to thoracotomy RULobectomy with MLND on 9/3/19.  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  Pathology of right rib excision was benign, revealed bone.  Visceral pleura invasion and HYACINTH present.  She completed chemotherapy on 12/10/19 with Dr. Guevara. \par \par CT Chest on 1/16/20 revealed:\par - s/p RULobectomy with associated postsurgical changes.  \par - Mild centrilobular emphysema.  \par - Subsegmental atelectasis in the medial aspect of the RML, increased from prior study.  \par - No consolidation.  No significant nodule or developing pulmonary mass.  \par - Scattered atelectatic changes in the right lung base adjacent to pleural fluid.  \par - Stable 10 x 9 groundglass nodule RLL posteriorly.\par - Small right pleural effusion, partially loculated, likely postsurgical.  No left pleural effusion.  \par - No mediastinal mass.  Previously noted pretracheal lymph node has decreased in size now measuring 13 mm, previously 24 mm.  No other significantly enlarged lymph nodes are noted. \par - Minimal right adrenal nodularity, stable.  Stable left adrenal nodularity.  \par \par I have reviewed the patient's medical records and diagnostic images during the time of this office visit, and I have made the following recommendation:\par 1. RTC in 3 months with noncontrast CT scan.\par \par I personally performed the services described in the documentation, reviewed the documentation recorded by the scribe in my presence and it accurately and completely records my words and actions.\par \par “I Gracie Wilkes RN am scribing for and the presence of Dr. Yaw Clement    the following sections , History of present illness , Past medical / surgical /  Family /social history ; review of systems; Vital signs; Physical examination and disposition. “\par \par

## 2020-02-04 NOTE — REVIEW OF SYSTEMS
[Feeling Tired] : feeling tired [Recent Weight Gain (___ Lbs)] : recent [unfilled] ~Ulb weight gain [Lower Ext Edema] : lower extremity edema [Shortness Of Breath] : shortness of breath [SOB on Exertion] : shortness of breath during exertion [Orthopnea] : orthopnea [Difficulty Walking] : difficulty walking [Negative] : Heme/Lymph [FreeTextEntry6] : 2 pillows to sleep. [de-identified] : Walks with a walker.

## 2020-02-04 NOTE — HISTORY OF PRESENT ILLNESS
[FreeTextEntry1] : 64 year old female returns today for follow up.  She is s/p FB; robotic-assisted, conversion to thoracotomy RULobectomy with MLND on 9/3/19.  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  Pathology of right rib excision was benign, revealed bone.  Visceral pleura invasion and HYACINTH present.  \par \par She completed chemotherapy on 12/10/19 with Dr. Guevara. \par \par She is a former smoker (1/2 PPD x 30 years, quit Jan 2018) and PMHx includes HTN, HLD, CAD (s/p coronary stenting), COPD and osteoporosis.  She is still awaiting right THR. She was scheduled to have the THR in January 2019, but surgery was cancelled due to abnormal stress test which prompted Cardiac Cath S/P with Stent to the RCA on 1/9/2018.\par \par She was last seen in the office on 1/6/20 and asked to return after CT Chest is done.  \par \par CT Chest on 1/16/20 revealed:\par - s/p RULobectomy with associated postsurgical changes.  \par - Mild centrilobular emphysema.  \par - Subsegmental atelectasis in the medial aspect of the RML, increased from prior study.  \par - No consolidation.  No significant nodule or developing pulmonary mass.  \par - Scattered atelectatic changes in the right lung base adjacent to pleural fluid.  \par - Stable 10 x 9 groundglass nodule RLL posteriorly.\par - Small right pleural effusion, partially loculated, likely postsurgical.  No left pleural effusion.  \par - No mediastinal mass.  Previously noted pretracheal lymph node has decreased in size now measuring 13 mm, previously 24 mm.  No other significantly enlarged lymph nodes are noted. \par - Minimal right adrenal nodularity, stable.  Stable left adrenal nodularity.  \par \par She presents today with shortness of breath with exertion. She denies chest pain, cough, hemoptysis, fever, palpitations, syncope, URI or recent illness.\par \par

## 2020-05-12 ENCOUNTER — APPOINTMENT (OUTPATIENT)
Dept: THORACIC SURGERY | Facility: CLINIC | Age: 65
End: 2020-05-12
Payer: COMMERCIAL

## 2020-05-12 PROCEDURE — 99213 OFFICE O/P EST LOW 20 MIN: CPT | Mod: 95

## 2020-05-12 NOTE — ASSESSMENT
[FreeTextEntry1] : 64 year old female returns today for follow up. She is s/p FB; robotic-assisted, conversion to thoracotomy RULobectomy with MLND on 9/3/19. 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 Pathology of right rib excision was benign, revealed bone. Visceral pleura invasion and HYACINTH present. \par \par She completed chemotherapy on 12/10/19 with Dr. Guevara. \par \par She is a former smoker (1/2 PPD x 30 years, quit Jan 2018) and PMHx includes HTN, HLD, CAD (s/p coronary stenting), COPD and osteoporosis. She is still awaiting right THR. She was scheduled to have the THR in January 2019, but surgery was cancelled due to abnormal stress test which prompted Cardiac Cath S/P with Stent to the RCA on 1/9/2018.\par \par CT Chest on 4/10/20:\par - stable or minimally increased in size 1cm ggo in RLL\par - post-op changes\par - stable prominent Rt paratracheal LN\par \par I have reviewed the patient's medical records and diagnostic images at time of this office consultation and have made the following recommendation:\par 1. CT reviewed with pt, stabel scan. RTC in 3 mons with CT Chest w/o contrast. \par \par \par I personally performed the services described in the documentation, reviewed the documentation recorded by the scribe in my presence and it accurately and completely records my words and actions. \par \par I, Hanh Sung, NP, am scribing for and the presence of Dr. Yaw Clement the following sections, HISTORY OF PRESENT ILLNESS, PAST MEDICAL/FAMILY/SOCIAL HISTORY; REVIEW OF SYSTEMS; VITAL SIGNS; PHYSICAL EXAM; DISPOSITION.\par \par \par \par

## 2020-05-12 NOTE — CONSULT LETTER
[Dear  ___] : Dear  [unfilled], [Consult Letter:] : I had the pleasure of evaluating your patient, [unfilled]. [( Thank you for referring [unfilled] for consultation for _____ )] : Thank you for referring [unfilled] for consultation for [unfilled] [Please see my note below.] : Please see my note below. [Consult Closing:] : Thank you very much for allowing me to participate in the care of this patient.  If you have any questions, please do not hesitate to contact me. [Sincerely,] : Sincerely, [FreeTextEntry2] : Wesley Joshi MD  [FreeTextEntry3] : Yaw Clement MD \par Department of Cardiovascular and Thoracic Surgery \par  \par [facility] \par \par \par

## 2020-05-12 NOTE — DATA REVIEWED
[FreeTextEntry1] : CT Chest on 4/10/20:\par - stable or minimally increased in size 1cm ggo in RLL\par - post-op changes\par - stable prominent Rt paratracheal LN\par

## 2020-05-12 NOTE — HISTORY OF PRESENT ILLNESS
[Home] : at home, [unfilled] , at the time of the visit. [Medical Office: ([facility])___] : at the medical office located in  [Patient] : the patient [Self] : self [FreeTextEntry4] : DIAMOND Schafer [FreeTextEntry1] : 64 year old female returns today for follow up. She is s/p FB; robotic-assisted, conversion to thoracotomy RULobectomy with MLND on 9/3/19. 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 Pathology of right rib excision was benign, revealed bone. Visceral pleura invasion and HYACITNH present. \par \par She completed chemotherapy on 12/10/19 with Dr. Guevara. \par \par She is a former smoker (1/2 PPD x 30 years, quit Jan 2018) and PMHx includes HTN, HLD, CAD (s/p coronary stenting), COPD and osteoporosis. She is still awaiting right THR. She was scheduled to have the THR in January 2019, but surgery was cancelled due to abnormal stress test which prompted Cardiac Cath S/P with Stent to the RCA on 1/9/2018.\par \par CT Chest on 1/16/20 revealed:\par - s/p RULobectomy with associated postsurgical changes. \par - Mild centrilobular emphysema. \par - Subsegmental atelectasis in the medial aspect of the RML, increased from prior study. \par - No consolidation. No significant nodule or developing pulmonary mass. \par - Scattered atelectatic changes in the right lung base adjacent to pleural fluid. \par - Stable 10 x 9 groundglass nodule RLL posteriorly.\par - Small right pleural effusion, partially loculated, likely postsurgical. No left pleural effusion. \par - No mediastinal mass. Previously noted pretracheal lymph node has decreased in size now measuring 13 mm, previously 24 mm. No other significantly enlarged lymph nodes are noted. \par - Minimal right adrenal nodularity, stable. Stable left adrenal nodularity. \par \par CT Chest on 4/10/20:\par - stable or minimally increased in size 1cm ggo in RLL\par - post-op changes\par - stable prominent Rt paratracheal LN\par \par Pt presents today for follow up via telehealth. Pt reports improvement nerve pain, denies SOB, cough or CP.\par

## 2020-06-03 NOTE — H&P PST ADULT - CENTRAL VENOUS CATHETER
CT shows marked improvement in the lung tissue changes with resolution of  most nodules and tissue changes of sarcoidosis compared to previous CT    June 2020                      May 2019:    Elisa Mooney  November 2018:  Elisa Renteria
Spoke to patient and was given results. Will discuss in more detail at next visit    Patient would like communication of their results via:        Home Phone: [phone] (home)  Okay to leave a message containing results?  Yes    Cell Phone:       Telephone Information:   Mobile [phone]
no

## 2020-08-11 ENCOUNTER — APPOINTMENT (OUTPATIENT)
Dept: THORACIC SURGERY | Facility: CLINIC | Age: 65
End: 2020-08-11
Payer: COMMERCIAL

## 2020-08-11 VITALS
OXYGEN SATURATION: 94 % | RESPIRATION RATE: 16 BRPM | WEIGHT: 185 LBS | SYSTOLIC BLOOD PRESSURE: 140 MMHG | HEART RATE: 82 BPM | BODY MASS INDEX: 32.77 KG/M2 | DIASTOLIC BLOOD PRESSURE: 80 MMHG

## 2020-08-11 PROCEDURE — 99213 OFFICE O/P EST LOW 20 MIN: CPT

## 2020-08-11 NOTE — HISTORY OF PRESENT ILLNESS
[FreeTextEntry1] : 64 year old female returns today for follow up. She is s/p FB; robotic-assisted, conversion to thoracotomy RULobectomy with MLND on 9/3/19. 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 Pathology of right rib excision was benign, revealed bone. Visceral pleura invasion and HYACINTH present. \par \par She completed chemotherapy on 12/10/19 with Dr. Guevara. \par \par She is a former smoker (1/2 PPD x 30 years, quit Jan 2018) and PMHx includes HTN, HLD, CAD (s/p coronary stenting), COPD and osteoporosis. She is still awaiting right THR. She was scheduled to have the THR in January 2019, but surgery was cancelled due to abnormal stress test which prompted Cardiac Cath S/P with Stent to the RCA on 1/9/2018.\par \par CT Chest on 1/16/20 revealed:\par - s/p RULobectomy with associated postsurgical changes. \par - Mild centrilobular emphysema. \par - Subsegmental atelectasis in the medial aspect of the RML, increased from prior study. \par - No consolidation. No significant nodule or developing pulmonary mass. \par - Scattered atelectatic changes in the right lung base adjacent to pleural fluid. \par - Stable 10 x 9 groundglass nodule RLL posteriorly.\par - Small right pleural effusion, partially loculated, likely postsurgical. No left pleural effusion. \par - No mediastinal mass. Previously noted pretracheal lymph node has decreased in size now measuring 13 mm, previously 24 mm. No other significantly enlarged lymph nodes are noted. \par - Minimal right adrenal nodularity, stable. Stable left adrenal nodularity. \par \par CT Chest on 4/10/20:\par - stable or minimally increased in size 1cm ggo in RLL\par - post-op changes\par - stable prominent Rt paratracheal LN\par \par CT Chest on 8/5/2020:\par - slightly decreased in size 9mm RLL ggo (6:235)\par - stable prominent mediastinal LN\par \par Pt presents today for follow up. Admits to chronic SOB 2/2 COPD. Denies CP or cough.

## 2020-08-11 NOTE — CONSULT LETTER
[Consult Letter:] : I had the pleasure of evaluating your patient, [unfilled]. [( Thank you for referring [unfilled] for consultation for _____ )] : Thank you for referring [unfilled] for consultation for [unfilled] [Please see my note below.] : Please see my note below. [Consult Closing:] : Thank you very much for allowing me to participate in the care of this patient.  If you have any questions, please do not hesitate to contact me. [Sincerely,] : Sincerely, [FreeTextEntry2] : Wesley Joshi MD  [FreeTextEntry3] : Yaw Clement MD\par Director of Thoracic, [facility]\par Cardiovascular & Thoracic Surgery\par \par [facility]\par [address]\par Oncology Building 3rd Fl\[place] [address]\par Tel: [phone]\par Fax: [phone]\par

## 2020-08-11 NOTE — PHYSICAL EXAM
[Auscultation Breath Sounds / Voice Sounds] : lungs were clear to auscultation bilaterally [Heart Rate And Rhythm] : heart rate was normal and rhythm regular [Heart Sounds] : normal S1 and S2 [Heart Sounds Gallop] : no gallops [Murmurs] : no murmurs [Heart Sounds Pericardial Friction Rub] : no pericardial rub [Diminished Respiratory Excursion] : normal chest expansion [Examination Of The Chest] : the chest was normal in appearance [Chest Visual Inspection Thoracic Asymmetry] : no chest asymmetry [Bowel Sounds] : normal bowel sounds [Abdomen Soft] : soft [Abdomen Tenderness] : non-tender [Abdomen Mass (___ Cm)] : no abdominal mass palpated [Skin Color & Pigmentation] : normal skin color and pigmentation [Skin Turgor] : normal skin turgor [] : no rash [Deep Tendon Reflexes (DTR)] : deep tendon reflexes were 2+ and symmetric [Sensation] : the sensory exam was normal to light touch and pinprick [No Focal Deficits] : no focal deficits [Impaired Insight] : insight and judgment were intact [Oriented To Time, Place, And Person] : oriented to person, place, and time [Affect] : the affect was normal

## 2020-08-11 NOTE — ASSESSMENT
[FreeTextEntry1] : 64 year old female returns today for follow up. She is s/p FB; robotic-assisted, conversion to thoracotomy RULobectomy with MLND on 9/3/19. 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 Pathology of right rib excision was benign, revealed bone. Visceral pleura invasion and HYACINTH present. \par \par She completed chemotherapy on 12/10/19 with Dr. Guevara. \par \par She is a former smoker (1/2 PPD x 30 years, quit Jan 2018) and PMHx includes HTN, HLD, CAD (s/p coronary stenting), COPD and osteoporosis. She is still awaiting right THR. She was scheduled to have the THR in January 2019, but surgery was cancelled due to abnormal stress test which prompted Cardiac Cath S/P with Stent to the RCA on 1/9/2018.\par \par CT Chest on 8/5/2020:\par - slightly decreased in size 9mm RLL ggo (6:235)\par - stable prominent mediastinal LN\par \par I have reviewed the patient's medical records and diagnostic images at time of this office consultation and have made the following recommendation:\par 1. CT scan showed no evidence of recurrence, recommended patient to return to office in 6mo with CT Chest w/o contrast\par \par \par I personally performed the services described in the documentation, reviewed the documentation recorded by the scribe in my presence and it accurately and completely records my words and actions.\par \par I, Shannan Roman, DIAMOND, am scribing for and the presence of STARR Kay, the following sections HISTORY OF PRESENT ILLNESS, PAST MEDICAL/FAMILY/SOCIAL HISTORY; REVIEW OF SYSTEMS; VITAL SIGNS; PHYSICAL EXAM; DISPOSITION.\par \par

## 2020-08-11 NOTE — DATA REVIEWED
[FreeTextEntry1] : CT Chest on 8/5/2020:\par - slightly decreased in size 9mm RLL ggo (6:235)\par - stable prominent mediastinal LN

## 2020-10-01 ENCOUNTER — APPOINTMENT (OUTPATIENT)
Dept: ORTHOPEDIC SURGERY | Facility: CLINIC | Age: 65
End: 2020-10-01
Payer: MEDICARE

## 2020-10-01 VITALS
SYSTOLIC BLOOD PRESSURE: 190 MMHG | WEIGHT: 190 LBS | BODY MASS INDEX: 37.3 KG/M2 | HEART RATE: 88 BPM | HEIGHT: 60 IN | DIASTOLIC BLOOD PRESSURE: 81 MMHG | TEMPERATURE: 97.1 F

## 2020-10-01 DIAGNOSIS — Z82.49 FAMILY HISTORY OF ISCHEMIC HEART DISEASE AND OTHER DISEASES OF THE CIRCULATORY SYSTEM: ICD-10-CM

## 2020-10-01 DIAGNOSIS — Z86.79 PERSONAL HISTORY OF OTHER DISEASES OF THE CIRCULATORY SYSTEM: ICD-10-CM

## 2020-10-01 DIAGNOSIS — M41.50 OTHER SECONDARY SCOLIOSIS, SITE UNSPECIFIED: ICD-10-CM

## 2020-10-01 DIAGNOSIS — Z78.9 OTHER SPECIFIED HEALTH STATUS: ICD-10-CM

## 2020-10-01 PROCEDURE — 72100 X-RAY EXAM L-S SPINE 2/3 VWS: CPT

## 2020-10-01 PROCEDURE — 73502 X-RAY EXAM HIP UNI 2-3 VIEWS: CPT | Mod: RT

## 2020-10-01 PROCEDURE — 99214 OFFICE O/P EST MOD 30 MIN: CPT

## 2020-10-01 NOTE — DISCUSSION/SUMMARY
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 \par The natural history and treatment of degenerative arthritis was discussed with the patient at length today. The spectrum of treatment including nonoperative modalities to surgical intervention was elucidated. Noninvasive and nonoperative treatment modalities include weight reduction, activity modification with low impact exercise,  as needed use of acetaminophen or anti-inflammatory medications if tolerated, glucosamine/chondroitin supplements, and physical therapy. Further treatments can include corticosteroid injection and the use of viscosupplementation with hyaluronic acid injections. Definitive surgical treatment can certainly include total joint arthroplasty also. The risks and benefits of each treatment options was discussed and all questions were answered.\par In view of lack of adequate pain relief with conservative (non-surgical) management protocol including physical therapy, home exercises, weight loss, activity modification, NSAIDS; the patient is recommended to consider a right Total Hip Replacement. \par \par The risks, benefits, alternatives, implications, complications including but not limited to pain, stiffness, bleeding, limp, wound breakdown, infection, limb length discrepancy, dislocation, bone fracture, nerve and vascular compromise, implant wear and durability issues and rehabilitation were discussed and relevant questions were addressed. The possibility of recurrent pain, no improvement in pain and actual worsening of the pain were also mentioned in conversation with the patient. Medical complications related to the patient's general medical health including deep vein thrombosis, pulmonary embolus, heart attack, stroke, death and other complications from anesthesia were discussed as well. Anticoagulation prophylaxis medication options to address risks of deep vein thrombosis and pulmonary embolism were discussed and weighed against the risks of bleeding and wound healing complications. The patient elected Ecotrin/Xarelto prophylaxis with mechanical modalities.\par \par I have reviewed the plan of care as well as a model of a total hip replacement implant equivalent to the one that will be used for their total hip replacement.  The patient agrees with the plan of care, as well as the use of implants for their total hip replacement.  The patient wishes to proceed and will undergo preoperative medical evaluation and clearance, attend the preoperative educational class and will schedule surgery appropriately.\par \par The patient was advised that a COVID PCR test would be required within 72 hours prior to surgery. \par \par She will need cardiac and pulmonary clearances due to medical history of CAD with stent along with lung ca history.

## 2020-10-01 NOTE — HISTORY OF PRESENT ILLNESS
[de-identified] : Ms. ERIKA CORRIGAN is a 64 year old female presents with right hip pain, present for more than three years, now worsening.  She localizes the pain to the groin and lateral aspect of the () hip.  The patient describes the pain as sharp and constant, and states it is worsened by activity. She states the pain is present when walking, climbing stairs, standing from a seated position, and deep flexion of the hip.  She admits to worsening stiffness of the hip, which is now interfering with ADLs such as dressing and toe nail care. She has been taking NSAIDs for pain with minimal relief. She admits to prior conservative management inclusive of physical therapy. She admits to  lower back pain, and admits/denies numbness and tingling of the lower extremities. The patient admits to limitations in her  quality of life, and is present to discuss options for treatment.\par She was indicated for a right total hip replacement in 2017, but was found to have cardiac issues. She had a stent placed. She also admits to lung CA, with partial lobectomy. \par She notes she was recently seen by her PCP/cardiologist, and advised she can proceed with total hip.  [Worsening] : worsening [8] : a current pain level of 8/10

## 2020-10-01 NOTE — PHYSICAL EXAM
[Poor Appearance] : well-appearing [Acute Distress] : not in acute distress [Obese] : not obese [Antalgic] : antalgic [LE] : Sensory: Intact in bilateral lower extremities [Knee] : patellar 2+ and symmetric bilaterally [Ankle] : ankle 2+ and symmetric bilaterally [DP] : dorsalis pedis 2+ and symmetric bilaterally [PT] : posterior tibial 2+ and symmetric bilaterally [FreeTextEntry2] : On general examination the patient is adequately groomed and nourished. The vital parameters are as recorded. \par There is no lymphedema or diffuse swelling, no varicose veins, no skin warmth/erythema/scars/swelling, no ulcers and no palpable lymph nodes or masses in both lower extremities. Bilateral pedal pulses are well palpable.\par Upper Extremity:\par Both right and left upper extremities are unremarkable in terms of skin rash, lesions, pigmentation, redness, tenderness, swelling, joint instability, abnormal deformity or crepitus. The overall range of motion, sensation, motor tone and strength testing are normal.\par \par Hip Exam:\par The gait is right stiff hip coxalgic.\par The patient has unequal leg lengths secondary to pelvic tilt. \par Puneet/Bernardo test is 12 inches on the right and 6 inches on the left. \par Active SLR is 40 degrees on the right and 60 degrees on the left. Both hips demonstrate no scars and the skin has no signs of inflammation or tenderness. \par Both Hips have a range of motion that is symmetrical in flexion and extension of:\par Hip flexion:             Right 60 degrees                Left 100 degrees\par Hip abduction:      Right 20 degrees                  Left 40 degrees\par Hip adduction:      Right 0 degrees                    Left 20 degrees\par Internal rotation:      Right 0 degrees                   Left 20 degrees\par External rotation:    Right 20 degrees                  Left 40 degrees\par There is no flexion contracture, deformity or instability. \par Labral impingement tests are negative.\par Right hip flexor, abductor and extensor power is grade 4+.\par Left hip flexor, abductor and extensor power is grade 5.\par \par Spine Exam:\par There is mild curvature of the spine with loss of normal lumbar lordosis. The skin is devoid of erythema, scars, pigmentation or rashes. There is mild lumbar spasm and tenderness especially at L4-L5 or L5-S1. \par The range of motion of the lumbar spine is limited by pain and spasm. Forward flexion is 80% normal, extension catch positive, lateral flexion and rotation 80% normal. There is no lumbar spine imbalance or instability detected.\par Bilateral passive SLR is right 40 degrees, left 40 degrees in supine and sitting positions. Lasegue's Test is negative.\par Neurology:\par The patient is alert and oriented in person, place and time. The mood is calm and affect is normal.\par Testing for coordination including Rhomberg's Test and Finger-Nose Test, sensation, motor tone and power and deep tendon reflexes in both lower extremities is normal.\par \par Spine Exam:\par There is a scoliotic curvature  of the spine with loss of normal lumbar lordosis. The skin is devoid of erythema, scars, pigmentation or rashes. There is mild lumbar spasm and tenderness especially at L4-L5 or L5-S1. \par The range of motion of the lumbar spine is limited by pain and spasm. Forward flexion is 80% normal, extension catch positive, lateral flexion and rotation 80% normal. There is no lumbar spine imbalance or instability detected.\par Bilateral passive SLR is right 40 degrees, left 40 degrees in supine and sitting positions. Lasegue's Test is negative.\par Neurology:\par The patient is alert and oriented in person, place and time. The mood is calm and affect is normal.\par Testing for coordination including Rhomberg's Test and Finger-Nose Test, sensation, motor tone and power and deep tendon reflexes in both lower extremities is normal.\par  [de-identified] : The following radiographs were ordered and read by me during this patients visit. I reviewed each radiograph in detail with the patient and discussed the findings as highlighted below. \par AP and false profile views of the right hip and AP view of the pelvis confirm advanced degenerative joint disease with joint space collapse osteophyte and cyst formation\par AP and lateral views of the lumbar spine reveal degenerative scoliosis. \par \par

## 2021-02-09 ENCOUNTER — APPOINTMENT (OUTPATIENT)
Dept: THORACIC SURGERY | Facility: CLINIC | Age: 66
End: 2021-02-09
Payer: MEDICARE

## 2021-02-09 VITALS
RESPIRATION RATE: 18 BRPM | BODY MASS INDEX: 35.5 KG/M2 | HEIGHT: 61 IN | TEMPERATURE: 98.4 F | DIASTOLIC BLOOD PRESSURE: 100 MMHG | SYSTOLIC BLOOD PRESSURE: 184 MMHG | WEIGHT: 188 LBS | OXYGEN SATURATION: 98 % | HEART RATE: 83 BPM

## 2021-02-09 PROCEDURE — 99215 OFFICE O/P EST HI 40 MIN: CPT

## 2021-02-09 RX ORDER — FUROSEMIDE 40 MG/1
40 TABLET ORAL
Refills: 0 | Status: ACTIVE | COMMUNITY

## 2021-02-09 RX ORDER — POTASSIUM 75 MG
TABLET ORAL
Refills: 0 | Status: ACTIVE | COMMUNITY

## 2021-02-09 NOTE — DATA REVIEWED
[FreeTextEntry1] : CT Chest w/o IV contrast on 2/4/2021:\par - s/p RULobectomy with emphysema\par - No new mass or nodules\par - Stable to slightly decreased in size, 9mm RLL gg nodule (10:234).\par - small loculated right pleural effusion\par \par PET/CT on 2/5/21 showed FDG-avid paratracheal LN.

## 2021-02-09 NOTE — PHYSICAL EXAM
[Auscultation Breath Sounds / Voice Sounds] : lungs were clear to auscultation bilaterally [Heart Rate And Rhythm] : heart rate was normal and rhythm regular [Heart Sounds] : normal S1 and S2 [Heart Sounds Gallop] : no gallops [Murmurs] : no murmurs [Heart Sounds Pericardial Friction Rub] : no pericardial rub [Examination Of The Chest] : the chest was normal in appearance [Chest Visual Inspection Thoracic Asymmetry] : no chest asymmetry [Diminished Respiratory Excursion] : normal chest expansion [Abdomen Soft] : soft [Bowel Sounds] : normal bowel sounds [Abdomen Tenderness] : non-tender [Abdomen Mass (___ Cm)] : no abdominal mass palpated [Skin Color & Pigmentation] : normal skin color and pigmentation [Skin Turgor] : normal skin turgor [] : no rash [Deep Tendon Reflexes (DTR)] : deep tendon reflexes were 2+ and symmetric [Sensation] : the sensory exam was normal to light touch and pinprick [No Focal Deficits] : no focal deficits [Oriented To Time, Place, And Person] : oriented to person, place, and time [Impaired Insight] : insight and judgment were intact [Affect] : the affect was normal

## 2021-02-09 NOTE — ASSESSMENT
[FreeTextEntry1] : 65 year old female returns today for follow up. She is s/p FB; robotic-assisted, conversion to thoracotomy RULobectomy with MLND on 9/3/19. 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 Pathology of right rib excision was benign, revealed bone. Visceral pleura invasion and HYACINTH present. \par \par She completed chemotherapy on 12/10/19 with Dr. Guevara. \par \par She is a former smoker (1/2 PPD x 30 years, quit Jan 2018) and PMHx includes HTN, HLD, CAD (s/p coronary stenting), COPD and osteoporosis. She is still awaiting right THR. She was scheduled to have the THR in January 2019, but surgery was cancelled due to abnormal stress test which prompted Cardiac Cath S/P with Stent to the RCA on 1/9/2018.\par \par CT Chest w/o IV contrast on 2/4/2021:\par - s/p RULobectomy with emphysema\par - No new mass or nodules\par - Stable to slightly decreased in size, 9mm RLL gg nodule (10:234).\par - small loculated right pleural effusion\par \par PET/CT on 2/5/21 showed FDG-avid paratracheal LN.\par \par I have reviewed the patient's medical records and diagnostic images at time of this office consultation and have made the following recommendation:\par 1. CT and PET scans reviewed, I recommended a Flex Bronch EBUS Bx on 3/9/21. Risks and benefits and alternatives explained to patient, all questions answered, patient agreed to proceed with procedure.\par 2. Medical clearance, PST and COVID testing prior.\par \par \par I personally performed the services described in the documentation, reviewed the documentation recorded by the scribe in my presence and it accurately and completely records my words and actions.\par \par I, Shannan Roman NP, am scribing for and the presence of STARR Kay, the following sections HISTORY OF PRESENT ILLNESS, PAST MEDICAL/FAMILY/SOCIAL HISTORY; REVIEW OF SYSTEMS; VITAL SIGNS; PHYSICAL EXAM; DISPOSITION.\par \par

## 2021-02-09 NOTE — HISTORY OF PRESENT ILLNESS
[FreeTextEntry1] : 65 year old female returns today for follow up. She is s/p FB; robotic-assisted, conversion to thoracotomy RULobectomy with MLND on 9/3/19. 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 Pathology of right rib excision was benign, revealed bone. Visceral pleura invasion and HYACINTH present. \par \par She completed chemotherapy on 12/10/19 with Dr. Guevara. \par \par She is a former smoker (1/2 PPD x 30 years, quit Jan 2018) and PMHx includes HTN, HLD, CAD (s/p coronary stenting), COPD and osteoporosis. She is still awaiting right THR. She was scheduled to have the THR in January 2019, but surgery was cancelled due to abnormal stress test which prompted Cardiac Cath S/P with Stent to the RCA on 1/9/2018.\par \par CT Chest on 8/5/2020:\par - slightly decreased in size 9mm RLL ggo (6:235)\par - stable prominent mediastinal LN\par \par CT Chest w/o IV contrast on 2/4/2021:\par - s/p RULobectomy with emphysema\par - No new mass or nodules\par - Stable to slightly decreased in size, 9mm RLL gg nodule (10:234).\par - small loculated right pleural effusion\par \par PET/CT on 2/5/21 showed FDG-avid paratracheal LN.\par \par Patient is here today for 6 month follow up CT Chest w/o contrast. Chronic SOB 2/2 COPD.\par \par

## 2021-02-09 NOTE — CONSULT LETTER
[Consult Letter:] : I had the pleasure of evaluating your patient, [unfilled]. [( Thank you for referring [unfilled] for consultation for _____ )] : Thank you for referring [unfilled] for consultation for [unfilled] [Please see my note below.] : Please see my note below. [Consult Closing:] : Thank you very much for allowing me to participate in the care of this patient.  If you have any questions, please do not hesitate to contact me. [Sincerely,] : Sincerely, [FreeTextEntry2] : Wesley Joshi MD  [FreeTextEntry3] : Yaw Clement MD\par Director of Thoracic, [facility]\par Cardiovascular & Thoracic Surgery\par \par [facility]\par [address]\par Oncology Building 3rd Fl\[place] [address]\par Tel: [phone]\par Fax: [phone]\par

## 2021-03-02 ENCOUNTER — OUTPATIENT (OUTPATIENT)
Dept: OUTPATIENT SERVICES | Facility: HOSPITAL | Age: 66
LOS: 1 days | End: 2021-03-02
Payer: MEDICARE

## 2021-03-02 VITALS
RESPIRATION RATE: 16 BRPM | SYSTOLIC BLOOD PRESSURE: 160 MMHG | TEMPERATURE: 97 F | OXYGEN SATURATION: 98 % | HEART RATE: 92 BPM | WEIGHT: 190.04 LBS | HEIGHT: 60 IN | DIASTOLIC BLOOD PRESSURE: 90 MMHG

## 2021-03-02 DIAGNOSIS — J44.9 CHRONIC OBSTRUCTIVE PULMONARY DISEASE, UNSPECIFIED: ICD-10-CM

## 2021-03-02 DIAGNOSIS — R59.9 ENLARGED LYMPH NODES, UNSPECIFIED: ICD-10-CM

## 2021-03-02 DIAGNOSIS — R59.0 LOCALIZED ENLARGED LYMPH NODES: ICD-10-CM

## 2021-03-02 DIAGNOSIS — Z90.2 ACQUIRED ABSENCE OF LUNG [PART OF]: Chronic | ICD-10-CM

## 2021-03-02 DIAGNOSIS — I10 ESSENTIAL (PRIMARY) HYPERTENSION: ICD-10-CM

## 2021-03-02 DIAGNOSIS — Z85.118 PERSONAL HISTORY OF OTHER MALIGNANT NEOPLASM OF BRONCHUS AND LUNG: Chronic | ICD-10-CM

## 2021-03-02 DIAGNOSIS — Z98.891 HISTORY OF UTERINE SCAR FROM PREVIOUS SURGERY: Chronic | ICD-10-CM

## 2021-03-02 LAB
ANION GAP SERPL CALC-SCNC: 13 MMOL/L — SIGNIFICANT CHANGE UP (ref 7–14)
BUN SERPL-MCNC: 24 MG/DL — HIGH (ref 7–23)
CALCIUM SERPL-MCNC: 9.7 MG/DL — SIGNIFICANT CHANGE UP (ref 8.4–10.5)
CHLORIDE SERPL-SCNC: 98 MMOL/L — SIGNIFICANT CHANGE UP (ref 98–107)
CO2 SERPL-SCNC: 26 MMOL/L — SIGNIFICANT CHANGE UP (ref 22–31)
CREAT SERPL-MCNC: 1.19 MG/DL — SIGNIFICANT CHANGE UP (ref 0.5–1.3)
GLUCOSE SERPL-MCNC: 109 MG/DL — HIGH (ref 70–99)
HCT VFR BLD CALC: 41.4 % — SIGNIFICANT CHANGE UP (ref 34.5–45)
HGB BLD-MCNC: 13.8 G/DL — SIGNIFICANT CHANGE UP (ref 11.5–15.5)
MCHC RBC-ENTMCNC: 30.6 PG — SIGNIFICANT CHANGE UP (ref 27–34)
MCHC RBC-ENTMCNC: 33.3 GM/DL — SIGNIFICANT CHANGE UP (ref 32–36)
MCV RBC AUTO: 91.8 FL — SIGNIFICANT CHANGE UP (ref 80–100)
NRBC # BLD: 0 /100 WBCS — SIGNIFICANT CHANGE UP
NRBC # FLD: 0 K/UL — SIGNIFICANT CHANGE UP
PLATELET # BLD AUTO: 343 K/UL — SIGNIFICANT CHANGE UP (ref 150–400)
POTASSIUM SERPL-MCNC: 3.2 MMOL/L — LOW (ref 3.5–5.3)
POTASSIUM SERPL-SCNC: 3.2 MMOL/L — LOW (ref 3.5–5.3)
RBC # BLD: 4.51 M/UL — SIGNIFICANT CHANGE UP (ref 3.8–5.2)
RBC # FLD: 11.9 % — SIGNIFICANT CHANGE UP (ref 10.3–14.5)
SODIUM SERPL-SCNC: 137 MMOL/L — SIGNIFICANT CHANGE UP (ref 135–145)
WBC # BLD: 9.69 K/UL — SIGNIFICANT CHANGE UP (ref 3.8–10.5)
WBC # FLD AUTO: 9.69 K/UL — SIGNIFICANT CHANGE UP (ref 3.8–10.5)

## 2021-03-02 PROCEDURE — 93010 ELECTROCARDIOGRAM REPORT: CPT

## 2021-03-02 RX ORDER — ACETAMINOPHEN 500 MG
2 TABLET ORAL
Qty: 0 | Refills: 0 | DISCHARGE

## 2021-03-02 RX ORDER — SODIUM CHLORIDE 9 MG/ML
1000 INJECTION, SOLUTION INTRAVENOUS
Refills: 0 | Status: DISCONTINUED | OUTPATIENT
Start: 2021-03-09 | End: 2021-03-23

## 2021-03-02 NOTE — H&P PST ADULT - NSICDXPASTMEDICALHX_GEN_ALL_CORE_FT
PAST MEDICAL HISTORY:  Carotid artery disease monitored by vascluar doctor Doscher    Emphysema     Hip pain     Hyperlipidemia     Hypertension     Pulmonary nodules yearly CT scans PAST MEDICAL HISTORY:  Carotid artery disease monitored by vascluar doctor Doscher    Edema of both legs     Emphysema     Hip pain     Hyperlipidemia     Hypertension     Localized enlarged lymph nodes     Lumbar disc disorder     Lung cancer right    Obesity     Pulmonary nodules yearly CT scans    Scoliosis     Stented coronary artery

## 2021-03-02 NOTE — H&P PST ADULT - VENOUS THROMBOEMBOLISM CURRENT STATUS
(1) other risk factor (includes escalating BMI, pack-years of smoking, diabetes requiring insulin, chemotherapy, female gender and length of surgery) (1) other risk factor (includes escalating BMI, pack-years of smoking, diabetes requiring insulin, chemotherapy, female gender and length of surgery)/(2) malignancy (present or previous)

## 2021-03-02 NOTE — H&P PST ADULT - HISTORY OF PRESENT ILLNESS
Pt is a 65 yr old female scheduled for Flexible Bronchoscopy Endobronchial U/S Biopsy with Cytology     62 yo female  former 38-pack year smoker with history of emphysema, HTN, HLD, CAD with chronic pulmonary nodules presents to PST unit with pre-op diagnosis of solitary pulmonary nodule scheduled for flexible bronchoscopy, right robotic assisted lung resection on 09/03/2019. She reports long term monitoring of lung nodules over 15 years with abnormality and lymphadenopathy on six months ago followed by CT scan 3 months later with enlarging nodule in the right lung.  Pt is a 65 yr old female scheduled for Flexible Bronchoscopy Endobronchial U/S Biopsy with Cytology with Dr Clement tentatively 3/9/21 - pt hx righ t lung resection 2019 for ca - chemo completed - pt found to have enlarged lymph node and now for biopsy. Pt hx CAD 2 stents placed 2018 - on ASA.   Pt denies COVID or recent travel   Pt to have COVID preop test

## 2021-03-02 NOTE — H&P PST ADULT - MUSCULOSKELETAL COMMENTS
Pt c/o of lumbar disc disorder and pain with walking - also right hip pain - to have replacement surgery in future Pt c/o of right hip pain and lower back pain with wt bearing Pt c/o of scoliosis and lumbar disc disorder and pain with walking - uses walker for support - also right hip pain - to have replacement surgery in future

## 2021-03-02 NOTE — H&P PST ADULT - RESPIRATORY AND THORAX COMMENTS
Hx COPD - pt symptoms controlled with med - pt denies use of Inhaler - hx of right lung surgery 2019 - chemo

## 2021-03-02 NOTE — H&P PST ADULT - NSICDXPROBLEM_GEN_ALL_CORE_FT
PROBLEM DIAGNOSES  Problem: Enlarged lymph node  Assessment and Plan: Pt scheduled for surgery and preop instructions including instructions for taking Famotidine on the day of surgery, given verbally and with use of  written materials, and patient confirming understanding of such instructions using  teach back   method.  OR Booking notified of sergio precautions , CAD stents x2 / ASA , allergy to pcn   Call to Dr Clement office to confirm pt to stay on ASA for surgery for stent protection and that patient is to see PCP for MC   CC requested by PST      Problem: HTN (hypertension)  Assessment and Plan: Pt to take Metoprolol am DOS    Problem: COPD, moderate  Assessment and Plan: Pt to use Trelegy am DOS

## 2021-03-02 NOTE — H&P PST ADULT - NSICDXFAMILYHX_GEN_ALL_CORE_FT
FAMILY HISTORY:  Mother  Still living? Unknown  Family history of CHF (congestive heart failure), Age at diagnosis: Age Unknown    Aunt  Still living? No  Family history of CHF (congestive heart failure), Age at diagnosis: Age Unknown FAMILY HISTORY:  Aunt  Still living? No  Family history of CHF (congestive heart failure), Age at diagnosis: Age Unknown

## 2021-03-03 LAB
APPEARANCE: CLEAR
BILIRUBIN URINE: NEGATIVE
BLOOD URINE: NEGATIVE
COLOR: YELLOW
GLUCOSE QUALITATIVE U: NEGATIVE
INR PPP: 1.04 RATIO
KETONES URINE: NEGATIVE
LEUKOCYTE ESTERASE URINE: NEGATIVE
NITRITE URINE: NEGATIVE
PH URINE: 6
PROTEIN URINE: ABNORMAL
PT BLD: 12.4 SEC
SPECIFIC GRAVITY URINE: 1.03
UROBILINOGEN URINE: NORMAL

## 2021-03-06 ENCOUNTER — APPOINTMENT (OUTPATIENT)
Dept: DISASTER EMERGENCY | Facility: CLINIC | Age: 66
End: 2021-03-06

## 2021-03-06 ENCOUNTER — LABORATORY RESULT (OUTPATIENT)
Age: 66
End: 2021-03-06

## 2021-03-08 NOTE — ASU PATIENT PROFILE, ADULT - PMH
Carotid artery disease  monitored by vascluar doctor Doscher  Edema of both legs    Emphysema    Hip pain    Hyperlipidemia    Hypertension    Localized enlarged lymph nodes    Lumbar disc disorder    Lung cancer  right  Obesity    Pulmonary nodules  yearly CT scans  Scoliosis    Stented coronary artery

## 2021-03-09 ENCOUNTER — APPOINTMENT (OUTPATIENT)
Dept: THORACIC SURGERY | Facility: HOSPITAL | Age: 66
End: 2021-03-09

## 2021-03-09 ENCOUNTER — RESULT REVIEW (OUTPATIENT)
Age: 66
End: 2021-03-09

## 2021-03-09 ENCOUNTER — OUTPATIENT (OUTPATIENT)
Dept: OUTPATIENT SERVICES | Facility: HOSPITAL | Age: 66
LOS: 1 days | Discharge: HOME | End: 2021-03-09
Payer: MEDICARE

## 2021-03-09 VITALS
RESPIRATION RATE: 16 BRPM | WEIGHT: 190.04 LBS | SYSTOLIC BLOOD PRESSURE: 177 MMHG | HEIGHT: 60 IN | OXYGEN SATURATION: 99 % | HEART RATE: 78 BPM | TEMPERATURE: 98 F | DIASTOLIC BLOOD PRESSURE: 67 MMHG

## 2021-03-09 VITALS
RESPIRATION RATE: 17 BRPM | HEART RATE: 74 BPM | TEMPERATURE: 98 F | DIASTOLIC BLOOD PRESSURE: 60 MMHG | SYSTOLIC BLOOD PRESSURE: 144 MMHG | OXYGEN SATURATION: 98 %

## 2021-03-09 DIAGNOSIS — Z90.2 ACQUIRED ABSENCE OF LUNG [PART OF]: Chronic | ICD-10-CM

## 2021-03-09 DIAGNOSIS — R59.0 LOCALIZED ENLARGED LYMPH NODES: ICD-10-CM

## 2021-03-09 DIAGNOSIS — Z85.118 PERSONAL HISTORY OF OTHER MALIGNANT NEOPLASM OF BRONCHUS AND LUNG: Chronic | ICD-10-CM

## 2021-03-09 DIAGNOSIS — Z98.891 HISTORY OF UTERINE SCAR FROM PREVIOUS SURGERY: Chronic | ICD-10-CM

## 2021-03-09 PROCEDURE — 88305 TISSUE EXAM BY PATHOLOGIST: CPT | Mod: 26

## 2021-03-09 PROCEDURE — 71045 X-RAY EXAM CHEST 1 VIEW: CPT | Mod: 26

## 2021-03-09 PROCEDURE — 88173 CYTOPATH EVAL FNA REPORT: CPT | Mod: 26

## 2021-03-09 PROCEDURE — 88112 CYTOPATH CELL ENHANCE TECH: CPT | Mod: 26,59

## 2021-03-09 PROCEDURE — 31653 BRONCH EBUS SAMPLNG 3/> NODE: CPT

## 2021-03-09 RX ORDER — FENTANYL CITRATE 50 UG/ML
25 INJECTION INTRAVENOUS
Refills: 0 | Status: DISCONTINUED | OUTPATIENT
Start: 2021-03-09 | End: 2021-03-09

## 2021-03-09 RX ORDER — METOCLOPRAMIDE HCL 10 MG
10 TABLET ORAL ONCE
Refills: 0 | Status: DISCONTINUED | OUTPATIENT
Start: 2021-03-09 | End: 2021-03-23

## 2021-03-09 RX ORDER — ONDANSETRON 8 MG/1
4 TABLET, FILM COATED ORAL ONCE
Refills: 0 | Status: DISCONTINUED | OUTPATIENT
Start: 2021-03-09 | End: 2021-03-23

## 2021-03-09 NOTE — ASU DISCHARGE PLAN (ADULT/PEDIATRIC) - CARE PROVIDER_API CALL
Yaw Clement)  Thoracic Surgery  [address] [place] [address]  Phone: [phone]  Fax: [phone]  Follow Up Time:

## 2021-03-09 NOTE — BRIEF OPERATIVE NOTE - NSICDXBRIEFPOSTOP_GEN_ALL_CORE_FT
POST-OP DIAGNOSIS:  Mediastinal lymphadenopathy 09-Mar-2021 10:02:27  Bhaskar Aguilar   DISPLAY PLAN FREE TEXT

## 2021-03-09 NOTE — ASU DISCHARGE PLAN (ADULT/PEDIATRIC) - CALL YOUR DOCTOR IF YOU HAVE ANY OF THE FOLLOWING:
Bleeding that does not stop/Swelling that gets worse/Pain not relieved by Medications/Fever greater than (need to indicate Fahrenheit or Celsius)/Nausea and vomiting that does not stop/Unable to urinate/Inability to tolerate liquids or foods/Increased irritability or sluggishness

## 2021-03-09 NOTE — BRIEF OPERATIVE NOTE - NSICDXBRIEFPREOP_GEN_ALL_CORE_FT
PRE-OP DIAGNOSIS:  Thoracic lymphadenopathy 09-Mar-2021 09:43:14  Bhaskar Aguilar   PRE-OP DIAGNOSIS:  Mediastinal lymphadenopathy 09-Mar-2021 10:03:32  Bhaskar Aguilar

## 2021-03-09 NOTE — BRIEF OPERATIVE NOTE - NSICDXBRIEFPROCEDURE_GEN_ALL_CORE_FT
PROCEDURES:  Bronchoscopy, with EBUS and bronchoalveolar lavage 09-Mar-2021 09:42:49  Bhaskar Aguilar

## 2021-03-11 PROBLEM — R59.0 LOCALIZED ENLARGED LYMPH NODES: Chronic | Status: ACTIVE | Noted: 2021-03-02

## 2021-03-11 PROBLEM — M51.9 UNSPECIFIED THORACIC, THORACOLUMBAR AND LUMBOSACRAL INTERVERTEBRAL DISC DISORDER: Chronic | Status: ACTIVE | Noted: 2021-03-02

## 2021-03-11 PROBLEM — M41.9 SCOLIOSIS, UNSPECIFIED: Chronic | Status: ACTIVE | Noted: 2021-03-02

## 2021-03-23 ENCOUNTER — APPOINTMENT (OUTPATIENT)
Dept: THORACIC SURGERY | Facility: CLINIC | Age: 66
End: 2021-03-23
Payer: MEDICARE

## 2021-03-23 VITALS
TEMPERATURE: 98.3 F | DIASTOLIC BLOOD PRESSURE: 91 MMHG | OXYGEN SATURATION: 96 % | WEIGHT: 188 LBS | BODY MASS INDEX: 35.5 KG/M2 | HEART RATE: 69 BPM | SYSTOLIC BLOOD PRESSURE: 174 MMHG | HEIGHT: 61 IN

## 2021-03-23 PROCEDURE — 99215 OFFICE O/P EST HI 40 MIN: CPT

## 2021-03-23 RX ORDER — METOPROLOL TARTRATE 50 MG/1
50 TABLET, FILM COATED ORAL
Refills: 0 | Status: DISCONTINUED | COMMUNITY
End: 2021-03-23

## 2021-03-23 RX ORDER — FOLIC ACID 20 MG
CAPSULE ORAL
Refills: 0 | Status: DISCONTINUED | COMMUNITY
End: 2021-03-23

## 2021-03-23 NOTE — PHYSICAL EXAM
[Auscultation Breath Sounds / Voice Sounds] : lungs were clear to auscultation bilaterally [Heart Rate And Rhythm] : heart rate was normal and rhythm regular [Heart Sounds] : normal S1 and S2 [Heart Sounds Gallop] : no gallops [Murmurs] : no murmurs [Heart Sounds Pericardial Friction Rub] : no pericardial rub [Examination Of The Chest] : the chest was normal in appearance [Chest Visual Inspection Thoracic Asymmetry] : no chest asymmetry [Diminished Respiratory Excursion] : normal chest expansion [Bowel Sounds] : normal bowel sounds [Abdomen Soft] : soft [Abdomen Tenderness] : non-tender [Abdomen Mass (___ Cm)] : no abdominal mass palpated [Skin Color & Pigmentation] : normal skin color and pigmentation [Skin Turgor] : normal skin turgor [] : no rash [Deep Tendon Reflexes (DTR)] : deep tendon reflexes were 2+ and symmetric [Sensation] : the sensory exam was normal to light touch and pinprick [No Focal Deficits] : no focal deficits [Oriented To Time, Place, And Person] : oriented to person, place, and time [Impaired Insight] : insight and judgment were intact [Affect] : the affect was normal [FreeTextEntry1] : ambulates with walker

## 2021-03-23 NOTE — HISTORY OF PRESENT ILLNESS
[FreeTextEntry1] : 65 year old female, former smoker (1/2 PPD x 30 years, quit Jan 2018), w/ hx of HTN, HLD, CAD (s/p coronary stenting to the RCA on 1/9/2018), COPD, osteoporosis. ?Right THR. \par \par Now 1.5yr s/p FB; robotic-assisted, conversion to thoracotomy RULobectomy with MLND on 9/3/19. 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 Pathology of right rib excision was benign, revealed bone. Visceral pleura invasion and HYACINTH present. \par \par She completed chemotherapy on 12/10/19 with Dr. Guevara. \par \par CT Chest on 8/5/2020:\par - slightly decreased in size 9mm RLL ggo (6:235)\par - stable prominent mediastinal LN\par \par CT Chest w/o IV contrast on 2/4/2021:\par - s/p RULobectomy with emphysema\par - No new mass or nodules\par - Stable to slightly decreased in size, 9mm RLL gg nodule (10:234).\par - small loculated right pleural effusion\par \par PET/CT on 2/5/21 showed FDG-avid paratracheal LN.\par \par Now s/p Flex Bronch EBUS Bx w/ BAL on 3/9/21. Path of subcarinal LN was negative malignancy; favor reactive LN. Rt paratracheal LN was non-diagnostic. BAL negative for malignancy.\par \par Patient is here today for a follow up. Subcutaneous emphysema to her face, neck and upper chest had resolved. Admits to increased sensation to Rt anterior chest wall.\par Received 1st dose of Pfizer on 3/12/21, wnd dose will be on 4/2.

## 2021-03-23 NOTE — ASSESSMENT
[FreeTextEntry1] : 65 year old female, former smoker (1/2 PPD x 30 years, quit Jan 2018), w/ hx of HTN, HLD, CAD (s/p coronary stenting to the RCA on 1/9/2018), COPD, osteoporosis. ?Right THR. \par \par Now 1.5yr s/p FB; robotic-assisted, conversion to thoracotomy RULobectomy with MLND on 9/3/19. 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 Pathology of right rib excision was benign, revealed bone. Visceral pleura invasion and HYACINTH present. \par She completed chemotherapy on 12/10/19 with Dr. Guevara. \par \par Now s/p Flex Bronch EBUS Bx w/ BAL on 3/9/21. Path of subcarinal LN was negative malignancy; favor reactive LN. Rt paratracheal LN was non-diagnostic. BAL negative for malignancy.\par \par I have reviewed the patient's medical records and diagnostic images at time of this office consultation and have made the following recommendation:\par 1. Path reviewed and explained to patient, I recommended patient to RTC in 3mo with CT Chest w/o contrast.\par \par \par I personally performed the services described in the documentation, reviewed the documentation recorded by the scribe in my presence and it accurately and completely records my words and actions.\par \par I, Shannan Roman NP, am scribing for and the presence of STARR Kay, the following sections HISTORY OF PRESENT ILLNESS, PAST MEDICAL/FAMILY/SOCIAL HISTORY; REVIEW OF SYSTEMS; VITAL SIGNS; PHYSICAL EXAM; DISPOSITION.\par \par

## 2021-03-23 NOTE — CONSULT LETTER
[Dear  ___] : Dear  [unfilled], [Consult Letter:] : I had the pleasure of evaluating your patient, [unfilled]. [( Thank you for referring [unfilled] for consultation for _____ )] : Thank you for referring [unfilled] for consultation for [unfilled] [Please see my note below.] : Please see my note below. [Consult Closing:] : Thank you very much for allowing me to participate in the care of this patient.  If you have any questions, please do not hesitate to contact me. [Sincerely,] : Sincerely, [FreeTextEntry2] : Wesley Joshi MD  [FreeTextEntry3] : Yaw Clement MD\par Director of Thoracic, [facility]\par Cardiovascular & Thoracic Surgery\par \par [facility]\par [address]\par Oncology Building 3rd Fl\[place] [address]\par Tel: [phone]\par Fax: [phone]\par

## 2021-03-23 NOTE — DATA REVIEWED
[FreeTextEntry1] : Flex Bronch EBUS Bx w/ BAL on 3/9/21. Path of subcarinal LN was negative malignancy; favor reactive LN. Rt paratracheal LN was non-diagnostic. BAL negative for malignancy.\par

## 2021-04-05 DIAGNOSIS — Z00.00 ENCOUNTER FOR GENERAL ADULT MEDICAL EXAMINATION W/OUT ABNORMAL FINDINGS: ICD-10-CM

## 2021-04-06 ENCOUNTER — APPOINTMENT (OUTPATIENT)
Dept: ORTHOPEDIC SURGERY | Facility: CLINIC | Age: 66
End: 2021-04-06
Payer: MEDICARE

## 2021-04-06 DIAGNOSIS — M17.11 UNILATERAL PRIMARY OSTEOARTHRITIS, RIGHT KNEE: ICD-10-CM

## 2021-04-06 PROCEDURE — 73564 X-RAY EXAM KNEE 4 OR MORE: CPT | Mod: RT

## 2021-04-06 PROCEDURE — 99214 OFFICE O/P EST MOD 30 MIN: CPT

## 2021-04-06 PROCEDURE — 73502 X-RAY EXAM HIP UNI 2-3 VIEWS: CPT | Mod: RT

## 2021-04-06 NOTE — REASON FOR VISIT
[Initial Visit] : an initial visit for [Hip Pain] : hip pain [Knee Pain] : knee pain [Osteoarthritis, Hip] : osteoarthritis, hip [Osteoarthritis, Knee] : osteoarthritis, knee

## 2021-04-06 NOTE — PHYSICAL EXAM
[de-identified] : Patient is well nourished, well-developed, in no acute distress, with appropriate mood and affect. The patient is oriented to time, place, and person. Respirations are even and unlabored. Gait evaluation reveals a limp. There is no inguinal adenopathy. Examination of the contralateral hip shows normal range of motion, strength, no tenderness, and intact skin. The affected limb is well-perfused, shows a grossly normal motor and sensory examination. Examination of the hip shows no skin lesions. Hip motion is reduced and causes pain. FADIR is positive and RICO is positive. Stinchfield test is positive. Leg lengths are approximately 2 cm longer on the right. Both hips are stable and muscle strength is normal.  Right knee motion is significantly reduced and does cause significant pain. The knee moves from 5-120 degrees. The knee is stable within that range-of-motion to AP and ML stress. The alignment of the knee is 5 degrees varus. Muscle strength is normal. Pedal pulses are palpable. [de-identified] : AP and lateral x-rays of the right hip, pelvis, and femur were ordered and taken in the office and demonstrate degenerative joint disease of the hip with joint space narrowing, osteophyte formation, and subchondral sclerosis.\par \par Long standing knee, AP knee, lateral knee, and patellar views of the right knee were ordered and taken in the office and demonstrate degenerative joint disease of the knee with joint space narrowing, osteophyte formation, and subchondral sclerosis.

## 2021-04-06 NOTE — DISCUSSION/SUMMARY
[de-identified] : This patient has right hip and right knee osteoarthritis.  She has failed a course of conservative management for the right hip and would like to proceed with a posterior lateral approach right total of arthroplasty using robotic navigation for assistance.  Robotic navigation will be required given her history of scoliosis in her lumbar spine.  This will facilitate acetabular component placement.\par \par The patient is an appropriate candidate for consideration of right total hip replacement. An extensive discussion was conducted of the natural history of the disease and the variety of surgical and non-surgical treatment options available to the patient. A risk/benefit analysis was discussed with the patient reviewing the advantages and disadvantages of surgical intervention at this time. Both the level and length of the patient's pain have made additional conservative treatment measures consisting of NSAIDs, physical therapy, and/or corticosteroids contraindicated. A full explanation was given of the nature and the purpose of the procedure and anesthesia, its benefits, possible alternative methods of diagnosis or treatment, the risks involved, the possibility of complications, the foreseeable consequences of the procedure and the possible results of the non-treatment. I reviewed the plan of care as well as used a model of a total hip implant equivalent to the one that will be used for their total hip joint replacement. The ability to secure the implant utilizing cement or cementless (press-fit) was discussed with the patient. The patient agrees with the plan of care, as well as the use of implants for their total hip replacement. \par \par No guarantee or assurance was made as to the results that may be obtained. Specifically, the risks were identified to include, but are not limited to the following: Infection, phlebitis, pulmonary embolism, death, paralysis, dislocation, pain, stiffness, instability, limp, weakness, breakage, leg-length inequality, uncontrolled bleeding, nerve injury, blood vessel injury, pressure sores, anesthetic risks, delayed healing of wound and bone, and wear and loosening. Further discussion was undertaken with the patient about the details of surgical preparation, treatment, and postoperative rehabilitation including medical clearance, autotransfusion, the hospital course, and the postoperative rehabilitation involved. All in all, I feel that this patient is a good candidate for surgical reconstruction.\par \par The patient and I discussed the current SARS-CoV-2 (COVID-19) pandemic which has affected our local hospitals. We discussed that our hospitals treat patients with COVID-19. All efforts will be made to avoid cohorting the patient with diagnosed or suspected COVID-19 patient. They also understand that we will screen them 24-48 hours prior to surgery. Despite our best efforts, there is a potential risk for iatrogenic transmission of COVID-19 to the patient during the perioperative period. [place] COVID-19 during the perioperative period may increase the patient´s risks of an adverse outcome including postoperative pneumonia, difficulty breathing, requirement for a breathing tube (general endotracheal intubation), and death. The patient is understanding of this risk, and is willing to proceed with surgery at this time.

## 2021-04-06 NOTE — HISTORY OF PRESENT ILLNESS
[de-identified] : This is very nice 65-year-old female experiencing right hip and right knee pain, which is severe in intensity. The pain substantially limits activities of daily living. Walking tolerance is reduced.  Severe groin pain.  Pain is been present for more than 2 years.  Has previously indicated for right total hip arthroplasty by Dr. Roque.  Care is now transferred to me.  I did review Dr. Roque past notes for this visit today.  She uses a walker.  Medication including NSAIDs and activity modification have been minimally effective for a period lasting greater than three months in duration.  Given the level of pain and dysfunction further physical therapy is contraindicated at this point.  Pain and restriction of function are intolerable at this time. The patient denies any radiation of the pain to the feet and it is not associated with numbness, tingling, or weakness.  She does have a history of cardiac stents and history of lobectomy for adenocarcinoma of her lung.

## 2021-04-14 NOTE — H&P PST ADULT - ALCOHOL USE HISTORY SINGLE SELECT
Occupational Therapy  Visit Type: initial evaluation  Precautions:  Medical precautions: ; standard precautions.   Lines:     Basic: capped IV and urinary catheter      Lines in chart and on patient reviewed, cautions maintained throughout session.  Lower Extremity:    Left:  weight bearing: as tolerated.    Right:  weight bearing: as tolerated.  Spine:     Lumbar: abdominal binder on for comfort, no bending, no twisting and no lifting greater than 10 #    SUBJECTIVE  Patient agreed to participate in therapy this date.  Patient in chair.  Reportedly completes ADL with mod indep at baseline, at home with aged father.  Pain  8/10  Patient / Family Goal: return to previous functional status    OBJECTIVE   Level of consciousness: alert    Oriented to appropriate for developmental age     Arousal alertness: appropriate responses to stimuli    Affect/Behavior: alert and appropriate  Patient activity tolerance: 1 to 1 activity to rest  Functional Communication/Cognition    Overall status:  Within functional limits    Form of communication:  Verbal  Balance  Sitting:    Static:  independent     Dynamic:  independent      Transfers:      Training completed:      Education details: patient demonstrates understanding and patient safety  Activities of Daily Living (ADLs):  Lower Body Dressing:     Footwear assistance: supervision    Footwear position: chair    Assist needed for: use of adaptive equipment    Equipment: sock aid, reacher and dressing stick      Interventions    Training provided: ADL training, body mechanics, compensatory techniques, safety training, positioning, use of adaptive equipment, activity tolerance and balance retraining  Skilled input: as detailed above  Verbal Consent: Writer verbally educated and received verbal consent for hand placement, positioning of patient, and techniques to be performed today from patient for clothing adjustments for techniques and therapist position for techniques as  described above and how they are pertinent to the patient's plan of care.        ASSESSMENT    Impairments: balance, pain, safety awareness, activity tolerance, bed mobility and body habitus  Functional Limitations: functional mobility, functional transfers, dressing, toileting and bathing  Personal Occupations Profile Affected: bathing/showering, lower body dressing, functional mobility/transfers, toileting/toilet hygiene  Discharge Recommendations:  Recommendations for Discharge: OT WI: Home      PT/OT Mobility Equipment for Discharge: owns 2ww, cane    Emphasis: OT POC, assessment of functional deficits, ADL training with AE intro, review home set-up & safety   Pt presents underlying ADL skills WFL with supervision-CGA.  Patient will benefit from a skill review session with OT 1x for reinforcement for optimal independence should patient remain overnight.  Anticipate pt to continue progressing with strength and functional skills while recovering.      Skilled therapy is required to address these limitations in attempt to maximize the patient's independence.  Progress: improving as expected  Clinical decision making: Moderate - Patient has several limitations (3-5), comorbidities and/or complexities, as noted in detailed assessment above, that impact their occupational profile.  Resulting in several treatment options and minimal to moderate task modification consistent with moderate clinical decision making complexity.    End of Session:   Location: in chair  Safety measures: call light within reach    PLAN    Interventions: ADL retraining, balance, body mechanics, patient education, safety training, compensatory technique education, functional transfer training, activity tolerance training, use of adaptive equipment, therapeutic activity, equipment eval/education, positioning, transfer training and patient/family training  Agreement to plan and goals: patient agrees with goals and treatment plan      GOALS  Review  Date: 4/16/2021  Long Term Goals: (to be met by time of discharge from hospital)  State precautions: Patient able to state precautions without cues.  Maintain precautions: Patient able to maintain precautions supervision.  Lower body dressing: Patient will complete lower body dressing in sitting supervision.  Toileting: Patient will complete toileting supervision and with minimal cues.  Toilet transfer: Patient will complete toilet transfer with 2-wheeled walker, supervision.         Documented in the chart in the following areas: Assessment. Plan.      Therapy procedure time and total treatment time can be found documented on the Time Entry flowsheet   never

## 2021-05-06 ENCOUNTER — NON-APPOINTMENT (OUTPATIENT)
Age: 66
End: 2021-05-06

## 2021-05-10 ENCOUNTER — RESULT REVIEW (OUTPATIENT)
Age: 66
End: 2021-05-10

## 2021-05-10 ENCOUNTER — OUTPATIENT (OUTPATIENT)
Dept: OUTPATIENT SERVICES | Facility: HOSPITAL | Age: 66
LOS: 1 days | End: 2021-05-10
Payer: MEDICARE

## 2021-05-10 VITALS
HEIGHT: 60 IN | TEMPERATURE: 98 F | HEART RATE: 80 BPM | SYSTOLIC BLOOD PRESSURE: 146 MMHG | DIASTOLIC BLOOD PRESSURE: 71 MMHG | OXYGEN SATURATION: 97 % | RESPIRATION RATE: 14 BRPM | WEIGHT: 199.96 LBS

## 2021-05-10 DIAGNOSIS — M16.11 UNILATERAL PRIMARY OSTEOARTHRITIS, RIGHT HIP: ICD-10-CM

## 2021-05-10 DIAGNOSIS — Z01.818 ENCOUNTER FOR OTHER PREPROCEDURAL EXAMINATION: ICD-10-CM

## 2021-05-10 DIAGNOSIS — Z90.2 ACQUIRED ABSENCE OF LUNG [PART OF]: Chronic | ICD-10-CM

## 2021-05-10 DIAGNOSIS — Z29.9 ENCOUNTER FOR PROPHYLACTIC MEASURES, UNSPECIFIED: ICD-10-CM

## 2021-05-10 DIAGNOSIS — Z95.5 PRESENCE OF CORONARY ANGIOPLASTY IMPLANT AND GRAFT: ICD-10-CM

## 2021-05-10 DIAGNOSIS — I77.9 DISORDER OF ARTERIES AND ARTERIOLES, UNSPECIFIED: ICD-10-CM

## 2021-05-10 DIAGNOSIS — Z98.891 HISTORY OF UTERINE SCAR FROM PREVIOUS SURGERY: Chronic | ICD-10-CM

## 2021-05-10 DIAGNOSIS — Z85.118 PERSONAL HISTORY OF OTHER MALIGNANT NEOPLASM OF BRONCHUS AND LUNG: Chronic | ICD-10-CM

## 2021-05-10 LAB
A1C WITH ESTIMATED AVERAGE GLUCOSE RESULT: 5.9 % — HIGH (ref 4–5.6)
ANION GAP SERPL CALC-SCNC: 17 MMOL/L — SIGNIFICANT CHANGE UP (ref 5–17)
BUN SERPL-MCNC: 19 MG/DL — SIGNIFICANT CHANGE UP (ref 7–23)
CALCIUM SERPL-MCNC: 9.6 MG/DL — SIGNIFICANT CHANGE UP (ref 8.4–10.5)
CHLORIDE SERPL-SCNC: 99 MMOL/L — SIGNIFICANT CHANGE UP (ref 96–108)
CO2 SERPL-SCNC: 23 MMOL/L — SIGNIFICANT CHANGE UP (ref 22–31)
CREAT SERPL-MCNC: 1.22 MG/DL — SIGNIFICANT CHANGE UP (ref 0.5–1.3)
ESTIMATED AVERAGE GLUCOSE: 123 MG/DL — HIGH (ref 68–114)
GLUCOSE SERPL-MCNC: 96 MG/DL — SIGNIFICANT CHANGE UP (ref 70–99)
HCT VFR BLD CALC: 38.9 % — SIGNIFICANT CHANGE UP (ref 34.5–45)
HGB BLD-MCNC: 13 G/DL — SIGNIFICANT CHANGE UP (ref 11.5–15.5)
MCHC RBC-ENTMCNC: 31 PG — SIGNIFICANT CHANGE UP (ref 27–34)
MCHC RBC-ENTMCNC: 33.4 GM/DL — SIGNIFICANT CHANGE UP (ref 32–36)
MCV RBC AUTO: 92.8 FL — SIGNIFICANT CHANGE UP (ref 80–100)
NRBC # BLD: 0 /100 WBCS — SIGNIFICANT CHANGE UP (ref 0–0)
PLATELET # BLD AUTO: 355 K/UL — SIGNIFICANT CHANGE UP (ref 150–400)
POTASSIUM SERPL-MCNC: 3.6 MMOL/L — SIGNIFICANT CHANGE UP (ref 3.5–5.3)
POTASSIUM SERPL-SCNC: 3.6 MMOL/L — SIGNIFICANT CHANGE UP (ref 3.5–5.3)
RBC # BLD: 4.19 M/UL — SIGNIFICANT CHANGE UP (ref 3.8–5.2)
RBC # FLD: 12.3 % — SIGNIFICANT CHANGE UP (ref 10.3–14.5)
SODIUM SERPL-SCNC: 139 MMOL/L — SIGNIFICANT CHANGE UP (ref 135–145)
WBC # BLD: 8.96 K/UL — SIGNIFICANT CHANGE UP (ref 3.8–10.5)
WBC # FLD AUTO: 8.96 K/UL — SIGNIFICANT CHANGE UP (ref 3.8–10.5)

## 2021-05-10 PROCEDURE — G1004: CPT

## 2021-05-10 PROCEDURE — 73700 CT LOWER EXTREMITY W/O DYE: CPT | Mod: 26,RT,MF

## 2021-05-10 PROCEDURE — 86900 BLOOD TYPING SEROLOGIC ABO: CPT

## 2021-05-10 PROCEDURE — 86850 RBC ANTIBODY SCREEN: CPT

## 2021-05-10 PROCEDURE — G0463: CPT

## 2021-05-10 PROCEDURE — 73700 CT LOWER EXTREMITY W/O DYE: CPT

## 2021-05-10 PROCEDURE — 86901 BLOOD TYPING SEROLOGIC RH(D): CPT

## 2021-05-10 RX ORDER — VANCOMYCIN HCL 1 G
1500 VIAL (EA) INTRAVENOUS ONCE
Refills: 0 | Status: COMPLETED | OUTPATIENT
Start: 2021-05-24 | End: 2021-05-24

## 2021-05-10 RX ORDER — LIDOCAINE HCL 20 MG/ML
0.2 VIAL (ML) INJECTION ONCE
Refills: 0 | Status: DISCONTINUED | OUTPATIENT
Start: 2021-05-24 | End: 2021-05-24

## 2021-05-10 RX ORDER — CARVEDILOL PHOSPHATE 80 MG/1
1 CAPSULE, EXTENDED RELEASE ORAL
Qty: 0 | Refills: 0 | DISCHARGE

## 2021-05-10 RX ORDER — SODIUM CHLORIDE 9 MG/ML
3 INJECTION INTRAMUSCULAR; INTRAVENOUS; SUBCUTANEOUS EVERY 8 HOURS
Refills: 0 | Status: DISCONTINUED | OUTPATIENT
Start: 2021-05-24 | End: 2021-05-24

## 2021-05-10 RX ORDER — CHLORHEXIDINE GLUCONATE 213 G/1000ML
1 SOLUTION TOPICAL ONCE
Refills: 0 | Status: COMPLETED | OUTPATIENT
Start: 2021-05-24 | End: 2021-05-24

## 2021-05-10 RX ORDER — TRAMADOL HYDROCHLORIDE 50 MG/1
0 TABLET ORAL
Qty: 0 | Refills: 0 | DISCHARGE

## 2021-05-10 RX ORDER — METOPROLOL TARTRATE 50 MG
1 TABLET ORAL
Qty: 0 | Refills: 0 | DISCHARGE

## 2021-05-10 NOTE — H&P PST ADULT - HISTORY OF PRESENT ILLNESS
Mrs. Parmar is a 65 year old man with PMH HTN, HLD, CAD s/p stent on ASA, COPD, Adenocarcinoma of R lung s/p surgery 9/3/2019 has serial CTs annually, former smoker, Scoliosis and OA of right hip scheduled for right total hip arthroplasty with PHOENIX robot posterior approach.  She c/o severe r hip pain as well as r knee pain, has difficulty walking uses a cane within the home and needs wheelchair for long distances.    Denies s/s of COVID,  Has had Pfizer vaccines, up to date.    COVID testing scheduled for 5/21 at [facility].

## 2021-05-10 NOTE — H&P PST ADULT - NSICDXPASTMEDICALHX_GEN_ALL_CORE_FT
PAST MEDICAL HISTORY:  Carotid artery disease monitored by vascluar doctor Doscher    Edema of both legs     Emphysema     Hip pain     Hyperlipidemia     Hypertension     Localized enlarged lymph nodes     Lumbar disc disorder     Lung cancer right, 2019    Obesity     Pulmonary nodules yearly CT scans    Scoliosis     Stented coronary artery

## 2021-05-10 NOTE — H&P PST ADULT - NSICDXPROBLEM_GEN_ALL_CORE_FT
PROBLEM DIAGNOSES  Problem: Unilateral primary osteoarthritis, right hip  Assessment and Plan: Scheduled for Right Arthroplasty with PHOENIX Robot posterior approach.  Labs pending-cbc, bmp, T&S, a1c, MRSA/MSSA  CT of right hip, no contrast, PH[place]ENIX  FS upon admission  Chlorhexidine to affected area  ABO upon arrival to [facility]  Medical and Cardio evaluations pending.    Problem: Need for prophylactic measure  Assessment and Plan: The Caprini score indicates that this patient is at high risk for a VTE event (score 6 or greater). Surgical patients in this group will benefit from both pharmacologic prophylaxis and intermittent compression devices.  The surgical team will determine the balance between VTE risk and bleeding risk, and other clinical considerations      Problem: Carotid artery disease  Assessment and Plan: Call cardio for most recent carotid dopplers.  To continue cardiac meds as prescribed, to take am doses DOS with small sips of water    Problem: Stented coronary artery  Assessment and Plan: Instructed to continue ASA.  Cardio evaluation for 5/12/21  Request results of recent echo, stress test.

## 2021-05-11 LAB
MRSA PCR RESULT.: SIGNIFICANT CHANGE UP
S AUREUS DNA NOSE QL NAA+PROBE: SIGNIFICANT CHANGE UP

## 2021-05-21 ENCOUNTER — OUTPATIENT (OUTPATIENT)
Dept: OUTPATIENT SERVICES | Facility: HOSPITAL | Age: 66
LOS: 1 days | End: 2021-05-21

## 2021-05-21 DIAGNOSIS — Z98.891 HISTORY OF UTERINE SCAR FROM PREVIOUS SURGERY: Chronic | ICD-10-CM

## 2021-05-21 DIAGNOSIS — Z11.52 ENCOUNTER FOR SCREENING FOR COVID-19: ICD-10-CM

## 2021-05-21 DIAGNOSIS — Z90.2 ACQUIRED ABSENCE OF LUNG [PART OF]: Chronic | ICD-10-CM

## 2021-05-21 LAB — SARS-COV-2 RNA SPEC QL NAA+PROBE: SIGNIFICANT CHANGE UP

## 2021-05-23 ENCOUNTER — TRANSCRIPTION ENCOUNTER (OUTPATIENT)
Age: 66
End: 2021-05-23

## 2021-05-24 ENCOUNTER — APPOINTMENT (OUTPATIENT)
Dept: ORTHOPEDIC SURGERY | Facility: HOSPITAL | Age: 66
End: 2021-05-24

## 2021-05-24 ENCOUNTER — OUTPATIENT (OUTPATIENT)
Dept: INPATIENT UNIT | Facility: HOSPITAL | Age: 66
LOS: 1 days | End: 2021-05-24
Payer: MEDICARE

## 2021-05-24 VITALS
HEIGHT: 60 IN | HEART RATE: 72 BPM | TEMPERATURE: 98 F | DIASTOLIC BLOOD PRESSURE: 87 MMHG | OXYGEN SATURATION: 99 % | RESPIRATION RATE: 20 BRPM | WEIGHT: 199.96 LBS | SYSTOLIC BLOOD PRESSURE: 175 MMHG

## 2021-05-24 DIAGNOSIS — Z90.2 ACQUIRED ABSENCE OF LUNG [PART OF]: Chronic | ICD-10-CM

## 2021-05-24 DIAGNOSIS — Z98.891 HISTORY OF UTERINE SCAR FROM PREVIOUS SURGERY: Chronic | ICD-10-CM

## 2021-05-24 DIAGNOSIS — M16.11 UNILATERAL PRIMARY OSTEOARTHRITIS, RIGHT HIP: ICD-10-CM

## 2021-05-24 RX ORDER — OXYCODONE HYDROCHLORIDE 5 MG/1
10 TABLET ORAL
Refills: 0 | Status: DISCONTINUED | OUTPATIENT
Start: 2021-05-24 | End: 2021-05-25

## 2021-05-24 RX ORDER — LABETALOL HCL 100 MG
200 TABLET ORAL
Refills: 0 | Status: DISCONTINUED | OUTPATIENT
Start: 2021-05-24 | End: 2021-05-25

## 2021-05-24 RX ORDER — CELECOXIB 200 MG/1
200 CAPSULE ORAL EVERY 12 HOURS
Refills: 0 | Status: DISCONTINUED | OUTPATIENT
Start: 2021-05-25 | End: 2021-05-25

## 2021-05-24 RX ORDER — ACETAMINOPHEN 500 MG
1000 TABLET ORAL ONCE
Refills: 0 | Status: DISCONTINUED | OUTPATIENT
Start: 2021-05-25 | End: 2021-05-25

## 2021-05-24 RX ORDER — POTASSIUM CHLORIDE 20 MEQ
10 PACKET (EA) ORAL
Refills: 0 | Status: DISCONTINUED | OUTPATIENT
Start: 2021-05-24 | End: 2021-05-25

## 2021-05-24 RX ORDER — TRAMADOL HYDROCHLORIDE 50 MG/1
50 TABLET ORAL EVERY 6 HOURS
Refills: 0 | Status: DISCONTINUED | OUTPATIENT
Start: 2021-05-24 | End: 2021-05-25

## 2021-05-24 RX ORDER — BENZOCAINE AND MENTHOL 5; 1 G/100ML; G/100ML
1 LIQUID ORAL
Refills: 0 | Status: DISCONTINUED | OUTPATIENT
Start: 2021-05-24 | End: 2021-05-25

## 2021-05-24 RX ORDER — MAGNESIUM HYDROXIDE 400 MG/1
30 TABLET, CHEWABLE ORAL DAILY
Refills: 0 | Status: DISCONTINUED | OUTPATIENT
Start: 2021-05-24 | End: 2021-05-25

## 2021-05-24 RX ORDER — OXYCODONE HYDROCHLORIDE 5 MG/1
5 TABLET ORAL
Refills: 0 | Status: DISCONTINUED | OUTPATIENT
Start: 2021-05-24 | End: 2021-05-25

## 2021-05-24 RX ORDER — TRAMADOL HYDROCHLORIDE 50 MG/1
50 TABLET ORAL ONCE
Refills: 0 | Status: DISCONTINUED | OUTPATIENT
Start: 2021-05-24 | End: 2021-05-24

## 2021-05-24 RX ORDER — ACETAMINOPHEN 500 MG
975 TABLET ORAL EVERY 8 HOURS
Refills: 0 | Status: DISCONTINUED | OUTPATIENT
Start: 2021-05-24 | End: 2021-05-24

## 2021-05-24 RX ORDER — FUROSEMIDE 40 MG
40 TABLET ORAL DAILY
Refills: 0 | Status: DISCONTINUED | OUTPATIENT
Start: 2021-05-24 | End: 2021-05-25

## 2021-05-24 RX ORDER — PANTOPRAZOLE SODIUM 20 MG/1
40 TABLET, DELAYED RELEASE ORAL
Refills: 0 | Status: DISCONTINUED | OUTPATIENT
Start: 2021-05-24 | End: 2021-05-25

## 2021-05-24 RX ORDER — ACETAMINOPHEN 500 MG
1000 TABLET ORAL ONCE
Refills: 0 | Status: COMPLETED | OUTPATIENT
Start: 2021-05-25 | End: 2021-05-25

## 2021-05-24 RX ORDER — SENNA PLUS 8.6 MG/1
2 TABLET ORAL AT BEDTIME
Refills: 0 | Status: DISCONTINUED | OUTPATIENT
Start: 2021-05-24 | End: 2021-05-25

## 2021-05-24 RX ORDER — ONDANSETRON 8 MG/1
4 TABLET, FILM COATED ORAL EVERY 6 HOURS
Refills: 0 | Status: DISCONTINUED | OUTPATIENT
Start: 2021-05-24 | End: 2021-05-25

## 2021-05-24 RX ORDER — ACETAMINOPHEN 500 MG
975 TABLET ORAL EVERY 8 HOURS
Refills: 0 | Status: DISCONTINUED | OUTPATIENT
Start: 2021-05-26 | End: 2021-05-25

## 2021-05-24 RX ORDER — ONDANSETRON 8 MG/1
4 TABLET, FILM COATED ORAL ONCE
Refills: 0 | Status: DISCONTINUED | OUTPATIENT
Start: 2021-05-24 | End: 2021-05-24

## 2021-05-24 RX ORDER — SODIUM CHLORIDE 9 MG/ML
1000 INJECTION INTRAMUSCULAR; INTRAVENOUS; SUBCUTANEOUS
Refills: 0 | Status: DISCONTINUED | OUTPATIENT
Start: 2021-05-24 | End: 2021-05-25

## 2021-05-24 RX ORDER — PANTOPRAZOLE SODIUM 20 MG/1
40 TABLET, DELAYED RELEASE ORAL ONCE
Refills: 0 | Status: COMPLETED | OUTPATIENT
Start: 2021-05-24 | End: 2021-05-24

## 2021-05-24 RX ORDER — POLYETHYLENE GLYCOL 3350 17 G/17G
17 POWDER, FOR SOLUTION ORAL AT BEDTIME
Refills: 0 | Status: DISCONTINUED | OUTPATIENT
Start: 2021-05-24 | End: 2021-05-25

## 2021-05-24 RX ORDER — FERROUS SULFATE 325(65) MG
325 TABLET ORAL DAILY
Refills: 0 | Status: DISCONTINUED | OUTPATIENT
Start: 2021-05-24 | End: 2021-05-25

## 2021-05-24 RX ORDER — ASCORBIC ACID 60 MG
500 TABLET,CHEWABLE ORAL
Refills: 0 | Status: DISCONTINUED | OUTPATIENT
Start: 2021-05-24 | End: 2021-05-25

## 2021-05-24 RX ORDER — SODIUM CHLORIDE 9 MG/ML
500 INJECTION INTRAMUSCULAR; INTRAVENOUS; SUBCUTANEOUS ONCE
Refills: 0 | Status: COMPLETED | OUTPATIENT
Start: 2021-05-24 | End: 2021-05-24

## 2021-05-24 RX ORDER — PANTOPRAZOLE SODIUM 20 MG/1
40 TABLET, DELAYED RELEASE ORAL ONCE
Refills: 0 | Status: DISCONTINUED | OUTPATIENT
Start: 2021-05-24 | End: 2021-05-24

## 2021-05-24 RX ORDER — SODIUM CHLORIDE 9 MG/ML
500 INJECTION INTRAMUSCULAR; INTRAVENOUS; SUBCUTANEOUS ONCE
Refills: 0 | Status: COMPLETED | OUTPATIENT
Start: 2021-05-24 | End: 2021-05-25

## 2021-05-24 RX ORDER — APIXABAN 2.5 MG/1
2.5 TABLET, FILM COATED ORAL
Refills: 0 | Status: DISCONTINUED | OUTPATIENT
Start: 2021-05-25 | End: 2021-05-25

## 2021-05-24 RX ORDER — FENTANYL CITRATE 50 UG/ML
25 INJECTION INTRAVENOUS
Refills: 0 | Status: DISCONTINUED | OUTPATIENT
Start: 2021-05-24 | End: 2021-05-24

## 2021-05-24 RX ORDER — VANCOMYCIN HCL 1 G
1250 VIAL (EA) INTRAVENOUS ONCE
Refills: 0 | Status: COMPLETED | OUTPATIENT
Start: 2021-05-24 | End: 2021-05-24

## 2021-05-24 RX ORDER — ATORVASTATIN CALCIUM 80 MG/1
40 TABLET, FILM COATED ORAL AT BEDTIME
Refills: 0 | Status: DISCONTINUED | OUTPATIENT
Start: 2021-05-24 | End: 2021-05-25

## 2021-05-24 RX ORDER — FOLIC ACID 0.8 MG
1 TABLET ORAL DAILY
Refills: 0 | Status: DISCONTINUED | OUTPATIENT
Start: 2021-05-24 | End: 2021-05-25

## 2021-05-24 RX ORDER — KETOROLAC TROMETHAMINE 30 MG/ML
15 SYRINGE (ML) INJECTION EVERY 6 HOURS
Refills: 0 | Status: COMPLETED | OUTPATIENT
Start: 2021-05-24 | End: 2021-05-25

## 2021-05-24 RX ORDER — SODIUM CHLORIDE 9 MG/ML
1000 INJECTION INTRAMUSCULAR; INTRAVENOUS; SUBCUTANEOUS
Refills: 0 | Status: DISCONTINUED | OUTPATIENT
Start: 2021-05-24 | End: 2021-05-24

## 2021-05-24 RX ORDER — ACETAMINOPHEN 500 MG
1000 TABLET ORAL ONCE
Refills: 0 | Status: DISCONTINUED | OUTPATIENT
Start: 2021-05-24 | End: 2021-05-24

## 2021-05-24 RX ORDER — HYDROMORPHONE HYDROCHLORIDE 2 MG/ML
0.25 INJECTION INTRAMUSCULAR; INTRAVENOUS; SUBCUTANEOUS ONCE
Refills: 0 | Status: DISCONTINUED | OUTPATIENT
Start: 2021-05-24 | End: 2021-05-24

## 2021-05-24 RX ORDER — LANOLIN ALCOHOL/MO/W.PET/CERES
3 CREAM (GRAM) TOPICAL AT BEDTIME
Refills: 0 | Status: DISCONTINUED | OUTPATIENT
Start: 2021-05-24 | End: 2021-05-25

## 2021-05-24 RX ADMIN — TRAMADOL HYDROCHLORIDE 50 MILLIGRAM(S): 50 TABLET ORAL at 13:44

## 2021-05-24 RX ADMIN — Medication 1 TABLET(S): at 22:48

## 2021-05-24 RX ADMIN — SODIUM CHLORIDE 100 MILLILITER(S): 9 INJECTION INTRAMUSCULAR; INTRAVENOUS; SUBCUTANEOUS at 21:46

## 2021-05-24 RX ADMIN — ATORVASTATIN CALCIUM 40 MILLIGRAM(S): 80 TABLET, FILM COATED ORAL at 21:44

## 2021-05-24 RX ADMIN — SODIUM CHLORIDE 100 MILLILITER(S): 9 INJECTION INTRAMUSCULAR; INTRAVENOUS; SUBCUTANEOUS at 20:20

## 2021-05-24 RX ADMIN — Medication 150 MILLIGRAM(S): at 13:46

## 2021-05-24 RX ADMIN — PANTOPRAZOLE SODIUM 40 MILLIGRAM(S): 20 TABLET, DELAYED RELEASE ORAL at 13:46

## 2021-05-24 RX ADMIN — Medication 300 MILLIGRAM(S): at 13:26

## 2021-05-24 RX ADMIN — SODIUM CHLORIDE 500 MILLILITER(S): 9 INJECTION INTRAMUSCULAR; INTRAVENOUS; SUBCUTANEOUS at 21:49

## 2021-05-24 RX ADMIN — Medication 15 MILLIGRAM(S): at 23:04

## 2021-05-24 RX ADMIN — SODIUM CHLORIDE 500 MILLILITER(S): 9 INJECTION INTRAMUSCULAR; INTRAVENOUS; SUBCUTANEOUS at 19:45

## 2021-05-24 RX ADMIN — SODIUM CHLORIDE 150 MILLILITER(S): 9 INJECTION INTRAMUSCULAR; INTRAVENOUS; SUBCUTANEOUS at 19:56

## 2021-05-24 RX ADMIN — CHLORHEXIDINE GLUCONATE 1 APPLICATION(S): 213 SOLUTION TOPICAL at 12:41

## 2021-05-24 RX ADMIN — Medication 166.67 MILLIGRAM(S): at 22:48

## 2021-05-24 RX ADMIN — SODIUM CHLORIDE 3 MILLILITER(S): 9 INJECTION INTRAMUSCULAR; INTRAVENOUS; SUBCUTANEOUS at 12:41

## 2021-05-24 NOTE — CHART NOTE - NSCHARTNOTEFT_GEN_A_CORE
Patient seen in RR, resting without complaints.  No Chest Pain, SOB, N/V. States she is still numb from the right calf down to the foot 2/2 to spinal.    T(C): 36 (05-24-21 @ 18:15), Max: 36.8 (05-24-21 @ 12:25)  HR: 60 (05-24-21 @ 19:15) (60 - 75)  BP: 135/56 (05-24-21 @ 19:15) (94/55 - 175/87)  RR: 15 (05-24-21 @ 19:15) (15 - 20)  SpO2: 96% (05-24-21 @ 19:15) (96% - 99%)  Wt(kg): --    Exam:  Alert and Thornton, No Acute Distress  Card: +S1/S2, RRR  Pulm: CTAB  Laterality: Right hip aquacel c/d/i; Abduction pillow in place  Calves soft  States she is still numb from her right calf down to her foot  Toes warm, brisk capillary refill  + DP pulse    Xray: Post-op xray in chart             A/P: Patient is a 65y Female S/p Right EDY Posterior Approach. VSS. NAD  -PT/OT- WBAT With Posterior Hip Precautions  -IS encouraged  -DVT PPx - Eliquis 2.5 mg BID  -Pain Control PRN  -Continue Current Tx  -FU AM Labs  -Dispo planning    Chanell Lee PA-C  Team Pager #[phone]

## 2021-05-24 NOTE — PHYSICAL THERAPY INITIAL EVALUATION ADULT - PERTINENT HX OF CURRENT PROBLEM, REHAB EVAL
66 y/o M with h/o HTN, HLD, CAD s/p stent on ASA, COPD, adenocarcinoma of R lung s/p surgery, former smoker, socoliosis and OA of right hip now presents s/p jace assisted R total hip replacement by posterior approach. XRAY PELVIS: cementless right total hip prosthesis implanted. intact and aligned hardware and no periprosthetic fxs. 64 y/o F with h/o HTN, HLD, CAD s/p stent on ASA, COPD, adenocarcinoma of R lung s/p surgery, former smoker, socoliosis and OA of right hip now presents s/p jace assisted R total hip replacement by posterior approach. XRAY PELVIS: cementless right total hip prosthesis implanted. intact and aligned hardware and no periprosthetic fxs.

## 2021-05-24 NOTE — PHYSICAL THERAPY INITIAL EVALUATION ADULT - ADDITIONAL COMMENTS
Pt lives in a private house with spouse with 3 steps to enter. Pt was Ind with all ADLs and amb with SC

## 2021-05-24 NOTE — PRE-ANESTHESIA EVALUATION ADULT - NSANTHAIRWAYFT_ENT_ALL_CORE
Mouth opening: >2cm  Thyromental distance: 3 FBs  Upper lip bite: adequate  Cervical ROM: grossly intact

## 2021-05-25 ENCOUNTER — TRANSCRIPTION ENCOUNTER (OUTPATIENT)
Age: 66
End: 2021-05-25

## 2021-05-25 VITALS
TEMPERATURE: 98 F | HEART RATE: 71 BPM | DIASTOLIC BLOOD PRESSURE: 67 MMHG | OXYGEN SATURATION: 100 % | RESPIRATION RATE: 18 BRPM | SYSTOLIC BLOOD PRESSURE: 126 MMHG

## 2021-05-25 DIAGNOSIS — M16.11 UNILATERAL PRIMARY OSTEOARTHRITIS, RIGHT HIP: ICD-10-CM

## 2021-05-25 DIAGNOSIS — I10 ESSENTIAL (PRIMARY) HYPERTENSION: ICD-10-CM

## 2021-05-25 DIAGNOSIS — I77.9 DISORDER OF ARTERIES AND ARTERIOLES, UNSPECIFIED: ICD-10-CM

## 2021-05-25 LAB
ANION GAP SERPL CALC-SCNC: 17 MMOL/L — SIGNIFICANT CHANGE UP (ref 5–17)
BUN SERPL-MCNC: 19 MG/DL — SIGNIFICANT CHANGE UP (ref 7–23)
CALCIUM SERPL-MCNC: 8.9 MG/DL — SIGNIFICANT CHANGE UP (ref 8.4–10.5)
CHLORIDE SERPL-SCNC: 103 MMOL/L — SIGNIFICANT CHANGE UP (ref 96–108)
CO2 SERPL-SCNC: 15 MMOL/L — LOW (ref 22–31)
COVID-19 SPIKE DOMAIN AB INTERP: POSITIVE
COVID-19 SPIKE DOMAIN ANTIBODY RESULT: >250 U/ML — HIGH
CREAT SERPL-MCNC: 1.24 MG/DL — SIGNIFICANT CHANGE UP (ref 0.5–1.3)
GLUCOSE SERPL-MCNC: 157 MG/DL — HIGH (ref 70–99)
HCT VFR BLD CALC: 35.2 % — SIGNIFICANT CHANGE UP (ref 34.5–45)
HGB BLD-MCNC: 11.4 G/DL — LOW (ref 11.5–15.5)
MCHC RBC-ENTMCNC: 31.5 PG — SIGNIFICANT CHANGE UP (ref 27–34)
MCHC RBC-ENTMCNC: 32.4 GM/DL — SIGNIFICANT CHANGE UP (ref 32–36)
MCV RBC AUTO: 97.2 FL — SIGNIFICANT CHANGE UP (ref 80–100)
NRBC # BLD: 0 /100 WBCS — SIGNIFICANT CHANGE UP (ref 0–0)
PLATELET # BLD AUTO: 273 K/UL — SIGNIFICANT CHANGE UP (ref 150–400)
POTASSIUM SERPL-MCNC: 4.6 MMOL/L — SIGNIFICANT CHANGE UP (ref 3.5–5.3)
POTASSIUM SERPL-SCNC: 4.6 MMOL/L — SIGNIFICANT CHANGE UP (ref 3.5–5.3)
RBC # BLD: 3.62 M/UL — LOW (ref 3.8–5.2)
RBC # FLD: 12.1 % — SIGNIFICANT CHANGE UP (ref 10.3–14.5)
SARS-COV-2 IGG+IGM SERPL QL IA: >250 U/ML — HIGH
SARS-COV-2 IGG+IGM SERPL QL IA: POSITIVE
SODIUM SERPL-SCNC: 135 MMOL/L — SIGNIFICANT CHANGE UP (ref 135–145)
WBC # BLD: 15.13 K/UL — HIGH (ref 3.8–10.5)
WBC # FLD AUTO: 15.13 K/UL — HIGH (ref 3.8–10.5)

## 2021-05-25 PROCEDURE — 97165 OT EVAL LOW COMPLEX 30 MIN: CPT

## 2021-05-25 PROCEDURE — S2900: CPT

## 2021-05-25 PROCEDURE — C1776: CPT

## 2021-05-25 PROCEDURE — 85027 COMPLETE CBC AUTOMATED: CPT

## 2021-05-25 PROCEDURE — 97116 GAIT TRAINING THERAPY: CPT

## 2021-05-25 PROCEDURE — 97530 THERAPEUTIC ACTIVITIES: CPT

## 2021-05-25 PROCEDURE — U0003: CPT

## 2021-05-25 PROCEDURE — 80048 BASIC METABOLIC PNL TOTAL CA: CPT

## 2021-05-25 PROCEDURE — U0005: CPT

## 2021-05-25 PROCEDURE — 97161 PT EVAL LOW COMPLEX 20 MIN: CPT

## 2021-05-25 PROCEDURE — C9803: CPT

## 2021-05-25 PROCEDURE — 72170 X-RAY EXAM OF PELVIS: CPT

## 2021-05-25 PROCEDURE — 82962 GLUCOSE BLOOD TEST: CPT

## 2021-05-25 PROCEDURE — C1889: CPT

## 2021-05-25 PROCEDURE — C1713: CPT

## 2021-05-25 PROCEDURE — 86769 SARS-COV-2 COVID-19 ANTIBODY: CPT

## 2021-05-25 PROCEDURE — 27130 TOTAL HIP ARTHROPLASTY: CPT | Mod: RT

## 2021-05-25 RX ORDER — ASPIRIN/CALCIUM CARB/MAGNESIUM 324 MG
1 TABLET ORAL
Qty: 0 | Refills: 0 | DISCHARGE

## 2021-05-25 RX ORDER — PANTOPRAZOLE SODIUM 20 MG/1
1 TABLET, DELAYED RELEASE ORAL
Qty: 30 | Refills: 0
Start: 2021-05-25 | End: 2021-06-23

## 2021-05-25 RX ORDER — SENNA PLUS 8.6 MG/1
2 TABLET ORAL
Qty: 0 | Refills: 0 | DISCHARGE
Start: 2021-05-25

## 2021-05-25 RX ORDER — MAGNESIUM HYDROXIDE 400 MG/1
30 TABLET, CHEWABLE ORAL
Qty: 0 | Refills: 0 | DISCHARGE
Start: 2021-05-25

## 2021-05-25 RX ORDER — APIXABAN 2.5 MG/1
1 TABLET, FILM COATED ORAL
Qty: 60 | Refills: 0
Start: 2021-05-25 | End: 2021-06-23

## 2021-05-25 RX ORDER — TRAMADOL HYDROCHLORIDE 50 MG/1
1 TABLET ORAL
Qty: 28 | Refills: 0
Start: 2021-05-25 | End: 2021-05-31

## 2021-05-25 RX ORDER — APIXABAN 2.5 MG/1
1 TABLET, FILM COATED ORAL
Qty: 0 | Refills: 0 | DISCHARGE
Start: 2021-05-25

## 2021-05-25 RX ORDER — ACETAMINOPHEN 500 MG
3 TABLET ORAL
Qty: 0 | Refills: 0 | DISCHARGE
Start: 2021-05-25

## 2021-05-25 RX ORDER — TRAMADOL HYDROCHLORIDE 50 MG/1
1 TABLET ORAL
Qty: 0 | Refills: 0 | DISCHARGE
Start: 2021-05-25

## 2021-05-25 RX ORDER — OXYCODONE HYDROCHLORIDE 5 MG/1
1 TABLET ORAL
Qty: 0 | Refills: 0 | DISCHARGE
Start: 2021-05-25

## 2021-05-25 RX ORDER — OXYCODONE HYDROCHLORIDE 5 MG/1
1 TABLET ORAL
Qty: 42 | Refills: 0
Start: 2021-05-25 | End: 2021-05-31

## 2021-05-25 RX ADMIN — Medication 325 MILLIGRAM(S): at 12:27

## 2021-05-25 RX ADMIN — Medication 10 MILLIEQUIVALENT(S): at 05:35

## 2021-05-25 RX ADMIN — Medication 500 MILLIGRAM(S): at 05:35

## 2021-05-25 RX ADMIN — Medication 1000 MILLIGRAM(S): at 06:05

## 2021-05-25 RX ADMIN — Medication 400 MILLIGRAM(S): at 05:34

## 2021-05-25 RX ADMIN — Medication 15 MILLIGRAM(S): at 06:15

## 2021-05-25 RX ADMIN — Medication 15 MILLIGRAM(S): at 05:36

## 2021-05-25 RX ADMIN — Medication 1 MILLIGRAM(S): at 12:27

## 2021-05-25 RX ADMIN — APIXABAN 2.5 MILLIGRAM(S): 2.5 TABLET, FILM COATED ORAL at 05:35

## 2021-05-25 RX ADMIN — Medication 15 MILLIGRAM(S): at 12:27

## 2021-05-25 RX ADMIN — Medication 40 MILLIGRAM(S): at 05:35

## 2021-05-25 RX ADMIN — SODIUM CHLORIDE 500 MILLILITER(S): 9 INJECTION INTRAMUSCULAR; INTRAVENOUS; SUBCUTANEOUS at 05:34

## 2021-05-25 RX ADMIN — Medication 1 TABLET(S): at 12:28

## 2021-05-25 RX ADMIN — Medication 1 TABLET(S): at 13:23

## 2021-05-25 RX ADMIN — Medication 15 MILLIGRAM(S): at 00:45

## 2021-05-25 RX ADMIN — Medication 400 MILLIGRAM(S): at 12:27

## 2021-05-25 RX ADMIN — PANTOPRAZOLE SODIUM 40 MILLIGRAM(S): 20 TABLET, DELAYED RELEASE ORAL at 05:35

## 2021-05-25 RX ADMIN — Medication 1 TABLET(S): at 06:52

## 2021-05-25 RX ADMIN — Medication 200 MILLIGRAM(S): at 05:35

## 2021-05-25 NOTE — DISCHARGE NOTE PROVIDER - NSDCFUADDINST_GEN_ALL_CORE_FT
Follow up with Dr. Ross on 6/8/21- please call office to confirm appointment.   DVT ppx-Eliquis 2.5mg oral twice daily for 4 weeks to prevent blood clots.  Activity: Weight bearing as tolerated on right leg.  Posterior hip precautions.  Dressing: Keep Aquacel clean and dry and in place until date written on dressing.   Follow up with Dr. Ross on 6/8/21 - please call office to confirm appointment.   DVT ppx- Eliquis 2.5mg oral twice daily for 4 weeks to prevent blood clots.  Activity: Weight bearing as tolerated on right leg.  Posterior hip precautions.  Dressing: Keep Aquacel clean and dry and in place until date written on dressing.

## 2021-05-25 NOTE — PROGRESS NOTE ADULT - ASSESSMENT
S/P R THR (posterior approach)    Plan    ck labs  OOB/PT/WBAT  Eliquis for DVT prophylaxis  analgesics for pain       Dipika Brooks PA-C   Beeper    [phone]

## 2021-05-25 NOTE — CONSULT NOTE ADULT - SUBJECTIVE AND OBJECTIVE BOX
HISTORY OF PRESENT ILLNESS: HPI:  Patient is a 65 year old female well known to our office with PMH of HTN, HLD, CAD s/p stent on ASA, COPD, Adenocarcinoma of R lung s/p surgery 9/3/2019 has serial CTs annually, former smoker, Scoliosis and OA of right hip who is admitted s/p R THR. Patient seen in our office for preop clearance where she had normal Pharm NST in 2020 and TTE with normal LV function in 3/2021. Cardiology following for management of CAD. Patient tolerated procedure well from CV perspective. Progressing well with no complaints currently. Denies chest pain, SOB, palpitations, dizziness, or syncope.    PAST MEDICAL & SURGICAL HISTORY:  Hypertension    Hyperlipidemia    Pulmonary nodules  yearly CT scans    Carotid artery disease  monitored by vascluar doctor Samuel    Emphysema    Hip pain    Obesity    Edema of both legs    Lung cancer  right,     Stented coronary artery    Localized enlarged lymph nodes    Scoliosis    Lumbar disc disorder    History of  section  x2    S/P lobectomy of lung              MEDICATIONS:  MEDICATIONS  (STANDING):  acetaminophen  IVPB .. 1000 milliGRAM(s) IV Intermittent once  apixaban 2.5 milliGRAM(s) Oral two times a day  ascorbic acid 500 milliGRAM(s) Oral two times a day  atorvastatin 40 milliGRAM(s) Oral at bedtime  calcium carbonate 1250 mG  + Vitamin D (OsCal 500 + D) 1 Tablet(s) Oral three times a day  celecoxib 200 milliGRAM(s) Oral every 12 hours  ferrous    sulfate 325 milliGRAM(s) Oral daily  folic acid 1 milliGRAM(s) Oral daily  furosemide    Tablet 40 milliGRAM(s) Oral daily  ketorolac   Injectable 15 milliGRAM(s) IV Push every 6 hours  labetalol 200 milliGRAM(s) Oral two times a day  multivitamin 1 Tablet(s) Oral daily  pantoprazole    Tablet 40 milliGRAM(s) Oral before breakfast  polyethylene glycol 3350 17 Gram(s) Oral at bedtime  potassium chloride    Tablet ER 10 milliEquivalent(s) Oral two times a day  senna 2 Tablet(s) Oral at bedtime  sodium chloride 0.9%. 1000 milliLiter(s) (100 mL/Hr) IV Continuous <Continuous>      Allergies    penicillin (Short breath; Hives)  tetracycline (Short breath; Hives)    Intolerances        FAMILY HISTORY:  Family history of CHF (congestive heart failure) (Aunt)      Non-contributary for premature coronary disease or sudden cardiac death    SOCIAL HISTORY:    [x ] Non-smoker  [ ] Smoker  [ ] Alcohol    FLU VACCINE THIS YEAR STARTS IN AUGUST:  [ ] Yes    [ ] No    IF OVER 65 HAVE YOU EVER HAD A PNA VACCINE:  [ ] Yes    [ ] No       [ ] N/A      REVIEW OF SYSTEMS:  [ ]chest pain  [  ]shortness of breath  [  ]palpitations  [  ]syncope  [ ]near syncope [ ]upper extremity weakness   [ ] lower extremity weakness  [  ]diplopia  [  ]altered mental status   [  ]fevers  [ ]chills [ ]nausea  [ ]vomitting  [  ]dysphagia    [ ]abdominal pain  [ ]melena  [ ]BRBPR    [  ]epistaxis  [  ]rash    [ ]lower extremity edema        [ x] All others negative	  [ ] Unable to obtain      LABS:	 	    CARDIAC MARKERS:                              11.4   15.13 )-----------( 273      ( 25 May 2021 09:41 )             35.2     Hb Trend: 11.4<--        135  |  103  |  19  ----------------------------<  157<H>  4.6   |  15<L>  |  1.24    Ca    8.9      25 May 2021 09:41      Creatinine Trend: 1.24<--, 1.22<--    Coags:      proBNP:   Lipid Profile:   HgA1c:   TSH:         PHYSICAL EXAM:  T(C): 36.5 (21 @ 13:22), Max: 36.5 (21 @ 13:22)  HR: 71 (21 @ 13:22) (60 - 88)  BP: 126/67 (21 @ 13:22) (94/55 - 153/73)  RR: 18 (21 @ 13:22) (15 - 18)  SpO2: 100% (21 @ 13:22) (95% - 100%)  Wt(kg): --   BMI (kg/m2): 39.1 (21 @ 14:35)  I&O's Summary    24 May 2021 07:01  -  25 May 2021 07:00  --------------------------------------------------------  IN: 1250 mL / OUT: 580 mL / NET: 670 mL    25 May 2021 07:01  -  25 May 2021 16:28  --------------------------------------------------------  IN: 240 mL / OUT: 0 mL / NET: 240 mL        Gen: Appears well in NAD  HEENT:  (-)icterus (-)pallor  CV: N S1 S2 / MIKO (+)2 Pulses B/l  Resp:  Clear to auscultation B/L, normal effort  GI: (+) BS Soft, NT, ND  Lymph:  (-)Edema, (-)obvious lymphadenopathy  Skin: Warm to touch, Normal turgor  Psych: Appropriate mood and affect    TELEMETRY: None	      ECG: Pending 	    ASSESSMENT/PLAN: Patient is a 65 year old female well known to our office with PMH of HTN, HLD, CAD s/p stent on ASA, COPD, Adenocarcinoma of R lung s/p surgery 9/3/2019 has serial CTs annually, former smoker, Scoliosis and OA of right hip who is admitted s/p R THR. Patient seen in our office for preop clearance where she had normal Pharm NST in 2020 and TTE with normal LV function in 3/2021. Cardiology following for management of CAD.    - Tolerated procedure well from CV perspective  - No evidence of clinical HF or anginal symptoms  - Recommend medical management of CAD  - No further inpatient cardiac w/u planned  - Post op care per surgery  - Patient to f/u in our office with Dr. Shepherd after d/c as scheduled    Tyson Blanco PA-C  Pager: [phone]  
Patient is a 65y old  Female who presents with a chief complaint of Right Hip Total Replacement (25 May 2021 07:09)      HPI:  Mrs. Parmar is a 65 year old man with PMH HTN, HLD, CAD s/p stent on ASA, COPD, Adenocarcinoma of R lung s/p surgery 9/3/2019 has serial CTs annually, former smoker, Scoliosis and OA of right hip scheduled for right total hip arthroplasty with PHOEN[employer] robot posterior approach.  She c/o severe r hip pain as well as r knee pain, has difficulty walking uses a cane within the home and needs wheelchair for long distances.    Denies s/s of COVID,  Has had Pfizer vaccines, up to date.    DATE OF SERVICE: 21 @ 13:40  S/p THR on right. Feels much better. Pain is controlled. BP is controlled      PAST MEDICAL & SURGICAL HISTORY:  Hypertension    Hyperlipidemia    Pulmonary nodules  yearly CT scans    Carotid artery disease  monitored by vascluar doctor Doscher    Emphysema    Hip pain    Obesity    Edema of both legs    Lung cancer  right, 2019    Stented coronary artery    Localized enlarged lymph nodes    Scoliosis    Lumbar disc disorder    History of  section  x2    S/P lobectomy of lung  2019        Review of Systems:   CONSTITUTIONAL: No fever, weight loss, or fatigue  EYES: No eye pain, visual disturbances, or discharge  ENMT:  No difficulty hearing, tinnitus, vertigo; No sinus or throat pain  NECK: No pain or stiffness  BREASTS: No pain, masses, or nipple discharge  RESPIRATORY: No cough, wheezing, chills or hemoptysis; No shortness of breath  CARDIOVASCULAR: No chest pain, palpitations, dizziness, or leg swelling  GASTROINTESTINAL: No abdominal or epigastric pain. No nausea, vomiting, or hematemesis; No diarrhea or constipation. No melena or hematochezia.  GENITOURINARY: No dysuria, frequency, hematuria, or incontinence  NEUROLOGICAL: No headaches, memory loss, loss of strength, numbness, or tremors  SKIN: No itching, burning, rashes, or lesions   LYMPH NODES: No enlarged glands  ENDOCRINE: No heat or cold intolerance; No hair loss  MUSCULOSKELETAL: No joint pain or swelling; No muscle, back, or extremity pain  PSYCHIATRIC: No depression, anxiety, mood swings, or difficulty sleeping  HEME/LYMPH: No easy bruising, or bleeding gums  ALLERY AND IMMUNOLOGIC: No hives or eczema    Allergies    penicillin (Short breath; Hives)  tetracycline (Short breath; Hives)    Intolerances        Social History:     FAMILY HISTORY:  Family history of CHF (congestive heart failure) (Aunt)        MEDICATIONS  (STANDING):  acetaminophen  IVPB .. 1000 milliGRAM(s) IV Intermittent once  apixaban 2.5 milliGRAM(s) Oral two times a day  ascorbic acid 500 milliGRAM(s) Oral two times a day  atorvastatin 40 milliGRAM(s) Oral at bedtime  calcium carbonate 1250 mG  + Vitamin D (OsCal 500 + D) 1 Tablet(s) Oral three times a day  celecoxib 200 milliGRAM(s) Oral every 12 hours  ferrous    sulfate 325 milliGRAM(s) Oral daily  folic acid 1 milliGRAM(s) Oral daily  furosemide    Tablet 40 milliGRAM(s) Oral daily  ketorolac   Injectable 15 milliGRAM(s) IV Push every 6 hours  labetalol 200 milliGRAM(s) Oral two times a day  multivitamin 1 Tablet(s) Oral daily  pantoprazole    Tablet 40 milliGRAM(s) Oral before breakfast  polyethylene glycol 3350 17 Gram(s) Oral at bedtime  potassium chloride    Tablet ER 10 milliEquivalent(s) Oral two times a day  senna 2 Tablet(s) Oral at bedtime  sodium chloride 0.9%. 1000 milliLiter(s) (100 mL/Hr) IV Continuous <Continuous>    MEDICATIONS  (PRN):  benzocaine 15 mG/menthol 3.6 mG (Sugar-Free) Lozenge 1 Lozenge Oral every 3 hours PRN Sore Throat  magnesium hydroxide Suspension 30 milliLiter(s) Oral daily PRN Constipation  melatonin 3 milliGRAM(s) Oral at bedtime PRN Sleep  ondansetron Injectable 4 milliGRAM(s) IV Push every 6 hours PRN Nausea and/or Vomiting  oxyCODONE    IR 5 milliGRAM(s) Oral every 3 hours PRN Moderate Pain (4 - 6)  oxyCODONE    IR 10 milliGRAM(s) Oral every 3 hours PRN Severe Pain (7 - 10)  traMADol 50 milliGRAM(s) Oral every 6 hours PRN Mild Pain (1 - 3)        CAPILLARY BLOOD GLUCOSE        I&O's Summary    24 May 2021 07:01  -  25 May 2021 07:00  --------------------------------------------------------  IN: 1250 mL / OUT: 580 mL / NET: 670 mL    25 May 2021 07:01  -  25 May 2021 13:40  --------------------------------------------------------  IN: 120 mL / OUT: 0 mL / NET: 120 mL        PHYSICAL EXAM:  Vital Signs Last 24 Hrs  T(C): 36.3 (25 May 2021 08:57), Max: 36.8 (24 May 2021 14:35)  T(F): 97.4 (25 May 2021 08:57), Max: 97.6 (24 May 2021 23:10)  HR: 66 (25 May 2021 08:57) (60 - 88)  BP: 110/64 (25 May 2021 08:57) (94/55 - 175/87)  BP(mean): 97 (24 May 2021 20:30) (70 - 97)  RR: 18 (25 May 2021 08:57) (15 - 20)  SpO2: 96% (25 May 2021 08:57) (95% - 99%)    GENERAL: NAD, well-developed  HEAD:  Atraumatic, Normocephalic  EYES: EOMI, PERRLA, conjunctiva and sclera clear  NECK: Supple, No JVD  CHEST/LUNG: Clear to auscultation bilaterally; No wheeze  HEART: Regular rate and rhythm; No murmurs, rubs, or gallops  ABDOMEN: Soft, Nontender, Nondistended; Bowel sounds present  EXTREMITIES:  2+ Peripheral Pulses, No clubbing, cyanosis, or edema  PSYCH: AAOx3  NEUROLOGY: non-focal  SKIN: No rashes or lesions    LABS:                        11.4   15.13 )-----------( 273      ( 25 May 2021 09:41 )             35.2     05-    135  |  103  |  19  ----------------------------<  157<H>  4.6   |  15<L>  |  1.24    Ca    8.9      25 May 2021 09:41                RADIOLOGY & ADDITIONAL TESTS:    Imaging Personally Reviewed:    Consultant(s) Notes Reviewed:      Care Discussed with Consultants/Other Providers:

## 2021-05-25 NOTE — PROGRESS NOTE ADULT - ATTENDING COMMENTS
I agree with the above note on this patient. All pertinent films have been reviewed. Please refer to clinical documentation of the history, physical examinations, data summary, and both assessment and plan as documented above and with which I agree.    Maycol Ross MD  Attending Orthopedic Surgeon

## 2021-05-25 NOTE — OCCUPATIONAL THERAPY INITIAL EVALUATION ADULT - PERTINENT HX OF CURRENT PROBLEM, REHAB EVAL
65 year old man with PMH HTN, HLD, CAD s/p stent on ASA, COPD, Adenocarcinoma of R lung s/p surgery 9/3/2019 has serial CTs annually, former smoker, Scoliosis and OA of right hip scheduled for right total hip arthroplasty with [employer] robot posterior approach.  She c/o severe r hip pain as well as r knee pain, has difficulty walking uses a cane within the home and needs wheelchair for long distances. See below

## 2021-05-25 NOTE — CONSULT NOTE ADULT - ASSESSMENT
Patient seen and examined, agree with above assessment and plan as transcribed above.    - tolerated procedure  - BP stable  - F/u in our office as planned    Vern Medrano MD, [facility]  BEEPER [phone]

## 2021-05-25 NOTE — DISCHARGE NOTE PROVIDER - NSDCMRMEDTOKEN_GEN_ALL_CORE_FT
aspirin 81 mg oral delayed release tablet: 1 tab(s) orally once a day  atorvastatin 40 mg oral tablet: 1 tab(s) orally once a day  labetalol 200 mg oral tablet: 1 tab(s) orally 2 times a day  Lasix 40 mg oral tablet: 1 tab(s) orally once a day   potassium chloride 10 mEq oral capsule, extended release: 1 cap(s) orally 2 times a day  Trelegy Ellipta inhalation powder: 1 puff(s) inhaled once a day   acetaminophen 325 mg oral tablet: 3 tabs orally every 8 hours X 7 days, then every 8 hours AS NEEDED for mild pain  apixaban 2.5 mg oral tablet: 1 tab orally 2 times a day X 4 weeks for the prevention of blood clots      MDD:2  atorvastatin 40 mg oral tablet: 1 tab(s) orally once a day  labetalol 200 mg oral tablet: 1 tab(s) orally 2 times a day  Lasix 40 mg oral tablet: 1 tab(s) orally once a day   magnesium hydroxide 8% oral suspension: 30 milliliter(s) orally once a day, As needed, Constipation  naproxen 500 mg oral tablet: 1 tab orally 2 times a day X 2 weeks   MDD:2  oxyCODONE 5 mg oral tablet: 1 to 2 tabs orally every 4-6 hours, As Needed for Moderate to Severe Pain  MDD:8  pantoprazole 40 mg oral delayed release tablet: 1 tab orally once a day (before breakfast) MDD:1  potassium chloride 10 mEq oral capsule, extended release: 1 cap(s) orally 2 times a day  senna oral tablet: 2 tab(s) orally once a day (at bedtime) for constipation.  May purchase over the counter  traMADol 50 mg oral tablet: 1 tab orally every 6 hours, As needed for Mild to Moderate Pain  MDD:4  Trelegy Ellipta inhalation powder: 1 puff(s) inhaled once a day

## 2021-05-25 NOTE — PROGRESS NOTE ADULT - SUBJECTIVE AND OBJECTIVE BOX
Patient is a 65y old  Female who presents with a chief complaint of R THR (10 May 2021 13:58)      POST OPERATIVE DAY #: 1  Patient comfortable  No complaints  OOB in chair    VS:  T(C): 36.4 (05-25-21 @ 05:31), Max: 36.8 (05-24-21 @ 12:25)  T(F): 97.5 (05-25-21 @ 05:31), Max: 98.2 (05-24-21 @ 12:25)  HR: 88 (05-25-21 @ 05:31) (60 - 88)  BP: 153/73 (05-25-21 @ 05:31) (94/55 - 175/87)  RR: 18 (05-25-21 @ 05:31) (15 - 20)  SpO2: 97% (05-25-21 @ 05:31) (95% - 99%)  Wt(kg): --      PHYSICAL EXAM:  NAD, Alert  EXT:   Rt Hip Aquacel Dressing C/D/I  No Calf Tenderness  (+) DF/PF; (+) Distal Pulses;  Sensation: No gross deficits noted  Pulses 2+   B/L, PAS

## 2021-05-25 NOTE — DISCHARGE NOTE PROVIDER - NSDCFUADDAPPT_GEN_ALL_CORE_FT
It is highly recommended you follow up with your PCP within 4 weeks to discuss recent   surgery/general checkup/possible medication adjustment.

## 2021-05-25 NOTE — OCCUPATIONAL THERAPY INITIAL EVALUATION ADULT - ANTICIPATED DISCHARGE DISPOSITION, OT EVAL
Home with no skilled OT needs, assist with ADLs as needed from family. Pt provided with raised toilet seat and hip kit

## 2021-05-25 NOTE — OCCUPATIONAL THERAPY INITIAL EVALUATION ADULT - LIVES WITH, PROFILE
Pt lives with family in private home with 3 steps to enter, tub in bathroom with shower chair and grab bars. Pt I in ADLs and ambulation prior to admission

## 2021-05-25 NOTE — DISCHARGE NOTE PROVIDER - NSDCFUSCHEDAPPT_GEN_ALL_CORE_FT
ERIKA CORRIGAN ; 06/08/2021 ; [facility]PP [facility] [address] [facility]  ERIKA CORRIGAN ; 06/22/2021 ; [facility] [id number] [place] [address]

## 2021-05-25 NOTE — CHART NOTE - NSCHARTNOTEFT_GEN_A_CORE
Ortho PA Note      Offered patient covid-19 vaccine, but patient declined as she is already vaccinated.        RUTH Mckeon  Orthopedic Surgery  [id number]

## 2021-05-25 NOTE — DISCHARGE NOTE PROVIDER - CARE PROVIDER_API CALL
Maycol Ross)  Orthopaedic Surgery  [address] [facility], [address]  [place] [address]  Phone: [phone]  Fax: [phone]  Follow Up Time:

## 2021-05-25 NOTE — DISCHARGE NOTE PROVIDER - HOSPITAL COURSE
History of Present Illness    Mrs. Parmar is a 65 year old man with PMH HTN, HLD, CAD s/p stent on ASA, COPD, Adenocarcinoma of R lung s/p surgery 9/3/2019 has serial CTs annually, former smoker, Scoliosis and OA of right hip scheduled for right total hip arthroplasty with [employer] robot posterior approach.  She c/o severe r hip pain as well as r knee pain, has difficulty walking uses a cane within the home and needs wheelchair for long distances.    Denies s/s of COVID,  Has had Pfizer vaccines, up to date.    Hospital Course:  Patient underwent right total hip replacement without complications. Evaluated and treated by physical therapy whom recommended home for discharge   disposition. Discharged to home when cleared by physical therapy.    History of Present Illness    Mrs. Parmar is a 65 year old man with PMH HTN, HLD, CAD s/p stent on ASA, COPD, Adenocarcinoma of R lung s/p surgery 9/3/2019 has serial CTs annually, former smoker, Scoliosis and OA of right hip scheduled for right total hip arthroplasty with [employer] robot posterior approach.  She c/o severe r hip pain as well as r knee pain, has difficulty walking uses a cane within the home and needs wheelchair for long distances.    Denies s/s of COVID,  Has had Pfizer vaccines, up to date.    Goal: "To walk again like a normal person."    Hospital Course:  Patient underwent right total hip replacement without complications. Evaluated and treated by physical therapy whom recommended home for discharge   disposition. Discharged to home when cleared by physical therapy.

## 2021-05-25 NOTE — DISCHARGE NOTE NURSING/CASE MANAGEMENT/SOCIAL WORK - PATIENT PORTAL LINK FT
You can access the FollowMyHealth Patient Portal offered by [facility] by registering at the following website: [email or web address]. By joining [facility]’s FollowMyHealth portal, you will also be able to view your health information using other applications (apps) compatible with our system.

## 2021-05-28 ENCOUNTER — INPATIENT (INPATIENT)
Facility: HOSPITAL | Age: 66
LOS: 0 days | Discharge: HOME | DRG: 813 | End: 2021-05-29
Attending: INTERNAL MEDICINE | Admitting: INTERNAL MEDICINE
Payer: COMMERCIAL

## 2021-05-28 VITALS
HEART RATE: 75 BPM | WEIGHT: 188.94 LBS | OXYGEN SATURATION: 97 % | HEIGHT: 60 IN | TEMPERATURE: 98 F | RESPIRATION RATE: 18 BRPM | DIASTOLIC BLOOD PRESSURE: 79 MMHG | SYSTOLIC BLOOD PRESSURE: 148 MMHG

## 2021-05-28 DIAGNOSIS — Z98.891 HISTORY OF UTERINE SCAR FROM PREVIOUS SURGERY: Chronic | ICD-10-CM

## 2021-05-28 DIAGNOSIS — R09.89 OTHER SPECIFIED SYMPTOMS AND SIGNS INVOLVING THE CIRCULATORY AND RESPIRATORY SYSTEMS: ICD-10-CM

## 2021-05-28 DIAGNOSIS — Z90.2 ACQUIRED ABSENCE OF LUNG [PART OF]: Chronic | ICD-10-CM

## 2021-05-28 DIAGNOSIS — D64.9 ANEMIA, UNSPECIFIED: ICD-10-CM

## 2021-05-28 LAB
ALBUMIN SERPL ELPH-MCNC: 3.4 G/DL — SIGNIFICANT CHANGE UP (ref 3.3–5)
ALP SERPL-CCNC: 92 U/L — SIGNIFICANT CHANGE UP (ref 40–120)
ALT FLD-CCNC: 38 U/L — SIGNIFICANT CHANGE UP (ref 10–45)
ANION GAP SERPL CALC-SCNC: 16 MMOL/L — SIGNIFICANT CHANGE UP (ref 5–17)
APTT BLD: 39.4 SEC — HIGH (ref 27.5–35.5)
AST SERPL-CCNC: 39 U/L — SIGNIFICANT CHANGE UP (ref 10–40)
BASOPHILS # BLD AUTO: 0.03 K/UL — SIGNIFICANT CHANGE UP (ref 0–0.2)
BASOPHILS NFR BLD AUTO: 0.3 % — SIGNIFICANT CHANGE UP (ref 0–2)
BILIRUB SERPL-MCNC: 0.8 MG/DL — SIGNIFICANT CHANGE UP (ref 0.2–1.2)
BLD GP AB SCN SERPL QL: NEGATIVE — SIGNIFICANT CHANGE UP
BUN SERPL-MCNC: 19 MG/DL — SIGNIFICANT CHANGE UP (ref 7–23)
CALCIUM SERPL-MCNC: 9 MG/DL — SIGNIFICANT CHANGE UP (ref 8.4–10.5)
CHLORIDE SERPL-SCNC: 96 MMOL/L — SIGNIFICANT CHANGE UP (ref 96–108)
CO2 SERPL-SCNC: 22 MMOL/L — SIGNIFICANT CHANGE UP (ref 22–31)
CREAT SERPL-MCNC: 1.27 MG/DL — SIGNIFICANT CHANGE UP (ref 0.5–1.3)
EOSINOPHIL # BLD AUTO: 0.15 K/UL — SIGNIFICANT CHANGE UP (ref 0–0.5)
EOSINOPHIL NFR BLD AUTO: 1.4 % — SIGNIFICANT CHANGE UP (ref 0–6)
GLUCOSE SERPL-MCNC: 110 MG/DL — HIGH (ref 70–99)
HCT VFR BLD CALC: 31.3 % — LOW (ref 34.5–45)
HCT VFR BLD CALC: 31.6 % — LOW (ref 34.5–45)
HGB BLD-MCNC: 10.5 G/DL — LOW (ref 11.5–15.5)
HGB BLD-MCNC: 10.8 G/DL — LOW (ref 11.5–15.5)
IMM GRANULOCYTES NFR BLD AUTO: 0.5 % — SIGNIFICANT CHANGE UP (ref 0–1.5)
INR BLD: 1.34 RATIO — HIGH (ref 0.88–1.16)
LYMPHOCYTES # BLD AUTO: 2.45 K/UL — SIGNIFICANT CHANGE UP (ref 1–3.3)
LYMPHOCYTES # BLD AUTO: 23.2 % — SIGNIFICANT CHANGE UP (ref 13–44)
MCHC RBC-ENTMCNC: 31.3 PG — SIGNIFICANT CHANGE UP (ref 27–34)
MCHC RBC-ENTMCNC: 31.6 PG — SIGNIFICANT CHANGE UP (ref 27–34)
MCHC RBC-ENTMCNC: 33.5 GM/DL — SIGNIFICANT CHANGE UP (ref 32–36)
MCHC RBC-ENTMCNC: 34.2 GM/DL — SIGNIFICANT CHANGE UP (ref 32–36)
MCV RBC AUTO: 92.4 FL — SIGNIFICANT CHANGE UP (ref 80–100)
MCV RBC AUTO: 93.4 FL — SIGNIFICANT CHANGE UP (ref 80–100)
MONOCYTES # BLD AUTO: 0.78 K/UL — SIGNIFICANT CHANGE UP (ref 0–0.9)
MONOCYTES NFR BLD AUTO: 7.4 % — SIGNIFICANT CHANGE UP (ref 2–14)
NEUTROPHILS # BLD AUTO: 7.12 K/UL — SIGNIFICANT CHANGE UP (ref 1.8–7.4)
NEUTROPHILS NFR BLD AUTO: 67.2 % — SIGNIFICANT CHANGE UP (ref 43–77)
NRBC # BLD: 0 /100 WBCS — SIGNIFICANT CHANGE UP (ref 0–0)
NRBC # BLD: 0 /100 WBCS — SIGNIFICANT CHANGE UP (ref 0–0)
PLATELET # BLD AUTO: 268 K/UL — SIGNIFICANT CHANGE UP (ref 150–400)
PLATELET # BLD AUTO: 278 K/UL — SIGNIFICANT CHANGE UP (ref 150–400)
POTASSIUM SERPL-MCNC: 4.3 MMOL/L — SIGNIFICANT CHANGE UP (ref 3.5–5.3)
POTASSIUM SERPL-SCNC: 4.3 MMOL/L — SIGNIFICANT CHANGE UP (ref 3.5–5.3)
PROT SERPL-MCNC: 7.1 G/DL — SIGNIFICANT CHANGE UP (ref 6–8.3)
PROTHROM AB SERPL-ACNC: 15.9 SEC — HIGH (ref 10.6–13.6)
RBC # BLD: 3.35 M/UL — LOW (ref 3.8–5.2)
RBC # BLD: 3.42 M/UL — LOW (ref 3.8–5.2)
RBC # FLD: 12.4 % — SIGNIFICANT CHANGE UP (ref 10.3–14.5)
RBC # FLD: 12.5 % — SIGNIFICANT CHANGE UP (ref 10.3–14.5)
RH IG SCN BLD-IMP: POSITIVE — SIGNIFICANT CHANGE UP
SODIUM SERPL-SCNC: 134 MMOL/L — LOW (ref 135–145)
WBC # BLD: 10.58 K/UL — HIGH (ref 3.8–10.5)
WBC # BLD: 12.13 K/UL — HIGH (ref 3.8–10.5)
WBC # FLD AUTO: 10.58 K/UL — HIGH (ref 3.8–10.5)
WBC # FLD AUTO: 12.13 K/UL — HIGH (ref 3.8–10.5)

## 2021-05-28 PROCEDURE — 99223 1ST HOSP IP/OBS HIGH 75: CPT

## 2021-05-28 PROCEDURE — 73502 X-RAY EXAM HIP UNI 2-3 VIEWS: CPT | Mod: 26,RT

## 2021-05-28 PROCEDURE — 99285 EMERGENCY DEPT VISIT HI MDM: CPT

## 2021-05-28 RX ORDER — ACETAMINOPHEN 500 MG
650 TABLET ORAL EVERY 6 HOURS
Refills: 0 | Status: DISCONTINUED | OUTPATIENT
Start: 2021-05-28 | End: 2021-05-28

## 2021-05-28 RX ORDER — ACETAMINOPHEN 500 MG
975 TABLET ORAL EVERY 8 HOURS
Refills: 0 | Status: DISCONTINUED | OUTPATIENT
Start: 2021-05-28 | End: 2021-05-29

## 2021-05-28 RX ORDER — APIXABAN 2.5 MG/1
2.5 TABLET, FILM COATED ORAL EVERY 12 HOURS
Refills: 0 | Status: DISCONTINUED | OUTPATIENT
Start: 2021-05-28 | End: 2021-05-29

## 2021-05-28 RX ORDER — ATORVASTATIN CALCIUM 80 MG/1
40 TABLET, FILM COATED ORAL AT BEDTIME
Refills: 0 | Status: DISCONTINUED | OUTPATIENT
Start: 2021-05-28 | End: 2021-05-29

## 2021-05-28 RX ORDER — LABETALOL HCL 100 MG
200 TABLET ORAL
Refills: 0 | Status: DISCONTINUED | OUTPATIENT
Start: 2021-05-28 | End: 2021-05-29

## 2021-05-28 RX ORDER — FUROSEMIDE 40 MG
40 TABLET ORAL DAILY
Refills: 0 | Status: DISCONTINUED | OUTPATIENT
Start: 2021-05-28 | End: 2021-05-29

## 2021-05-28 RX ORDER — TRAMADOL HYDROCHLORIDE 50 MG/1
50 TABLET ORAL EVERY 6 HOURS
Refills: 0 | Status: DISCONTINUED | OUTPATIENT
Start: 2021-05-28 | End: 2021-05-29

## 2021-05-28 RX ORDER — OXYCODONE HYDROCHLORIDE 5 MG/1
5 TABLET ORAL EVERY 6 HOURS
Refills: 0 | Status: DISCONTINUED | OUTPATIENT
Start: 2021-05-28 | End: 2021-05-29

## 2021-05-28 RX ORDER — PANTOPRAZOLE SODIUM 20 MG/1
40 TABLET, DELAYED RELEASE ORAL
Refills: 0 | Status: DISCONTINUED | OUTPATIENT
Start: 2021-05-28 | End: 2021-05-29

## 2021-05-28 NOTE — H&P ADULT - PROBLEM SELECTOR PLAN 1
anemia with Hb ~10 (baseline ~13 but post-op Hb 11.4) - likely combination of post-op losses and some bleeding from surgical site in setting of Eliquis use. Denies any other bleeding, no GI bleed/melena   - repeat Hb stable  - will repeat Hb again in AM  - c/w Eliquis if remains stable   - if remains stable in AM, likely d/c and outpt f/u

## 2021-05-28 NOTE — ED PROVIDER NOTE - PMH
Carotid artery disease  monitored by vascluar doctor Doscher  Edema of both legs    Emphysema    Hip pain    Hyperlipidemia    Hypertension    Localized enlarged lymph nodes    Lumbar disc disorder    Lung cancer  right, 2019  Obesity    Pulmonary nodules  yearly CT scans  Scoliosis    Stented coronary artery

## 2021-05-28 NOTE — H&P ADULT - PROBLEM SELECTOR PLAN 2
s/p R EDY 5/24/21  orthopedics consulted, recs appreciated - post-op serosanguinous drainage noted  no intervention, c/w Aquacel dressing    outpt f/u with orthopedic surgeon s/p R EDY 5/24/21  orthopedics consulted, recs appreciated - post-op serosanguinous drainage noted  no intervention, c/w Aquacel dressing    outpt f/u with orthopedic surgeon  c/w pain control

## 2021-05-28 NOTE — ED ADULT NURSE NOTE - NSIMPLEMENTINTERV_GEN_ALL_ED
Implemented All Fall with Harm Risk Interventions:  [place] to call system. Call bell, personal items and telephone within reach. Instruct patient to call for assistance. Room bathroom lighting operational. Non-slip footwear when patient is off stretcher. Physically safe environment: no spills, clutter or unnecessary equipment. Stretcher in lowest position, wheels locked, appropriate side rails in place. Provide visual cue, wrist band, yellow gown, etc. Monitor gait and stability. Monitor for mental status changes and reorient to person, place, and time. Review medications for side effects contributing to fall risk. Reinforce activity limits and safety measures with patient and family. Provide visual clues: red socks.

## 2021-05-28 NOTE — CONSULT NOTE ADULT - ASSESSMENT
66yo Female PMH HTN, HLD, CAD s/p stent on ASA, COPD, Adenocarcinoma of R lung s/p surgery 9/3/2019 has serial CTs annually, former smoker, s/p R EDY by Dr. Ross on 5/24/21 presenting with normal postoperative drainage    - Pain control  - Aquacel dressing changed at bedside  - She is scheduled for follow up appointment with Dr. Ross on 6/8/21  - Discussed office phone number and polo to contact the office with any other future questions with postoperative drainage  - Discussed plan with Dr. Ross 64yo Female PMH HTN, HLD, CAD s/p stent on ASA, COPD, Adenocarcinoma of R lung s/p surgery 9/3/2019 has serial CTs annually, former smoker, s/p R EDY by Dr. Ross on 5/24/21 presenting with normal postoperative drainage    - Pain control  - Aquacel dressing changed at bedside  - She is scheduled for follow up appointment with Dr. Ross on 6/8/21  - Discussed office phone number and polo to contact the office with any other future questions with postoperative drainage  - Ortho stable for discharge  - Discussed plan with Dr. Ross

## 2021-05-28 NOTE — ED PROVIDER NOTE - ATTENDING CONTRIBUTION TO CARE
I performed a history and physical exam of the patient and discussed their management with the resident. I reviewed the resident's note and agree with the documented findings and plan of care.  Julissa Desai MD

## 2021-05-28 NOTE — ED PROVIDER NOTE - NS_ ATTENDINGSCRIBEDETAILS _ED_A_ED_FT
I performed a history and physical exam of the patient and discussed their management with the resident. I reviewed the scribe's note and agree with the documented findings and plan of care.  Julissa Desai MD

## 2021-05-28 NOTE — H&P ADULT - NSHPPHYSICALEXAM_GEN_ALL_CORE
Vital Signs Last 24 Hrs  T(C): 36.7 (28 May 2021 22:30), Max: 36.9 (28 May 2021 17:20)  T(F): 98.1 (28 May 2021 22:30), Max: 98.5 (28 May 2021 17:20)  HR: 72 (28 May 2021 22:30) (72 - 77)  BP: 142/91 (28 May 2021 22:30) (142/91 - 167/49)  BP(mean): 84 (28 May 2021 19:40) (84 - 84)  RR: 18 (28 May 2021 22:30) (18 - 18)  SpO2: 99% (28 May 2021 22:30) (97% - 99%)    PHYSICAL EXAM:  GENERAL: NAD, well-developed  HEAD:  Atraumatic, normocephalic  EYES: EOMI, conjunctiva and sclera clear  NECK: Supple, no JVD  CHEST/LUNG: Clear to auscultation bilaterally; no wheezing or rales  HEART: Regular rate and rhythm; no murmurs  ABDOMEN: Soft, nontender, nondistended; bowel sounds present  EXTREMITIES:  2+ Peripheral Pulses, trace LLE edema, 1-2+ LLE edema; Aquacel dressings on R posterior hip  PSYCH: calm affect, not anxious  NEUROLOGY: non-focal, AAOx3  SKIN: No rashes or lesions  MUSCULOSKELETAL: no back pain, moving all extremities

## 2021-05-28 NOTE — CONSULT NOTE ADULT - ATTENDING COMMENTS
I agree with the above note on this patient. All pertinent films have been reviewed. Please refer to clinical documentation of the history, physical examinations, data summary, and both assessment and plan as documented above and with which I agree.    reviewed plan with resident, reviewed imaging  outpatient f/u    Maycol Ross MD  Attending Orthopedic Surgeon

## 2021-05-28 NOTE — H&P ADULT - NSHPLABSRESULTS_GEN_ALL_CORE
Labs, imaging and EKG personally reviewed and interpreted by me.                           10.5   12.13 )-----------( 278      ( 28 May 2021 22:45 )             31.3     05-28    134<L>  |  96  |  19  ----------------------------<  110<H>  4.3   |  22  |  1.27    Ca    9.0      28 May 2021 18:32    TPro  7.1  /  Alb  3.4  /  TBili  0.8  /  DBili  x   /  AST  39  /  ALT  38  /  AlkPhos  92  05-28        PT/INR - ( 28 May 2021 19:09 )   PT: 15.9 sec;   INR: 1.34 ratio         PTT - ( 28 May 2021 19:09 )  PTT:39.4 sec

## 2021-05-28 NOTE — H&P ADULT - NSHPREVIEWOFSYSTEMS_GEN_ALL_CORE
REVIEW OF SYSTEMS:    CONSTITUTIONAL: No fevers, chills  EYES/ENT: No visual changes;  or throat pain   NECK: No pain or stiffness  RESPIRATORY: No cough, no shortness of breath  CARDIOVASCULAR: No chest pain or palpitations  GASTROINTESTINAL: no nausea, vomiting, no abdominal pain, no BRBPR  GENITOURINARY: no polyuria, no dysuria  NEUROLOGICAL: no numbness, no headaches, no confusion   MUSCULOSKELETAL: as per HPI  SKIN: No itching, burning, rashes, or lesions   PSYCH: no anxiety, depression  HEME: no gum bleeding, no bruising

## 2021-05-28 NOTE — ED ADULT NURSE NOTE - OBJECTIVE STATEMENT
65 y F presents to the ED s/p R hip surgery on Monday, reports that she was walking around all day today with no issues was sitting on her couch today and felt like she sat in water and noticed her incision was bleeding. Pt has her surgical dressing intact on skin, pt has blood underneath. Pt has no other complaints. On assessment, A&Ox4. Denies lightheadedness, dizziness, headaches, numbness and tingling. Breathing spontaneously and unlabored on Room air. Denies cough, SOB and CP. No Peripheral edema. Cap refill 2s. No JVD. Peripheral pulses strong and equal bilaterally. Denies CP, SOB and palpitations. Abdomen soft, nontender, nondistended, negative CVA tenderness, positive bowel sounds in all four quadrants. Pt is continent. Denies n/v/d, dysuria, melena and hematuria. Pt safety maintained. Call bell within reach. Side rails in upward position. Seen and evaluated by MD. Awaiting dispo. 65 y F pmhx of lung ca, stents presents to the ED s/p R hip surgery on Monday, reports that she was walking around all day today with no issues was sitting on her couch today and felt like she sat in water and noticed her incision was bleeding. Pt has her surgical dressing intact on skin, pt has blood underneath. Pt has no other complaints. On assessment, A&Ox4. Denies lightheadedness, dizziness, headaches, numbness and tingling. Breathing spontaneously and unlabored on Room air. Denies cough, SOB and CP. No Peripheral edema. Cap refill 2s. No JVD. Peripheral pulses strong and equal bilaterally. Denies CP, SOB and palpitations. Abdomen soft, nontender, nondistended, negative CVA tenderness, positive bowel sounds in all four quadrants. Pt is continent. Denies n/v/d, dysuria, melena and hematuria. Pt safety maintained. Call bell within reach. Side rails in upward position. Seen and evaluated by MD. Awaiting dispo.

## 2021-05-28 NOTE — ED PROVIDER NOTE - CLINICAL SUMMARY MEDICAL DECISION MAKING FREE TEXT BOX
Julissa Desai (MD): 65y F s/p hip replacement on Monday, on Eliquis p/w bleeding from incision site. Wound not visualized because there is a dressing that should not be removed per Ortho. Plan for labs, typing screen, PT-INR, XR hip, ortho consult.

## 2021-05-28 NOTE — ED PROVIDER NOTE - OBJECTIVE STATEMENT
65y F PMHx Lung CA s/p lobectomy, COPD, HTN p/w bleeding from incision site to right posterior thigh x today. Pt is s/p right posterior hip replacement surgery on Monday (Dr. Hyde). On Eliquis, last dose was this morning. Endorses leg swelling. Denies calf pain, difficulty ambulating.  Surgeon: Dr. Maycol Ross [phone]  Daily medications: apixaban, atorvastatin, labetalol, Lasix, pantoprazole  PRN meds: Trelegy Elllipta, magnesium hydroxide, oxycodone, tramadol.  Allergic to penicillin (rash/hives), tetracycline (rash/hives). Nonsmoker, no EtOH use.

## 2021-05-28 NOTE — ED PROVIDER NOTE - PROGRESS NOTE DETAILS
Julissa Desai): Paged ortho, who requested Aquacel. Will see pt at bedside. Javi FAM (PGY-1)   spoke with Dr. Gibson regarding pt's drop in Hb recently. Will order for another cbc at 10:30pm. I spoke with pt who is stating that she is ok with the plan, and with the plan for admission. If hb continues to drop, we will order a CT hip

## 2021-05-28 NOTE — CONSULT NOTE ADULT - SUBJECTIVE AND OBJECTIVE BOX
64yo Female PMH HTN, HLD, CAD s/p stent on ASA, COPD, Adenocarcinoma of R lung s/p surgery 9/3/2019 has serial CTs annually, former smoker, s/p R EDY by Dr. Ross on 21 presents with sanguinous drainage from the aquacel dressing. Patient denies trauma. Denies fever or chills. Patient has been taking her eliquis for DVT prophylaxis. Patient denies numbness or tingling in the RLE. Patient denies any other injuries. She has been using a walker and working with physical therapy.    ROS: 10 point review of systems otherwise negative unless noted in HPI    PMH:  Hypertension    Hyperlipidemia    Pulmonary nodules    Carotid artery disease    Emphysema    Hip pain    Obesity    Edema of both legs    Lung cancer    Stented coronary artery    Localized enlarged lymph nodes    Scoliosis    Lumbar disc disorder      PSH:  History of  section    H/O: lung cancer    S/P lobectomy of lung      AH:  penicillin (Short breath; Hives)  tetracycline (Short breath; Hives)    Meds: See med rec    T(C): 36.9 (21 @ 17:20)  HR: 77 (21 @ 17:20)  BP: 163/78 (21 @ 17:20)  RR: 18 (21 @ 17:20)  SpO2: 98% (21 @ 17:20)  Wt(kg): --                        10.8   10.58 )-----------( 268      ( 28 May 2021 18:32 )             31.6         134<L>  |  96  |  19  ----------------------------<  110<H>  4.3   |  22  |  1.27    Ca    9.0      28 May 2021 18:32    TPro  7.1  /  Alb  3.4  /  TBili  0.8  /  DBili  x   /  AST  39  /  ALT  38  /  AlkPhos  92      PT/INR - ( 28 May 2021 19:09 )   PT: 15.9 sec;   INR: 1.34 ratio         PTT - ( 28 May 2021 19:09 )  PTT:39.4 sec      PE  Gen: NAD, alert and oriented  Resp: Unlabored breathing  RLE: Dressing with sanguinous drainage from inferior border of the dressing, no ecchymosis, or erythema       SILT DP/SP/ Marc/Saph,        +EHL/FHL/TA/Gastroc,        Hip/Knee/ankle painless ROM,        equal leg lengths       DP+,        soft compartments, no calf ttp,       Secondary:  No TTP over bony landmarks, SILT BL, ROM intact BL, distal pulses palpable.    Imaging:  XR demonstrating right total hip arthroplasty

## 2021-05-28 NOTE — ED PROVIDER NOTE - SKIN COLOR
Pt with dressing over incision site to right posterior thigh. Wound not visualized due to dressing not to be removed per ortho.

## 2021-05-28 NOTE — H&P ADULT - HISTORY OF PRESENT ILLNESS
64yo Female PMH HTN, HLD, CAD s/p stent on ASA, COPD, Adenocarcinoma of R lung s/p surgery 9/3/2019 has serial CTs annually, former smoker, s/p R EDY by Dr. Ross on 5/24/21 p/w bleeding from surgical site. Pt states she has been recovering well from the surgery and has been trying to be mobile. Today, started noticing blood from the posterior surgical site, which had saturated through the dressing and was leaking onto her clothing. Pt denies any associated pain, numbness, tingling; still able to ambulate as she had been the day prior. Of note, pt is on DVT ppx with eliquis 2.5mg BID since the surgery. Became concerned as the bleeding continued, which prompted her to come to the ED. Denies any CP, SOB, syncope, lightheadedness, dizziness. Has been having regular bowel movements. Rest of ROS negative.

## 2021-05-28 NOTE — ED ADULT NURSE REASSESSMENT NOTE - NS ED NURSE REASSESS COMMENT FT1
Received report from SAMMI Liriano. Pt updated on plan of awaiting test results. Pt denies pain. Respirations equal and symmetrical, lung sounds clear. Abdomen soft and non-tender. +1 edema noted to bilateral upper extremities, pt reports her arms look at baseline. Radial pulses strong bilaterally, cap refill less than 3 seconds. Pt provided with a hospital patient phone to contact her . Call bell within reach.

## 2021-05-28 NOTE — ED ADULT NURSE REASSESSMENT NOTE - NS ED NURSE REASSESS COMMENT FT1
Pt updated on plan of awaiting bed on inpatient unit. Pt remains free of pain. Respirations equal and symmetrical, lung sounds clear. Ortho changed dressing at bedside, dressing remains clean, dry and intact. Call bell within reach.

## 2021-05-29 ENCOUNTER — TRANSCRIPTION ENCOUNTER (OUTPATIENT)
Age: 66
End: 2021-05-29

## 2021-05-29 VITALS
OXYGEN SATURATION: 96 % | DIASTOLIC BLOOD PRESSURE: 75 MMHG | SYSTOLIC BLOOD PRESSURE: 151 MMHG | RESPIRATION RATE: 18 BRPM | TEMPERATURE: 98 F | HEART RATE: 83 BPM

## 2021-05-29 DIAGNOSIS — Z29.9 ENCOUNTER FOR PROPHYLACTIC MEASURES, UNSPECIFIED: ICD-10-CM

## 2021-05-29 DIAGNOSIS — I10 ESSENTIAL (PRIMARY) HYPERTENSION: ICD-10-CM

## 2021-05-29 DIAGNOSIS — I25.10 ATHEROSCLEROTIC HEART DISEASE OF NATIVE CORONARY ARTERY WITHOUT ANGINA PECTORIS: ICD-10-CM

## 2021-05-29 DIAGNOSIS — D64.9 ANEMIA, UNSPECIFIED: ICD-10-CM

## 2021-05-29 DIAGNOSIS — R60.0 LOCALIZED EDEMA: ICD-10-CM

## 2021-05-29 DIAGNOSIS — Z96.641 PRESENCE OF RIGHT ARTIFICIAL HIP JOINT: ICD-10-CM

## 2021-05-29 LAB
ANION GAP SERPL CALC-SCNC: 16 MMOL/L — SIGNIFICANT CHANGE UP (ref 5–17)
BUN SERPL-MCNC: 20 MG/DL — SIGNIFICANT CHANGE UP (ref 7–23)
CALCIUM SERPL-MCNC: 9.3 MG/DL — SIGNIFICANT CHANGE UP (ref 8.4–10.5)
CHLORIDE SERPL-SCNC: 95 MMOL/L — LOW (ref 96–108)
CO2 SERPL-SCNC: 24 MMOL/L — SIGNIFICANT CHANGE UP (ref 22–31)
CREAT SERPL-MCNC: 1.3 MG/DL — SIGNIFICANT CHANGE UP (ref 0.5–1.3)
GLUCOSE SERPL-MCNC: 107 MG/DL — HIGH (ref 70–99)
HCT VFR BLD CALC: 32.8 % — LOW (ref 34.5–45)
HCV AB S/CO SERPL IA: 0.16 S/CO — SIGNIFICANT CHANGE UP (ref 0–0.99)
HCV AB SERPL-IMP: SIGNIFICANT CHANGE UP
HGB BLD-MCNC: 10.8 G/DL — LOW (ref 11.5–15.5)
MCHC RBC-ENTMCNC: 30.9 PG — SIGNIFICANT CHANGE UP (ref 27–34)
MCHC RBC-ENTMCNC: 32.9 GM/DL — SIGNIFICANT CHANGE UP (ref 32–36)
MCV RBC AUTO: 94 FL — SIGNIFICANT CHANGE UP (ref 80–100)
NRBC # BLD: 0 /100 WBCS — SIGNIFICANT CHANGE UP (ref 0–0)
PLATELET # BLD AUTO: 287 K/UL — SIGNIFICANT CHANGE UP (ref 150–400)
POTASSIUM SERPL-MCNC: 3.5 MMOL/L — SIGNIFICANT CHANGE UP (ref 3.5–5.3)
POTASSIUM SERPL-SCNC: 3.5 MMOL/L — SIGNIFICANT CHANGE UP (ref 3.5–5.3)
RBC # BLD: 3.49 M/UL — LOW (ref 3.8–5.2)
RBC # FLD: 12.5 % — SIGNIFICANT CHANGE UP (ref 10.3–14.5)
SARS-COV-2 RNA SPEC QL NAA+PROBE: SIGNIFICANT CHANGE UP
SODIUM SERPL-SCNC: 135 MMOL/L — SIGNIFICANT CHANGE UP (ref 135–145)
WBC # BLD: 10.32 K/UL — SIGNIFICANT CHANGE UP (ref 3.8–10.5)
WBC # FLD AUTO: 10.32 K/UL — SIGNIFICANT CHANGE UP (ref 3.8–10.5)

## 2021-05-29 PROCEDURE — 85025 COMPLETE CBC W/AUTO DIFF WBC: CPT

## 2021-05-29 PROCEDURE — 86803 HEPATITIS C AB TEST: CPT

## 2021-05-29 PROCEDURE — U0005: CPT

## 2021-05-29 PROCEDURE — 86850 RBC ANTIBODY SCREEN: CPT

## 2021-05-29 PROCEDURE — 73502 X-RAY EXAM HIP UNI 2-3 VIEWS: CPT

## 2021-05-29 PROCEDURE — 85610 PROTHROMBIN TIME: CPT

## 2021-05-29 PROCEDURE — 97607 NEG PRS WND THR NDME<=50SQCM: CPT

## 2021-05-29 PROCEDURE — 85027 COMPLETE CBC AUTOMATED: CPT

## 2021-05-29 PROCEDURE — 80053 COMPREHEN METABOLIC PANEL: CPT

## 2021-05-29 PROCEDURE — 86900 BLOOD TYPING SEROLOGIC ABO: CPT

## 2021-05-29 PROCEDURE — 86901 BLOOD TYPING SEROLOGIC RH(D): CPT

## 2021-05-29 PROCEDURE — U0003: CPT

## 2021-05-29 PROCEDURE — 80048 BASIC METABOLIC PNL TOTAL CA: CPT

## 2021-05-29 PROCEDURE — 99285 EMERGENCY DEPT VISIT HI MDM: CPT | Mod: 25

## 2021-05-29 PROCEDURE — 85730 THROMBOPLASTIN TIME PARTIAL: CPT

## 2021-05-29 RX ORDER — POTASSIUM CHLORIDE 20 MEQ
1 PACKET (EA) ORAL
Qty: 0 | Refills: 0 | DISCHARGE

## 2021-05-29 RX ADMIN — Medication 200 MILLIGRAM(S): at 05:56

## 2021-05-29 RX ADMIN — PANTOPRAZOLE SODIUM 40 MILLIGRAM(S): 20 TABLET, DELAYED RELEASE ORAL at 05:59

## 2021-05-29 RX ADMIN — OXYCODONE HYDROCHLORIDE 5 MILLIGRAM(S): 5 TABLET ORAL at 00:48

## 2021-05-29 RX ADMIN — APIXABAN 2.5 MILLIGRAM(S): 2.5 TABLET, FILM COATED ORAL at 05:57

## 2021-05-29 RX ADMIN — OXYCODONE HYDROCHLORIDE 5 MILLIGRAM(S): 5 TABLET ORAL at 09:42

## 2021-05-29 RX ADMIN — OXYCODONE HYDROCHLORIDE 5 MILLIGRAM(S): 5 TABLET ORAL at 01:58

## 2021-05-29 RX ADMIN — Medication 40 MILLIGRAM(S): at 05:56

## 2021-05-29 NOTE — DISCHARGE NOTE PROVIDER - NSDCMRMEDTOKEN_GEN_ALL_CORE_FT
acetaminophen 325 mg oral tablet: 3 tabs orally every 8 hours X 7 days, then every 8 hours AS NEEDED for mild pain  apixaban 2.5 mg oral tablet: 1 tab orally 2 times a day X 4 weeks for the prevention of blood clots      MDD:2  atorvastatin 40 mg oral tablet: 1 tab(s) orally once a day  labetalol 200 mg oral tablet: 1 tab(s) orally 2 times a day  Lasix 40 mg oral tablet: 1 tab(s) orally once a day   magnesium hydroxide 8% oral suspension: 30 milliliter(s) orally once a day, As needed, Constipation  oxyCODONE 5 mg oral tablet: 1 to 2 tabs orally every 4-6 hours, As Needed for Moderate to Severe Pain  MDD:8  pantoprazole 40 mg oral delayed release tablet: 1 tab orally once a day (before breakfast) MDD:1  senna oral tablet: 2 tab(s) orally once a day (at bedtime) for constipation.  May purchase over the counter  traMADol 50 mg oral tablet: 1 tab orally every 6 hours, As needed for Mild to Moderate Pain  MDD:4  Trelegy Ellipta inhalation powder: 1 puff(s) inhaled once a day

## 2021-05-29 NOTE — DISCHARGE NOTE PROVIDER - CARE PROVIDER_API CALL
Maycol Ross)  Orthopaedic Surgery  [address] [facility], [address]  [place] [address]  Phone: [phone]  Fax: [phone]  Follow Up Time:

## 2021-05-29 NOTE — DISCHARGE NOTE PROVIDER - HOSPITAL COURSE
64yo Female PMH HTN, HLD, CAD s/p stent on ASA, COPD, Adenocarcinoma of R lung s/p surgery 9/3/2019 has serial CTs annually, former smoker, s/p R EDY by Dr. Ross on 5/24/21 presenting with postoperative drainage admited from ED to medicine seen by ortho- Incisional vac applied at bedside, will be discharged home with incisional vac- She is scheduled for follow up appointment with Dr. Ross on 6/8/21  - Discussed office phone number and polo to contact the office with any other future questions with postoperative drainage  - Ortho stable for discharge  - Discussed plan with Dr. Ross

## 2021-05-29 NOTE — DISCHARGE NOTE PROVIDER - NSDCCPCAREPLAN_GEN_ALL_CORE_FT
PRINCIPAL DISCHARGE DIAGNOSIS  Diagnosis: Presence of artificial hip joint, right  Assessment and Plan of Treatment: Follow up with your surgeon       PRINCIPAL DISCHARGE DIAGNOSIS  Diagnosis: Presence of artificial hip joint, right  Assessment and Plan of Treatment: follow up appointment with Dr. Ross on 6/8/21, call to be seen earlier within 7 days for Incisional vac to be removed in office

## 2021-05-29 NOTE — PROGRESS NOTE ADULT - SUBJECTIVE AND OBJECTIVE BOX
Pt seen/examined. Doing well. Pain controlled. Some drainage from aquacel dressing.     T(C): 36.7 (05-29-21 @ 03:07), Max: 36.9 (05-28-21 @ 17:20)  HR: 92 (05-29-21 @ 03:07) (72 - 92)  BP: 147/77 (05-29-21 @ 03:07) (142/91 - 167/49)  RR: 18 (05-29-21 @ 03:07) (18 - 18)  SpO2: 96% (05-29-21 @ 03:07) (95% - 99%)  Wt(kg): --    Gen: awake, alert, NAD  Resp: no increased work of breathing  RLE:  Incisional vac applied to the posterior hip incision  +EHL/FHL/TA/GS  SILT S/S/SP/DP  +DP/PT Pulses  Compartments soft  No calf TTP     64yo Female PMH HTN, HLD, CAD s/p stent on ASA, COPD, Adenocarcinoma of R lung s/p surgery 9/3/2019 has serial CTs annually, former smoker, s/p R EDY by Dr. Ross on 5/24/21 presenting with normal postoperative drainage    - Pain control  - Incisional vac applied at bedside, will be discharged home with incisional vac  - She is scheduled for follow up appointment with Dr. Ross on 6/8/21  - Discussed office phone number and polo to contact the office with any other future questions with postoperative drainage  - Ortho stable for discharge  - Discussed plan with Dr. Ross

## 2021-05-29 NOTE — DISCHARGE NOTE NURSING/CASE MANAGEMENT/SOCIAL WORK - PATIENT PORTAL LINK FT
You can access the FollowMyHealth Patient Portal offered by [facility] by registering at the following website: [email or web address]. By joining [employer]’s FollowMyHealth portal, you will also be able to view your health information using other applications (apps) compatible with our system.

## 2021-05-29 NOTE — DISCHARGE NOTE PROVIDER - NSDCFUSCHEDAPPT_GEN_ALL_CORE_FT
ERIKA CORRIGAN ; 06/08/2021 ; NPP [facility] [address] [facility]  ERIKA CORRIGAN ; 06/22/2021 ; MAR[facility] [id number] [place] [address]

## 2021-06-02 ENCOUNTER — APPOINTMENT (OUTPATIENT)
Dept: ORTHOPEDIC SURGERY | Facility: CLINIC | Age: 66
End: 2021-06-02
Payer: MEDICARE

## 2021-06-02 PROCEDURE — 99024 POSTOP FOLLOW-UP VISIT: CPT

## 2021-06-02 PROCEDURE — 73502 X-RAY EXAM HIP UNI 2-3 VIEWS: CPT | Mod: RT

## 2021-06-06 NOTE — DISCUSSION/SUMMARY
[de-identified] : 1 week status post right total hip arthroplasty.  Continue to be weight-bear as tolerated.  Posterior hip precautions reinforced.  Follow-up in 1 week for wound check.

## 2021-06-06 NOTE — PHYSICAL EXAM
[de-identified] : Patient is well nourished, well-developed, in no acute distress, with appropriate mood and affect. The patient is oriented to time, place, and person. Respirations are even and unlabored. Examination reveals satisfactory wound healing. No surrounding erythema. Motion is good and relatively pain free. Leg lengths are acceptable.\par \par Incisional VAC removed and Aquacel dressing placed.  Wound was clean dry and intact. [de-identified] : AP pelvis, AP hip, and lateral x-rays of the right hip were ordered and obtained in the office and demonstrate satisfactory position and alignment of the components are present. No signs of loosening are seen.

## 2021-06-06 NOTE — HISTORY OF PRESENT ILLNESS
[de-identified] : Status-post right total hip arthroplasty here for initial postoperative evaluation. Excellent progress is noted in terms of pain and restoration of function. Pain is well controlled with oral medications. There has been no change in medical health since discharge.  Was readmitted to the hospital because of wound drainage which is really just staining on the bandage and discharge the next day with an incisional VAC.  The patient does require assistive devices.  Here for wound check.

## 2021-06-06 NOTE — REVIEW OF SYSTEMS
[Negative] : Heme/Lymph [Joint Pain] : joint pain [Joint Stiffness] : joint stiffness [FreeTextEntry9] : s/p Right Total Hip Arthroplasty 05/24/2021

## 2021-06-08 ENCOUNTER — APPOINTMENT (OUTPATIENT)
Dept: ORTHOPEDIC SURGERY | Facility: CLINIC | Age: 66
End: 2021-06-08
Payer: MEDICARE

## 2021-06-08 VITALS — WEIGHT: 188 LBS | HEIGHT: 61 IN | BODY MASS INDEX: 35.5 KG/M2

## 2021-06-08 PROCEDURE — 73502 X-RAY EXAM HIP UNI 2-3 VIEWS: CPT | Mod: RT

## 2021-06-08 PROCEDURE — 99024 POSTOP FOLLOW-UP VISIT: CPT

## 2021-06-09 NOTE — PROGRESS NOTE ADULT - ATTENDING COMMENTS
I agree with the above note and have personally seen and examined this patient. All pertinent films have been reviewed. Please refer to clinical documentation of the history, physical examinations, data summary, and both assessment and plan as documented above and with which I agree.    patient admitted overnight to hospital with slight wound ooze 2/2 eliquis  eliquis for dvt ppx  no e/o pji  no active bleed, this is 2/2 eliquis  ivac applied  outpatient f/u wed with me in office  patient agreeable to dc home    Maycol Ross MD  Attending Orthopedic Surgeon Plan: PHOTOS TAKEN Detail Level: Zone Continue Regimen: Finacea 15% Topical Foam - Apply thin layer to clean face qd-bid as tolerated followed by moisturizer

## 2021-06-10 NOTE — PHYSICAL EXAM
[de-identified] : Well developed, well nourished in no apparent distress, awake, alert and orientated to person, place and time with appropriate mood and affect\par Respirations are even and unlabored. Gait evaluation does not reveal a limp. There is no inguinal adenopathy. The affected limb is well-perfused with palpable pedal pulse, without skin lesions, shows a grossly normal motor and sensory examination. Incision is healed Hip motion is full and painless throughout ROM. Leg lengths are approximately equal  [de-identified] : AP pelvis, AP hip, and lateral x-rays of the right hip were ordered and obtained in the office and demonstrate satisfactory position and alignment of the components are present. No signs of loosening are seen.

## 2021-06-10 NOTE — HISTORY OF PRESENT ILLNESS
[de-identified] : Status-post right total hip  arthroplasty here for initial postoperative evaluation. Excellent progress is noted in terms of pain and restoration of function. Pain is well controlled with oral medications. There has been no change in medical health since discharge. The patient does require assistive devices.

## 2021-06-22 ENCOUNTER — APPOINTMENT (OUTPATIENT)
Dept: THORACIC SURGERY | Facility: CLINIC | Age: 66
End: 2021-06-22
Payer: MEDICARE

## 2021-06-22 VITALS
RESPIRATION RATE: 17 BRPM | TEMPERATURE: 98.6 F | OXYGEN SATURATION: 100 % | HEIGHT: 61 IN | HEART RATE: 72 BPM | DIASTOLIC BLOOD PRESSURE: 93 MMHG | WEIGHT: 190 LBS | SYSTOLIC BLOOD PRESSURE: 163 MMHG | BODY MASS INDEX: 35.87 KG/M2

## 2021-06-22 PROCEDURE — 99215 OFFICE O/P EST HI 40 MIN: CPT

## 2021-06-22 RX ORDER — CARVEDILOL 6.25 MG/1
6.25 TABLET, FILM COATED ORAL
Refills: 0 | Status: DISCONTINUED | COMMUNITY
End: 2021-06-22

## 2021-06-22 NOTE — PHYSICAL EXAM
[Heart Rate And Rhythm] : heart rate was normal and rhythm regular [Auscultation Breath Sounds / Voice Sounds] : lungs were clear to auscultation bilaterally [Heart Sounds] : normal S1 and S2 [Heart Sounds Gallop] : no gallops [Murmurs] : no murmurs [Heart Sounds Pericardial Friction Rub] : no pericardial rub [Chest Visual Inspection Thoracic Asymmetry] : no chest asymmetry [Examination Of The Chest] : the chest was normal in appearance [Diminished Respiratory Excursion] : normal chest expansion [Bowel Sounds] : normal bowel sounds [Abdomen Tenderness] : non-tender [Abdomen Soft] : soft [Abdomen Mass (___ Cm)] : no abdominal mass palpated [Skin Color & Pigmentation] : normal skin color and pigmentation [Skin Turgor] : normal skin turgor [] : no rash

## 2021-06-22 NOTE — DATA REVIEWED
[FreeTextEntry1] : I have independently reviewed patient's CT on 6/15/21:\par CT Chest on 6/15/21:\par - post-RULobectomy w/ stable post-op changes\par - stable 8mm RLL ggo (7:242)\par - new, enlargement of superior mediastinal LNs: a 2.6 x 1.8cm superior prevascular LN (3:39; previously 1.2 x 1.1cm), a 2.8 x 1.7cm Rt paratracheal superior mediastinal LN (previously 2.1 x 1.3cm) \par - mild to moderate emphysematous changes

## 2021-06-22 NOTE — ASSESSMENT
[FreeTextEntry1] : 65 year old female, former smoker (1/2 PPD x 30 years, quit Jan 2018), w/ hx of HTN, HLD, CAD (s/p coronary stenting to the RCA on 1/9/2018), COPD, osteoporosis. ?Right THR. \par \par Now 1yr 9mo s/p FB; robotic-assisted, conversion to thoracotomy RULobectomy with MLND on 9/3/19. 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 Pathology of right rib excision was benign, revealed bone. Visceral pleura invasion and HYACINTH present. \par \par She completed chemotherapy on 12/10/19 with Dr. Guevara. \par \par Now s/p Flex Bronch EBUS Bx w/ BAL on 3/9/21. Path of subcarinal LN was negative malignancy; favor reactive LN. Rt paratracheal LN was non-diagnostic. BAL negative for malignancy.\par \par CT Chest on 6/15/21:\par - post-RULobectomy w/ stable post-op changes\par - stable 8mm RLL ggo (7:242)\par - new, enlargement of superior mediastinal LNs: a 2.6 x 1.8cm superior prevascular LN (3:39; previously 1.2 x 1.1cm), a 2.8 x 1.7cm Rt paratracheal superior mediastinal LN (previously 2.1 x 1.3cm) \par - mild to moderate emphysematous changes\par \par I have reviewed the patient's medical records and diagnostic images at time of this office consultation and have made the following recommendation:\par 1. CT scan reviewed, increasing size prevascular LN and paratracheal LN, I recommended a Flex Bronch, Cervical Mediastinoscopy on 7/13/21. Risks and benefits and alternatives explained to patient, all questions answered, patient agreed to proceed with procedure.\par 2. Medical clearance and PST.\par \par \par \par I personally performed the services described in the documentation, reviewed the documentation recorded by the scribe in my presence and it accurately and completely records my words and actions.\par \par I, Shannan Roman, NP, am scribing for and the presence of STARR Kay, the following sections HISTORY OF PRESENT ILLNESS, PAST MEDICAL/FAMILY/SOCIAL HISTORY; REVIEW OF SYSTEMS; VITAL SIGNS; PHYSICAL EXAM; DISPOSITION.\par \par

## 2021-06-22 NOTE — HISTORY OF PRESENT ILLNESS
[FreeTextEntry1] : 65 year old female, former smoker (1/2 PPD x 30 years, quit Jan 2018), w/ hx of HTN, HLD, CAD (s/p coronary stenting to the RCA on 1/9/2018), COPD, osteoporosis. ?Right THR. \par \par Now 1yr 9mo s/p FB; robotic-assisted, conversion to thoracotomy RULobectomy with MLND on 9/3/19. 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 Pathology of right rib excision was benign, revealed bone. Visceral pleura invasion and HYACINTH present. \par \par She completed chemotherapy on 12/10/19 with Dr. Guevara. \par \par CT Chest w/o IV contrast on 2/4/2021:\par - s/p RULobectomy with emphysema\par - No new mass or nodules\par - Stable to slightly decreased in size, 9mm RLL gg nodule (10:234).\par - small loculated right pleural effusion\par \par PET/CT on 2/5/21 showed FDG-avid paratracheal LN.\par \par Now s/p Flex Bronch EBUS Bx w/ BAL on 3/9/21. Path of subcarinal LN was negative malignancy; favor reactive LN. Rt paratracheal LN was non-diagnostic. BAL negative for malignancy.\par \par Received 1st dose of Pfizer on 3/12/21, 2nd dose on 4/2. \par \par CT Chest on 6/15/21:\par - post-RULobectomy w/ stable post-op changes\par - stable 8mm RLL ggo (7:242)\par - new, enlargement of superior mediastinal LNs: a 2.6 x 1.8cm superior prevascular LN (3:39; previously 1.2 x 1.1cm), a 2.8 x 1.7cm Rt paratracheal superior mediastinal LN (previously 2.1 x 1.3cm) \par - mild to moderate emphysematous changes\par \par Patient is here today for a follow up.\par Of note, patient is s/p Lt Total Hip Replacement on 5/24/21. Now with some pain and Lt lower extremity edema, using a cane.\par

## 2021-06-22 NOTE — CONSULT LETTER
[Dear  ___] : Dear  [unfilled], [Consult Letter:] : I had the pleasure of evaluating your patient, [unfilled]. [( Thank you for referring [unfilled] for consultation for _____ )] : Thank you for referring [unfilled] for consultation for [unfilled] [Please see my note below.] : Please see my note below. [Consult Closing:] : Thank you very much for allowing me to participate in the care of this patient.  If you have any questions, please do not hesitate to contact me. [Sincerely,] : Sincerely, [FreeTextEntry2] : Wesley Joshi MD  [FreeTextEntry3] : Yaw Clement MD\par Director of Thoracic, [facility]\par Cardiovascular & Thoracic Surgery\par \par [facility]\par [address]\par Oncology Building 3rd Fl\[place] [address]\par Tel: [phone]\par Fax: [phone]\par

## 2021-07-07 ENCOUNTER — OUTPATIENT (OUTPATIENT)
Dept: OUTPATIENT SERVICES | Facility: HOSPITAL | Age: 66
LOS: 1 days | End: 2021-07-07

## 2021-07-07 VITALS
HEIGHT: 61 IN | DIASTOLIC BLOOD PRESSURE: 68 MMHG | SYSTOLIC BLOOD PRESSURE: 146 MMHG | RESPIRATION RATE: 16 BRPM | OXYGEN SATURATION: 98 % | TEMPERATURE: 98 F | WEIGHT: 190.04 LBS | HEART RATE: 69 BPM

## 2021-07-07 DIAGNOSIS — R59.0 LOCALIZED ENLARGED LYMPH NODES: ICD-10-CM

## 2021-07-07 DIAGNOSIS — J44.9 CHRONIC OBSTRUCTIVE PULMONARY DISEASE, UNSPECIFIED: ICD-10-CM

## 2021-07-07 DIAGNOSIS — Z98.891 HISTORY OF UTERINE SCAR FROM PREVIOUS SURGERY: Chronic | ICD-10-CM

## 2021-07-07 DIAGNOSIS — I10 ESSENTIAL (PRIMARY) HYPERTENSION: ICD-10-CM

## 2021-07-07 DIAGNOSIS — Z96.649 PRESENCE OF UNSPECIFIED ARTIFICIAL HIP JOINT: Chronic | ICD-10-CM

## 2021-07-07 DIAGNOSIS — Z90.2 ACQUIRED ABSENCE OF LUNG [PART OF]: Chronic | ICD-10-CM

## 2021-07-07 DIAGNOSIS — I25.10 ATHEROSCLEROTIC HEART DISEASE OF NATIVE CORONARY ARTERY WITHOUT ANGINA PECTORIS: ICD-10-CM

## 2021-07-07 DIAGNOSIS — Z95.5 PRESENCE OF CORONARY ANGIOPLASTY IMPLANT AND GRAFT: Chronic | ICD-10-CM

## 2021-07-07 LAB
ANION GAP SERPL CALC-SCNC: 16 MMOL/L — HIGH (ref 7–14)
BLD GP AB SCN SERPL QL: NEGATIVE — SIGNIFICANT CHANGE UP
BUN SERPL-MCNC: 22 MG/DL — SIGNIFICANT CHANGE UP (ref 7–23)
CALCIUM SERPL-MCNC: 9.6 MG/DL — SIGNIFICANT CHANGE UP (ref 8.4–10.5)
CHLORIDE SERPL-SCNC: 98 MMOL/L — SIGNIFICANT CHANGE UP (ref 98–107)
CO2 SERPL-SCNC: 23 MMOL/L — SIGNIFICANT CHANGE UP (ref 22–31)
CREAT SERPL-MCNC: 1.28 MG/DL — SIGNIFICANT CHANGE UP (ref 0.5–1.3)
GLUCOSE SERPL-MCNC: 110 MG/DL — HIGH (ref 70–99)
HCT VFR BLD CALC: 32.8 % — LOW (ref 34.5–45)
HGB BLD-MCNC: 10.8 G/DL — LOW (ref 11.5–15.5)
MCHC RBC-ENTMCNC: 30.3 PG — SIGNIFICANT CHANGE UP (ref 27–34)
MCHC RBC-ENTMCNC: 32.9 GM/DL — SIGNIFICANT CHANGE UP (ref 32–36)
MCV RBC AUTO: 91.9 FL — SIGNIFICANT CHANGE UP (ref 80–100)
NRBC # BLD: 0 /100 WBCS — SIGNIFICANT CHANGE UP
NRBC # FLD: 0 K/UL — SIGNIFICANT CHANGE UP
PLATELET # BLD AUTO: 345 K/UL — SIGNIFICANT CHANGE UP (ref 150–400)
POTASSIUM SERPL-MCNC: 3.8 MMOL/L — SIGNIFICANT CHANGE UP (ref 3.5–5.3)
POTASSIUM SERPL-SCNC: 3.8 MMOL/L — SIGNIFICANT CHANGE UP (ref 3.5–5.3)
RBC # BLD: 3.57 M/UL — LOW (ref 3.8–5.2)
RBC # FLD: 12.6 % — SIGNIFICANT CHANGE UP (ref 10.3–14.5)
RH IG SCN BLD-IMP: POSITIVE — SIGNIFICANT CHANGE UP
SODIUM SERPL-SCNC: 137 MMOL/L — SIGNIFICANT CHANGE UP (ref 135–145)
WBC # BLD: 9.8 K/UL — SIGNIFICANT CHANGE UP (ref 3.8–10.5)
WBC # FLD AUTO: 9.8 K/UL — SIGNIFICANT CHANGE UP (ref 3.8–10.5)

## 2021-07-07 NOTE — H&P PST ADULT - NSICDXPASTMEDICALHX_GEN_ALL_CORE_FT
PAST MEDICAL HISTORY:  Carotid artery disease monitored by vascluar doctor Samuel    COPD (chronic obstructive pulmonary disease)     Edema of both legs     Emphysema     Hip pain     Hyperlipidemia     Hypertension     Localized enlarged lymph nodes     Lumbar disc disorder     Lung cancer right, 2019, completed chemotherapy 12/2019    Obesity     Pulmonary nodules yearly CT scans    Scoliosis     Stented coronary artery

## 2021-07-07 NOTE — H&P PST ADULT - HISTORY OF PRESENT ILLNESS
66 yo female with preop dx. localized enlarged lymph nodes presents to pre surgical testing.  Pt is scheduled for flexible bronchoscopy, cervical mediastinoscopy. 64 yo female with preop dx. localized enlarged lymph nodes presents to pre surgical testing.  Pt h/o lung CA s/p RULobectomy 2019, surveillance CT Chest revealing enlarged lymph node.  Pt is scheduled for flexible bronchoscopy, cervical mediastinoscopy.

## 2021-07-07 NOTE — H&P PST ADULT - SKIN COMMENTS
right hip surgical incision well approximated right hip surgical incision well approximated, healing well, one steri strip intact, has ortho follow up appointment tomorrow

## 2021-07-07 NOTE — H&P PST ADULT - NSICDXPASTSURGICALHX_GEN_ALL_CORE_FT
PAST SURGICAL HISTORY:  History of  section x2 ,     History of hip replacement right 2021    S/P lobectomy of lung RULobectomy 2019    Stented coronary artery x2 stents 2018

## 2021-07-07 NOTE — H&P PST ADULT - MUSCULOSKELETAL
detailed exam details… ROM intact/no joint erythema/no joint warmth/no calf tenderness/normal strength right hip/no joint erythema/no joint warmth/no calf tenderness/normal strength/decreased ROM

## 2021-07-07 NOTE — H&P PST ADULT - NEGATIVE NEUROLOGICAL SYMPTOMS
no transient paralysis/no weakness/no paresthesias/no generalized seizures/no tremors/no vertigo/no loss of sensation

## 2021-07-07 NOTE — H&P PST ADULT - NSICDXPROBLEM_GEN_ALL_CORE_FT
PROBLEM DIAGNOSES  Problem: Localized enlarged lymph nodes  Assessment and Plan:     Problem: HTN (hypertension)  Assessment and Plan:     Problem: Chronic obstructive pulmonary disease (COPD)  Assessment and Plan:     Problem: CAD (coronary artery disease)  Assessment and Plan:        PROBLEM DIAGNOSES  Problem: Localized enlarged lymph nodes  Assessment and Plan:  Pt is scheduled for flexible bronchoscopy, cervical mediastinoscopy on 7/13/21.  Verbal and written pre op instructions reviewed with patient and pt able to verbalize understanding.   Chlorhexidine wash not provided due to healing right hip surgical incision.  Pt instructed to follow surgeon's guidelines regarding COVID testing preop.   Pt to obtain medical eval as requested by surgeon, will request document.   OR booking notified of right hip implant and penicillin allergy.    Problem: HTN (hypertension)  Assessment and Plan: Pt instructed to take labetalol, furosemide and potassium cl AM of surgery with a sip of water, pt able to verbalize understanding.  Pt met STOP BANG score for DOMINIC precaution. OR booking notified via fax.      Problem: Chronic obstructive pulmonary disease (COPD)  Assessment and Plan: Pt instructed to take inhal.er as prescribed    Problem: CAD (coronary artery disease)  Assessment and Plan: Pt instructed to continue aspirin therapy as usual.  Pt to obtain cardiac eval as requested by surgeon, will request document.

## 2021-07-07 NOTE — H&P PST ADULT - ENT GEN HX ROS MEA POS PC
"I went to urgent care a week ago because I had a PND that was causing a cough, I had a negative COVID test and Strep test"/post-nasal discharge "I went to urgent care a week ago because I had a PND that was causing a cough, I had a negative COVID test and Strep test, I was told to just take cough medication if it bothers me.  I'm feeling better now"/post-nasal discharge "I had a cough due to PND starting 6/26/21, went to UC 6/28/21 had a negatie COVID PCR and strep test, was told I didn't need treatment and to take OTC cough medicine.  I'm feeling better now" - reports in chart/post-nasal discharge

## 2021-07-07 NOTE — H&P PST ADULT - MS EXT PE MLT D E PC
no clubbing/no cyanosis 19 m M with h/o RAD here with afebrile RAD excerbation. no obvious allergic or infectious trigger. feeding well. Last albuterol > 5 hours pta. on exam, scattered wheezes, subtle intercostal and subcostal accessory muscle use. no murmur, no hsm. well-appearing. Plan: albuterol MDi now with PO dex, re-eval, but anticipate dc home with continue q4hr prn albuterol with mdi/spacer. Pipo Marquez MD no clubbing/no cyanosis/pedal edema

## 2021-07-08 ENCOUNTER — APPOINTMENT (OUTPATIENT)
Dept: ORTHOPEDIC SURGERY | Facility: CLINIC | Age: 66
End: 2021-07-08
Payer: MEDICARE

## 2021-07-08 DIAGNOSIS — M79.89 OTHER SPECIFIED SOFT TISSUE DISORDERS: ICD-10-CM

## 2021-07-08 DIAGNOSIS — Z96.641 PRESENCE OF RIGHT ARTIFICIAL HIP JOINT: ICD-10-CM

## 2021-07-08 DIAGNOSIS — G89.18 OTHER ACUTE POSTPROCEDURAL PAIN: ICD-10-CM

## 2021-07-08 DIAGNOSIS — M16.11 UNILATERAL PRIMARY OSTEOARTHRITIS, RIGHT HIP: ICD-10-CM

## 2021-07-08 PROBLEM — J44.9 CHRONIC OBSTRUCTIVE PULMONARY DISEASE, UNSPECIFIED: Chronic | Status: ACTIVE | Noted: 2021-07-07

## 2021-07-08 PROBLEM — C34.90 MALIGNANT NEOPLASM OF UNSPECIFIED PART OF UNSPECIFIED BRONCHUS OR LUNG: Chronic | Status: ACTIVE | Noted: 2019-09-19

## 2021-07-08 PROCEDURE — 99024 POSTOP FOLLOW-UP VISIT: CPT

## 2021-07-08 NOTE — PHYSICAL EXAM
[de-identified] : Well developed, well nourished in no apparent distress, awake, alert and orientated to person, place and time with appropriate mood and affect\par Respirations are even and unlabored. Gait evaluation does not reveal a limp. There is no inguinal adenopathy. The affected limb is well-perfused with palpable pedal pulse, without skin lesions, shows a grossly normal motor and sensory examination. Incision is healed Hip motion is full and painless throughout ROM. Leg lengths are approximately equal

## 2021-07-08 NOTE — HISTORY OF PRESENT ILLNESS
[de-identified] : Status-post right total hip  arthroplasty here for routine postoperative evaluation. Excellent progress is noted in terms of pain and restoration of function. Pain is well controlled with oral medications. There has been no change in medical health since discharge. The patient does not require assistive devices.

## 2021-07-08 NOTE — DISCUSSION/SUMMARY
[de-identified] : The patient is doing well after joint replacement surgery. Continue wbat. Continue PT.  Return around the 6 month anniversary from surgery for follow-up evaluation.

## 2021-07-12 ENCOUNTER — NON-APPOINTMENT (OUTPATIENT)
Age: 66
End: 2021-07-12

## 2021-07-24 ENCOUNTER — APPOINTMENT (OUTPATIENT)
Dept: DISASTER EMERGENCY | Facility: CLINIC | Age: 66
End: 2021-07-24

## 2021-07-24 DIAGNOSIS — Z01.818 ENCOUNTER FOR OTHER PREPROCEDURAL EXAMINATION: ICD-10-CM

## 2021-07-24 LAB — SARS-COV-2 N GENE NPH QL NAA+PROBE: NOT DETECTED

## 2021-07-26 NOTE — ASU PATIENT PROFILE, ADULT - PMH
Carotid artery disease  monitored by vascluar doctor Doselena  COPD (chronic obstructive pulmonary disease)    Edema of both legs    Emphysema    Hip pain    Hyperlipidemia    Hypertension    Localized enlarged lymph nodes    Lumbar disc disorder    Lung cancer  right, 2019, completed chemotherapy 12/2019  Obesity    Pulmonary nodules  yearly CT scans  Scoliosis    Stented coronary artery

## 2021-07-26 NOTE — ASU PATIENT PROFILE, ADULT - PATIENT KNOW
yes V-Y Plasty Text: The defect edges were debeveled with a #15 scalpel blade. Given the location of the defect, shape of the defect and the proximity to free margins an V-Y advancement flap was deemed most appropriate. Using a sterile surgical marker, an appropriate advancement flap was drawn incorporating the defect and placing the expected incisions within the relaxed skin tension lines where possible. The area thus outlined was incised deep to adipose tissue with a #15 scalpel blade. The skin margins were undermined to an appropriate distance in all directions utilizing iris scissors.

## 2021-07-26 NOTE — ASU PATIENT PROFILE, ADULT - PSH
History of  section  x2 ,   History of hip replacement  right 2021  S/P lobectomy of lung  RULobectomy 2019  Stented coronary artery  x2 stents 2018

## 2021-07-27 ENCOUNTER — APPOINTMENT (OUTPATIENT)
Dept: THORACIC SURGERY | Facility: HOSPITAL | Age: 66
End: 2021-07-27

## 2021-07-27 ENCOUNTER — RESULT REVIEW (OUTPATIENT)
Age: 66
End: 2021-07-27

## 2021-07-27 ENCOUNTER — OUTPATIENT (OUTPATIENT)
Dept: OUTPATIENT SERVICES | Facility: HOSPITAL | Age: 66
LOS: 1 days | Discharge: HOME | End: 2021-07-27
Payer: MEDICARE

## 2021-07-27 VITALS
OXYGEN SATURATION: 96 % | TEMPERATURE: 97 F | DIASTOLIC BLOOD PRESSURE: 64 MMHG | HEART RATE: 68 BPM | RESPIRATION RATE: 18 BRPM | SYSTOLIC BLOOD PRESSURE: 166 MMHG

## 2021-07-27 VITALS
RESPIRATION RATE: 18 BRPM | WEIGHT: 190.04 LBS | DIASTOLIC BLOOD PRESSURE: 62 MMHG | HEART RATE: 68 BPM | HEIGHT: 61 IN | SYSTOLIC BLOOD PRESSURE: 164 MMHG | TEMPERATURE: 98 F | OXYGEN SATURATION: 97 %

## 2021-07-27 DIAGNOSIS — R59.0 LOCALIZED ENLARGED LYMPH NODES: ICD-10-CM

## 2021-07-27 DIAGNOSIS — Z96.649 PRESENCE OF UNSPECIFIED ARTIFICIAL HIP JOINT: Chronic | ICD-10-CM

## 2021-07-27 DIAGNOSIS — Z95.5 PRESENCE OF CORONARY ANGIOPLASTY IMPLANT AND GRAFT: Chronic | ICD-10-CM

## 2021-07-27 DIAGNOSIS — Z90.2 ACQUIRED ABSENCE OF LUNG [PART OF]: Chronic | ICD-10-CM

## 2021-07-27 DIAGNOSIS — Z98.891 HISTORY OF UTERINE SCAR FROM PREVIOUS SURGERY: Chronic | ICD-10-CM

## 2021-07-27 PROCEDURE — 88305 TISSUE EXAM BY PATHOLOGIST: CPT | Mod: 26

## 2021-07-27 PROCEDURE — 39402 MEDIASTINOSCPY W/LMPH NOD BX: CPT

## 2021-07-27 PROCEDURE — 71045 X-RAY EXAM CHEST 1 VIEW: CPT | Mod: 26

## 2021-07-27 PROCEDURE — 31622 DX BRONCHOSCOPE/WASH: CPT

## 2021-07-27 RX ORDER — ONDANSETRON 8 MG/1
4 TABLET, FILM COATED ORAL ONCE
Refills: 0 | Status: DISCONTINUED | OUTPATIENT
Start: 2021-07-27 | End: 2021-08-10

## 2021-07-27 RX ORDER — TOBRAMYCIN 0.3 %
1 DROPS OPHTHALMIC (EYE) EVERY 4 HOURS
Refills: 0 | Status: DISCONTINUED | OUTPATIENT
Start: 2021-07-27 | End: 2021-08-10

## 2021-07-27 RX ORDER — CALCIUM CHLORIDE, MAGNESIUM CHLORIDE, POTASSIUM CHLORIDE, SODIUM ACETATE, SODIUM CHLORIDE, SODIUM CITRATE .48; .3; .75; 3.9; 6.4; 1.7 MG/ML; MG/ML; MG/ML; MG/ML; MG/ML; MG/ML
1 SOLUTION OPHTHALMIC ONCE
Refills: 0 | Status: COMPLETED | OUTPATIENT
Start: 2021-07-27 | End: 2021-07-27

## 2021-07-27 RX ORDER — ASPIRIN/CALCIUM CARB/MAGNESIUM 324 MG
1 TABLET ORAL
Qty: 0 | Refills: 0 | DISCHARGE

## 2021-07-27 RX ORDER — OXYCODONE HYDROCHLORIDE 5 MG/1
1 TABLET ORAL
Qty: 8 | Refills: 0
Start: 2021-07-27 | End: 2021-07-28

## 2021-07-27 RX ORDER — SODIUM CHLORIDE 9 MG/ML
1000 INJECTION, SOLUTION INTRAVENOUS
Refills: 0 | Status: DISCONTINUED | OUTPATIENT
Start: 2021-07-27 | End: 2021-08-10

## 2021-07-27 RX ORDER — TOBRAMYCIN 0.3 %
1 DROPS OPHTHALMIC (EYE)
Qty: 0 | Refills: 0 | DISCHARGE
Start: 2021-07-27

## 2021-07-27 RX ORDER — FENTANYL CITRATE 50 UG/ML
50 INJECTION INTRAVENOUS ONCE
Refills: 0 | Status: DISCONTINUED | OUTPATIENT
Start: 2021-07-27 | End: 2021-07-27

## 2021-07-27 RX ORDER — FENTANYL CITRATE 50 UG/ML
25 INJECTION INTRAVENOUS ONCE
Refills: 0 | Status: DISCONTINUED | OUTPATIENT
Start: 2021-07-27 | End: 2021-07-27

## 2021-07-27 RX ADMIN — Medication 1 DROP(S): at 17:10

## 2021-07-27 RX ADMIN — CALCIUM CHLORIDE, MAGNESIUM CHLORIDE, POTASSIUM CHLORIDE, SODIUM ACETATE, SODIUM CHLORIDE, SODIUM CITRATE 1 APPLICATION(S): .48; .3; .75; 3.9; 6.4; 1.7 SOLUTION OPHTHALMIC at 16:54

## 2021-07-27 RX ADMIN — SODIUM CHLORIDE 30 MILLILITER(S): 9 INJECTION, SOLUTION INTRAVENOUS at 11:38

## 2021-07-27 NOTE — ASU DISCHARGE PLAN (ADULT/PEDIATRIC) - FOLLOW UP APPOINTMENTS
[facility] may also call Recovery Room (PACU) 24/7 @ [phone]/[facility], [facility]

## 2021-07-27 NOTE — ASU DISCHARGE PLAN (ADULT/PEDIATRIC) - NURSING INSTRUCTIONS
You received IV Tylenol for pain management at _2:30__. Please DO NOT take any Tylenol (Acetaminophen) containing products, such as Vicodin, Percocet, Excedrin, and cold medications for the next 6 hours (until8:30  ___ PM). DO NOT TAKE MORE THAN 3000 MG OF TYLENOL in a 24 hour period.

## 2021-07-27 NOTE — ASU DISCHARGE PLAN (ADULT/PEDIATRIC) - CALL YOUR DOCTOR IF YOU HAVE ANY OF THE FOLLOWING:
Bleeding that does not stop/Wound/Surgical Site with redness, or foul smelling discharge or pus/Inability to tolerate liquids or foods Bleeding that does not stop/Swelling that gets worse/Pain not relieved by Medications/Fever greater than (need to indicate Fahrenheit or Celsius)/Wound/Surgical Site with redness, or foul smelling discharge or pus/Nausea and vomiting that does not stop/Unable to urinate/Inability to tolerate liquids or foods

## 2021-07-27 NOTE — ASU DISCHARGE PLAN (ADULT/PEDIATRIC) - CARE PROVIDER_API CALL
Yaw Clement)  Thoracic Surgery  [address] [place] [address]  Phone: [phone]  Fax: [phone]  Follow Up Time:

## 2021-07-27 NOTE — ASU DISCHARGE PLAN (ADULT/PEDIATRIC) - ASU DC SPECIAL INSTRUCTIONSFT
You may remove dressing in 24hrs. Leave steri strips in place until they fall out.   Follow up with Dr. Yaw Clement in the office in 7-10 business days.   Call the office for your follow up appointment.

## 2021-08-03 ENCOUNTER — APPOINTMENT (OUTPATIENT)
Dept: THORACIC SURGERY | Facility: CLINIC | Age: 66
End: 2021-08-03
Payer: MEDICARE

## 2021-08-03 VITALS
BODY MASS INDEX: 36.91 KG/M2 | SYSTOLIC BLOOD PRESSURE: 180 MMHG | DIASTOLIC BLOOD PRESSURE: 75 MMHG | WEIGHT: 188 LBS | OXYGEN SATURATION: 98 % | HEIGHT: 60 IN | RESPIRATION RATE: 17 BRPM | HEART RATE: 71 BPM

## 2021-08-03 PROCEDURE — 99215 OFFICE O/P EST HI 40 MIN: CPT

## 2021-08-03 NOTE — HISTORY OF PRESENT ILLNESS
[FreeTextEntry1] : 65 year old female, former smoker (1/2 PPD x 30 years, quit Jan 2018), w/ hx of HTN, HLD, CAD (s/p coronary stenting to the RCA on 1/9/2018), COPD, osteoporosis. ?Right THR. \par \par Now 1yr 10mo s/p FB; robotic-assisted, conversion to thoracotomy RULobectomy with MLND on 9/3/19. 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 Pathology of right rib excision was benign, revealed bone. Visceral pleura invasion and HYACINTH present. \par \par She completed chemotherapy on 12/10/19 with Dr. Guevara. \par \par CT Chest w/o IV contrast on 2/4/2021:\par - s/p RULobectomy with emphysema\par - No new mass or nodules\par - Stable to slightly decreased in size, 9mm RLL gg nodule (10:234).\par - small loculated right pleural effusion\par \par PET/CT on 2/5/21 showed FDG-avid paratracheal LN.\par \par Now s/p Flex Bronch EBUS Bx w/ BAL on 3/9/21. Path of subcarinal LN was negative malignancy; favor reactive LN. Rt paratracheal LN was non-diagnostic. BAL negative for malignancy.\par \par Received 1st dose of Pfizer on 3/12/21, 2nd dose on 4/2. \par \par CT Chest on 6/15/21:\par - post-RULobectomy w/ stable post-op changes\par - stable 8mm RLL ggo (7:242)\par - new, enlargement of superior mediastinal LNs: a 2.6 x 1.8cm superior prevascular LN (3:39; previously 1.2 x 1.1cm), a 2.8 x 1.7cm Rt paratracheal superior mediastinal LN (previously 2.1 x 1.3cm) \par - mild to moderate emphysematous changes\par \par Now s/p Flex Bronch, Cervical Mediastinoscopy on 7/27/21. Path of Rt paratracheal LNs and Subcarinal LNs are all benign.\par \par Patient is here today for a follow up. Admits to cough, congestion, and hoarseness. \par

## 2021-08-03 NOTE — CONSULT LETTER
[Dear  ___] : Dear  [unfilled], [Consult Letter:] : I had the pleasure of evaluating your patient, [unfilled]. [( Thank you for referring [unfilled] for consultation for _____ )] : Thank you for referring [unfilled] for consultation for [unfilled] [Please see my note below.] : Please see my note below. [Consult Closing:] : Thank you very much for allowing me to participate in the care of this patient.  If you have any questions, please do not hesitate to contact me. [Sincerely,] : Sincerely, [FreeTextEntry2] : Wesley Joshi MD [FreeTextEntry3] : Yaw Clement MD\par Director of Thoracic, [facility]\par Cardiovascular & Thoracic Surgery\par \par [facility]\par [address]\par Oncology Building 3rd Fl\[place] [address]\par Tel: [phone]\par Fax: [phone]\par

## 2021-08-03 NOTE — ASSESSMENT
[FreeTextEntry1] : 65 year old female, former smoker (1/2 PPD x 30 years, quit Jan 2018), w/ hx of HTN, HLD, CAD (s/p coronary stenting to the RCA on 1/9/2018), COPD, osteoporosis. ?Right THR. \par \par Now 1yr 11mo s/p FB; robotic-assisted, conversion to thoracotomy RULobectomy with MLND on 9/3/19. 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 Pathology of right rib excision was benign, revealed bone. Visceral pleura invasion and HYACINTH present. \par \par She completed chemotherapy on 12/10/19 with Dr. Guevara. \par \par CT Chest w/o IV contrast on 2/4/2021:\par - s/p RULobectomy with emphysema\par - No new mass or nodules\par - Stable to slightly decreased in size, 9mm RLL gg nodule (10:234).\par - small loculated right pleural effusion\par \par PET/CT on 2/5/21 showed FDG-avid paratracheal LN.\par \par Now s/p Flex Bronch EBUS Bx w/ BAL on 3/9/21. Path of subcarinal LN was negative malignancy; favor reactive LN. Rt paratracheal LN was non-diagnostic. BAL negative for malignancy.\par \par Received 1st dose of Pfizer on 3/12/21, 2nd dose on 4/2. \par \par CT Chest on 6/15/21:\par - post-RULobectomy w/ stable post-op changes\par - stable 8mm RLL ggo (7:242)\par - new, enlargement of superior mediastinal LNs: a 2.6 x 1.8cm superior prevascular LN (3:39; previously 1.2 x 1.1cm), a 2.8 x 1.7cm Rt paratracheal superior mediastinal LN (previously 2.1 x 1.3cm) \par - mild to moderate emphysematous changes\par \par Now s/p Flex Bronch, Cervical Mediastinoscopy on 7/27/21. Path of Rt paratracheal LNs and Subcarinal LNs are all benign.\par \par I have reviewed the patient's medical records and diagnostic images at time of this office consultation and have made the following recommendation:\par 1. Path reviewed and explained to patient, negative for malignancy, I recommended patient to RTC in 4mo with CT Chest w/o contrast.\par \par \par I personally performed the services described in the documentation, reviewed the documentation recorded by the scribe in my presence and it accurately and completely records my words and actions.\par \par I, Shannan Roman NP, am scribing for and the presence of STARR Kay, the following sections HISTORY OF PRESENT ILLNESS, PAST MEDICAL/FAMILY/SOCIAL HISTORY; REVIEW OF SYSTEMS; VITAL SIGNS; PHYSICAL EXAM; DISPOSITION.\par \par

## 2021-08-17 ENCOUNTER — APPOINTMENT (OUTPATIENT)
Dept: RADIOLOGY | Facility: IMAGING CENTER | Age: 66
End: 2021-08-17
Payer: MEDICARE

## 2021-08-17 ENCOUNTER — OUTPATIENT (OUTPATIENT)
Dept: OUTPATIENT SERVICES | Facility: HOSPITAL | Age: 66
LOS: 1 days | End: 2021-08-17
Payer: MEDICARE

## 2021-08-17 DIAGNOSIS — Z90.2 ACQUIRED ABSENCE OF LUNG [PART OF]: Chronic | ICD-10-CM

## 2021-08-17 DIAGNOSIS — Z96.649 PRESENCE OF UNSPECIFIED ARTIFICIAL HIP JOINT: Chronic | ICD-10-CM

## 2021-08-17 DIAGNOSIS — Z95.5 PRESENCE OF CORONARY ANGIOPLASTY IMPLANT AND GRAFT: Chronic | ICD-10-CM

## 2021-08-17 DIAGNOSIS — Z00.8 ENCOUNTER FOR OTHER GENERAL EXAMINATION: ICD-10-CM

## 2021-08-17 DIAGNOSIS — Z98.891 HISTORY OF UTERINE SCAR FROM PREVIOUS SURGERY: Chronic | ICD-10-CM

## 2021-08-17 PROCEDURE — 71046 X-RAY EXAM CHEST 2 VIEWS: CPT

## 2021-08-17 PROCEDURE — 71046 X-RAY EXAM CHEST 2 VIEWS: CPT | Mod: 26

## 2021-08-19 ENCOUNTER — APPOINTMENT (OUTPATIENT)
Dept: ULTRASOUND IMAGING | Facility: CLINIC | Age: 66
End: 2021-08-19
Payer: MEDICARE

## 2021-08-19 PROCEDURE — 93971 EXTREMITY STUDY: CPT | Mod: LT

## 2021-08-22 ENCOUNTER — FORM ENCOUNTER (OUTPATIENT)
Age: 66
End: 2021-08-22

## 2021-08-23 PROBLEM — R59.1 LYMPHADENOPATHY: Status: ACTIVE | Noted: 2019-05-06

## 2021-08-24 ENCOUNTER — APPOINTMENT (OUTPATIENT)
Dept: THORACIC SURGERY | Facility: CLINIC | Age: 66
End: 2021-08-24
Payer: MEDICARE

## 2021-08-24 VITALS
HEART RATE: 77 BPM | BODY MASS INDEX: 36.91 KG/M2 | DIASTOLIC BLOOD PRESSURE: 108 MMHG | RESPIRATION RATE: 17 BRPM | OXYGEN SATURATION: 98 % | HEIGHT: 60 IN | SYSTOLIC BLOOD PRESSURE: 173 MMHG | WEIGHT: 188 LBS

## 2021-08-24 DIAGNOSIS — R59.1 GENERALIZED ENLARGED LYMPH NODES: ICD-10-CM

## 2021-08-24 DIAGNOSIS — T78.3XXA ANGIONEUROTIC EDEMA, INITIAL ENCOUNTER: ICD-10-CM

## 2021-08-24 DIAGNOSIS — J39.2 OTHER DISEASES OF PHARYNX: ICD-10-CM

## 2021-08-24 PROCEDURE — 99214 OFFICE O/P EST MOD 30 MIN: CPT

## 2021-08-24 NOTE — ASSESSMENT
[FreeTextEntry1] : 65 year old female, former smoker (1/2 PPD x 30 years, quit Jan 2018), w/ hx of HTN, HLD, CAD (s/p coronary stenting to the RCA on 1/9/2018), COPD, osteoporosis. ?Right THR. \par \par Now ~2yr s/p FB; robotic-assisted, conversion to thoracotomy RULobectomy with MLND on 9/3/19. 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 Pathology of right rib excision was benign, revealed bone. Visceral pleura invasion and HYACINTH present. \par \par She completed chemotherapy on 12/10/19 with Dr. Guevara. \par \par Now s/p Flex Bronch EBUS Bx w/ BAL on 3/9/21. Path of subcarinal LN was negative malignancy; favor reactive LN. Rt paratracheal LN was non-diagnostic. BAL negative for malignancy.\par \par Now s/p Flex Bronch, Cervical Mediastinoscopy on 7/27/21. Path of Rt paratracheal LNs and Subcarinal LNs are all benign.\par \par I have reviewed the patient's medical records and diagnostic images at time of this office consultation and have made the following recommendation:\par 1. CT neck and chest with IV contrast to further evaluate lymph nodes and etiology of swelling. \par \par Recommendations reviewed with patient during this office visit, and all questions answered; Patient instructed on the importance of follow up and verbalizes understanding.\par \par I personally performed the services described in the documentation, reviewed the documentation recorded by the scribe in my presence and it accurately and completely records my words and actions.\par \par I, SAL WoodwardC, am scribing for and the presence of STARR Kay, the following sections HISTORY OF PRESENT ILLNESS, PAST MEDICAL/FAMILY/SOCIAL HISTORY; REVIEW OF SYSTEMS; VITAL SIGNS; PHYSICAL EXAM; DISPOSITION.\par \par \par \par

## 2021-08-24 NOTE — PHYSICAL EXAM
[] : no respiratory distress [Respiration, Rhythm And Depth] : normal respiratory rhythm and effort [Exaggerated Use Of Accessory Muscles For Inspiration] : no accessory muscle use [Auscultation Breath Sounds / Voice Sounds] : lungs were clear to auscultation bilaterally [Examination Of The Chest] : the chest was normal in appearance [Chest Visual Inspection Thoracic Asymmetry] : no chest asymmetry [Diminished Respiratory Excursion] : normal chest expansion [2+] : left 2+ [Abnormal Walk] : normal gait [Nail Clubbing] : no clubbing  or cyanosis of the fingernails [Involuntary Movements] : no involuntary movements were seen [Motor Tone] : muscle strength and tone were normal [Oriented To Time, Place, And Person] : oriented to person, place, and time [Impaired Insight] : insight and judgment were intact [Affect] : the affect was normal [Mood] : the mood was normal [Memory Recent] : recent memory was not impaired [Memory Remote] : remote memory was not impaired [FreeTextEntry1] : LUE more swollen than RUL extremity

## 2021-08-24 NOTE — REVIEW OF SYSTEMS
[As Noted in HPI] : as noted in HPI [Limb Swelling] : limb swelling [Negative] : Heme/Lymph [FreeTextEntry4] : B/L neck swelling [FreeTextEntry9] : Left upper extremity

## 2021-08-24 NOTE — HISTORY OF PRESENT ILLNESS
[FreeTextEntry1] : 65 year old female, former smoker (1/2 PPD x 30 years, quit Jan 2018), w/ hx of HTN, HLD, CAD (s/p coronary stenting to the RCA on 1/9/2018), COPD, osteoporosis. ?Right THR. \par \par Now ~2yr s/p FB; robotic-assisted, conversion to thoracotomy RULobectomy with MLND on 9/3/19. 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 Pathology of right rib excision was benign, revealed bone. Visceral pleura invasion and HYACINTH present. \par \par She completed chemotherapy on 12/10/19 with Dr. Guevara. \par \par CT Chest w/o IV contrast on 2/4/2021:\par - s/p RULobectomy with emphysema\par - No new mass or nodules\par - Stable to slightly decreased in size, 9mm RLL gg nodule (10:234).\par - small loculated right pleural effusion\par \par PET/CT on 2/5/21 showed FDG-avid paratracheal LN.\par \par Now s/p Flex Bronch EBUS Bx w/ BAL on 3/9/21. Path of subcarinal LN was negative malignancy; favor reactive LN. Rt paratracheal LN was non-diagnostic. BAL negative for malignancy.\par \par Received 1st dose of Pfizer on 3/12/21, 2nd dose on 4/2. \par \par CT Chest on 6/15/21:\par - post-RULobectomy w/ stable post-op changes\par - stable 8mm RLL ggo (7:242)\par - new, enlargement of superior mediastinal LNs: a 2.6 x 1.8cm superior prevascular LN (3:39; previously 1.2 x 1.1cm), a 2.8 x 1.7cm Rt paratracheal superior mediastinal LN (previously 2.1 x 1.3cm) \par - mild to moderate emphysematous changes\par \par Now s/p Flex Bronch, Cervical Mediastinoscopy on 7/27/21. Path of Rt paratracheal LNs and Subcarinal LNs are all benign.\par \par Patient was last seen on 8/3/21, was instructed to RTC in Nov with CT scan.\par \par Patient is here today, c/o b/l neck swelling along with RUE swelling. Endorses dyspnea upon exertion with moderate activity, which is new since procedure. Relays that she had recently underwent an ultrasound of B/L upper extremities; no blood clot noted. Denies dysphagia, fever, chills, palpitations,chest pain lightheadedness or dizziness. \par

## 2021-08-24 NOTE — CONSULT LETTER
[Courtesy Letter:] : I had the pleasure of seeing your patient, [unfilled], in my office today. [( Thank you for referring [unfilled] for consultation for _____ )] : Thank you for referring [unfilled] for consultation for [unfilled] [Please see my note below.] : Please see my note below. [Consult Closing:] : Thank you very much for allowing me to participate in the care of this patient.  If you have any questions, please do not hesitate to contact me. [Sincerely,] : Sincerely, [FreeTextEntry2] : Wesley Joshi MD  [FreeTextEntry3] : Yaw Clement MD\par Director of Thoracic, [facility]\par Cardiovascular & Thoracic Surgery\par \par [facility]\par [address]\par Oncology Building 3rd Fl\[place] [address]\par Tel: [phone]\par Fax: [phone]\par

## 2021-08-30 ENCOUNTER — INPATIENT (INPATIENT)
Facility: HOSPITAL | Age: 66
LOS: 8 days | Discharge: HOME | End: 2021-09-08
Attending: INTERNAL MEDICINE | Admitting: INTERNAL MEDICINE
Payer: MEDICARE

## 2021-08-30 VITALS
OXYGEN SATURATION: 98 % | HEIGHT: 61 IN | RESPIRATION RATE: 18 BRPM | SYSTOLIC BLOOD PRESSURE: 152 MMHG | DIASTOLIC BLOOD PRESSURE: 69 MMHG | TEMPERATURE: 98 F | HEART RATE: 88 BPM

## 2021-08-30 DIAGNOSIS — I25.10 ATHEROSCLEROTIC HEART DISEASE OF NATIVE CORONARY ARTERY WITHOUT ANGINA PECTORIS: ICD-10-CM

## 2021-08-30 DIAGNOSIS — Z85.118 PERSONAL HISTORY OF OTHER MALIGNANT NEOPLASM OF BRONCHUS AND LUNG: ICD-10-CM

## 2021-08-30 DIAGNOSIS — I10 ESSENTIAL (PRIMARY) HYPERTENSION: ICD-10-CM

## 2021-08-30 DIAGNOSIS — R22.1 LOCALIZED SWELLING, MASS AND LUMP, NECK: ICD-10-CM

## 2021-08-30 DIAGNOSIS — I87.1 COMPRESSION OF VEIN: ICD-10-CM

## 2021-08-30 DIAGNOSIS — E78.5 HYPERLIPIDEMIA, UNSPECIFIED: ICD-10-CM

## 2021-08-30 DIAGNOSIS — Z98.891 HISTORY OF UTERINE SCAR FROM PREVIOUS SURGERY: Chronic | ICD-10-CM

## 2021-08-30 DIAGNOSIS — Z90.2 ACQUIRED ABSENCE OF LUNG [PART OF]: Chronic | ICD-10-CM

## 2021-08-30 DIAGNOSIS — Z96.649 PRESENCE OF UNSPECIFIED ARTIFICIAL HIP JOINT: Chronic | ICD-10-CM

## 2021-08-30 DIAGNOSIS — Z29.9 ENCOUNTER FOR PROPHYLACTIC MEASURES, UNSPECIFIED: ICD-10-CM

## 2021-08-30 DIAGNOSIS — Z95.5 PRESENCE OF CORONARY ANGIOPLASTY IMPLANT AND GRAFT: Chronic | ICD-10-CM

## 2021-08-30 DIAGNOSIS — D72.829 ELEVATED WHITE BLOOD CELL COUNT, UNSPECIFIED: ICD-10-CM

## 2021-08-30 LAB
ALBUMIN SERPL ELPH-MCNC: 4.4 G/DL — SIGNIFICANT CHANGE UP (ref 3.3–5)
ALP SERPL-CCNC: 147 U/L — HIGH (ref 40–120)
ALT FLD-CCNC: 12 U/L — SIGNIFICANT CHANGE UP (ref 4–33)
ANION GAP SERPL CALC-SCNC: 16 MMOL/L — HIGH (ref 7–14)
APTT BLD: 36.9 SEC — HIGH (ref 27–36.3)
AST SERPL-CCNC: 15 U/L — SIGNIFICANT CHANGE UP (ref 4–32)
BASOPHILS # BLD AUTO: 0.07 K/UL — SIGNIFICANT CHANGE UP (ref 0–0.2)
BASOPHILS NFR BLD AUTO: 0.5 % — SIGNIFICANT CHANGE UP (ref 0–2)
BILIRUB SERPL-MCNC: 0.6 MG/DL — SIGNIFICANT CHANGE UP (ref 0.2–1.2)
BLOOD GAS VENOUS COMPREHENSIVE RESULT: SIGNIFICANT CHANGE UP
BUN SERPL-MCNC: 17 MG/DL — SIGNIFICANT CHANGE UP (ref 7–23)
CALCIUM SERPL-MCNC: 9.8 MG/DL — SIGNIFICANT CHANGE UP (ref 8.4–10.5)
CHLORIDE SERPL-SCNC: 98 MMOL/L — SIGNIFICANT CHANGE UP (ref 98–107)
CO2 SERPL-SCNC: 24 MMOL/L — SIGNIFICANT CHANGE UP (ref 22–31)
CREAT SERPL-MCNC: 1.22 MG/DL — SIGNIFICANT CHANGE UP (ref 0.5–1.3)
CRP SERPL-MCNC: 25.5 MG/L — HIGH
D DIMER BLD IA.RAPID-MCNC: 555 NG/ML DDU — HIGH
EOSINOPHIL # BLD AUTO: 0.16 K/UL — SIGNIFICANT CHANGE UP (ref 0–0.5)
EOSINOPHIL NFR BLD AUTO: 1.1 % — SIGNIFICANT CHANGE UP (ref 0–6)
GLUCOSE SERPL-MCNC: 111 MG/DL — HIGH (ref 70–99)
HCT VFR BLD CALC: 37.9 % — SIGNIFICANT CHANGE UP (ref 34.5–45)
HGB BLD-MCNC: 12.3 G/DL — SIGNIFICANT CHANGE UP (ref 11.5–15.5)
IANC: 11.35 K/UL — HIGH (ref 1.5–8.5)
IMM GRANULOCYTES NFR BLD AUTO: 0.5 % — SIGNIFICANT CHANGE UP (ref 0–1.5)
INR BLD: 1.18 RATIO — HIGH (ref 0.88–1.16)
LYMPHOCYTES # BLD AUTO: 15.3 % — SIGNIFICANT CHANGE UP (ref 13–44)
LYMPHOCYTES # BLD AUTO: 2.25 K/UL — SIGNIFICANT CHANGE UP (ref 1–3.3)
MAGNESIUM SERPL-MCNC: 2.2 MG/DL — SIGNIFICANT CHANGE UP (ref 1.6–2.6)
MCHC RBC-ENTMCNC: 28 PG — SIGNIFICANT CHANGE UP (ref 27–34)
MCHC RBC-ENTMCNC: 32.5 GM/DL — SIGNIFICANT CHANGE UP (ref 32–36)
MCV RBC AUTO: 86.3 FL — SIGNIFICANT CHANGE UP (ref 80–100)
MONOCYTES # BLD AUTO: 0.78 K/UL — SIGNIFICANT CHANGE UP (ref 0–0.9)
MONOCYTES NFR BLD AUTO: 5.3 % — SIGNIFICANT CHANGE UP (ref 2–14)
NEUTROPHILS # BLD AUTO: 11.35 K/UL — HIGH (ref 1.8–7.4)
NEUTROPHILS NFR BLD AUTO: 77.3 % — HIGH (ref 43–77)
NRBC # BLD: 0 /100 WBCS — SIGNIFICANT CHANGE UP
NRBC # FLD: 0 K/UL — SIGNIFICANT CHANGE UP
PHOSPHATE SERPL-MCNC: 3.6 MG/DL — SIGNIFICANT CHANGE UP (ref 2.5–4.5)
PLATELET # BLD AUTO: 351 K/UL — SIGNIFICANT CHANGE UP (ref 150–400)
POTASSIUM SERPL-MCNC: 3.4 MMOL/L — LOW (ref 3.5–5.3)
POTASSIUM SERPL-SCNC: 3.4 MMOL/L — LOW (ref 3.5–5.3)
PROT SERPL-MCNC: 7.7 G/DL — SIGNIFICANT CHANGE UP (ref 6–8.3)
PROTHROM AB SERPL-ACNC: 13.3 SEC — SIGNIFICANT CHANGE UP (ref 10.6–13.6)
RBC # BLD: 4.39 M/UL — SIGNIFICANT CHANGE UP (ref 3.8–5.2)
RBC # FLD: 14.2 % — SIGNIFICANT CHANGE UP (ref 10.3–14.5)
SARS-COV-2 RNA SPEC QL NAA+PROBE: SIGNIFICANT CHANGE UP
SODIUM SERPL-SCNC: 138 MMOL/L — SIGNIFICANT CHANGE UP (ref 135–145)
WBC # BLD: 14.69 K/UL — HIGH (ref 3.8–10.5)
WBC # FLD AUTO: 14.69 K/UL — HIGH (ref 3.8–10.5)

## 2021-08-30 PROCEDURE — 99285 EMERGENCY DEPT VISIT HI MDM: CPT | Mod: 25

## 2021-08-30 PROCEDURE — 99222 1ST HOSP IP/OBS MODERATE 55: CPT

## 2021-08-30 PROCEDURE — 36556 INSERT NON-TUNNEL CV CATH: CPT

## 2021-08-30 PROCEDURE — 71045 X-RAY EXAM CHEST 1 VIEW: CPT | Mod: 26

## 2021-08-30 PROCEDURE — 93010 ELECTROCARDIOGRAM REPORT: CPT | Mod: 59

## 2021-08-30 PROCEDURE — 99223 1ST HOSP IP/OBS HIGH 75: CPT

## 2021-08-30 RX ORDER — ATORVASTATIN CALCIUM 80 MG/1
40 TABLET, FILM COATED ORAL AT BEDTIME
Refills: 0 | Status: DISCONTINUED | OUTPATIENT
Start: 2021-08-30 | End: 2021-09-08

## 2021-08-30 RX ORDER — POTASSIUM CHLORIDE 20 MEQ
40 PACKET (EA) ORAL ONCE
Refills: 0 | Status: COMPLETED | OUTPATIENT
Start: 2021-08-30 | End: 2021-08-30

## 2021-08-30 RX ORDER — LABETALOL HCL 100 MG
1 TABLET ORAL
Qty: 0 | Refills: 0 | DISCHARGE

## 2021-08-30 RX ORDER — LANOLIN ALCOHOL/MO/W.PET/CERES
3 CREAM (GRAM) TOPICAL AT BEDTIME
Refills: 0 | Status: DISCONTINUED | OUTPATIENT
Start: 2021-08-30 | End: 2021-09-08

## 2021-08-30 RX ORDER — ACETAMINOPHEN 500 MG
650 TABLET ORAL EVERY 6 HOURS
Refills: 0 | Status: DISCONTINUED | OUTPATIENT
Start: 2021-08-30 | End: 2021-09-08

## 2021-08-30 RX ORDER — ONDANSETRON 8 MG/1
4 TABLET, FILM COATED ORAL EVERY 8 HOURS
Refills: 0 | Status: DISCONTINUED | OUTPATIENT
Start: 2021-08-30 | End: 2021-09-08

## 2021-08-30 RX ORDER — ATORVASTATIN CALCIUM 80 MG/1
40 TABLET, FILM COATED ORAL DAILY
Refills: 0 | Status: DISCONTINUED | OUTPATIENT
Start: 2021-08-30 | End: 2021-08-30

## 2021-08-30 RX ORDER — CARVEDILOL PHOSPHATE 80 MG/1
6.25 CAPSULE, EXTENDED RELEASE ORAL EVERY 12 HOURS
Refills: 0 | Status: DISCONTINUED | OUTPATIENT
Start: 2021-08-30 | End: 2021-09-08

## 2021-08-30 RX ORDER — FUROSEMIDE 40 MG
40 TABLET ORAL DAILY
Refills: 0 | Status: DISCONTINUED | OUTPATIENT
Start: 2021-08-31 | End: 2021-09-08

## 2021-08-30 RX ORDER — BUDESONIDE AND FORMOTEROL FUMARATE DIHYDRATE 160; 4.5 UG/1; UG/1
2 AEROSOL RESPIRATORY (INHALATION) DAILY
Refills: 0 | Status: DISCONTINUED | OUTPATIENT
Start: 2021-08-31 | End: 2021-09-08

## 2021-08-30 RX ORDER — TIOTROPIUM BROMIDE 18 UG/1
1 CAPSULE ORAL; RESPIRATORY (INHALATION) DAILY
Refills: 0 | Status: DISCONTINUED | OUTPATIENT
Start: 2021-08-31 | End: 2021-09-08

## 2021-08-30 RX ORDER — ENOXAPARIN SODIUM 100 MG/ML
40 INJECTION SUBCUTANEOUS AT BEDTIME
Refills: 0 | Status: DISCONTINUED | OUTPATIENT
Start: 2021-08-30 | End: 2021-09-01

## 2021-08-30 RX ORDER — ASPIRIN/CALCIUM CARB/MAGNESIUM 324 MG
81 TABLET ORAL DAILY
Refills: 0 | Status: DISCONTINUED | OUTPATIENT
Start: 2021-08-31 | End: 2021-09-01

## 2021-08-30 RX ADMIN — CARVEDILOL PHOSPHATE 6.25 MILLIGRAM(S): 80 CAPSULE, EXTENDED RELEASE ORAL at 23:33

## 2021-08-30 RX ADMIN — ENOXAPARIN SODIUM 40 MILLIGRAM(S): 100 INJECTION SUBCUTANEOUS at 23:32

## 2021-08-30 RX ADMIN — ATORVASTATIN CALCIUM 40 MILLIGRAM(S): 80 TABLET, FILM COATED ORAL at 23:33

## 2021-08-30 RX ADMIN — Medication 40 MILLIEQUIVALENT(S): at 23:33

## 2021-08-30 NOTE — ED PROVIDER NOTE - CLINICAL SUMMARY MEDICAL DECISION MAKING FREE TEXT BOX
Concern for neck LAD, PE, UE DVT, HF. Labs, US, COVID test, ekg, imaging. Will dispo based on results/reassesment Concern for neck LAD, PE, UE DVT, HF. Labs, US, COVID test, ekg, imaging. 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 Labs, US, COVID test, ekg, imaging. Will dispo based on results/reassesment

## 2021-08-30 NOTE — ED PROVIDER NOTE - ATTENDING CONTRIBUTION TO CARE
I have seen and examined the patient on the patient´s visit date. I have reviewed the note written by Adrian Navas DO, on that visit day. I have supervised and participated as necessary in the performance of procedures indicated for patient management and was available at all phases of the patient´s visit when needed. We discussed the history, physical exam findings, management plan, and  medical decision making. I have made my additions, exceptions, and revisions within the chart and I agree with H and P as documented in its entirety. The data and my interpretation of any data collected from labs, interventions and imaging appear below as well as my independent medical decision making and considerations.    The patient is a 65y Female who has a past medical and surgery history of HTN HLD COPD  Pulm nodules/ right sided lung CA s/p RULlobectomy/[id number]   CAD/stents Carotid artery disease  hip pain/[id number] currently being followed for progressive thoracic/cervical lynpadenopathy PTED with progression of same with neck swelling and UE swelling L>Rt "why are my veins so swollen in my hands"   Vital Signs Stable  PE: as described; my additions and exceptions are noted in the chart    DATA:  LAB:  Blood Gas Venous - Lactate: 1.5 mmol/L (08-30-21 @ 21:52)                        12.3   14.69 )-----------( 351      ( 30 Aug 2021 21:52 )             37.9   Mean Cell Volume: 86.3 fL (08-30-21 @ 21:52)  Auto Neutrophil %: 77.3 % (08-30-21 @ 21:52)  Auto Eosinophil %: 1.1 % (08-30-21 @ 21:52)  PT/INR - ( 30 Aug 2021 21:52 )   PT: 13.3 sec;   INR: 1.18 ratio         PTT - ( 30 Aug 2021 21:52 )  PTT:36.9 [id number]    137  |  96<L>  |  19  ----------------------------<  103<H>  3.6   |  24  |  1.20    Ca    9.7      30 Aug 2021 23:35  Phos  3.6     08-30  Mg     2.20     08-30    TPro  7.3  /  Alb  4.3  /  TBili  0.6  /  DBili  x   /  AST  15  /  ALT  13  /  AlkPhos  149<H>  08-30        IMPRESSION/RISK:  Dx=Bulky lymphadenopathy of the neck   Consideration include CTA neck and chest to r/o thrombus or compressive lesions in proximal great venous vessels   Plan  as above  CTSx input   Reassess  Dispo as per results reccs and response

## 2021-08-30 NOTE — ED PROVIDER NOTE - PHYSICAL EXAMINATION
Gen: non toxic appearing, NAD   Head: NC/NT  Eyes:  , anicteric  ENT: airway patent, mmm   CV: RRR, +S1/S2   Resp: CTAB, symmetric breath sounds   GI:  abdomen soft non-distended, NTTP   Back: no CVA tenderness  Extremities - +LUE edmema  Neuro: A&Ox4, following commands, speech clear, moving all four extremities spontaneously Gen: obese, appears flushed, telengectasia of the chest  Head: NC/NT  Eyes:  anicteric  ENT: airway patent, mmm   CV: RRR, +S1/S2   Resp: CTAB, symmetric breath sounds   GI:  abdomen soft non-distended, NTTP   Back: no CVA tenderness  Extremities - +LUE edmema  Neuro: A&Ox4, following commands, speech clear, moving all four extremities spontaneously

## 2021-08-30 NOTE — ED PROCEDURE NOTE - PROCEDURE ADDITIONAL DETAILS
Was able to draw back blood twice - one time the wire kinked and second time the blood clotted and was unable to continue draw back the blood

## 2021-08-30 NOTE — ED PROVIDER NOTE - NS ED ROS FT
Gen: Denies fever, chills  CV: Denies chest pain  Skin: Denies rash, erythema  Resp: +SOB, denies cough  ENT: Denies nasal congestion  Eyes: Denies blurry vision  GI: Denies nausea, vomiting, diarrhea  Msk: +LUE swelling  : Denies dysuria  Neuro: Denies headache

## 2021-08-30 NOTE — H&P ADULT - HISTORY OF PRESENT ILLNESS
65F with HTN, HLD, CAD s/p stents (last in 2018), COPD hx of resected RR lung adenocarcinoma (serial CT scans q3-6 months, follows Dr. Clement), former smoker who presents with worsening neck swelling and L arm edema the past 2 weeks. Background hx, she had a bronchoscopy in July 2021 due to concerns of new lymphadenopathy during one of her CT scans and had bronch and mediascopy with biopsies which did not show any malignancy. The past 2 weeks, she started noticing neck circumference growth and some bilateral arm swelling. She had a duplex of UE which did not show any DVT but some lymph nodes. The past week, she started having worsening neck growth and LUE swelling which made her concerned. She was suppose to have a scheduled outpatient CT chest and neck next month, but she felt like she started having trouble breathing when she bends her neck. Otherwise, she denies chest pain, dyspnea, dyspnea on exertion, chest pain, headaches.  In the ED, vitals stable. No hypoxic. EKG NSR. Labs with leukocytosis. Pending CT scan of neck and chest

## 2021-08-30 NOTE — ED ADULT NURSE NOTE - OBJECTIVE STATEMENT
received to room 5. complains of swelling in neck and LUE x 2 weeks. reports had bronchoscopy 7/29. also reports some sob with exertion. placed on 2 L NC for comfort, reports improvement. denies chest pain. reports scheduled for ct chest/neck in a few days but swelling has gotten worse and couldn't wait. b/l arms edematous, L > R. unable to obtain IV access/labs at this time. MD aware, states will use ultrasound guide to obtain access. awaiting usiv and ct in no acute distress.

## 2021-08-30 NOTE — ED ADULT TRIAGE NOTE - CHIEF COMPLAINT QUOTE
Pt presents to ED ambulatory from home with c/o R arm swelling and swelling to neck s/p bronchoscopy  1 week ago. Pt endorses difficulty breathing. Pt denies difficulty swallowing or speaking.

## 2021-08-30 NOTE — ED PROVIDER NOTE - OBJECTIVE STATEMENT
64 yo F with pmhx of  HTN, HLD, CAD s/p stent on ASA, COPD, Adenocarcinoma of R lung s/p surgery 9/3/2019 presents with 2weeks of swelling in neck and swelling in LUE. had bronchoscopy 1mth ago. shortness of breath with exertion. No chest pain, abd pain, n/v. States she had US outpt abt a week ago  - showing enlarged LAD. Supposed to get CT chest/neck in few days - now sxs are worsening could not wait.

## 2021-08-30 NOTE — H&P ADULT - NSHPPHYSICALEXAM_GEN_ALL_CORE
PHYSICAL EXAM:  GENERAL: NAD, well-developed, well-nourished  HEAD:  Mild fullness of the face, symetrical, no ptosis  EYES: EOMI, PERRL, conjunctiva and sclera clear  NECK: soft indurated/cystic mass-like protrusion bilateral in supraccostal region and enlarged neck circumference. Airway patent. No stridor or wheezes  CHEST/LUNG: Clear to auscultation bilaterally; No wheezes, rales or rhonchi  HEART: Regular rate and rhythm; No murmurs, rubs, or gallops, (+)S1, S2  ABDOMEN: Soft, Nontender, Nondistended; Normal Bowel sounds   EXTREMITIES:  2+ Peripheral Pulses, No clubbing, cyanosis, or edema  PSYCH: normal mood and affect  NEUROLOGY: AAOx3, non-focal  SKIN: No rashes or lesions

## 2021-08-30 NOTE — ED ADULT NURSE REASSESSMENT NOTE - NS ED NURSE REASSESS COMMENT FT1
ER report taking from covering RN pt AOX 3 coming with neck swelling with left arm >right arm swelling, x 2 wks, pt stated since last Bronchoscopy started to develop swelling, uvula some what visible, pt able to speak, no diff. swallowing, pt stated diff. breathing when head is forward, sleeps with sitting up with her head up. pt morbid obese, lungs clear, resp. equal 19-20b/min, oxygen Sat % in RA while head is straight up.  US IV is needed.  pt has poor venous accessed.   on CM with NSR. 86b/min, pt able to ambulate gait state, denies CP, no dizziness, no blur vision.    Pending dispo.    Marily Gonzalez RN

## 2021-08-30 NOTE — H&P ADULT - NSHPREVIEWOFSYSTEMS_GEN_ALL_CORE
REVIEW OF SYSTEMS:    CONSTITUTIONAL: No weakness, fevers or chills  EYES/ENT: No visual changes;  No dysphagia  NECK: slight worsening neck circumference  RESPIRATORY: No cough, wheezing, hemoptysis; No shortness of breath  CARDIOVASCULAR: No chest pain or palpitations; No lower extremity edema  GASTROINTESTINAL: No abdominal or epigastric pain. No nausea, vomiting, or hematemesis; No diarrhea or constipation. No melena or hematochezia.  GENITOURINARY: No dysuria, frequency or hematuria  NEUROLOGICAL: No numbness or weakness  SKIN: No itching, burning, rashes, or lesions   PSYCH: No auditory or visual hallucinations  All other review of systems is negative unless indicated above.

## 2021-08-30 NOTE — H&P ADULT - PROBLEM SELECTOR PLAN 5
-Will need to verify with pharmacy in AM what does of coreg patient takes. For now will start at Coreg 6.125 considering the elevated BP

## 2021-08-30 NOTE — H&P ADULT - PROBLEM SELECTOR PLAN 4
-Continue home ASA  -Unsure why patient not on a ACE/ARB  -Will need to verify with pharmacy in AM what does of coreg patient takes. For now will start at Coreg 6.125 considering the elevated BP

## 2021-08-30 NOTE — H&P ADULT - ASSESSMENT
65F with HTN, HLD, CAD s/p stents (last in 2018), COPD hx of resected RR lung adenocarcinoma (serial CT scans q3-6 months, follows Dr. Clement), former smoker who presents with worsening neck swelling and L arm concerning for SVC syndrome. Pending CT H/N.

## 2021-08-30 NOTE — ED PROVIDER NOTE - ADMIT DISPOSITION PRESENT ON ADMISSION SEPSIS
Body Location Override (Optional - Billing Will Still Be Based On Selected Body Map Location If Applicable): left lateral neck
Add 69885 Cpt? (Important Note: In 2017 The Use Of 91295 Is Being Tracked By Cms To Determine Future Global Period Reimbursement For Global Periods): no
Detail Level: Detailed
No

## 2021-08-30 NOTE — H&P ADULT - PROBLEM SELECTOR PLAN 1
Likely SVC syndrome as patient does have more unilateral arm swelling.  -Will get another duplex of bilateral upper extremity to eval for thrombosis  -D-dimer pending as well  -Pending CT scan H/N/C  -Ct surgery called by ED

## 2021-08-30 NOTE — ED PROVIDER NOTE - CARE PLAN
1 Principal Discharge DX:	SVC (superior vena cava obstruction)  Secondary Diagnosis:	Cancer of lung

## 2021-08-30 NOTE — H&P ADULT - NSHPLABSRESULTS_GEN_ALL_CORE
08-30    138  |  98  |  17  ----------------------------<  111<H>  3.4<L>   |  24  |  1.22    Ca    9.8      30 Aug 2021 21:52    TPro  7.7  /  Alb  4.4  /  TBili  0.6  /  DBili  x   /  AST  15  /  ALT  12  /  AlkPhos  147<H>  08-30                       12.3   14.69 )-----------( 351      ( 30 Aug 2021 21:52 )             37.9   LIVER FUNCTIONS - ( 30 Aug 2021 21:52 )  Alb: 4.4 g/dL / Pro: 7.7 g/dL / ALK PHOS: 147 U/L / ALT: 12 U/L / AST: 15 U/L / GGT: x           PT/INR - ( 30 Aug 2021 21:52 )   PT: 13.3 sec;   INR: 1.18 ratio       PTT - ( 30 Aug 2021 21:52 )  PTT:36.9 sec    Image and EKG reviewed

## 2021-08-30 NOTE — ED PROVIDER NOTE - PROGRESS NOTE DETAILS
juanito: pt received in sign out from Dr. Dinero.  pt with hx lung ca, recent clean bronch notes over the last 2 weeks progressively increasing swelling and redness of neck, upper extremities and face.  on pe pt with classic look of svc syndrome, spoke with pcp Dr. wetzel who agrees to admission, also contacted ct surgery for eval.  planning ct complicated by difficulty obtaining access, no access to be placed in upper extremities.  femoral being placed. Adrian Navas,  PGY-3: due to suspected superior vena cava syndrome - cannot place IV line in upper extremities. Spoke with radiology over the phone - states would prefer patient have lower extremity central line for better contrast CT study. Pt had recent RT hip replacement. Only attempted the central line LT femoral - US view was limited due to body habitus. Successfully placed the needle in vessel twice - however had complications both time - wire kinked and second time the blood clotted. Blood drawn from RT AC vein. Did not leave that line in. Spoke with surgery - did venous saph long IV line. CT tech is called and informed to take the pt for ct.

## 2021-08-30 NOTE — ED PROCEDURE NOTE - ATTENDING CONTRIBUTION TO CARE
attempt made to place left femoral line.  pt with svc syndrome so no line on upper extremities,/ central line.  habitus was limiting.  attmpts made x 2, with blood draw both times, but could not successfully pass line over wire x 2, attempt aborted and surgery called to place the line.    I performed a history and physical exam of the patient and discussed their management with the resident and /or advanced care provider. I reviewed the resident and /or ACP's note and agree with the documented findings and plan of care. My medical decison making and observations are found above.

## 2021-08-31 LAB
A1C WITH ESTIMATED AVERAGE GLUCOSE RESULT: 5.5 % — SIGNIFICANT CHANGE UP (ref 4–5.6)
ALBUMIN SERPL ELPH-MCNC: 4.3 G/DL — SIGNIFICANT CHANGE UP (ref 3.3–5)
ALP SERPL-CCNC: 149 U/L — HIGH (ref 40–120)
ALT FLD-CCNC: 13 U/L — SIGNIFICANT CHANGE UP (ref 4–33)
ANION GAP SERPL CALC-SCNC: 17 MMOL/L — HIGH (ref 7–14)
AST SERPL-CCNC: 15 U/L — SIGNIFICANT CHANGE UP (ref 4–32)
BILIRUB SERPL-MCNC: 0.6 MG/DL — SIGNIFICANT CHANGE UP (ref 0.2–1.2)
BUN SERPL-MCNC: 19 MG/DL — SIGNIFICANT CHANGE UP (ref 7–23)
CALCIUM SERPL-MCNC: 9.7 MG/DL — SIGNIFICANT CHANGE UP (ref 8.4–10.5)
CHLORIDE SERPL-SCNC: 96 MMOL/L — LOW (ref 98–107)
CO2 SERPL-SCNC: 24 MMOL/L — SIGNIFICANT CHANGE UP (ref 22–31)
CREAT SERPL-MCNC: 1.2 MG/DL — SIGNIFICANT CHANGE UP (ref 0.5–1.3)
ERYTHROCYTE [SEDIMENTATION RATE] IN BLOOD: 53 MM/HR — HIGH (ref 4–25)
ESTIMATED AVERAGE GLUCOSE: 111 — SIGNIFICANT CHANGE UP
GLUCOSE SERPL-MCNC: 103 MG/DL — HIGH (ref 70–99)
HCT VFR BLD CALC: 34.9 % — SIGNIFICANT CHANGE UP (ref 34.5–45)
HGB BLD-MCNC: 11.6 G/DL — SIGNIFICANT CHANGE UP (ref 11.5–15.5)
INR BLD: 1.2 RATIO — HIGH (ref 0.88–1.16)
MAGNESIUM SERPL-MCNC: 2.2 MG/DL — SIGNIFICANT CHANGE UP (ref 1.6–2.6)
MCHC RBC-ENTMCNC: 28.6 PG — SIGNIFICANT CHANGE UP (ref 27–34)
MCHC RBC-ENTMCNC: 33.2 GM/DL — SIGNIFICANT CHANGE UP (ref 32–36)
MCV RBC AUTO: 86 FL — SIGNIFICANT CHANGE UP (ref 80–100)
NRBC # BLD: 0 /100 WBCS — SIGNIFICANT CHANGE UP
NRBC # FLD: 0 K/UL — SIGNIFICANT CHANGE UP
PLATELET # BLD AUTO: 297 K/UL — SIGNIFICANT CHANGE UP (ref 150–400)
POTASSIUM SERPL-MCNC: 3.6 MMOL/L — SIGNIFICANT CHANGE UP (ref 3.5–5.3)
POTASSIUM SERPL-SCNC: 3.6 MMOL/L — SIGNIFICANT CHANGE UP (ref 3.5–5.3)
PROT SERPL-MCNC: 7.3 G/DL — SIGNIFICANT CHANGE UP (ref 6–8.3)
PROTHROM AB SERPL-ACNC: 13.7 SEC — HIGH (ref 10.6–13.6)
RBC # BLD: 4.06 M/UL — SIGNIFICANT CHANGE UP (ref 3.8–5.2)
RBC # FLD: 14.6 % — HIGH (ref 10.3–14.5)
SODIUM SERPL-SCNC: 137 MMOL/L — SIGNIFICANT CHANGE UP (ref 135–145)
WBC # BLD: 12.29 K/UL — HIGH (ref 3.8–10.5)
WBC # FLD AUTO: 12.29 K/UL — HIGH (ref 3.8–10.5)

## 2021-08-31 PROCEDURE — 31575 DIAGNOSTIC LARYNGOSCOPY: CPT

## 2021-08-31 PROCEDURE — 70491 CT SOFT TISSUE NECK W/DYE: CPT | Mod: 26

## 2021-08-31 PROCEDURE — 99222 1ST HOSP IP/OBS MODERATE 55: CPT | Mod: 25

## 2021-08-31 PROCEDURE — 71260 CT THORAX DX C+: CPT | Mod: 26

## 2021-08-31 RX ADMIN — TIOTROPIUM BROMIDE 1 CAPSULE(S): 18 CAPSULE ORAL; RESPIRATORY (INHALATION) at 10:17

## 2021-08-31 RX ADMIN — CARVEDILOL PHOSPHATE 6.25 MILLIGRAM(S): 80 CAPSULE, EXTENDED RELEASE ORAL at 18:00

## 2021-08-31 RX ADMIN — ATORVASTATIN CALCIUM 40 MILLIGRAM(S): 80 TABLET, FILM COATED ORAL at 22:04

## 2021-08-31 RX ADMIN — Medication 40 MILLIGRAM(S): at 05:40

## 2021-08-31 RX ADMIN — BUDESONIDE AND FORMOTEROL FUMARATE DIHYDRATE 2 PUFF(S): 160; 4.5 AEROSOL RESPIRATORY (INHALATION) at 10:17

## 2021-08-31 RX ADMIN — CARVEDILOL PHOSPHATE 6.25 MILLIGRAM(S): 80 CAPSULE, EXTENDED RELEASE ORAL at 05:40

## 2021-08-31 RX ADMIN — Medication 81 MILLIGRAM(S): at 10:17

## 2021-08-31 RX ADMIN — ENOXAPARIN SODIUM 40 MILLIGRAM(S): 100 INJECTION SUBCUTANEOUS at 22:05

## 2021-08-31 NOTE — CONSULT NOTE ADULT - ASSESSMENT
Assessment: 65y Female with h/o R lung cancer with extensive mediastinal lymphadenopathy and near occlusion of the SVC with IR consulted for SVC stent.    Plan: IR to perform SVC angiogram and possible stent placement Thursday, 9/2  -Please place order for IR Procedure, approving attending Dr. Victor  -NPO past midnight prior to procedure  -hold therpaeutic/prophylactic anticoagulants  -AM CBC, BMP, and coags  -discussed with primary team    Darinel Mehta MD  PGY-IV, Interventional Radiology  [facility]-p.[phone], [phone]  [facility]-[phone] ([phone]), [phone]

## 2021-08-31 NOTE — CONSULT NOTE ADULT - SUBJECTIVE AND OBJECTIVE BOX
08-31-21 @ 13:19    Patient is a 65y old  Female who presents with a chief complaint of Neck swelling (31 Aug 2021 10:40)      HPI:  65F with HTN, HLD, CAD s/p stents (last in ), COPD hx of resected RR lung adenocarcinoma (serial CT scans q3-6 months, follows Dr. Clement), former smoker who presents with worsening neck swelling and L arm edema the past 2 weeks. Background hx, she had a bronchoscopy in 2021 due to concerns of new lymphadenopathy during one of her CT scans and had bronch and mediascopy with biopsies which did not show any malignancy. The past 2 weeks, she started noticing neck circumference growth and some bilateral arm swelling. She had a duplex of UE which did not show any DVT but some lymph nodes. The past week, she started having worsening neck growth and LUE swelling which made her concerned. She was suppose to have a scheduled outpatient CT chest and neck next month, but she felt like she started having trouble breathing when she bends her neck. Otherwise, she denies chest pain, dyspnea, dyspnea on exertion, chest pain, headaches.  In the ED, vitals stable. No hypoxic. EKG NSR. Labs with leukocytosis. Pending CT scan of neck and chest (30 Aug 2021 22:19)     she says she has been doing well at home in terms of her SOB: she has not been wheezing: she takers trlegy at home: Now she is here for neck and left arm swelling!    sheis my oupt and last time she came to my office was in 2019  ?FOLLOWING PRESENT  [ x] Hx of PE/DVT, [ y] Hx COPD, [ ] xHx of Asthma, [y ] Hx of Hospitalization, [x ]  Hx of BiPAP/CPAP use, [ x] Hx of DOMINIC    Allergies    penicillin (Short breath; Hives)  tetracycline (Short breath; Hives)    Intolerances        PAST MEDICAL & SURGICAL HISTORY:  Hypertension    Hyperlipidemia    Pulmonary nodules  yearly CT scans    Carotid artery disease  monitored by vascluar doctor Doscher    Emphysema    Hip pain    Obesity    Edema of both legs    Lung cancer  right, , completed chemotherapy 2019    Stented coronary artery    Localized enlarged lymph nodes    Scoliosis    Lumbar disc disorder    COPD (chronic obstructive pulmonary disease)    History of  section  x2 ,     S/P lobectomy of lung  RULobectomy     History of hip replacement  right 2021    Stented coronary artery  x2 stents 2018        FAMILY HISTORY:  Family history of CHF (congestive heart failure) (Aunt)        Social History: [  ex sm [place]ker] TOBACCO                  [ x ] ETOH                                 [  x] IVDA/DRUGS    REVIEW OF SYSTEMS      General:	x    Skin/Breast:x  	  Ophthalmologic:x  	  ENMT:	neck swelling    Respiratory and Thorax:x  	  Cardiovascular:	x    Gastrointestinal:	x    Genitourinary:	x    Musculoskeletal:	 LUE swelling    Neurological:	x    Psychiatric:x	    Hematology/Lymphatics:	x    Endocrine:	x    Allergic/Immunologic:	x    MEDICATIONS  (STANDING):  aspirin  chewable 81 milliGRAM(s) Oral daily  atorvastatin 40 milliGRAM(s) Oral at bedtime  budesonide  80 MICROgram(s)/formoterol 4.5 MICROgram(s) Inhaler 2 Puff(s) Inhalation daily  carvedilol 6.25 milliGRAM(s) Oral every 12 hours  enoxaparin Injectable 40 milliGRAM(s) SubCutaneous at bedtime  furosemide    Tablet 40 milliGRAM(s) Oral daily  tiotropium 18 MICROgram(s) Capsule 1 Capsule(s) Inhalation daily    MEDICATIONS  (PRN):  acetaminophen   Tablet .. 650 milliGRAM(s) Oral every 6 hours PRN Temp greater or equal to 38.5C (101.3F), Mild Pain (1 - 3)  aluminum hydroxide/magnesium hydroxide/simethicone Suspension 30 milliLiter(s) Oral every 4 hours PRN Dyspepsia  melatonin 3 milliGRAM(s) Oral at bedtime PRN Insomnia  ondansetron Injectable 4 milliGRAM(s) IV Push every 8 hours PRN Nausea and/or Vomiting       Vital Signs Last 24 Hrs  T(C): 36.6 (31 Aug 2021 05:38), Max: 36.6 (30 Aug 2021 23:38)  T(F): 97.8 (31 Aug 2021 05:38), Max: 97.9 (30 Aug 2021 23:38)  HR: 77 (31 Aug 2021 11:22) (77 - 98)  BP: 133/60 (31 Aug 2021 05:38) (128/53 - 166/96)  BP(mean): --  RR: 18 (31 Aug 2021 11:22) (18 - 19)  SpO2: 99% (31 Aug 2021 11:22) (98% - 100%)Orthostatic VS          I&O's Summary      Physical Exam:   GENERAL: Obese  HEENT: BAYLEE/   Atraumatic, Normocephalic  ENMT: No tonsillar erythema, exudates, or enlargement; Moist mucous membranes, Good dentition, No lesions  NECK: ant neck swelling : large: no sq air:    CHEST/LUNG: Clear to auscultation bilaterally; No rales, rhonchi, wheezing, or rubs  CVS: Regular rate and rhythm; No murmurs, rubs, or gallops  GI: : Soft, Nontender, Nondistended; Bowel sounds present  NERVOUS SYSTEM:  Alert & Oriented X3  EXTREMITIES:  LUE edema  LYMPH: No lymphadenopathy noted  SKIN: No rashes or lesions  ENDOCRINOLOGY: No Thyromegaly  PSYCH: Appropriate    Labs:  Venous<49<4>>27<<7.385>>Venous<<3><<4><<5<<279>>COVID-19 PCR: NotDetec (30 Aug 2021 16:57)  COVID-19 PCR: NotDetec (28 May 2021 21:44)  COVID-19 PCR: NotDetec (21 May 2021 12:03)                              11.6   12.29 )-----------( 297      ( 31 Aug 2021 06:40 )             34.9                         12.3   14.69 )-----------( 351      ( 30 Aug 2021 21:52 )             37.9     08-    137  |  96<L>  |  19  ----------------------------<  103<H>  3.6   |  24  |  1.20      138  |  98  |  17  ----------------------------<  111<H>  3.4<L>   |  24  |  1.22    Ca    9.7      30 Aug 2021 23:35  Ca    9.8      30 Aug 2021 21:52  Phos  3.6     08-30  Mg     2.20     08-30  Mg     2.20     -30    TPro  7.3  /  Alb  4.3  /  TBili  0.6  /  DBili  x   /  AST  15  /  ALT  13  /  AlkPhos  149<H>    TPro  7.7  /  Alb  4.4  /  TBili  0.6  /  DBili  x   /  AST  15  /  ALT  12  /  AlkPhos  147<H>      CAPILLARY BLOOD GLUCOSE        LIVER FUNCTIONS - ( 30 Aug 2021 23:35 )  Alb: 4.3 g/dL / Pro: 7.3 g/dL / ALK PHOS: 149 U/L / ALT: 13 U/L / AST: 15 U/L / GGT: x           PT/INR - ( 31 Aug 2021 06:40 )   PT: 13.7 sec;   INR: 1.20 ratio         PTT - ( 30 Aug 2021 21:52 )  PTT:36.9 sec    D DImer  D-Dimer Assay, Quantitative: 555 ng/mL DDU ( @ 21:52)      Studies  Chest X-RAY  CT SCAN Chest   CT Abdomen  Venous Dopplers: LE:   Others  rarad< from: CT Chest w/ IV Cont (21 @ 02:06) >    Motion limited study.    LUNGS AND AIRWAYS: Patent central airways.  Status post right upper lobectomy with right hemithorax volume loss and postsurgical changes. 9 mm groundglass nodule, not definitely changed compared to 2017 however complete evaluation is limited secondary to respiratory motion. Emphysema.  PLEURA: No pleural effusion.  MEDIASTINUM AND FARIHA: There is is extensive mediastinal lymphadenopathy with cluster of lymph nodes in the AP window, prevascular space, and right superior paratracheal space, these nodes are poorly delineated from each other.  VESSELS: No main, left main, right main, lobar, or segmental pulmonary embolism. Evaluation of subsegmental pulmonary arteries is limitedsecondary to poor contrast opacification. Aortic and coronary artery calcifications. Suggestion of complete/near-complete occlusion of the SVC, left brachiocephalic, and left internal jugular veins, however evaluation is limited secondary to IV contrast timing.  HEART: Heart size is enlarged. No pericardial effusion.  CHEST WALL AND LOWER NECK: There is bilateral supraclavicular lymphadenopathy, for reference a left supraclavicular lymph node measures 1.6 x 2.3 cm (AP X TR). Please refer to CT neck report of same day for detailed evaluation of the neck.  VISUALIZED UPPER ABDOMEN:  Similar nodular thickening of the bilateral adrenal glands.  BONES: Surgical defect of the right posterior seventh rib. Degenerative changes.    IMPRESSION:  No main, left main, right main, lobar, or segmental pulmonary embolism. Evaluation of subsegmental pulmonary arteries is limited secondary to poor contrast opacification.    There is extensive supraclavicular and mediastinal lymphadenopathy.  Suggestion of complete/near-complete occlusion of the SVC, left brachiocephalic, and left internal jugular veins, however evaluation is limited secondary to IV contrast timing. Vascular ultrasound versus repeat neck/chest imaging with more delayed IV contrast timing may be helpful for complete evaluation.        --- End of Report ---            LUCY INFANTE MD; Resident Radiology  This document has been electronically signed.    < end of copied text >  < from: CT Neck Soft Tissue w/ IV Cont (21 @ 02:05) >  nodes are seen about the superior vena cava and proximal brachiocephalic vein; please see separate dictation of the CT chest performed concurrently for solo evaluation of the mediastinum.    Heterogeneous thyroid gland without focal suspicious nodule. The submandibular and parotid glands are unremarkable.    Retropharyngeal course of the right internal carotid artery there is dense calcific atherosclerotic plaque involving both carotid bulbs and origins of the internal carotid arteries with associated at least moderate stenosis ofthe proximal bilateral internal carotid arteries. There is a fluid fluid level in the proximal left internal jugular vein which doesn't have the normal appearance of mixing artifact and is concerning for the presence of thrombus, particularly, given the lack of enhancement more proximally within the left internal jugular vein extending to the brachiocephalic vein. Findings, however, are inconclusive pain that the study was not performed further evaluation of the venous structures.    The visualized intracranial and intraorbital compartments are unremarkable.    There is no lucent or sclerotic lesion. Degenerative changes are seen in the cervical spine.    IMPRESSION:  Limited by motion artifact, particularly at the level of the oropharynx.    Bilateral lower facial and upper neck swelling, left greater than right, with retropharyngeal edema is presumed to be related to venous outlet obstruction. There is suggestion of complete/near-complete occlusion of the SVC, left brachiocephalic, and left internal jugular veins, however evaluation is limited secondary to IV contrast timing. Vascular ultrasound versus repeat neck/chest imaging with more delayed IV contrast timing may be helpful for complete evaluation. Mass effect on the supraglottic airway with mild narrowing. Airway precautions recommended.    Supraclavicular and mediastinal adenopathy likely on a malignant basis, particularly given history of adenocarcinoma.    --- End of Report ---              ROSALIA CHAUHAN MD; AttendingRadiologist  This document has been electronically signed. Aug 31 2021 11:02AM    < end of copied text >                   No

## 2021-08-31 NOTE — CONSULT NOTE ADULT - SUBJECTIVE AND OBJECTIVE BOX
HPI:  65 year old female with hx of R lung adenocarcinoma for which she underwent a RULobectomy in  followed by chemotherapy.  She undergoes  serial CT scans every 3 to 6 months and had an EBUS/ mediastinoscopy in 2021 after mediastinal lymphadenopathy was noted.  Pathology was negative for malignancy.  She now presented to the ER with worsening neck swelling and LUE edema over the past 2 weeks.  She noticing neck circumference growth and some bilateral arm swelling, L more than R.  A duplex of the upper extremities was negative for DVT but revealed some lymph nodes.  Then over the past week, her neck became much more swollen, extending to her face and eyelids.  She denies any crepitus.  Additionally, her LUE  became much more swollen.   She was scheduled  to have an outpatient CT chest and neck next month, but when she felt like dyspneic upon bending her neck forward, she came to the ER.  In the ED, vitals were stable. Labs showed leukocytosis.  CT scans of the neck and chest were ordered.      PAST MEDICAL & SURGICAL HISTORY:  HTN, HLD, Pulmonary nodules, Carotid stenosis, CAD with stents x2 (2018), Emphysema/ COPD (Former smoker), Obesity, BL LE edema, Lung cancer  RULobectomy 2019, completed chemotherapy 2019, EBUS/ mediastinoscopy 2021 for Lymphadenopathy, Scoliosis, Lumbar disc disorder,   section x2 , , R hip replacement 2021    REVIEW OF SYSTEMS  General: No Weight change/ No Fatigue/ No HA/ No Dizziness	  Skin/Breast: No Rashes/ No Lesions/ No Masses/ LUE and neck swelling without crepitus  Ophthalmologic: Swollen eyelids; No Blurry vision	  ENMT: No Hearing loss  Respiratory and Thorax: Dyspnea with neck bending; No Cough/ No Wheezing/ No SOB/ No Hemoptysis/ No excessive sputum production  Cardiovascular: No Chest pain/ No Palpitations/ No Diaphoresis	  Gastrointestinal: No Nausea/ No Vomiting/ No Constipation/ Appetite Change	  Genitourinary: No Hematuria/ No Dysuria/ No Frequency change/ Impotence	    Musculoskeletal: No Pain/ Weakness/ Claudication	    Neurological: No Seizures/ TIA/CVA/ Parastesias	    Psychiatric: No Dementia/ Depression/ SI/HI	    Hematology/Lymphatics: No hx of bleeding/ Edema	    Endocrine:	No Hyperglycemia/ Hypoglycemia    Allergic/Immunologic:	 No Anaphylaxis/ Intolerance/ Recent illnesses    MEDICATIONS  (STANDING):  aspirin  chewable 81 milliGRAM(s) Oral daily  atorvastatin 40 milliGRAM(s) Oral at bedtime  budesonide  80 MICROgram(s)/formoterol 4.5 MICROgram(s) Inhaler 2 Puff(s) Inhalation daily  carvedilol 6.25 milliGRAM(s) Oral every 12 hours  enoxaparin Injectable 40 milliGRAM(s) SubCutaneous at bedtime  furosemide    Tablet 40 milliGRAM(s) Oral daily  tiotropium 18 MICROgram(s) Capsule 1 Capsule(s) Inhalation daily    MEDICATIONS  (PRN):  acetaminophen   Tablet .. 650 milliGRAM(s) Oral every 6 hours PRN Temp greater or equal to 38.5C (101.3F), Mild Pain (1 - 3)  aluminum hydroxide/magnesium hydroxide/simethicone Suspension 30 milliLiter(s) Oral every 4 hours PRN Dyspepsia  melatonin 3 milliGRAM(s) Oral at bedtime PRN Insomnia  ondansetron Injectable 4 milliGRAM(s) IV Push every 8 hours PRN Nausea and/or Vomiting      Allergies    penicillin (Short breath; Hives)  tetracycline (Short breath; Hives)    Intolerances        SOCIAL HISTORY:    FAMILY HISTORY:  Family history of CHF (congestive heart failure) (Aunt)        Vital Signs Last 24 Hrs  T(C): 36.6 (30 Aug 2021 23:38), Max: 36.7 (30 Aug 2021 12:12)  T(F): 97.9 (30 Aug 2021 23:38), Max: 98 (30 Aug 2021 12:12)  HR: 94 (30 Aug 2021 23:38) (88 - 98)  BP: 166/96 (30 Aug 2021 23:38) (145/81 - 166/96)  BP(mean): --  RR: 19 (30 Aug 2021 23:38) (18 - 19)  SpO2: 100% (30 Aug 2021 23:38) (98% - 100%)    General: WN/WD NAD  Neurology: Awake, nonfocal, ZENG x 4  Eyes: Scleras clear, PERRLA/ EOMI, Gross vision intact  ENT:Gross hearing intact, grossly patent pharynx, no stridor  Neck: Neck supple, trachea midline, No JVD,   Respiratory: CTA B/L, No wheezing, rales, rhonchi  CV: RRR, S1S2, no murmurs, rubs or gallops  Abdominal: Soft, NT, ND +BS,   Extremities: No edema, + peripheral pulses  Skin: No Rashes, Hematoma, Ecchymosis  Lymphatic: No Neck, axilla, groin LAD  Psych: Oriented x 3, normal affect  Incisions:   Tubes:    LABS:                        12.3   14.69 )-----------( 351      ( 30 Aug 2021 21:52 )             37.9     08-30    137  |  96<L>  |  19  ----------------------------<  103<H>  3.6   |  24  |  1.20    Ca    9.7      30 Aug 2021 23:35  Phos  3.6     08-30  Mg     2.20     08-30    TPro  7.3  /  Alb  4.3  /  TBili  0.6  /  DBili  x   /  AST  15  /  ALT  13  /  AlkPhos  149<H>  08-30    PT/INR - ( 30 Aug 2021 21:52 )   PT: 13.3 sec;   INR: 1.18 ratio         PTT - ( 30 Aug 2021 21:52 )  PTT:36.9 sec      RADIOLOGY & ADDITIONAL STUDIES:    ASSESSMENT:   65yFemalePAST MEDICAL & SURGICAL HISTORY:  Hypertension    Hyperlipidemia    Pulmonary nodules  yearly CT scans    Carotid artery disease  monitored by vascluar doctor Doscher    Emphysema    Hip pain    Obesity    Edema of both legs    Lung cancer  right, , completed chemotherapy 2019    Stented coronary artery    Localized enlarged lymph nodes    Scoliosis    Lumbar disc disorder    COPD (chronic obstructive pulmonary disease)    History of  section  x2 ,     S/P lobectomy of lung  RULobectomy 2019    History of hip replacement  right 2021    Stented coronary artery  x2 stents 2018    HEALTH ISSUES - PROBLEM Dx:  Head and neck swelling    SVC syndrome    CAD (coronary artery disease)    HTN (hypertension)    HLD (hyperlipidemia)    History of adenocarcinoma of lung    Need for prophylactic measure    Leukocytosis        HEALTH ISSUES - R/O PROBLEM Dx:      PLAN: HPI:  65 year old female with hx of R lung adenocarcinoma for which she underwent a RULobectomy in 2019 followed by chemotherapy.  She undergoes  serial CT scans every 3 to 6 months and had an EBUS/ mediastinoscopy in 2021 after mediastinal lymphadenopathy was noted.  Pathology was negative for malignancy.  She now presented to the ER with worsening neck swelling and LUE edema over the past 2 weeks.  She noticing neck circumference growth and some bilateral arm swelling, L more than R.  A duplex of the upper extremities was negative for DVT but revealed some lymph nodes.  Then over the past week, her neck became much more swollen, extending to her face and eyelids.  She denies any crepitus.  Additionally, her LUE  became much more swollen.   She was scheduled  to have an outpatient CT chest and neck next month, but when she felt like dyspneic upon bending her neck forward, she came to the ER.  In the ED, vitals were stable. Labs showed leukocytosis.  CT scans of the neck and chest were ordered.      PAST MEDICAL & SURGICAL HISTORY:  HTN, HLD, Pulmonary nodules, Carotid stenosis, CAD with stents x2 (2018), Emphysema/ COPD (Former smoker), Obesity, BL LE edema, Lung cancer  RULobectomy 2019, completed chemotherapy 2019, EBUS/ mediastinoscopy 2021 for Lymphadenopathy, Scoliosis, Lumbar disc disorder,   section x2 , , R hip replacement 2021    REVIEW OF SYSTEMS  General: No Weight change/ No Fatigue/ No HA/ No Dizziness	  Skin/Breast: No Rashes/ No Lesions/ No Masses/ LUE and neck swelling without crepitus  Ophthalmologic: Swollen eyelids; No Blurry vision	  ENMT: No Hearing loss  Respiratory and Thorax: Dyspnea with neck bending; No Cough/ No Wheezing/ No SOB/ No Hemoptysis/ No excessive sputum production  Cardiovascular: No Chest pain/ No Palpitations/ No Diaphoresis	  Gastrointestinal: No Nausea/ No Vomiting/ No Constipation/ Appetite Change	  Genitourinary: No Hematuria/ No Dysuria/ No Frequency change/ Impotence	  Musculoskeletal: No Pain/ No Weakness/ No Claudication	  Neurological: No Seizures/ No Paresthesias	  Psychiatric: No Dementia/ No Depression  Hematology/Lymphatics: No hx of bleeding/ + Edema	  Endocrine: No Hyperglycemia/ No Hypoglycemia  Allergic/Immunologic:	 No Anaphylaxis/ No Intolerance/ No Recent illnesses    MEDICATIONS  (STANDING):  aspirin  chewable 81 milliGRAM(s) Oral daily  atorvastatin 40 milliGRAM(s) Oral at bedtime  budesonide  80 MICROgram(s)/formoterol 4.5 MICROgram(s) Inhaler 2 Puff(s) Inhalation daily  carvedilol 6.25 milliGRAM(s) Oral every 12 hours  enoxaparin Injectable 40 milliGRAM(s) SubCutaneous at bedtime  furosemide    Tablet 40 milliGRAM(s) Oral daily  tiotropium 18 MICROgram(s) Capsule 1 Capsule(s) Inhalation daily    MEDICATIONS  (PRN):  acetaminophen   Tablet .. 650 milliGRAM(s) Oral every 6 hours PRN Temp greater or equal to 38.5C (101.3F), Mild Pain (1 - 3)  aluminum hydroxide/magnesium hydroxide/simethicone Suspension 30 milliLiter(s) Oral every 4 hours PRN Dyspepsia  melatonin 3 milliGRAM(s) Oral at bedtime PRN Insomnia  ondansetron Injectable 4 milliGRAM(s) IV Push every 8 hours PRN Nausea and/or Vomiting    Allergies  penicillin (Short breath; Hives)  tetracycline (Short breath; Hives)    SOCIAL HISTORY:  Former smoker    FAMILY HISTORY:  Family history of CHF (congestive heart failure) (Aunt)    Vital Signs Last 24 Hrs  T(C): 36.6 (30 Aug 2021 23:38), Max: 36.7 (30 Aug 2021 12:12)  T(F): 97.9 (30 Aug 2021 23:38), Max: 98 (30 Aug 2021 12:12)  HR: 94 (30 Aug 2021 23:38) (88 - 98)  BP: 166/96 (30 Aug 2021 23:38) (145/81 - 166/96)  RR: 19 (30 Aug 2021 23:38) (18 - 19)  SpO2: 100% (30 Aug 2021 23:38) (98% - 100%)    General: WN/WD NAD  Neurology: Awake, nonfocal, ZENG x 4  Eyes: Scleras clear, PERRLA/ EOMI, Gross vision intact  ENT:Gross hearing intact, grossly patent pharynx, no stridor  Neck: Neck supple, trachea midline, No JVD,   Respiratory: CTA B/L, No wheezing, rales, rhonchi  CV: RRR, S1S2, no murmurs, rubs or gallops  Abdominal: Soft, NT, ND +BS,   Extremities: No edema, + peripheral pulses  Skin: No Rashes, Hematoma, Ecchymosis  Lymphatic: No Neck, axilla, groin LAD  Psych: Oriented x 3, normal affect  Incisions:   Tubes:    LABS:                        12.3   14.69 )-----------( 351      ( 30 Aug 2021 21:52 )             37.9     08-30    137  |  96<L>  |  19  ----------------------------<  103<H>  3.6   |  24  |  1.20    Ca    9.7      30 Aug 2021 23:35  Phos  3.6     08-30  Mg     2.20     08-30    TPro  7.3  /  Alb  4.3  /  TBili  0.6  /  DBili  x   /  AST  15  /  ALT  13  /  AlkPhos  149<H>  08-30    PT/INR - ( 30 Aug 2021 21:52 )   PT: 13.3 sec;   INR: 1.18 ratio         PTT - ( 30 Aug 2021 21:52 )  PTT:36.9 sec      RADIOLOGY & ADDITIONAL STUDIES:    ASSESSMENT:   65yFemalePAST MEDICAL & SURGICAL HISTORY:  Hypertension    Hyperlipidemia    Pulmonary nodules  yearly CT scans    Carotid artery disease  monitored by vascluar doctor Doscher    Emphysema    Hip pain    Obesity    Edema of both legs    Lung cancer  right, 2019, completed chemotherapy 2019    Stented coronary artery    Localized enlarged lymph nodes    Scoliosis    Lumbar disc disorder    COPD (chronic obstructive pulmonary disease)    History of  section  x2 ,     S/P lobectomy of lung  RULobectomy 2019    History of hip replacement  right 2021    Stented coronary artery  x2 stents 2018    HEALTH ISSUES - PROBLEM Dx:  Head and neck swelling    SVC syndrome    CAD (coronary artery disease)    HTN (hypertension)    HLD (hyperlipidemia)    History of adenocarcinoma of lung    Need for prophylactic measure    Leukocytosis        HEALTH ISSUES - R/O PROBLEM Dx:      PLAN: HPI:  65 year old female with hx of R lung adenocarcinoma for which she underwent a RULobectomy in 2019 followed by chemotherapy.  She undergoes  serial CT scans every 3 to 6 months and had an EBUS/ mediastinoscopy in 2021 after mediastinal lymphadenopathy was noted.  Pathology was negative for malignancy.  She now presented to the ER with worsening neck swelling and LUE edema over the past 2 weeks.  She noticing neck circumference growth and some bilateral arm swelling, L more than R.  A duplex of the upper extremities was negative for DVT but revealed some lymph nodes.  Then over the past week, her neck became much more swollen, extending to her face and eyelids.  She denies any crepitus.  Additionally, her LUE  became much more swollen.   She was scheduled  to have an outpatient CT chest and neck next month, but when she felt like dyspneic upon bending her neck forward, she came to the ER.  In the ED, vitals were stable. Labs showed leukocytosis.  CT scans of the neck and chest were ordered.      PAST MEDICAL & SURGICAL HISTORY:  HTN, HLD, Pulmonary nodules, Carotid stenosis, CAD with stents x2 (2018), Emphysema/ COPD (Former smoker), Obesity, BL LE edema, Lung cancer  RULobectomy 2019, completed chemotherapy 2019, EBUS/ mediastinoscopy 2021 for Lymphadenopathy, Scoliosis, Lumbar disc disorder,   section x2 , , R hip replacement 2021    REVIEW OF SYSTEMS  General: No Weight change/ No Fatigue/ No HA/ No Dizziness	  Skin/Breast: No Rashes/ No Lesions/ No Masses/ LUE and neck swelling without crepitus  Ophthalmologic: Swollen eyelids; No Blurry vision	  ENMT: No Hearing loss  Respiratory and Thorax: Dyspnea with neck bending; No Cough/ No Wheezing/ No SOB/ No Hemoptysis/ No excessive sputum production  Cardiovascular: No Chest pain/ No Palpitations/ No Diaphoresis	  Gastrointestinal: No Nausea/ No Vomiting/ No Constipation/ Appetite Change	  Genitourinary: No Hematuria/ No Dysuria/ No Frequency change/ Impotence	  Musculoskeletal: No Pain/ No Weakness/ No Claudication	  Neurological: No Seizures/ No Paresthesias	  Psychiatric: No Dementia/ No Depression  Hematology/Lymphatics: No hx of bleeding/ + Edema	  Endocrine: No Hyperglycemia/ No Hypoglycemia  Allergic/Immunologic:	 No Anaphylaxis/ No Intolerance/ No Recent illnesses    MEDICATIONS  (STANDING):  aspirin  chewable 81 milliGRAM(s) Oral daily  atorvastatin 40 milliGRAM(s) Oral at bedtime  budesonide  80 MICROgram(s)/formoterol 4.5 MICROgram(s) Inhaler 2 Puff(s) Inhalation daily  carvedilol 6.25 milliGRAM(s) Oral every 12 hours  enoxaparin Injectable 40 milliGRAM(s) SubCutaneous at bedtime  furosemide    Tablet 40 milliGRAM(s) Oral daily  tiotropium 18 MICROgram(s) Capsule 1 Capsule(s) Inhalation daily    MEDICATIONS  (PRN):  acetaminophen   Tablet .. 650 milliGRAM(s) Oral every 6 hours PRN Temp greater or equal to 38.5C (101.3F), Mild Pain (1 - 3)  aluminum hydroxide/magnesium hydroxide/simethicone Suspension 30 milliLiter(s) Oral every 4 hours PRN Dyspepsia  melatonin 3 milliGRAM(s) Oral at bedtime PRN Insomnia  ondansetron Injectable 4 milliGRAM(s) IV Push every 8 hours PRN Nausea and/or Vomiting    Allergies  penicillin (Short breath; Hives)  tetracycline (Short breath; Hives)    SOCIAL HISTORY:  Former smoker    FAMILY HISTORY:  Family history of CHF (congestive heart failure) (Aunt)    Vital Signs Last 24 Hrs  T(C): 36.6 (30 Aug 2021 23:38), Max: 36.7 (30 Aug 2021 12:12)  T(F): 97.9 (30 Aug 2021 23:38), Max: 98 (30 Aug 2021 12:12)  HR: 94 (30 Aug 2021 23:38) (88 - 98)  BP: 166/96 (30 Aug 2021 23:38) (145/81 - 166/96)  RR: 19 (30 Aug 2021 23:38) (18 - 19)  SpO2: 100% (30 Aug 2021 23:38) (98% - 100%)    General: WN/WD NAD  Neurology: Awake, nonfocal, ZENG x 4  Eyes: Scleras clear, PERRLA/ EOMI, Gross vision intact  ENT:Gross hearing intact, grossly patent pharynx, no stridor  Neck: Neck supple, trachea midline, No JVD,   Respiratory: CTA B/L, No wheezing, rales, rhonchi  CV: RRR, S1S2, no murmurs, rubs or gallops  Abdominal: Soft, NT, ND +BS,   Extremities: No edema, + peripheral pulses  Skin: No Rashes, Hematoma, Ecchymosis  Lymphatic: No Neck, axilla, groin LAD  Psych: Oriented x 3, normal affect  Incisions:   Tubes:    LABS:                        12.3   14.69 )-----------( 351      ( 30 Aug 2021 21:52 )             37.9     08-30    137  |  96<L>  |  19  ----------------------------<  103<H>  3.6   |  24  |  1.20    Ca    9.7      30 Aug 2021 23:35  Phos  3.6     08-30  Mg     2.20     08-30    TPro  7.3  /  Alb  4.3  /  TBili  0.6  /  DBili  x   /  AST  15  /  ALT  13  /  AlkPhos  149<H>  08-30    PT/INR - ( 30 Aug 2021 21:52 )   PT: 13.3 sec;   INR: 1.18 ratio    PTT - ( 30 Aug 2021 21:52 )  PTT:36.9 sec      RADIOLOGY & ADDITIONAL STUDIES:  CT neck and chest pending    ASSESSMENT:   LUE, neck and facial swelling with h/o lung cancer and lymphadenopathy    PLAN:  Follow up CT scans  Thoracic surgery will follow up

## 2021-08-31 NOTE — CONSULT NOTE ADULT - SUBJECTIVE AND OBJECTIVE BOX
CC: neck swelling     HPI: 65 year old female with a history of COPD and right lung Ca and resection . Patient has been monitored with CT imaging. Now states she has had recent Lymphadenopathy and neck swelling. CT scan shows complete occlusion of SVC. Has a scheduled Stent placement with CT surgery on 21.  Patient presents C/O increased swelling over the past several days and intermittent SOB. Able to tolerate oral diet. Denies Cough, fevers, chills, N/V, rigors or sweats. Called for evaluation of Airway.       PAST MEDICAL & SURGICAL HISTORY:  Hypertension    Hyperlipidemia    Pulmonary nodules  yearly CT scans    Carotid artery disease  monitored by vascluar doctor Samuel    Emphysema    Hip pain    Obesity    Edema of both legs    Lung cancer  right, , completed chemotherapy 2019    Stented coronary artery    Localized enlarged lymph nodes    Scoliosis    Lumbar disc disorder    COPD (chronic obstructive pulmonary disease)    History of  section  x2 ,     S/P lobectomy of lung  RULobectomy     History of hip replacement  right 2021    Stented coronary artery  x2 stents 2018      Allergies    penicillin (Short breath; Hives)  tetracycline (Short breath; Hives)    Intolerances      MEDICATIONS  (STANDING):  aspirin  chewable 81 milliGRAM(s) Oral daily  atorvastatin 40 milliGRAM(s) Oral at bedtime  budesonide  80 MICROgram(s)/formoterol 4.5 MICROgram(s) Inhaler 2 Puff(s) Inhalation daily  carvedilol 6.25 milliGRAM(s) Oral every 12 hours  enoxaparin Injectable 40 milliGRAM(s) SubCutaneous at bedtime  furosemide    Tablet 40 milliGRAM(s) Oral daily  tiotropium 18 MICROgram(s) Capsule 1 Capsule(s) Inhalation daily    MEDICATIONS  (PRN):  acetaminophen   Tablet .. 650 milliGRAM(s) Oral every 6 hours PRN Temp greater or equal to 38.5C (101.3F), Mild Pain (1 - 3)  aluminum hydroxide/magnesium hydroxide/simethicone Suspension 30 milliLiter(s) Oral every 4 hours PRN Dyspepsia  melatonin 3 milliGRAM(s) Oral at bedtime PRN Insomnia  ondansetron Injectable 4 milliGRAM(s) IV Push every 8 hours PRN Nausea and/or Vomiting      ROS:   ENT: all negative except as noted in HPI   CV: denies palpitations  Pulm: denies SOB, cough, hemoptysis  GI: denies change in apetite, indigestion, n/v  : denies pertinent urinary symptoms, urgency  Neuro: denies numbness/tingling, loss of sensation  Psych: denies anxiety  MS: denies muscle weakness, instability  Heme: denies easy bruising or bleeding  Endo: denies heat/cold intolerance, excessive sweating  Vascular: denies LE edema    Vital Signs Last 24 Hrs  T(C): 36.6 (31 Aug 2021 05:38), Max: 36.6 (30 Aug 2021 23:38)  T(F): 97.8 (31 Aug 2021 05:38), Max: 97.9 (30 Aug 2021 23:38)  HR: 79 (31 Aug 2021 13:40) (77 - 98)  BP: 111/80 (31 Aug 2021 13:40) (111/80 - 166/96)  BP(mean): --  RR: 17 (31 Aug 2021 13:40) (17 - 19)  SpO2: 98% (31 Aug 2021 13:40) (98% - 100%)                          11.6   12.29 )-----------( 297      ( 31 Aug 2021 06:40 )             34.9    08-30    137  |  96<L>  |  19  ----------------------------<  103<H>  3.6   |  24  |  1.20    Ca    9.7      30 Aug 2021 23:35  Phos  3.6     08-30  Mg     2.20     08-30    TPro  7.3  /  Alb  4.3  /  TBili  0.6  /  DBili  x   /  AST  15  /  ALT  13  /  AlkPhos  149<H>  08-30   PT/INR - ( 31 Aug 2021 06:40 )   PT: 13.7 sec;   INR: 1.20 ratio         PTT - ( 30 Aug 2021 21:52 )  PTT:36.9 sec    PHYSICAL EXAM:  Gen: NAD  Skin: No rashes, bruises, or lesions  Head: Normocephalic, Atraumatic  Face: no edema, erythema, or fluctuance. Parotid glands soft without mass  Eyes: no scleral injection  Ears: Right - ear canal clear, TM intact without effusion or erythema. No evidence of any fluid drainage. No mastoid tenderness, erythema, or ear bulging            Left - ear canal clear, TM intact without effusion or erythema. No evidence of any fluid drainage. No mastoid tenderness, erythema, or ear bulging  Nose: Nares bilaterally patent, no discharge  Mouth: No Stridor / Drooling / Trismus.  Mucosa moist, tongue/uvula midline, oropharynx clear  Neck: Flat, supple, no lymphadenopathy, trachea midline, no masses  Lymphatic: No lymphadenopathy  Resp: breathing easily, no stridor  CV: no peripheral edema/cyanosis  GI: nondistended   Peripheral vascular: no JVD or edema  Neuro: facial nerve intact, no facial droop      Diagnostic Nasal Endoscopy: no nasal polyps     Fiberoptic Indirect laryngoscopy: supraglottic and glottic narrowing. No edema, epiglottis sharp. Vocal cords mobile and patent. no masses.     IMAGING/ADDITIONAL STUDIES:   IMPRESSION:  No main, left main, right main, lobar, or segmental pulmonary embolism. Evaluation of subsegmental pulmonary arteries is limited secondary to poor contrast opacification.    There is extensive supraclavicular and mediastinal lymphadenopathy. Suggestion of complete/near-complete occlusion of the SVC, left brachiocephalic, and left internal jugular veins, however evaluation is limited secondary to IV contrast timing. Vascular ultrasound versus repeat neck/chest imaging with more delayed IV contrast timing may be helpful for complete evaluation.

## 2021-08-31 NOTE — CONSULT NOTE ADULT - SUBJECTIVE AND OBJECTIVE BOX
Vascular & Interventional Radiology Consult Note    Evaluate for Procedure: SVC stent placement    HPI: 65y Female with pmhx of  HTN, HLD, CAD s/p stent on ASA, COPD, Adenocarcinoma of R lung s/p surgery 9/3/2019 presents with 2weeks of swelling in neck and swelling in LUE. patient is found to have extensive mediastinal lymphadenopathy with resultant compression of the SVC.    Allergies: penicillin (Short breath; Hives)  tetracycline (Short breath; Hives)    Medications (Abx/Cardiac/Anticoagulation/Blood Products)  aspirin  chewable: 81 milliGRAM(s) Oral (08-31 @ 10:17)  carvedilol: 6.25 milliGRAM(s) Oral (08-31 @ 05:40)  enoxaparin Injectable: 40 milliGRAM(s) SubCutaneous (08-30 @ 23:32)  furosemide    Tablet: 40 milliGRAM(s) Oral (08-31 @ 05:40)    Data:  154.9  T(C): 36.6  HR: 79  BP: 133/60  RR: 18  SpO2: 100%    -WBC 12.29 / HgB 11.6 / Hct 34.9 / Plt 297  -Na 137 / Cl 96 / BUN 19 / Glucose 103  -K 3.6 / CO2 24 / Cr 1.20  -ALT 13 / Alk Phos 149 / T.Bili 0.6  -INR 1.20 / PTT --      Imaging: CT chest reviewed demonstrating extensive mediastinal lymphadenopathy with near occlusion of the SVC.

## 2021-08-31 NOTE — CONSULT NOTE ADULT - SUBJECTIVE AND OBJECTIVE BOX
Requesting Physician : Dr. Shepherd     Reason for Consultation: CAD    HISTORY OF PRESENT ILLNESS:  65 year old female with history of HTN, HLD, CAD s/p ANETTE to the proximal RCA in 2018,  COPD hx of resected RR lung adenocarcinoma (serial CT scans q3-6 months, follows Dr. Clement), former smoker who presents with worsening neck swelling and L arm edema the past 2 weeks.  The patient was admitted and found to have compression of her SVC.  IR was consulted for urgent stent placement.  The patient has no chest pain or anginal symptoms.  She has been maintained on sapt for history of RCA ANETTE in 2018 with her last NST in 2019 demonstrating no ischemia.            PAST MEDICAL & SURGICAL HISTORY:  Hypertension    Hyperlipidemia    Pulmonary nodules  yearly CT scans    Carotid artery disease  monitored by vascluar doctor Samuel    Emphysema    Hip pain    Obesity    Edema of both legs    Lung cancer  right, , completed chemotherapy 2019    Stented coronary artery    Localized enlarged lymph nodes    Scoliosis    Lumbar disc disorder    COPD (chronic obstructive pulmonary disease)    History of  section  x2 ,     S/P lobectomy of lung  RULobectomy     History of hip replacement  right 2021    Stented coronary artery  x2 stents 2018            MEDICATIONS:  MEDICATIONS  (STANDING):  aspirin  chewable 81 milliGRAM(s) Oral daily  atorvastatin 40 milliGRAM(s) Oral at bedtime  budesonide  80 MICROgram(s)/formoterol 4.5 MICROgram(s) Inhaler 2 Puff(s) Inhalation daily  carvedilol 6.25 milliGRAM(s) Oral every 12 hours  enoxaparin Injectable 40 milliGRAM(s) SubCutaneous at bedtime  furosemide    Tablet 40 milliGRAM(s) Oral daily  tiotropium 18 MICROgram(s) Capsule 1 Capsule(s) Inhalation daily      Allergies    penicillin (Short breath; Hives)  tetracycline (Short breath; Hives)    Intolerances        FAMILY HISTORY:  Family history of CHF (congestive heart failure) (Aunt)      Non-contributary for premature coronary disease or sudden cardiac death    SOCIAL HISTORY:    [x ] Non-smoker  [ ] Smoker  [ ] Alcohol      REVIEW OF SYSTEMS:  [ ]chest pain  [  ]shortness of breath  [  ]palpitations  [  ]syncope  [ ]near syncope [ ]upper extremity weakness   [ ] lower extremity weakness  [  ]diplopia  [  ]altered mental status   [  ]fevers  [ ]chills [ ]nausea  [ ]vomitting  [  ]dysphagia    [ ]abdominal pain  [ ]melena  [ ]BRBPR    [  ]epistaxis  [  ]rash    [ ]lower extremity edema        [x ] All others negative	  [ ] Unable to obtain    PHYSICAL EXAM:  T(C): 36.6 (21 @ 05:38), Max: 36.6 (21 @ 23:38)  HR: 79 (21 @ 13:40) (77 - 98)  BP: 111/80 (21 @ 13:40) (111/80 - 166/96)  RR: 17 (21 @ 13:40) (17 - 19)  SpO2: 98% (21 @ 13:40) (98% - 100%)  Wt(kg): --  I&O's Summary        HEENT:   Normal oral mucosa, PERRL, EOMI	  Lymphatic: No lymphadenopathy , + LUE edema   Cardiovascular: Normal S1 S2, No JVD, No murmurs , Peripheral pulses palpable 2+ bilaterally  Respiratory: Lungs clear to auscultation, normal effort 	  Gastrointestinal:  Soft, Non-tender, + BS	  Skin: No rashes, No ecchymoses, No cyanosis, warm to touch  Musculoskeletal: Normal range of motion, normal strength  Psychiatry:  Mood & affect appropriate      TELEMETRY:  	    ECG: < from: 12 Lead ECG (21 @ 12:32) >  Normal sinus rhythm  Possible Left atrial enlargement  Nonspecific T wave abnormality  Abnormal ECG    < end of copied text >   	  RADIOLOGY:  OTHER:     DIAGNOSTIC TESTING:  [ ] Echocardiogram:  [ ]  Catheterization:  [ ] Stress Test:    	  	  LABS:	 	    CARDIAC MARKERS:                              11.6   12.29 )-----------( 297      ( 31 Aug 2021 06:40 )             34.9         137  |  96<L>  |  19  ----------------------------<  103<H>  3.6   |  24  |  1.20    Ca    9.7      30 Aug 2021 23:35  Phos  3.6       Mg     2.20         TPro  7.3  /  Alb  4.3  /  TBili  0.6  /  DBili  x   /  AST  15  /  ALT  13  /  AlkPhos  149<H>      proBNP:   Lipid Profile:   HgA1c:   TSH:     ASSESSMENT/PLAN: 65 year old female with history of HTN, HLD, CAD s/p ANETTE to the proximal RCA in 2018,  COPD hx of resected RR lung adenocarcinoma (serial CT scans q3-6 months, follows Dr. Clement), former smoker who presents with worsening neck swelling and L arm edema the past 2 weeks.    -pt. with no chest pain or anginal symptoms  -last stress test with no ischemia  -recommend sapt for history of PCI > 1 year ago with asa 81mg PO daily  -optimized from cv perspective for planned IR procedure  -follow up thoracic surgery    Tip Hernandez MD

## 2021-08-31 NOTE — CONSULT NOTE ADULT - NSCONSULTADDITIONALINFOA_GEN_ALL_CORE
dw acp
Attending note:  CT scans reviewed. Finding of conglomerate of mediastinal lymphadenopathy appears to be causing SVC syndrome.    Pt's airway appears stable.  No ENT intervention planned at this time.    Case reviewed with ENT PA and resident team.

## 2021-09-01 LAB
ALBUMIN SERPL ELPH-MCNC: 4.2 G/DL — SIGNIFICANT CHANGE UP (ref 3.3–5)
ALP SERPL-CCNC: 131 U/L — HIGH (ref 40–120)
ALT FLD-CCNC: 14 U/L — SIGNIFICANT CHANGE UP (ref 4–33)
ANION GAP SERPL CALC-SCNC: 14 MMOL/L — SIGNIFICANT CHANGE UP (ref 7–14)
AST SERPL-CCNC: 14 U/L — SIGNIFICANT CHANGE UP (ref 4–32)
BILIRUB SERPL-MCNC: 0.7 MG/DL — SIGNIFICANT CHANGE UP (ref 0.2–1.2)
BUN SERPL-MCNC: 22 MG/DL — SIGNIFICANT CHANGE UP (ref 7–23)
CALCIUM SERPL-MCNC: 9.6 MG/DL — SIGNIFICANT CHANGE UP (ref 8.4–10.5)
CHLORIDE SERPL-SCNC: 96 MMOL/L — LOW (ref 98–107)
CO2 SERPL-SCNC: 24 MMOL/L — SIGNIFICANT CHANGE UP (ref 22–31)
COVID-19 SPIKE DOMAIN AB INTERP: POSITIVE
COVID-19 SPIKE DOMAIN ANTIBODY RESULT: 149 U/ML — HIGH
CREAT SERPL-MCNC: 1.22 MG/DL — SIGNIFICANT CHANGE UP (ref 0.5–1.3)
GLUCOSE SERPL-MCNC: 111 MG/DL — HIGH (ref 70–99)
HCT VFR BLD CALC: 34.2 % — LOW (ref 34.5–45)
HGB BLD-MCNC: 11.5 G/DL — SIGNIFICANT CHANGE UP (ref 11.5–15.5)
MAGNESIUM SERPL-MCNC: 2.2 MG/DL — SIGNIFICANT CHANGE UP (ref 1.6–2.6)
MCHC RBC-ENTMCNC: 28.9 PG — SIGNIFICANT CHANGE UP (ref 27–34)
MCHC RBC-ENTMCNC: 33.6 GM/DL — SIGNIFICANT CHANGE UP (ref 32–36)
MCV RBC AUTO: 85.9 FL — SIGNIFICANT CHANGE UP (ref 80–100)
NRBC # BLD: 0 /100 WBCS — SIGNIFICANT CHANGE UP
NRBC # FLD: 0 K/UL — SIGNIFICANT CHANGE UP
PLATELET # BLD AUTO: 301 K/UL — SIGNIFICANT CHANGE UP (ref 150–400)
POTASSIUM SERPL-MCNC: 3.8 MMOL/L — SIGNIFICANT CHANGE UP (ref 3.5–5.3)
POTASSIUM SERPL-SCNC: 3.8 MMOL/L — SIGNIFICANT CHANGE UP (ref 3.5–5.3)
PROT SERPL-MCNC: 7.6 G/DL — SIGNIFICANT CHANGE UP (ref 6–8.3)
RBC # BLD: 3.98 M/UL — SIGNIFICANT CHANGE UP (ref 3.8–5.2)
RBC # FLD: 14.3 % — SIGNIFICANT CHANGE UP (ref 10.3–14.5)
SARS-COV-2 IGG+IGM SERPL QL IA: 149 U/ML — HIGH
SARS-COV-2 IGG+IGM SERPL QL IA: POSITIVE
SODIUM SERPL-SCNC: 134 MMOL/L — LOW (ref 135–145)
WBC # BLD: 10.77 K/UL — HIGH (ref 3.8–10.5)
WBC # FLD AUTO: 10.77 K/UL — HIGH (ref 3.8–10.5)

## 2021-09-01 RX ORDER — ENOXAPARIN SODIUM 100 MG/ML
40 INJECTION SUBCUTANEOUS AT BEDTIME
Refills: 0 | Status: DISCONTINUED | OUTPATIENT
Start: 2021-09-01 | End: 2021-09-02

## 2021-09-01 RX ADMIN — ENOXAPARIN SODIUM 40 MILLIGRAM(S): 100 INJECTION SUBCUTANEOUS at 23:22

## 2021-09-01 RX ADMIN — CARVEDILOL PHOSPHATE 6.25 MILLIGRAM(S): 80 CAPSULE, EXTENDED RELEASE ORAL at 23:22

## 2021-09-01 RX ADMIN — ATORVASTATIN CALCIUM 40 MILLIGRAM(S): 80 TABLET, FILM COATED ORAL at 23:22

## 2021-09-01 RX ADMIN — Medication 40 MILLIGRAM(S): at 12:36

## 2021-09-01 RX ADMIN — BUDESONIDE AND FORMOTEROL FUMARATE DIHYDRATE 2 PUFF(S): 160; 4.5 AEROSOL RESPIRATORY (INHALATION) at 12:31

## 2021-09-01 RX ADMIN — Medication 81 MILLIGRAM(S): at 12:33

## 2021-09-01 RX ADMIN — TIOTROPIUM BROMIDE 1 CAPSULE(S): 18 CAPSULE ORAL; RESPIRATORY (INHALATION) at 12:32

## 2021-09-01 RX ADMIN — CARVEDILOL PHOSPHATE 6.25 MILLIGRAM(S): 80 CAPSULE, EXTENDED RELEASE ORAL at 12:35

## 2021-09-02 LAB
ALBUMIN SERPL ELPH-MCNC: 3.8 G/DL — SIGNIFICANT CHANGE UP (ref 3.3–5)
ALP SERPL-CCNC: 123 U/L — HIGH (ref 40–120)
ALT FLD-CCNC: 13 U/L — SIGNIFICANT CHANGE UP (ref 4–33)
ANION GAP SERPL CALC-SCNC: 18 MMOL/L — HIGH (ref 7–14)
APTT BLD: 196.1 SEC — CRITICAL HIGH (ref 27–36.3)
APTT BLD: 39.4 SEC — HIGH (ref 27–36.3)
AST SERPL-CCNC: 14 U/L — SIGNIFICANT CHANGE UP (ref 4–32)
BILIRUB SERPL-MCNC: 0.8 MG/DL — SIGNIFICANT CHANGE UP (ref 0.2–1.2)
BUN SERPL-MCNC: 19 MG/DL — SIGNIFICANT CHANGE UP (ref 7–23)
CALCIUM SERPL-MCNC: 9.4 MG/DL — SIGNIFICANT CHANGE UP (ref 8.4–10.5)
CHLORIDE SERPL-SCNC: 95 MMOL/L — LOW (ref 98–107)
CO2 SERPL-SCNC: 21 MMOL/L — LOW (ref 22–31)
CREAT SERPL-MCNC: 1.22 MG/DL — SIGNIFICANT CHANGE UP (ref 0.5–1.3)
GLUCOSE SERPL-MCNC: 120 MG/DL — HIGH (ref 70–99)
HCT VFR BLD CALC: 32.1 % — LOW (ref 34.5–45)
HCT VFR BLD CALC: 32.7 % — LOW (ref 34.5–45)
HGB BLD-MCNC: 10.6 G/DL — LOW (ref 11.5–15.5)
HGB BLD-MCNC: 11 G/DL — LOW (ref 11.5–15.5)
INR BLD: 1.18 RATIO — HIGH (ref 0.88–1.16)
MAGNESIUM SERPL-MCNC: 2.2 MG/DL — SIGNIFICANT CHANGE UP (ref 1.6–2.6)
MCHC RBC-ENTMCNC: 28.6 PG — SIGNIFICANT CHANGE UP (ref 27–34)
MCHC RBC-ENTMCNC: 28.6 PG — SIGNIFICANT CHANGE UP (ref 27–34)
MCHC RBC-ENTMCNC: 33 GM/DL — SIGNIFICANT CHANGE UP (ref 32–36)
MCHC RBC-ENTMCNC: 33.6 GM/DL — SIGNIFICANT CHANGE UP (ref 32–36)
MCV RBC AUTO: 85.2 FL — SIGNIFICANT CHANGE UP (ref 80–100)
MCV RBC AUTO: 86.5 FL — SIGNIFICANT CHANGE UP (ref 80–100)
NRBC # BLD: 0 /100 WBCS — SIGNIFICANT CHANGE UP
NRBC # BLD: 0 /100 WBCS — SIGNIFICANT CHANGE UP
NRBC # FLD: 0 K/UL — SIGNIFICANT CHANGE UP
NRBC # FLD: 0 K/UL — SIGNIFICANT CHANGE UP
PHOSPHATE SERPL-MCNC: 4.3 MG/DL — SIGNIFICANT CHANGE UP (ref 2.5–4.5)
PLATELET # BLD AUTO: 297 K/UL — SIGNIFICANT CHANGE UP (ref 150–400)
PLATELET # BLD AUTO: 310 K/UL — SIGNIFICANT CHANGE UP (ref 150–400)
POTASSIUM SERPL-MCNC: 3.7 MMOL/L — SIGNIFICANT CHANGE UP (ref 3.5–5.3)
POTASSIUM SERPL-SCNC: 3.7 MMOL/L — SIGNIFICANT CHANGE UP (ref 3.5–5.3)
PROT SERPL-MCNC: 7.2 G/DL — SIGNIFICANT CHANGE UP (ref 6–8.3)
PROTHROM AB SERPL-ACNC: 13.3 SEC — SIGNIFICANT CHANGE UP (ref 10.6–13.6)
RBC # BLD: 3.71 M/UL — LOW (ref 3.8–5.2)
RBC # BLD: 3.84 M/UL — SIGNIFICANT CHANGE UP (ref 3.8–5.2)
RBC # FLD: 14.3 % — SIGNIFICANT CHANGE UP (ref 10.3–14.5)
RBC # FLD: 14.4 % — SIGNIFICANT CHANGE UP (ref 10.3–14.5)
SODIUM SERPL-SCNC: 134 MMOL/L — LOW (ref 135–145)
WBC # BLD: 10.43 K/UL — SIGNIFICANT CHANGE UP (ref 3.8–10.5)
WBC # BLD: 9.6 K/UL — SIGNIFICANT CHANGE UP (ref 3.8–10.5)
WBC # FLD AUTO: 10.43 K/UL — SIGNIFICANT CHANGE UP (ref 3.8–10.5)
WBC # FLD AUTO: 9.6 K/UL — SIGNIFICANT CHANGE UP (ref 3.8–10.5)

## 2021-09-02 PROCEDURE — 93970 EXTREMITY STUDY: CPT | Mod: 26

## 2021-09-02 RX ORDER — HEPARIN SODIUM 5000 [USP'U]/ML
INJECTION INTRAVENOUS; SUBCUTANEOUS
Qty: 25000 | Refills: 0 | Status: DISCONTINUED | OUTPATIENT
Start: 2021-09-02 | End: 2021-09-04

## 2021-09-02 RX ORDER — HEPARIN SODIUM 5000 [USP'U]/ML
3000 INJECTION INTRAVENOUS; SUBCUTANEOUS EVERY 6 HOURS
Refills: 0 | Status: DISCONTINUED | OUTPATIENT
Start: 2021-09-02 | End: 2021-09-04

## 2021-09-02 RX ORDER — HEPARIN SODIUM 5000 [USP'U]/ML
6500 INJECTION INTRAVENOUS; SUBCUTANEOUS EVERY 6 HOURS
Refills: 0 | Status: DISCONTINUED | OUTPATIENT
Start: 2021-09-02 | End: 2021-09-04

## 2021-09-02 RX ADMIN — BUDESONIDE AND FORMOTEROL FUMARATE DIHYDRATE 2 PUFF(S): 160; 4.5 AEROSOL RESPIRATORY (INHALATION) at 12:30

## 2021-09-02 RX ADMIN — TIOTROPIUM BROMIDE 1 CAPSULE(S): 18 CAPSULE ORAL; RESPIRATORY (INHALATION) at 11:43

## 2021-09-02 RX ADMIN — HEPARIN SODIUM 0 UNIT(S)/HR: 5000 INJECTION INTRAVENOUS; SUBCUTANEOUS at 19:49

## 2021-09-02 RX ADMIN — HEPARIN SODIUM 1500 UNIT(S)/HR: 5000 INJECTION INTRAVENOUS; SUBCUTANEOUS at 12:27

## 2021-09-02 RX ADMIN — CARVEDILOL PHOSPHATE 6.25 MILLIGRAM(S): 80 CAPSULE, EXTENDED RELEASE ORAL at 22:13

## 2021-09-02 RX ADMIN — ATORVASTATIN CALCIUM 40 MILLIGRAM(S): 80 TABLET, FILM COATED ORAL at 22:13

## 2021-09-02 RX ADMIN — Medication 40 MILLIGRAM(S): at 11:43

## 2021-09-02 RX ADMIN — CARVEDILOL PHOSPHATE 6.25 MILLIGRAM(S): 80 CAPSULE, EXTENDED RELEASE ORAL at 11:43

## 2021-09-02 RX ADMIN — HEPARIN SODIUM 1200 UNIT(S)/HR: 5000 INJECTION INTRAVENOUS; SUBCUTANEOUS at 20:50

## 2021-09-02 NOTE — CONSULT NOTE ADULT - ASSESSMENT
65F w/ hx R lung adenocarcinoma s/p resection, p/w SVC syndrome, found to have acute non-occlusive L IJ thrombus    Recommendation:  - no acute vascular surgical intervention  - Anticoagulation with heparin gtt  - L arm elevation  - plan to be discussed with attending    C team surgery  [id number] 65F w/ hx R lung adenocarcinoma s/p resection, p/w SVC syndrome, found to have acute non-occlusive L IJ thrombus    Recommendation:  - no acute vascular surgical intervention  - agree with therapeutic anticoagulation  - plan discussed with attending, Dr. Doug FLORES team surgery  [id number]

## 2021-09-02 NOTE — CONSULT NOTE ADULT - SUBJECTIVE AND OBJECTIVE BOX
VASCULAR SURGERY CONSULT NOTE    Patient is a 65y old  Female who presents with a chief complaint of Neck swelling (02 Sep 2021 15:15)    HPI:  65F with HTN, HLD, CAD s/p stents (last in ), COPD hx of resected RR lung adenocarcinoma (serial CT scans q3-6 months, follows Dr. Clement), former smoker who presents with worsening neck swelling and L arm edema the past 2 weeks. Background hx, she had a bronchoscopy in 2021 due to concerns of new lymphadenopathy during one of her CT scans and had bronch and mediascopy with biopsies which did not show any malignancy. The past 2 weeks, she started noticing neck circumference growth and some bilateral arm swelling. She had a duplex of UE which did not show any DVT but some lymph nodes. The past week, she started having worsening neck growth and LUE swelling which made her concerned. She was suppose to have a scheduled outpatient CT chest and neck next month, but she felt like she started having trouble breathing when she bends her neck. Otherwise, she denies chest pain, dyspnea, dyspnea on exertion, chest pain, headaches.    CT C/neck shows extensive supraclavicular, mediastinal lymphadenopathy, complete/near complete SVC occlusion, L brachiocephalic and LIJ occlusion but limited by contrast timing. b/l UE venous duplex shows acute non-occlusive L IJ DVT    10-points review of system performed with pertinent negative and positive findings documented in the HPI     PAST MEDICAL & SURGICAL HISTORY:  Hypertension    Hyperlipidemia    Pulmonary nodules  yearly CT scans    Carotid artery disease  monitored by vascular doctor Doscher    Emphysema    Hip pain    Obesity    Edema of both legs    Lung cancer  right, , completed chemotherapy 2019    Stented coronary artery    Localized enlarged lymph nodes    Scoliosis    Lumbar disc disorder    COPD (chronic obstructive pulmonary disease)    History of  section  x2 ,     S/P lobectomy of lung  RULobectomy     History of hip replacement  right 2021    Stented coronary artery  x2 stents 2018    FAMILY HISTORY:  Family history of CHF (congestive heart failure) (Aunt)    : Family history not pertinent as reviewed with the patient and family    SOCIAL HISTORY: No pertinent social history    MEDICATIONS  (STANDING):  atorvastatin 40 milliGRAM(s) Oral at bedtime  budesonide  80 MICROgram(s)/formoterol 4.5 MICROgram(s) Inhaler 2 Puff(s) Inhalation daily  carvedilol 6.25 milliGRAM(s) Oral every 12 hours  furosemide    Tablet 40 milliGRAM(s) Oral daily  heparin  Infusion.  Unit(s)/Hr (15 mL/Hr) IV Continuous <Continuous>  tiotropium 18 MICROgram(s) Capsule 1 Capsule(s) Inhalation daily    MEDICATIONS  (PRN):  acetaminophen   Tablet .. 650 milliGRAM(s) Oral every 6 hours PRN Temp greater or equal to 38.5C (101.3F), Mild Pain (1 - 3)  aluminum hydroxide/magnesium hydroxide/simethicone Suspension 30 milliLiter(s) Oral every 4 hours PRN Dyspepsia  heparin   Injectable 6500 Unit(s) IV Push every 6 hours PRN For aPTT less than 40  heparin   Injectable 3000 Unit(s) IV Push every 6 hours PRN For aPTT between 40 - 57  melatonin 3 milliGRAM(s) Oral at bedtime PRN Insomnia  ondansetron Injectable 4 milliGRAM(s) IV Push every 8 hours PRN Nausea and/or Vomiting    Allergies    penicillin (Short breath; Hives)  tetracycline (Short breath; Hives)    Intolerances        Vital Signs Last 24 Hrs  T(C): 36.9 (02 Sep 2021 11:39), Max: 36.9 (02 Sep 2021 11:39)  T(F): 98.4 (02 Sep 2021 11:39), Max: 98.4 (02 Sep 2021 11:39)  HR: 79 (02 Sep 2021 11:39) (75 - 87)  BP: 126/72 (02 Sep 2021 11:39) (108/61 - 140/66)  BP(mean): --  RR: 16 (02 Sep 2021 11:39) (16 - 18)  SpO2: 98% (02 Sep 2021 11:39) (98% - 99%)  Daily     Daily     Exam:  General: resting in bed  HEENT: large palpable left supraclavicular mass, nontender  Resp: nonlabored breathing  Ext: +LUE swelling; palpable radial ulnar pulses b/l  Neuro: AAOx3                        11.0   9.60  )-----------( 310      ( 02 Sep 2021 05:15 )             32.7     09-02    134<L>  |  95<L>  |  19  ----------------------------<  120<H>  3.7   |  21<L>  |  1.22    Ca    9.4      02 Sep 2021 05:15  Phos  4.3       Mg     2.20         TPro  7.2  /  Alb  3.8  /  TBili  0.8  /  DBili  x   /  AST  14  /  ALT  13  /  AlkPhos  123<H>      PT/INR - ( 02 Sep 2021 05:15 )   PT: 13.3 sec;   INR: 1.18 ratio         PTT - ( 02 Sep 2021 05:15 )  PTT:39.4 sec      IMAGING STUDIES:    < from: CT Chest w/ IV Cont (21 @ 02:06) >  IMPRESSION:  No main, left main, right main, lobar, or segmental pulmonary embolism. Evaluation of subsegmental pulmonary arteries is limited secondary to poor contrast opacification.    There is extensive supraclavicular and mediastinal lymphadenopathy.  Suggestion of complete/near-complete occlusion of the SVC, left brachiocephalic, and left internal jugular veins, however evaluation is limited secondary to IV contrast timing. Vascular ultrasound versus repeat neck/chest imaging with more delayed IV contrast timing may be helpful for complete evaluation.        < end of copied text >  < from: VA Duplex Ext Veins Upper Comp, Bilat. (21 @ 10:27) >  Right Findings: The right internal jugular, subclavian,  axillary, brachial, radial, and ulnar veins are patent,  and demonstrate full compressibility with phasic Doppler  waveforms.  No evidence of thrombosis.  The right basilic and cephalic veins appear  sonographically normal and compressible without evidence  of thrombosis.  Left Findings: Acute, non occlusive deep venous thrombosis  visualized in the left internal jugular vein.  The left subclavian, axillary, brachial, radial, and ulnar  veins are patent, and demonstrate full compressibility  with phasic Doppler waveforms.  No evidence of thrombosis.  ------------------------------------------------------------------------  Summary/Impressions:  Acute, non occlusive deep venous thrombosis visualized in  the left internal jugular veins.    < end of copied text >

## 2021-09-03 ENCOUNTER — APPOINTMENT (OUTPATIENT)
Dept: CT IMAGING | Facility: IMAGING CENTER | Age: 66
End: 2021-09-03

## 2021-09-03 LAB
ALBUMIN SERPL ELPH-MCNC: 3.6 G/DL — SIGNIFICANT CHANGE UP (ref 3.3–5)
ALP SERPL-CCNC: 118 U/L — SIGNIFICANT CHANGE UP (ref 40–120)
ALT FLD-CCNC: 13 U/L — SIGNIFICANT CHANGE UP (ref 4–33)
ANION GAP SERPL CALC-SCNC: 18 MMOL/L — HIGH (ref 7–14)
APTT BLD: 136.8 SEC — SIGNIFICANT CHANGE UP (ref 27–36.3)
APTT BLD: 73.5 SEC — HIGH (ref 27–36.3)
APTT BLD: >200 SEC — CRITICAL HIGH (ref 27–36.3)
AST SERPL-CCNC: 20 U/L — SIGNIFICANT CHANGE UP (ref 4–32)
BILIRUB SERPL-MCNC: 0.6 MG/DL — SIGNIFICANT CHANGE UP (ref 0.2–1.2)
BUN SERPL-MCNC: 18 MG/DL — SIGNIFICANT CHANGE UP (ref 7–23)
CALCIUM SERPL-MCNC: 9 MG/DL — SIGNIFICANT CHANGE UP (ref 8.4–10.5)
CHLORIDE SERPL-SCNC: 96 MMOL/L — LOW (ref 98–107)
CO2 SERPL-SCNC: 19 MMOL/L — LOW (ref 22–31)
CREAT SERPL-MCNC: 1.16 MG/DL — SIGNIFICANT CHANGE UP (ref 0.5–1.3)
GLUCOSE SERPL-MCNC: 124 MG/DL — HIGH (ref 70–99)
HCT VFR BLD CALC: 33.1 % — LOW (ref 34.5–45)
HCT VFR BLD CALC: 33.6 % — LOW (ref 34.5–45)
HGB BLD-MCNC: 10.9 G/DL — LOW (ref 11.5–15.5)
HGB BLD-MCNC: 11.4 G/DL — LOW (ref 11.5–15.5)
MAGNESIUM SERPL-MCNC: 2.2 MG/DL — SIGNIFICANT CHANGE UP (ref 1.6–2.6)
MCHC RBC-ENTMCNC: 28.6 PG — SIGNIFICANT CHANGE UP (ref 27–34)
MCHC RBC-ENTMCNC: 29.4 PG — SIGNIFICANT CHANGE UP (ref 27–34)
MCHC RBC-ENTMCNC: 32.4 GM/DL — SIGNIFICANT CHANGE UP (ref 32–36)
MCHC RBC-ENTMCNC: 34.4 GM/DL — SIGNIFICANT CHANGE UP (ref 32–36)
MCV RBC AUTO: 85.3 FL — SIGNIFICANT CHANGE UP (ref 80–100)
MCV RBC AUTO: 88.2 FL — SIGNIFICANT CHANGE UP (ref 80–100)
NRBC # BLD: 0 /100 WBCS — SIGNIFICANT CHANGE UP
NRBC # BLD: 0 /100 WBCS — SIGNIFICANT CHANGE UP
NRBC # FLD: 0 K/UL — SIGNIFICANT CHANGE UP
NRBC # FLD: 0 K/UL — SIGNIFICANT CHANGE UP
PHOSPHATE SERPL-MCNC: 4.4 MG/DL — SIGNIFICANT CHANGE UP (ref 2.5–4.5)
PLATELET # BLD AUTO: 143 K/UL — LOW (ref 150–400)
PLATELET # BLD AUTO: 290 K/UL — SIGNIFICANT CHANGE UP (ref 150–400)
POTASSIUM SERPL-MCNC: 3.7 MMOL/L — SIGNIFICANT CHANGE UP (ref 3.5–5.3)
POTASSIUM SERPL-SCNC: 3.7 MMOL/L — SIGNIFICANT CHANGE UP (ref 3.5–5.3)
PROT SERPL-MCNC: 6.9 G/DL — SIGNIFICANT CHANGE UP (ref 6–8.3)
RBC # BLD: 3.81 M/UL — SIGNIFICANT CHANGE UP (ref 3.8–5.2)
RBC # BLD: 3.88 M/UL — SIGNIFICANT CHANGE UP (ref 3.8–5.2)
RBC # FLD: 14.4 % — SIGNIFICANT CHANGE UP (ref 10.3–14.5)
RBC # FLD: 14.5 % — SIGNIFICANT CHANGE UP (ref 10.3–14.5)
SODIUM SERPL-SCNC: 133 MMOL/L — LOW (ref 135–145)
WBC # BLD: 8.57 K/UL — SIGNIFICANT CHANGE UP (ref 3.8–10.5)
WBC # BLD: 8.73 K/UL — SIGNIFICANT CHANGE UP (ref 3.8–10.5)
WBC # FLD AUTO: 8.57 K/UL — SIGNIFICANT CHANGE UP (ref 3.8–10.5)
WBC # FLD AUTO: 8.73 K/UL — SIGNIFICANT CHANGE UP (ref 3.8–10.5)

## 2021-09-03 PROCEDURE — 37238 OPEN/PERQ PLACE STENT SAME: CPT

## 2021-09-03 PROCEDURE — 76937 US GUIDE VASCULAR ACCESS: CPT | Mod: 26,59

## 2021-09-03 PROCEDURE — 36013 PLACE CATHETER IN ARTERY: CPT

## 2021-09-03 PROCEDURE — 37253 INTRVASC US NONCORONARY ADDL: CPT

## 2021-09-03 PROCEDURE — 36410 VNPNXR 3YR/> PHY/QHP DX/THER: CPT | Mod: 59

## 2021-09-03 PROCEDURE — 37252 INTRVASC US NONCORONARY 1ST: CPT

## 2021-09-03 RX ORDER — SODIUM CHLORIDE 9 MG/ML
10 INJECTION INTRAMUSCULAR; INTRAVENOUS; SUBCUTANEOUS
Refills: 0 | Status: DISCONTINUED | OUTPATIENT
Start: 2021-09-03 | End: 2021-09-08

## 2021-09-03 RX ORDER — CHLORHEXIDINE GLUCONATE 213 G/1000ML
1 SOLUTION TOPICAL
Refills: 0 | Status: DISCONTINUED | OUTPATIENT
Start: 2021-09-03 | End: 2021-09-08

## 2021-09-03 RX ADMIN — Medication 40 MILLIGRAM(S): at 05:24

## 2021-09-03 RX ADMIN — HEPARIN SODIUM 1200 UNIT(S)/HR: 5000 INJECTION INTRAVENOUS; SUBCUTANEOUS at 03:56

## 2021-09-03 RX ADMIN — CARVEDILOL PHOSPHATE 6.25 MILLIGRAM(S): 80 CAPSULE, EXTENDED RELEASE ORAL at 22:09

## 2021-09-03 RX ADMIN — ATORVASTATIN CALCIUM 40 MILLIGRAM(S): 80 TABLET, FILM COATED ORAL at 22:09

## 2021-09-03 RX ADMIN — HEPARIN SODIUM 900 UNIT(S)/HR: 5000 INJECTION INTRAVENOUS; SUBCUTANEOUS at 12:10

## 2021-09-03 RX ADMIN — HEPARIN SODIUM 600 UNIT(S)/HR: 5000 INJECTION INTRAVENOUS; SUBCUTANEOUS at 20:58

## 2021-09-03 RX ADMIN — CARVEDILOL PHOSPHATE 6.25 MILLIGRAM(S): 80 CAPSULE, EXTENDED RELEASE ORAL at 09:28

## 2021-09-03 RX ADMIN — HEPARIN SODIUM 0 UNIT(S)/HR: 5000 INJECTION INTRAVENOUS; SUBCUTANEOUS at 11:07

## 2021-09-03 RX ADMIN — TIOTROPIUM BROMIDE 1 CAPSULE(S): 18 CAPSULE ORAL; RESPIRATORY (INHALATION) at 09:30

## 2021-09-03 RX ADMIN — HEPARIN SODIUM 0 UNIT(S)/HR: 5000 INJECTION INTRAVENOUS; SUBCUTANEOUS at 19:58

## 2021-09-03 RX ADMIN — BUDESONIDE AND FORMOTEROL FUMARATE DIHYDRATE 2 PUFF(S): 160; 4.5 AEROSOL RESPIRATORY (INHALATION) at 09:27

## 2021-09-03 NOTE — PROCEDURE NOTE - GENERAL PROCEDURE NAME
Left upper extremity and central venogram with SVC/left brachiocephalic vein stenting and venoplasty.

## 2021-09-03 NOTE — CONSULT NOTE ADULT - SUBJECTIVE AND OBJECTIVE BOX
Patient is a 65y old  Female who presents with a chief complaint of Neck swelling (02 Sep 2021 18:29), has h/o early stage NSCLC, s/p surgery followed by adjuvant chemo > 2 years ago, recent mediastinoscopy for increasing adenoapthy was negative, here with increasing neck and chest adenopathy and SVC syndrome and a DVT in the neck, has very poor venous access. Feels anxious, no pain bleeding and a detailed ROS unremarkable,       PAST MEDICAL & SURGICAL HISTORY:  Hypertension    Hyperlipidemia    Pulmonary nodules  yearly CT scans    Carotid artery disease  monitored by vascluar doctor Doscher    Emphysema    Hip pain    Obesity    Edema of both legs    Lung cancer  right, , completed chemotherapy 2019    Stented coronary artery    Localized enlarged lymph nodes    Scoliosis    Lumbar disc disorder    COPD (chronic obstructive pulmonary disease)    History of  section  x2 ,     S/P lobectomy of lung  RULobectomy     History of hip replacement  right 2021    Stented coronary artery  x2 stents 2018      Meds:  acetaminophen   Tablet .. 650 milliGRAM(s) Oral every 6 hours PRN  aluminum hydroxide/magnesium hydroxide/simethicone Suspension 30 milliLiter(s) Oral every 4 hours PRN  atorvastatin 40 milliGRAM(s) Oral at bedtime  budesonide  80 MICROgram(s)/formoterol 4.5 MICROgram(s) Inhaler 2 Puff(s) Inhalation daily  carvedilol 6.25 milliGRAM(s) Oral every 12 hours  furosemide    Tablet 40 milliGRAM(s) Oral daily  heparin   Injectable 6500 Unit(s) IV Push every 6 hours PRN  heparin   Injectable 3000 Unit(s) IV Push every 6 hours PRN  heparin  Infusion.  Unit(s)/Hr IV Continuous <Continuous>  melatonin 3 milliGRAM(s) Oral at bedtime PRN  ondansetron Injectable 4 milliGRAM(s) IV Push every 8 hours PRN  tiotropium 18 MICROgram(s) Capsule 1 Capsule(s) Inhalation daily      penicillin (Short breath; Hives)  tetracycline (Short breath; Hives)      FAMILY HISTORY:  Family history of CHF (congestive heart failure) (Aunt)        Vital Signs Last 24 Hrs  T(C): 36.6 (03 Sep 2021 05:26), Max: 36.9 (02 Sep 2021 11:39)  T(F): 97.9 (03 Sep 2021 05:26), Max: 98.4 (02 Sep 2021 11:39)  HR: 78 (03 Sep 2021 09:24) (78 - 80)  BP: 150/62 (03 Sep 2021 09:24) (112/64 - 150/62)  BP(mean): --  RR: 16 (03 Sep 2021 09:24) (16 - 17)  SpO2: 98% (03 Sep 2021 09:24) (97% - 99%)                          10.9   8.73  )-----------( 143      ( 03 Sep 2021 03:33 )             33.6       09-03    133<L>  |  96<L>  |  18  ----------------------------<  124<H>  3.7   |  19<L>  |  1.16    Ca    9.0      03 Sep 2021 03:33  Phos  4.4     03  Mg     2.20     -03    TPro  6.9  /  Alb  3.6  /  TBili  0.6  /  DBili  x   /  AST  20  /  ALT  13  /  AlkPhos  118  -03      PT/INR - ( 02 Sep 2021 05:15 )   PT: 13.3 sec;   INR: 1.18 ratio         PTT - ( 03 Sep 2021 03:33 )  PTT:73.5 sec        CT neck and chest: IMPRESSION:  No main, left main, right main, lobar, or segmental pulmonary embolism. Evaluation of subsegmental pulmonary arteries is limited secondary to poor contrast opacification.    There is extensive supraclavicular and mediastinal lymphadenopathy.  Suggestion of complete/near-complete occlusion of the SVC, left brachiocephalic, and left internal jugular veins, however evaluation is limited secondary to IV contrast timing. Vascular ultrasound versus repeat neck/chest imaging with more delayed IV contrast timing may be helpful for complete evaluation.        --- End of Report ---            LUCY INFANTE MD; Resident Radiology  This document has been electronically signed.    IMPRESSION:  Limited by motion artifact, particularly at the level of the oropharynx.    Bilateral lower facial and upper neck swelling, left greater than right, with retropharyngeal edema is presumed to be related to venous outlet obstruction. There is suggestion of complete/near-complete occlusion of the SVC, left brachiocephalic, and left internal jugular veins, however evaluation is limited secondary to IV contrast timing. Vascular ultrasound versus repeat neck/chest imaging with more delayed IV contrast timing may be helpful for complete evaluation. Mass effect on the supraglottic airway with mild narrowing. Airway precautions recommended.    Supraclavicular and mediastinal adenopathy likely on a malignant basis, particularly given history of adenocarcinoma.    --- End of Report ---              ROSALIA CHAUHAN MD; AttendingRadiologist  This document has been electronically signed. Aug 31 2021 11:02AM

## 2021-09-03 NOTE — PROCEDURE NOTE - PLAN
- continue with heparin drip for now, would recommend transition to lovenox BID for SVC patentcy.   - ultimately, patient will need to follow up with Dr. Zayas ([phone]) in 1 month to discuss anticoagulation management for stent.   - right midline catheter may be used immediately.   -

## 2021-09-03 NOTE — CONSULT NOTE ADULT - CONSULT REASON
Possible SVC syndrome
L IJ thrombus
CAD
SVC stent
Neck swelling
neck swelling
lung cancer, hypercoagulable state

## 2021-09-03 NOTE — CONSULT NOTE ADULT - CONSULT REQUESTED DATE/TIME
02-Sep-2021 18:30
31-Aug-2021
31-Aug-2021 10:43
30-Aug-2021 18:45
31-Aug-2021 16:50
02-Sep-2021 17:02
31-Aug-2021 13:18

## 2021-09-03 NOTE — PROCEDURE NOTE - PROCEDURE FINDINGS AND DETAILS
Midline: Right midline catheter placed for venous access prior to SVC stent procedure in right cephalic vein which was widely patent.     SVC Stent: Patent left basilic vein accessed. Multiple collaterals identified on venography. Minimal flow through the left brachiocephalic vein and into the SVC. A wire and catheter were used to successfully cross into the IVC. 5mm balloon venoplasty performed followed by placement of overlapping stents in the SVC and left brachiocephalic vein. Venoplasty performed with 12mm balloon. Post deployment images demonstrate brisk flow through the stent into the SVC and resolution of the venous collaterals. Hemostasis in left arm achieved with manual compression.

## 2021-09-03 NOTE — PRE PROCEDURE NOTE - PRE PROCEDURE EVALUATION
Interventional Radiology Pre-Procedure Note    Diagnosis/Indication: Patient is a 65y old  Female who presents with a chief complaint of Neck swelling found to have SVC syndrome with left IJ thrombus. Marked left upper extremity swelling.  Plan for SVC venoplasty and stenting.       PAST MEDICAL & SURGICAL HISTORY:  Hypertension    Hyperlipidemia    Pulmonary nodules  yearly CT scans    Carotid artery disease  monitored by vascluar doctor Samuel    Emphysema    Hip pain    Obesity    Edema of both legs    Lung cancer  right, 2019, completed chemotherapy 2019    Stented coronary artery    Localized enlarged lymph nodes    Scoliosis    Lumbar disc disorder    COPD (chronic obstructive pulmonary disease)    History of  section  x2 ,     S/P lobectomy of lung  RULobectomy     History of hip replacement  right 2021    Stented coronary artery  x2 stents 2018         Allergies: penicillin (Short breath; Hives)  tetracycline (Short breath; Hives)      LABS:                        11.4   8.57  )-----------( 290      ( 03 Sep 2021 10:23 )             33.1     09-03    133<L>  |  96<L>  |  18  ----------------------------<  124<H>  3.7   |  19<L>  |  1.16    Ca    9.0      03 Sep 2021 03:33  Phos  4.4     09-03  Mg     2.20     09-03    TPro  6.9  /  Alb  3.6  /  TBili  0.6  /  DBili  x   /  AST  20  /  ALT  13  /  AlkPhos  118  09-03    PT/INR - ( 02 Sep 2021 05:15 )   PT: 13.3 sec;   INR: 1.18 ratio         PTT - ( 03 Sep 2021 10:23 )  PTT:136.8 sec    Procedure/ risks/ benefits were explained, informed consent obtained from patient, verbalizes understanding.

## 2021-09-03 NOTE — CONSULT NOTE ADULT - ASSESSMENT
65y old  Female who presents with a chief complaint of Neck swelling (02 Sep 2021 18:29), has h/o early stage NSCLC, s/p surgery followed by adjuvant chemo > 2 years ago, recent mediastinoscopy for increasing adenopathy was negative, here with increasing neck and chest adenopathy and SVC syndrome and a DVT in the neck, has very poor venous access. will recommend:    - continue IV heparin or lovenox at therapeutic doses  - repeat plts stat and if low, obtain HIT assay ( the poor venous access may have contributed to low pts) - discussed with NP and Dr Gibson  - discussed with Dr Yaw Clement - he will to get a midline and node Bx - on tuesday - as the rapidity of the disease suggests that there may be different pathology  - all other supportive Rx    - for questions, please call [phone]

## 2021-09-04 LAB
ANION GAP SERPL CALC-SCNC: 13 MMOL/L — SIGNIFICANT CHANGE UP (ref 7–14)
APTT BLD: 196.5 SEC — CRITICAL HIGH (ref 27–36.3)
APTT BLD: 29.1 SEC — SIGNIFICANT CHANGE UP (ref 27–36.3)
APTT BLD: 46.9 SEC — HIGH (ref 27–36.3)
BUN SERPL-MCNC: 17 MG/DL — SIGNIFICANT CHANGE UP (ref 7–23)
CALCIUM SERPL-MCNC: 9.2 MG/DL — SIGNIFICANT CHANGE UP (ref 8.4–10.5)
CHLORIDE SERPL-SCNC: 95 MMOL/L — LOW (ref 98–107)
CO2 SERPL-SCNC: 26 MMOL/L — SIGNIFICANT CHANGE UP (ref 22–31)
CREAT SERPL-MCNC: 1.19 MG/DL — SIGNIFICANT CHANGE UP (ref 0.5–1.3)
GLUCOSE SERPL-MCNC: 109 MG/DL — HIGH (ref 70–99)
HCT VFR BLD CALC: 30.7 % — LOW (ref 34.5–45)
HCT VFR BLD CALC: 32.2 % — LOW (ref 34.5–45)
HGB BLD-MCNC: 10.5 G/DL — LOW (ref 11.5–15.5)
HGB BLD-MCNC: 10.8 G/DL — LOW (ref 11.5–15.5)
MAGNESIUM SERPL-MCNC: 2.1 MG/DL — SIGNIFICANT CHANGE UP (ref 1.6–2.6)
MCHC RBC-ENTMCNC: 28.8 PG — SIGNIFICANT CHANGE UP (ref 27–34)
MCHC RBC-ENTMCNC: 29 PG — SIGNIFICANT CHANGE UP (ref 27–34)
MCHC RBC-ENTMCNC: 33.5 GM/DL — SIGNIFICANT CHANGE UP (ref 32–36)
MCHC RBC-ENTMCNC: 34.2 GM/DL — SIGNIFICANT CHANGE UP (ref 32–36)
MCV RBC AUTO: 84.8 FL — SIGNIFICANT CHANGE UP (ref 80–100)
MCV RBC AUTO: 85.9 FL — SIGNIFICANT CHANGE UP (ref 80–100)
NRBC # BLD: 0 /100 WBCS — SIGNIFICANT CHANGE UP
NRBC # BLD: 0 /100 WBCS — SIGNIFICANT CHANGE UP
NRBC # FLD: 0 K/UL — SIGNIFICANT CHANGE UP
NRBC # FLD: 0 K/UL — SIGNIFICANT CHANGE UP
PHOSPHATE SERPL-MCNC: 4.5 MG/DL — SIGNIFICANT CHANGE UP (ref 2.5–4.5)
PLATELET # BLD AUTO: 246 K/UL — SIGNIFICANT CHANGE UP (ref 150–400)
PLATELET # BLD AUTO: 261 K/UL — SIGNIFICANT CHANGE UP (ref 150–400)
POTASSIUM SERPL-MCNC: 3.1 MMOL/L — LOW (ref 3.5–5.3)
POTASSIUM SERPL-SCNC: 3.1 MMOL/L — LOW (ref 3.5–5.3)
RBC # BLD: 3.62 M/UL — LOW (ref 3.8–5.2)
RBC # BLD: 3.75 M/UL — LOW (ref 3.8–5.2)
RBC # FLD: 14.2 % — SIGNIFICANT CHANGE UP (ref 10.3–14.5)
RBC # FLD: 14.4 % — SIGNIFICANT CHANGE UP (ref 10.3–14.5)
SODIUM SERPL-SCNC: 134 MMOL/L — LOW (ref 135–145)
WBC # BLD: 11.43 K/UL — HIGH (ref 3.8–10.5)
WBC # BLD: 7.98 K/UL — SIGNIFICANT CHANGE UP (ref 3.8–10.5)
WBC # FLD AUTO: 11.43 K/UL — HIGH (ref 3.8–10.5)
WBC # FLD AUTO: 7.98 K/UL — SIGNIFICANT CHANGE UP (ref 3.8–10.5)

## 2021-09-04 RX ORDER — HEPARIN SODIUM 5000 [USP'U]/ML
7500 INJECTION INTRAVENOUS; SUBCUTANEOUS ONCE
Refills: 0 | Status: DISCONTINUED | OUTPATIENT
Start: 2021-09-04 | End: 2021-09-04

## 2021-09-04 RX ORDER — HEPARIN SODIUM 5000 [USP'U]/ML
3500 INJECTION INTRAVENOUS; SUBCUTANEOUS EVERY 6 HOURS
Refills: 0 | Status: DISCONTINUED | OUTPATIENT
Start: 2021-09-04 | End: 2021-09-04

## 2021-09-04 RX ORDER — POTASSIUM CHLORIDE 20 MEQ
40 PACKET (EA) ORAL ONCE
Refills: 0 | Status: COMPLETED | OUTPATIENT
Start: 2021-09-04 | End: 2021-09-04

## 2021-09-04 RX ORDER — HEPARIN SODIUM 5000 [USP'U]/ML
12 INJECTION INTRAVENOUS; SUBCUTANEOUS
Qty: 25000 | Refills: 0 | Status: DISCONTINUED | OUTPATIENT
Start: 2021-09-04 | End: 2021-09-04

## 2021-09-04 RX ORDER — HEPARIN SODIUM 5000 [USP'U]/ML
3500 INJECTION INTRAVENOUS; SUBCUTANEOUS EVERY 6 HOURS
Refills: 0 | Status: DISCONTINUED | OUTPATIENT
Start: 2021-09-04 | End: 2021-09-08

## 2021-09-04 RX ORDER — ASPIRIN/CALCIUM CARB/MAGNESIUM 324 MG
81 TABLET ORAL DAILY
Refills: 0 | Status: DISCONTINUED | OUTPATIENT
Start: 2021-09-04 | End: 2021-09-08

## 2021-09-04 RX ORDER — HEPARIN SODIUM 5000 [USP'U]/ML
1200 INJECTION INTRAVENOUS; SUBCUTANEOUS
Qty: 25000 | Refills: 0 | Status: DISCONTINUED | OUTPATIENT
Start: 2021-09-04 | End: 2021-09-08

## 2021-09-04 RX ORDER — HEPARIN SODIUM 5000 [USP'U]/ML
7500 INJECTION INTRAVENOUS; SUBCUTANEOUS EVERY 6 HOURS
Refills: 0 | Status: DISCONTINUED | OUTPATIENT
Start: 2021-09-04 | End: 2021-09-04

## 2021-09-04 RX ORDER — HEPARIN SODIUM 5000 [USP'U]/ML
7500 INJECTION INTRAVENOUS; SUBCUTANEOUS EVERY 6 HOURS
Refills: 0 | Status: DISCONTINUED | OUTPATIENT
Start: 2021-09-04 | End: 2021-09-08

## 2021-09-04 RX ADMIN — CARVEDILOL PHOSPHATE 6.25 MILLIGRAM(S): 80 CAPSULE, EXTENDED RELEASE ORAL at 17:17

## 2021-09-04 RX ADMIN — HEPARIN SODIUM 1200 UNIT(S)/HR: 5000 INJECTION INTRAVENOUS; SUBCUTANEOUS at 14:34

## 2021-09-04 RX ADMIN — CARVEDILOL PHOSPHATE 6.25 MILLIGRAM(S): 80 CAPSULE, EXTENDED RELEASE ORAL at 06:01

## 2021-09-04 RX ADMIN — HEPARIN SODIUM 900 UNIT(S)/HR: 5000 INJECTION INTRAVENOUS; SUBCUTANEOUS at 22:40

## 2021-09-04 RX ADMIN — HEPARIN SODIUM 0 UNIT(S)/HR: 5000 INJECTION INTRAVENOUS; SUBCUTANEOUS at 21:37

## 2021-09-04 RX ADMIN — HEPARIN SODIUM UNIT(S)/HR: 5000 INJECTION INTRAVENOUS; SUBCUTANEOUS at 13:52

## 2021-09-04 RX ADMIN — ATORVASTATIN CALCIUM 40 MILLIGRAM(S): 80 TABLET, FILM COATED ORAL at 21:36

## 2021-09-04 RX ADMIN — HEPARIN SODIUM 7500 UNIT(S): 5000 INJECTION INTRAVENOUS; SUBCUTANEOUS at 14:36

## 2021-09-04 RX ADMIN — Medication 40 MILLIGRAM(S): at 06:01

## 2021-09-04 RX ADMIN — TIOTROPIUM BROMIDE 1 CAPSULE(S): 18 CAPSULE ORAL; RESPIRATORY (INHALATION) at 09:05

## 2021-09-04 RX ADMIN — HEPARIN SODIUM 800 UNIT(S)/HR: 5000 INJECTION INTRAVENOUS; SUBCUTANEOUS at 04:54

## 2021-09-04 RX ADMIN — Medication 40 MILLIEQUIVALENT(S): at 09:04

## 2021-09-04 RX ADMIN — BUDESONIDE AND FORMOTEROL FUMARATE DIHYDRATE 2 PUFF(S): 160; 4.5 AEROSOL RESPIRATORY (INHALATION) at 09:05

## 2021-09-04 RX ADMIN — CHLORHEXIDINE GLUCONATE 1 APPLICATION(S): 213 SOLUTION TOPICAL at 06:49

## 2021-09-04 NOTE — PHYSICAL THERAPY INITIAL EVALUATION ADULT - PERTINENT HX OF CURRENT PROBLEM, REHAB EVAL
Pt. is a 65 year old female with HTN, HLD, CAD s/p stents (last in 2018), COPD hx of resected lung adenocarcinoma, former smoker who presents with worsening neck swelling and Left arm edema

## 2021-09-04 NOTE — PROVIDER CONTACT NOTE (OTHER) - ASSESSMENT
Patient BP elevated as per flowsheet. Patient asymptomatic. RN administered coreg 6.25mg.
Surrounding area slightly erythematic and warm. Pt denies pain at the site. Hardened area noted. Flushes easily and without pain.

## 2021-09-04 NOTE — PROVIDER CONTACT NOTE (OTHER) - ACTION/TREATMENT ORDERED:
Provider at the bedside to evaluate. Provider to discuss with IR. @1330 Midline appears to be leaking, provider made aware and at the bedside to evaluate. Midline not in use at this time.
Provider aware. Continue to monitor.

## 2021-09-04 NOTE — SWALLOW BEDSIDE ASSESSMENT ADULT - COMMENTS
Consult received and chart reviewed. Pt was awake, alert, and cooperative during initial clinical swallow evaluation this AM. Pt's  at bedside. Pt followed directions, answered questions appropriately, and engaged in conversation with the clinician. Pt denied feeding/swallowing concerns.     As per charting, pt is a "65F with HTN, HLD, CAD s/p stents (last in 2018), COPD hx of resected RR lung adenocarcinoma (serial CT scans q3-6 months, follows Dr. Clement), former smoker who presents with worsening neck swelling and L arm edema the past 2 weeks. Background hx, she had a bronchoscopy in July 2021 due to concerns of new lymphadenopathy during one of her CT scans and had bronch and mediascopy with biopsies which did not show any malignancy. The past 2 weeks, she started noticing neck circumference growth and some bilateral arm swelling. She had a duplex of UE which did not show any DVT but some lymph nodes. The past week, she started having worsening neck growth and LUE swelling which made her concerned. She was suppose to have a scheduled outpatient CT chest and neck next month, but she felt like she started having trouble breathing when she bends her neck."    CT Chest 8/31: "No main, left main, right main, lobar, or segmental pulmonary embolism. Evaluation of subsegmental pulmonary arteries is limited secondary to poor contrast opacification."

## 2021-09-04 NOTE — PROGRESS NOTE ADULT - ATTENDING COMMENTS
feels remarkably better after IR stent from SVC to innominate vein.  able to swallow solid food, speak more clearly, has more energy, no headache.

## 2021-09-04 NOTE — SWALLOW BEDSIDE ASSESSMENT ADULT - SWALLOW EVAL: DIAGNOSIS
1) Functional oral stage of swallow for solids, puree, honey thick fluids, nectar thick fluids, and thin fluids marked by adequate acceptance, bolus manipulation, mastication, and coordination. Adequate bolus collection and timely anterior to posterior oral transit/transfer noted. Adequate oral clearance observed. 2) Functional pharyngeal stage of swallow for solids, puree, honey thick fluids, nectar thick fluids, and thin fluids marked by initiation of pharyngeal swallow trigger and hyolaryngeal excursion noted upon digital palpation. No overt signs/symptoms of penetration/aspiration noted.

## 2021-09-05 LAB
ANION GAP SERPL CALC-SCNC: 14 MMOL/L — SIGNIFICANT CHANGE UP (ref 7–14)
APTT BLD: 100.3 SEC — HIGH (ref 27–36.3)
APTT BLD: 50.7 SEC — HIGH (ref 27–36.3)
APTT BLD: 66.7 SEC — HIGH (ref 27–36.3)
BUN SERPL-MCNC: 19 MG/DL — SIGNIFICANT CHANGE UP (ref 7–23)
CALCIUM SERPL-MCNC: 9 MG/DL — SIGNIFICANT CHANGE UP (ref 8.4–10.5)
CHLORIDE SERPL-SCNC: 95 MMOL/L — LOW (ref 98–107)
CO2 SERPL-SCNC: 23 MMOL/L — SIGNIFICANT CHANGE UP (ref 22–31)
CREAT SERPL-MCNC: 1.26 MG/DL — SIGNIFICANT CHANGE UP (ref 0.5–1.3)
GLUCOSE SERPL-MCNC: 129 MG/DL — HIGH (ref 70–99)
HCT VFR BLD CALC: 31 % — LOW (ref 34.5–45)
HGB BLD-MCNC: 10.4 G/DL — LOW (ref 11.5–15.5)
MAGNESIUM SERPL-MCNC: 2 MG/DL — SIGNIFICANT CHANGE UP (ref 1.6–2.6)
MCHC RBC-ENTMCNC: 29 PG — SIGNIFICANT CHANGE UP (ref 27–34)
MCHC RBC-ENTMCNC: 33.5 GM/DL — SIGNIFICANT CHANGE UP (ref 32–36)
MCV RBC AUTO: 86.4 FL — SIGNIFICANT CHANGE UP (ref 80–100)
NRBC # BLD: 0 /100 WBCS — SIGNIFICANT CHANGE UP
NRBC # FLD: 0 K/UL — SIGNIFICANT CHANGE UP
PHOSPHATE SERPL-MCNC: 4 MG/DL — SIGNIFICANT CHANGE UP (ref 2.5–4.5)
PLATELET # BLD AUTO: 241 K/UL — SIGNIFICANT CHANGE UP (ref 150–400)
POTASSIUM SERPL-MCNC: 3.2 MMOL/L — LOW (ref 3.5–5.3)
POTASSIUM SERPL-SCNC: 3.2 MMOL/L — LOW (ref 3.5–5.3)
RBC # BLD: 3.59 M/UL — LOW (ref 3.8–5.2)
RBC # FLD: 14.5 % — SIGNIFICANT CHANGE UP (ref 10.3–14.5)
SODIUM SERPL-SCNC: 132 MMOL/L — LOW (ref 135–145)
WBC # BLD: 9.35 K/UL — SIGNIFICANT CHANGE UP (ref 3.8–10.5)
WBC # FLD AUTO: 9.35 K/UL — SIGNIFICANT CHANGE UP (ref 3.8–10.5)

## 2021-09-05 RX ORDER — POTASSIUM CHLORIDE 20 MEQ
40 PACKET (EA) ORAL EVERY 4 HOURS
Refills: 0 | Status: COMPLETED | OUTPATIENT
Start: 2021-09-05 | End: 2021-09-05

## 2021-09-05 RX ADMIN — HEPARIN SODIUM 900 UNIT(S)/HR: 5000 INJECTION INTRAVENOUS; SUBCUTANEOUS at 19:32

## 2021-09-05 RX ADMIN — Medication 81 MILLIGRAM(S): at 09:53

## 2021-09-05 RX ADMIN — CARVEDILOL PHOSPHATE 6.25 MILLIGRAM(S): 80 CAPSULE, EXTENDED RELEASE ORAL at 05:36

## 2021-09-05 RX ADMIN — HEPARIN SODIUM 3500 UNIT(S): 5000 INJECTION INTRAVENOUS; SUBCUTANEOUS at 12:31

## 2021-09-05 RX ADMIN — HEPARIN SODIUM 1100 UNIT(S)/HR: 5000 INJECTION INTRAVENOUS; SUBCUTANEOUS at 12:29

## 2021-09-05 RX ADMIN — Medication 40 MILLIEQUIVALENT(S): at 14:07

## 2021-09-05 RX ADMIN — CHLORHEXIDINE GLUCONATE 1 APPLICATION(S): 213 SOLUTION TOPICAL at 05:38

## 2021-09-05 RX ADMIN — BUDESONIDE AND FORMOTEROL FUMARATE DIHYDRATE 2 PUFF(S): 160; 4.5 AEROSOL RESPIRATORY (INHALATION) at 09:54

## 2021-09-05 RX ADMIN — Medication 40 MILLIEQUIVALENT(S): at 09:53

## 2021-09-05 RX ADMIN — Medication 40 MILLIGRAM(S): at 05:36

## 2021-09-05 RX ADMIN — HEPARIN SODIUM 900 UNIT(S)/HR: 5000 INJECTION INTRAVENOUS; SUBCUTANEOUS at 05:37

## 2021-09-05 RX ADMIN — TIOTROPIUM BROMIDE 1 CAPSULE(S): 18 CAPSULE ORAL; RESPIRATORY (INHALATION) at 09:55

## 2021-09-05 RX ADMIN — CARVEDILOL PHOSPHATE 6.25 MILLIGRAM(S): 80 CAPSULE, EXTENDED RELEASE ORAL at 18:11

## 2021-09-05 RX ADMIN — ATORVASTATIN CALCIUM 40 MILLIGRAM(S): 80 TABLET, FILM COATED ORAL at 21:38

## 2021-09-05 NOTE — PROGRESS NOTE ADULT - ATTENDING COMMENTS
seen and agree w/ PA. in good spirits. right arm feels tight, tender, with midline.  otherwise feeling better since SVC stenting.

## 2021-09-06 ENCOUNTER — TRANSCRIPTION ENCOUNTER (OUTPATIENT)
Age: 66
End: 2021-09-06

## 2021-09-06 LAB
ANION GAP SERPL CALC-SCNC: 14 MMOL/L — SIGNIFICANT CHANGE UP (ref 7–14)
APTT BLD: 41.1 SEC — HIGH (ref 27–36.3)
APTT BLD: 54.6 SEC — HIGH (ref 27–36.3)
APTT BLD: 91.9 SEC — HIGH (ref 27–36.3)
BUN SERPL-MCNC: 19 MG/DL — SIGNIFICANT CHANGE UP (ref 7–23)
CALCIUM SERPL-MCNC: 9 MG/DL — SIGNIFICANT CHANGE UP (ref 8.4–10.5)
CHLORIDE SERPL-SCNC: 100 MMOL/L — SIGNIFICANT CHANGE UP (ref 98–107)
CO2 SERPL-SCNC: 22 MMOL/L — SIGNIFICANT CHANGE UP (ref 22–31)
CREAT SERPL-MCNC: 1.35 MG/DL — HIGH (ref 0.5–1.3)
GLUCOSE SERPL-MCNC: 142 MG/DL — HIGH (ref 70–99)
HCT VFR BLD CALC: 32.7 % — LOW (ref 34.5–45)
HGB BLD-MCNC: 11.1 G/DL — LOW (ref 11.5–15.5)
MAGNESIUM SERPL-MCNC: 1.9 MG/DL — SIGNIFICANT CHANGE UP (ref 1.6–2.6)
MCHC RBC-ENTMCNC: 29.4 PG — SIGNIFICANT CHANGE UP (ref 27–34)
MCHC RBC-ENTMCNC: 33.9 GM/DL — SIGNIFICANT CHANGE UP (ref 32–36)
MCV RBC AUTO: 86.5 FL — SIGNIFICANT CHANGE UP (ref 80–100)
NRBC # BLD: 0 /100 WBCS — SIGNIFICANT CHANGE UP
NRBC # FLD: 0 K/UL — SIGNIFICANT CHANGE UP
PHOSPHATE SERPL-MCNC: 3.5 MG/DL — SIGNIFICANT CHANGE UP (ref 2.5–4.5)
PLATELET # BLD AUTO: 281 K/UL — SIGNIFICANT CHANGE UP (ref 150–400)
POTASSIUM SERPL-MCNC: 3.9 MMOL/L — SIGNIFICANT CHANGE UP (ref 3.5–5.3)
POTASSIUM SERPL-SCNC: 3.9 MMOL/L — SIGNIFICANT CHANGE UP (ref 3.5–5.3)
RBC # BLD: 3.78 M/UL — LOW (ref 3.8–5.2)
RBC # FLD: 14.6 % — HIGH (ref 10.3–14.5)
SARS-COV-2 RNA SPEC QL NAA+PROBE: SIGNIFICANT CHANGE UP
SODIUM SERPL-SCNC: 136 MMOL/L — SIGNIFICANT CHANGE UP (ref 135–145)
WBC # BLD: 9.83 K/UL — SIGNIFICANT CHANGE UP (ref 3.8–10.5)
WBC # FLD AUTO: 9.83 K/UL — SIGNIFICANT CHANGE UP (ref 3.8–10.5)

## 2021-09-06 RX ADMIN — ATORVASTATIN CALCIUM 40 MILLIGRAM(S): 80 TABLET, FILM COATED ORAL at 22:01

## 2021-09-06 RX ADMIN — TIOTROPIUM BROMIDE 1 CAPSULE(S): 18 CAPSULE ORAL; RESPIRATORY (INHALATION) at 10:25

## 2021-09-06 RX ADMIN — CARVEDILOL PHOSPHATE 6.25 MILLIGRAM(S): 80 CAPSULE, EXTENDED RELEASE ORAL at 06:03

## 2021-09-06 RX ADMIN — BUDESONIDE AND FORMOTEROL FUMARATE DIHYDRATE 2 PUFF(S): 160; 4.5 AEROSOL RESPIRATORY (INHALATION) at 10:24

## 2021-09-06 RX ADMIN — HEPARIN SODIUM 3500 UNIT(S): 5000 INJECTION INTRAVENOUS; SUBCUTANEOUS at 18:09

## 2021-09-06 RX ADMIN — Medication 40 MILLIGRAM(S): at 06:03

## 2021-09-06 RX ADMIN — CHLORHEXIDINE GLUCONATE 1 APPLICATION(S): 213 SOLUTION TOPICAL at 06:10

## 2021-09-06 RX ADMIN — Medication 81 MILLIGRAM(S): at 17:41

## 2021-09-06 RX ADMIN — HEPARIN SODIUM 1100 UNIT(S)/HR: 5000 INJECTION INTRAVENOUS; SUBCUTANEOUS at 10:44

## 2021-09-06 RX ADMIN — HEPARIN SODIUM 1100 UNIT(S)/HR: 5000 INJECTION INTRAVENOUS; SUBCUTANEOUS at 03:58

## 2021-09-06 RX ADMIN — HEPARIN SODIUM 1300 UNIT(S)/HR: 5000 INJECTION INTRAVENOUS; SUBCUTANEOUS at 18:09

## 2021-09-06 RX ADMIN — HEPARIN SODIUM 3500 UNIT(S): 5000 INJECTION INTRAVENOUS; SUBCUTANEOUS at 04:02

## 2021-09-06 RX ADMIN — CARVEDILOL PHOSPHATE 6.25 MILLIGRAM(S): 80 CAPSULE, EXTENDED RELEASE ORAL at 17:41

## 2021-09-06 NOTE — PROGRESS NOTE ADULT - ATTENDING COMMENTS
seen and agree w/ PA. doing well s/p SVC stent. right arm remains swollen.  some peripheral IV's removed.

## 2021-09-07 ENCOUNTER — RESULT REVIEW (OUTPATIENT)
Age: 66
End: 2021-09-07

## 2021-09-07 ENCOUNTER — APPOINTMENT (OUTPATIENT)
Dept: THORACIC SURGERY | Facility: HOSPITAL | Age: 66
End: 2021-09-07

## 2021-09-07 ENCOUNTER — APPOINTMENT (OUTPATIENT)
Dept: THORACIC SURGERY | Facility: CLINIC | Age: 66
End: 2021-09-07

## 2021-09-07 ENCOUNTER — TRANSCRIPTION ENCOUNTER (OUTPATIENT)
Age: 66
End: 2021-09-07

## 2021-09-07 DIAGNOSIS — I82.C19 ACUTE EMBOLISM AND THROMBOSIS OF UNSPECIFIED INTERNAL JUGULAR VEIN: ICD-10-CM

## 2021-09-07 DIAGNOSIS — Z87.09 PERSONAL HISTORY OF OTHER DISEASES OF THE RESPIRATORY SYSTEM: ICD-10-CM

## 2021-09-07 LAB
ANION GAP SERPL CALC-SCNC: 17 MMOL/L — HIGH (ref 7–14)
APTT BLD: 106.9 SEC — HIGH (ref 27–36.3)
APTT BLD: 126.6 SEC — SIGNIFICANT CHANGE UP (ref 27–36.3)
APTT BLD: 99.1 SEC — HIGH (ref 27–36.3)
BUN SERPL-MCNC: 18 MG/DL — SIGNIFICANT CHANGE UP (ref 7–23)
CALCIUM SERPL-MCNC: 9.6 MG/DL — SIGNIFICANT CHANGE UP (ref 8.4–10.5)
CHLORIDE SERPL-SCNC: 97 MMOL/L — LOW (ref 98–107)
CO2 SERPL-SCNC: 24 MMOL/L — SIGNIFICANT CHANGE UP (ref 22–31)
CREAT SERPL-MCNC: 1.2 MG/DL — SIGNIFICANT CHANGE UP (ref 0.5–1.3)
GLUCOSE SERPL-MCNC: 119 MG/DL — HIGH (ref 70–99)
HCG SERPL-ACNC: 17.1 MIU/ML — SIGNIFICANT CHANGE UP
HCG UR QL: NEGATIVE — SIGNIFICANT CHANGE UP
HCT VFR BLD CALC: 33 % — LOW (ref 34.5–45)
HGB BLD-MCNC: 10.8 G/DL — LOW (ref 11.5–15.5)
INR BLD: 1.06 RATIO — SIGNIFICANT CHANGE UP (ref 0.88–1.16)
MAGNESIUM SERPL-MCNC: 2 MG/DL — SIGNIFICANT CHANGE UP (ref 1.6–2.6)
MCHC RBC-ENTMCNC: 28.3 PG — SIGNIFICANT CHANGE UP (ref 27–34)
MCHC RBC-ENTMCNC: 32.7 GM/DL — SIGNIFICANT CHANGE UP (ref 32–36)
MCV RBC AUTO: 86.6 FL — SIGNIFICANT CHANGE UP (ref 80–100)
NRBC # BLD: 0 /100 WBCS — SIGNIFICANT CHANGE UP
NRBC # FLD: 0 K/UL — SIGNIFICANT CHANGE UP
PHOSPHATE SERPL-MCNC: 4.1 MG/DL — SIGNIFICANT CHANGE UP (ref 2.5–4.5)
PLATELET # BLD AUTO: 295 K/UL — SIGNIFICANT CHANGE UP (ref 150–400)
POTASSIUM SERPL-MCNC: 4 MMOL/L — SIGNIFICANT CHANGE UP (ref 3.5–5.3)
POTASSIUM SERPL-SCNC: 4 MMOL/L — SIGNIFICANT CHANGE UP (ref 3.5–5.3)
PROTHROM AB SERPL-ACNC: 12.2 SEC — SIGNIFICANT CHANGE UP (ref 10.6–13.6)
RBC # BLD: 3.81 M/UL — SIGNIFICANT CHANGE UP (ref 3.8–5.2)
RBC # FLD: 14.6 % — HIGH (ref 10.3–14.5)
SODIUM SERPL-SCNC: 138 MMOL/L — SIGNIFICANT CHANGE UP (ref 135–145)
WBC # BLD: 9.18 K/UL — SIGNIFICANT CHANGE UP (ref 3.8–10.5)
WBC # FLD AUTO: 9.18 K/UL — SIGNIFICANT CHANGE UP (ref 3.8–10.5)

## 2021-09-07 PROCEDURE — 38510 BIOPSY/REMOVAL LYMPH NODES: CPT

## 2021-09-07 PROCEDURE — 88342 IMHCHEM/IMCYTCHM 1ST ANTB: CPT | Mod: 26,59

## 2021-09-07 PROCEDURE — 99221 1ST HOSP IP/OBS SF/LOW 40: CPT | Mod: 25

## 2021-09-07 PROCEDURE — 88360 TUMOR IMMUNOHISTOCHEM/MANUAL: CPT | Mod: 26

## 2021-09-07 PROCEDURE — 88341 IMHCHEM/IMCYTCHM EA ADD ANTB: CPT | Mod: 26,59

## 2021-09-07 PROCEDURE — 88305 TISSUE EXAM BY PATHOLOGIST: CPT | Mod: 26

## 2021-09-07 RX ADMIN — HEPARIN SODIUM 1100 UNIT(S)/HR: 5000 INJECTION INTRAVENOUS; SUBCUTANEOUS at 08:38

## 2021-09-07 RX ADMIN — CHLORHEXIDINE GLUCONATE 1 APPLICATION(S): 213 SOLUTION TOPICAL at 05:12

## 2021-09-07 RX ADMIN — BUDESONIDE AND FORMOTEROL FUMARATE DIHYDRATE 2 PUFF(S): 160; 4.5 AEROSOL RESPIRATORY (INHALATION) at 12:12

## 2021-09-07 RX ADMIN — TIOTROPIUM BROMIDE 1 CAPSULE(S): 18 CAPSULE ORAL; RESPIRATORY (INHALATION) at 12:13

## 2021-09-07 RX ADMIN — Medication 40 MILLIGRAM(S): at 05:12

## 2021-09-07 RX ADMIN — ATORVASTATIN CALCIUM 40 MILLIGRAM(S): 80 TABLET, FILM COATED ORAL at 23:13

## 2021-09-07 RX ADMIN — HEPARIN SODIUM 1300 UNIT(S)/HR: 5000 INJECTION INTRAVENOUS; SUBCUTANEOUS at 00:49

## 2021-09-07 RX ADMIN — CARVEDILOL PHOSPHATE 6.25 MILLIGRAM(S): 80 CAPSULE, EXTENDED RELEASE ORAL at 05:12

## 2021-09-07 RX ADMIN — Medication 81 MILLIGRAM(S): at 23:12

## 2021-09-07 RX ADMIN — CARVEDILOL PHOSPHATE 6.25 MILLIGRAM(S): 80 CAPSULE, EXTENDED RELEASE ORAL at 23:12

## 2021-09-07 NOTE — PROGRESS NOTE ADULT - PROBLEM SELECTOR PLAN 8
dvt propahyxlis
dvt prophylaxis : on full dose heparin
dvt prophylaxis : on full dose heparin
FENP: No IVF, Lytes PRN, dysphagia diet, Lovenox ppx
FENP: No IVF, Lytes PRN, dysphagia diet, Lovenox ppx
dvt prophylaxis : on full dose heparin
dvt propahyxlis
FENP: No IVF, Lytes PRN, dysphagia diet, Lovenox ppx

## 2021-09-07 NOTE — BRIEF OPERATIVE NOTE - COMMENTS
RUTH Arredondo, provided direct first assist support to the surgeon during this surgical procedure. My involvement included positioning, prepping and draping the patient prior to surgery, ensuring clear visibility and exposure for the surgeon by using instruments such as retractors and suction, closing surgical incisions and dressing wounds. As well as other tasks as directed by the surgeon. Can restart hep gtt @ 2:30 AM    I RUTH Flower, provided direct first assist support to the surgeon during this surgical procedure. My involvement included positioning, prepping and draping the patient prior to surgery, ensuring clear visibility and exposure for the surgeon by using instruments such as retractors and suction, closing surgical incisions and dressing wounds. As well as other tasks as directed by the surgeon.

## 2021-09-07 NOTE — PRE-OP CHECKLIST - BOWEL PREP
Pt was admitted on 201 from [address] [facility] at around [address] [address]  Upon arrival pt appeared calm, however stated having moderate anxiety and depression  Pt denied SI, HI and hallucinations  She was seen by psychiatrist outpatient and stated she was compliant with med until last day before admitting  Pt denied using drugs, but smokes 1 pack of cigarettes a day and drinks 6-7 cans of beer every day  Hx of recent SI to overdose on pills that pt denied, abuse on non- prescription analgetics,  depression,bipolar, anxiety disorder, alcohol withdrawal syndrome( pt denied), PTSD,  HTN, bowel surgeries  Pt lives in a house with 27year old son, who pt stated is supportive  n/a

## 2021-09-07 NOTE — DISCHARGE NOTE NURSING/CASE MANAGEMENT/SOCIAL WORK - PATIENT PORTAL LINK FT
You can access the FollowMyHealth Patient Portal offered by [facility] by registering at the following website: [email or web address]. By joining [facility] FollowMyHealth portal, you will also be able to view your health information using other applications (apps) compatible with our system.

## 2021-09-08 ENCOUNTER — TRANSCRIPTION ENCOUNTER (OUTPATIENT)
Age: 66
End: 2021-09-08

## 2021-09-08 VITALS
OXYGEN SATURATION: 96 % | HEART RATE: 86 BPM | RESPIRATION RATE: 18 BRPM | DIASTOLIC BLOOD PRESSURE: 75 MMHG | TEMPERATURE: 98 F | SYSTOLIC BLOOD PRESSURE: 147 MMHG

## 2021-09-08 LAB
ANION GAP SERPL CALC-SCNC: 17 MMOL/L — HIGH (ref 7–14)
APTT BLD: 181.7 SEC — SIGNIFICANT CHANGE UP (ref 27–36.3)
BUN SERPL-MCNC: 26 MG/DL — HIGH (ref 7–23)
CALCIUM SERPL-MCNC: 9.9 MG/DL — SIGNIFICANT CHANGE UP (ref 8.4–10.5)
CHLORIDE SERPL-SCNC: 95 MMOL/L — LOW (ref 98–107)
CO2 SERPL-SCNC: 24 MMOL/L — SIGNIFICANT CHANGE UP (ref 22–31)
CREAT SERPL-MCNC: 1.48 MG/DL — HIGH (ref 0.5–1.3)
GLUCOSE SERPL-MCNC: 172 MG/DL — HIGH (ref 70–99)
GRAM STN FLD: SIGNIFICANT CHANGE UP
HCT VFR BLD CALC: 36 % — SIGNIFICANT CHANGE UP (ref 34.5–45)
HCT VFR BLD CALC: 39.4 % — SIGNIFICANT CHANGE UP (ref 34.5–45)
HGB BLD-MCNC: 11.9 G/DL — SIGNIFICANT CHANGE UP (ref 11.5–15.5)
HGB BLD-MCNC: 13 G/DL — SIGNIFICANT CHANGE UP (ref 11.5–15.5)
MAGNESIUM SERPL-MCNC: 2 MG/DL — SIGNIFICANT CHANGE UP (ref 1.6–2.6)
MCHC RBC-ENTMCNC: 28.6 PG — SIGNIFICANT CHANGE UP (ref 27–34)
MCHC RBC-ENTMCNC: 28.6 PG — SIGNIFICANT CHANGE UP (ref 27–34)
MCHC RBC-ENTMCNC: 33 GM/DL — SIGNIFICANT CHANGE UP (ref 32–36)
MCHC RBC-ENTMCNC: 33.1 GM/DL — SIGNIFICANT CHANGE UP (ref 32–36)
MCV RBC AUTO: 86.5 FL — SIGNIFICANT CHANGE UP (ref 80–100)
MCV RBC AUTO: 86.8 FL — SIGNIFICANT CHANGE UP (ref 80–100)
NRBC # BLD: 0 /100 WBCS — SIGNIFICANT CHANGE UP
NRBC # BLD: 0 /100 WBCS — SIGNIFICANT CHANGE UP
NRBC # FLD: 0 K/UL — SIGNIFICANT CHANGE UP
NRBC # FLD: 0 K/UL — SIGNIFICANT CHANGE UP
PHOSPHATE SERPL-MCNC: 4.4 MG/DL — SIGNIFICANT CHANGE UP (ref 2.5–4.5)
PLATELET # BLD AUTO: 370 K/UL — SIGNIFICANT CHANGE UP (ref 150–400)
PLATELET # BLD AUTO: 417 K/UL — HIGH (ref 150–400)
POTASSIUM SERPL-MCNC: 4.5 MMOL/L — SIGNIFICANT CHANGE UP (ref 3.5–5.3)
POTASSIUM SERPL-SCNC: 4.5 MMOL/L — SIGNIFICANT CHANGE UP (ref 3.5–5.3)
RBC # BLD: 4.16 M/UL — SIGNIFICANT CHANGE UP (ref 3.8–5.2)
RBC # BLD: 4.54 M/UL — SIGNIFICANT CHANGE UP (ref 3.8–5.2)
RBC # FLD: 14.4 % — SIGNIFICANT CHANGE UP (ref 10.3–14.5)
RBC # FLD: 14.5 % — SIGNIFICANT CHANGE UP (ref 10.3–14.5)
SODIUM SERPL-SCNC: 136 MMOL/L — SIGNIFICANT CHANGE UP (ref 135–145)
SPECIMEN SOURCE: SIGNIFICANT CHANGE UP
WBC # BLD: 13.93 K/UL — HIGH (ref 3.8–10.5)
WBC # BLD: 9.74 K/UL — SIGNIFICANT CHANGE UP (ref 3.8–10.5)
WBC # FLD AUTO: 13.93 K/UL — HIGH (ref 3.8–10.5)
WBC # FLD AUTO: 9.74 K/UL — SIGNIFICANT CHANGE UP (ref 3.8–10.5)

## 2021-09-08 RX ORDER — CARVEDILOL PHOSPHATE 80 MG/1
0 CAPSULE, EXTENDED RELEASE ORAL
Qty: 0 | Refills: 0 | DISCHARGE

## 2021-09-08 RX ORDER — ASPIRIN/CALCIUM CARB/MAGNESIUM 324 MG
1 TABLET ORAL
Qty: 0 | Refills: 0 | DISCHARGE
Start: 2021-09-08

## 2021-09-08 RX ORDER — ACETAMINOPHEN 500 MG
2 TABLET ORAL
Qty: 0 | Refills: 0 | DISCHARGE

## 2021-09-08 RX ORDER — ACETAMINOPHEN 500 MG
2 TABLET ORAL
Qty: 0 | Refills: 0 | DISCHARGE
Start: 2021-09-08

## 2021-09-08 RX ORDER — CARVEDILOL PHOSPHATE 80 MG/1
1 CAPSULE, EXTENDED RELEASE ORAL
Qty: 60 | Refills: 0
Start: 2021-09-08 | End: 2021-10-07

## 2021-09-08 RX ORDER — APIXABAN 2.5 MG/1
1 TABLET, FILM COATED ORAL
Qty: 60 | Refills: 0
Start: 2021-09-08 | End: 2021-10-07

## 2021-09-08 RX ORDER — CARVEDILOL PHOSPHATE 80 MG/1
1 CAPSULE, EXTENDED RELEASE ORAL
Qty: 60 | Refills: 0 | DISCHARGE
Start: 2021-09-08 | End: 2021-10-07

## 2021-09-08 RX ORDER — HEPARIN SODIUM 5000 [USP'U]/ML
INJECTION INTRAVENOUS; SUBCUTANEOUS
Qty: 25000 | Refills: 0 | Status: DISCONTINUED | OUTPATIENT
Start: 2021-09-08 | End: 2021-09-08

## 2021-09-08 RX ORDER — LABETALOL HCL 100 MG
1 TABLET ORAL
Qty: 0 | Refills: 0 | DISCHARGE

## 2021-09-08 RX ORDER — ASPIRIN/CALCIUM CARB/MAGNESIUM 324 MG
1 TABLET ORAL
Qty: 0 | Refills: 0 | DISCHARGE

## 2021-09-08 RX ORDER — APIXABAN 2.5 MG/1
5 TABLET, FILM COATED ORAL
Refills: 0 | Status: DISCONTINUED | OUTPATIENT
Start: 2021-09-08 | End: 2021-09-08

## 2021-09-08 RX ADMIN — BUDESONIDE AND FORMOTEROL FUMARATE DIHYDRATE 2 PUFF(S): 160; 4.5 AEROSOL RESPIRATORY (INHALATION) at 10:03

## 2021-09-08 RX ADMIN — APIXABAN 5 MILLIGRAM(S): 2.5 TABLET, FILM COATED ORAL at 12:38

## 2021-09-08 RX ADMIN — Medication 40 MILLIGRAM(S): at 05:19

## 2021-09-08 RX ADMIN — CHLORHEXIDINE GLUCONATE 1 APPLICATION(S): 213 SOLUTION TOPICAL at 05:19

## 2021-09-08 RX ADMIN — HEPARIN SODIUM 1500 UNIT(S)/HR: 5000 INJECTION INTRAVENOUS; SUBCUTANEOUS at 02:45

## 2021-09-08 RX ADMIN — HEPARIN SODIUM 0 UNIT(S)/HR: 5000 INJECTION INTRAVENOUS; SUBCUTANEOUS at 10:02

## 2021-09-08 RX ADMIN — Medication 81 MILLIGRAM(S): at 12:27

## 2021-09-08 RX ADMIN — TIOTROPIUM BROMIDE 1 CAPSULE(S): 18 CAPSULE ORAL; RESPIRATORY (INHALATION) at 12:27

## 2021-09-08 RX ADMIN — CARVEDILOL PHOSPHATE 6.25 MILLIGRAM(S): 80 CAPSULE, EXTENDED RELEASE ORAL at 12:26

## 2021-09-08 RX ADMIN — HEPARIN SODIUM 1200 UNIT(S)/HR: 5000 INJECTION INTRAVENOUS; SUBCUTANEOUS at 11:06

## 2021-09-08 NOTE — PROGRESS NOTE ADULT - PROBLEM SELECTOR PLAN 4
cont home meds
-Continue home ASA    Other home meds to continue
-Continue home ASA  -Unsure why patient not on a ACE/ARB  -Will need to verify with pharmacy in AM what does of coreg patient takes. For now will start at Coreg 6.125 considering the elevated BP
-Continue home ASA  -Unsure why patient not on a ACE/ARB  -Will need to verify with pharmacy in AM what does of coreg patient takes. For now will start at Coreg 6.125 considering the elevated BP
cont home meds
-Continue home ASA    Other home meds to continue
Hx of RUL Lobectomy for resection of lung adenocarcinoma in 9/2019  -S/p completion of chemo 12/2019  -EBUS/mediastinoscopy 7/2021 for increasing lymphadenopathy  -Thoracic surgery following  -Outpt f/u with Dr. Clement, receives serial CT scans q3-6 mo  -May need PET scan outpt   -For LN biopsy today   -serial CT scans q3-6 months, follows Dr. Clement
cont home meds
-Continue home ASA    Other home meds to continue
-Continue home ASA    Other home meds to continue
cont home meds
Hx of RUL Lobectomy for resection of lung adenocarcinoma in 9/2019  -S/p completion of chemo 12/2019  -EBUS/mediastinoscopy 7/2021 for increasing lymphadenopathy  -Thoracic surgery following  -Outpt f/u with Dr. Clement, receives serial CT scans q3-6 mo  -May need PET scan outpt   -S/p LN biopsy, f/u path  -serial CT scans q3-6 months, follows Dr. Clement
-Continue home ASA    Other home meds to continue
conth omem eds

## 2021-09-08 NOTE — CHART NOTE - NSCHARTNOTEFT_GEN_A_CORE
Called IR ok to resume ASA 24 hours post procedure. Will order to resume tonight.     Bettye Bañuelos PA-C  Department of Medicine  Pager [phone]
Called by RN, concern for warmth surrounding midline placed by IR yesterday.    Pt seen and examined at bedside.   Midline intact, no signs of infiltration, flushing well  No impressive warmth, erythema or drainage  Site non tender to palpation, no palpable cord  Dressing changed  no leukocytosis or fever    Will continue to monitor  and escalate to IR as necessary.     Bettye Bañuelos PA-C  Department of Medicine  Pager [phone]
Called by RN, midline with sanginous drainage.     Dressing was changed by RN     Ir called to notify, will continue to monitor     Bettye Bañuelos PA-C  Department of Medicine  Pager [phone]
PRE-INTERVENTIONAL RADIOLOGY PROCEDURE NOTE    Patient Age: 65    Patient Gender: female    Procedure (including site / side if known): SVC stent    Diagnosis / Indication: SVC compression, symptomatic    Interventional Radiology Attending Physician: DR Victor    Ordering Attending Physician: Dr. Clement/Dr. Gibson    Patient and Family aware? Yes
Patient s/p left supraclavicular lymph node biopsy, patient resting comfortably.  Incision with dressing clean and dry.    Vital Signs Last 24 Hrs  T(C): 36.6 (07 Sep 2021 23:00), Max: 36.9 (07 Sep 2021 19:00)  T(F): 97.8 (07 Sep 2021 23:00), Max: 98.4 (07 Sep 2021 19:00)  HR: 84 (07 Sep 2021 23:00) (74 - 90)  BP: 156/99 (07 Sep 2021 23:00) (130/77 - 184/74)  BP(mean): 72 (07 Sep 2021 21:15) (72 - 92)  RR: 18 (07 Sep 2021 23:00) (14 - 18)  SpO2: 96% (07 Sep 2021 23:00) (96% - 99%)    I&O's Detail    MEDICATIONS  (STANDING):  aspirin  chewable 81 milliGRAM(s) Oral daily  atorvastatin 40 milliGRAM(s) Oral at bedtime  budesonide  80 MICROgram(s)/formoterol 4.5 MICROgram(s) Inhaler 2 Puff(s) Inhalation daily  carvedilol 6.25 milliGRAM(s) Oral every 12 hours  chlorhexidine 4% Liquid 1 Application(s) Topical <User Schedule>  furosemide    Tablet 40 milliGRAM(s) Oral daily  heparin  Infusion. 1200 Unit(s)/Hr (12 mL/Hr) IV Continuous <Continuous>  tiotropium 18 MICROgram(s) Capsule 1 Capsule(s) Inhalation daily    A/P: S/P: Left supraclavicular lymph node biopsy  Heparin drip can be restarted at 2:30am   Follow up path   Pain management   Continue care as per primary team
Pt for left supraclavicular LN biopsy tomorrow with DR. Clement  Please send repeat COVID swab today  Type and screen in am  NPO after 12mn tonight  OR planned for 4pm tomorrow. Please hold   Heparin gtt at 10am on 9/7.   Will follow.
Pt resting in bed. No complaints of SOB.     Plan for IR stenting, possibly tomorrow.  Case d/w Dr. Urbano Beaulieu PAC [id number]
Called by vascular lab for report of acute non-occlusive left IJ thrombus. Dr. Gibson updated and patient be started on heparin gtt. IR contacted to determine if patient would be candidate for thrombectomy. Vascular surgery to be called as well.    Guilherme Tejada PA-C  Medicine ACP, pgr [phone]  Available on Microsoft Teams
ENT consulted to assess airway, pt currently in no respiratory distress, d/w attending Dr. Gibson
Pt with L IJ thrombus, as per vascular, no acute vascular intervention.   Asi per IR,  IJ thrombectomy unable to be performed     Bettye Bañuelos PA-C  Department of Medicine  Pager [phone]
Vascular surgery consulted for further evaluation and management recommendations of IJ thrombus. Consult appreciated.    Guilherme Tejada PA-C  Medicine ACP, pgr [phone]  Available on Microsoft Teams

## 2021-09-08 NOTE — PROGRESS NOTE ADULT - PROBLEM SELECTOR PLAN 1
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 she has had biopsies performed recently which were all benign appearing  CT surgery is following.  new clots in left IJ areas. Start on IV heparin  Vascular eval called, Dr Baker  IR will attempt stenting tomorrow
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 she has had biopsies performed recently which were all benign appearing  CT surgery is following.  IR will attempt stenting tomorrow
IR has performed SVC stenting  Will need a biopsy f neck L Nodes
evaluated by NET : no airways compromise at this time: she has no stridor: Cont BD : she is not wheezing    9/2: doing pretty good: for ir intervention: she has no stridor and has no wheezing: cont BD    9/3: her swelling looks better to me/; no resp compromise: on room air:    9/5: doingo k: no SOB: no wheezing: The neck swelling to me looks less:
evaluated by NET : no airways compromise at this time: she has no stridor: Cont BD : she is not wheezing    9/2: doing pretty good: for ir intervention: she has no stridor and has no wheezing: cont BD    9/3: her swelling looks better to me/; no resp compromise: on room air:    9/5: doing ok: no SOB: no wheezing: The neck swelling to me looks less:  9/6: for LN bipsy tomorrow:
IR has performed SVC stenting  Will need a biopsy of neck L Nodes tomorrow moring  Medically optimized for the same
evaluated by NET : no airways compromise at this time: she has no stridor: Cont BD : she is not wheezing
evaluated by NET : no airways compromise at this time: she has no stridor: Cont BD : she is not wheezing    9/2: doing pretty good: for ir intervention: she has no stridor and has no wheezing: cont BD    9/3: her swelling looks better to me/; no resp compromise: on room air:
CT chest with extensive supraclavicular and mediastinal lymphadenopathy with suggestion of complete/near-complete occlusion of the SVC, left brachiocephalic, and left internal jugular veins  -Evaluated by ENT, no airway compromise  -S/p stenting of SVC with improvement of her swelling  -Remains with no respiratory complaints, no wheezing, no stridor  -Breathing comfortably on RA  -S/p L supraclavicular LN excision/biopsy last night  -Some swelling noted at procedure site, dressing CDI  -F/u pathology
CT chest with extensive supraclavicular and mediastinal lymphadenopathy with suggestion of complete/near-complete occlusion of the SVC, left brachiocephalic, and left internal jugular veins  -Evaluated by ENT, no airway compromise  -S/p stenting of SVC with improvement of her swelling  -Plan for LN biopsy today 9/7  -Remains with no respiratory complaints, no wheezing, no stridor  -Breathing comfortably on RA
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 she has had biopsies performed recently which were all benign appearing  CT surgery is following.  new clots in left IJ areas. Start on IV heparin  Vascular eval called, Dr Baker  IR has performed SVC stenting
evaluated by NET : no airways compromise at this time: she has no stridor: Cont BD : she is not wheezing    9/2: doing pretty good: for ir intervention: she has no stridor and has no wheezing: cont BD
IR has performed SVC stenting with improvement  Will need a biopsy of neck L Nodestoday
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 she has had biopsies performed recently which were all benign appearing  CT surgery is following.  IR will attempt stenting on Thursday

## 2021-09-08 NOTE — DIETITIAN INITIAL EVALUATION ADULT. - PERTINENT LABORATORY DATA
(9/8) WBC 13.93 H,  H, Cl 95 L, BUN 26 H, Creat 1.48 H, Glu 172 H;    (9/3) Albumin 3.6;           (8/30) HbA1c 5.5%

## 2021-09-08 NOTE — DIETITIAN INITIAL EVALUATION ADULT. - OTHER INFO
Pt 66 yo female with PMHx of HTN, HLD, CAD s/p stents, COPD hx of resected RR lung adenocarcinoma, former smoker presented with worsening neck swelling and L arm; Pt with SVC syndrome; s/p stent 9/3; Pt s/p Left supraclavicular lymph node biopsy - per chart review.     At time of visit, Pt awake, alert. Per Pt her appetite usually good; no chewing or swallowing difficulty; no nausea, vomiting or diarrhea @ this time. Of note, Pt passed Swallow Bedside Assessment, SLP rec: Regular solids with thin liquids (9/4).     Per Pt, her height: ~60" & her weight: ~180#; no weight loss or weight changes PTA. No other food related concerns voiced @ present. RDN remains available, Pt made aware.

## 2021-09-08 NOTE — PROGRESS NOTE ADULT - PROBLEM SELECTOR PROBLEM 3
Leukocytosis
Thrombosis of internal jugular vein
Leukocytosis
Thrombosis of internal jugular vein
Leukocytosis
Leukocytosis

## 2021-09-08 NOTE — DISCHARGE NOTE PROVIDER - NSDCMRMEDTOKEN_GEN_ALL_CORE_FT
acetaminophen 325 mg oral tablet: 2 tab(s) orally every 6 hours, As needed, Temp greater or equal to 38.5C (101.3F), Mild Pain (1 - 3)  apixaban 5 mg oral tablet: 1 tab(s) orally 2 times a day  aspirin 81 mg oral tablet, chewable: 1 tab(s) orally once a day  atorvastatin 40 mg oral tablet: 1 tab(s) orally once a day  carvedilol 6.25 mg oral tablet: 1 tab(s) orally every 12 hours  Lasix 40 mg oral tablet: 1 tab(s) orally once a day   potassium chloride 10 mEq oral capsule, extended release: 1 cap(s) orally 2 times a day  Trelegy Ellipta inhalation powder: 1 puff(s) inhaled once a day

## 2021-09-08 NOTE — PROGRESS NOTE ADULT - SUBJECTIVE AND OBJECTIVE BOX
Date of service: 09/04/21       HISTORY OF PRESENT ILLNESS:  65 year old female with history of HTN, HLD, CAD s/p ANETTE to the proximal RCA in 2018,  COPD hx of resected RR lung adenocarcinoma (serial CT scans q3-6 months, follows Dr. Clement), former smoker who presents with worsening neck swelling and L arm edema the past 2 weeks.  The patient was admitted and found to have compression of her SVC.  IR was consulted for urgent stent placement.  The patient has no chest pain or anginal symptoms.  She has been maintained on sapt for history of RCA ANETTE in 2018 with her last NST in 2019 demonstrating no ischemia.      pt seen and examined, no complaints, ROS - .          acetaminophen   Tablet .. 650 milliGRAM(s) Oral every 6 hours PRN  aluminum hydroxide/magnesium hydroxide/simethicone Suspension 30 milliLiter(s) Oral every 4 hours PRN  atorvastatin 40 milliGRAM(s) Oral at bedtime  budesonide  80 MICROgram(s)/formoterol 4.5 MICROgram(s) Inhaler 2 Puff(s) Inhalation daily  carvedilol 6.25 milliGRAM(s) Oral every 12 hours  chlorhexidine 4% Liquid 1 Application(s) Topical <User Schedule>  furosemide    Tablet 40 milliGRAM(s) Oral daily  heparin   Injectable 6500 Unit(s) IV Push every 6 hours PRN  heparin   Injectable 3000 Unit(s) IV Push every 6 hours PRN  heparin  Infusion.  Unit(s)/Hr IV Continuous <Continuous>  melatonin 3 milliGRAM(s) Oral at bedtime PRN  ondansetron Injectable 4 milliGRAM(s) IV Push every 8 hours PRN  sodium chloride 0.9% lock flush 10 milliLiter(s) IV Push every 1 hour PRN  tiotropium 18 MICROgram(s) Capsule 1 Capsule(s) Inhalation daily                            10.5   7.98  )-----------( 246      ( 04 Sep 2021 04:04 )             30.7       Hemoglobin: 10.5 g/dL (09-04 @ 04:04)  Hemoglobin: 11.4 g/dL (09-03 @ 10:23)  Hemoglobin: 10.9 g/dL (09-03 @ 03:33)  Hemoglobin: 10.6 g/dL (09-02 @ 19:13)  Hemoglobin: 11.0 g/dL (09-02 @ 05:15)      09-04    134<L>  |  95<L>  |  17  ----------------------------<  109<H>  3.1<L>   |  26  |  1.19    Ca    9.2      04 Sep 2021 04:04  Phos  4.5     09-04  Mg     2.10     09-04    TPro  6.9  /  Alb  3.6  /  TBili  0.6  /  DBili  x   /  AST  20  /  ALT  13  /  AlkPhos  118  09-03    Creatinine Trend: 1.19<--, 1.16<--, 1.22<--, 1.22<--, 1.20<--, 1.22<--    COAGS: PTT - ( 04 Sep 2021 04:04 )  PTT:46.9 sec          T(C): 36.5 (09-04-21 @ 06:00), Max: 36.7 (09-03-21 @ 19:20)  HR: 72 (09-04-21 @ 06:00) (72 - 96)  BP: 124/63 (09-04-21 @ 06:00) (115/67 - 150/62)  RR: 17 (09-04-21 @ 06:00) (16 - 17)  SpO2: 98% (09-04-21 @ 06:00) (98% - 99%)  Wt(kg): --    I&O's Summary      Gen: Edematous head + neck.  HEENT:   Normal oral mucosa, PERRL, EOMI	  Lymphatic: No lymphadenopathy , + LUE edema   Cardiovascular: Normal S1 S2, No JVD, No murmurs , Peripheral pulses palpable 2+ bilaterally  Respiratory: Lungs clear to auscultation, normal effort 	  Gastrointestinal:  Soft, Non-tender, + BS	  Skin: No rashes, No ecchymoses, No cyanosis, warm to touch  Musculoskeletal: Normal range of motion, normal strength  Psychiatry:  Mood & affect appropriate    TELEMETRY:  	    ECG: < from: 12 Lead ECG (08.30.21 @ 12:32) >  Normal sinus rhythm  Possible Left atrial enlargement  Nonspecific T wave abnormality  Abnormal ECG    ASSESSMENT/PLAN: 65 year old female with history of HTN, HLD, CAD s/p ANETTE to the proximal RCA in 2018,  COPD hx of resected RR lung adenocarcinoma (serial CT scans q3-6 months, follows Dr. Clement), former smoker who presents with worsening neck swelling and L arm edema the past 2 weeks.    -pt. with no chest pain or anginal symptoms  -last stress test with no ischemia  -recommend sapt for history of PCI > 1 year ago with asa 81mg PO daily  -optimized from cv perspective for planned IR procedure  -treatment of IVC thrombus per vascular and medicine - currently on hep gtt   -follow up thoracic surgery   
Date of service: 09/05/21       HISTORY OF PRESENT ILLNESS:  65 year old female with history of HTN, HLD, CAD s/p ANETTE to the proximal RCA in 2018,  COPD hx of resected RR lung adenocarcinoma (serial CT scans q3-6 months, follows Dr. Clement), former smoker who presents with worsening neck swelling and L arm edema the past 2 weeks.  The patient was admitted and found to have compression of her SVC.  IR was consulted for urgent stent placement.  The patient has no chest pain or anginal symptoms.  She has been maintained on sapt for history of RCA ANETTE in 2018 with her last NST in 2019 demonstrating no ischemia.      pt seen and examined, no complaints, ROS - .     Review of Systems:   Constitutional: [ ] fevers, [ ] chills.   Skin: [ ] dry skin. [ ] rashes.  Psychiatric: [ ] depression, [ ] anxiety.   Gastrointestinal: [ ] BRBPR, [ ] melena.   Neurological: [ ] confusion. [ ] seizures. [ ] shuffling gait.   Ears,Nose,Mouth and Throat: [ ] ear pain [ ] sore throat.   Eyes: [ ] diplopia.   Respiratory: [ ] hemoptysis. [ ] shortness of breath  Cardiovascular: See HPI above  Hematologic/Lymphatic: [ ] anemia. [ ] painful nodes. [ ] prolonged bleeding.   Genitourinary: [ ] hematuria. [ ] flank pain.   Endocrine: [ ] significant change in weight. [ ] intolerance to heat and cold.     Review of systems [x ] otherwise negative, [ ] otherwise unable to obtain    FH: no family history of sudden cardiac death in first degree relatives    SH: [ ] tobacco, [ ] alcohol, [ ] drugs    acetaminophen   Tablet .. 650 milliGRAM(s) Oral every 6 hours PRN  aluminum hydroxide/magnesium hydroxide/simethicone Suspension 30 milliLiter(s) Oral every 4 hours PRN  aspirin  chewable 81 milliGRAM(s) Oral daily  atorvastatin 40 milliGRAM(s) Oral at bedtime  budesonide  80 MICROgram(s)/formoterol 4.5 MICROgram(s) Inhaler 2 Puff(s) Inhalation daily  carvedilol 6.25 milliGRAM(s) Oral every 12 hours  chlorhexidine 4% Liquid 1 Application(s) Topical <User Schedule>  furosemide    Tablet 40 milliGRAM(s) Oral daily  heparin   Injectable 7500 Unit(s) IV Push every 6 hours PRN  heparin   Injectable 3500 Unit(s) IV Push every 6 hours PRN  heparin  Infusion. 1200 Unit(s)/Hr IV Continuous <Continuous>  melatonin 3 milliGRAM(s) Oral at bedtime PRN  ondansetron Injectable 4 milliGRAM(s) IV Push every 8 hours PRN  potassium chloride    Tablet ER 40 milliEquivalent(s) Oral every 4 hours  sodium chloride 0.9% lock flush 10 milliLiter(s) IV Push every 1 hour PRN  tiotropium 18 MICROgram(s) Capsule 1 Capsule(s) Inhalation daily                            10.4   9.35  )-----------( 241      ( 05 Sep 2021 05:21 )             31.0       09-05    132<L>  |  95<L>  |  19  ----------------------------<  129<H>  3.2<L>   |  23  |  1.26    Ca    9.0      05 Sep 2021 05:21  Phos  4.0     09-05  Mg     2.00     09-05              T(C): 36.6 (09-05-21 @ 05:34), Max: 36.6 (09-05-21 @ 05:34)  HR: 77 (09-05-21 @ 05:34) (77 - 90)  BP: 123/61 (09-05-21 @ 05:34) (108/50 - 131/74)  RR: 18 (09-05-21 @ 05:34) (18 - 18)  SpO2: 97% (09-05-21 @ 05:34) (79% - 98%)  Wt(kg): --       Gen: Edematous head + neck much improved  HEENT:   Normal oral mucosa, PERRL, EOMI	  Lymphatic: No lymphadenopathy , + LUE edema   Cardiovascular: Normal S1 S2, No JVD, No murmurs , Peripheral pulses palpable 2+ bilaterally  Respiratory: Lungs clear to auscultation, normal effort 	  Gastrointestinal:  Soft, Non-tender, + BS	  Skin: No rashes, No ecchymoses, No cyanosis, warm to touch  Musculoskeletal: Normal range of motion, normal strength  Psychiatry:  Mood & affect appropriate    TELEMETRY:  	    ECG: < from: 12 Lead ECG (08.30.21 @ 12:32) >  Normal sinus rhythm  Possible Left atrial enlargement  Nonspecific T wave abnormality  Abnormal ECG    ASSESSMENT/PLAN: 65 year old female with history of HTN, HLD, CAD s/p ANETTE to the proximal RCA in 2018,  COPD hx of resected RR lung adenocarcinoma (serial CT scans q3-6 months, follows Dr. Clement), former smoker who presents with worsening neck swelling and L arm edema the past 2 weeks.    -s/p stenting of SVC with improvement of her swelling  -planned for IR LN biopsy next week  -pt. with no chest pain or anginal symptoms  -last stress test with no ischemia  -recommend sapt for history of PCI > 1 year ago with asa 81mg PO daily  -treatment of IVC thrombus per vascular and medicine - currently on hep gtt [facility]- procedure      
Interventional Radiology Progress Note    S: 65y Female  with pmhx of  HTN, HLD, CAD s/p stent on ASA, COPD, Adenocarcinoma of R lung s/p surgery 9/3/2019 presents with 2weeks of swelling in neck and swelling in LUE. patient is found to have extensive mediastinal lymphadenopathy with resultant compression of the SVC. patient is status post SVC stenting 9/3. patient reports significant improvement this AM in neck and arm swelling and is tolerating eating much better. patient without pain.     O:  Vital Signs Last 24 Hrs  T(C): 36.5 (04 Sep 2021 06:00), Max: 36.7 (03 Sep 2021 19:20)  T(F): 97.7 (04 Sep 2021 06:00), Max: 98 (03 Sep 2021 19:20)  HR: 72 (04 Sep 2021 06:00) (72 - 96)  BP: 124/63 (04 Sep 2021 06:00) (115/67 - 133/70)  BP(mean): --  RR: 17 (04 Sep 2021 06:00) (16 - 17)  SpO2: 98% (04 Sep 2021 06:00) (98% - 99%)    I&O's Detail      MEDICATIONS  (STANDING):  atorvastatin 40 milliGRAM(s) Oral at bedtime  budesonide  80 MICROgram(s)/formoterol 4.5 MICROgram(s) Inhaler 2 Puff(s) Inhalation daily  carvedilol 6.25 milliGRAM(s) Oral every 12 hours  chlorhexidine 4% Liquid 1 Application(s) Topical <User Schedule>  furosemide    Tablet 40 milliGRAM(s) Oral daily  heparin  Infusion.  Unit(s)/Hr (15 mL/Hr) IV Continuous <Continuous>  tiotropium 18 MICROgram(s) Capsule 1 Capsule(s) Inhalation daily    MEDICATIONS  (PRN):  acetaminophen   Tablet .. 650 milliGRAM(s) Oral every 6 hours PRN Temp greater or equal to 38.5C (101.3F), Mild Pain (1 - 3)  aluminum hydroxide/magnesium hydroxide/simethicone Suspension 30 milliLiter(s) Oral every 4 hours PRN Dyspepsia  heparin   Injectable 6500 Unit(s) IV Push every 6 hours PRN For aPTT less than 40  heparin   Injectable 3000 Unit(s) IV Push every 6 hours PRN For aPTT between 40 - 57  melatonin 3 milliGRAM(s) Oral at bedtime PRN Insomnia  ondansetron Injectable 4 milliGRAM(s) IV Push every 8 hours PRN Nausea and/or Vomiting  sodium chloride 0.9% lock flush 10 milliLiter(s) IV Push every 1 hour PRN Pre/post blood products, medications, blood draw, and to maintain line patency                            10.5   7.98  )-----------( 246      ( 04 Sep 2021 04:04 )             30.7       09-04    134<L>  |  95<L>  |  17  ----------------------------<  109<H>  3.1<L>   |  26  |  1.19    Ca    9.2      04 Sep 2021 04:04  Phos  4.5     09-04  Mg     2.10     09-04    TPro  6.9  /  Alb  3.6  /  TBili  0.6  /  DBili  x   /  AST  20  /  ALT  13  /  AlkPhos  118  09-03      PHYSICAL EXAM:  Gen: NAD, neck swelling markedly decreased, AAOx3  Ext: LUE puncture site dressing c/d/i, no pain, decreased b/l upper extremity swelling  Chest: non labored breathing on NC  
Interventional Radiology Progress Note    S: 65y Female pmhx of  HTN, HLD, CAD s/p stent on ASA, COPD, Adenocarcinoma of R lung s/p surgery 9/3/2019 presents with 2weeks of swelling in neck and swelling in LUE. patient is found to have extensive mediastinal lymphadenopathy with resultant compression of the SVC. patient is status post SVC stenting 9/3. patient seen and examined at bedside this AM. patient reports continued improvement in upper extremity and neck swelling. no pain.    O:  Vital Signs Last 24 Hrs  T(C): 36.6 (05 Sep 2021 05:34), Max: 36.6 (04 Sep 2021 12:30)  T(F): 97.8 (05 Sep 2021 05:34), Max: 97.8 (04 Sep 2021 12:30)  HR: 77 (05 Sep 2021 05:34) (77 - 90)  BP: 123/61 (05 Sep 2021 05:34) (108/50 - 145/69)  BP(mean): --  RR: 18 (05 Sep 2021 05:34) (18 - 18)  SpO2: 97% (05 Sep 2021 05:34) (79% - 98%)    I&O's Detail      MEDICATIONS  (STANDING):  aspirin  chewable 81 milliGRAM(s) Oral daily  atorvastatin 40 milliGRAM(s) Oral at bedtime  budesonide  80 MICROgram(s)/formoterol 4.5 MICROgram(s) Inhaler 2 Puff(s) Inhalation daily  carvedilol 6.25 milliGRAM(s) Oral every 12 hours  chlorhexidine 4% Liquid 1 Application(s) Topical <User Schedule>  furosemide    Tablet 40 milliGRAM(s) Oral daily  heparin  Infusion. 1200 Unit(s)/Hr (12 mL/Hr) IV Continuous <Continuous>  potassium chloride    Tablet ER 40 milliEquivalent(s) Oral every 4 hours  tiotropium 18 MICROgram(s) Capsule 1 Capsule(s) Inhalation daily    MEDICATIONS  (PRN):  acetaminophen   Tablet .. 650 milliGRAM(s) Oral every 6 hours PRN Temp greater or equal to 38.5C (101.3F), Mild Pain (1 - 3)  aluminum hydroxide/magnesium hydroxide/simethicone Suspension 30 milliLiter(s) Oral every 4 hours PRN Dyspepsia  heparin   Injectable 7500 Unit(s) IV Push every 6 hours PRN For aPTT less than 40  heparin   Injectable 3500 Unit(s) IV Push every 6 hours PRN For aPTT between 40 - 57  melatonin 3 milliGRAM(s) Oral at bedtime PRN Insomnia  ondansetron Injectable 4 milliGRAM(s) IV Push every 8 hours PRN Nausea and/or Vomiting  sodium chloride 0.9% lock flush 10 milliLiter(s) IV Push every 1 hour PRN Pre/post blood products, medications, blood draw, and to maintain line patency                            10.4   9.35  )-----------( 241      ( 05 Sep 2021 05:21 )             31.0       09-05    132<L>  |  95<L>  |  19  ----------------------------<  129<H>  3.2<L>   |  23  |  1.26    Ca    9.0      05 Sep 2021 05:21  Phos  4.0     09-05  Mg     2.00     09-05      PHYSICAL EXAM:  Gen: NAD, neck swelling decreased, AAOx3  Ext: LUE puncture site dressing c/d/i, no pain, decreased b/l upper extremity swelling, RUE midline in place without erythema or TTP  Chest: non labored breathing on room air  
Patient is a 65y old  Female who presents with a chief complaint of Neck swelling (31 Aug 2021 16:49)    21  HPI:  Neck, left arm, left leg swelling much better  For LN biopsy in AM    PAST MEDICAL & SURGICAL HISTORY:  Hypertension    Hyperlipidemia    Pulmonary nodules  yearly CT scans    Carotid artery disease  monitored by vascluar doctor Doscher    Emphysema    Hip pain    Obesity    Edema of both legs    Lung cancer  right, 2019, completed chemotherapy 2019    Stented coronary artery    Localized enlarged lymph nodes    Scoliosis    Lumbar disc disorder    COPD (chronic obstructive pulmonary disease)    History of  section  x2 ,     S/P lobectomy of lung  RULobectomy     History of hip replacement  right 2021    Stented coronary artery  x2 stents 2018        Review of Systems:   CONSTITUTIONAL: No fever, weight loss, or fatigue  EYES: No eye pain, visual disturbances, or discharge  ENMT:  No difficulty hearing, tinnitus, vertigo; No sinus or throat pain. Neck swollen  NECK: No pain or stiffness  BREASTS: No pain, masses, or nipple discharge  RESPIRATORY: No cough, wheezing, chills or hemoptysis; No shortness of breath  CARDIOVASCULAR: No chest pain, palpitations, dizziness, Left arm and legs swollen  GASTROINTESTINAL: No abdominal or epigastric pain. No nausea, vomiting, or hematemesis; No diarrhea or constipation. No melena or hematochezia.  GENITOURINARY: No dysuria, frequency, hematuria, or incontinence  NEUROLOGICAL: No headaches, memory loss, loss of strength, numbness, or tremors  SKIN: No itching, burning, rashes, or lesions   LYMPH NODES: No enlarged glands  ENDOCRINE: No heat or cold intolerance; No hair loss  MUSCULOSKELETAL: No joint pain or swelling; No muscle, back, or extremity pain  PSYCHIATRIC: No depression, anxiety, mood swings, or difficulty sleeping  HEME/LYMPH: No easy bruising, or bleeding gums  ALLERY AND IMMUNOLOGIC: No hives or eczema    Allergies    penicillin (Short breath; Hives)  tetracycline (Short breath; Hives)    Intolerances        Social History:     FAMILY HISTORY:  Family history of CHF (congestive heart failure) (Aunt)        MEDICATIONS  (STANDING):  aspirin  chewable 81 milliGRAM(s) Oral daily  atorvastatin 40 milliGRAM(s) Oral at bedtime  budesonide  80 MICROgram(s)/formoterol 4.5 MICROgram(s) Inhaler 2 Puff(s) Inhalation daily  carvedilol 6.25 milliGRAM(s) Oral every 12 hours  enoxaparin Injectable 40 milliGRAM(s) SubCutaneous at bedtime  furosemide    Tablet 40 milliGRAM(s) Oral daily  tiotropium 18 MICROgram(s) Capsule 1 Capsule(s) Inhalation daily    MEDICATIONS  (PRN):  acetaminophen   Tablet .. 650 milliGRAM(s) Oral every 6 hours PRN Temp greater or equal to 38.5C (101.3F), Mild Pain (1 - 3)  aluminum hydroxide/magnesium hydroxide/simethicone Suspension 30 milliLiter(s) Oral every 4 hours PRN Dyspepsia  melatonin 3 milliGRAM(s) Oral at bedtime PRN Insomnia  ondansetron Injectable 4 milliGRAM(s) IV Push every 8 hours PRN Nausea and/or Vomiting        CAPILLARY BLOOD GLUCOSE        I&O's Summary      PHYSICAL EXAM:  Vital Signs Last 24 Hrs  T(C): 36.4 (01 Sep 2021 04:37), Max: 36.8 (31 Aug 2021 17:56)  T(F): 97.6 (01 Sep 2021 04:37), Max: 98.2 (31 Aug 2021 17:56)  HR: 90 (01 Sep 2021 04:37) (72 - 90)  BP: 111/62 (01 Sep 2021 04:37) (111/62 - 151/80)  BP(mean): --  RR: 20 (01 Sep 2021 04:37) (17 - 20)  SpO2: 97% (01 Sep 2021 04:37) (96% - 100%)    GENERAL: NAD, well-developed  HEAD:  Atraumatic, Normocephalic  EYES: EOMI, PERRLA, conjunctiva and sclera clear  NECK: Swollen, No JVD  CHEST/LUNG: Clear to auscultation bilaterally; No wheeze  HEART: Regular rate and rhythm; No murmurs, rubs, or gallops  ABDOMEN: Soft, Nontender, Nondistended; Bowel sounds present  EXTREMITIES:  2+ Peripheral Pulses, No clubbing, cyanosis, Left leg edema  PSYCH: AAOx3  NEUROLOGY: non-focal  SKIN: No rashes or lesions    LABS:                        11.5   10.77 )-----------( 301      ( 01 Sep 2021 10:49 )             34.2     08-30    137  |  96<L>  |  19  ----------------------------<  103<H>  3.6   |  24  |  1.20    Ca    9.7      30 Aug 2021 23:35  Phos  3.6       Mg     2.20         TPro  7.3  /  Alb  4.3  /  TBili  0.6  /  DBili  x   /  AST  15  /  ALT  13  /  AlkPhos  149<H>      PT/INR - ( 31 Aug 2021 06:40 )   PT: 13.7 sec;   INR: 1.20 ratio         PTT - ( 30 Aug 2021 21:52 )  PTT:36.9 sec          RADIOLOGY & ADDITIONAL TESTS:    Imaging Personally Reviewed:    Consultant(s) Notes Reviewed:      Care Discussed with Consultants/Other Providers:  
Patient is a 65y old  Female who presents with a chief complaint of Neck swelling (31 Aug 2021 16:49)    21  HPI:  Neck, left arm, left leg swelling much better  For LN biopsy today    PAST MEDICAL & SURGICAL HISTORY:  Hypertension    Hyperlipidemia    Pulmonary nodules  yearly CT scans    Carotid artery disease  monitored by vascluar doctor Doscher    Emphysema    Hip pain    Obesity    Edema of both legs    Lung cancer  right, 2019, completed chemotherapy 2019    Stented coronary artery    Localized enlarged lymph nodes    Scoliosis    Lumbar disc disorder    COPD (chronic obstructive pulmonary disease)    History of  section  x2 ,     S/P lobectomy of lung  RULobectomy     History of hip replacement  right 2021    Stented coronary artery  x2 stents 2018        Review of Systems:   CONSTITUTIONAL: No fever, weight loss, or fatigue  EYES: No eye pain, visual disturbances, or discharge  ENMT:  No difficulty hearing, tinnitus, vertigo; No sinus or throat pain. Neck swollen  NECK: No pain or stiffness  BREASTS: No pain, masses, or nipple discharge  RESPIRATORY: No cough, wheezing, chills or hemoptysis; No shortness of breath  CARDIOVASCULAR: No chest pain, palpitations, dizziness, Left arm and legs swollen  GASTROINTESTINAL: No abdominal or epigastric pain. No nausea, vomiting, or hematemesis; No diarrhea or constipation. No melena or hematochezia.  GENITOURINARY: No dysuria, frequency, hematuria, or incontinence  NEUROLOGICAL: No headaches, memory loss, loss of strength, numbness, or tremors  SKIN: No itching, burning, rashes, or lesions   LYMPH NODES: No enlarged glands  ENDOCRINE: No heat or cold intolerance; No hair loss  MUSCULOSKELETAL: No joint pain or swelling; No muscle, back, or extremity pain  PSYCHIATRIC: No depression, anxiety, mood swings, or difficulty sleeping  HEME/LYMPH: No easy bruising, or bleeding gums  ALLERY AND IMMUNOLOGIC: No hives or eczema    Allergies    penicillin (Short breath; Hives)  tetracycline (Short breath; Hives)    Intolerances        Social History:     FAMILY HISTORY:  Family history of CHF (congestive heart failure) (Aunt)        MEDICATIONS  (STANDING):  aspirin  chewable 81 milliGRAM(s) Oral daily  atorvastatin 40 milliGRAM(s) Oral at bedtime  budesonide  80 MICROgram(s)/formoterol 4.5 MICROgram(s) Inhaler 2 Puff(s) Inhalation daily  carvedilol 6.25 milliGRAM(s) Oral every 12 hours  enoxaparin Injectable 40 milliGRAM(s) SubCutaneous at bedtime  furosemide    Tablet 40 milliGRAM(s) Oral daily  tiotropium 18 MICROgram(s) Capsule 1 Capsule(s) Inhalation daily    MEDICATIONS  (PRN):  acetaminophen   Tablet .. 650 milliGRAM(s) Oral every 6 hours PRN Temp greater or equal to 38.5C (101.3F), Mild Pain (1 - 3)  aluminum hydroxide/magnesium hydroxide/simethicone Suspension 30 milliLiter(s) Oral every 4 hours PRN Dyspepsia  melatonin 3 milliGRAM(s) Oral at bedtime PRN Insomnia  ondansetron Injectable 4 milliGRAM(s) IV Push every 8 hours PRN Nausea and/or Vomiting        CAPILLARY BLOOD GLUCOSE        I&O's Summary      PHYSICAL EXAM:  Vital Signs Last 24 Hrs  T(C): 36.4 (01 Sep 2021 04:37), Max: 36.8 (31 Aug 2021 17:56)  T(F): 97.6 (01 Sep 2021 04:37), Max: 98.2 (31 Aug 2021 17:56)  HR: 90 (01 Sep 2021 04:37) (72 - 90)  BP: 111/62 (01 Sep 2021 04:37) (111/62 - 151/80)  BP(mean): --  RR: 20 (01 Sep 2021 04:37) (17 - 20)  SpO2: 97% (01 Sep 2021 04:37) (96% - 100%)    GENERAL: NAD, well-developed  HEAD:  Atraumatic, Normocephalic  EYES: EOMI, PERRLA, conjunctiva and sclera clear  NECK: Swollen, No JVD  CHEST/LUNG: Clear to auscultation bilaterally; No wheeze  HEART: Regular rate and rhythm; No murmurs, rubs, or gallops  ABDOMEN: Soft, Nontender, Nondistended; Bowel sounds present  EXTREMITIES:  2+ Peripheral Pulses, No clubbing, cyanosis, Left leg edema  PSYCH: AAOx3  NEUROLOGY: non-focal  SKIN: No rashes or lesions    LABS:                        11.5   10.77 )-----------( 301      ( 01 Sep 2021 10:49 )             34.2     08-30    137  |  96<L>  |  19  ----------------------------<  103<H>  3.6   |  24  |  1.20    Ca    9.7      30 Aug 2021 23:35  Phos  3.6     -  Mg     2.20         TPro  7.3  /  Alb  4.3  /  TBili  0.6  /  DBili  x   /  AST  15  /  ALT  13  /  AlkPhos  149<H>      PT/INR - ( 31 Aug 2021 06:40 )   PT: 13.7 sec;   INR: 1.20 ratio         PTT - ( 30 Aug 2021 21:52 )  PTT:36.9 sec          RADIOLOGY & ADDITIONAL TESTS:    Imaging Personally Reviewed:    Consultant(s) Notes Reviewed:      Care Discussed with Consultants/Other Providers:  
Date of service: 09/02/21    Requesting Physician : Dr. Shephedr     Reason for Consultation: CAD    HISTORY OF PRESENT ILLNESS:  65 year old female with history of HTN, HLD, CAD s/p ANETTE to the proximal RCA in 2018,  COPD hx of resected RR lung adenocarcinoma (serial CT scans q3-6 months, follows Dr. Clement), former smoker who presents with worsening neck swelling and L arm edema the past 2 weeks.  The patient was admitted and found to have compression of her SVC.  IR was consulted for urgent stent placement.  The patient has no chest pain or anginal symptoms.  She has been maintained on sapt for history of RCA ANETTE in 2018 with her last NST in 2019 demonstrating no ischemia.      S: no chest pain or sob; ros otherwise negative.         acetaminophen   Tablet .. 650 milliGRAM(s) Oral every 6 hours PRN  aluminum hydroxide/magnesium hydroxide/simethicone Suspension 30 milliLiter(s) Oral every 4 hours PRN  atorvastatin 40 milliGRAM(s) Oral at bedtime  budesonide  80 MICROgram(s)/formoterol 4.5 MICROgram(s) Inhaler 2 Puff(s) Inhalation daily  carvedilol 6.25 milliGRAM(s) Oral every 12 hours  furosemide    Tablet 40 milliGRAM(s) Oral daily  heparin   Injectable 6500 Unit(s) IV Push every 6 hours PRN  heparin   Injectable 3000 Unit(s) IV Push every 6 hours PRN  heparin  Infusion.  Unit(s)/Hr IV Continuous <Continuous>  melatonin 3 milliGRAM(s) Oral at bedtime PRN  ondansetron Injectable 4 milliGRAM(s) IV Push every 8 hours PRN  tiotropium 18 MICROgram(s) Capsule 1 Capsule(s) Inhalation daily                            11.0   9.60  )-----------( 310      ( 02 Sep 2021 05:15 )             32.7       09-02    134<L>  |  95<L>  |  19  ----------------------------<  120<H>  3.7   |  21<L>  |  1.22    Ca    9.4      02 Sep 2021 05:15  Phos  4.3     09-02  Mg     2.20     09-02    TPro  7.2  /  Alb  3.8  /  TBili  0.8  /  DBili  x   /  AST  14  /  ALT  13  /  AlkPhos  123<H>  09-02            T(C): 36.9 (09-02-21 @ 11:39), Max: 36.9 (09-01-21 @ 16:15)  HR: 79 (09-02-21 @ 11:39) (75 - 87)  BP: 126/72 (09-02-21 @ 11:39) (108/61 - 180/80)  RR: 16 (09-02-21 @ 11:39) (16 - 18)  SpO2: 98% (09-02-21 @ 11:39) (98% - 100%)  Wt(kg): --    I&O's Summary      Gen: Edematous head + neck.  HEENT:   Normal oral mucosa, PERRL, EOMI	  Lymphatic: No lymphadenopathy , + LUE edema   Cardiovascular: Normal S1 S2, No JVD, No murmurs , Peripheral pulses palpable 2+ bilaterally  Respiratory: Lungs clear to auscultation, normal effort 	  Gastrointestinal:  Soft, Non-tender, + BS	  Skin: No rashes, No ecchymoses, No cyanosis, warm to touch  Musculoskeletal: Normal range of motion, normal strength  Psychiatry:  Mood & affect appropriate    TELEMETRY:  	    ECG: < from: 12 Lead ECG (08.30.21 @ 12:32) >  Normal sinus rhythm  Possible Left atrial enlargement  Nonspecific T wave abnormality  Abnormal ECG    ASSESSMENT/PLAN: 65 year old female with history of HTN, HLD, CAD s/p ANETTE to the proximal RCA in 2018,  COPD hx of resected RR lung adenocarcinoma (serial CT scans q3-6 months, follows Dr. Clement), former smoker who presents with worsening neck swelling and L arm edema the past 2 weeks.    -pt. with no chest pain or anginal symptoms  -last stress test with no ischemia  -recommend sapt for history of PCI > 1 year ago with asa 81mg PO daily  -optimized from cv perspective for planned IR procedure  -treatment of IVC thrombus per vascular and medicine   -follow up thoracic surgery    Tip Hernandez MD     
 pt resting in bed, she still admits upper extremity and face and neck swelling  she admits feeling anxious and scared  she is NPO for possible IR stenting today   is at bedside    Vital Signs Last 24 Hrs  T(C): 36.6 (03 Sep 2021 05:26), Max: 36.9 (02 Sep 2021 11:39)  T(F): 97.9 (03 Sep 2021 05:26), Max: 98.4 (02 Sep 2021 11:39)  HR: 78 (03 Sep 2021 09:24) (78 - 80)  BP: 150/62 (03 Sep 2021 09:24) (112/64 - 150/62)  BP(mean): --  RR: 16 (03 Sep 2021 09:24) (16 - 17)  SpO2: 98% (03 Sep 2021 09:24) (97% - 99%)    PHYSICAL EXAM  GENERAL: pt awake and alert, no acute distress  HEAD:  Atraumatic, Normocephalic  EYES: EOMI, PERRLA, conjunctiva and sclera clear  NECK: Swollen, No JVD  CHEST/LUNG: Clear to auscultation bilaterally; No wheeze  HEART: Regular rate and rhythm; No murmurs, rubs, or gallops  ABDOMEN: Soft, Nontender, Nondistended; Bowel sounds present  EXTREMITIES:  2+ Peripheral Pulses, No clubbing, cyanosis, Left leg edema; upper extremitiese edematous  PSYCH: AAOx3  NEUROLOGY: non-focal  SKIN: No rashes or lesions    ASSESSMENT  65F with HTN, HLD, CAD s/p stents (last in 2018), COPD hx of resected RR lung adenocarcinoma (serial CT scans q3-6 months, follows Dr. Clement), former smoker who presents with worsening neck swelling and L arm concerning for SVC syndrome.     PLAN  IR today for SVC stent  Plan for OR with thoracic surgery, Dr Yaw Clement on Tuesday, September 7, 2021  preop coags, type and screen, covid swab, hcg  held heparin gtt in AM on 9/7  continue care per primary team    Romelia Johnson [id number]
Date of Service: 09-05-21 @ 13:17    Patient is a 65y old  Female who presents with a chief complaint of Neck swelling (05 Sep 2021 11:15)      Any change in ROS: Doing ok : no SOB : neck swelling is down: able to   her left arm above her h ead:       MEDICATIONS  (STANDING):  aspirin  chewable 81 milliGRAM(s) Oral daily  atorvastatin 40 milliGRAM(s) Oral at bedtime  budesonide  80 MICROgram(s)/formoterol 4.5 MICROgram(s) Inhaler 2 Puff(s) Inhalation daily  carvedilol 6.25 milliGRAM(s) Oral every 12 hours  chlorhexidine 4% Liquid 1 Application(s) Topical <User Schedule>  furosemide    Tablet 40 milliGRAM(s) Oral daily  heparin  Infusion. 1200 Unit(s)/Hr (12 mL/Hr) IV Continuous <Continuous>  potassium chloride    Tablet ER 40 milliEquivalent(s) Oral every 4 hours  tiotropium 18 MICROgram(s) Capsule 1 Capsule(s) Inhalation daily    MEDICATIONS  (PRN):  acetaminophen   Tablet .. 650 milliGRAM(s) Oral every 6 hours PRN Temp greater or equal to 38.5C (101.3F), Mild Pain (1 - 3)  aluminum hydroxide/magnesium hydroxide/simethicone Suspension 30 milliLiter(s) Oral every 4 hours PRN Dyspepsia  heparin   Injectable 7500 Unit(s) IV Push every 6 hours PRN For aPTT less than 40  heparin   Injectable 3500 Unit(s) IV Push every 6 hours PRN For aPTT between 40 - 57  melatonin 3 milliGRAM(s) Oral at bedtime PRN Insomnia  ondansetron Injectable 4 milliGRAM(s) IV Push every 8 hours PRN Nausea and/or Vomiting  sodium chloride 0.9% lock flush 10 milliLiter(s) IV Push every 1 hour PRN Pre/post blood products, medications, blood draw, and to maintain line patency    Vital Signs Last 24 Hrs  T(C): 36.6 (05 Sep 2021 05:34), Max: 36.6 (05 Sep 2021 05:34)  T(F): 97.8 (05 Sep 2021 05:34), Max: 97.8 (05 Sep 2021 05:34)  HR: 77 (05 Sep 2021 05:34) (77 - 90)  BP: 123/61 (05 Sep 2021 05:34) (108/50 - 131/74)  BP(mean): --  RR: 18 (05 Sep 2021 05:34) (18 - 18)  SpO2: 97% (05 Sep 2021 05:34) (79% - 98%)    I&O's Summary        Physical Exam:   GENERAL: NAD, well-groomed, well-developed  HEENT: BAYLEE/   Atraumatic, Normocephalic  ENMT: No tonsillar erythema, exudates, or enlargement; Moist mucous membranes, Good dentition, No lesions  NECK: Supple, No JVD, Normal thyroid  CHEST/LUNG: Clear to auscultaion  CVS: Regular rate and rhythm; No murmurs, rubs, or gallops  GI: : Soft, Nontender, Nondistended; Bowel sounds present  NERVOUS SYSTEM:  Alert & Oriented X3  EXTREMITIES:  2+ Peripheral Pulses, No clubbing, cyanosis, or edema  LYMPH: No lymphadenopathy noted  SKIN: No rashes or lesions  ENDOCRINOLOGY: No Thyromegaly  PSYCH: Appropriate    Labs:  29                            10.4   9.35  )-----------( 241      ( 05 Sep 2021 05:21 )             31.0                         10.8   11.43 )-----------( 261      ( 04 Sep 2021 20:45 )             32.2                         10.5   7.98  )-----------( 246      ( 04 Sep 2021 04:04 )             30.7                         11.4   8.57  )-----------( 290      ( 03 Sep 2021 10:23 )             33.1                         10.9   8.73  )-----------( 143      ( 03 Sep 2021 03:33 )             33.6                         10.6   10.43 )-----------( 297      ( 02 Sep 2021 19:13 )             32.1                         11.0   9.60  )-----------( 310      ( 02 Sep 2021 05:15 )             32.7     09-05    132<L>  |  95<L>  |  19  ----------------------------<  129<H>  3.2<L>   |  23  |  1.26  09-04    134<L>  |  95<L>  |  17  ----------------------------<  109<H>  3.1<L>   |  26  |  1.19  09-03    133<L>  |  96<L>  |  18  ----------------------------<  124<H>  3.7   |  19<L>  |  1.16  09-02    134<L>  |  95<L>  |  19  ----------------------------<  120<H>  3.7   |  21<L>  |  1.22    Ca    9.0      05 Sep 2021 05:21  Ca    9.2      04 Sep 2021 04:04  Phos  4.0     09-05  Phos  4.5     09-04  Mg     2.00     09-05  Mg     2.10     09-04    TPro  6.9  /  Alb  3.6  /  TBili  0.6  /  DBili  x   /  AST  20  /  ALT  13  /  AlkPhos  118  09-03  TPro  7.2  /  Alb  3.8  /  TBili  0.8  /  DBili  x   /  AST  14  /  ALT  13  /  AlkPhos  123<H>  09-02    CAPILLARY BLOOD GLUCOSE            PTT - ( 05 Sep 2021 11:43 )  PTT:50.7 sec    D-Dimer Assay, Quantitative: 555 ng/mL DDU (08-30 @ 21:52)        RECENT CULTURES:        RESPIRATORY CULTURES:      r< from: CT Chest w/ IV Cont (08.31.21 @ 02:06) >  PROCEDURE:  CT Angiography of the Chest.  Sagittal and coronal reformats were performed as well as 3D(MIP) reconstructions.    FINDINGS:    Motion limited study.    LUNGS AND AIRWAYS: Patent central airways.  Status post right upper lobectomy with right hemithorax volume loss and postsurgical changes. 9 mm groundglass nodule, not definitely changed compared to 12/27/2017 however complete evaluation is limited secondary to respiratory motion. Emphysema.  PLEURA: No pleural effusion.  MEDIASTINUM AND FARIHA: There is is extensive mediastinal lymphadenopathy with cluster of lymph nodes in the AP window, prevascular space, and right superior paratracheal space, these nodes are poorly delineated from each other.  VESSELS: No main, left main, right main, lobar, or segmental pulmonary embolism. Evaluation of subsegmental pulmonary arteries is limitedsecondary to poor contrast opacification. Aortic and coronary artery calcifications. Suggestion of complete/near-complete occlusion of the SVC, left brachiocephalic, and left internal jugular veins, however evaluation is limited secondary to IV contrast timing.  HEART: Heart size is enlarged. No pericardial effusion.  CHEST WALL AND LOWER NECK: There is bilateral supraclavicular lymphadenopathy, for reference a left supraclavicular lymph node measures 1.6 x 2.3 cm (AP X TR). Please refer to CT neck report of same day for detailed evaluation of the neck.  VISUALIZED UPPER ABDOMEN:  Similar nodular thickening of the bilateral adrenal glands.  BONES: Surgical defect of the right posterior seventh rib. Degenerative changes.    IMPRESSION:  No main, left main, right main, lobar, or segmental pulmonary embolism. Evaluation of subsegmental pulmonary arteries is limited secondary to poor contrast opacification.    There is extensive supraclavicular and mediastinal lymphadenopathy.  Suggestion of complete/near-complete occlusion of the SVC, left brachiocephalic, and left internal jugular veins, however evaluation is limited secondary to IV contrast timing. Vascular ultrasound versus repeat neck/chest imaging with more delayed IV contrast timing may be helpful for complete evaluation.        --- End of Report ---            LUCY INFANTE MD; Resident Radiology  This document has been electronically signed.    < end of copied text >      Studies  Chest X-RAY  CT SCAN Chest   Venous Dopplers: LE:   CT Abdomen  Others              
Date of service: 09/03/21    Requesting Physician : Dr. Shepherd     Reason for Consultation: CAD    HISTORY OF PRESENT ILLNESS:  65 year old female with history of HTN, HLD, CAD s/p ANETTE to the proximal RCA in 2018,  COPD hx of resected RR lung adenocarcinoma (serial CT scans q3-6 months, follows Dr. Clement), former smoker who presents with worsening neck swelling and L arm edema the past 2 weeks.  The patient was admitted and found to have compression of her SVC.  IR was consulted for urgent stent placement.  The patient has no chest pain or anginal symptoms.  She has been maintained on sapt for history of RCA ANETTE in 2018 with her last NST in 2019 demonstrating no ischemia.      S: no chest pain or sob; ros otherwise negative.       acetaminophen   Tablet .. 650 milliGRAM(s) Oral every 6 hours PRN  aluminum hydroxide/magnesium hydroxide/simethicone Suspension 30 milliLiter(s) Oral every 4 hours PRN  atorvastatin 40 milliGRAM(s) Oral at bedtime  budesonide  80 MICROgram(s)/formoterol 4.5 MICROgram(s) Inhaler 2 Puff(s) Inhalation daily  carvedilol 6.25 milliGRAM(s) Oral every 12 hours  furosemide    Tablet 40 milliGRAM(s) Oral daily  heparin   Injectable 6500 Unit(s) IV Push every 6 hours PRN  heparin   Injectable 3000 Unit(s) IV Push every 6 hours PRN  heparin  Infusion.  Unit(s)/Hr IV Continuous <Continuous>  melatonin 3 milliGRAM(s) Oral at bedtime PRN  ondansetron Injectable 4 milliGRAM(s) IV Push every 8 hours PRN  tiotropium 18 MICROgram(s) Capsule 1 Capsule(s) Inhalation daily                            11.4   8.57  )-----------( 290      ( 03 Sep 2021 10:23 )             33.1       09-03    133<L>  |  96<L>  |  18  ----------------------------<  124<H>  3.7   |  19<L>  |  1.16    Ca    9.0      03 Sep 2021 03:33  Phos  4.4     09-03  Mg     2.20     09-03    TPro  6.9  /  Alb  3.6  /  TBili  0.6  /  DBili  x   /  AST  20  /  ALT  13  /  AlkPhos  118  09-03            T(C): 36.6 (09-03-21 @ 05:26), Max: 36.6 (09-03-21 @ 05:26)  HR: 78 (09-03-21 @ 09:24) (78 - 80)  BP: 150/62 (09-03-21 @ 09:24) (112/64 - 150/62)  RR: 16 (09-03-21 @ 09:24) (16 - 17)  SpO2: 98% (09-03-21 @ 09:24) (97% - 99%)  Wt(kg): --    I&O's Summary    02 Sep 2021 07:01  -  03 Sep 2021 07:00  --------------------------------------------------------  IN: 359 mL / OUT: 0 mL / NET: 359 mL      Gen: Edematous head + neck.  HEENT:   Normal oral mucosa, PERRL, EOMI	  Lymphatic: No lymphadenopathy , + LUE edema   Cardiovascular: Normal S1 S2, No JVD, No murmurs , Peripheral pulses palpable 2+ bilaterally  Respiratory: Lungs clear to auscultation, normal effort 	  Gastrointestinal:  Soft, Non-tender, + BS	  Skin: No rashes, No ecchymoses, No cyanosis, warm to touch  Musculoskeletal: Normal range of motion, normal strength  Psychiatry:  Mood & affect appropriate    TELEMETRY:  	    ECG: < from: 12 Lead ECG (08.30.21 @ 12:32) >  Normal sinus rhythm  Possible Left atrial enlargement  Nonspecific T wave abnormality  Abnormal ECG    ASSESSMENT/PLAN: 65 year old female with history of HTN, HLD, CAD s/p ANETTE to the proximal RCA in 2018,  COPD hx of resected RR lung adenocarcinoma (serial CT scans q3-6 months, follows Dr. Clement), former smoker who presents with worsening neck swelling and L arm edema the past 2 weeks.    -pt. with no chest pain or anginal symptoms  -last stress test with no ischemia  -recommend sapt for history of PCI > 1 year ago with asa 81mg PO daily  -optimized from cv perspective for planned IR procedure  -treatment of IVC thrombus per vascular and medicine - currently on hep gtt   -follow up thoracic surgery    [facility] Damsad David, MD     
Date of service: 09/06/21       HISTORY OF PRESENT ILLNESS:  65 year old female with history of HTN, HLD, CAD s/p ANETTE to the proximal RCA in 2018,  COPD hx of resected RR lung adenocarcinoma (serial CT scans q3-6 months, follows Dr. Clement), former smoker who presents with worsening neck swelling and L arm edema the past 2 weeks.  The patient was admitted and found to have compression of her SVC.  IR was consulted for urgent stent placement.  The patient has no chest pain or anginal symptoms.  She has been maintained on sapt for history of RCA ANETTE in 2018 with her last NST in 2019 demonstrating no ischemia.      pt seen and examined, no complaints, ROS - .     Review of Systems:   Constitutional: [ ] fevers, [ ] chills.   Skin: [ ] dry skin. [ ] rashes.  Psychiatric: [ ] depression, [ ] anxiety.   Gastrointestinal: [ ] BRBPR, [ ] melena.   Neurological: [ ] confusion. [ ] seizures. [ ] shuffling gait.   Ears,Nose,Mouth and Throat: [ ] ear pain [ ] sore throat.   Eyes: [ ] diplopia.   Respiratory: [ ] hemoptysis. [ ] shortness of breath  Cardiovascular: See HPI above  Hematologic/Lymphatic: [ ] anemia. [ ] painful nodes. [ ] prolonged bleeding.   Genitourinary: [ ] hematuria. [ ] flank pain.   Endocrine: [ ] significant change in weight. [ ] intolerance to heat and cold.     Review of systems [x ] otherwise negative, [ ] otherwise unable to obtain    FH: no family history of sudden cardiac death in first degree relatives    SH: [ ] tobacco, [ ] alcohol, [ ] drugs    acetaminophen   Tablet .. 650 milliGRAM(s) Oral every 6 hours PRN  aluminum hydroxide/magnesium hydroxide/simethicone Suspension 30 milliLiter(s) Oral every 4 hours PRN  aspirin  chewable 81 milliGRAM(s) Oral daily  atorvastatin 40 milliGRAM(s) Oral at bedtime  budesonide  80 MICROgram(s)/formoterol 4.5 MICROgram(s) Inhaler 2 Puff(s) Inhalation daily  carvedilol 6.25 milliGRAM(s) Oral every 12 hours  chlorhexidine 4% Liquid 1 Application(s) Topical <User Schedule>  furosemide    Tablet 40 milliGRAM(s) Oral daily  heparin   Injectable 7500 Unit(s) IV Push every 6 hours PRN  heparin   Injectable 3500 Unit(s) IV Push every 6 hours PRN  heparin  Infusion. 1200 Unit(s)/Hr IV Continuous <Continuous>  melatonin 3 milliGRAM(s) Oral at bedtime PRN  ondansetron Injectable 4 milliGRAM(s) IV Push every 8 hours PRN  sodium chloride 0.9% lock flush 10 milliLiter(s) IV Push every 1 hour PRN  tiotropium 18 MICROgram(s) Capsule 1 Capsule(s) Inhalation daily                            11.1   9.83  )-----------( 281      ( 06 Sep 2021 02:35 )             32.7     136  |  100  |  19  ----------------------------<  142<H>  3.9   |  22  |  1.35<H>    Ca    9.0      06 Sep 2021 02:35  Phos  3.5     09-06  Mg     1.90     09-06    T(C): 36.6 (09-06-21 @ 06:01), Max: 37 (09-05-21 @ 12:30)  HR: 85 (09-06-21 @ 06:01) (75 - 96)  BP: 115/75 (09-06-21 @ 06:01) (109/64 - 156/65)  RR: 18 (09-06-21 @ 06:01) (18 - 18)  SpO2: 97% (09-06-21 @ 06:01) (96% - 98%)    Gen: Edematous head + neck much improved  HEENT:   Normal oral mucosa, PERRL, EOMI	  Lymphatic: No lymphadenopathy , + LUE edema   Cardiovascular: Normal S1 S2, No JVD, No murmurs , Peripheral pulses palpable 2+ bilaterally  Respiratory: Lungs clear to auscultation, normal effort 	  Gastrointestinal:  Soft, Non-tender, + BS	  Skin: No rashes, No ecchymoses, No cyanosis, warm to touch  Musculoskeletal: Normal range of motion, normal strength  Psychiatry:  Mood & affect appropriate    TELEMETRY:  	  SR    ECG: < from: 12 Lead ECG (08.30.21 @ 12:32) >  Normal sinus rhythm  Possible Left atrial enlargement  Nonspecific T wave abnormality  Abnormal ECG    ASSESSMENT/PLAN: 65 year old female with history of HTN, HLD, CAD s/p ANETTE to the proximal RCA in 2018,  COPD hx of resected RR lung adenocarcinoma (serial CT scans q3-6 months, follows Dr. Clement), former smoker who presents with worsening neck swelling and L arm edema the past 2 weeks.    -s/p stenting of SVC with improvement of her swelling  -planned for IR LN biopsy next week  -pt. with no chest pain or anginal symptoms  -last stress test with no ischemia  -recommend sapt for history of PCI > 1 year ago with asa 81mg PO daily  -treatment of IVC thrombus per vascular and medicine - currently on hep gtt natividad- procedure   -no objection to DC telemetry     
Date of service: 09/07/21       HISTORY OF PRESENT ILLNESS:  65 year old female with history of HTN, HLD, CAD s/p ANETTE to the proximal RCA in 2018,  COPD hx of resected RR lung adenocarcinoma (serial CT scans q3-6 months, follows Dr. Clement), former smoker who presents with worsening neck swelling and L arm edema the past 2 weeks.  The patient was admitted and found to have compression of her SVC.  IR was consulted for urgent stent placement.  The patient has no chest pain or anginal symptoms.  She has been maintained on sapt for history of RCA ANETTE in 2018 with her last NST in 2019 demonstrating no ischemia.      S: no chest pain or sob; ue swelling improved; ros otherwise negative.     Review of Systems:   Constitutional: [ ] fevers, [ ] chills.   Skin: [ ] dry skin. [ ] rashes.  Psychiatric: [ ] depression, [ ] anxiety.   Gastrointestinal: [ ] BRBPR, [ ] melena.   Neurological: [ ] confusion. [ ] seizures. [ ] shuffling gait.   Ears,Nose,Mouth and Throat: [ ] ear pain [ ] sore throat.   Eyes: [ ] diplopia.   Respiratory: [ ] hemoptysis. [ ] shortness of breath  Cardiovascular: See HPI above  Hematologic/Lymphatic: [ ] anemia. [ ] painful nodes. [ ] prolonged bleeding.   Genitourinary: [ ] hematuria. [ ] flank pain.   Endocrine: [ ] significant change in weight. [ ] intolerance to heat and cold.     Review of systems [x ] otherwise negative, [ ] otherwise unable to obtain    FH: no family history of sudden cardiac death in first degree relatives    SH: [ ] tobacco, [ ] alcohol, [ ] drugs    acetaminophen   Tablet .. 650 milliGRAM(s) Oral every 6 hours PRN  aluminum hydroxide/magnesium hydroxide/simethicone Suspension 30 milliLiter(s) Oral every 4 hours PRN  aspirin  chewable 81 milliGRAM(s) Oral daily  atorvastatin 40 milliGRAM(s) Oral at bedtime  budesonide  80 MICROgram(s)/formoterol 4.5 MICROgram(s) Inhaler 2 Puff(s) Inhalation daily  carvedilol 6.25 milliGRAM(s) Oral every 12 hours  chlorhexidine 4% Liquid 1 Application(s) Topical <User Schedule>  furosemide    Tablet 40 milliGRAM(s) Oral daily  heparin   Injectable 7500 Unit(s) IV Push every 6 hours PRN  heparin   Injectable 3500 Unit(s) IV Push every 6 hours PRN  heparin  Infusion. 1200 Unit(s)/Hr IV Continuous <Continuous>  melatonin 3 milliGRAM(s) Oral at bedtime PRN  ondansetron Injectable 4 milliGRAM(s) IV Push every 8 hours PRN  sodium chloride 0.9% lock flush 10 milliLiter(s) IV Push every 1 hour PRN  tiotropium 18 MICROgram(s) Capsule 1 Capsule(s) Inhalation daily                            10.8   9.18  )-----------( 295      ( 07 Sep 2021 07:23 )             33.0       09-07    138  |  97<L>  |  18  ----------------------------<  119<H>  4.0   |  24  |  1.20    Ca    9.6      07 Sep 2021 07:23  Phos  4.1     09-07  Mg     2.00     09-07              T(C): 36.6 (09-07-21 @ 12:32), Max: 36.7 (09-06-21 @ 20:16)  HR: 80 (09-07-21 @ 12:32) (74 - 82)  BP: 130/77 (09-07-21 @ 12:32) (129/73 - 153/86)  RR: 18 (09-07-21 @ 12:32) (18 - 19)  SpO2: 99% (09-07-21 @ 12:32) (98% - 99%)  Wt(kg): --    I&O's Summary      Gen: Edematous head + neck much improved  HEENT:   Normal oral mucosa, PERRL, EOMI	  Lymphatic: No lymphadenopathy , + LUE edema   Cardiovascular: Normal S1 S2, No JVD, No murmurs , Peripheral pulses palpable 2+ bilaterally  Respiratory: Lungs clear to auscultation, normal effort 	  Gastrointestinal:  Soft, Non-tender, + BS	  Skin: No rashes, No ecchymoses, No cyanosis, warm to touch  Musculoskeletal: Normal range of motion, normal strength  Psychiatry:  Mood & affect appropriate    TELEMETRY:  SR 80's    ECG: < from: 12 Lead ECG (08.30.21 @ 12:32) >  Normal sinus rhythm  Possible Left atrial enlargement  Nonspecific T wave abnormality  Abnormal ECG    ASSESSMENT/PLAN: 65 year old female with history of HTN, HLD, CAD s/p ANETTE to the proximal RCA in 2018,  COPD hx of resected RR lung adenocarcinoma (serial CT scans q3-6 months, follows Dr. Clement), former smoker who presents with worsening neck swelling and L arm edema the past 2 weeks.    -s/p stenting of SVC with improvement of her swelling  -planned for IR LN biopsy next week  -pt. with no chest pain or anginal symptoms  -last stress test with no ischemia  -recommend sapt for history of PCI > 1 year ago with asa 81mg PO daily  -treatment of IVC thrombus per vascular and medicine - currently on hep gtt natividad- procedure   -no objection to DC telemetry  -optimized from cv perspective for planned biopsy    Tip Hernandez MD      
Date of service: 09/08/21     HISTORY OF PRESENT ILLNESS:  65 year old female with history of HTN, HLD, CAD s/p ANETTE to the proximal RCA in 2018,  COPD hx of resected RR lung adenocarcinoma (serial CT scans q3-6 months, follows Dr. Clement), former smoker who presents with worsening neck swelling and L arm edema the past 2 weeks.  The patient was admitted and found to have compression of her SVC.  IR was consulted for urgent stent placement.  The patient has no chest pain or anginal symptoms.  She has been maintained on sapt for history of RCA ANETTE in 2018 with her last NST in 2019 demonstrating no ischemia.      S: no chest pain or sob; ue swelling improved; ros otherwise negative.     Review of Systems:   Constitutional: [ ] fevers, [ ] chills.   Skin: [ ] dry skin. [ ] rashes.  Psychiatric: [ ] depression, [ ] anxiety.   Gastrointestinal: [ ] BRBPR, [ ] melena.   Neurological: [ ] confusion. [ ] seizures. [ ] shuffling gait.   Ears,Nose,Mouth and Throat: [ ] ear pain [ ] sore throat.   Eyes: [ ] diplopia.   Respiratory: [ ] hemoptysis. [ ] shortness of breath  Cardiovascular: See HPI above  Hematologic/Lymphatic: [ ] anemia. [ ] painful nodes. [ ] prolonged bleeding.   Genitourinary: [ ] hematuria. [ ] flank pain.   Endocrine: [ ] significant change in weight. [ ] intolerance to heat and cold.     Review of systems [x ] otherwise negative, [ ] otherwise unable to obtain    FH: no family history of sudden cardiac death in first degree relatives    SH: [ ] tobacco, [ ] alcohol, [ ] drugs    acetaminophen   Tablet .. 650 milliGRAM(s) Oral every 6 hours PRN  aluminum hydroxide/magnesium hydroxide/simethicone Suspension 30 milliLiter(s) Oral every 4 hours PRN  apixaban 5 milliGRAM(s) Oral two times a day  aspirin  chewable 81 milliGRAM(s) Oral daily  atorvastatin 40 milliGRAM(s) Oral at bedtime  budesonide  80 MICROgram(s)/formoterol 4.5 MICROgram(s) Inhaler 2 Puff(s) Inhalation daily  carvedilol 6.25 milliGRAM(s) Oral every 12 hours  chlorhexidine 4% Liquid 1 Application(s) Topical <User Schedule>  furosemide    Tablet 40 milliGRAM(s) Oral daily  melatonin 3 milliGRAM(s) Oral at bedtime PRN  ondansetron Injectable 4 milliGRAM(s) IV Push every 8 hours PRN  sodium chloride 0.9% lock flush 10 milliLiter(s) IV Push every 1 hour PRN  tiotropium 18 MICROgram(s) Capsule 1 Capsule(s) Inhalation daily                        13.0   13.93 )-----------( 417      ( 08 Sep 2021 09:22 )             39.4       136  |  95<L>  |  26<H>  ----------------------------<  172<H>  4.5   |  24  |  1.48<H>    Ca    9.9      08 Sep 2021 07:29  Phos  4.4     09-08  Mg     2.00     09-08      T(C): 36.4 (09-08-21 @ 12:14), Max: 36.9 (09-07-21 @ 19:00)  HR: 86 (09-08-21 @ 12:14) (78 - 90)  BP: 140/75 (09-08-21 @ 12:14) (112/62 - 184/74)  RR: 18 (09-08-21 @ 12:14) (14 - 18)  SpO2: 96% (09-08-21 @ 12:14) (96% - 99%)    Gen: Edematous head + neck much improved  HEENT:   Normal oral mucosa, PERRL, EOMI	  Lymphatic: No lymphadenopathy , + LUE edema   Cardiovascular: Normal S1 S2, No JVD, No murmurs , Peripheral pulses palpable 2+ bilaterally  Respiratory: Lungs clear to auscultation, normal effort 	  Gastrointestinal:  Soft, Non-tender, + BS	  Skin: No rashes, No ecchymoses, No cyanosis, warm to touch  Musculoskeletal: Normal range of motion, normal strength  Psychiatry:  Mood & affect appropriate    TELEMETRY:  SR 80's    ECG: < from: 12 Lead ECG (08.30.21 @ 12:32) >  Normal sinus rhythm  Possible Left atrial enlargement  Nonspecific T wave abnormality  Abnormal ECG    ASSESSMENT/PLAN: 65 year old female with history of HTN, HLD, CAD s/p ANETTE to the proximal RCA in 2018,  COPD hx of resected RR lung adenocarcinoma (serial CT scans q3-6 months, follows Dr. Clement), former smoker who presents with worsening neck swelling and L arm edema the past 2 weeks.    -s/p stenting of SVC with improvement of her swelling  -pt. with no chest pain or anginal symptoms  -last stress test with no ischemia  -recommend sapt for history of PCI > 1 year ago with asa 81mg PO daily  -treatment of IVC thrombus per vascular and medicine - currently on hep gtt natividad- procedure- change to PO AC per Heme/onc  -no objection to DC telemetry  -DC planning       
Requesting Physician : Dr. Shepherd     Reason for Consultation: CAD    HISTORY OF PRESENT ILLNESS:  65 year old female with history of HTN, HLD, CAD s/p ANETTE to the proximal RCA in 2018,  COPD hx of resected RR lung adenocarcinoma (serial CT scans q3-6 months, follows Dr. Clement), former smoker who presents with worsening neck swelling and L arm edema the past 2 weeks.  The patient was admitted and found to have compression of her SVC.  IR was consulted for urgent stent placement.  The patient has no chest pain or anginal symptoms.  She has been maintained on sapt for history of RCA ANETTE in 2018 with her last NST in 2019 demonstrating no ischemia.      Date of service 9/1: Awaiting SVC stenting, no angina, no palpitations.    acetaminophen   Tablet .. 650 milliGRAM(s) Oral every 6 hours PRN  aluminum hydroxide/magnesium hydroxide/simethicone Suspension 30 milliLiter(s) Oral every 4 hours PRN  aspirin  chewable 81 milliGRAM(s) Oral daily  atorvastatin 40 milliGRAM(s) Oral at bedtime  budesonide  80 MICROgram(s)/formoterol 4.5 MICROgram(s) Inhaler 2 Puff(s) Inhalation daily  carvedilol 6.25 milliGRAM(s) Oral every 12 hours  enoxaparin Injectable 40 milliGRAM(s) SubCutaneous at bedtime  furosemide    Tablet 40 milliGRAM(s) Oral daily  melatonin 3 milliGRAM(s) Oral at bedtime PRN  ondansetron Injectable 4 milliGRAM(s) IV Push every 8 hours PRN  tiotropium 18 MICROgram(s) Capsule 1 Capsule(s) Inhalation daily                       11.5   10.77 )-----------( 301      ( 01 Sep 2021 10:49 )             34.2       09-01    134<L>  |  96<L>  |  22  ----------------------------<  111<H>  3.8   |  24  |  1.22    Ca    9.6      01 Sep 2021 10:49  Phos  3.6     08-30  Mg     2.20     09-01    TPro  7.6  /  Alb  4.2  /  TBili  0.7  /  DBili  x   /  AST  14  /  ALT  14  /  AlkPhos  131<H>  09-01    T(C): 36.7 (09-01-21 @ 14:32), Max: 36.8 (08-31-21 @ 17:56)  HR: 79 (09-01-21 @ 14:32) (72 - 90)  BP: 138/63 (09-01-21 @ 14:32) (111/62 - 151/80)  RR: 19 (09-01-21 @ 14:32) (19 - 20)  SpO2: 96% (09-01-21 @ 14:32) (96% - 100%)  Wt(kg): --    I&O's Summary    Gen: Edematous head + neck.  HEENT:   Normal oral mucosa, PERRL, EOMI	  Lymphatic: No lymphadenopathy , + LUE edema   Cardiovascular: Normal S1 S2, No JVD, No murmurs , Peripheral pulses palpable 2+ bilaterally  Respiratory: Lungs clear to auscultation, normal effort 	  Gastrointestinal:  Soft, Non-tender, + BS	  Skin: No rashes, No ecchymoses, No cyanosis, warm to touch  Musculoskeletal: Normal range of motion, normal strength  Psychiatry:  Mood & affect appropriate    TELEMETRY:  	    ECG: < from: 12 Lead ECG (08.30.21 @ 12:32) >  Normal sinus rhythm  Possible Left atrial enlargement  Nonspecific T wave abnormality  Abnormal ECG    ASSESSMENT/PLAN: 65 year old female with history of HTN, HLD, CAD s/p ANETTE to the proximal RCA in 2018,  COPD hx of resected RR lung adenocarcinoma (serial CT scans q3-6 months, follows Dr. Clement), former smoker who presents with worsening neck swelling and L arm edema the past 2 weeks.    -pt. with no chest pain or anginal symptoms  -last stress test with no ischemia  -recommend sapt for history of PCI > 1 year ago with asa 81mg PO daily  -optimized from cv perspective for planned IR procedure  -follow up thoracic surgery    Lencho Mascorro M.D.  Cardiac Electrophysiology  [phone]    
Pt has been doing much better, had some reaction yesterday from the anesthesia, could not talk for a few seconds, overall, doing much better, the swelling over the neck, face, arms and legs is less, breathing better, eating OK. ROS is otherwise unremarkable.       Meds:  acetaminophen   Tablet .. 650 milliGRAM(s) Oral every 6 hours PRN  aluminum hydroxide/magnesium hydroxide/simethicone Suspension 30 milliLiter(s) Oral every 4 hours PRN  apixaban 5 milliGRAM(s) Oral two times a day  aspirin  chewable 81 milliGRAM(s) Oral daily  atorvastatin 40 milliGRAM(s) Oral at bedtime  budesonide  80 MICROgram(s)/formoterol 4.5 MICROgram(s) Inhaler 2 Puff(s) Inhalation daily  carvedilol 6.25 milliGRAM(s) Oral every 12 hours  chlorhexidine 4% Liquid 1 Application(s) Topical <User Schedule>  furosemide    Tablet 40 milliGRAM(s) Oral daily  melatonin 3 milliGRAM(s) Oral at bedtime PRN  ondansetron Injectable 4 milliGRAM(s) IV Push every 8 hours PRN  sodium chloride 0.9% lock flush 10 milliLiter(s) IV Push every 1 hour PRN  tiotropium 18 MICROgram(s) Capsule 1 Capsule(s) Inhalation daily      Vital Signs Last 24 Hrs  T(C): 36.6 (08 Sep 2021 16:00), Max: 36.6 (07 Sep 2021 21:30)  T(F): 97.8 (08 Sep 2021 16:00), Max: 97.9 (07 Sep 2021 21:30)  HR: 86 (08 Sep 2021 16:00) (80 - 86)  BP: 147/75 (08 Sep 2021 16:00) (112/62 - 158/73)  BP(mean): 72 (07 Sep 2021 21:15) (72 - 92)  RR: 18 (08 Sep 2021 16:00) (14 - 18)  SpO2: 96% (08 Sep 2021 16:00) (96% - 99%)                          13.0   13.93 )-----------( 417      ( 08 Sep 2021 09:22 )             39.4       09-08    136  |  95<L>  |  26<H>  ----------------------------<  172<H>  4.5   |  24  |  1.48<H>    Ca    9.9      08 Sep 2021 07:29  Phos  4.4     09-08  Mg     2.00     09-08                PT/INR - ( 07 Sep 2021 07:23 )   PT: 12.2 sec;   INR: 1.06 ratio         PTT - ( 08 Sep 2021 09:22 )  PTT:181.7 sec    Pathology _ called the department in order to expedite the results, the slides not assigned as yet.
Date of Service: 09-07-21 @ 11:32    Patient is a 65y old  Female who presents with a chief complaint of Neck swelling (06 Sep 2021 23:39)    Any change in ROS:   Feeling well today, plan for LN biopsy this afternoon   Seen walking in hallway, no respiratory complaints  O2 sats 96% on RA with exertion  Denies CP, SOB    MEDICATIONS  (STANDING):  aspirin  chewable 81 milliGRAM(s) Oral daily  atorvastatin 40 milliGRAM(s) Oral at bedtime  budesonide  80 MICROgram(s)/formoterol 4.5 MICROgram(s) Inhaler 2 Puff(s) Inhalation daily  carvedilol 6.25 milliGRAM(s) Oral every 12 hours  chlorhexidine 4% Liquid 1 Application(s) Topical <User Schedule>  furosemide    Tablet 40 milliGRAM(s) Oral daily  heparin  Infusion. 1200 Unit(s)/Hr (12 mL/Hr) IV Continuous <Continuous>  tiotropium 18 MICROgram(s) Capsule 1 Capsule(s) Inhalation daily    MEDICATIONS  (PRN):  acetaminophen   Tablet .. 650 milliGRAM(s) Oral every 6 hours PRN Temp greater or equal to 38.5C (101.3F), Mild Pain (1 - 3)  aluminum hydroxide/magnesium hydroxide/simethicone Suspension 30 milliLiter(s) Oral every 4 hours PRN Dyspepsia  heparin   Injectable 7500 Unit(s) IV Push every 6 hours PRN For aPTT less than 40  heparin   Injectable 3500 Unit(s) IV Push every 6 hours PRN For aPTT between 40 - 57  melatonin 3 milliGRAM(s) Oral at bedtime PRN Insomnia  ondansetron Injectable 4 milliGRAM(s) IV Push every 8 hours PRN Nausea and/or Vomiting  sodium chloride 0.9% lock flush 10 milliLiter(s) IV Push every 1 hour PRN Pre/post blood products, medications, blood draw, and to maintain line patency    Vital Signs Last 24 Hrs  T(C): 36.4 (07 Sep 2021 05:10), Max: 36.7 (06 Sep 2021 12:18)  T(F): 97.6 (07 Sep 2021 05:10), Max: 98.1 (06 Sep 2021 12:18)  HR: 74 (07 Sep 2021 05:10) (74 - 82)  BP: 143/72 (07 Sep 2021 05:10) (129/73 - 153/86)  BP(mean): --  RR: 18 (07 Sep 2021 05:10) (18 - 19)  SpO2: 99% (07 Sep 2021 05:10) (97% - 99%)    Physical Exam:   GENERAL: NAD  HEENT: BAYLEE  ENMT: No tonsillar erythema, exudates, or enlargement  NECK: Supple, No JVD  CHEST/LUNG: Clear to auscultation b/l   CVS: Regular rate and rhythm  GI: : Soft, Nontender, Nondistended  NERVOUS SYSTEM:  Alert & Oriented X3  EXTREMITIES:  2+ Peripheral Pulses, No clubbing, cyanosis, or edema  LYMPH: +lymphadenopathy   SKIN: No rashes or lesions  PSYCH: Appropriate    Labs:               10.8   9.18  )-----------( 295      ( 07 Sep 2021 07:23 )             33.0                         11.1   9.83  )-----------( 281      ( 06 Sep 2021 02:35 )             32.7                         10.4   9.35  )-----------( 241      ( 05 Sep 2021 05:21 )             31.0                         10.8   11.43 )-----------( 261      ( 04 Sep 2021 20:45 )             32.2                         10.5   7.98  )-----------( 246      ( 04 Sep 2021 04:04 )             30.7     09-07    138  |  97<L>  |  18  ----------------------------<  119<H>  4.0   |  24  |  1.20  09-06    136  |  100  |  19  ----------------------------<  142<H>  3.9   |  22  |  1.35<H>  09-05    132<L>  |  95<L>  |  19  ----------------------------<  129<H>  3.2<L>   |  23  |  1.26  09-04    134<L>  |  95<L>  |  17  ----------------------------<  109<H>  3.1<L>   |  26  |  1.19    Ca    9.6      07 Sep 2021 07:23  Ca    9.0      06 Sep 2021 02:35  Phos  4.1     09-07  Phos  3.5     09-06  Mg     2.00     09-07  Mg     1.90     09-06    PT/INR - ( 07 Sep 2021 07:23 )   PT: 12.2 sec;   INR: 1.06 ratio    PTT - ( 07 Sep 2021 07:23 )  PTT:106.9 sec    Studies    CT SCAN Chest < from: CT Chest w/ IV Cont (08.31.21 @ 02:06) >  FINDINGS:    Motion limited study.    LUNGS AND AIRWAYS: Patent central airways.  Status post right upper lobectomy with right hemithorax volume loss and postsurgical changes. 9 mm groundglass nodule, not definitely changed compared to 12/27/2017 however complete evaluation is limited secondary to respiratory motion. Emphysema.  PLEURA: No pleural effusion.  MEDIASTINUM AND FARIHA: There is is extensive mediastinal lymphadenopathy with cluster of lymph nodes in the AP window, prevascular space, and right superior paratracheal space, these nodes are poorly delineated from each other.  VESSELS: No main, left main, right main, lobar, or segmental pulmonary embolism. Evaluation of subsegmental pulmonary arteries is limitedsecondary to poor contrast opacification. Aortic and coronary artery calcifications. Suggestion of complete/near-complete occlusion of the SVC, left brachiocephalic, and left internal jugular veins, however evaluation is limited secondary to IV contrast timing.  HEART: Heart size is enlarged. No pericardial effusion.  CHEST WALL AND LOWER NECK: There is bilateral supraclavicular lymphadenopathy, for reference a left supraclavicular lymph node measures 1.6 x 2.3 cm (AP X TR). Please refer to CT neck report of same day for detailed evaluation of the neck.  VISUALIZED UPPER ABDOMEN:  Similar nodular thickening of the bilateral adrenal glands.  BONES: Surgical defect of the right posterior seventh rib. Degenerative changes.    IMPRESSION:  No main, left main, right main, lobar, or segmental pulmonary embolism. Evaluation of subsegmental pulmonary arteries is limited secondary to poor contrast opacification.    There is extensive supraclavicular and mediastinal lymphadenopathy.  Suggestion of complete/near-complete occlusion of the SVC, left brachiocephalic, and left internal jugular veins, however evaluation is limited secondary to IV contrast timing. Vascular ultrasound versus repeat neck/chest imaging with more delayed IV contrast timing may be helpful for complete evaluation.      < end of copied text >                  
Patient is a 65y old  Female who presents with a chief complaint of Neck swelling (31 Aug 2021 16:49)      HPI:  Neck, left arm, left leg swollen. Voice i hoarse. She is able to swallow and denies dyspnea    PAST MEDICAL & SURGICAL HISTORY:  Hypertension    Hyperlipidemia    Pulmonary nodules  yearly CT scans    Carotid artery disease  monitored by vascluar doctor Doscher    Emphysema    Hip pain    Obesity    Edema of both legs    Lung cancer  right, 2019, completed chemotherapy 2019    Stented coronary artery    Localized enlarged lymph nodes    Scoliosis    Lumbar disc disorder    COPD (chronic obstructive pulmonary disease)    History of  section  x2 ,     S/P lobectomy of lung  RULobectomy     History of hip replacement  right 2021    Stented coronary artery  x2 stents 2018        Review of Systems:   CONSTITUTIONAL: No fever, weight loss, or fatigue  EYES: No eye pain, visual disturbances, or discharge  ENMT:  No difficulty hearing, tinnitus, vertigo; No sinus or throat pain. Neck swollen  NECK: No pain or stiffness  BREASTS: No pain, masses, or nipple discharge  RESPIRATORY: No cough, wheezing, chills or hemoptysis; No shortness of breath  CARDIOVASCULAR: No chest pain, palpitations, dizziness, Left arm and legs swollen  GASTROINTESTINAL: No abdominal or epigastric pain. No nausea, vomiting, or hematemesis; No diarrhea or constipation. No melena or hematochezia.  GENITOURINARY: No dysuria, frequency, hematuria, or incontinence  NEUROLOGICAL: No headaches, memory loss, loss of strength, numbness, or tremors  SKIN: No itching, burning, rashes, or lesions   LYMPH NODES: No enlarged glands  ENDOCRINE: No heat or cold intolerance; No hair loss  MUSCULOSKELETAL: No joint pain or swelling; No muscle, back, or extremity pain  PSYCHIATRIC: No depression, anxiety, mood swings, or difficulty sleeping  HEME/LYMPH: No easy bruising, or bleeding gums  ALLERY AND IMMUNOLOGIC: No hives or eczema    Allergies    penicillin (Short breath; Hives)  tetracycline (Short breath; Hives)    Intolerances        Social History:     FAMILY HISTORY:  Family history of CHF (congestive heart failure) (Aunt)        MEDICATIONS  (STANDING):  aspirin  chewable 81 milliGRAM(s) Oral daily  atorvastatin 40 milliGRAM(s) Oral at bedtime  budesonide  80 MICROgram(s)/formoterol 4.5 MICROgram(s) Inhaler 2 Puff(s) Inhalation daily  carvedilol 6.25 milliGRAM(s) Oral every 12 hours  enoxaparin Injectable 40 milliGRAM(s) SubCutaneous at bedtime  furosemide    Tablet 40 milliGRAM(s) Oral daily  tiotropium 18 MICROgram(s) Capsule 1 Capsule(s) Inhalation daily    MEDICATIONS  (PRN):  acetaminophen   Tablet .. 650 milliGRAM(s) Oral every 6 hours PRN Temp greater or equal to 38.5C (101.3F), Mild Pain (1 - 3)  aluminum hydroxide/magnesium hydroxide/simethicone Suspension 30 milliLiter(s) Oral every 4 hours PRN Dyspepsia  melatonin 3 milliGRAM(s) Oral at bedtime PRN Insomnia  ondansetron Injectable 4 milliGRAM(s) IV Push every 8 hours PRN Nausea and/or Vomiting        CAPILLARY BLOOD GLUCOSE        I&O's Summary      PHYSICAL EXAM:  Vital Signs Last 24 Hrs  T(C): 36.4 (01 Sep 2021 04:37), Max: 36.8 (31 Aug 2021 17:56)  T(F): 97.6 (01 Sep 2021 04:37), Max: 98.2 (31 Aug 2021 17:56)  HR: 90 (01 Sep 2021 04:37) (72 - 90)  BP: 111/62 (01 Sep 2021 04:37) (111/62 - 151/80)  BP(mean): --  RR: 20 (01 Sep 2021 04:37) (17 - 20)  SpO2: 97% (01 Sep 2021 04:37) (96% - 100%)    GENERAL: NAD, well-developed  HEAD:  Atraumatic, Normocephalic  EYES: EOMI, PERRLA, conjunctiva and sclera clear  NECK: Swollen, No JVD  CHEST/LUNG: Clear to auscultation bilaterally; No wheeze  HEART: Regular rate and rhythm; No murmurs, rubs, or gallops  ABDOMEN: Soft, Nontender, Nondistended; Bowel sounds present  EXTREMITIES:  2+ Peripheral Pulses, No clubbing, cyanosis, Left leg edema  PSYCH: AAOx3  NEUROLOGY: non-focal  SKIN: No rashes or lesions    LABS:                        11.5   10.77 )-----------( 301      ( 01 Sep 2021 10:49 )             34.2     08-30    137  |  96<L>  |  19  ----------------------------<  103<H>  3.6   |  24  |  1.20    Ca    9.7      30 Aug 2021 23:35  Phos  3.6       Mg     2.20         TPro  7.3  /  Alb  4.3  /  TBili  0.6  /  DBili  x   /  AST  15  /  ALT  13  /  AlkPhos  149<H>  30    PT/INR - ( 31 Aug 2021 06:40 )   PT: 13.7 sec;   INR: 1.20 ratio         PTT - ( 30 Aug 2021 21:52 )  PTT:36.9 sec          RADIOLOGY & ADDITIONAL TESTS:    Imaging Personally Reviewed:    Consultant(s) Notes Reviewed:      Care Discussed with Consultants/Other Providers:  
Date of Service: 09-06-21 @ 12:08    Patient is a 65y old  Female who presents with a chief complaint of Neck swelling (06 Sep 2021 12:06)      Any change in ROS: doingok : no SOB :       MEDICATIONS  (STANDING):  aspirin  chewable 81 milliGRAM(s) Oral daily  atorvastatin 40 milliGRAM(s) Oral at bedtime  budesonide  80 MICROgram(s)/formoterol 4.5 MICROgram(s) Inhaler 2 Puff(s) Inhalation daily  carvedilol 6.25 milliGRAM(s) Oral every 12 hours  chlorhexidine 4% Liquid 1 Application(s) Topical <User Schedule>  furosemide    Tablet 40 milliGRAM(s) Oral daily  heparin  Infusion. 1200 Unit(s)/Hr (12 mL/Hr) IV Continuous <Continuous>  tiotropium 18 MICROgram(s) Capsule 1 Capsule(s) Inhalation daily    MEDICATIONS  (PRN):  acetaminophen   Tablet .. 650 milliGRAM(s) Oral every 6 hours PRN Temp greater or equal to 38.5C (101.3F), Mild Pain (1 - 3)  aluminum hydroxide/magnesium hydroxide/simethicone Suspension 30 milliLiter(s) Oral every 4 hours PRN Dyspepsia  heparin   Injectable 7500 Unit(s) IV Push every 6 hours PRN For aPTT less than 40  heparin   Injectable 3500 Unit(s) IV Push every 6 hours PRN For aPTT between 40 - 57  melatonin 3 milliGRAM(s) Oral at bedtime PRN Insomnia  ondansetron Injectable 4 milliGRAM(s) IV Push every 8 hours PRN Nausea and/or Vomiting  sodium chloride 0.9% lock flush 10 milliLiter(s) IV Push every 1 hour PRN Pre/post blood products, medications, blood draw, and to maintain line patency    Vital Signs Last 24 Hrs  T(C): 36.6 (06 Sep 2021 06:01), Max: 37 (05 Sep 2021 12:30)  T(F): 97.8 (06 Sep 2021 06:01), Max: 98.6 (05 Sep 2021 12:30)  HR: 85 (06 Sep 2021 06:01) (75 - 96)  BP: 115/75 (06 Sep 2021 06:01) (109/64 - 156/65)  BP(mean): --  RR: 18 (06 Sep 2021 06:01) (18 - 18)  SpO2: 97% (06 Sep 2021 06:01) (96% - 98%)    I&O's Summary        Physical Exam:   GENERAL: NAD, well-groomed, well-developed  HEENT: BAYLEE/   Atraumatic, Normocephalic  ENMT: No tonsillar erythema, exudates, or enlargement; Moist mucous membranes, Good dentition, No lesions  NECK: Supple, No JVD, Normal thyroid  CHEST/LUNG: Clear to auscultaion, ; No rales, rhonchi, wheezing, or rubs  CVS: Regular rate and rhythm; No murmurs, rubs, or gallops  GI: : Soft, Nontender, Nondistended; Bowel sounds present  NERVOUS SYSTEM:  Alert & Oriented X3  EXTREMITIES:- edema  LYMPH: No lymphadenopathy noted  SKIN: No rashes or lesions  ENDOCRINOLOGY: No Thyromegaly  PSYCH: Appropriate    Labs:  29                            11.1   9.83  )-----------( 281      ( 06 Sep 2021 02:35 )             32.7                         10.4   9.35  )-----------( 241      ( 05 Sep 2021 05:21 )             31.0                         10.8   11.43 )-----------( 261      ( 04 Sep 2021 20:45 )             32.2                         10.5   7.98  )-----------( 246      ( 04 Sep 2021 04:04 )             30.7                         11.4   8.57  )-----------( 290      ( 03 Sep 2021 10:23 )             33.1                         10.9   8.73  )-----------( 143      ( 03 Sep 2021 03:33 )             33.6                         10.6   10.43 )-----------( 297      ( 02 Sep 2021 19:13 )             32.1     09-06    136  |  100  |  19  ----------------------------<  142<H>  3.9   |  22  |  1.35<H>  09-05    132<L>  |  95<L>  |  19  ----------------------------<  129<H>  3.2<L>   |  23  |  1.26  09-04    134<L>  |  95<L>  |  17  ----------------------------<  109<H>  3.1<L>   |  26  |  1.19  09-03    133<L>  |  96<L>  |  18  ----------------------------<  124<H>  3.7   |  19<L>  |  1.16    Ca    9.0      06 Sep 2021 02:35  Ca    9.0      05 Sep 2021 05:21  Phos  3.5     09-06  Phos  4.0     09-05  Mg     1.90     09-06  Mg     2.00     09-05    TPro  6.9  /  Alb  3.6  /  TBili  0.6  /  DBili  x   /  AST  20  /  ALT  13  /  AlkPhos  118  09-03    CAPILLARY BLOOD GLUCOSE            PTT - ( 06 Sep 2021 10:22 )  PTT:91.9 sec    D-Dimer Assay, Quantitative: 555 ng/mL DDU (08-30 @ 21:52)        RECENT CULTURES:        RESPIRATORY CULTURES:      r< from: CT Chest w/ IV Cont (08.31.21 @ 02:06) >  VESSELS: No main, left main, right main, lobar, or segmental pulmonary embolism. Evaluation of subsegmental pulmonary arteries is limitedsecondary to poor contrast opacification. Aortic and coronary artery calcifications. Suggestion of complete/near-complete occlusion of the SVC, left brachiocephalic, and left internal jugular veins, however evaluation is limited secondary to IV contrast timing.  HEART: Heart size is enlarged. No pericardial effusion.  CHEST WALL AND LOWER NECK: There is bilateral supraclavicular lymphadenopathy, for reference a left supraclavicular lymph node measures 1.6 x 2.3 cm (AP X TR). Please refer to CT neck report of same day for detailed evaluation of the neck.  VISUALIZED UPPER ABDOMEN:  Similar nodular thickening of the bilateral adrenal glands.  BONES: Surgical defect of the right posterior seventh rib. Degenerative changes.    IMPRESSION:  No main, left main, right main, lobar, or segmental pulmonary embolism. Evaluation of subsegmental pulmonary arteries is limited secondary to poor contrast opacification.    There is extensive supraclavicular and mediastinal lymphadenopathy.  Suggestion of complete/near-complete occlusion of the SVC, left brachiocephalic, and left internal jugular veins, however evaluation is limited secondary to IV contrast timing. Vascular ultrasound versus repeat neck/chest imaging with more delayed IV contrast timing may be helpful for complete evaluation.        --- End of Report ---            LUCY INFANTE MD; Resident Radiology  This document has been electronically signed.    < end of copied text >      Studies  Chest X-RAY  CT SCAN Chest   Venous Dopplers: LE:   CT Abdomen  Others              
Date of Service: 09-03-21 @ 13:19    Patient is a 65y old  Female who presents with a chief complaint of Neck swelling (03 Sep 2021 11:34)      Any change in ROS: no SOB /l now on heparin    MEDICATIONS  (STANDING):  atorvastatin 40 milliGRAM(s) Oral at bedtime  budesonide  80 MICROgram(s)/formoterol 4.5 MICROgram(s) Inhaler 2 Puff(s) Inhalation daily  carvedilol 6.25 milliGRAM(s) Oral every 12 hours  furosemide    Tablet 40 milliGRAM(s) Oral daily  heparin  Infusion.  Unit(s)/Hr (15 mL/Hr) IV Continuous <Continuous>  tiotropium 18 MICROgram(s) Capsule 1 Capsule(s) Inhalation daily    MEDICATIONS  (PRN):  acetaminophen   Tablet .. 650 milliGRAM(s) Oral every 6 hours PRN Temp greater or equal to 38.5C (101.3F), Mild Pain (1 - 3)  aluminum hydroxide/magnesium hydroxide/simethicone Suspension 30 milliLiter(s) Oral every 4 hours PRN Dyspepsia  heparin   Injectable 6500 Unit(s) IV Push every 6 hours PRN For aPTT less than 40  heparin   Injectable 3000 Unit(s) IV Push every 6 hours PRN For aPTT between 40 - 57  melatonin 3 milliGRAM(s) Oral at bedtime PRN Insomnia  ondansetron Injectable 4 milliGRAM(s) IV Push every 8 hours PRN Nausea and/or Vomiting    Vital Signs Last 24 Hrs  T(C): 36.6 (03 Sep 2021 05:26), Max: 36.6 (03 Sep 2021 05:26)  T(F): 97.9 (03 Sep 2021 05:26), Max: 97.9 (03 Sep 2021 05:26)  HR: 78 (03 Sep 2021 09:24) (78 - 80)  BP: 150/62 (03 Sep 2021 09:24) (112/64 - 150/62)  BP(mean): --  RR: 16 (03 Sep 2021 09:24) (16 - 17)  SpO2: 98% (03 Sep 2021 09:24) (97% - 99%)    I&O's Summary    02 Sep 2021 07:01  -  03 Sep 2021 07:00  --------------------------------------------------------  IN: 359 mL / OUT: 0 mL / NET: 359 mL          Physical Exam:   GENERAL: NAD, well-groomed, well-developed  HEENT: BAYLEE/   Atraumatic, Normocephalic  ENMT: No tonsillar erythema, exudates, or enlargement; Moist mucous membranes, Good dentition, No lesions  NECK: Supple, No JVD, Normal thyroid  CHEST/LUNG: Clear to auscultaion  CVS: Regular rate and rhythm; No murmurs, rubs, or gallops  GI: : Soft, Nontender, Nondistended; Bowel sounds present  NERVOUS SYSTEM:  Alert & Oriented X3  EXTREMITIES: - edema  LYMPH: No lymphadenopathy noted  SKIN: No rashes or lesions  ENDOCRINOLOGY: No Thyromegaly  PSYCH: Appropriate    Labs:  29                            11.4   8.57  )-----------( 290      ( 03 Sep 2021 10:23 )             33.1                         10.9   8.73  )-----------( 143      ( 03 Sep 2021 03:33 )             33.6                         10.6   10.43 )-----------( 297      ( 02 Sep 2021 19:13 )             32.1                         11.0   9.60  )-----------( 310      ( 02 Sep 2021 05:15 )             32.7                         11.5   10.77 )-----------( 301      ( 01 Sep 2021 10:49 )             34.2                         11.6   12.29 )-----------( 297      ( 31 Aug 2021 06:40 )             34.9                         12.3   14.69 )-----------( 351      ( 30 Aug 2021 21:52 )             37.9     09-03    133<L>  |  96<L>  |  18  ----------------------------<  124<H>  3.7   |  19<L>  |  1.16  09-02    134<L>  |  95<L>  |  19  ----------------------------<  120<H>  3.7   |  21<L>  |  1.22  09-01    134<L>  |  96<L>  |  22  ----------------------------<  111<H>  3.8   |  24  |  1.22  08-30    137  |  96<L>  |  19  ----------------------------<  103<H>  3.6   |  24  |  1.20  08-30    138  |  98  |  17  ----------------------------<  111<H>  3.4<L>   |  24  |  1.22    Ca    9.0      03 Sep 2021 03:33  Ca    9.4      02 Sep 2021 05:15  Phos  4.4     09-03  Phos  4.3     09-02  Mg     2.20     09-03  Mg     2.20     09-02    TPro  6.9  /  Alb  3.6  /  TBili  0.6  /  DBili  x   /  AST  20  /  ALT  13  /  AlkPhos  118  09-03  TPro  7.2  /  Alb  3.8  /  TBili  0.8  /  DBili  x   /  AST  14  /  ALT  13  /  AlkPhos  123<H>  09-02  TPro  7.6  /  Alb  4.2  /  TBili  0.7  /  DBili  x   /  AST  14  /  ALT  14  /  AlkPhos  131<H>  09-01  TPro  7.3  /  Alb  4.3  /  TBili  0.6  /  DBili  x   /  AST  15  /  ALT  13  /  AlkPhos  149<H>  08-30  TPro  7.7  /  Alb  4.4  /  TBili  0.6  /  DBili  x   /  AST  15  /  ALT  12  /  AlkPhos  147<H>  08-30    CAPILLARY BLOOD GLUCOSE          LIVER FUNCTIONS - ( 03 Sep 2021 03:33 )  Alb: 3.6 g/dL / Pro: 6.9 g/dL / ALK PHOS: 118 U/L / ALT: 13 U/L / AST: 20 U/L / GGT: x           PT/INR - ( 02 Sep 2021 05:15 )   PT: 13.3 sec;   INR: 1.18 ratio         PTT - ( 03 Sep 2021 10:23 )  PTT:136.8 sec    D-Dimer Assay, Quantitative: 555 ng/mL DDU (08-30 @ 21:52)        RECENT CULTURES:        RESPIRATORY CULTURES:      < from: CT Chest w/ IV Cont (08.31.21 @ 02:06) >  secondary to IV contrast timing.  HEART: Heart size is enlarged. No pericardial effusion.  CHEST WALL AND LOWER NECK: There is bilateral supraclavicular lymphadenopathy, for reference a left supraclavicular lymph node measures 1.6 x 2.3 cm (AP X TR). Please refer to CT neck report of same day for detailed evaluation of the neck.  VISUALIZED UPPER ABDOMEN:  Similar nodular thickening of the bilateral adrenal glands.  BONES: Surgical defect of the right posterior seventh rib. Degenerative changes.    IMPRESSION:  No main, left main, right main, lobar, or segmental pulmonary embolism. Evaluation of subsegmental pulmonary arteries is limited secondary to poor contrast opacification.    There is extensive supraclavicular and mediastinal lymphadenopathy.  Suggestion of complete/near-complete occlusion of the SVC, left brachiocephalic, and left internal jugular veins, however evaluation is limited secondary to IV contrast timing. Vascular ultrasound versus repeat neck/chest imaging with more delayed IV contrast timing may be helpful for complete evaluation.        --- End of Report ---      < from: CT Neck Soft Tissue w/ IV Cont (08.31.21 @ 02:05) >  Heterogeneous thyroid gland without focal suspicious nodule. The submandibular and parotid glands are unremarkable.    Retropharyngeal course of the right internal carotid artery there is dense calcific atherosclerotic plaque involving both carotid bulbs and origins of the internal carotid arteries with associated at least moderate stenosis ofthe proximal bilateral internal carotid arteries. There is a fluid fluid level in the proximal left internal jugular vein which doesn't have the normal appearance of mixing artifact and is concerning for the presence of thrombus, particularly, given the lack of enhancement more proximally within the left internal jugular vein extending to the brachiocephalic vein. Findings, however, are inconclusive pain that the study was not performed further evaluation of the venous structures.    The visualized intracranial and intraorbital compartments are unremarkable.    There is no lucent or sclerotic lesion. Degenerative changes are seen in the cervical spine.    IMPRESSION:  Limited by motion artifact, particularly at the level of the oropharynx.    Bilateral lower facial and upper neck swelling, left greater than right, with retropharyngeal edema is presumed to be related to venous outlet obstruction. There is suggestion of complete/near-complete occlusion of the SVC, left brachiocephalic, and left internal jugular veins, however evaluation is limited secondary to IV contrast timing. Vascular ultrasound versus repeat neck/chest imaging with more delayed IV contrast timing may be helpful for complete evaluation. Mass effect on the supraglottic airway with mild narrowing. Airway precautions recommended.    Supraclavicular and mediastinal adenopathy likely on a malignant basis, particularly given history of adenocarcinoma.    --- End of Report ---              ROSALIA CHAUHAN MD; AttendingRadiologist  This document has been electronically signed. Aug 31 2021 11:02AM    < end of copied text >        LUCY INFANTE MD; Resident Radiology  This document has been electronically signed.    < end of copied text >      Studies  Chest X-RAY  CT SCAN Chest   Venous Dopplers: LE:   CT Abdomen  Others              
Date of Service: 09-08-21 @ 13:01    Patient is a 65y old  Female who presents with a chief complaint of Neck swelling (08 Sep 2021 11:52)    Any change in ROS:   Feeling well  Some minor discomfort/swelling of L neck s/p L supraclavicular LN excision/biopsy  O2 sats 98% on RA, unchanged with exertion  Denies CP, SOB    MEDICATIONS  (STANDING):  apixaban 5 milliGRAM(s) Oral two times a day  aspirin  chewable 81 milliGRAM(s) Oral daily  atorvastatin 40 milliGRAM(s) Oral at bedtime  budesonide  80 MICROgram(s)/formoterol 4.5 MICROgram(s) Inhaler 2 Puff(s) Inhalation daily  carvedilol 6.25 milliGRAM(s) Oral every 12 hours  chlorhexidine 4% Liquid 1 Application(s) Topical <User Schedule>  furosemide    Tablet 40 milliGRAM(s) Oral daily  tiotropium 18 MICROgram(s) Capsule 1 Capsule(s) Inhalation daily    MEDICATIONS  (PRN):  acetaminophen   Tablet .. 650 milliGRAM(s) Oral every 6 hours PRN Temp greater or equal to 38.5C (101.3F), Mild Pain (1 - 3)  aluminum hydroxide/magnesium hydroxide/simethicone Suspension 30 milliLiter(s) Oral every 4 hours PRN Dyspepsia  melatonin 3 milliGRAM(s) Oral at bedtime PRN Insomnia  ondansetron Injectable 4 milliGRAM(s) IV Push every 8 hours PRN Nausea and/or Vomiting  sodium chloride 0.9% lock flush 10 milliLiter(s) IV Push every 1 hour PRN Pre/post blood products, medications, blood draw, and to maintain line patency    Vital Signs Last 24 Hrs  T(C): 36.4 (08 Sep 2021 12:14), Max: 36.9 (07 Sep 2021 19:00)  T(F): 97.6 (08 Sep 2021 12:14), Max: 98.4 (07 Sep 2021 19:00)  HR: 86 (08 Sep 2021 12:14) (78 - 90)  BP: 140/75 (08 Sep 2021 12:14) (112/62 - 184/74)  BP(mean): 72 (07 Sep 2021 21:15) (72 - 92)  RR: 18 (08 Sep 2021 12:14) (14 - 18)  SpO2: 96% (08 Sep 2021 12:14) (96% - 99%)      Physical Exam:   GENERAL: NAD  HEENT: BAYLEE  ENMT: No tonsillar erythema, exudates, or enlargement  NECK: L neck swelling at LN biopsy site, dressing CDI  CHEST/LUNG: Clear to auscultation b/l  CVS: Regular rate and rhythm  GI: : Soft, Nontender, Nondistended  NERVOUS SYSTEM:  Alert & Oriented X3  EXTREMITIES:  2+ Peripheral Pulses, No clubbing, cyanosis, or edema  SKIN: No rashes or lesions  PSYCH: Appropriate    Labs:                       13.0   13.93 )-----------( 417      ( 08 Sep 2021 09:22 )             39.4                         11.9   9.74  )-----------( 370      ( 08 Sep 2021 07:29 )             36.0                         10.8   9.18  )-----------( 295      ( 07 Sep 2021 07:23 )             33.0                         11.1   9.83  )-----------( 281      ( 06 Sep 2021 02:35 )             32.7                         10.4   9.35  )-----------( 241      ( 05 Sep 2021 05:21 )             31.0                         10.8   11.43 )-----------( 261      ( 04 Sep 2021 20:45 )             32.2     09-08    136  |  95<L>  |  26<H>  ----------------------------<  172<H>  4.5   |  24  |  1.48<H>  09-07    138  |  97<L>  |  18  ----------------------------<  119<H>  4.0   |  24  |  1.20  09-06    136  |  100  |  19  ----------------------------<  142<H>  3.9   |  22  |  1.35<H>  09-05    132<L>  |  95<L>  |  19  ----------------------------<  129<H>  3.2<L>   |  23  |  1.26    Ca    9.9      08 Sep 2021 07:29  Ca    9.6      07 Sep 2021 07:23  Phos  4.4     09-08  Phos  4.1     09-07  Mg     2.00     09-08  Mg     2.00     09-07    PT/INR - ( 07 Sep 2021 07:23 )   PT: 12.2 sec;   INR: 1.06 ratio    PTT - ( 08 Sep 2021 09:22 )  PTT:181.7 sec    Studies    CT SCAN Chest< from: CT Chest w/ IV Cont (08.31.21 @ 02:06) >  FINDINGS:    Motion limited study.    LUNGS AND AIRWAYS: Patent central airways.  Status post right upper lobectomy with right hemithorax volume loss and postsurgical changes. 9 mm groundglass nodule, not definitely changed compared to 12/27/2017 however complete evaluation is limited secondary to respiratory motion. Emphysema.  PLEURA: No pleural effusion.  MEDIASTINUM AND FARIHA: There is is extensive mediastinal lymphadenopathy with cluster of lymph nodes in the AP window, prevascular space, and right superior paratracheal space, these nodes are poorly delineated from each other.  VESSELS: No main, left main, right main, lobar, or segmental pulmonary embolism. Evaluation of subsegmental pulmonary arteries is limitedsecondary to poor contrast opacification. Aortic and coronary artery calcifications. Suggestion of complete/near-complete occlusion of the SVC, left brachiocephalic, and left internal jugular veins, however evaluation is limited secondary to IV contrast timing.  HEART: Heart size is enlarged. No pericardial effusion.  CHEST WALL AND LOWER NECK: There is bilateral supraclavicular lymphadenopathy, for reference a left supraclavicular lymph node measures 1.6 x 2.3 cm (AP X TR). Please refer to CT neck report of same day for detailed evaluation of the neck.  VISUALIZED UPPER ABDOMEN:  Similar nodular thickening of the bilateral adrenal glands.  BONES: Surgical defect of the right posterior seventh rib. Degenerative changes.    IMPRESSION:  No main, left main, right main, lobar, or segmental pulmonary embolism. Evaluation of subsegmental pulmonary arteries is limited secondary to poor contrast opacification.    There is extensive supraclavicular and mediastinal lymphadenopathy.  Suggestion of complete/near-complete occlusion of the SVC, left brachiocephalic, and left internal jugular veins, however evaluation is limited secondary to IV contrast timing. Vascular ultrasound versus repeat neck/chest imaging with more delayed IV contrast timing may be helpful for complete evaluation.      < end of copied text >     Venous Dopplers: LE: < from: VA Duplex Ext Veins Upper Comp, Bilat. (09.02.21 @ 10:27) >  Summary/Impressions:  Acute, non occlusive deep venous thrombosis visualized in  the left internal jugular veins.  Results communicated to LEI Rush on 9/2/21 at 10:50  am.    < end of copied text >                  
Patient is a 65y old  Female who presents with a chief complaint of Neck swelling (31 Aug 2021 16:49)    21  HPI:  Neck, left arm, left leg swollen.    PAST MEDICAL & SURGICAL HISTORY:  Hypertension    Hyperlipidemia    Pulmonary nodules  yearly CT scans    Carotid artery disease  monitored by vascluar doctor Doscher    Emphysema    Hip pain    Obesity    Edema of both legs    Lung cancer  right, , completed chemotherapy 2019    Stented coronary artery    Localized enlarged lymph nodes    Scoliosis    Lumbar disc disorder    COPD (chronic obstructive pulmonary disease)    History of  section  x2 ,     S/P lobectomy of lung  RULobectomy     History of hip replacement  right 2021    Stented coronary artery  x2 stents 2018        Review of Systems:   CONSTITUTIONAL: No fever, weight loss, or fatigue  EYES: No eye pain, visual disturbances, or discharge  ENMT:  No difficulty hearing, tinnitus, vertigo; No sinus or throat pain. Neck swollen  NECK: No pain or stiffness  BREASTS: No pain, masses, or nipple discharge  RESPIRATORY: No cough, wheezing, chills or hemoptysis; No shortness of breath  CARDIOVASCULAR: No chest pain, palpitations, dizziness, Left arm and legs swollen  GASTROINTESTINAL: No abdominal or epigastric pain. No nausea, vomiting, or hematemesis; No diarrhea or constipation. No melena or hematochezia.  GENITOURINARY: No dysuria, frequency, hematuria, or incontinence  NEUROLOGICAL: No headaches, memory loss, loss of strength, numbness, or tremors  SKIN: No itching, burning, rashes, or lesions   LYMPH NODES: No enlarged glands  ENDOCRINE: No heat or cold intolerance; No hair loss  MUSCULOSKELETAL: No joint pain or swelling; No muscle, back, or extremity pain  PSYCHIATRIC: No depression, anxiety, mood swings, or difficulty sleeping  HEME/LYMPH: No easy bruising, or bleeding gums  ALLERY AND IMMUNOLOGIC: No hives or eczema    Allergies    penicillin (Short breath; Hives)  tetracycline (Short breath; Hives)    Intolerances        Social History:     FAMILY HISTORY:  Family history of CHF (congestive heart failure) (Aunt)        MEDICATIONS  (STANDING):  aspirin  chewable 81 milliGRAM(s) Oral daily  atorvastatin 40 milliGRAM(s) Oral at bedtime  budesonide  80 MICROgram(s)/formoterol 4.5 MICROgram(s) Inhaler 2 Puff(s) Inhalation daily  carvedilol 6.25 milliGRAM(s) Oral every 12 hours  enoxaparin Injectable 40 milliGRAM(s) SubCutaneous at bedtime  furosemide    Tablet 40 milliGRAM(s) Oral daily  tiotropium 18 MICROgram(s) Capsule 1 Capsule(s) Inhalation daily    MEDICATIONS  (PRN):  acetaminophen   Tablet .. 650 milliGRAM(s) Oral every 6 hours PRN Temp greater or equal to 38.5C (101.3F), Mild Pain (1 - 3)  aluminum hydroxide/magnesium hydroxide/simethicone Suspension 30 milliLiter(s) Oral every 4 hours PRN Dyspepsia  melatonin 3 milliGRAM(s) Oral at bedtime PRN Insomnia  ondansetron Injectable 4 milliGRAM(s) IV Push every 8 hours PRN Nausea and/or Vomiting        CAPILLARY BLOOD GLUCOSE        I&O's Summary      PHYSICAL EXAM:  Vital Signs Last 24 Hrs  T(C): 36.4 (01 Sep 2021 04:37), Max: 36.8 (31 Aug 2021 17:56)  T(F): 97.6 (01 Sep 2021 04:37), Max: 98.2 (31 Aug 2021 17:56)  HR: 90 (01 Sep 2021 04:37) (72 - 90)  BP: 111/62 (01 Sep 2021 04:37) (111/62 - 151/80)  BP(mean): --  RR: 20 (01 Sep 2021 04:37) (17 - 20)  SpO2: 97% (01 Sep 2021 04:37) (96% - 100%)    GENERAL: NAD, well-developed  HEAD:  Atraumatic, Normocephalic  EYES: EOMI, PERRLA, conjunctiva and sclera clear  NECK: Swollen, No JVD  CHEST/LUNG: Clear to auscultation bilaterally; No wheeze  HEART: Regular rate and rhythm; No murmurs, rubs, or gallops  ABDOMEN: Soft, Nontender, Nondistended; Bowel sounds present  EXTREMITIES:  2+ Peripheral Pulses, No clubbing, cyanosis, Left leg edema  PSYCH: AAOx3  NEUROLOGY: non-focal  SKIN: No rashes or lesions    LABS:                        11.5   10.77 )-----------( 301      ( 01 Sep 2021 10:49 )             34.2     08-30    137  |  96<L>  |  19  ----------------------------<  103<H>  3.6   |  24  |  1.20    Ca    9.7      30 Aug 2021 23:35  Phos  3.6       Mg     2.20         TPro  7.3  /  Alb  4.3  /  TBili  0.6  /  DBili  x   /  AST  15  /  ALT  13  /  AlkPhos  149<H>      PT/INR - ( 31 Aug 2021 06:40 )   PT: 13.7 sec;   INR: 1.20 ratio         PTT - ( 30 Aug 2021 21:52 )  PTT:36.9 sec          RADIOLOGY & ADDITIONAL TESTS:    Imaging Personally Reviewed:    Consultant(s) Notes Reviewed:      Care Discussed with Consultants/Other Providers:  
Patient is a 65y old  Female who presents with a chief complaint of Neck swelling (31 Aug 2021 16:49)    9/3/21  HPI:  Neck, left arm, left leg swollen.  Venoplasty performed by IR    PAST MEDICAL & SURGICAL HISTORY:  Hypertension    Hyperlipidemia    Pulmonary nodules  yearly CT scans    Carotid artery disease  monitored by vascluar doctor Doscher    Emphysema    Hip pain    Obesity    Edema of both legs    Lung cancer  right, , completed chemotherapy 2019    Stented coronary artery    Localized enlarged lymph nodes    Scoliosis    Lumbar disc disorder    COPD (chronic obstructive pulmonary disease)    History of  section  x2 ,     S/P lobectomy of lung  RULobectomy     History of hip replacement  right 2021    Stented coronary artery  x2 stents 2018        Review of Systems:   CONSTITUTIONAL: No fever, weight loss, or fatigue  EYES: No eye pain, visual disturbances, or discharge  ENMT:  No difficulty hearing, tinnitus, vertigo; No sinus or throat pain. Neck swollen  NECK: No pain or stiffness  BREASTS: No pain, masses, or nipple discharge  RESPIRATORY: No cough, wheezing, chills or hemoptysis; No shortness of breath  CARDIOVASCULAR: No chest pain, palpitations, dizziness, Left arm and legs swollen  GASTROINTESTINAL: No abdominal or epigastric pain. No nausea, vomiting, or hematemesis; No diarrhea or constipation. No melena or hematochezia.  GENITOURINARY: No dysuria, frequency, hematuria, or incontinence  NEUROLOGICAL: No headaches, memory loss, loss of strength, numbness, or tremors  SKIN: No itching, burning, rashes, or lesions   LYMPH NODES: No enlarged glands  ENDOCRINE: No heat or cold intolerance; No hair loss  MUSCULOSKELETAL: No joint pain or swelling; No muscle, back, or extremity pain  PSYCHIATRIC: No depression, anxiety, mood swings, or difficulty sleeping  HEME/LYMPH: No easy bruising, or bleeding gums  ALLERY AND IMMUNOLOGIC: No hives or eczema    Allergies    penicillin (Short breath; Hives)  tetracycline (Short breath; Hives)    Intolerances        Social History:     FAMILY HISTORY:  Family history of CHF (congestive heart failure) (Aunt)        MEDICATIONS  (STANDING):  aspirin  chewable 81 milliGRAM(s) Oral daily  atorvastatin 40 milliGRAM(s) Oral at bedtime  budesonide  80 MICROgram(s)/formoterol 4.5 MICROgram(s) Inhaler 2 Puff(s) Inhalation daily  carvedilol 6.25 milliGRAM(s) Oral every 12 hours  enoxaparin Injectable 40 milliGRAM(s) SubCutaneous at bedtime  furosemide    Tablet 40 milliGRAM(s) Oral daily  tiotropium 18 MICROgram(s) Capsule 1 Capsule(s) Inhalation daily    MEDICATIONS  (PRN):  acetaminophen   Tablet .. 650 milliGRAM(s) Oral every 6 hours PRN Temp greater or equal to 38.5C (101.3F), Mild Pain (1 - 3)  aluminum hydroxide/magnesium hydroxide/simethicone Suspension 30 milliLiter(s) Oral every 4 hours PRN Dyspepsia  melatonin 3 milliGRAM(s) Oral at bedtime PRN Insomnia  ondansetron Injectable 4 milliGRAM(s) IV Push every 8 hours PRN Nausea and/or Vomiting        CAPILLARY BLOOD GLUCOSE        I&O's Summary      PHYSICAL EXAM:  Vital Signs Last 24 Hrs  T(C): 36.4 (01 Sep 2021 04:37), Max: 36.8 (31 Aug 2021 17:56)  T(F): 97.6 (01 Sep 2021 04:37), Max: 98.2 (31 Aug 2021 17:56)  HR: 90 (01 Sep 2021 04:37) (72 - 90)  BP: 111/62 (01 Sep 2021 04:37) (111/62 - 151/80)  BP(mean): --  RR: 20 (01 Sep 2021 04:37) (17 - 20)  SpO2: 97% (01 Sep 2021 04:37) (96% - 100%)    GENERAL: NAD, well-developed  HEAD:  Atraumatic, Normocephalic  EYES: EOMI, PERRLA, conjunctiva and sclera clear  NECK: Swollen, No JVD  CHEST/LUNG: Clear to auscultation bilaterally; No wheeze  HEART: Regular rate and rhythm; No murmurs, rubs, or gallops  ABDOMEN: Soft, Nontender, Nondistended; Bowel sounds present  EXTREMITIES:  2+ Peripheral Pulses, No clubbing, cyanosis, Left leg edema  PSYCH: AAOx3  NEUROLOGY: non-focal  SKIN: No rashes or lesions    LABS:                        11.5   10.77 )-----------( 301      ( 01 Sep 2021 10:49 )             34.2     08-30    137  |  96<L>  |  19  ----------------------------<  103<H>  3.6   |  24  |  1.20    Ca    9.7      30 Aug 2021 23:35  Phos  3.6     -  Mg     2.20         TPro  7.3  /  Alb  4.3  /  TBili  0.6  /  DBili  x   /  AST  15  /  ALT  13  /  AlkPhos  149<H>      PT/INR - ( 31 Aug 2021 06:40 )   PT: 13.7 sec;   INR: 1.20 ratio         PTT - ( 30 Aug 2021 21:52 )  PTT:36.9 sec          RADIOLOGY & ADDITIONAL TESTS:    Imaging Personally Reviewed:    Consultant(s) Notes Reviewed:      Care Discussed with Consultants/Other Providers:  
Patient is a 65y old  Female who presents with a chief complaint of Neck swelling (31 Aug 2021 16:49)    21  HPI:  Neck, left arm, left leg swelling much better    PAST MEDICAL & SURGICAL HISTORY:  Hypertension    Hyperlipidemia    Pulmonary nodules  yearly CT scans    Carotid artery disease  monitored by vascluar doctor Doscher    Emphysema    Hip pain    Obesity    Edema of both legs    Lung cancer  right, , completed chemotherapy 2019    Stented coronary artery    Localized enlarged lymph nodes    Scoliosis    Lumbar disc disorder    COPD (chronic obstructive pulmonary disease)    History of  section  x2 ,     S/P lobectomy of lung  RULobectomy     History of hip replacement  right 2021    Stented coronary artery  x2 stents 2018        Review of Systems:   CONSTITUTIONAL: No fever, weight loss, or fatigue  EYES: No eye pain, visual disturbances, or discharge  ENMT:  No difficulty hearing, tinnitus, vertigo; No sinus or throat pain. Neck swollen  NECK: No pain or stiffness  BREASTS: No pain, masses, or nipple discharge  RESPIRATORY: No cough, wheezing, chills or hemoptysis; No shortness of breath  CARDIOVASCULAR: No chest pain, palpitations, dizziness, Left arm and legs swollen  GASTROINTESTINAL: No abdominal or epigastric pain. No nausea, vomiting, or hematemesis; No diarrhea or constipation. No melena or hematochezia.  GENITOURINARY: No dysuria, frequency, hematuria, or incontinence  NEUROLOGICAL: No headaches, memory loss, loss of strength, numbness, or tremors  SKIN: No itching, burning, rashes, or lesions   LYMPH NODES: No enlarged glands  ENDOCRINE: No heat or cold intolerance; No hair loss  MUSCULOSKELETAL: No joint pain or swelling; No muscle, back, or extremity pain  PSYCHIATRIC: No depression, anxiety, mood swings, or difficulty sleeping  HEME/LYMPH: No easy bruising, or bleeding gums  ALLERY AND IMMUNOLOGIC: No hives or eczema    Allergies    penicillin (Short breath; Hives)  tetracycline (Short breath; Hives)    Intolerances        Social History:     FAMILY HISTORY:  Family history of CHF (congestive heart failure) (Aunt)        MEDICATIONS  (STANDING):  aspirin  chewable 81 milliGRAM(s) Oral daily  atorvastatin 40 milliGRAM(s) Oral at bedtime  budesonide  80 MICROgram(s)/formoterol 4.5 MICROgram(s) Inhaler 2 Puff(s) Inhalation daily  carvedilol 6.25 milliGRAM(s) Oral every 12 hours  enoxaparin Injectable 40 milliGRAM(s) SubCutaneous at bedtime  furosemide    Tablet 40 milliGRAM(s) Oral daily  tiotropium 18 MICROgram(s) Capsule 1 Capsule(s) Inhalation daily    MEDICATIONS  (PRN):  acetaminophen   Tablet .. 650 milliGRAM(s) Oral every 6 hours PRN Temp greater or equal to 38.5C (101.3F), Mild Pain (1 - 3)  aluminum hydroxide/magnesium hydroxide/simethicone Suspension 30 milliLiter(s) Oral every 4 hours PRN Dyspepsia  melatonin 3 milliGRAM(s) Oral at bedtime PRN Insomnia  ondansetron Injectable 4 milliGRAM(s) IV Push every 8 hours PRN Nausea and/or Vomiting        CAPILLARY BLOOD GLUCOSE        I&O's Summary      PHYSICAL EXAM:  Vital Signs Last 24 Hrs  T(C): 36.4 (01 Sep 2021 04:37), Max: 36.8 (31 Aug 2021 17:56)  T(F): 97.6 (01 Sep 2021 04:37), Max: 98.2 (31 Aug 2021 17:56)  HR: 90 (01 Sep 2021 04:37) (72 - 90)  BP: 111/62 (01 Sep 2021 04:37) (111/62 - 151/80)  BP(mean): --  RR: 20 (01 Sep 2021 04:37) (17 - 20)  SpO2: 97% (01 Sep 2021 04:37) (96% - 100%)    GENERAL: NAD, well-developed  HEAD:  Atraumatic, Normocephalic  EYES: EOMI, PERRLA, conjunctiva and sclera clear  NECK: Swollen, No JVD  CHEST/LUNG: Clear to auscultation bilaterally; No wheeze  HEART: Regular rate and rhythm; No murmurs, rubs, or gallops  ABDOMEN: Soft, Nontender, Nondistended; Bowel sounds present  EXTREMITIES:  2+ Peripheral Pulses, No clubbing, cyanosis, Left leg edema  PSYCH: AAOx3  NEUROLOGY: non-focal  SKIN: No rashes or lesions    LABS:                        11.5   10.77 )-----------( 301      ( 01 Sep 2021 10:49 )             34.2     08-30    137  |  96<L>  |  19  ----------------------------<  103<H>  3.6   |  24  |  1.20    Ca    9.7      30 Aug 2021 23:35  Phos  3.6       Mg     2.20         TPro  7.3  /  Alb  4.3  /  TBili  0.6  /  DBili  x   /  AST  15  /  ALT  13  /  AlkPhos  149<H>      PT/INR - ( 31 Aug 2021 06:40 )   PT: 13.7 sec;   INR: 1.20 ratio         PTT - ( 30 Aug 2021 21:52 )  PTT:36.9 sec          RADIOLOGY & ADDITIONAL TESTS:    Imaging Personally Reviewed:    Consultant(s) Notes Reviewed:      Care Discussed with Consultants/Other Providers:  
Patient is a 65y old  Female who presents with a chief complaint of Neck swelling (31 Aug 2021 16:49)    21  HPI:  Neck, left arm, left leg swollen.No worsening in symptoms    PAST MEDICAL & SURGICAL HISTORY:  Hypertension    Hyperlipidemia    Pulmonary nodules  yearly CT scans    Carotid artery disease  monitored by vascluar doctor Doscher    Emphysema    Hip pain    Obesity    Edema of both legs    Lung cancer  right, , completed chemotherapy 2019    Stented coronary artery    Localized enlarged lymph nodes    Scoliosis    Lumbar disc disorder    COPD (chronic obstructive pulmonary disease)    History of  section  x2 ,     S/P lobectomy of lung  RULobectomy     History of hip replacement  right 2021    Stented coronary artery  x2 stents 2018        Review of Systems:   CONSTITUTIONAL: No fever, weight loss, or fatigue  EYES: No eye pain, visual disturbances, or discharge  ENMT:  No difficulty hearing, tinnitus, vertigo; No sinus or throat pain. Neck swollen  NECK: No pain or stiffness  BREASTS: No pain, masses, or nipple discharge  RESPIRATORY: No cough, wheezing, chills or hemoptysis; No shortness of breath  CARDIOVASCULAR: No chest pain, palpitations, dizziness, Left arm and legs swollen  GASTROINTESTINAL: No abdominal or epigastric pain. No nausea, vomiting, or hematemesis; No diarrhea or constipation. No melena or hematochezia.  GENITOURINARY: No dysuria, frequency, hematuria, or incontinence  NEUROLOGICAL: No headaches, memory loss, loss of strength, numbness, or tremors  SKIN: No itching, burning, rashes, or lesions   LYMPH NODES: No enlarged glands  ENDOCRINE: No heat or cold intolerance; No hair loss  MUSCULOSKELETAL: No joint pain or swelling; No muscle, back, or extremity pain  PSYCHIATRIC: No depression, anxiety, mood swings, or difficulty sleeping  HEME/LYMPH: No easy bruising, or bleeding gums  ALLERY AND IMMUNOLOGIC: No hives or eczema    Allergies    penicillin (Short breath; Hives)  tetracycline (Short breath; Hives)    Intolerances        Social History:     FAMILY HISTORY:  Family history of CHF (congestive heart failure) (Aunt)        MEDICATIONS  (STANDING):  aspirin  chewable 81 milliGRAM(s) Oral daily  atorvastatin 40 milliGRAM(s) Oral at bedtime  budesonide  80 MICROgram(s)/formoterol 4.5 MICROgram(s) Inhaler 2 Puff(s) Inhalation daily  carvedilol 6.25 milliGRAM(s) Oral every 12 hours  enoxaparin Injectable 40 milliGRAM(s) SubCutaneous at bedtime  furosemide    Tablet 40 milliGRAM(s) Oral daily  tiotropium 18 MICROgram(s) Capsule 1 Capsule(s) Inhalation daily    MEDICATIONS  (PRN):  acetaminophen   Tablet .. 650 milliGRAM(s) Oral every 6 hours PRN Temp greater or equal to 38.5C (101.3F), Mild Pain (1 - 3)  aluminum hydroxide/magnesium hydroxide/simethicone Suspension 30 milliLiter(s) Oral every 4 hours PRN Dyspepsia  melatonin 3 milliGRAM(s) Oral at bedtime PRN Insomnia  ondansetron Injectable 4 milliGRAM(s) IV Push every 8 hours PRN Nausea and/or Vomiting        CAPILLARY BLOOD GLUCOSE        I&O's Summary      PHYSICAL EXAM:  Vital Signs Last 24 Hrs  T(C): 36.4 (01 Sep 2021 04:37), Max: 36.8 (31 Aug 2021 17:56)  T(F): 97.6 (01 Sep 2021 04:37), Max: 98.2 (31 Aug 2021 17:56)  HR: 90 (01 Sep 2021 04:37) (72 - 90)  BP: 111/62 (01 Sep 2021 04:37) (111/62 - 151/80)  BP(mean): --  RR: 20 (01 Sep 2021 04:37) (17 - 20)  SpO2: 97% (01 Sep 2021 04:37) (96% - 100%)    GENERAL: NAD, well-developed  HEAD:  Atraumatic, Normocephalic  EYES: EOMI, PERRLA, conjunctiva and sclera clear  NECK: Swollen, No JVD  CHEST/LUNG: Clear to auscultation bilaterally; No wheeze  HEART: Regular rate and rhythm; No murmurs, rubs, or gallops  ABDOMEN: Soft, Nontender, Nondistended; Bowel sounds present  EXTREMITIES:  2+ Peripheral Pulses, No clubbing, cyanosis, Left leg edema  PSYCH: AAOx3  NEUROLOGY: non-focal  SKIN: No rashes or lesions    LABS:                        11.5   10.77 )-----------( 301      ( 01 Sep 2021 10:49 )             34.2     08-30    137  |  96<L>  |  19  ----------------------------<  103<H>  3.6   |  24  |  1.20    Ca    9.7      30 Aug 2021 23:35  Phos  3.6       Mg     2.20         TPro  7.3  /  Alb  4.3  /  TBili  0.6  /  DBili  x   /  AST  15  /  ALT  13  /  AlkPhos  149<H>      PT/INR - ( 31 Aug 2021 06:40 )   PT: 13.7 sec;   INR: 1.20 ratio         PTT - ( 30 Aug 2021 21:52 )  PTT:36.9 sec          RADIOLOGY & ADDITIONAL TESTS:    Imaging Personally Reviewed:    Consultant(s) Notes Reviewed:      Care Discussed with Consultants/Other Providers:  
Date of Service: 09-01-21 @ 13:38    Patient is a 65y old  Female who presents with a chief complaint of Neck swelling (31 Aug 2021 16:49)      Any change in ROS: doing ok : no SOB : seen by ENT     MEDICATIONS  (STANDING):  aspirin  chewable 81 milliGRAM(s) Oral daily  atorvastatin 40 milliGRAM(s) Oral at bedtime  budesonide  80 MICROgram(s)/formoterol 4.5 MICROgram(s) Inhaler 2 Puff(s) Inhalation daily  carvedilol 6.25 milliGRAM(s) Oral every 12 hours  enoxaparin Injectable 40 milliGRAM(s) SubCutaneous at bedtime  furosemide    Tablet 40 milliGRAM(s) Oral daily  tiotropium 18 MICROgram(s) Capsule 1 Capsule(s) Inhalation daily    MEDICATIONS  (PRN):  acetaminophen   Tablet .. 650 milliGRAM(s) Oral every 6 hours PRN Temp greater or equal to 38.5C (101.3F), Mild Pain (1 - 3)  aluminum hydroxide/magnesium hydroxide/simethicone Suspension 30 milliLiter(s) Oral every 4 hours PRN Dyspepsia  melatonin 3 milliGRAM(s) Oral at bedtime PRN Insomnia  ondansetron Injectable 4 milliGRAM(s) IV Push every 8 hours PRN Nausea and/or Vomiting    Vital Signs Last 24 Hrs  T(C): 36.4 (01 Sep 2021 04:37), Max: 36.8 (31 Aug 2021 17:56)  T(F): 97.6 (01 Sep 2021 04:37), Max: 98.2 (31 Aug 2021 17:56)  HR: 90 (01 Sep 2021 04:37) (72 - 90)  BP: 111/62 (01 Sep 2021 04:37) (111/62 - 151/80)  BP(mean): --  RR: 20 (01 Sep 2021 04:37) (17 - 20)  SpO2: 97% (01 Sep 2021 04:37) (96% - 100%)    I&O's Summary        Physical Exam:   GENERAL: Obese+  HEENT: BAYLEE/   Atraumatic, Normocephalic  ENMT: neck swelling and supraclavicular sweiiling  NECK: Supple, No JVD, Normal thyroid  CHEST/LUNG: Clear to auscultaion  CVS: Regular rate and rhythm; No murmurs, rubs, or gallops  GI: : Soft, Nontender, Nondistended; Bowel sounds present  NERVOUS SYSTEM:  Alert & Oriented X3  EXTREMITIES:  -edema  LYMPH: No lymphadenopathy noted  SKIN: No rashes or lesions  ENDOCRINOLOGY: No Thyromegaly  PSYCH: calm  Labs:  29                            11.5   10.77 )-----------( 301      ( 01 Sep 2021 10:49 )             34.2                         11.6   12.29 )-----------( 297      ( 31 Aug 2021 06:40 )             34.9                         12.3   14.69 )-----------( 351      ( 30 Aug 2021 21:52 )             37.9     09-01    134<L>  |  96<L>  |  22  ----------------------------<  111<H>  3.8   |  24  |  1.22  08-30    137  |  96<L>  |  19  ----------------------------<  103<H>  3.6   |  24  |  1.20  08-30    138  |  98  |  17  ----------------------------<  111<H>  3.4<L>   |  24  |  1.22    Ca    9.6      01 Sep 2021 10:49  Ca    9.7      30 Aug 2021 23:35  Ca    9.8      30 Aug 2021 21:52  Phos  3.6     08-30  Mg     2.20     09-01  Mg     2.20     08-30  Mg     2.20     08-30    TPro  7.6  /  Alb  4.2  /  TBili  0.7  /  DBili  x   /  AST  14  /  ALT  14  /  AlkPhos  131<H>  09-01  TPro  7.3  /  Alb  4.3  /  TBili  0.6  /  DBili  x   /  AST  15  /  ALT  13  /  AlkPhos  149<H>  08-30  TPro  7.7  /  Alb  4.4  /  TBili  0.6  /  DBili  x   /  AST  15  /  ALT  12  /  AlkPhos  147<H>  08-30    CAPILLARY BLOOD GLUCOSE          LIVER FUNCTIONS - ( 01 Sep 2021 10:49 )  Alb: 4.2 g/dL / Pro: 7.6 g/dL / ALK PHOS: 131 U/L / ALT: 14 U/L / AST: 14 U/L / GGT: x           PT/INR - ( 31 Aug 2021 06:40 )   PT: 13.7 sec;   INR: 1.20 ratio         PTT - ( 30 Aug 2021 21:52 )  PTT:36.9 sec    D-Dimer Assay, Quantitative: 555 ng/mL DDU (08-30 @ 21:52)        RECENT CULTURES:      rd< from: CT Chest w/ IV Cont (08.31.21 @ 02:06) >  SSELS: No main, left main, right main, lobar, or segmental pulmonary embolism. Evaluation of subsegmental pulmonary arteries is limitedsecondary to poor contrast opacification. Aortic and coronary artery calcifications. Suggestion of complete/near-complete occlusion of the SVC, left brachiocephalic, and left internal jugular veins, however evaluation is limited secondary to IV contrast timing.  HEART: Heart size is enlarged. No pericardial effusion.  CHEST WALL AND LOWER NECK: There is bilateral supraclavicular lymphadenopathy, for reference a left supraclavicular lymph node measures 1.6 x 2.3 cm (AP X TR). Please refer to CT neck report of same day for detailed evaluation of the neck.  VISUALIZED UPPER ABDOMEN:  Similar nodular thickening of the bilateral adrenal glands.  BONES: Surgical defect of the right posterior seventh rib. Degenerative changes.    IMPRESSION:  No main, left main, right main, lobar, or segmental pulmonary embolism. Evaluation of subsegmental pulmonary arteries is limited secondary to poor contrast opacification.    There is extensive supraclavicular and mediastinal lymphadenopathy.  Suggestion of complete/near-complete occlusion of the SVC, left brachiocephalic, and left internal jugular veins, however evaluation is limited secondary to IV contrast timing. Vascular ultrasound versus repeat neck/chest imaging with more delayed IV contrast timing may be helpful for complete evaluation.        --- End of Report ---            LUCY INFANTE MD; Resident Radiology  This document has been electronically marlee    < end of copied text >    RESPIRATORY CULTURES:          Studies  Chest X-RAY  CT SCAN Chest   Venous Dopplers: LE:   CT Abdomen  Others              
Date of Service: 09-02-21 @ 12:35    Patient is a 65y old  Female who presents with a chief complaint of Neck swelling (01 Sep 2021 20:44)      Any change in ROS: Doing ok : no SOB : no chest pain : no stridor:     MEDICATIONS  (STANDING):  atorvastatin 40 milliGRAM(s) Oral at bedtime  budesonide  80 MICROgram(s)/formoterol 4.5 MICROgram(s) Inhaler 2 Puff(s) Inhalation daily  carvedilol 6.25 milliGRAM(s) Oral every 12 hours  furosemide    Tablet 40 milliGRAM(s) Oral daily  heparin  Infusion.  Unit(s)/Hr (15 mL/Hr) IV Continuous <Continuous>  tiotropium 18 MICROgram(s) Capsule 1 Capsule(s) Inhalation daily    MEDICATIONS  (PRN):  acetaminophen   Tablet .. 650 milliGRAM(s) Oral every 6 hours PRN Temp greater or equal to 38.5C (101.3F), Mild Pain (1 - 3)  aluminum hydroxide/magnesium hydroxide/simethicone Suspension 30 milliLiter(s) Oral every 4 hours PRN Dyspepsia  heparin   Injectable 6500 Unit(s) IV Push every 6 hours PRN For aPTT less than 40  heparin   Injectable 3000 Unit(s) IV Push every 6 hours PRN For aPTT between 40 - 57  melatonin 3 milliGRAM(s) Oral at bedtime PRN Insomnia  ondansetron Injectable 4 milliGRAM(s) IV Push every 8 hours PRN Nausea and/or Vomiting    Vital Signs Last 24 Hrs  T(C): 36.9 (02 Sep 2021 11:39), Max: 36.9 (01 Sep 2021 16:15)  T(F): 98.4 (02 Sep 2021 11:39), Max: 98.4 (01 Sep 2021 16:15)  HR: 79 (02 Sep 2021 11:39) (75 - 87)  BP: 126/72 (02 Sep 2021 11:39) (108/61 - 180/80)  BP(mean): --  RR: 16 (02 Sep 2021 11:39) (16 - 19)  SpO2: 98% (02 Sep 2021 11:39) (96% - 100%)    I&O's Summary        Physical Exam:   GENERAL: NAD, well-groomed, well-developed  HEENT: BAYLEE/   Atraumatic, Normocephalic  ENMT:neck swelling: firm but non tender   NECK: Supple, No JVD, Normal thyroid  CHEST/LUNG: Clear to auscultaion, ; No rales, rhonchi, wheezing, or rubs  CVS: Regular rate and rhythm; No murmurs, rubs, or gallops  GI: : Soft, Nontender, Nondistended; Bowel sounds present  NERVOUS SYSTEM:  Alert & Oriented X3  EXTREMITIES: - edema  LYMPH: No lymphadenopathy noted  SKIN: No rashes or lesions  ENDOCRINOLOGY: No Thyromegaly  PSYCH: Appropriate    Labs:  29                            11.0   9.60  )-----------( 310      ( 02 Sep 2021 05:15 )             32.7                         11.5   10.77 )-----------( 301      ( 01 Sep 2021 10:49 )             34.2                         11.6   12.29 )-----------( 297      ( 31 Aug 2021 06:40 )             34.9                         12.3   14.69 )-----------( 351      ( 30 Aug 2021 21:52 )             37.9     09-02    134<L>  |  95<L>  |  19  ----------------------------<  120<H>  3.7   |  21<L>  |  1.22  09-01    134<L>  |  96<L>  |  22  ----------------------------<  111<H>  3.8   |  24  |  1.22  08-30    137  |  96<L>  |  19  ----------------------------<  103<H>  3.6   |  24  |  1.20  08-30    138  |  98  |  17  ----------------------------<  111<H>  3.4<L>   |  24  |  1.22    Ca    9.4      02 Sep 2021 05:15  Ca    9.6      01 Sep 2021 10:49  Phos  4.3     09-02  Mg     2.20     09-02  Mg     2.20     09-01    TPro  7.2  /  Alb  3.8  /  TBili  0.8  /  DBili  x   /  AST  14  /  ALT  13  /  AlkPhos  123<H>  09-02  TPro  7.6  /  Alb  4.2  /  TBili  0.7  /  DBili  x   /  AST  14  /  ALT  14  /  AlkPhos  131<H>  09-01  TPro  7.3  /  Alb  4.3  /  TBili  0.6  /  DBili  x   /  AST  15  /  ALT  13  /  AlkPhos  149<H>  08-30  TPro  7.7  /  Alb  4.4  /  TBili  0.6  /  DBili  x   /  AST  15  /  ALT  12  /  AlkPhos  147<H>  08-30    CAPILLARY BLOOD GLUCOSE          LIVER FUNCTIONS - ( 02 Sep 2021 05:15 )  Alb: 3.8 g/dL / Pro: 7.2 g/dL / ALK PHOS: 123 U/L / ALT: 13 U/L / AST: 14 U/L / GGT: x           PT/INR - ( 02 Sep 2021 05:15 )   PT: 13.3 sec;   INR: 1.18 ratio         PTT - ( 02 Sep 2021 05:15 )  PTT:39.4 sec    D-Dimer Assay, Quantitative: 555 ng/mL DDU (08-30 @ 21:52)        RECENT CULTURES:        RESPIRATORY CULTURES:      rad< from: CT Chest w/ IV Cont (08.31.21 @ 02:06) >  PLEURA: No pleural effusion.  MEDIASTINUM AND FARIHA: There is is extensive mediastinal lymphadenopathy with cluster of lymph nodes in the AP window, prevascular space, and right superior paratracheal space, these nodes are poorly delineated from each other.  VESSELS: No main, left main, right main, lobar, or segmental pulmonary embolism. Evaluation of subsegmental pulmonary arteries is limitedsecondary to poor contrast opacification. Aortic and coronary artery calcifications. Suggestion of complete/near-complete occlusion of the SVC, left brachiocephalic, and left internal jugular veins, however evaluation is limited secondary to IV contrast timing.  HEART: Heart size is enlarged. No pericardial effusion.  CHEST WALL AND LOWER NECK: There is bilateral supraclavicular lymphadenopathy, for reference a left supraclavicular lymph node measures 1.6 x 2.3 cm (AP X TR). Please refer to CT neck report of same day for detailed evaluation of the neck.  VISUALIZED UPPER ABDOMEN:  Similar nodular thickening of the bilateral adrenal glands.  BONES: Surgical defect of the right posterior seventh rib. Degenerative changes.    IMPRESSION:  No main, left main, right main, lobar, or segmental pulmonary embolism. Evaluation of subsegmental pulmonary arteries is limited secondary to poor contrast opacification.    There is extensive supraclavicular and mediastinal lymphadenopathy.  Suggestion of complete/near-complete occlusion of the SVC, left brachiocephalic, and left internal jugular veins, however evaluation is limited secondary to IV contrast timing. Vascular ultrasound versus repeat neck/chest imaging with more delayed IV contrast timing may be helpful for complete evaluation.        --- End of Report ---    < end of copied text >      Studies  Chest X-RAY  CT SCAN Chest   Venous Dopplers: LE:   CT Abdomen  Others              
Pt is feeling much better, swelling over the face and neck less since the placement of the stent, able to eat now, no visible bleeding, no CP, swelling over the legs is less.       Meds:  acetaminophen   Tablet .. 650 milliGRAM(s) Oral every 6 hours PRN  aluminum hydroxide/magnesium hydroxide/simethicone Suspension 30 milliLiter(s) Oral every 4 hours PRN  atorvastatin 40 milliGRAM(s) Oral at bedtime  budesonide  80 MICROgram(s)/formoterol 4.5 MICROgram(s) Inhaler 2 Puff(s) Inhalation daily  carvedilol 6.25 milliGRAM(s) Oral every 12 hours  chlorhexidine 4% Liquid 1 Application(s) Topical <User Schedule>  furosemide    Tablet 40 milliGRAM(s) Oral daily  heparin   Injectable 6500 Unit(s) IV Push every 6 hours PRN  heparin   Injectable 3000 Unit(s) IV Push every 6 hours PRN  heparin  Infusion.  Unit(s)/Hr IV Continuous <Continuous>  melatonin 3 milliGRAM(s) Oral at bedtime PRN  ondansetron Injectable 4 milliGRAM(s) IV Push every 8 hours PRN  sodium chloride 0.9% lock flush 10 milliLiter(s) IV Push every 1 hour PRN  tiotropium 18 MICROgram(s) Capsule 1 Capsule(s) Inhalation daily      Vital Signs Last 24 Hrs  T(C): 36.5 (04 Sep 2021 06:00), Max: 36.7 (03 Sep 2021 19:20)  T(F): 97.7 (04 Sep 2021 06:00), Max: 98 (03 Sep 2021 19:20)  HR: 72 (04 Sep 2021 06:00) (72 - 96)  BP: 124/63 (04 Sep 2021 06:00) (115/67 - 133/70)  BP(mean): --  RR: 17 (04 Sep 2021 06:00) (16 - 17)  SpO2: 98% (04 Sep 2021 06:00) (98% - 99%)                          10.5   7.98  )-----------( 246      ( 04 Sep 2021 04:04 )             30.7       09-04    134<L>  |  95<L>  |  17  ----------------------------<  109<H>  3.1<L>   |  26  |  1.19    Ca    9.2      04 Sep 2021 04:04  Phos  4.5     09-04  Mg     2.10     09-04    TPro  6.9  /  Alb  3.6  /  TBili  0.6  /  DBili  x   /  AST  20  /  ALT  13  /  AlkPhos  118  09-03              PTT - ( 04 Sep 2021 04:04 )  PTT:46.9 sec

## 2021-09-08 NOTE — PROGRESS NOTE ADULT - PROBLEM SELECTOR PROBLEM 1
Head and neck swelling

## 2021-09-08 NOTE — PROGRESS NOTE ADULT - PROBLEM SELECTOR PLAN 6
-Continue Atorvastatin 40mg qhs
-since resolved  -Afebrile
-Continue Atorvastatin 40mg qhs
-since resolved  -Afebrile

## 2021-09-08 NOTE — PROVIDER CONTACT NOTE (CRITICAL VALUE NOTIFICATION) - ASSESSMENT
Patient A+Ox4, denies chest pain or SOB, no acute s/s of distress noted.
Pt resting comfortably in bed, so signs/symptoms of bleeding noted
patient A&O4, denies pain and discomfort. no signs or symptoms of distress noted.

## 2021-09-08 NOTE — PROGRESS NOTE ADULT - PROBLEM SELECTOR PROBLEM 2
SVC syndrome

## 2021-09-08 NOTE — PROGRESS NOTE ADULT - PROVIDER SPECIALTY LIST ADULT
Thoracic Surgery
Cardiology
Intervent Radiology
Anesthesia
Cardiology
Cardiology
Intervent Radiology
Pulmonology
Heme/Onc
Internal Medicine
Internal Medicine
Pulmonology
Internal Medicine
Internal Medicine
Pulmonology
Pulmonology
Internal Medicine
Pulmonology
Internal Medicine
Internal Medicine
Pulmonology
Pulmonology
Heme/Onc

## 2021-09-08 NOTE — PROVIDER CONTACT NOTE (CRITICAL VALUE NOTIFICATION) - RECOMMENDATIONS
MARCIO Baez made aware. Heparin nomogram followed
heparin nomogram followed.
Follow heparin nomogram

## 2021-09-08 NOTE — PROGRESS NOTE ADULT - PROBLEM SELECTOR PROBLEM 7
History of adenocarcinoma of lung
History of adenocarcinoma of lung
CAD (coronary artery disease)
History of adenocarcinoma of lung
CAD (coronary artery disease)
History of adenocarcinoma of lung

## 2021-09-08 NOTE — CHART NOTE - NSCHARTNOTESELECT_GEN_ALL_CORE
Event Note
CTS Chart Note/Event Note
Event Note
Thoracic Surgery/Event Note
Thoracic surgery
pre IR note/Event Note

## 2021-09-08 NOTE — PROGRESS NOTE ADULT - PROBLEM SELECTOR PROBLEM 6
HLD (hyperlipidemia)
Leukocytosis
HLD (hyperlipidemia)
Leukocytosis
HLD (hyperlipidemia)

## 2021-09-08 NOTE — PROGRESS NOTE ADULT - PROBLEM SELECTOR PLAN 5
Cont home meds
-Will need to verify with pharmacy in AM what does of coreg patient takes. For now will start at Coreg 6.125 considering the elevated BP
controlled
controlled
Per hx  -Not currently exacerbated  -Symbicort 2 puffs BID  -Spiriva 1 capsule inhaled daily
controlled    9/2: controlled    9/3: controlled
Cont home meds
controlled    9/2: controlled    9/3: controlled  9/5: controlled
controlled    9/2: controlled
Per hx  -Not currently exacerbated  -Symbicort 2 puffs BID  -Spiriva 1 capsule inhaled daily
-Will need to verify with pharmacy in AM what does of coreg patient takes. For now will start at Coreg 6.125 considering the elevated BP
Cont home meds

## 2021-09-08 NOTE — DISCHARGE NOTE PROVIDER - HOSPITAL COURSE
65F with HTN, HLD, CAD s/p stents (last in 2018), COPD hx of resected RR lung adenocarcinoma (serial CT scans q3-6 months, follows Dr. Clement), former smoker who presents with worsening neck swelling and L arm concerning for SVC syndrome. Pending CT H/N.    Head and neck swelling.   ·  Plan: IR has performed SVC stenting with improvement  Will need a biopsy of neck L Nodes today. Will follow up with Dr. Guevara for biopsy results     SVC syndrome.   ·  Plan: -Management as above.  Had SVC Stent placed.  Will follow up with Dr. Zayas in 4 weeks.      Leukocytosis.   ·  Plan: Unsure etiology. Not sepsis and no signs of infection. Will observe for now.     CAD (coronary artery disease).   ·  Plan: -Continue home ASA    Other home meds to continue.     HTN (hypertension).   ·  Plan: Cont home meds.     HLD (hyperlipidemia).   ·  Plan: -Continue Atorvastatin 40mg qhs.     History of adenocarcinoma of lung.   ·  Plan: Follows Dr. Peters.    Dispo-  On 9/8/21, medically optimized as per attending Dr. Cuevas for discharge home with outpatient follow up with Oncology for biopsy results and IR for Stent placement for SVC.   All medications reviewed prior to discharge

## 2021-09-08 NOTE — PROGRESS NOTE ADULT - PROBLEM SELECTOR PROBLEM 4
CAD (coronary artery disease)
CAD (coronary artery disease)
History of adenocarcinoma of lung
CAD (coronary artery disease)
History of adenocarcinoma of lung
CAD (coronary artery disease)

## 2021-09-08 NOTE — PROGRESS NOTE ADULT - PROBLEM SELECTOR PLAN 7
she has significant lymphadenopathy: she should get pet scan    9/3: for biopsy : next week
stable  -cards following
Follows Dr. Peters
she has significant lymphadenopathy: she should get pet scan    9/3: for biopsy : next week    9/5; for biopsy next week: tuesday
Follows Dr. Peters
stable  -cards following
Follows Dr. Peters
she has significant lymphadenopathy: she should get pet scan    9/3: for biopsy : next week    9/5; for biopsy next week: Tuesday 9/6: for biopsy tomorrow
Follows Dr. Peters
she has significant lymphadenopathy: she should get pet scan
Follows Dr. Peters
she has significant lymphadenopathy: she should get pet scan

## 2021-09-08 NOTE — PROGRESS NOTE ADULT - PROBLEM SELECTOR PROBLEM 5
HTN (hypertension)
History of COPD
HTN (hypertension)
History of COPD
HTN (hypertension)

## 2021-09-08 NOTE — PROVIDER CONTACT NOTE (CRITICAL VALUE NOTIFICATION) - BACKGROUND
66 yo F with hx of HTN, CAD, COPD, resected RR lung adenocarcinoma, former smoker. Admitted with swelling to left neck and arm concerning for SVC syndrome. s/p SVC stent and left neck biopsy
Pt with a hx of left IJ DVT, on heparin gtt. Pt s/p SVC stent placement
patient admitted for left neck and arm swelling. positive for DVT in Left upper extremity.

## 2021-09-08 NOTE — PROGRESS NOTE ADULT - ASSESSMENT
65F with HTN, HLD, CAD s/p stents (last in 2018), COPD hx of resected RR lung adenocarcinoma (serial CT scans q3-6 months, follows Dr. Clement), former smoker who presents with worsening neck swelling and L arm concerning for SVC syndrome. Pending CT H/N.
65y Female with SVC compression now s/p SVC stenting 9/3    -patient clinically improved and without complaints  -continue therapeutic a/c with conversion to therapeutic lovenox for continued outpatient A/C  -patient should have f/u chest CTV in 4-6 weeks  -can contact IR outpatient office for f/u at [phone]  -continue care as per primary team  -IR to sign off on patient now as an inpatient  -re-consult IR with any issues    Darinel Mehta MD  PGY-IV, Interventional Radiology  [facility]-p.[phone], [phone]  [facility]-p.[phone] ([phone]), [phone] 
POST ANESTHESIA EVALUATION    Postop Day 1        MENTAL STATUS:   [ x ] Awake   [  ] Arousable   [  ] Sedated    AIRWAY PATENCY:   [ x ] Satisfactory   [  ] Other:     NAUSEA / VOMITING:  [ x ] None   [  ] Controlled with current regimen   [  ] Other    PAIN:   [  ] None   [ x ] Controlled with current regimen   [  ] Other    [ x ] No apparent anesthesia complications      Comments:      Slava Flor M.D.  Department of Anesthesiology  [facility] 
65y Female with SVC compression now s/p SVC stenting 9/3    -patient clinically improved and without complaints  -continue care as per primary team  -re-consult IR with any issues    Darinel Mehta MD  PGY-IV, Interventional Radiology  [facility]-p.[phone], [phone]  [facility]-p.[phone] ([phone]), [phone] 
65F with HTN, HLD, CAD s/p stents (last in 2018), COPD hx of resected RR lung adenocarcinoma (serial CT scans q3-6 months, follows Dr. Clement), former smoker who presents with worsening neck swelling and L arm concerning for SVC syndrome. Pending CT H/N.
65y old  Female who presents with a chief complaint of Neck swelling (02 Sep 2021 18:29), has h/o early stage NSCLC, s/p surgery followed by adjuvant chemo > 2 years ago, recent mediastinoscopy for increasing adenopathy was negative, here with increasing neck and chest adenopathy and SVC syndrome and a DVT in the neck, has very poor venous access. will recommend:    - continue IV heparin or lovenox at therapeutic doses - can be switched over to eliquis after the Bx  - monitor CBC and for any bleeding  - repeat plts have been in acceptable range  - discussed with Dr Yaw Clement on 9.3.21 - he will to get a midline and node Bx - on tuesday - as the rapidity of the disease suggests that there may be different pathology  - all other supportive Rx    - for questions, please call [phone]    
65F with HTN, HLD, CAD s/p stents (last in 2018), COPD hx of resected RR lung adenocarcinoma (serial CT scans q3-6 months, follows Dr. Clement), former smoker who presents with worsening neck swelling and L arm concerning for SVC syndrome. Pending CT H/N.
65y old  Female who presents with a chief complaint of Neck swelling (02 Sep 2021 18:29), has h/o early stage NSCLC, s/p surgery followed by adjuvant chemo > 2 years ago, recent mediastinoscopy for increasing adenopathy was negative, here with increasing neck and chest adenopathy and SVC syndrome and a DVT in the neck, has very poor venous access. will recommend:    - f/u on node Bx - called today to expedite the results  - continue IV heparin or lovenox at therapeutic doses - can be switched over to eliquis after the Bx  - monitor CBC and for any bleeding  - repeat plts have been in acceptable range  - discussed with Dr Yaw Clement on 9.3.21 - he will to get a midline and node Bx - on tuesday - as the rapidity of the disease suggests that there may be different pathology  - all other supportive Rx  - to f/u as outpt next week after discharge  - discussed with Keli Clement and Arthur 8.7.21 and NP today    - for questions, please call [phone]

## 2021-09-08 NOTE — PROGRESS NOTE ADULT - ATTENDING COMMENTS
Patient seen and examined.  Agree with above.   Tolerated procedures well in terms of cv perspective  Continue with sapt for history of prior PCI  No further inpatient cardiac workup needed at this time.     Tip Hernandez MD

## 2021-09-08 NOTE — PROGRESS NOTE ADULT - PROBLEM SELECTOR PLAN 2
-Management as above
s/p stenting of SVR 9/3 with improvement in swelling
-Management as above
for procedure in AM : stent placement    9/2: for stent polacement:
for procedure in AM : stent placement    9/2: for stent placement:    9/3: Left IJV thromboses: on heparin    9/5: cont heparin:  9/6: cont AC
for procedure in AM : stent placement    9/2: for stent placement:    9/3: Left IJV thromboses: on heparin
s/p stenting of SVR 9/3 with improvement in swelling
-Management as above
for procedure in AM : stent placement    9/2: for stent placement:    9/3: Left IJV thromboses: on heparin    9/5: cont heparin:
-Management as above
-Management as above
for procedure in AM : stent placement

## 2021-09-08 NOTE — PROGRESS NOTE ADULT - REASON FOR ADMISSION
Neck swelling

## 2021-09-08 NOTE — DISCHARGE NOTE PROVIDER - NSDCCPCAREPLAN_GEN_ALL_CORE_FT
PRINCIPAL DISCHARGE DIAGNOSIS  Diagnosis: SVC (superior vena cava obstruction)  Assessment and Plan of Treatment: Youu had a stent placed by Interventional Radiologist Dr Zayas.   You will need to follow  up in 4 weeks.  Please call [phone] for further medical management.  Continue Eliquis 5 mg 2 times daily for patency of thrombus.      SECONDARY DISCHARGE DIAGNOSES  Diagnosis: Cancer of lung  Assessment and Plan of Treatment: Follow up with Dr. Clement as outpatient.    Diagnosis: Thrombosis of internal jugular vein  Assessment and Plan of Treatment: Please contune Eliquis as prescribed.    Diagnosis: Head and neck swelling  Assessment and Plan of Treatment: Follow up with Dr. Clement for biopsy results taken on 9/7.    Diagnosis: CAD (coronary artery disease)  Assessment and Plan of Treatment: Continue Aspirin as prescribed.    Diagnosis: HTN (hypertension)  Assessment and Plan of Treatment: Continue current blood pressure medication regimen as directed. Monitor for any visual changes, headaches or dizziness.  Monitor blood pressure regularly.  Follow up with your PCP for further management for high blood pressure, please call to make appointment within 1 week of discharge

## 2021-09-08 NOTE — PROGRESS NOTE ADULT - PROBLEM SELECTOR PLAN 3
Unsure etiology. Not sepsis and no signs of infection. Will observe for now
Unsure etiology. Not sepsis and no signs of infection. Will observe for now
downtrending: no antibtiocs: no fveer
Unsure etiology. Not sepsis and no signs of infection. Will observe for now
downtrending: no antibtiocs: no fever    9/2: resolved:
downtrending: no antibiotics: no fever    9/2: resolved:    9/3: resolved    9/5: resolved!
downtrending: no antibiotics: no fever    9/2: resolved:    9/3: resolved    9/5: resolved!  9/6: no fever: resolved
Unsure etiology. Not sepsis and no signs of infection. Will observe for now
Unsure etiology. Not sepsis and no signs of infection. Will observe for now
UE venous duplex shows acute non-occlusive L IJ DVT  -AC per primary team  -AC held today for LN biopsy.
Unsure etiology. Not sepsis and no signs of infection. Will observe for now
downtrending: no antibiotics: no fever    9/2: resolved:    9/3: resolved
UE venous duplex shows acute non-occlusive L IJ DVT  -AC per primary team
Unsure etiology. Not sepsis and no signs of infection. Will observe for now

## 2021-09-08 NOTE — DISCHARGE NOTE PROVIDER - CARE PROVIDER_API CALL
Toby Zayas)  Radiology General  [address] [place] [address]  Phone: [phone]  Fax: [phone]  Follow Up Time:     Gera Guevara)  Hematology; Medical Oncology  [address], [address]  [place] [address]  Phone: [phone]  Fax: [phone]  Follow Up Time:

## 2021-09-13 PROBLEM — R60.0 ARM EDEMA: Status: ACTIVE | Noted: 2021-08-24

## 2021-09-13 LAB
CULTURE RESULTS: SIGNIFICANT CHANGE UP
SPECIMEN SOURCE: SIGNIFICANT CHANGE UP
SURGICAL PATHOLOGY STUDY: SIGNIFICANT CHANGE UP

## 2021-09-14 ENCOUNTER — APPOINTMENT (OUTPATIENT)
Dept: THORACIC SURGERY | Facility: CLINIC | Age: 66
End: 2021-09-14
Payer: MEDICARE

## 2021-09-14 VITALS
WEIGHT: 185 LBS | BODY MASS INDEX: 36.32 KG/M2 | HEART RATE: 81 BPM | DIASTOLIC BLOOD PRESSURE: 83 MMHG | OXYGEN SATURATION: 98 % | HEIGHT: 60 IN | TEMPERATURE: 98 F | SYSTOLIC BLOOD PRESSURE: 161 MMHG | RESPIRATION RATE: 18 BRPM

## 2021-09-14 DIAGNOSIS — R60.0 LOCALIZED EDEMA: ICD-10-CM

## 2021-09-14 PROCEDURE — 99024 POSTOP FOLLOW-UP VISIT: CPT

## 2021-09-14 NOTE — ASSESSMENT
[FreeTextEntry1] : 65 year old female, former smoker (1/2 PPD x 30 years, quit Jan 2018), w/ hx of HTN, HLD, CAD (s/p coronary stenting to the RCA on 1/9/2018), COPD, osteoporosis. ?Right THR. \par \par Now 2yr s/p FB; robotic-assisted, conversion to thoracotomy RULobectomy with MLND on 9/3/19. 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 Pathology of right rib excision was benign, revealed bone. Visceral pleura invasion and HYACINTH present. \par \par She completed chemotherapy on 12/10/19 with Dr. Guevara. \par \par CT Chest w/o IV contrast on 2/4/2021:\par - s/p RULobectomy with emphysema\par - No new mass or nodules\par - Stable to slightly decreased in size, 9mm RLL gg nodule (10:234).\par - small loculated right pleural effusion\par \par PET/CT on 2/5/21 showed FDG-avid paratracheal LN.\par \par Now s/p Flex Bronch EBUS Bx w/ BAL on 3/9/21. Path of subcarinal LN was negative malignancy; favor reactive LN. Rt paratracheal LN was non-diagnostic. BAL negative for malignancy.\par \par Received 1st dose of Pfizer on 3/12/21, 2nd dose on 4/2. \par \par CT Chest on 6/15/21:\par - post-RULobectomy w/ stable post-op changes\par - stable 8mm RLL ggo (7:242)\par - new, enlargement of superior mediastinal LNs: a 2.6 x 1.8cm superior prevascular LN (3:39; previously 1.2 x 1.1cm), a 2.8 x 1.7cm Rt paratracheal superior mediastinal LN (previously 2.1 x 1.3cm) \par - mild to moderate emphysematous changes\par \par Now s/p Flex Bronch, Cervical Mediastinoscopy on 7/27/21. Path of Rt paratracheal LNs and Subcarinal LNs are all benign.\par \par Doppler of upper extremities on 8/19/21: \par - no DVT\par - enlarged, abnormal appearing LNs in the Lt neck adjacent to the internal jugular vein, measuring 2 x 1.4 x 2cm\par - subcutaneous edema in the Lt wrist and forearm\par \par She returned to office on 8/24/21 c/o b/l neck swelling along with RUE swelling:\par \par Patient was admitted on 8/30/21 - 9/8/21 for worsening neck swelling and Lt arm swelling and to r/o SVC Syndrome. Patient now [facility]. Patient also s/p cervical node biopsy on 9/7/21, path revealed +Mets poorly-diff NSCLC, c/w lung primary.\par \par CT Neck and chest w/ IV contrast on 8/31/21:\par - stable 9mm ggo\par - extensive mediastinal lymphadenopathy w/ cluster of LNs in the AP window, prevascular space, and Rt superior paratracheal space\par - no P.E.\par - complete/near-complete occlusion of the SVC, Lt brachiocephalic, and Lt internal jugular veins\par - bilateral supraclavicular lymphadenopathy: a 1.6 x 2.3cm Lt supraclavicular LN\par - enlarged bilateral Level 4 LNs: 2.5 x 1.2cm Lt node; 1.6 x 1.1cm Rt node\par - bilateral lower facial and upper neck swelling, left greater than right, with retropharyngeal edema is presumed to be related to venous outlet obstruction\par \par I have reviewed the patient's medical records and diagnostic images at time of this office consultation and have made the following recommendation:\par 1. Path reviewed and explained to patient, +recurrent lung CA mets to cervical LN, I recommended patient to f/u with Hem/Onc Dr. Guevara for chemo, RTC as needed.\par \par \par I personally performed the services described in the documentation, reviewed the documentation recorded by the scribe in my presence and it accurately and completely records my words and actions.\par \par I, Shannan Roman NP, am scribing for and the presence of STARR Kay, the following sections HISTORY OF PRESENT ILLNESS, PAST MEDICAL/FAMILY/SOCIAL HISTORY; REVIEW OF SYSTEMS; VITAL SIGNS; PHYSICAL EXAM; DISPOSITION.\par \par

## 2021-09-14 NOTE — HISTORY OF PRESENT ILLNESS
[FreeTextEntry1] : 65 year old female, former smoker (1/2 PPD x 30 years, quit Jan 2018), w/ hx of HTN, HLD, CAD (s/p coronary stenting to the RCA on 1/9/2018), COPD, osteoporosis. ?Right THR. \par \par Now 2yr s/p FB; robotic-assisted, conversion to thoracotomy RULobectomy with MLND on 9/3/19. 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 Pathology of right rib excision was benign, revealed bone. Visceral pleura invasion and HYACINTH present. \par \par She completed chemotherapy on 12/10/19 with Dr. Guevara. \par \par CT Chest w/o IV contrast on 2/4/2021:\par - s/p RULobectomy with emphysema\par - No new mass or nodules\par - Stable to slightly decreased in size, 9mm RLL gg nodule (10:234).\par - small loculated right pleural effusion\par \par PET/CT on 2/5/21 showed FDG-avid paratracheal LN.\par \par Now s/p Flex Bronch EBUS Bx w/ BAL on 3/9/21. Path of subcarinal LN was negative malignancy; favor reactive LN. Rt paratracheal LN was non-diagnostic. BAL negative for malignancy.\par \par Received 1st dose of Pfizer on 3/12/21, 2nd dose on 4/2. \par \par CT Chest on 6/15/21:\par - post-RULobectomy w/ stable post-op changes\par - stable 8mm RLL ggo (7:242)\par - new, enlargement of superior mediastinal LNs: a 2.6 x 1.8cm superior prevascular LN (3:39; previously 1.2 x 1.1cm), a 2.8 x 1.7cm Rt paratracheal superior mediastinal LN (previously 2.1 x 1.3cm) \par - mild to moderate emphysematous changes\par \par Now s/p Flex Bronch, Cervical Mediastinoscopy on 7/27/21. Path of Rt paratracheal LNs and Subcarinal LNs are all benign.\par \par Doppler of upper extremities on 8/19/21: \par - no DVT\par - enlarged, abnormal appearing LNs in the Lt neck adjacent to the internal jugular vein, measuring 2 x 1.4 x 2cm\par - subcutaneous edema in the Lt wrist and forearm\par \par She returned to office on 8/24/21 c/o b/l neck swelling along with RUE swelling:\par \par Patient was admitted on 8/30/21 - 9/8/21 for worsening neck swelling and Lt arm swelling and to r/o SVC Syndrome. Patient now s/p SVC stenting by IR, on Eliquis, followed by Dr. Zayas. Patient also s/p cervical node biopsy on 9/7/21, path revealed +Mets poorly-diff NSCLC, c/w lung primary.\par \par CT Neck and chest w/ IV contrast on 8/31/21:\par - stable 9mm ggo\par - extensive mediastinal lymphadenopathy w/ cluster of LNs in the AP window, prevascular space, and Rt superior paratracheal space\par - no P.E.\par - complete/near-complete occlusion of the SVC, Lt brachiocephalic, and Lt internal jugular veins\par - bilateral supraclavicular lymphadenopathy: a 1.6 x 2.3cm Lt supraclavicular LN\par - enlarged bilateral Level 4 LNs: 2.5 x 1.2cm Lt node; 1.6 x 1.1cm Rt node\par - bilateral lower facial and upper neck swelling, left greater than right, with retropharyngeal edema is presumed to be related to venous outlet obstruction\par \par Patient is here today for a follow up. Admits to hoarseness.\par Of note, patient is scheduled for PET and Brain MRI by Dr. Guevara.\par

## 2021-09-14 NOTE — CONSULT LETTER
[FreeTextEntry2] : Wesley Joshi MD (Pulm) [FreeTextEntry3] : Yaw Clement MD\par Director of Thoracic, [facility]\par Cardiovascular & Thoracic Surgery\par \par [facility]\par [address]\par Oncology Building 3rd Fl\[place] [address]\par Tel: [phone]\par Fax: [phone]\par

## 2021-09-28 ENCOUNTER — APPOINTMENT (OUTPATIENT)
Dept: RADIATION ONCOLOGY | Facility: CLINIC | Age: 66
End: 2021-09-28
Payer: MEDICARE

## 2021-09-28 ENCOUNTER — OUTPATIENT (OUTPATIENT)
Dept: OUTPATIENT SERVICES | Facility: HOSPITAL | Age: 66
LOS: 1 days | Discharge: HOME | End: 2021-09-28
Payer: MEDICARE

## 2021-09-28 VITALS
RESPIRATION RATE: 17 BRPM | SYSTOLIC BLOOD PRESSURE: 174 MMHG | BODY MASS INDEX: 36.72 KG/M2 | OXYGEN SATURATION: 99 % | WEIGHT: 187.99 LBS | TEMPERATURE: 97.34 F | DIASTOLIC BLOOD PRESSURE: 89 MMHG | HEART RATE: 81 BPM

## 2021-09-28 DIAGNOSIS — Z95.5 PRESENCE OF CORONARY ANGIOPLASTY IMPLANT AND GRAFT: Chronic | ICD-10-CM

## 2021-09-28 DIAGNOSIS — Z96.649 PRESENCE OF UNSPECIFIED ARTIFICIAL HIP JOINT: Chronic | ICD-10-CM

## 2021-09-28 DIAGNOSIS — Z90.2 ACQUIRED ABSENCE OF LUNG [PART OF]: Chronic | ICD-10-CM

## 2021-09-28 DIAGNOSIS — Z98.891 HISTORY OF UTERINE SCAR FROM PREVIOUS SURGERY: Chronic | ICD-10-CM

## 2021-09-28 PROCEDURE — 77263 THER RADIOLOGY TX PLNG CPLX: CPT

## 2021-09-28 PROCEDURE — 99205 OFFICE O/P NEW HI 60 MIN: CPT | Mod: GC,25

## 2021-09-28 RX ORDER — CARVEDILOL 6.25 MG/1
6.25 TABLET, FILM COATED ORAL
Refills: 0 | Status: ACTIVE | COMMUNITY

## 2021-09-28 RX ORDER — CLINDAMYCIN HYDROCHLORIDE 300 MG/1
CAPSULE ORAL
Refills: 0 | Status: DISCONTINUED | COMMUNITY
End: 2021-09-28

## 2021-09-28 RX ORDER — ASPIRIN 81 MG
81 TABLET, DELAYED RELEASE (ENTERIC COATED) ORAL
Refills: 0 | Status: ACTIVE | COMMUNITY

## 2021-09-28 RX ORDER — LABETALOL HYDROCHLORIDE 300 MG/1
TABLET, FILM COATED ORAL
Refills: 0 | Status: DISCONTINUED | COMMUNITY
End: 2021-09-28

## 2021-09-28 RX ORDER — METOPROLOL TARTRATE 75 MG/1
TABLET, FILM COATED ORAL
Refills: 0 | Status: DISCONTINUED | COMMUNITY
End: 2021-09-28

## 2021-09-28 NOTE — REVIEW OF SYSTEMS
[Negative] : Allergic/Immunologic [Cough: Grade 0] : Cough: Grade 0 [Dyspnea: Grade 0] : Dyspnea: Grade 0 [Hiccups: Grade 0] : Hiccups: Grade 0 [Hypoxia: Grade 0] : Hypoxia: Grade 0 [Pharyngeal Mucositis: Grade 0] : Pharyngeal Mucositis: Grade 0 [Pneumonitis: Grade 0] : Pneumonitis: Grade 0 [Voice Alteration: Grade 0] : Voice Alteration: Grade 0 [Hoarseness: Grade 1 - Mild or intermittent voice change; fully understandable; self-resolves] : Hoarseness: Grade 1 - Mild or intermittent voice change; fully understandable; self-resolves

## 2021-10-01 ENCOUNTER — NON-APPOINTMENT (OUTPATIENT)
Age: 66
End: 2021-10-01

## 2021-10-01 PROCEDURE — 77334 RADIATION TREATMENT AID(S): CPT | Mod: 26

## 2021-10-06 LAB
CULTURE RESULTS: SIGNIFICANT CHANGE UP
SPECIMEN SOURCE: SIGNIFICANT CHANGE UP

## 2021-10-07 ENCOUNTER — APPOINTMENT (OUTPATIENT)
Dept: INTERVENTIONAL RADIOLOGY/VASCULAR | Facility: CLINIC | Age: 66
End: 2021-10-07
Payer: MEDICARE

## 2021-10-07 VITALS
OXYGEN SATURATION: 99 % | RESPIRATION RATE: 16 BRPM | BODY MASS INDEX: 36.32 KG/M2 | HEIGHT: 60 IN | DIASTOLIC BLOOD PRESSURE: 85 MMHG | HEART RATE: 81 BPM | SYSTOLIC BLOOD PRESSURE: 175 MMHG | WEIGHT: 185 LBS

## 2021-10-07 DIAGNOSIS — I87.1 COMPRESSION OF VEIN: ICD-10-CM

## 2021-10-07 PROCEDURE — 99214 OFFICE O/P EST MOD 30 MIN: CPT

## 2021-10-07 RX ORDER — APIXABAN 5 MG/1
5 TABLET, FILM COATED ORAL
Refills: 0 | Status: ACTIVE | COMMUNITY

## 2021-10-08 PROCEDURE — 77470 SPECIAL RADIATION TREATMENT: CPT | Mod: 26

## 2021-10-20 PROCEDURE — 77293 RESPIRATOR MOTION MGMT SIMUL: CPT | Mod: 26

## 2021-10-20 PROCEDURE — 77338 DESIGN MLC DEVICE FOR IMRT: CPT | Mod: 26

## 2021-10-20 PROCEDURE — 77301 RADIOTHERAPY DOSE PLAN IMRT: CPT | Mod: 26

## 2021-10-20 PROCEDURE — 77300 RADIATION THERAPY DOSE PLAN: CPT | Mod: 26

## 2021-10-21 NOTE — HISTORY OF PRESENT ILLNESS
[FreeTextEntry1] : 65 year old female, former smoker (1/2 PPD x 30 years, quit Jan 2018), now 2yr s/p RULobectomy with MLND on 9/3/19. 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 Pathology of right rib excision was benign, revealed bone. Visceral pleura invasion and HYACINTH present. She completed chemotherapy on 12/10/19 with Dr. Guevara. \par \par CT Chest w/o IV contrast on 2/4/2021:\par - s/p RULobectomy with emphysema\par - No new mass or nodules\par - Stable to slightly decreased in size, 9mm RLL gg nodule (10:234).\par - small loculated right pleural effusion\par \par PET/CT on 2/5/21 showed FDG-avid paratracheal LN.\par \par Now s/p Flex Bronch EBUS Bx w/ BAL on 3/9/21. Path of subcarinal LN was negative malignancy; favor reactive LN. Rt paratracheal LN was non-diagnostic. BAL negative for malignancy.\par \par CT Chest on 6/15/21:\par - post-RULobectomy w/ stable post-op changes\par - stable 8mm RLL ggo (7:242)\par - new, enlargement of superior mediastinal LNs: a 2.6 x 1.8cm superior prevascular LN (3:39; previously 1.2 x 1.1cm), a 2.8 x 1.7cm Rt paratracheal superior mediastinal LN (previously 2.1 x 1.3cm) \par - mild to moderate emphysematous changes\par \par Now s/p Flex Bronch, Cervical Mediastinoscopy on 7/27/21. Path of Rt paratracheal LNs and Subcarinal LNs are all benign.\par \par Doppler of upper extremities on 8/19/21: \par - no DVT\par - enlarged, abnormal appearing LNs in the Lt neck adjacent to the internal jugular vein, measuring 2 x 1.4 x 2cm\par - subcutaneous edema in the Lt wrist and forearm\par \par Patient was admitted on 8/30/21 - 9/8/21 for worsening neck swelling and Lt arm swelling and to r/o SVC Syndrome. Patient now s/p SVC stenting by IR, on Eliquis, followed by Dr. Zayas. Patient also s/p cervical node biopsy on 9/7/21, path revealed +Mets poorly-diff NSCLC, c/w lung primary.\par \par CT Neck and chest w/ IV contrast on 8/31/21:\par - stable 9mm ggo\par - extensive mediastinal lymphadenopathy w/ cluster of LNs in the AP window, prevascular space, and Rt superior paratracheal space\par - no P.E.\par - complete/near-complete occlusion of the SVC, Lt brachiocephalic, and Lt internal jugular veins\par - bilateral supraclavicular lymphadenopathy: a 1.6 x 2.3cm Lt supraclavicular LN\par - enlarged bilateral Level 4 LNs: 2.5 x 1.2cm Lt node; 1.6 x 1.1cm Rt node\par - bilateral lower facial and upper neck swelling, left greater than right, with retropharyngeal edema is presumed to be related to venous outlet obstruction\par \par Had negative metastatic workup with PET and Brain MRI by Dr. Guevara, and started chemotherapy yesterday. Clinically she is near asymptomatic after stenting with some edema in her neck as only concern. She has some SOB at night at baseline from her COPD and sleeps with her head supported. \par \par \par

## 2021-10-21 NOTE — REASON FOR VISIT
[Consideration of Curative Therapy] : consideration of curative therapy for [Spouse] : spouse [Lung Cancer] : lung cancer

## 2021-10-21 NOTE — DISEASE MANAGEMENT
[Clinical] : TNM Stage: c [N/A] : Currently not applicable [FreeTextEntry4] : NSCLC [TTNM] : x [NTNM] : x [MTNM] : 1

## 2021-10-25 PROCEDURE — 77427 RADIATION TX MANAGEMENT X5: CPT

## 2021-10-25 PROCEDURE — 77387B: CUSTOM | Mod: 26

## 2021-10-26 PROCEDURE — 77387B: CUSTOM | Mod: 26

## 2021-10-27 PROCEDURE — 77387B: CUSTOM | Mod: 26

## 2021-10-28 ENCOUNTER — NON-APPOINTMENT (OUTPATIENT)
Age: 66
End: 2021-10-28

## 2021-10-28 VITALS
SYSTOLIC BLOOD PRESSURE: 176 MMHG | HEART RATE: 86 BPM | WEIGHT: 185 LBS | BODY MASS INDEX: 36.13 KG/M2 | OXYGEN SATURATION: 97 % | RESPIRATION RATE: 17 BRPM | DIASTOLIC BLOOD PRESSURE: 87 MMHG

## 2021-10-28 PROCEDURE — 77387B: CUSTOM | Mod: 26

## 2021-10-29 PROCEDURE — 77014: CPT | Mod: 26

## 2021-11-01 PROCEDURE — 77387B: CUSTOM | Mod: 26

## 2021-11-01 PROCEDURE — 77427 RADIATION TX MANAGEMENT X5: CPT

## 2021-11-02 PROCEDURE — 77387B: CUSTOM | Mod: 26

## 2021-11-02 NOTE — HISTORY OF PRESENT ILLNESS
[FreeTextEntry1] : 65 year old female, former smoker (1/2 PPD x 30 years, quit Jan 2018), now 2yr s/p RULobectomy with MLND on 9/3/19. 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 Pathology of right rib excision was benign, revealed bone. Visceral pleura invasion and HYACINTH present. She completed chemotherapy on 12/10/19 with Dr. Guevara. \par \par 10/28/21\par -Tolerating tx\par -Eating ok\par -Voice a little hoarse\par -no complaints of pain noted

## 2021-11-02 NOTE — H&P ADULT - NSRESEARCHGRANT_MLMHIDDEN_GEN_A_CORE
Telemetry Bed?: Yes   Admitting Physician: LYSSA PEPE [[id number]]   Is this a telephone or verbal order?: This is a telephone order from the admitting physician  
yes

## 2021-11-02 NOTE — DISEASE MANAGEMENT
[Clinical] : TNM Stage: c [N/A] : Currently not applicable [FreeTextEntry4] : NSCLC [TTNM] : x [NTNM] : x [MTNM] : 1 [de-identified] : 4tx/800cGy [de-identified] : 30 tx.6000cGy [de-identified] : mediastinum

## 2021-11-02 NOTE — REVIEW OF SYSTEMS
[Dysphagia: Grade 0] : Dysphagia: Grade 0 [Esophagitis: Grade 0] : Esophagitis: Grade 0 [Cough: Grade 0] : Cough: Grade 0 [Dyspnea: Grade 0] : Dyspnea: Grade 0 [Hoarseness: Grade 1 - Mild or intermittent voice change; fully understandable; self-resolves] : Hoarseness: Grade 1 - Mild or intermittent voice change; fully understandable; self-resolves

## 2021-11-03 PROCEDURE — 77387B: CUSTOM | Mod: 26

## 2021-11-04 VITALS
HEIGHT: 60 IN | SYSTOLIC BLOOD PRESSURE: 175 MMHG | RESPIRATION RATE: 16 BRPM | BODY MASS INDEX: 37.27 KG/M2 | HEART RATE: 94 BPM | DIASTOLIC BLOOD PRESSURE: 96 MMHG | OXYGEN SATURATION: 99 % | WEIGHT: 189.81 LBS

## 2021-11-04 PROCEDURE — 77387B: CUSTOM | Mod: 26

## 2021-11-04 NOTE — VITALS
[Maximal Pain Intensity: 3/10] : 3/10 [Least Pain Intensity: 1/10] : 1/10 [Pain Description/Quality: ___] : Pain description/quality: [unfilled] [Pain Duration: ___] : Pain duration: [unfilled] [Pain Location: ___] : Pain Location: [unfilled] [Pain Interferes with ADLs] : Pain interferes with activities of daily living. [OTC] : OTC [80: Normal activity with effort; some signs or symptoms of disease.] : 80: Normal activity with effort; some signs or symptoms of disease.  [ECOG Performance Status: 0 - Fully active, able to carry on all pre-disease performance without restriction] : Performance Status: 0 - Fully active, able to carry on all pre-disease performance without restriction

## 2021-11-04 NOTE — REASON FOR VISIT
[Routine On-Treatment] : a routine on-treatment visit for [Lung Cancer] : lung cancer [Spouse] : spouse

## 2021-11-04 NOTE — REVIEW OF SYSTEMS
[Constipation: Grade 0] : Constipation: Grade 0 [Diarrhea: Grade 0] : Diarrhea: Grade 0 [Dysphagia: Grade 1 - Symptomatic, able to eat regular diet] : Dysphagia: Grade 1 - Symptomatic, able to eat regular diet [Esophagitis: Grade 0] : Esophagitis: Grade 0 [Nausea: Grade 0] : Nausea: Grade 0 [Vomiting: Grade 0] : Vomiting: Grade 0 [Fatigue: Grade 1 - Fatigue relieved by rest] : Fatigue: Grade 1 - Fatigue relieved by rest [Cough: Grade 0] : Cough: Grade 0 [Dyspnea: Grade 1 - Shortness of breath with moderate exertion] : Dyspnea: Grade 1 - Shortness of breath with moderate exertion [Hoarseness: Grade 1 - Mild or intermittent voice change; fully understandable; self-resolves] : Hoarseness: Grade 1 - Mild or intermittent voice change; fully understandable; self-resolves [Voice Alteration: Grade 1 - Mild or intermittent change from normal voice] : Voice Alteration: Grade 1 - Mild or intermittent change from normal voice

## 2021-11-05 PROCEDURE — 77014: CPT | Mod: 26

## 2021-11-07 NOTE — HISTORY OF PRESENT ILLNESS
[FreeTextEntry1] : 65 year old female, former smoker (1/2 PPD x 30 years, quit Jan 2018), now 2yr s/p RULobectomy with MLND on 9/3/19. 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 Pathology of right rib excision was benign, revealed bone. Visceral pleura invasion and HYACINTH present. She completed chemotherapy on 12/10/19 with Dr. Guevara. \par \par 10/28/21\par -Tolerating tx\par -Eating ok\par -Voice a little hoarse\par -no complaints of pain noted\par \par 11/4/21\par -Tolerating tx\par -Has dysphagia and hoarse voice, mild pain swallowing\par -Eating liquids\par -No coughing but SOB\par -Uses a walker to get around.

## 2021-11-07 NOTE — DISEASE MANAGEMENT
[Clinical] : TNM Stage: c [N/A] : Currently not applicable [FreeTextEntry4] : NSCLC [TTNM] : x [NTNM] : x [de-identified] : 9tx/1800cGy [MTNM] : 1 [de-identified] : 30 tx.6000cGy [de-identified] : mediastinum

## 2021-11-08 PROBLEM — C77.0 MALIGNANT NEOPLASM METASTATIC TO LYMPH NODE OF NECK: Status: ACTIVE | Noted: 2021-09-14

## 2021-11-08 PROCEDURE — 77427 RADIATION TX MANAGEMENT X5: CPT

## 2021-11-08 PROCEDURE — 77387B: CUSTOM | Mod: 26

## 2021-11-08 NOTE — ASSESSMENT
[FreeTextEntry1] : \par \par \par I have reviewed the patient's medical records and diagnostic images at time of this office consultation and have made the following recommendation:\par 1.	\par \par I personally performed the services described in the documentation, reviewed the documentation recorded by the scribe in my presence and it accurately and completely records my words and actions.\par \par I, Chandni Rivers NP, am scribing for and the presence of STARR Kay, the following sections HISTORY OF PRESENT ILLNESS, PAST MEDICAL/FAMILY/SOCIAL HISTORY; REVIEW OF SYSTEMS; VITAL SIGNS; PHYSICAL EXAM; DISPOSITION.

## 2021-11-08 NOTE — HISTORY OF PRESENT ILLNESS
[FreeTextEntry1] : 65 year old female, former smoker (1/2 PPD x 30 years, quit Jan 2018), w/ hx of HTN, HLD, CAD (s/p coronary stenting to the RCA on 1/9/2018), COPD, osteoporosis. ?Right THR. \par \par Now 2yr s/p FB; robotic-assisted, conversion to thoracotomy RULobectomy with MLND on 9/3/19. 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 Pathology of right rib excision was benign, revealed bone. Visceral pleura invasion and HYACINTH present. \par \par She completed chemotherapy on 12/10/19 with Dr. Guevara. \par \par CT Chest w/o IV contrast on 2/4/2021:\par - s/p RULobectomy with emphysema\par - No new mass or nodules\par - Stable to slightly decreased in size, 9mm RLL gg nodule (10:234).\par - small loculated right pleural effusion\par \par PET/CT on 2/5/21 showed FDG-avid paratracheal LN.\par \par Now s/p Flex Bronch EBUS Bx w/ BAL on 3/9/21. Path of subcarinal LN was negative malignancy; favor reactive LN. Rt paratracheal LN was non-diagnostic. BAL negative for malignancy.\par \par Received 1st dose of Pfizer on 3/12/21, 2nd dose on 4/2. \par \par CT Chest on 6/15/21:\par - post-RULobectomy w/ stable post-op changes\par - stable 8mm RLL ggo (7:242)\par - new, enlargement of superior mediastinal LNs: a 2.6 x 1.8cm superior prevascular LN (3:39; previously 1.2 x 1.1cm), a 2.8 x 1.7cm Rt paratracheal superior mediastinal LN (previously 2.1 x 1.3cm) \par - mild to moderate emphysematous changes\par \par Now s/p Flex Bronch, Cervical Mediastinoscopy on 7/27/21. Path of Rt paratracheal LNs and Subcarinal LNs are all benign.\par \par Doppler of upper extremities on 8/19/21: \par - no DVT\par - enlarged, abnormal appearing LNs in the Lt neck adjacent to the internal jugular vein, measuring 2 x 1.4 x 2cm\par - subcutaneous edema in the Lt wrist and forearm\par \par She returned to office on 8/24/21 c/o b/l neck swelling along with RUE swelling:\par \par Patient was admitted on 8/30/21 - 9/8/21 for worsening neck swelling and Lt arm swelling and to r/o SVC Syndrome. Patient now s/p SVC stenting by IR, on Eliqu, followed by Dr. Zayas. Patient also s/p cervical node biopsy on 9/7/21, path revealed +Mets poorly-diff NSCLC, c/w lung primary.\par \par CT Neck and chest w/ IV contrast on 8/31/21:\par - stable 9mm ggo\par - extensive mediastinal lymphadenopathy w/ cluster of LNs in the AP window, prevascular space, and Rt superior paratracheal space\par - no P.E.\par - complete/near-complete occlusion of the SVC, Lt brachiocephalic, and Lt internal jugular veins\par - bilateral supraclavicular lymphadenopathy: a 1.6 x 2.3cm Lt supraclavicular LN\par - enlarged bilateral Level 4 LNs: 2.5 x 1.2cm Lt node; 1.6 x 1.1cm Rt node\par - bilateral lower facial and upper neck swelling, left greater than right, with retropharyngeal edema is presumed to be related to venous outlet obstruction\par \par Last seen patient on 09/14/2021, +recurrent lung CA mets to cervical LN, I recommended patient to f/u with Hem/Onc Dr. Guevara for chemo, RTC as needed. \par \par Patient started on Chemo on 09/27/2021. \par \par Patient also f/u with Dr. Zayas for SVC syndrome. \par \par Patient was evaluated by Dr. Donte Duncan on 09/28/2021, started RT on 10/28/2021. \par \par CT chest on ...\par - \par \par Patient is here today for a follow up.

## 2021-11-08 NOTE — CONSULT LETTER
[Consult Letter:] : I had the pleasure of evaluating your patient, [unfilled]. [( Thank you for referring [unfilled] for consultation for _____ )] : Thank you for referring [unfilled] for consultation for [unfilled] [Please see my note below.] : Please see my note below. [Consult Closing:] : Thank you very much for allowing me to participate in the care of this patient.  If you have any questions, please do not hesitate to contact me. [Sincerely,] : Sincerely, [FreeTextEntry2] : Wesely Joshi MD (Pulm) [FreeTextEntry3] : Yaw Clement MD\par Director of Thoracic, [facility]\par Cardiovascular & Thoracic Surgery\par \par [facility]\par [address]\par Oncology Building 3rd Fl\[place] [address]\par Tel: [phone]\par Fax: [phone]\par

## 2021-11-09 ENCOUNTER — APPOINTMENT (OUTPATIENT)
Dept: THORACIC SURGERY | Facility: CLINIC | Age: 66
End: 2021-11-09

## 2021-11-09 DIAGNOSIS — C77.0 SECONDARY AND UNSPECIFIED MALIGNANT NEOPLASM OF LYMPH NODES OF HEAD, FACE AND NECK: ICD-10-CM

## 2021-11-09 PROCEDURE — 77387B: CUSTOM | Mod: 26

## 2021-11-10 PROCEDURE — 77387B: CUSTOM | Mod: 26

## 2021-11-11 ENCOUNTER — NON-APPOINTMENT (OUTPATIENT)
Age: 66
End: 2021-11-11

## 2021-11-11 VITALS
SYSTOLIC BLOOD PRESSURE: 176 MMHG | WEIGHT: 187.17 LBS | BODY MASS INDEX: 36.55 KG/M2 | OXYGEN SATURATION: 100 % | DIASTOLIC BLOOD PRESSURE: 98 MMHG | TEMPERATURE: 98.6 F | HEART RATE: 73 BPM | RESPIRATION RATE: 16 BRPM

## 2021-11-11 PROCEDURE — 77387B: CUSTOM | Mod: 26

## 2021-11-11 NOTE — VITALS
[Maximal Pain Intensity: 4/10] : 4/10 [Least Pain Intensity: 3/10] : 3/10 [Pain Description/Quality: ___] : Pain description/quality: [unfilled] [Pain Duration: ___] : Pain duration: [unfilled] [Pain Location: ___] : Pain Location: [unfilled] [Pain Interferes with ADLs] : Pain interferes with activities of daily living. [NoTreatment Scheduled] : no treatment scheduled [70: Cares for self; unalbe to carry on normal activity or do active work.] : 70: Cares for self; unable to carry on normal activity or do active work. [ECOG Performance Status: 1 - Restricted in physically strenuous activity but ambulatory and able to carry out work of a light or sedentary nature] : Performance Status: 1 - Restricted in physically strenuous activity but ambulatory and able to carry out work of a light or sedentary nature, e.g., light house work, office work

## 2021-11-12 PROCEDURE — 77014: CPT | Mod: 26

## 2021-11-14 NOTE — HISTORY OF PRESENT ILLNESS
[FreeTextEntry1] : 65 year old female, former smoker (1/2 PPD x 30 years, quit Jan 2018), now 2yr s/p RULobectomy with MLND on 9/3/19. 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 Pathology of right rib excision was benign, revealed bone. Visceral pleura invasion and HYACINTH present. She completed chemotherapy on 12/10/19 with Dr. Guevara. \par \par 10/28/21\par -Tolerating tx\par -Eating ok\par -Voice a little hoarse\par -no complaints of pain noted\par \par 11/4/21\par -Tolerating tx\par -Has dysphagia and hoarse voice, mild pain swallowing\par -Eating liquids\par -No coughing but SOB\par -Uses a walker to get around.\par \par 11/11/21\par -Tolerated tx\par -Has increase dysphagia. ,using Carafate.\par -No coughing or SOB at this time\par -Pt feeling fatigued \par \par

## 2021-11-14 NOTE — DISEASE MANAGEMENT
[Clinical] : TNM Stage: c [N/A] : Currently not applicable [FreeTextEntry4] : NSCLC [TTNM] : x [NTNM] : x [MTNM] : 1 [de-identified] : 14 tx/2800cGy [de-identified] : 30 tx.6000cGy [de-identified] : mediastinum

## 2021-11-15 PROCEDURE — 77387B: CUSTOM | Mod: 26

## 2021-11-15 PROCEDURE — 77427 RADIATION TX MANAGEMENT X5: CPT

## 2021-11-16 PROCEDURE — 77387B: CUSTOM | Mod: 26

## 2021-11-17 PROCEDURE — 77387B: CUSTOM | Mod: 26

## 2021-11-18 ENCOUNTER — NON-APPOINTMENT (OUTPATIENT)
Age: 66
End: 2021-11-18

## 2021-11-18 VITALS
BODY MASS INDEX: 35.99 KG/M2 | TEMPERATURE: 98.06 F | HEART RATE: 102 BPM | WEIGHT: 184.31 LBS | SYSTOLIC BLOOD PRESSURE: 116 MMHG | RESPIRATION RATE: 18 BRPM | DIASTOLIC BLOOD PRESSURE: 56 MMHG | OXYGEN SATURATION: 99 %

## 2021-11-18 DIAGNOSIS — K20.90 ESOPHAGITIS, UNSPECIFIED WITHOUT BLEEDING: ICD-10-CM

## 2021-11-18 PROCEDURE — 77387B: CUSTOM | Mod: 26

## 2021-11-18 NOTE — VITALS
[Maximal Pain Intensity: 6/10] : 6/10 [Least Pain Intensity: 0/10] : 0/10 [Pain Description/Quality: ___] : Pain description/quality: [unfilled] [Pain Duration: ___] : Pain duration: [unfilled] [Pain Location: ___] : Pain Location: [unfilled] [Pain Interferes with ADLs] : Pain interferes with activities of daily living. [OTC] : OTC [70: Cares for self; unalbe to carry on normal activity or do active work.] : 70: Cares for self; unable to carry on normal activity or do active work. [ECOG Performance Status: 2 - Ambulatory and capable of all self care but unable to carry out any work activities] : Performance Status: 2 - Ambulatory and capable of all self care but unable to carry out any work activities. Up and about more than 50% of waking hours

## 2021-11-19 PROCEDURE — 77014: CPT | Mod: 26

## 2021-11-21 PROCEDURE — 77427 RADIATION TX MANAGEMENT X5: CPT

## 2021-11-21 PROCEDURE — 77387B: CUSTOM | Mod: 26

## 2021-11-22 ENCOUNTER — INPATIENT (INPATIENT)
Facility: HOSPITAL | Age: 66
LOS: 3 days | Discharge: HOME | End: 2021-11-26
Attending: INTERNAL MEDICINE | Admitting: INTERNAL MEDICINE
Payer: MEDICARE

## 2021-11-22 VITALS
HEART RATE: 86 BPM | SYSTOLIC BLOOD PRESSURE: 107 MMHG | HEIGHT: 60 IN | OXYGEN SATURATION: 100 % | RESPIRATION RATE: 16 BRPM | DIASTOLIC BLOOD PRESSURE: 50 MMHG | TEMPERATURE: 98 F

## 2021-11-22 DIAGNOSIS — D62 ACUTE POSTHEMORRHAGIC ANEMIA: ICD-10-CM

## 2021-11-22 DIAGNOSIS — I10 ESSENTIAL (PRIMARY) HYPERTENSION: ICD-10-CM

## 2021-11-22 DIAGNOSIS — Z95.5 PRESENCE OF CORONARY ANGIOPLASTY IMPLANT AND GRAFT: Chronic | ICD-10-CM

## 2021-11-22 DIAGNOSIS — K92.1 MELENA: ICD-10-CM

## 2021-11-22 DIAGNOSIS — Z96.649 PRESENCE OF UNSPECIFIED ARTIFICIAL HIP JOINT: Chronic | ICD-10-CM

## 2021-11-22 DIAGNOSIS — Z98.891 HISTORY OF UTERINE SCAR FROM PREVIOUS SURGERY: Chronic | ICD-10-CM

## 2021-11-22 DIAGNOSIS — Z90.2 ACQUIRED ABSENCE OF LUNG [PART OF]: Chronic | ICD-10-CM

## 2021-11-22 DIAGNOSIS — C34.90 MALIGNANT NEOPLASM OF UNSPECIFIED PART OF UNSPECIFIED BRONCHUS OR LUNG: ICD-10-CM

## 2021-11-22 DIAGNOSIS — I87.1 COMPRESSION OF VEIN: ICD-10-CM

## 2021-11-22 LAB
ALBUMIN SERPL ELPH-MCNC: 3.7 G/DL — SIGNIFICANT CHANGE UP (ref 3.3–5)
ALP SERPL-CCNC: 96 U/L — SIGNIFICANT CHANGE UP (ref 40–120)
ALT FLD-CCNC: 18 U/L — SIGNIFICANT CHANGE UP (ref 4–33)
ANION GAP SERPL CALC-SCNC: 16 MMOL/L — HIGH (ref 7–14)
ANISOCYTOSIS BLD QL: SLIGHT — SIGNIFICANT CHANGE UP
APTT BLD: 41.6 SEC — HIGH (ref 27–36.3)
AST SERPL-CCNC: 18 U/L — SIGNIFICANT CHANGE UP (ref 4–32)
BASOPHILS # BLD AUTO: 0 K/UL — SIGNIFICANT CHANGE UP (ref 0–0.2)
BASOPHILS NFR BLD AUTO: 0 % — SIGNIFICANT CHANGE UP (ref 0–2)
BILIRUB SERPL-MCNC: 1.5 MG/DL — HIGH (ref 0.2–1.2)
BUN SERPL-MCNC: 19 MG/DL — SIGNIFICANT CHANGE UP (ref 7–23)
CALCIUM SERPL-MCNC: 8.9 MG/DL — SIGNIFICANT CHANGE UP (ref 8.4–10.5)
CHLORIDE SERPL-SCNC: 99 MMOL/L — SIGNIFICANT CHANGE UP (ref 98–107)
CO2 SERPL-SCNC: 23 MMOL/L — SIGNIFICANT CHANGE UP (ref 22–31)
CREAT SERPL-MCNC: 1.08 MG/DL — SIGNIFICANT CHANGE UP (ref 0.5–1.3)
DACRYOCYTES BLD QL SMEAR: SLIGHT — SIGNIFICANT CHANGE UP
EOSINOPHIL # BLD AUTO: 0.02 K/UL — SIGNIFICANT CHANGE UP (ref 0–0.5)
EOSINOPHIL NFR BLD AUTO: 1 % — SIGNIFICANT CHANGE UP (ref 0–6)
GLUCOSE SERPL-MCNC: 102 MG/DL — HIGH (ref 70–99)
HCT VFR BLD CALC: 19.2 % — CRITICAL LOW (ref 34.5–45)
HGB BLD-MCNC: 6.6 G/DL — CRITICAL LOW (ref 11.5–15.5)
IANC: 0.86 K/UL — LOW (ref 1.5–8.5)
INR BLD: 2.42 RATIO — HIGH (ref 0.88–1.16)
LYMPHOCYTES # BLD AUTO: 0.28 K/UL — LOW (ref 1–3.3)
LYMPHOCYTES # BLD AUTO: 17 % — SIGNIFICANT CHANGE UP (ref 13–44)
MACROCYTES BLD QL: SLIGHT — SIGNIFICANT CHANGE UP
MANUAL SMEAR VERIFICATION: SIGNIFICANT CHANGE UP
MCHC RBC-ENTMCNC: 30.4 PG — SIGNIFICANT CHANGE UP (ref 27–34)
MCHC RBC-ENTMCNC: 34.4 GM/DL — SIGNIFICANT CHANGE UP (ref 32–36)
MCV RBC AUTO: 88.5 FL — SIGNIFICANT CHANGE UP (ref 80–100)
MICROCYTES BLD QL: SLIGHT — SIGNIFICANT CHANGE UP
MONOCYTES # BLD AUTO: 0.53 K/UL — SIGNIFICANT CHANGE UP (ref 0–0.9)
MONOCYTES NFR BLD AUTO: 32 % — HIGH (ref 2–14)
NEUTROPHILS # BLD AUTO: 0.83 K/UL — LOW (ref 1.8–7.4)
NEUTROPHILS NFR BLD AUTO: 49 % — SIGNIFICANT CHANGE UP (ref 43–77)
NEUTS BAND # BLD: 1 % — SIGNIFICANT CHANGE UP (ref 0–6)
NRBC # BLD: 1 /100 — HIGH (ref 0–0)
OB PNL STL: NEGATIVE — SIGNIFICANT CHANGE UP
PLAT MORPH BLD: NORMAL — SIGNIFICANT CHANGE UP
PLATELET # BLD AUTO: 62 K/UL — LOW (ref 150–400)
PLATELET COUNT - ESTIMATE: ABNORMAL
POTASSIUM SERPL-MCNC: 3.3 MMOL/L — LOW (ref 3.5–5.3)
POTASSIUM SERPL-SCNC: 3.3 MMOL/L — LOW (ref 3.5–5.3)
PROT SERPL-MCNC: 6.7 G/DL — SIGNIFICANT CHANGE UP (ref 6–8.3)
PROTHROM AB SERPL-ACNC: 26.7 SEC — HIGH (ref 10.6–13.6)
RBC # BLD: 2.17 M/UL — LOW (ref 3.8–5.2)
RBC # FLD: 15.7 % — HIGH (ref 10.3–14.5)
RBC BLD AUTO: ABNORMAL
SARS-COV-2 RNA SPEC QL NAA+PROBE: SIGNIFICANT CHANGE UP
SODIUM SERPL-SCNC: 138 MMOL/L — SIGNIFICANT CHANGE UP (ref 135–145)
TROPONIN T, HIGH SENSITIVITY RESULT: 22 NG/L — SIGNIFICANT CHANGE UP
WBC # BLD: 1.66 K/UL — LOW (ref 3.8–10.5)
WBC # FLD AUTO: 1.66 K/UL — LOW (ref 3.8–10.5)

## 2021-11-22 PROCEDURE — 99223 1ST HOSP IP/OBS HIGH 75: CPT

## 2021-11-22 PROCEDURE — 99285 EMERGENCY DEPT VISIT HI MDM: CPT

## 2021-11-22 PROCEDURE — 71046 X-RAY EXAM CHEST 2 VIEWS: CPT | Mod: 26

## 2021-11-22 RX ORDER — ONDANSETRON 8 MG/1
4 TABLET, FILM COATED ORAL EVERY 8 HOURS
Refills: 0 | Status: DISCONTINUED | OUTPATIENT
Start: 2021-11-22 | End: 2021-11-26

## 2021-11-22 RX ORDER — LANOLIN ALCOHOL/MO/W.PET/CERES
3 CREAM (GRAM) TOPICAL AT BEDTIME
Refills: 0 | Status: DISCONTINUED | OUTPATIENT
Start: 2021-11-22 | End: 2021-11-26

## 2021-11-22 RX ORDER — TETRACAINE/BENZOCAINE/BUTAMBEN 2%-14%-2%
1 OINTMENT (GRAM) TOPICAL THREE TIMES A DAY
Refills: 0 | Status: DISCONTINUED | OUTPATIENT
Start: 2021-11-22 | End: 2021-11-26

## 2021-11-22 RX ORDER — CARVEDILOL PHOSPHATE 80 MG/1
6.25 CAPSULE, EXTENDED RELEASE ORAL EVERY 12 HOURS
Refills: 0 | Status: DISCONTINUED | OUTPATIENT
Start: 2021-11-22 | End: 2021-11-26

## 2021-11-22 RX ORDER — ATORVASTATIN CALCIUM 80 MG/1
40 TABLET, FILM COATED ORAL AT BEDTIME
Refills: 0 | Status: DISCONTINUED | OUTPATIENT
Start: 2021-11-22 | End: 2021-11-26

## 2021-11-22 RX ORDER — TIOTROPIUM BROMIDE 18 UG/1
1 CAPSULE ORAL; RESPIRATORY (INHALATION) DAILY
Refills: 0 | Status: DISCONTINUED | OUTPATIENT
Start: 2021-11-22 | End: 2021-11-26

## 2021-11-22 RX ORDER — ACETAMINOPHEN 500 MG
650 TABLET ORAL EVERY 6 HOURS
Refills: 0 | Status: DISCONTINUED | OUTPATIENT
Start: 2021-11-22 | End: 2021-11-26

## 2021-11-22 RX ORDER — PANTOPRAZOLE SODIUM 20 MG/1
8 TABLET, DELAYED RELEASE ORAL
Qty: 80 | Refills: 0 | Status: DISCONTINUED | OUTPATIENT
Start: 2021-11-22 | End: 2021-11-23

## 2021-11-22 RX ORDER — BUDESONIDE AND FORMOTEROL FUMARATE DIHYDRATE 160; 4.5 UG/1; UG/1
2 AEROSOL RESPIRATORY (INHALATION)
Refills: 0 | Status: DISCONTINUED | OUTPATIENT
Start: 2021-11-22 | End: 2021-11-26

## 2021-11-22 RX ORDER — PANTOPRAZOLE SODIUM 20 MG/1
80 TABLET, DELAYED RELEASE ORAL ONCE
Refills: 0 | Status: COMPLETED | OUTPATIENT
Start: 2021-11-22 | End: 2021-11-22

## 2021-11-22 RX ADMIN — PANTOPRAZOLE SODIUM 10 MG/HR: 20 TABLET, DELAYED RELEASE ORAL at 19:18

## 2021-11-22 RX ADMIN — PANTOPRAZOLE SODIUM 80 MILLIGRAM(S): 20 TABLET, DELAYED RELEASE ORAL at 16:09

## 2021-11-22 RX ADMIN — PANTOPRAZOLE SODIUM 10 MG/HR: 20 TABLET, DELAYED RELEASE ORAL at 16:07

## 2021-11-22 NOTE — ED PROVIDER NOTE - CHIEF COMPLAINT
The patient is a 66y Female complaining of  The patient is a 66y Female complaining of low hemoglobin

## 2021-11-22 NOTE — H&P ADULT - PROBLEM SELECTOR PLAN 1
New  Hb baseline ~10- presenting with Hb 6.6  Denies BRBPR and black stool, FOBT negative  coag elevated, plt decreased, Tbili increased- will add ddimer, ldh, fibrinogen and haptoglobin   Continue Protonix drip in the meantime   GI consult in AM pending above labs   holding eliquis and asa for the time being - scd for dvt ppx New  Hb baseline ~10- presenting with Hb 6.6  Denies BRBPR and black stool, FOBT negative  coag elevated, plt decreased, Tbili increased- will add ddimer, ldh, fibrinogen and haptoglobin   s/p 1 unit PRBC- Q6 H/H for 4 occurances ordered  Continue Protonix drip in the meantime   GI consult in AM pending above labs   holding eliquis and asa for the time being - scd for dvt ppx

## 2021-11-22 NOTE — ED ADULT NURSE NOTE - CHIEF COMPLAINT QUOTE
Sent from Oncologist for blood transfusion. States her hgb was 7.3. c/o dizziness and weakness. Pt. has lung ca on chemo and RT.   Evan Guillermo () [phone]

## 2021-11-22 NOTE — H&P ADULT - HISTORY OF PRESENT ILLNESS
66 yr old female with a pmh of active lung cancer, CAD, HTN, SVC syndrome on eliquis and asa who presents with a 3 day history of lightheaded and weak. She has had decreased oral intake ever since having radiation due to throat pain as drinking water hurts.   She denies any BRBPR and black stool. She reports intermittent constipation and dark brown stool  She also has not been taking her lasix as that requires her to take K supplements and she states the pills are too big.   She went to her oncologist (Dr. Guevara) today as she was to have a follow up and when they checked her Hb it was 7.    Denies  headache,  chest pain, palpitations, SOB, abdominal pain, joint pain, diarrhea/constipation, urinary symptoms.     Vitals in the ED T 97.7, HR 84, /63, RR 16 satting 99% RA

## 2021-11-22 NOTE — ED PROVIDER NOTE - ATTENDING CONTRIBUTION TO CARE
Attending MD Hartman.  Agree with above. Pt is a 65 yo female with pmhx of lung ca presenting to ED with complaint of rectal bleeding/melena.  Saw onc today and found Hgb 7.3.  Endorses assoc light-headedness, near syncope x 1 wk.  No gross melena on exam. Pt’s baseline Hgb more c/w 10.  Pt alert and oriented x 4.  Pt takes eliquis for CAD. Attending MD Hartman.  Agree with above. Pt is a 65 yo female with pmhx of lung ca presenting to ED with complaint of rectal bleeding/melena.  Saw onc today and found Hgb 7.3.  Endorses assoc light-headedness, near syncope x 1 wk.  No gross melena on exam. Pt’s baseline Hgb more c/w 10.  Pt alert and oriented x 4.  Pt takes eliquis for CAD.  Pt hemodynamically stable and signed out to incoming team pending confirmatory labs in ED, transfusion, probable admission for poss endoscopy.  Stable at time of signout.

## 2021-11-22 NOTE — ED PROVIDER NOTE - NSICDXPASTMEDICALHX_GEN_ALL_CORE_FT
Attending Attestation Note:    Agree with documentation and findings.      Cathryn Carranza MD           PAST MEDICAL HISTORY:  Carotid artery disease monitored by vascluar doctor Samuel    COPD (chronic obstructive pulmonary disease)     Edema of both legs     Emphysema     Hip pain     Hyperlipidemia     Hypertension     Localized enlarged lymph nodes     Lumbar disc disorder     Lung cancer right, 2019, completed chemotherapy 12/2019    Obesity     Pulmonary nodules yearly CT scans    Scoliosis     Stented coronary artery

## 2021-11-22 NOTE — H&P ADULT - PROBLEM SELECTOR PLAN 3
Active treatment  Was to receive chemo Monday  [facility] consult in AM  lidocaine spray for difficulty swallowing due to pain

## 2021-11-22 NOTE — H&P ADULT - NSHPSOCIALHISTORY_GEN_ALL_CORE
Does not use tobacco products currently (ex smoker), consume alcohol or partake in illicit drug use   Lives with her children at home

## 2021-11-22 NOTE — H&P ADULT - NSHPREVIEWOFSYSTEMS_GEN_ALL_CORE
REVIEW OF SYSTEMS:    CONSTITUTIONAL: + weakness & lightheadedness,  no fevers or chills  EYES/ENT: No visual changes;  No dysphagia; No sore throat; No rhinorrhea; No sinus pain/pressure  NECK: No pain or stiffness  RESPIRATORY: No cough, wheezing, hemoptysis; No shortness of breath  CARDIOVASCULAR: No chest pain or palpitations; No lower extremity edema  GASTROINTESTINAL: No abdominal or epigastric pain. No nausea, vomiting, or hematemesis; No diarrhea or constipation. No melena or hematochezia.  GENITOURINARY: No dysuria, frequency or hematuria  NEUROLOGICAL: No numbness, weakness  MSK: ambulates with a walker lately   SKIN: No itching, burning, rashes, or lesions   All other review of systems is negative unless indicated above.

## 2021-11-22 NOTE — H&P ADULT - NSHPPHYSICALEXAM_GEN_ALL_CORE
PHYSICAL EXAM:  GENERAL: NAD, well-developed, well-nourished  HEAD:  Atraumatic, Normocephalic  EYES: EOMI, PERRL, pale conjunctiva,  sclera clear  NECK: Supple, No JVD  CHEST/LUNG: Clear to auscultation bilaterally; No wheezes, rales or rhonchi  HEART: Regular rate and rhythm; No murmurs, rubs, or gallops, (+)S1, S2  ABDOMEN: Soft, Nontender, Nondistended; Normal Bowel sounds   EXTREMITIES:  2+ Peripheral Pulses, No clubbing, cyanosis, 1+ bilateral lower extremity edema  PSYCH: normal mood and affect  NEUROLOGY: AAOx3, non-focal  SKIN: No rashes or lesions

## 2021-11-22 NOTE — ED ADULT NURSE NOTE - OBJECTIVE STATEMENT
Patient received to room 19. Patient is a&ox4 ambulatory but very weak at baseline. PMH lung cancer, htn, on Eliquis for past stent. Patient regularly goes to the oncologist for check up and found out her hemoglobin was "low 7.2". Patient was sent here for a blood transfusion. Patient states she symptomatic with light headness, a bit dizziness. Patient denies chest pain, n/v, sob. Patient skin is intact no major bruising. Patient received to room 19. Patient is a&ox4 ambulatory but very weak at baseline. PMH lung cancer, htn, on Eliquis for past stent. Patient regularly goes to the oncologist for check up and found out her hemoglobin was "low 7.2". Patient was sent here for a blood transfusion. Patient has a rectal blood she states, describes as dark red. Patient states she symptomatic with light headness, a bit dizziness. Patient denies chest pain, n/v, sob. Patient skin is intact no major bruising.

## 2021-11-22 NOTE — ED PROVIDER NOTE - OBJECTIVE STATEMENT
65yo F w/ PMHx of lung cancer, obesity, CAD reporting to ED with low hemoglobin. Patient reports < 1 week history of melena in addition to feeling lightheaded when she walks in addition to near syncope. She denies any chest pain or SOB. Reports being fully vaccinated for COVID-19. Denies any abdominal pain or chest pain. Reports being complaint on Eliquis for her CAD, last dose this AM. Follows up with Dr. Guevara of Oncology who treats her lung carcinoma with radiation and chemotherapy. Her baseline hemoglobin is in the 10's. Reports melena over the past few days.

## 2021-11-22 NOTE — ED ADULT TRIAGE NOTE - CHIEF COMPLAINT QUOTE
gradual onset Sent from Oncologist for blood transfusion. States her hgb was 7.3. c/o dizziness and weakness. Pt. has lung ca on chemo and RT.   Evan Guillermo () [phone]

## 2021-11-22 NOTE — H&P ADULT - NSHPLABSRESULTS_GEN_ALL_CORE
6.6    1.66  )-----------( 62       ( 22 Nov 2021 16:10 )             19.2       11-22    138  |  99  |  19  ----------------------------<  102<H>  3.3<L>   |  23  |  1.08    Ca    8.9      22 Nov 2021 16:10    TPro  6.7  /  Alb  3.7  /  TBili  1.5<H>  /  DBili  x   /  AST  18  /  ALT  18  /  AlkPhos  96  11-22      PT/INR - ( 22 Nov 2021 16:10 )   PT: 26.7 sec;   INR: 2.42 ratio         PTT - ( 22 Nov 2021 16:10 )  PTT:41.6 sec      CXR interpreted by myself: clear lungs

## 2021-11-22 NOTE — H&P ADULT - PROBLEM SELECTOR PLAN 2
Chronic moderate exacerbation   /63  Coreg 6.25mg BID with hold parameters  Holding home lasix for the time being as patient did present with

## 2021-11-22 NOTE — ED PROVIDER NOTE - CLINICAL SUMMARY MEDICAL DECISION MAKING FREE TEXT BOX
65yo F w/ PMHx of lung cancer, obesity, CAD reporting to ED with low hemoglobin. Patient staffed with Dr. Hartman; medical record was reviewed. Upon arrival to the ED, patient was promptly evaluated; NAD. Patient has extensive history of lung cancer on chemotherapy. Reports symptoms consistent with anemia in terms of shortness of breath. Ecg reassuring, no acute ischemia. Given melena and concern for anemia (Hgb of 7.3) and on Eliquis, hemoccult performed in the presence of bedside female nurse technician, hemoccult was performed. No acute melena noted. CT pending. Protonix bolus and gtt given. 67yo F w/ PMHx of lung cancer, obesity, CAD reporting to ED with low hemoglobin. Patient staffed with Dr. Hartman; medical record was reviewed. Upon arrival to the ED, patient was promptly evaluated; NAD. Patient has extensive history of lung cancer on chemotherapy. Reports symptoms consistent with anemia in terms of shortness of breath. Ecg reassuring, no acute ischemia. Given melena and concern for anemia (Hgb of 7.3) and on Eliquis, hemoccult performed in the presence of bedside female nurse technician, hemoccult was performed. No acute melena noted. CT pending. Protonix bolus and gtt given. CBC remarkable with Hgb of 6.6. Verbal and written consent obtained for 1 uPRBC. Given hemodynamic stability, we considered but did not reverse this patient. Discussed care of patient with Dr. Guevara of the oncology service with ultimate plans to admit patient to the hospitalist. All questions were answered and the patient was admitted in stable condition with handoff provided to Dr. Fiore in-patient hospitalist team.

## 2021-11-22 NOTE — ED ADULT NURSE NOTE - HAVE YOU HAD A FIRST COVID-19 BOOSTER?
Reason for Disposition  • Normal infrequent  stools after 4 weeks old (all triage questions negative)    Protocols used: BREAST-FEEDING [id number]     Yes

## 2021-11-23 LAB
ANION GAP SERPL CALC-SCNC: 14 MMOL/L — SIGNIFICANT CHANGE UP (ref 7–14)
ANION GAP SERPL CALC-SCNC: 16 MMOL/L — HIGH (ref 7–14)
BUN SERPL-MCNC: 18 MG/DL — SIGNIFICANT CHANGE UP (ref 7–23)
BUN SERPL-MCNC: 18 MG/DL — SIGNIFICANT CHANGE UP (ref 7–23)
CALCIUM SERPL-MCNC: 8.8 MG/DL — SIGNIFICANT CHANGE UP (ref 8.4–10.5)
CALCIUM SERPL-MCNC: 8.9 MG/DL — SIGNIFICANT CHANGE UP (ref 8.4–10.5)
CHLORIDE SERPL-SCNC: 102 MMOL/L — SIGNIFICANT CHANGE UP (ref 98–107)
CHLORIDE SERPL-SCNC: 103 MMOL/L — SIGNIFICANT CHANGE UP (ref 98–107)
CO2 SERPL-SCNC: 22 MMOL/L — SIGNIFICANT CHANGE UP (ref 22–31)
CO2 SERPL-SCNC: 22 MMOL/L — SIGNIFICANT CHANGE UP (ref 22–31)
COVID-19 NUCLEOCAPSID GAM AB INTERP: NEGATIVE — SIGNIFICANT CHANGE UP
COVID-19 NUCLEOCAPSID TOTAL GAM ANTIBODY RESULT: 0.08 INDEX — SIGNIFICANT CHANGE UP
COVID-19 SPIKE DOMAIN AB INTERP: POSITIVE
COVID-19 SPIKE DOMAIN ANTIBODY RESULT: >250 U/ML — HIGH
CREAT SERPL-MCNC: 1.12 MG/DL — SIGNIFICANT CHANGE UP (ref 0.5–1.3)
CREAT SERPL-MCNC: 1.14 MG/DL — SIGNIFICANT CHANGE UP (ref 0.5–1.3)
D DIMER BLD IA.RAPID-MCNC: 225 NG/ML DDU — SIGNIFICANT CHANGE UP
FIBRINOGEN PPP-MCNC: 567 MG/DL — HIGH (ref 290–520)
GLUCOSE SERPL-MCNC: 107 MG/DL — HIGH (ref 70–99)
GLUCOSE SERPL-MCNC: 92 MG/DL — SIGNIFICANT CHANGE UP (ref 70–99)
HAPTOGLOB SERPL-MCNC: 98 MG/DL — SIGNIFICANT CHANGE UP (ref 34–200)
HCT VFR BLD CALC: 23.2 % — LOW (ref 34.5–45)
HCT VFR BLD CALC: 25.9 % — LOW (ref 34.5–45)
HGB BLD-MCNC: 7.8 G/DL — LOW (ref 11.5–15.5)
HGB BLD-MCNC: 8.8 G/DL — LOW (ref 11.5–15.5)
LDH SERPL L TO P-CCNC: 318 U/L — HIGH (ref 135–225)
MAGNESIUM SERPL-MCNC: 1.7 MG/DL — SIGNIFICANT CHANGE UP (ref 1.6–2.6)
MCHC RBC-ENTMCNC: 30.4 PG — SIGNIFICANT CHANGE UP (ref 27–34)
MCHC RBC-ENTMCNC: 30.5 PG — SIGNIFICANT CHANGE UP (ref 27–34)
MCHC RBC-ENTMCNC: 33.6 GM/DL — SIGNIFICANT CHANGE UP (ref 32–36)
MCHC RBC-ENTMCNC: 34 GM/DL — SIGNIFICANT CHANGE UP (ref 32–36)
MCV RBC AUTO: 89.6 FL — SIGNIFICANT CHANGE UP (ref 80–100)
MCV RBC AUTO: 90.6 FL — SIGNIFICANT CHANGE UP (ref 80–100)
NRBC # BLD: 0 /100 WBCS — SIGNIFICANT CHANGE UP
NRBC # BLD: 1 /100 WBCS — SIGNIFICANT CHANGE UP
NRBC # FLD: 0.02 K/UL — HIGH
NRBC # FLD: 0.03 K/UL — HIGH
PHOSPHATE SERPL-MCNC: 2.5 MG/DL — SIGNIFICANT CHANGE UP (ref 2.5–4.5)
PLATELET # BLD AUTO: 68 K/UL — LOW (ref 150–400)
PLATELET # BLD AUTO: 89 K/UL — LOW (ref 150–400)
POTASSIUM SERPL-MCNC: 3.2 MMOL/L — LOW (ref 3.5–5.3)
POTASSIUM SERPL-MCNC: 3.3 MMOL/L — LOW (ref 3.5–5.3)
POTASSIUM SERPL-SCNC: 3.2 MMOL/L — LOW (ref 3.5–5.3)
POTASSIUM SERPL-SCNC: 3.3 MMOL/L — LOW (ref 3.5–5.3)
RBC # BLD: 2.56 M/UL — LOW (ref 3.8–5.2)
RBC # BLD: 2.89 M/UL — LOW (ref 3.8–5.2)
RBC # FLD: 15 % — HIGH (ref 10.3–14.5)
RBC # FLD: 15.3 % — HIGH (ref 10.3–14.5)
SARS-COV-2 IGG+IGM SERPL QL IA: 0.08 INDEX — SIGNIFICANT CHANGE UP
SARS-COV-2 IGG+IGM SERPL QL IA: >250 U/ML — HIGH
SARS-COV-2 IGG+IGM SERPL QL IA: NEGATIVE — SIGNIFICANT CHANGE UP
SARS-COV-2 IGG+IGM SERPL QL IA: POSITIVE
SODIUM SERPL-SCNC: 139 MMOL/L — SIGNIFICANT CHANGE UP (ref 135–145)
SODIUM SERPL-SCNC: 140 MMOL/L — SIGNIFICANT CHANGE UP (ref 135–145)
WBC # BLD: 2.08 K/UL — LOW (ref 3.8–10.5)
WBC # BLD: 2.54 K/UL — LOW (ref 3.8–10.5)
WBC # FLD AUTO: 2.08 K/UL — LOW (ref 3.8–10.5)
WBC # FLD AUTO: 2.54 K/UL — LOW (ref 3.8–10.5)

## 2021-11-23 RX ORDER — POTASSIUM CHLORIDE 20 MEQ
40 PACKET (EA) ORAL ONCE
Refills: 0 | Status: COMPLETED | OUTPATIENT
Start: 2021-11-23 | End: 2021-11-23

## 2021-11-23 RX ORDER — PANTOPRAZOLE SODIUM 20 MG/1
40 TABLET, DELAYED RELEASE ORAL EVERY 12 HOURS
Refills: 0 | Status: DISCONTINUED | OUTPATIENT
Start: 2021-11-23 | End: 2021-11-26

## 2021-11-23 RX ADMIN — BUDESONIDE AND FORMOTEROL FUMARATE DIHYDRATE 2 PUFF(S): 160; 4.5 AEROSOL RESPIRATORY (INHALATION) at 20:28

## 2021-11-23 RX ADMIN — PANTOPRAZOLE SODIUM 10 MG/HR: 20 TABLET, DELAYED RELEASE ORAL at 02:10

## 2021-11-23 RX ADMIN — CARVEDILOL PHOSPHATE 6.25 MILLIGRAM(S): 80 CAPSULE, EXTENDED RELEASE ORAL at 17:58

## 2021-11-23 RX ADMIN — PANTOPRAZOLE SODIUM 10 MG/HR: 20 TABLET, DELAYED RELEASE ORAL at 14:23

## 2021-11-23 RX ADMIN — PANTOPRAZOLE SODIUM 40 MILLIGRAM(S): 20 TABLET, DELAYED RELEASE ORAL at 20:27

## 2021-11-23 RX ADMIN — TIOTROPIUM BROMIDE 1 CAPSULE(S): 18 CAPSULE ORAL; RESPIRATORY (INHALATION) at 11:59

## 2021-11-23 RX ADMIN — ATORVASTATIN CALCIUM 40 MILLIGRAM(S): 80 TABLET, FILM COATED ORAL at 21:09

## 2021-11-23 RX ADMIN — CARVEDILOL PHOSPHATE 6.25 MILLIGRAM(S): 80 CAPSULE, EXTENDED RELEASE ORAL at 06:25

## 2021-11-23 RX ADMIN — Medication 1 SPRAY(S): at 10:14

## 2021-11-23 RX ADMIN — BUDESONIDE AND FORMOTEROL FUMARATE DIHYDRATE 2 PUFF(S): 160; 4.5 AEROSOL RESPIRATORY (INHALATION) at 10:15

## 2021-11-23 RX ADMIN — Medication 40 MILLIEQUIVALENT(S): at 11:59

## 2021-11-23 RX ADMIN — Medication 40 MILLIEQUIVALENT(S): at 06:27

## 2021-11-23 NOTE — CONSULT NOTE ADULT - SUBJECTIVE AND OBJECTIVE BOX
Patient is a 66y old  Female who presents with a chief complaint of weakness (2021 10:20)     .     HPI:    HPI:  66 yr old female with a pmh of active lung cancer, CAD, HTN, SVC syndrome on eliquis and asa who presents with a 3 day history of lightheaded and weak. She has had decreased oral intake ever since having radiation due to throat pain as drinking water hurts.   She denies any BRBPR and black stool. She reports intermittent constipation and dark brown stool  She also has not been taking her lasix as that requires her to take K supplements and she states the pills are too big.   She went to her oncologist (Dr. Guevara) today as she was to have a follow up and when they checked her Hb it was 7.    Denies  headache,  chest pain, palpitations, SOB, abdominal pain, joint pain, diarrhea/constipation, urinary symptoms.     Vitals in the ED T 97.7, HR 84, /63, RR 16 satting 99% RA (2021 21:55)          REVIEW OF SYSTEMS  Constitutional:   No fever, + fatigue, no pallor, no night sweats, no weight loss.  HEENT:   No eye pain, no vision changes, no icterus, no mouth ulcers.  Respiratory:   No shortness of breath, no cough, no respiratory distress.   Cardiovascular:   No chest pain, no palpitations.   Gastrointestinal: No abdominal pain, no nausea, no vomiting , no diahrrea, no constipation, no hematochezia,no melena.  Skin:   No rashes, no jaundice, no eczema.   Musculoskeletal:   No joint pain, no swelling, no myalgia.   Neurologic:   No headache, no seizure, no weakness.   Genitourinary:   No dysuria, no decreased urine output.  Psychiatric:  No depression, no anxiety,   Endocrine:   No thyroid disease, no diabetes.  Heme/Lymphatic:   No anemia, no blood transfusions, no lymph node enlargement, no bleeding, no bruising.  ___________________________________________________________________________________________  Allergies    penicillin (Short breath; Hives)  tetracycline (Short breath; Hives)    Intolerances      MEDICATIONS  (STANDING):  atorvastatin 40 milliGRAM(s) Oral at bedtime  budesonide  80 MICROgram(s)/formoterol 4.5 MICROgram(s) Inhaler 2 Puff(s) Inhalation two times a day  carvedilol 6.25 milliGRAM(s) Oral every 12 hours  pantoprazole Infusion 8 mG/Hr (10 mL/Hr) IV Continuous <Continuous>  tetracaine/benzocaine/butamben Spray 1 Spray(s) Topical three times a day  tiotropium 18 MICROgram(s) Capsule 1 Capsule(s) Inhalation daily    MEDICATIONS  (PRN):  acetaminophen     Tablet .. 650 milliGRAM(s) Oral every 6 hours PRN Temp greater or equal to 38C (100.4F), Mild Pain (1 - 3)  aluminum hydroxide/magnesium hydroxide/simethicone Suspension 30 milliLiter(s) Oral every 4 hours PRN Dyspepsia  melatonin 3 milliGRAM(s) Oral at bedtime PRN Insomnia  ondansetron Injectable 4 milliGRAM(s) IV Push every 8 hours PRN Nausea and/or Vomiting      PAST MEDICAL & SURGICAL HISTORY:  Hypertension    Hyperlipidemia    Pulmonary nodules  yearly CT scans    Carotid artery disease  monitored by vascluar doctor Doscher    Emphysema    Hip pain    Obesity    Edema of both legs    Lung cancer  right, 2019, completed chemotherapy 2019    Stented coronary artery    Localized enlarged lymph nodes    Scoliosis    Lumbar disc disorder    COPD (chronic obstructive pulmonary disease)    History of  section  x2 ,     S/P lobectomy of lung  RULobectomy 2019    History of hip replacement  right 2021    Stented coronary artery  x2 stents 2018      FAMILY HISTORY:  Family history of CHF (congestive heart failure) (Aunt)      Social History: No hsitory of : Tobacco use, IVDA, EToH  ______________________________________________________________________________________    PHYSICAL EXAM    Daily     Daily   BMI: 36.1 (11-22 @ 12:29)  Change in Weight:  Vital Signs Last 24 Hrs  T(C): 36.8 (2021 06:18), Max: 37.4 (2021 14:09)  T(F): 98.2 (2021 06:18), Max: 99.4 (2021 14:09)  HR: 84 (2021 06:18) (74 - 88)  BP: 156/64 (2021 06:18) (124/57 - 156/64)  BP(mean): --  RR: 16 (2021 06:18) (16 - 20)  SpO2: 100% (2021 06:18) (99% - 100%)    General:  Well developed, well nourished, alert and active, no pallor, NAD.  HEENT:    Normal appearance of conjunctiva, ears, nose, lips, oropharynx, and oral mucosa, anicteric.  Neck:  No masses, no asymmetry.  Lymph Nodes:  No lymphadenopathy.   Cardiovascular:  RRR normal S1/S2, no murmur.  Respiratory:  CTA B/L, normal respiratory effort.   Abdominal:   soft, no masses or tenderness, normoactive BS, NT/ND, no HSM.  Extremities:   No clubbing or cyanosis, normal capillary refill, no edema.   Skin:   No rash, jaundice, lesions, eczema.   Musculoskeletal:  No joint swelling, erythema or tenderness.   Neuro: No focal deficits.   Other:   _______________________________________________________________________________________________  Lab Results:                          7.8    2.08  )-----------( 68       ( 2021 04:58 )             23.2     11-23    140  |  102  |  18  ----------------------------<  92  3.2<L>   |  22  |  1.12    Ca    8.9      2021 04:58    TPro  6.7  /  Alb  3.7  /  TBili  1.5<H>  /  DBili  x   /  AST  18  /  ALT  18  /  AlkPhos  96  11-22    LIVER FUNCTIONS - ( 2021 16:10 )  Alb: 3.7 g/dL / Pro: 6.7 g/dL / ALK PHOS: 96 U/L / ALT: 18 U/L / AST: 18 U/L / GGT: x           PT/INR - ( 2021 16:10 )   PT: 26.7 sec;   INR: 2.42 ratio         PTT - ( 2021 16:10 )  PTT:41.6 sec        Stool Results:          RADIOLOGY RESULTS:    SURGICAL PATHOLOGY:     
Patient is a 66y old  Female who presents with a chief complaint of weakness (2021 21:55), dizziness, mouth sores and decreased intake because of the sores, s/p a unit of PRBCs but still dizzy on walking, ROS is otherwise unremarkable. she is on chemo + RT for recurrent but stage 3 NSCLC.          PAST MEDICAL & SURGICAL HISTORY:  Hypertension    Hyperlipidemia    Pulmonary nodules  yearly CT scans    Carotid artery disease  monitored by vascluar doctor Doscher    Emphysema    Hip pain    Obesity    Edema of both legs    Lung cancer  right, , completed chemotherapy 2019    Stented coronary artery    Localized enlarged lymph nodes    Scoliosis    Lumbar disc disorder    COPD (chronic obstructive pulmonary disease)    History of  section  x2 ,     S/P lobectomy of lung  RULobectomy     History of hip replacement  right 2021    Stented coronary artery  x2 stents 2018        acetaminophen     Tablet .. 650 milliGRAM(s) Oral every 6 hours PRN  aluminum hydroxide/magnesium hydroxide/simethicone Suspension 30 milliLiter(s) Oral every 4 hours PRN  atorvastatin 40 milliGRAM(s) Oral at bedtime  budesonide  80 MICROgram(s)/formoterol 4.5 MICROgram(s) Inhaler 2 Puff(s) Inhalation two times a day  carvedilol 6.25 milliGRAM(s) Oral every 12 hours  melatonin 3 milliGRAM(s) Oral at bedtime PRN  ondansetron Injectable 4 milliGRAM(s) IV Push every 8 hours PRN  pantoprazole Infusion 8 mG/Hr IV Continuous <Continuous>  tetracaine/benzocaine/butamben Spray 1 Spray(s) Topical three times a day  tiotropium 18 MICROgram(s) Capsule 1 Capsule(s) Inhalation daily      penicillin (Short breath; Hives)  tetracycline (Short breath; Hives)      FAMILY HISTORY:  Family history of CHF (congestive heart failure) (Aunt)        Vital Signs Last 24 Hrs  T(C): 36.8 (2021 06:18), Max: 37.4 (2021 14:09)  T(F): 98.2 (2021 06:18), Max: 99.4 (2021 14:09)  HR: 84 (2021 06:18) (74 - 88)  BP: 156/64 (2021 06:18) (107/50 - 156/64)  BP(mean): --  RR: 16 (2021 06:18) (16 - 20)  SpO2: 100% (2021 06:18) (99% - 100%)                          7.8    2.08  )-----------( 68       ( 2021 04:58 )             23.2       11-23    140  |  102  |  18  ----------------------------<  92  3.2<L>   |  22  |  1.12    Ca    8.9      2021 04:58    TPro  6.7  /  Alb  3.7  /  TBili  1.5<H>  /  DBili  x   /  AST  18  /  ALT  18  /  AlkPhos  96  11-22      PT/INR - ( 2021 16:10 )   PT: 26.7 sec;   INR: 2.42 ratio         PTT - ( 2021 16:10 )  PTT:41.6 sec      CxR: IMPRESSION: No acute pulmonary pathology.        --- End of Report ---            OLLIE ARREDONDO MD; Resident Radiologist  This document has been electronically signed.  DARRYL WHITTAKER MD; Attending Radiologist  This document has been electronically signed. 2021  6:06PM  
Requesting Physician :     Reason for Consultation:     HISTORY OF PRESENT ILLNESS:   66 yr old female with a pmh of active lung cancer, CAD s/p prior PCI, HTN, SVC syndrome on eliquis and asa who presents with a 3 day history of weakness and lightheadedness.  The patient presented initially with these symptoms to her oncologist office where a CBC demonstrated severe anemia.  She was sent to the ED for further workup.  The patient reports dyspnea but denies exertional component or chest pain.  She was admitted and found to have severe anemia.  At home, she was maintained on sapt for history of CAD with prior PCI.         PAST MEDICAL & SURGICAL HISTORY:  Hypertension    Hyperlipidemia    Pulmonary nodules  yearly CT scans    Carotid artery disease  monitored by vascluar doctor Doscher    Emphysema    Hip pain    Obesity    Edema of both legs    Lung cancer  right, , completed chemotherapy 2019    Stented coronary artery    Localized enlarged lymph nodes    Scoliosis    Lumbar disc disorder    COPD (chronic obstructive pulmonary disease)    History of  section  x2 ,     S/P lobectomy of lung  RULobectomy     History of hip replacement  right 2021    Stented coronary artery  x2 stents 2018            MEDICATIONS:  MEDICATIONS  (STANDING):  atorvastatin 40 milliGRAM(s) Oral at bedtime  budesonide  80 MICROgram(s)/formoterol 4.5 MICROgram(s) Inhaler 2 Puff(s) Inhalation two times a day  carvedilol 6.25 milliGRAM(s) Oral every 12 hours  pantoprazole  Injectable 40 milliGRAM(s) IV Push every 12 hours  tetracaine/benzocaine/butamben Spray 1 Spray(s) Topical three times a day  tiotropium 18 MICROgram(s) Capsule 1 Capsule(s) Inhalation daily      Allergies    penicillin (Short breath; Hives)  tetracycline (Short breath; Hives)    Intolerances        FAMILY HISTORY:  Family history of CHF (congestive heart failure) (Aunt)      Non-contributary for premature coronary disease or sudden cardiac death    SOCIAL HISTORY:    [x ] Non-smoker  [ ] Smoker  [ ] Alcohol      REVIEW OF SYSTEMS:  [ ]chest pain  [ x ]shortness of breath  [  ]palpitations  [  ]syncope  [ ]near syncope [ ]upper extremity weakness   [ ] lower extremity weakness  [  ]diplopia  [  ]altered mental status   [  ]fevers  [ ]chills [ ]nausea  [ ]vomitting  [  ]dysphagia    [ ]abdominal pain  [ ]melena  [ ]BRBPR    [  ]epistaxis  [  ]rash    [ ]lower extremity edema        [x ] All others negative	  [ ] Unable to obtain    PHYSICAL EXAM:  T(C): 36.8 (21 @ 06:18), Max: 37.3 (21 @ 02:15)  HR: 84 (21 @ 06:18) (74 - 86)  BP: 156/64 (21 @ 06:18) (124/57 - 156/64)  RR: 16 (21 @ 06:18) (16 - 18)  SpO2: 100% (21 @ 06:18) (99% - 100%)  Wt(kg): --  I&O's Summary        HEENT:   Normal oral mucosa, PERRL, EOMI	  Lymphatic: No lymphadenopathy , + edema in LE bilaterally   Cardiovascular: Normal S1 S2, No JVD, No murmurs , Peripheral pulses palpable 2+ bilaterally  Respiratory: Lungs clear to auscultation, normal effort 	  Gastrointestinal:  Soft, Non-tender, + BS	  Skin: No rashes, No ecchymoses, No cyanosis, warm to touch  Musculoskeletal: Normal range of motion, normal strength  Psychiatry:  Mood & affect appropriate      TELEMETRY: 	    ECG:  	  RADIOLOGY:  OTHER:     DIAGNOSTIC TESTING:  [ ] Echocardiogram: < from: Transthoracic Echocardiogram (19 @ 10:37) >  1. Mitral valve not well visualized, probably normal.  2. Hyperdynamic left ventricle.  3. The right ventricle is not well visualized; grossly  normal right ventricular systolic function.  4. Normal pericardium with no pericardial effusion.  5. Right pleural effusion.  6. Pericardial fat pad seen.  ------------------------------------    < end of copied text >    [ ]  Catheterization:  [ ] Stress Test:    	  	  LABS:	 	    CARDIAC MARKERS:                              7.8    2.08  )-----------( 68       ( 2021 04:58 )             23.2     11-    140  |  102  |  18  ----------------------------<  92  3.2<L>   |  22  |  1.12    Ca    8.9      2021 04:58    TPro  6.7  /  Alb  3.7  /  TBili  1.5<H>  /  DBili  x   /  AST  18  /  ALT  18  /  AlkPhos  96  -    proBNP:   Lipid Profile:   HgA1c:   TSH:     ASSESSMENT/PLAN: 66 yr old female with a pmh of active lung cancer, CAD s/p prior PCI, HTN, SVC syndrome on eliquis and asa who presents with a 3 day history of weakness and lightheadedness.      -pt. symptoms are in the setting of severe anemia   -do not suspect acs at this time  -sapt on hold due to severe anemia - follow up GI and heme to see when and if safe to restart given history of CAD with prior PCI  -workup of anemia per primary team   -further workup pending clinical course    Tip Hernandez MD

## 2021-11-23 NOTE — CONSULT NOTE ADULT - ASSESSMENT
66 year old female with throat CA presents with fatigue and anemia     Anemia   Monitor CBC   Transfuse to a goal Hemoglobin > 8   I would try to avoid endoscopy at this time as she is likely a difficult airway in the event that intubation is emergently needed   Suggest supportive care wiht IV PPI BID   Monitor CBC   Ok for diet as tolerated     Throat CA   As per medical team     I was physically present for the key portions of the evaluation and management (E/M) service provided.  The patient was personally seen and examined at bedside.  I have edited the note as appropriate.   Thank you for your consultation and allowing  me to participate in the care of your patients. If you have further questions please contact me at [phone].     Bert Quevedo M.D.       _________________________________________________________________________________________________  [facility]  [address] [place] [address]  Office: [phone]    Fortunato Ruby PA-C    ___________________________________________________________________________________________________    
66F with stage 3 adenocarcinoma of the lung on chemo (carbo + pemetrexed) and RT, here with symptomatic anemia/pancytopenia, will recommend:    - continue BSC for now  - transfuse LD, irradiated PRBCs to keep hgb >8.  - WBC and plts are acceptable  - on PPIs  - GI evaluation  - B12 and folate to continue  - RT on hold at this time until she is stable  - discussed in detail with Dr Donte Duncan 11.22.21.     for questions, please call [phone]

## 2021-11-23 NOTE — ED ADULT NURSE REASSESSMENT NOTE - NS ED NURSE REASSESS COMMENT FT1
Report received from SAMMI Mcnamara. Resting comfortably on stretcher. VSS.  Alert and oriented x 4, 1 unit blood transfused. On protonix infusion.  Left chest port accessed already.
Patient is resting in bed, denies chest pain, n/v. Patient awaiting bed. updated on plan.

## 2021-11-23 NOTE — PATIENT PROFILE ADULT - FALL HARM RISK
DTC Progress Note    Recommendations/ Comments: Chart reviewed due to hyperglycemia. Blood sugars mainly >180 and pt has required 8 units of correction over the last 24 hours. If appropriate, please consider changing correction scale to resistant scale. Current hospital DM medication: correction scale Humalog, normal sensitivity, Dexamethasone 10 mg IV daily    Chart reviewed on Jeanettechloé Miguel. Patient is a 67 y.o. female with known diabetes on Metformin 1000 mg BID at home. A1c:   Lab Results   Component Value Date/Time    Hemoglobin A1c 6.5 (H) 11/22/2013 11:23 AM       Recent Glucose Results:   Lab Results   Component Value Date/Time     (H) 05/31/2018 08:40 AM    GLUCPOC 218 (H) 05/31/2018 11:25 AM    GLUCPOC 202 (H) 05/31/2018 06:37 AM    GLUCPOC 199 (H) 05/30/2018 09:25 PM        Lab Results   Component Value Date/Time    Creatinine 0.68 05/31/2018 08:40 AM     Estimated Creatinine Clearance: 78.2 mL/min (based on Cr of 0.68). Active Orders   Diet    DIET DIABETIC CONSISTENT CARB Regular; 2 GM NA (House Low NA)        PO intake:   Patient Vitals for the past 72 hrs:   % Diet Eaten   05/29/18 1802 100 %   05/29/18 1207 100 %       Will continue to follow as needed.     Thank you  Ida Villanueva RD coagulation(Bleeding disorder R/T clinical cond/anti-coags)

## 2021-11-24 LAB
ANION GAP SERPL CALC-SCNC: 12 MMOL/L — SIGNIFICANT CHANGE UP (ref 7–14)
ANION GAP SERPL CALC-SCNC: 13 MMOL/L — SIGNIFICANT CHANGE UP (ref 7–14)
BUN SERPL-MCNC: 18 MG/DL — SIGNIFICANT CHANGE UP (ref 7–23)
BUN SERPL-MCNC: 18 MG/DL — SIGNIFICANT CHANGE UP (ref 7–23)
CALCIUM SERPL-MCNC: 8.9 MG/DL — SIGNIFICANT CHANGE UP (ref 8.4–10.5)
CALCIUM SERPL-MCNC: 9 MG/DL — SIGNIFICANT CHANGE UP (ref 8.4–10.5)
CHLORIDE SERPL-SCNC: 101 MMOL/L — SIGNIFICANT CHANGE UP (ref 98–107)
CHLORIDE SERPL-SCNC: 102 MMOL/L — SIGNIFICANT CHANGE UP (ref 98–107)
CO2 SERPL-SCNC: 22 MMOL/L — SIGNIFICANT CHANGE UP (ref 22–31)
CO2 SERPL-SCNC: 23 MMOL/L — SIGNIFICANT CHANGE UP (ref 22–31)
CREAT SERPL-MCNC: 1.04 MG/DL — SIGNIFICANT CHANGE UP (ref 0.5–1.3)
CREAT SERPL-MCNC: 1.09 MG/DL — SIGNIFICANT CHANGE UP (ref 0.5–1.3)
GLUCOSE SERPL-MCNC: 119 MG/DL — HIGH (ref 70–99)
GLUCOSE SERPL-MCNC: 127 MG/DL — HIGH (ref 70–99)
HCT VFR BLD CALC: 27.3 % — LOW (ref 34.5–45)
HGB BLD-MCNC: 9.5 G/DL — LOW (ref 11.5–15.5)
MAGNESIUM SERPL-MCNC: 2.2 MG/DL — SIGNIFICANT CHANGE UP (ref 1.6–2.6)
MAGNESIUM SERPL-MCNC: 2.5 MG/DL — SIGNIFICANT CHANGE UP (ref 1.6–2.6)
MCHC RBC-ENTMCNC: 30.4 PG — SIGNIFICANT CHANGE UP (ref 27–34)
MCHC RBC-ENTMCNC: 34.8 GM/DL — SIGNIFICANT CHANGE UP (ref 32–36)
MCV RBC AUTO: 87.2 FL — SIGNIFICANT CHANGE UP (ref 80–100)
NRBC # BLD: 1 /100 WBCS — SIGNIFICANT CHANGE UP
NRBC # FLD: 0.03 K/UL — HIGH
PHOSPHATE SERPL-MCNC: 2.4 MG/DL — LOW (ref 2.5–4.5)
PHOSPHATE SERPL-MCNC: 2.9 MG/DL — SIGNIFICANT CHANGE UP (ref 2.5–4.5)
PLATELET # BLD AUTO: 106 K/UL — LOW (ref 150–400)
POTASSIUM SERPL-MCNC: 3.3 MMOL/L — LOW (ref 3.5–5.3)
POTASSIUM SERPL-MCNC: 3.3 MMOL/L — LOW (ref 3.5–5.3)
POTASSIUM SERPL-SCNC: 3.3 MMOL/L — LOW (ref 3.5–5.3)
POTASSIUM SERPL-SCNC: 3.3 MMOL/L — LOW (ref 3.5–5.3)
RBC # BLD: 3.13 M/UL — LOW (ref 3.8–5.2)
RBC # FLD: 15.6 % — HIGH (ref 10.3–14.5)
SODIUM SERPL-SCNC: 136 MMOL/L — SIGNIFICANT CHANGE UP (ref 135–145)
SODIUM SERPL-SCNC: 137 MMOL/L — SIGNIFICANT CHANGE UP (ref 135–145)
WBC # BLD: 2.71 K/UL — LOW (ref 3.8–10.5)
WBC # FLD AUTO: 2.71 K/UL — LOW (ref 3.8–10.5)

## 2021-11-24 RX ORDER — POTASSIUM CHLORIDE 20 MEQ
10 PACKET (EA) ORAL
Refills: 0 | Status: DISCONTINUED | OUTPATIENT
Start: 2021-11-24 | End: 2021-11-24

## 2021-11-24 RX ORDER — POTASSIUM CHLORIDE 20 MEQ
40 PACKET (EA) ORAL EVERY 4 HOURS
Refills: 0 | Status: COMPLETED | OUTPATIENT
Start: 2021-11-24 | End: 2021-11-24

## 2021-11-24 RX ORDER — MAGNESIUM SULFATE 500 MG/ML
2 VIAL (ML) INJECTION ONCE
Refills: 0 | Status: COMPLETED | OUTPATIENT
Start: 2021-11-24 | End: 2021-11-24

## 2021-11-24 RX ORDER — ASPIRIN/CALCIUM CARB/MAGNESIUM 324 MG
81 TABLET ORAL DAILY
Refills: 0 | Status: DISCONTINUED | OUTPATIENT
Start: 2021-11-24 | End: 2021-11-26

## 2021-11-24 RX ORDER — POTASSIUM CHLORIDE 20 MEQ
40 PACKET (EA) ORAL ONCE
Refills: 0 | Status: COMPLETED | OUTPATIENT
Start: 2021-11-24 | End: 2021-11-24

## 2021-11-24 RX ORDER — SODIUM,POTASSIUM PHOSPHATES 278-250MG
2 POWDER IN PACKET (EA) ORAL ONCE
Refills: 0 | Status: COMPLETED | OUTPATIENT
Start: 2021-11-24 | End: 2021-11-24

## 2021-11-24 RX ORDER — SODIUM,POTASSIUM PHOSPHATES 278-250MG
1 POWDER IN PACKET (EA) ORAL ONCE
Refills: 0 | Status: COMPLETED | OUTPATIENT
Start: 2021-11-24 | End: 2021-11-24

## 2021-11-24 RX ORDER — APIXABAN 2.5 MG/1
5 TABLET, FILM COATED ORAL EVERY 12 HOURS
Refills: 0 | Status: DISCONTINUED | OUTPATIENT
Start: 2021-11-24 | End: 2021-11-26

## 2021-11-24 RX ADMIN — ATORVASTATIN CALCIUM 40 MILLIGRAM(S): 80 TABLET, FILM COATED ORAL at 21:12

## 2021-11-24 RX ADMIN — CARVEDILOL PHOSPHATE 6.25 MILLIGRAM(S): 80 CAPSULE, EXTENDED RELEASE ORAL at 17:22

## 2021-11-24 RX ADMIN — APIXABAN 5 MILLIGRAM(S): 2.5 TABLET, FILM COATED ORAL at 17:22

## 2021-11-24 RX ADMIN — BUDESONIDE AND FORMOTEROL FUMARATE DIHYDRATE 2 PUFF(S): 160; 4.5 AEROSOL RESPIRATORY (INHALATION) at 21:12

## 2021-11-24 RX ADMIN — Medication 40 MILLIEQUIVALENT(S): at 17:22

## 2021-11-24 RX ADMIN — Medication 40 MILLIEQUIVALENT(S): at 04:49

## 2021-11-24 RX ADMIN — Medication 40 MILLIEQUIVALENT(S): at 21:12

## 2021-11-24 RX ADMIN — TIOTROPIUM BROMIDE 1 CAPSULE(S): 18 CAPSULE ORAL; RESPIRATORY (INHALATION) at 13:04

## 2021-11-24 RX ADMIN — PANTOPRAZOLE SODIUM 40 MILLIGRAM(S): 20 TABLET, DELAYED RELEASE ORAL at 04:49

## 2021-11-24 RX ADMIN — PANTOPRAZOLE SODIUM 40 MILLIGRAM(S): 20 TABLET, DELAYED RELEASE ORAL at 17:22

## 2021-11-24 RX ADMIN — Medication 2 PACKET(S): at 04:49

## 2021-11-24 RX ADMIN — Medication 81 MILLIGRAM(S): at 17:22

## 2021-11-24 RX ADMIN — BUDESONIDE AND FORMOTEROL FUMARATE DIHYDRATE 2 PUFF(S): 160; 4.5 AEROSOL RESPIRATORY (INHALATION) at 08:10

## 2021-11-24 RX ADMIN — CARVEDILOL PHOSPHATE 6.25 MILLIGRAM(S): 80 CAPSULE, EXTENDED RELEASE ORAL at 04:50

## 2021-11-24 RX ADMIN — Medication 50 GRAM(S): at 01:57

## 2021-11-24 NOTE — PROVIDER CONTACT NOTE (MEDICATION) - SITUATION
Pt refuse to take the Kphos pill and supplement. States, "it is uncomfortable and difficult to swallow"   a/ox4, no acute distress at this time

## 2021-11-24 NOTE — CHART NOTE - NSCHARTNOTEFT_GEN_A_CORE
Spoke with GI and attending, okay to resume A/C. Eliquis and ASA resumed, will continue to monitor    ACP  [id number]

## 2021-11-24 NOTE — DISEASE MANAGEMENT
[Clinical] : TNM Stage: c [N/A] : Currently not applicable [FreeTextEntry4] : NSCLC w/ SVC syndrome s/p stenting, + cervical LN [TTNM] : - [NTNM] : - [MTNM] : - [de-identified] : 19 tx/3800cGy [de-identified] : 30 tx/6000cGy [de-identified] : mediastinum

## 2021-11-24 NOTE — HISTORY OF PRESENT ILLNESS
[FreeTextEntry1] : 65 year old female, former smoker (1/2 PPD x 30 years, quit Jan 2018), now 2yr s/p RULobectomy with MLND on 9/3/19. 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 Pathology of right rib excision was benign, revealed bone. Visceral pleura invasion and HYACINTH present. She completed chemotherapy on 12/10/19 with Dr. Guevara. Later with SVC syndrome s/p stenting, cervical LN bx positive, g3 NSCLC\par \par 10/28/21\par -Tolerating tx\par -Eating ok\par -Voice a little hoarse\par -no complaints of pain noted\par \par 11/4/21\par -Tolerating tx\par -Has dysphagia and hoarse voice, mild pain swallowing\par -Eating liquids\par -No coughing but SOB\par -Uses a walker to get around.\par \par 11/11/21\par -Tolerated tx\par -Has increase dysphagia. ,using Carafate.\par -No coughing or SOB at this time\par -Pt feeling fatigued \par \par 11/18/21\par -tolerated tx\par -will complete in 2 weeks\par -Eating liquids and small amt soft solids\par -Pt having pain in throats and ears. Taking Carafate and magic mixture daily. Odynophagia causing dietary disturbance. Is trying softs, grits, smoothies. Tolerating with some difficulty\par -Fatigue noted\par -SOB due to COPD

## 2021-11-24 NOTE — REVIEW OF SYSTEMS
[Constipation: Grade 0] : Constipation: Grade 0 [Diarrhea: Grade 0] : Diarrhea: Grade 0 [Dysphagia: Grade 1 - Symptomatic, able to eat regular diet] : Dysphagia: Grade 1 - Symptomatic, able to eat regular diet [Nausea: Grade 0] : Nausea: Grade 0 [Vomiting: Grade 0] : Vomiting: Grade 0 [Fatigue: Grade 1 - Fatigue relieved by rest] : Fatigue: Grade 1 - Fatigue relieved by rest [Cough: Grade 0] : Cough: Grade 0 [Dyspnea: Grade 1 - Shortness of breath with moderate exertion] : Dyspnea: Grade 1 - Shortness of breath with moderate exertion [Hoarseness: Grade 1 - Mild or intermittent voice change; fully understandable; self-resolves] : Hoarseness: Grade 1 - Mild or intermittent voice change; fully understandable; self-resolves [Voice Alteration: Grade 1 - Mild or intermittent change from normal voice] : Voice Alteration: Grade 1 - Mild or intermittent change from normal voice [Esophagitis: Grade 1 - Asymptomatic; clinical or diagnostic observations only; intervention not indicated] : Esophagitis: Grade 1 - Asymptomatic; clinical or diagnostic observations only; intervention not indicated

## 2021-11-25 LAB
ANION GAP SERPL CALC-SCNC: 9 MMOL/L — SIGNIFICANT CHANGE UP (ref 7–14)
BUN SERPL-MCNC: 15 MG/DL — SIGNIFICANT CHANGE UP (ref 7–23)
CALCIUM SERPL-MCNC: 8.9 MG/DL — SIGNIFICANT CHANGE UP (ref 8.4–10.5)
CHLORIDE SERPL-SCNC: 104 MMOL/L — SIGNIFICANT CHANGE UP (ref 98–107)
CO2 SERPL-SCNC: 25 MMOL/L — SIGNIFICANT CHANGE UP (ref 22–31)
CREAT SERPL-MCNC: 1.08 MG/DL — SIGNIFICANT CHANGE UP (ref 0.5–1.3)
FOLATE SERPL-MCNC: 20 NG/ML — HIGH (ref 3.1–17.5)
GLUCOSE SERPL-MCNC: 100 MG/DL — HIGH (ref 70–99)
HCT VFR BLD CALC: 25.9 % — LOW (ref 34.5–45)
HGB BLD-MCNC: 9 G/DL — LOW (ref 11.5–15.5)
MAGNESIUM SERPL-MCNC: 2 MG/DL — SIGNIFICANT CHANGE UP (ref 1.6–2.6)
MCHC RBC-ENTMCNC: 30.7 PG — SIGNIFICANT CHANGE UP (ref 27–34)
MCHC RBC-ENTMCNC: 34.7 GM/DL — SIGNIFICANT CHANGE UP (ref 32–36)
MCV RBC AUTO: 88.4 FL — SIGNIFICANT CHANGE UP (ref 80–100)
NRBC # BLD: 0 /100 WBCS — SIGNIFICANT CHANGE UP
NRBC # FLD: 0 K/UL — SIGNIFICANT CHANGE UP
PHOSPHATE SERPL-MCNC: 3.1 MG/DL — SIGNIFICANT CHANGE UP (ref 2.5–4.5)
PLATELET # BLD AUTO: 142 K/UL — LOW (ref 150–400)
POTASSIUM SERPL-MCNC: 3.5 MMOL/L — SIGNIFICANT CHANGE UP (ref 3.5–5.3)
POTASSIUM SERPL-SCNC: 3.5 MMOL/L — SIGNIFICANT CHANGE UP (ref 3.5–5.3)
RBC # BLD: 2.93 M/UL — LOW (ref 3.8–5.2)
RBC # FLD: 16 % — HIGH (ref 10.3–14.5)
SODIUM SERPL-SCNC: 138 MMOL/L — SIGNIFICANT CHANGE UP (ref 135–145)
VIT B12 SERPL-MCNC: 2000 PG/ML — HIGH (ref 200–900)
WBC # BLD: 2.56 K/UL — LOW (ref 3.8–10.5)
WBC # FLD AUTO: 2.56 K/UL — LOW (ref 3.8–10.5)

## 2021-11-25 RX ORDER — SUCRALFATE 1 G
1 TABLET ORAL
Refills: 0 | Status: DISCONTINUED | OUTPATIENT
Start: 2021-11-25 | End: 2021-11-26

## 2021-11-25 RX ORDER — DIPHENHYDRAMINE HYDROCHLORIDE AND LIDOCAINE HYDROCHLORIDE AND ALUMINUM HYDROXIDE AND MAGNESIUM HYDRO
5 KIT
Refills: 0 | Status: DISCONTINUED | OUTPATIENT
Start: 2021-11-25 | End: 2021-11-26

## 2021-11-25 RX ADMIN — BUDESONIDE AND FORMOTEROL FUMARATE DIHYDRATE 2 PUFF(S): 160; 4.5 AEROSOL RESPIRATORY (INHALATION) at 14:16

## 2021-11-25 RX ADMIN — TIOTROPIUM BROMIDE 1 CAPSULE(S): 18 CAPSULE ORAL; RESPIRATORY (INHALATION) at 14:16

## 2021-11-25 RX ADMIN — PANTOPRAZOLE SODIUM 40 MILLIGRAM(S): 20 TABLET, DELAYED RELEASE ORAL at 17:40

## 2021-11-25 RX ADMIN — APIXABAN 5 MILLIGRAM(S): 2.5 TABLET, FILM COATED ORAL at 17:40

## 2021-11-25 RX ADMIN — APIXABAN 5 MILLIGRAM(S): 2.5 TABLET, FILM COATED ORAL at 05:37

## 2021-11-25 RX ADMIN — CARVEDILOL PHOSPHATE 6.25 MILLIGRAM(S): 80 CAPSULE, EXTENDED RELEASE ORAL at 05:36

## 2021-11-25 RX ADMIN — BUDESONIDE AND FORMOTEROL FUMARATE DIHYDRATE 2 PUFF(S): 160; 4.5 AEROSOL RESPIRATORY (INHALATION) at 20:29

## 2021-11-25 RX ADMIN — DIPHENHYDRAMINE HYDROCHLORIDE AND LIDOCAINE HYDROCHLORIDE AND ALUMINUM HYDROXIDE AND MAGNESIUM HYDRO 5 MILLILITER(S): KIT at 14:18

## 2021-11-25 RX ADMIN — CARVEDILOL PHOSPHATE 6.25 MILLIGRAM(S): 80 CAPSULE, EXTENDED RELEASE ORAL at 17:40

## 2021-11-25 RX ADMIN — Medication 81 MILLIGRAM(S): at 14:18

## 2021-11-25 RX ADMIN — PANTOPRAZOLE SODIUM 40 MILLIGRAM(S): 20 TABLET, DELAYED RELEASE ORAL at 05:37

## 2021-11-25 RX ADMIN — ATORVASTATIN CALCIUM 40 MILLIGRAM(S): 80 TABLET, FILM COATED ORAL at 22:18

## 2021-11-26 ENCOUNTER — TRANSCRIPTION ENCOUNTER (OUTPATIENT)
Age: 66
End: 2021-11-26

## 2021-11-26 VITALS
DIASTOLIC BLOOD PRESSURE: 60 MMHG | HEART RATE: 80 BPM | TEMPERATURE: 99 F | OXYGEN SATURATION: 98 % | RESPIRATION RATE: 18 BRPM | SYSTOLIC BLOOD PRESSURE: 145 MMHG

## 2021-11-26 LAB
ANION GAP SERPL CALC-SCNC: 9 MMOL/L — SIGNIFICANT CHANGE UP (ref 7–14)
ANISOCYTOSIS BLD QL: SLIGHT — SIGNIFICANT CHANGE UP
BUN SERPL-MCNC: 13 MG/DL — SIGNIFICANT CHANGE UP (ref 7–23)
CALCIUM SERPL-MCNC: 8.9 MG/DL — SIGNIFICANT CHANGE UP (ref 8.4–10.5)
CHLORIDE SERPL-SCNC: 104 MMOL/L — SIGNIFICANT CHANGE UP (ref 98–107)
CO2 SERPL-SCNC: 26 MMOL/L — SIGNIFICANT CHANGE UP (ref 22–31)
CREAT SERPL-MCNC: 1.13 MG/DL — SIGNIFICANT CHANGE UP (ref 0.5–1.3)
GIANT PLATELETS BLD QL SMEAR: PRESENT — SIGNIFICANT CHANGE UP
GLUCOSE SERPL-MCNC: 111 MG/DL — HIGH (ref 70–99)
HCT VFR BLD CALC: 27.9 % — LOW (ref 34.5–45)
HGB BLD-MCNC: 9.3 G/DL — LOW (ref 11.5–15.5)
MACROCYTES BLD QL: SLIGHT — SIGNIFICANT CHANGE UP
MAGNESIUM SERPL-MCNC: 1.8 MG/DL — SIGNIFICANT CHANGE UP (ref 1.6–2.6)
MANUAL SMEAR VERIFICATION: SIGNIFICANT CHANGE UP
MCHC RBC-ENTMCNC: 29.7 PG — SIGNIFICANT CHANGE UP (ref 27–34)
MCHC RBC-ENTMCNC: 33.3 GM/DL — SIGNIFICANT CHANGE UP (ref 32–36)
MCV RBC AUTO: 89.1 FL — SIGNIFICANT CHANGE UP (ref 80–100)
NEUTS BAND # BLD: 0.9 % — SIGNIFICANT CHANGE UP (ref 0–6)
NRBC # BLD: 0 /100 WBCS — SIGNIFICANT CHANGE UP
NRBC # FLD: 0 K/UL — SIGNIFICANT CHANGE UP
OVALOCYTES BLD QL SMEAR: SLIGHT — SIGNIFICANT CHANGE UP
PHOSPHATE SERPL-MCNC: 2.9 MG/DL — SIGNIFICANT CHANGE UP (ref 2.5–4.5)
PLAT MORPH BLD: NORMAL — SIGNIFICANT CHANGE UP
PLATELET # BLD AUTO: 179 K/UL — SIGNIFICANT CHANGE UP (ref 150–400)
PLATELET COUNT - ESTIMATE: NORMAL — SIGNIFICANT CHANGE UP
POIKILOCYTOSIS BLD QL AUTO: SLIGHT — SIGNIFICANT CHANGE UP
POLYCHROMASIA BLD QL SMEAR: SLIGHT — SIGNIFICANT CHANGE UP
POTASSIUM SERPL-MCNC: 3.7 MMOL/L — SIGNIFICANT CHANGE UP (ref 3.5–5.3)
POTASSIUM SERPL-SCNC: 3.7 MMOL/L — SIGNIFICANT CHANGE UP (ref 3.5–5.3)
RBC # BLD: 3.13 M/UL — LOW (ref 3.8–5.2)
RBC # FLD: 16.8 % — HIGH (ref 10.3–14.5)
RBC BLD AUTO: ABNORMAL
SODIUM SERPL-SCNC: 139 MMOL/L — SIGNIFICANT CHANGE UP (ref 135–145)
VARIANT LYMPHS # BLD: 5.4 % — SIGNIFICANT CHANGE UP (ref 0–6)
WBC # BLD: 3.42 K/UL — LOW (ref 3.8–10.5)
WBC # FLD AUTO: 3.42 K/UL — LOW (ref 3.8–10.5)

## 2021-11-26 RX ORDER — DIPHENHYDRAMINE HYDROCHLORIDE AND LIDOCAINE HYDROCHLORIDE AND ALUMINUM HYDROXIDE AND MAGNESIUM HYDRO
5 KIT
Qty: 750 | Refills: 0
Start: 2021-11-26 | End: 2021-12-25

## 2021-11-26 RX ORDER — POLYETHYLENE GLYCOL 3350 17 G/17G
17 POWDER, FOR SOLUTION ORAL
Qty: 510 | Refills: 0
Start: 2021-11-26 | End: 2021-12-25

## 2021-11-26 RX ORDER — POTASSIUM CHLORIDE 20 MEQ
1 PACKET (EA) ORAL
Qty: 0 | Refills: 0 | DISCHARGE

## 2021-11-26 RX ORDER — POLYETHYLENE GLYCOL 3350 17 G/17G
17 POWDER, FOR SOLUTION ORAL DAILY
Refills: 0 | Status: DISCONTINUED | OUTPATIENT
Start: 2021-11-26 | End: 2021-11-26

## 2021-11-26 RX ORDER — PANTOPRAZOLE SODIUM 20 MG/1
1 TABLET, DELAYED RELEASE ORAL
Qty: 60 | Refills: 0
Start: 2021-11-26 | End: 2021-12-25

## 2021-11-26 RX ORDER — SENNA PLUS 8.6 MG/1
2 TABLET ORAL AT BEDTIME
Refills: 0 | Status: DISCONTINUED | OUTPATIENT
Start: 2021-11-26 | End: 2021-11-26

## 2021-11-26 RX ORDER — SUCRALFATE 1 G
10 TABLET ORAL
Qty: 280 | Refills: 0
Start: 2021-11-26 | End: 2021-12-09

## 2021-11-26 RX ADMIN — PANTOPRAZOLE SODIUM 40 MILLIGRAM(S): 20 TABLET, DELAYED RELEASE ORAL at 05:17

## 2021-11-26 RX ADMIN — Medication 81 MILLIGRAM(S): at 13:51

## 2021-11-26 RX ADMIN — APIXABAN 5 MILLIGRAM(S): 2.5 TABLET, FILM COATED ORAL at 05:17

## 2021-11-26 RX ADMIN — CARVEDILOL PHOSPHATE 6.25 MILLIGRAM(S): 80 CAPSULE, EXTENDED RELEASE ORAL at 17:45

## 2021-11-26 RX ADMIN — CARVEDILOL PHOSPHATE 6.25 MILLIGRAM(S): 80 CAPSULE, EXTENDED RELEASE ORAL at 05:17

## 2021-11-26 RX ADMIN — Medication 300 UNIT(S): at 17:29

## 2021-11-26 RX ADMIN — TIOTROPIUM BROMIDE 1 CAPSULE(S): 18 CAPSULE ORAL; RESPIRATORY (INHALATION) at 13:52

## 2021-11-26 RX ADMIN — BUDESONIDE AND FORMOTEROL FUMARATE DIHYDRATE 2 PUFF(S): 160; 4.5 AEROSOL RESPIRATORY (INHALATION) at 13:53

## 2021-11-26 RX ADMIN — DIPHENHYDRAMINE HYDROCHLORIDE AND LIDOCAINE HYDROCHLORIDE AND ALUMINUM HYDROXIDE AND MAGNESIUM HYDRO 5 MILLILITER(S): KIT at 10:22

## 2021-11-26 RX ADMIN — APIXABAN 5 MILLIGRAM(S): 2.5 TABLET, FILM COATED ORAL at 17:45

## 2021-11-26 RX ADMIN — PANTOPRAZOLE SODIUM 40 MILLIGRAM(S): 20 TABLET, DELAYED RELEASE ORAL at 17:48

## 2021-11-26 NOTE — PROGRESS NOTE ADULT - PROBLEM SELECTOR PLAN 3
Active treatment  Was to receive chemo next monday  Heme-onc FU  Thoracic surgery FU
Active treatment  Was to receive chemo Monday  Heme-onc FU  Thoracic surgery FU
Active treatment  Was to receive chemo Monday  [facility]  Thoracic surgery FU

## 2021-11-26 NOTE — PROGRESS NOTE ADULT - PROVIDER SPECIALTY LIST ADULT
Cardiology
Cardiology
Gastroenterology
Cardiology
Gastroenterology
Gastroenterology
Internal Medicine
Heme/Onc

## 2021-11-26 NOTE — DISCHARGE NOTE NURSING/CASE MANAGEMENT/SOCIAL WORK - PATIENT PORTAL LINK FT
You can access the FollowMyHealth Patient Portal offered by [facility] by registering at the following website: [email or web address]. By joining [facility]’s FollowMyHealth portal, you will also be able to view your health information using other applications (apps) compatible with our system.

## 2021-11-26 NOTE — DISCHARGE NOTE PROVIDER - CARE PROVIDER_API CALL
Gera Guevara)  Hematology; Medical Oncology  [address] [place], [address]  [place] [address]  Phone: [phone]  Fax: [phone]  Follow Up Time:     Rodrigo Campbell  CARDIAC ELECTROPHYSIOLOGY  [address]  [place] [address]  Phone: [phone]  Fax: [phone]  Follow Up Time:     Jen Gibson)  Internal Medicine  [address] [place] [address]  Phone: [phone]  Fax: [phone]  Follow Up Time:     Donte Duncan)  Radiation Oncology  [address] [facility] [place] [address]  Phone: [phone]  Fax: [phone]  Follow Up Time:

## 2021-11-26 NOTE — PROGRESS NOTE ADULT - ATTENDING COMMENTS
seen and agree with PA.  has recovered well since SVC stent over the Summer and is now underoing chemo and radiation.  having expected esophageal complaints.  from our perspective OK to discharge when deemed stable from Oncology.

## 2021-11-26 NOTE — DISCHARGE NOTE PROVIDER - PROVIDER TOKENS
PROVIDER:[TOKEN:[[id number]]],PROVIDER:[TOKEN:[[id number]]],PROVIDER:[TOKEN:[[id number]]],PROVIDER:[TOKEN:[[id number]]]

## 2021-11-26 NOTE — DISCHARGE NOTE PROVIDER - HOSPITAL COURSE
66 F lung cancer (on chemo/RT), CAD/PCI, HTN, SVC syndrome (on Eliquis and ASA) who presents with a 3 day history of lightheaded and weak. Found to be anemic to 6.6    Anemia due to acute blood loss.   - Hb baseline ~10- presenting with Hb 6.6  - Denies BRBPR and black stool, FOBT negative  - S/P 2 U PRBC   - GI consulted   -Protonix 40mg IVP while inpatient; change to PO BID on discharge  started on Carafate Syrup BID today, 11/25  Would avoid endoscopy as she is likely a difficult airway in the event that intubation is emergently needed   - HOLD Eliquis: Resumed   - ASA; resumed 11/24   --- continue BSC for now  - Hgb has been stable and plts have improved, WBC low but aceptable  - transfuse LD, irradiated PRBCs to keep hgb >8.  - WBC and plts are more acceptable  - on PPIs  - on MMW - swish, gargle and swallow  - GI evaluation done - no EGD planned because of risk  - B12 and folate to continue  -monitor cbc and stool color     CAD  -c/w aspirin    HTN  - Coreg   - HOLD Lasix as hypotensive, poor PO intake     Lung cancer.   - chemo and RT on hold at this time until she is stable  - discussed in detail with Dr Donte Duncan 11.22.21.   - to f/u as outpt after discharge    SVC syndrome S/P stent 9/4/2021   - Eliquis, ASA  --close OP F/U with Dr Shepherd after DC    On 11/26/2021, discussed with /Dr. Guevara , patient is medically cleared and optimized for discharge today. All medications were reviewed with attending, and sent to mutually agreed upon pharmacy.

## 2021-11-26 NOTE — PROGRESS NOTE ADULT - PROBLEM SELECTOR PLAN 1
CHIEF COMPLAINT:    Chief Complaint   Patient presents with   â¢ Knee Pain     right   â¢ Shoulder Pain     right   â¢ Back Pain     right mid back         HISTORY OF PRESENT ILLNESS: Elaine Amaro is a 47year old female who presented requesting evaluation of right knee pain, right low back pain and right shoulder pain since 11/30 that has been getting worse. She denies injury, does not lay on her right side in bed, states that she lays all over. The knee pain started first and she thinks she has been compensating for it which caused her back and shoulder pain. Pain is worth with standing up and walking, better at rest.  She reports having a history of arthritis, has done PT and seen orthopedics in the past for her knee, had been offered an injection at that time but she had declined. She has tried tylenol for pain but minimal relief. She is on coumadin. Review of systems:    GENERAL: Denies recent weight changes, fatigue, weakness, fever, chills. INTEGUMENTARY: Denies discoloration, rashes or lesions. CARDIOVASCULAR: Denies chest pain, palpitations, fatigue, dyspnea with exertion, or peripheral edema. RESPIRATORY: Denies chest tightness, shortness of breath, or wheezing. MUSKULOSKELETAL: Positive for Right Shoulder, Right Knee and Right low back pain. Denies joint pain or swelling, or limitations to ROM. Denies muscle pain or weakness. NEUROLOGICAL: Denies weakness, numbness/tingling and headache.         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
hemangiomas   â¢ Mild intermittent asthma without complication 0/4/8976   â¢ Morbid obesity with BMI of 50.0-59.9, adult (CMS/[facility]) 4/4/2016   â¢ PONV (postoperative nausea and vomiting)        Current Medications:    Current Outpatient Prescriptions   Medication Sig Dispense Refill   â¢ warfarin (COUMADIN) 5 MG tablet TAKE ONE TO TWO TABLETS BY MOUTH DAILY AS DIRECTED. BLOOD THINNER. 180 tablet 0   â¢ cyanocobalamin 1000 MCG tablet Take 1 tablet by mouth daily. For low B12 level 30 tablet 11   â¢ lidocaine (XYLOCAINE) 5 % ointment Apply topically as needed for Pain. 35.44 g 2   â¢ warfarin (COUMADIN) 1 MG tablet TAKE 2-3 TABLETS BY MOUTH DAILY 270 tablet 2   â¢ warfarin (COUMADIN) 5 MG tablet TAKE 1 TABLET BY MOUTH DAILY OR AS DIRECTED 90 tablet 1     No current facility-administered medications for this visit. Allergies:    ALLERGIES:   Allergen Reactions   â¢ Latex      rash, itching, tongue swelling       SOCIAL HISTORY:  Social History   Substance Use Topics   â¢ Smoking status: Never Smoker   â¢ Smokeless tobacco: Never Used   â¢ Alcohol use No         Drug Use:    No                I have reviewed the past medical history, medications and allergies listed in the medical record as obtained by my nursing staff and support staff. I also reviewed RN's note today. Physical Exam:      Visit Vitals  /73 (BP Location: [facility])   Pulse 80   Temp 97.9 Â°F (36.6 Â°C) (Oral)   Resp 18   LMP 10/26/2005   SpO2 97%      GEN: No acute distress, Non-toxic appearing  MUSCULOSKELETAL:  Right SHOULDER: Inspection normal, Non-tender to palpation, Range of motion normal in forward flexion, abduction, external rotation, and internal rotation. AC crossover test negative. Strength 5/5 globally. BACK: Observation normal. Non-tender to palpation in the midline. Non-tender to palpation across paraspinal musculature. Range of motion normal (Flexion/Extension). Straight leg raise negative.    RIGHT KNEE: Inspection normal, Range of motion
normal, No dislocation/subluxation or laxity. Strength 5/5 globally, Muscle tone normal. Non-tender to palpation, No effusions, Patellar inhibition and Patellar apprehension negative, Anterior drawer negative, Posterior drawer negative  SKIN: Warm, Dry, Inspection normal with no Rashes/Lesions/Ulcers, Palpation normal with no induration/fluctuance/subcutaneous nodules or tenderness  NEURO: Strength 5/5 b/l, Sensation Grossly Intact by Touch        ASSESSMENT/PLAN:Gissel was seen today for knee pain, shoulder pain and back pain. Diagnoses and all orders for this visit:    Acute pain of right knee  -     SERVICE TO ORTHOPEDICS  -     SERVICE TO PHYSICAL THERAPY    Acute bilateral low back pain without sciatica  -     SERVICE TO ORTHOPEDICS  -     SERVICE TO PHYSICAL THERAPY      Likely arthritis. Because she is on coumadin she cannot take NSAIDS. Extra Strength tylenol. Follow up with Orthopedics to discuss knee injection. Follow up: The patient was advised to follow up with primary physician or to recheck with the urgent care clinic sooner if symptoms get worse or if new symptoms appear. The patient indicated understanding of the diagnosis and agreed with the plan of care.
CBC is stable on DOAC  DC planning
New  Hb baseline ~10- presenting with Hb 6.6  Denies BRBPR and black stool, FOBT negative  coag elevated, plt decreased, Tbili increased- will add ddimer, ldh, fibrinogen and haptoglobin   s/p 1 unit PRBC-   GI eval noted  Medical management suggested
FU CBC  Transfused 1 U PRBC

## 2021-11-26 NOTE — DISCHARGE NOTE PROVIDER - CARE PROVIDERS DIRECT ADDRESSES
,DirectAddress_Unknown,DirectAddress_Unknown,[email or web address]

## 2021-11-26 NOTE — DISCHARGE NOTE PROVIDER - NSDCFUSCHEDAPPT_GEN_ALL_CORE_FT
ERIKA CORRIGAN ; 12/09/2021 ; [facility]  ERIKA CORRIGAN ; 01/19/2022 ; [facility]PP [address]

## 2021-11-26 NOTE — PROGRESS NOTE ADULT - EXTREMITIES COMMENTS
minimal to trace edema at ankles both sides
trace edema ankles, has slight swelling of the left hand, no redness and no tenderness

## 2021-11-26 NOTE — PROGRESS NOTE ADULT - NEGATIVE GENERAL SYMPTOMS
no fever/no chills/no fatigue
no fever/no chills/no fatigue
no fever/no chills/no anorexia/no fatigue

## 2021-11-26 NOTE — PROGRESS NOTE ADULT - PROBLEM SELECTOR PLAN 4
acute as <90 days  Stent placed 9/4/21  Restart anticoagulants and monitor CBC
acute as <90 days  Stent placed 9/4/21  Holding eliquis and asa due to problem #1
acute as <90 days  Stent placed 9/4/21  Restart anticoagulants and monitor CBC

## 2021-11-26 NOTE — DISCHARGE NOTE PROVIDER - NSDCCPCAREPLAN_GEN_ALL_CORE_FT
PRINCIPAL DISCHARGE DIAGNOSIS  Diagnosis: Anemia due to acute blood loss  Assessment and Plan of Treatment: Please follow up with Dr. Gibson/Dr. Guevara within 1 week of discharge.  Please call to make an appointment within 1 week of discharge.  -You received 2 units packed red blood cells while you were admitted to the hospital.    -You were seen and evaluated by the gastroenterologist while you were admitted to the hospital.    -continue to take protonix twice a day upon discharge and Carafate Syrup twice a day  -no endoscopy done due to risk   -initially your eliquis and aspirin then restarted.  your blood count is stable  - You were evaluated by hematology/oncology Dr. Guevara while you were admitted to the hospital.    Hgb has been stable and plts have improved, WBC low but aceptable  -monitor cbc and stool color  Please repeat a complete blood count within 1 week of discharge.      SECONDARY DISCHARGE DIAGNOSES  Diagnosis: Lung cancer  Assessment and Plan of Treatment: Please follow up with your pcp within 1 week of  discharge.  Please call to make an appointment within 1 week of discharge.    - chemotherapy  and Radiation therapy on hold at this time until she is stable  - Please follow up with  Dr Donte Duncan and Dr. Guevara Oncologist   - to f/u as outpt after discharge  -Please follow up with Dr. Guevara for your scheduled appointment on monday.        Diagnosis: SVC syndrome  Assessment and Plan of Treatment: Please follow up with your pcp within 1 week of  discharge.  Please call to make an appointment within 1 week of discharge.   You were evaluated by cardiology while you were admitted to the hospital  -  you are status post stent 9/4/2021   - continue Eliquis and aspirin   --Please follow up with Dr Shepherd within 1 week of discharge.  Please call to make an appointment .    Diagnosis: Hypertension  Assessment and Plan of Treatment: Continue blood pressure medication regimen as directed. Monitor for any visual changes, headaches or dizziness.  Monitor blood pressure regularly.  Follow up with your PCP for further management for high blood pressure.    Diagnosis: CAD (coronary artery disease)  Assessment and Plan of Treatment: Please follow up with your pcp and cardiologist within 1 week of discharge.  Please call to make an appointment.  Please continue aspirin and atorvastatin as prescribed    Diagnosis: HLD (hyperlipidemia)  Assessment and Plan of Treatment: Continue cholesterol control medications. Continue DASH diet. Follow up with your PCP within 1 week of discharge for further management and monitoring of lipid and cholesterol panels.     PRINCIPAL DISCHARGE DIAGNOSIS  Diagnosis: Anemia due to acute blood loss  Assessment and Plan of Treatment: Please follow up with Dr. Gibson/Dr. Guevara within 1 week of discharge.  Please call to make an appointment within 1 week of discharge.  -You received 2 units packed red blood cells while you were admitted to the hospital.    -You were seen and evaluated by the gastroenterologist while you were admitted to the hospital.    -continue to take protonix twice a day upon discharge and Carafate Syrup twice a day  -no endoscopy done due to risk   -initially your eliquis and aspirin then restarted.  your blood count is stable  - You were evaluated by hematology/oncology Dr. Guevara while you were admitted to the hospital.    Hgb has been stable and plts have improved, WBC low but aceptable  -monitor cbc and stool color  Please repeat a complete blood count within 1 week of discharge.      SECONDARY DISCHARGE DIAGNOSES  Diagnosis: Lung cancer  Assessment and Plan of Treatment: Please follow up with your pcp within 1 week of  discharge.  Please call to make an appointment within 1 week of discharge.    - chemotherapy  and Radiation therapy on hold at this time until she is stable  - Please follow up with  Dr Donte Duncan and Dr. Guevara Oncologist   - to f/u as outpt after discharge  -Please follow up with Dr. Guevara for your scheduled appointment on monday.        Diagnosis: SVC syndrome  Assessment and Plan of Treatment: Please follow up with your pcp within 1 week of  discharge.  Please call to make an appointment within 1 week of discharge.   You were evaluated by cardiology while you were admitted to the hospital  -  you are status post stent 9/4/2021   - continue Eliquis and aspirin   --Please follow up with Dr Shepherd within 1 week of discharge.  Please call to make an appointment .    Diagnosis: Hypertension  Assessment and Plan of Treatment: Continue blood pressure medication regimen as directed. Monitor for any visual changes, headaches or dizziness.  Monitor blood pressure regularly.  Follow up with your PCP for further management for high blood pressure.   lasix was held during your hospital stay. Please follow up with Dr. Gibson when its safe to resume    Diagnosis: CAD (coronary artery disease)  Assessment and Plan of Treatment: Please follow up with your pcp and cardiologist within 1 week of discharge.  Please call to make an appointment.  Please continue aspirin and atorvastatin as prescribed    Diagnosis: HLD (hyperlipidemia)  Assessment and Plan of Treatment: Continue cholesterol control medications. Continue DASH diet. Follow up with your PCP within 1 week of discharge for further management and monitoring of lipid and cholesterol panels.

## 2021-11-26 NOTE — PROGRESS NOTE ADULT - ASSESSMENT
66 yr old female presenting with anemia 
66 year old female with throat CA presents with fatigue and anemia     Anemia   Monitor CBC and Transfuse to a goal Hemoglobin > 8   Would avoid endoscopy as she is likely a difficult airway in the event that intubation is emergently needed   Protonix 40mg IVP while inpatient; change to PO BID on discharge  started on Carafate Syrup BID today, 11/25  no objection to resuming a/c with above ppi; monitor cbc and stool color   puree diet with nutritional shakes     Throat CA   As per medical and oncology teams     I reviewed the overnight course of events on the unit, re-confirming the patient history. I discussed the care with the patient and their family. The plan of care was discussed with the physician assistant and modifications were made to the notation where appropriate. Differential diagnosis and plan of care discussed with patient after the evaluation. Advanced care planning was discussed with patient and family.  Advanced care planning forms were reviewed and discussed.  Risks, benefits and alternatives of gastroenterologic procedures were discussed in detail and all questions were answered. 35 minutes spent on total encounter of which more than fifty percent of the encounter was spent counseling and/or coordinating care by the attending physician. 
66 year old female with throat CA presents with fatigue and anemia     Anemia   Monitor CBC and Transfuse PRN for Hgb < 8   Would avoid endoscopy as she is likely a difficult airway in the event that intubation is emergently needed   Protonix 40mg IVP while inpatient; change to PO BID on discharge  Continue Carafate Syrup BID   no objection to resuming A/C or SAPT (ppx w/ppi as above)  senna/miralax to keep stools soft; monitor stool color   puree diet with nutritional shakes     Throat CA   As per medical and oncology teams     I reviewed the overnight course of events on the unit, re-confirming the patient history. I discussed the care with the patient and their family. The plan of care was discussed with the physician assistant and modifications were made to the notation where appropriate. Differential diagnosis and plan of care discussed with patient after the evaluation. Advanced care planning was discussed with patient and family.  Advanced care planning forms were reviewed and discussed.  Risks, benefits and alternatives of gastroenterologic procedures were discussed in detail and all questions were answered. 35 minutes spent on total encounter of which more than fifty percent of the encounter was spent counseling and/or coordinating care by the attending physician. 
66 year old female with throat CA presents with fatigue and anemia     Anemia   Monitor CBC and Transfuse to a goal Hemoglobin > 8   Would avoid endoscopy as she is likely a difficult airway in the event that intubation is emergently needed   Continue supportive care with IV PPI BID  no objection to resuming a/c with above ppi; monitor cbc and stool color   puree diet with nutritional shakes     Throat CA   As per medical team     I reviewed the overnight course of events on the unit, re-confirming the patient history. I discussed the care with the patient and their family. The plan of care was discussed with the physician assistant and modifications were made to the notation where appropriate. Differential diagnosis and plan of care discussed with patient after the evaluation. Advanced care planning was discussed with patient and family.  Advanced care planning forms were reviewed and discussed.  Risks, benefits and alternatives of gastroenterologic procedures were discussed in detail and all questions were answered. 35 minutes spent on total encounter of which more than fifty percent of the encounter was spent counseling and/or coordinating care by the attending physician. 
66F with stage 3 adenocarcinoma of the lung on chemo (carbo + pemetrexed) and RT, here with symptomatic anemia/pancytopenia, will recommend:    - continue BSC for now  - transfuse LD, irradiated PRBCs to keep hgb >8.  - WBC and plts are more acceptable  - on PPIs  - resume MMW - swish, gargle and swallow  - GI evaluation done - no EGD planned because of risk  - B12 and folate to continue  - chemo and RT on hold at this time until she is stable  - discussed in detail with Dr Donte Duncan 11.22.21.   - to f/u as outpt after discharge    for questions, please call [phone]  
CARDIOLOGY ATTENDING    Agree with above. F/U GI regarding restarting SAPT. No further inpatient cardiac workup expected    
66 yr old female presenting with anemia 
66 yr old female presenting with anemia 
66F with stage 3 adenocarcinoma of the lung on chemo (carbo + pemetrexed) and RT, here with symptomatic anemia/pancytopenia, will recommend:    - continue BSC for now  - transfuse LD, irradiated PRBCs to keep hgb >8.  - WBC and plts are more acceptable  - on PPIs  - GI evaluation done - no EGD planned because of risk  - B12 and folate to continue  - RT on hold at this time until she is stable  - discussed in detail with Dr Donte Duncan 11.22.21.   - to f/u as outpt after discarge    for questions, please call [phone]      
66F with stage 3 adenocarcinoma of the lung on chemo (carbo + pemetrexed) and RT, here with symptomatic anemia/pancytopenia, will recommend:    - continue BSC for now  - Hgb has been stable and plts have improved, WBC low but aceptable  - transfuse LD, irradiated PRBCs to keep hgb >8.  - WBC and plts are more acceptable  - on PPIs  - on MMW - swish, gargle and swallow  - GI evaluation done - no EGD planned because of risk  - B12 and folate to continue  - chemo and RT on hold at this time until she is stable  - discussed in detail with Dr Donte Duncan 11.22.21.   - to f/u as outpt after discharge    for questions, please call [phone]

## 2021-11-26 NOTE — PROGRESS NOTE ADULT - SUBJECTIVE AND OBJECTIVE BOX
Date of service  11/25/2021    chief complaint: weakness    extended hpi: 66 yr old female with a pmh of active lung cancer, CAD s/p prior PCI, HTN, SVC syndrome on eliquis and asa who presents with a 3 day history of weakness and lightheadedness.      denies chest pain or SOB, ongoing pain in throat when swallowing    Review of Systems:   Constitutional: [ ] fevers, [ ] chills.   Skin: [ ] dry skin. [ ] rashes.  Psychiatric: [ ] depression, [ ] anxiety.   Gastrointestinal: [ ] BRBPR, [ ] melena.   Neurological: [ ] confusion. [ ] seizures. [ ] shuffling gait.   Ears,Nose,Mouth and Throat: [ ] ear pain [ ] sore throat.   Eyes: [ ] diplopia.   Respiratory: [ ] hemoptysis. [ ] shortness of breath  Cardiovascular: See HPI above  Hematologic/Lymphatic: [ ] anemia. [ ] painful nodes. [ ] prolonged bleeding.   Genitourinary: [ ] hematuria. [ ] flank pain.   Endocrine: [ ] significant change in weight. [ ] intolerance to heat and cold.     Review of systems [x ] otherwise negative, [ ] otherwise unable to obtain    FH: no family history of sudden cardiac death in first degree relatives    SH: [ ] tobacco, [ ] alcohol, [ ] drugs    acetaminophen     Tablet .. 650 milliGRAM(s) Oral every 6 hours PRN  aluminum hydroxide/magnesium hydroxide/simethicone Suspension 30 milliLiter(s) Oral every 4 hours PRN  apixaban 5 milliGRAM(s) Oral every 12 hours  aspirin  chewable 81 milliGRAM(s) Oral daily  atorvastatin 40 milliGRAM(s) Oral at bedtime  budesonide  80 MICROgram(s)/formoterol 4.5 MICROgram(s) Inhaler 2 Puff(s) Inhalation two times a day  carvedilol 6.25 milliGRAM(s) Oral every 12 hours  FIRST- Mouthwash  BLM 5 milliLiter(s) Swish and Swallow five times a day  melatonin 3 milliGRAM(s) Oral at bedtime PRN  ondansetron Injectable 4 milliGRAM(s) IV Push every 8 hours PRN  pantoprazole  Injectable 40 milliGRAM(s) IV Push every 12 hours  sucralfate suspension 1 Gram(s) Oral two times a day  tetracaine/benzocaine/butamben Spray 1 Spray(s) Topical three times a day  tiotropium 18 MICROgram(s) Capsule 1 Capsule(s) Inhalation daily                            9.0    2.56  )-----------( 142      ( 25 Nov 2021 07:53 )             25.9       11-25    138  |  104  |  15  ----------------------------<  100<H>  3.5   |  25  |  1.08    Ca    8.9      25 Nov 2021 07:53  Phos  3.1     11-25  Mg     2.00     11-25      T(C): 36.9 (11-25-21 @ 12:37), Max: 36.9 (11-25-21 @ 12:37)  HR: 77 (11-25-21 @ 12:37) (77 - 82)  BP: 153/60 (11-25-21 @ 12:37) (133/66 - 153/60)  RR: 18 (11-25-21 @ 12:37) (16 - 18)  SpO2: 100% (11-25-21 @ 12:37) (98% - 100%)  Wt(kg): --    General: Well nourished in no acute distress. Alert and Oriented * 3.   Head: Normocephalic and atraumatic.   Neck: No JVD. No bruits. Supple. Does not appear to be enlarged.   Cardiovascular: + S1,S2 ; RRR Soft systolic murmur at the left lower sternal border. No rubs noted.    Lungs: CTA b/l. No rhonchi, rales or wheezes.   Abdomen: + BS, soft. Non tender. Non distended. No rebound. No guarding.   Extremities: No clubbing/cyanosis/edema.   Neurologic: Moves all four extremities. Full range of motion.   Skin: Warm and moist. The patient's skin has normal elasticity and good skin turgor.   Psychiatric: Appropriate mood and affect.  Musculoskeletal: Normal range of motion, normal strength    DATA    tele- SR APCS    66 yr old female with a pmh of active lung cancer, CAD s/p prior PCI, HTN, SVC syndrome on eliquis and asa who presents with a 3 day history of weakness and lightheadedness.      -pt. symptoms are in the setting of severe anemia which have now resolved  -do not suspect acs at this time  -sapt on hold due to severe anemia - follow up GI and heme to see when and if safe to restart given history of CAD with prior PCI  -workup of anemia per primary team / heme onc/ GI  -close OP F/U with Dr Shepherd after DC
INTERVAL HPI/OVERNIGHT EVENTS:    no bm this morning   tolerating new diet better    MEDICATIONS  (STANDING):  apixaban 5 milliGRAM(s) Oral every 12 hours  aspirin  chewable 81 milliGRAM(s) Oral daily  atorvastatin 40 milliGRAM(s) Oral at bedtime  budesonide  80 MICROgram(s)/formoterol 4.5 MICROgram(s) Inhaler 2 Puff(s) Inhalation two times a day  carvedilol 6.25 milliGRAM(s) Oral every 12 hours  pantoprazole  Injectable 40 milliGRAM(s) IV Push every 12 hours  sucralfate suspension 1 Gram(s) Oral two times a day  tetracaine/benzocaine/butamben Spray 1 Spray(s) Topical three times a day  tiotropium 18 MICROgram(s) Capsule 1 Capsule(s) Inhalation daily    MEDICATIONS  (PRN):  acetaminophen     Tablet .. 650 milliGRAM(s) Oral every 6 hours PRN Temp greater or equal to 38C (100.4F), Mild Pain (1 - 3)  aluminum hydroxide/magnesium hydroxide/simethicone Suspension 30 milliLiter(s) Oral every 4 hours PRN Dyspepsia  melatonin 3 milliGRAM(s) Oral at bedtime PRN Insomnia  ondansetron Injectable 4 milliGRAM(s) IV Push every 8 hours PRN Nausea and/or Vomiting      Allergies    penicillin (Short breath; Hives)  tetracycline (Short breath; Hives)    Intolerances        Review of Systems:    General:  No wt loss, fevers, chills, night sweats, fatigue   Eyes:  Good vision, no reported pain  ENT:  No sore throat, pain, runny nose, dysphagia  CV:  No pain, palpitations, hypo/hypertension  Resp:  No dyspnea, cough, tachypnea, wheezing  GI:  No pain, No nausea, No vomiting, No diarrhea, No constipation, No weight loss, No fever, No pruritis, No rectal bleeding, No melena, No dysphagia  :  No pain, bleeding, incontinence, nocturia  Muscle:  No pain, weakness  Neuro:  No weakness, tingling, memory problems  Psych:  No fatigue, insomnia, mood problems, depression  Endocrine:  No polyuria, polydypsia, cold/heat intolerance  Heme:  No petechiae, ecchymosis, easy bruisability  Skin:  No rash, tattoos, scars, edema      Vital Signs Last 24 Hrs  T(C): 36.7 (25 Nov 2021 05:30), Max: 36.8 (24 Nov 2021 12:18)  T(F): 98 (25 Nov 2021 05:30), Max: 98.3 (24 Nov 2021 12:18)  HR: 80 (25 Nov 2021 05:30) (77 - 89)  BP: 147/61 (25 Nov 2021 05:30) (133/66 - 159/71)  BP(mean): --  RR: 18 (25 Nov 2021 05:30) (16 - 18)  SpO2: 98% (25 Nov 2021 05:30) (98% - 99%)    PHYSICAL EXAM:    Constitutional: NAD  HEENT: EOMI, throat clear  Neck: No LAD, supple  Respiratory: CTA and P  Cardiovascular: S1 and S2, RRR, no M  Gastrointestinal: BS+, soft, NT/ND, neg HSM,  Extremities: No peripheral edema, neg clubbing, cyanosis  Vascular: 2+ peripheral pulses  Neurological: A/O x 3, no focal deficits  Psychiatric: Normal mood, normal affect  Skin: No rashes      LABS:                        9.0    2.56  )-----------( 142      ( 25 Nov 2021 07:53 )             25.9     11-25    138  |  104  |  15  ----------------------------<  100<H>  3.5   |  25  |  1.08    Ca    8.9      25 Nov 2021 07:53  Phos  3.1     11-25  Mg     2.00     11-25            RADIOLOGY & ADDITIONAL TESTS:  
Patient is a 66y old  Female who presents with a chief complaint of weakness (2021 14:39)      HPI:  Dysarthria noted. No dizziness      PAST MEDICAL & SURGICAL HISTORY:  Hypertension    Hyperlipidemia    Pulmonary nodules  yearly CT scans    Carotid artery disease  monitored by [facility] doctor Samuel    Emphysema    Hip pain    Obesity    Edema of both legs    Lung cancer  right, , completed chemotherapy 2019    Stented coronary artery    Localized enlarged lymph nodes    Scoliosis    Lumbar disc disorder    COPD (chronic obstructive pulmonary disease)    History of  section  x2 ,     S/P lobectomy of lung  RULobectomy     History of hip replacement  right 2021    Stented coronary artery  x2 stents 2018        Review of Systems:   CONSTITUTIONAL: No fever, weight loss, or fatigue  EYES: No eye pain, visual disturbances, or discharge  ENMT:  No difficulty hearing, tinnitus, vertigo; No sinus or throat pain  NECK: No pain or stiffness  BREASTS: No pain, masses, or nipple discharge  RESPIRATORY: No cough, wheezing, chills or hemoptysis; No shortness of breath  CARDIOVASCULAR: No chest pain, palpitations, dizziness, or leg swelling  GASTROINTESTINAL: No abdominal or epigastric pain. No nausea, vomiting, or hematemesis; No diarrhea or constipation. No melena or hematochezia.  GENITOURINARY: No dysuria, frequency, hematuria, or incontinence  NEUROLOGICAL: No headaches, memory loss, loss of strength, numbness, or tremors  SKIN: No itching, burning, rashes, or lesions   LYMPH NODES: No enlarged glands  ENDOCRINE: No heat or cold intolerance; No hair loss  MUSCULOSKELETAL: No joint pain or swelling; No muscle, back, or extremity pain  PSYCHIATRIC: No depression, anxiety, mood swings, or difficulty sleeping  HEME/LYMPH: No easy bruising, or bleeding gums  ALLERY AND IMMUNOLOGIC: No hives or eczema    Allergies    penicillin (Short breath; Hives)  tetracycline (Short breath; Hives)    Intolerances        Social History:     FAMILY HISTORY:  Family history of CHF (congestive heart failure) (Aunt)        MEDICATIONS  (STANDING):  atorvastatin 40 milliGRAM(s) Oral at bedtime  budesonide  80 MICROgram(s)/formoterol 4.5 MICROgram(s) Inhaler 2 Puff(s) Inhalation two times a day  carvedilol 6.25 milliGRAM(s) Oral every 12 hours  pantoprazole  Injectable 40 milliGRAM(s) IV Push every 12 hours  tetracaine/benzocaine/butamben Spray 1 Spray(s) Topical three times a day  tiotropium 18 MICROgram(s) Capsule 1 Capsule(s) Inhalation daily    MEDICATIONS  (PRN):  acetaminophen     Tablet .. 650 milliGRAM(s) Oral every 6 hours PRN Temp greater or equal to 38C (100.4F), Mild Pain (1 - 3)  aluminum hydroxide/magnesium hydroxide/simethicone Suspension 30 milliLiter(s) Oral every 4 hours PRN Dyspepsia  melatonin 3 milliGRAM(s) Oral at bedtime PRN Insomnia  ondansetron Injectable 4 milliGRAM(s) IV Push every 8 hours PRN Nausea and/or Vomiting        CAPILLARY BLOOD GLUCOSE        I&O's Summary      PHYSICAL EXAM:  Vital Signs Last 24 Hrs  T(C): 36.9 (2021 18:35), Max: 37.3 (2021 02:15)  T(F): 98.4 (2021 18:35), Max: 99.2 (2021 02:15)  HR: 81 (2021 18:35) (74 - 87)  BP: 157/65 (2021 18:35) (124/57 - 176/73)  BP(mean): --  RR: 19 (2021 18:35) (16 - 20)  SpO2: 100% (2021 18:35) (100% - 100%)    GENERAL: NAD, well-developed  HEAD:  Atraumatic, Normocephalic  EYES: EOMI, PERRLA, conjunctiva and sclera clear  NECK: Supple, No JVD  CHEST/LUNG: Clear to auscultation bilaterally; No wheeze  HEART: Regular rate and rhythm; No murmurs, rubs, or gallops  ABDOMEN: Soft, Nontender, Nondistended; Bowel sounds present  EXTREMITIES:  2+ Peripheral Pulses, No clubbing, cyanosis, or edema  PSYCH: AAOx3  NEUROLOGY: non-focal  SKIN: No rashes or lesions    LABS:                        7.8    2.08  )-----------( 68       ( 2021 04:58 )             23.2     11-23    140  |  102  |  18  ----------------------------<  92  3.2<L>   |  22  |  1.12    Ca    8.9      2021 04:58    TPro  6.7  /  Alb  3.7  /  TBili  1.5<H>  /  DBili  x   /  AST  18  /  ALT  18  /  AlkPhos  96  11-22    PT/INR - ( 2021 16:10 )   PT: 26.7 sec;   INR: 2.42 ratio         PTT - ( 2021 16:10 )  PTT:41.6 sec          RADIOLOGY & ADDITIONAL TESTS:    Imaging Personally Reviewed:    Consultant(s) Notes Reviewed:      Care Discussed with Consultants/Other Providers:  
Date of service  11/26/2021    chief complaint: weakness    extended hpi: 66 yr old female with a pmh of active lung cancer, CAD s/p prior PCI, HTN, SVC syndrome on eliquis and asa who presents with a 3 day history of weakness and lightheadedness.      Denies chest pain or SOB. Review of systems otherwise (-)    Review of Systems:   Constitutional: [ ] fevers, [ ] chills.   Skin: [ ] dry skin. [ ] rashes.  Psychiatric: [ ] depression, [ ] anxiety.   Gastrointestinal: [ ] BRBPR, [ ] melena.   Neurological: [ ] confusion. [ ] seizures. [ ] shuffling gait.   Ears,Nose,Mouth and Throat: [ ] ear pain [ ] sore throat.   Eyes: [ ] diplopia.   Respiratory: [ ] hemoptysis. [ ] shortness of breath  Cardiovascular: See HPI above  Hematologic/Lymphatic: [ ] anemia. [ ] painful nodes. [ ] prolonged bleeding.   Genitourinary: [ ] hematuria. [ ] flank pain.   Endocrine: [ ] significant change in weight. [ ] intolerance to heat and cold.     Review of systems [x ] otherwise negative, [ ] otherwise unable to obtain    FH: no family history of sudden cardiac death in first degree relatives    SH: [ ] tobacco, [ ] alcohol, [ ] drugs      MEDICATIONS  (STANDING):  apixaban 5 milliGRAM(s) Oral every 12 hours  aspirin  chewable 81 milliGRAM(s) Oral daily  atorvastatin 40 milliGRAM(s) Oral at bedtime  budesonide  80 MICROgram(s)/formoterol 4.5 MICROgram(s) Inhaler 2 Puff(s) Inhalation two times a day  carvedilol 6.25 milliGRAM(s) Oral every 12 hours  FIRST- Mouthwash  BLM 5 milliLiter(s) Swish and Swallow five times a day  pantoprazole  Injectable 40 milliGRAM(s) IV Push every 12 hours  polyethylene glycol 3350 17 Gram(s) Oral daily  senna 2 Tablet(s) Oral at bedtime  sucralfate suspension 1 Gram(s) Oral two times a day  tetracaine/benzocaine/butamben Spray 1 Spray(s) Topical three times a day  tiotropium 18 MICROgram(s) Capsule 1 Capsule(s) Inhalation daily    MEDICATIONS  (PRN):  acetaminophen     Tablet .. 650 milliGRAM(s) Oral every 6 hours PRN Temp greater or equal to 38C (100.4F), Mild Pain (1 - 3)  aluminum hydroxide/magnesium hydroxide/simethicone Suspension 30 milliLiter(s) Oral every 4 hours PRN Dyspepsia  bisacodyl Suppository 10 milliGRAM(s) Rectal daily PRN Constipation  melatonin 3 milliGRAM(s) Oral at bedtime PRN Insomnia  ondansetron Injectable 4 milliGRAM(s) IV Push every 8 hours PRN Nausea and/or Vomiting      LABS:                          9.3    3.42  )-----------( 179      ( 26 Nov 2021 08:57 )             27.9     Hemoglobin: 9.3 g/dL (11-26 @ 08:57)  Hemoglobin: 9.0 g/dL (11-25 @ 07:53)  Hemoglobin: 9.5 g/dL (11-24 @ 05:15)  Hemoglobin: 8.8 g/dL (11-23 @ 22:28)  Hemoglobin: 7.8 g/dL (11-23 @ 04:58)    11-26    139  |  104  |  13  ----------------------------<  111<H>  3.7   |  26  |  1.13    Ca    8.9      26 Nov 2021 08:57  Phos  2.9     11-26  Mg     1.80     11-26      Creatinine Trend: 1.13<--, 1.08<--, 1.04<--, 1.09<--, 1.14<--, 1.12<--               PHYSICAL EXAM  Vital Signs Last 24 Hrs  T(C): 36.9 (26 Nov 2021 05:14), Max: 37.4 (25 Nov 2021 17:57)  T(F): 98.5 (26 Nov 2021 05:14), Max: 99.3 (25 Nov 2021 17:57)  HR: 73 (26 Nov 2021 05:14) (73 - 83)  BP: 142/61 (26 Nov 2021 05:14) (139/61 - 153/60)  BP(mean): --  RR: 18 (26 Nov 2021 05:14) (18 - 18)  SpO2: 97% (26 Nov 2021 05:14) (96% - 100%)      General: Well nourished in no acute distress. Alert and Oriented * 3.   Head: Normocephalic and atraumatic.   Neck: No JVD. No bruits. Supple. Does not appear to be enlarged.   Cardiovascular: + S1,S2 ; RRR Soft systolic murmur at the left lower sternal border. No rubs noted.    Lungs: CTA b/l. No rhonchi, rales or wheezes.   Abdomen: + BS, soft. Non tender. Non distended. No rebound. No guarding.   Extremities: No clubbing/cyanosis/edema.   Neurologic: Moves all four extremities. Full range of motion.   Skin: Warm and moist. The patient's skin has normal elasticity and good skin turgor.   Psychiatric: Appropriate mood and affect.  Musculoskeletal: Normal range of motion, normal strength    DATA    tele- SR    66 yr old female with a pmh of active lung cancer, CAD s/p prior PCI, HTN, SVC syndrome on eliquis and asa who presents with a 3 day history of weakness and lightheadedness.      -pt. symptoms are in the setting of severe anemia which have now resolved  -do not suspect acs at this time  -GI eval noted - ASA 81mg resumed for CAD/prior PCI  -workup of anemia per primary team / heme onc/ GI  -no further inpatient cardiac w/u planned  -close OP F/U with Dr Shepherd after DC    Tyson Blanco PA-C  Pager: [phone]  
INTERVAL HPI/OVERNIGHT EVENTS:    h/h stable   she believes last bm was on Wednesday morning, non-bloody   no c/o abd pain   throat discomfort remains unchanged      MEDICATIONS  (STANDING):  apixaban 5 milliGRAM(s) Oral every 12 hours  aspirin  chewable 81 milliGRAM(s) Oral daily  atorvastatin 40 milliGRAM(s) Oral at bedtime  budesonide  80 MICROgram(s)/formoterol 4.5 MICROgram(s) Inhaler 2 Puff(s) Inhalation two times a day  carvedilol 6.25 milliGRAM(s) Oral every 12 hours  FIRST- Mouthwash  BLM 5 milliLiter(s) Swish and Swallow five times a day  pantoprazole  Injectable 40 milliGRAM(s) IV Push every 12 hours  polyethylene glycol 3350 17 Gram(s) Oral daily  senna 2 Tablet(s) Oral at bedtime  sucralfate suspension 1 Gram(s) Oral two times a day  tetracaine/benzocaine/butamben Spray 1 Spray(s) Topical three times a day  tiotropium 18 MICROgram(s) Capsule 1 Capsule(s) Inhalation daily    MEDICATIONS  (PRN):  acetaminophen     Tablet .. 650 milliGRAM(s) Oral every 6 hours PRN Temp greater or equal to 38C (100.4F), Mild Pain (1 - 3)  aluminum hydroxide/magnesium hydroxide/simethicone Suspension 30 milliLiter(s) Oral every 4 hours PRN Dyspepsia  bisacodyl Suppository 10 milliGRAM(s) Rectal daily PRN Constipation  melatonin 3 milliGRAM(s) Oral at bedtime PRN Insomnia  ondansetron Injectable 4 milliGRAM(s) IV Push every 8 hours PRN Nausea and/or Vomiting      Allergies    penicillin (Short breath; Hives)  tetracycline (Short breath; Hives)    Intolerances        Review of Systems:    General:  No wt loss, fevers, chills, night sweats, fatigue   Eyes:  Good vision, no reported pain  ENT:  No sore throat, pain, runny nose, dysphagia  CV:  No pain, palpitations, hypo/hypertension  Resp:  No dyspnea, cough, tachypnea, wheezing  GI:  No pain, No nausea, No vomiting, No diarrhea, No constipation, No weight loss, No fever, No pruritis, No rectal bleeding, No melena, No dysphagia  :  No pain, bleeding, incontinence, nocturia  Muscle:  No pain, weakness  Neuro:  No weakness, tingling, memory problems  Psych:  No fatigue, insomnia, mood problems, depression  Endocrine:  No polyuria, polydypsia, cold/heat intolerance  Heme:  No petechiae, ecchymosis, easy bruisability  Skin:  No rash, tattoos, scars, edema      Vital Signs Last 24 Hrs  T(C): 36.9 (26 Nov 2021 05:14), Max: 37.4 (25 Nov 2021 17:57)  T(F): 98.5 (26 Nov 2021 05:14), Max: 99.3 (25 Nov 2021 17:57)  HR: 73 (26 Nov 2021 05:14) (73 - 83)  BP: 142/61 (26 Nov 2021 05:14) (139/61 - 153/60)  BP(mean): --  RR: 18 (26 Nov 2021 05:14) (18 - 18)  SpO2: 97% (26 Nov 2021 05:14) (96% - 100%)    PHYSICAL EXAM:    Constitutional: NAD  HEENT: EOMI, throat clear  Neck: No LAD, supple  Respiratory: CTA and P  Cardiovascular: S1 and S2, RRR, no M  Gastrointestinal: BS+, soft, NT/ND, neg HSM,  Extremities: No peripheral edema, neg clubbing, cyanosis  Vascular: 2+ peripheral pulses  Neurological: A/O x 3, no focal deficits  Psychiatric: Normal mood, normal affect  Skin: No rashes      LABS:                        9.3    3.42  )-----------( 179      ( 26 Nov 2021 08:57 )             27.9     11-26    139  |  104  |  13  ----------------------------<  111<H>  3.7   |  26  |  1.13    Ca    8.9      26 Nov 2021 08:57  Phos  2.9     11-26  Mg     1.80     11-26            RADIOLOGY & ADDITIONAL TESTS:  
Date of service  11/24/2021    chief complaint: weakness    extended hpi: 66 yr old female with a pmh of active lung cancer, CAD s/p prior PCI, HTN, SVC syndrome on eliquis and asa who presents with a 3 day history of weakness and lightheadedness.      denies chest pain or SOB    Review of Systems:   Constitutional: [ ] fevers, [ ] chills.   Skin: [ ] dry skin. [ ] rashes.  Psychiatric: [ ] depression, [ ] anxiety.   Gastrointestinal: [ ] BRBPR, [ ] melena.   Neurological: [ ] confusion. [ ] seizures. [ ] shuffling gait.   Ears,Nose,Mouth and Throat: [ ] ear pain [ ] sore throat.   Eyes: [ ] diplopia.   Respiratory: [ ] hemoptysis. [ ] shortness of breath  Cardiovascular: See HPI above  Hematologic/Lymphatic: [ ] anemia. [ ] painful nodes. [ ] prolonged bleeding.   Genitourinary: [ ] hematuria. [ ] flank pain.   Endocrine: [ ] significant change in weight. [ ] intolerance to heat and cold.     Review of systems [x ] otherwise negative, [ ] otherwise unable to obtain    FH: no family history of sudden cardiac death in first degree relatives    SH: [ ] tobacco, [ ] alcohol, [ ] drugs    acetaminophen     Tablet .. 650 milliGRAM(s) Oral every 6 hours PRN  aluminum hydroxide/magnesium hydroxide/simethicone Suspension 30 milliLiter(s) Oral every 4 hours PRN  atorvastatin 40 milliGRAM(s) Oral at bedtime  budesonide  80 MICROgram(s)/formoterol 4.5 MICROgram(s) Inhaler 2 Puff(s) Inhalation two times a day  carvedilol 6.25 milliGRAM(s) Oral every 12 hours  melatonin 3 milliGRAM(s) Oral at bedtime PRN  ondansetron Injectable 4 milliGRAM(s) IV Push every 8 hours PRN  pantoprazole  Injectable 40 milliGRAM(s) IV Push every 12 hours  tetracaine/benzocaine/butamben Spray 1 Spray(s) Topical three times a day  tiotropium 18 MICROgram(s) Capsule 1 Capsule(s) Inhalation daily                            9.5    2.71  )-----------( 106      ( 24 Nov 2021 05:15 )             27.3     137  |  102  |  18  ----------------------------<  119<H>  3.3<L>   |  23  |  1.09    Ca    9.0      24 Nov 2021 05:15  Phos  2.4     11-24  Mg     2.50     11-24    TPro  6.7  /  Alb  3.7  /  TBili  1.5<H>  /  DBili  x   /  AST  18  /  ALT  18  /  AlkPhos  96  11-22    T(C): 36.8 (11-24-21 @ 12:18), Max: 37.1 (11-23-21 @ 15:05)  HR: 89 (11-24-21 @ 12:18) (76 - 89)  BP: 159/71 (11-24-21 @ 12:18) (122/54 - 176/73)  RR: 16 (11-24-21 @ 12:18) (16 - 20)  SpO2: 98% (11-24-21 @ 12:18) (98% - 100%)    General: Well nourished in no acute distress. Alert and Oriented * 3.   Head: Normocephalic and atraumatic.   Neck: No JVD. No bruits. Supple. Does not appear to be enlarged.   Cardiovascular: + S1,S2 ; RRR Soft systolic murmur at the left lower sternal border. No rubs noted.    Lungs: CTA b/l. No rhonchi, rales or wheezes.   Abdomen: + BS, soft. Non tender. Non distended. No rebound. No guarding.   Extremities: No clubbing/cyanosis/edema.   Neurologic: Moves all four extremities. Full range of motion.   Skin: Warm and moist. The patient's skin has normal elasticity and good skin turgor.   Psychiatric: Appropriate mood and affect.  Musculoskeletal: Normal range of motion, normal strength    DATA    tele- SR APCS    66 yr old female with a pmh of active lung cancer, CAD s/p prior PCI, HTN, SVC syndrome on eliquis and asa who presents with a 3 day history of weakness and lightheadedness.      -pt. symptoms are in the setting of severe anemia   -do not suspect acs at this time  -sapt on hold due to severe anemia - follow up GI and heme to see when and if safe to restart given history of CAD with prior PCI  -workup of anemia per primary team   -further workup pending clinical course
INTERVAL HPI/OVERNIGHT EVENTS:    denying any rectal bleeding; No abdominal pain   good response to prbc  requesting more pureed diet       MEDICATIONS  (STANDING):  atorvastatin 40 milliGRAM(s) Oral at bedtime  budesonide  80 MICROgram(s)/formoterol 4.5 MICROgram(s) Inhaler 2 Puff(s) Inhalation two times a day  carvedilol 6.25 milliGRAM(s) Oral every 12 hours  pantoprazole  Injectable 40 milliGRAM(s) IV Push every 12 hours  tetracaine/benzocaine/butamben Spray 1 Spray(s) Topical three times a day  tiotropium 18 MICROgram(s) Capsule 1 Capsule(s) Inhalation daily    MEDICATIONS  (PRN):  acetaminophen     Tablet .. 650 milliGRAM(s) Oral every 6 hours PRN Temp greater or equal to 38C (100.4F), Mild Pain (1 - 3)  aluminum hydroxide/magnesium hydroxide/simethicone Suspension 30 milliLiter(s) Oral every 4 hours PRN Dyspepsia  melatonin 3 milliGRAM(s) Oral at bedtime PRN Insomnia  ondansetron Injectable 4 milliGRAM(s) IV Push every 8 hours PRN Nausea and/or Vomiting      Allergies    penicillin (Short breath; Hives)  tetracycline (Short breath; Hives)    Intolerances        Review of Systems:    General:  No wt loss, fevers, chills, night sweats, fatigue   Eyes:  Good vision, no reported pain  ENT:  No sore throat, pain, runny nose, dysphagia  CV:  No pain, palpitations, hypo/hypertension  Resp:  No dyspnea, cough, tachypnea, wheezing  GI:  No pain, No nausea, No vomiting, No diarrhea, No constipation, No weight loss, No fever, No pruritis, No rectal bleeding, No melena, No dysphagia  :  No pain, bleeding, incontinence, nocturia  Muscle:  No pain, weakness  Neuro:  No weakness, tingling, memory problems  Psych:  No fatigue, insomnia, mood problems, depression  Endocrine:  No polyuria, polydypsia, cold/heat intolerance  Heme:  No petechiae, ecchymosis, easy bruisability  Skin:  No rash, tattoos, scars, edema      Vital Signs Last 24 Hrs  T(C): 36.8 (24 Nov 2021 12:18), Max: 37.1 (23 Nov 2021 15:05)  T(F): 98.3 (24 Nov 2021 12:18), Max: 98.7 (23 Nov 2021 15:05)  HR: 89 (24 Nov 2021 12:18) (76 - 89)  BP: 159/71 (24 Nov 2021 12:18) (122/54 - 176/73)  BP(mean): --  RR: 16 (24 Nov 2021 12:18) (16 - 20)  SpO2: 98% (24 Nov 2021 12:18) (98% - 100%)    PHYSICAL EXAM:    Constitutional: NAD  HEENT: EOMI, throat clear  Neck: No LAD, supple  Respiratory: CTA and P  Cardiovascular: S1 and S2, RRR, no M  Gastrointestinal: BS+, soft, NT/ND, neg HSM,  Extremities: No peripheral edema, neg clubbing, cyanosis  Vascular: 2+ peripheral pulses  Neurological: A/O x 3, no focal deficits  Psychiatric: Normal mood, normal affect  Skin: No rashes      LABS:                        9.5    2.71  )-----------( 106      ( 24 Nov 2021 05:15 )             27.3     11-24    137  |  102  |  18  ----------------------------<  119<H>  3.3<L>   |  23  |  1.09    Ca    9.0      24 Nov 2021 05:15  Phos  2.4     11-24  Mg     2.50     11-24    TPro  6.7  /  Alb  3.7  /  TBili  1.5<H>  /  DBili  x   /  AST  18  /  ALT  18  /  AlkPhos  96  11-22    PT/INR - ( 22 Nov 2021 16:10 )   PT: 26.7 sec;   INR: 2.42 ratio         PTT - ( 22 Nov 2021 16:10 )  PTT:41.6 sec      RADIOLOGY & ADDITIONAL TESTS:  
Patient is a 66y old  Female who presents with a chief complaint of weakness (2021 14:39)      HPI:  No SOB or dizziness  No bleeding noted      PAST MEDICAL & SURGICAL HISTORY:  Hypertension    Hyperlipidemia    Pulmonary nodules  yearly CT scans    Carotid artery disease  monitored by [facility]ier doctor Samuel    Emphysema    Hip pain    Obesity    Edema of both legs    Lung cancer  right, , completed chemotherapy 2019    Stented coronary artery    Localized enlarged lymph nodes    Scoliosis    Lumbar disc disorder    COPD (chronic obstructive pulmonary disease)    History of  section  x2 ,     S/P lobectomy of lung  RULobectomy     History of hip replacement  right 2021    Stented coronary artery  x2 stents 2018        Review of Systems:   CONSTITUTIONAL: No fever, weight loss, or fatigue  EYES: No eye pain, visual disturbances, or discharge  ENMT:  No difficulty hearing, tinnitus, vertigo; No sinus or throat pain  NECK: No pain or stiffness  BREASTS: No pain, masses, or nipple discharge  RESPIRATORY: No cough, wheezing, chills or hemoptysis; No shortness of breath  CARDIOVASCULAR: No chest pain, palpitations, dizziness, or leg swelling  GASTROINTESTINAL: No abdominal or epigastric pain. No nausea, vomiting, or hematemesis; No diarrhea or constipation. No melena or hematochezia.  GENITOURINARY: No dysuria, frequency, hematuria, or incontinence  NEUROLOGICAL: No headaches, memory loss, loss of strength, numbness, or tremors  SKIN: No itching, burning, rashes, or lesions   LYMPH NODES: No enlarged glands  ENDOCRINE: No heat or cold intolerance; No hair loss  MUSCULOSKELETAL: No joint pain or swelling; No muscle, back, or extremity pain  PSYCHIATRIC: No depression, anxiety, mood swings, or difficulty sleeping  HEME/LYMPH: No easy bruising, or bleeding gums  ALLERY AND IMMUNOLOGIC: No hives or eczema    Allergies    penicillin (Short breath; Hives)  tetracycline (Short breath; Hives)    Intolerances        Social History:     FAMILY HISTORY:  Family history of CHF (congestive heart failure) (Aunt)        MEDICATIONS  (STANDING):  atorvastatin 40 milliGRAM(s) Oral at bedtime  budesonide  80 MICROgram(s)/formoterol 4.5 MICROgram(s) Inhaler 2 Puff(s) Inhalation two times a day  carvedilol 6.25 milliGRAM(s) Oral every 12 hours  pantoprazole  Injectable 40 milliGRAM(s) IV Push every 12 hours  tetracaine/benzocaine/butamben Spray 1 Spray(s) Topical three times a day  tiotropium 18 MICROgram(s) Capsule 1 Capsule(s) Inhalation daily    MEDICATIONS  (PRN):  acetaminophen     Tablet .. 650 milliGRAM(s) Oral every 6 hours PRN Temp greater or equal to 38C (100.4F), Mild Pain (1 - 3)  aluminum hydroxide/magnesium hydroxide/simethicone Suspension 30 milliLiter(s) Oral every 4 hours PRN Dyspepsia  melatonin 3 milliGRAM(s) Oral at bedtime PRN Insomnia  ondansetron Injectable 4 milliGRAM(s) IV Push every 8 hours PRN Nausea and/or Vomiting        CAPILLARY BLOOD GLUCOSE        I&O's Summary      PHYSICAL EXAM:  Vital Signs Last 24 Hrs  T(C): 36.9 (2021 18:35), Max: 37.3 (2021 02:15)  T(F): 98.4 (2021 18:35), Max: 99.2 (2021 02:15)  HR: 81 (2021 18:35) (74 - 87)  BP: 157/65 (2021 18:35) (124/57 - 176/73)  BP(mean): --  RR: 19 (2021 18:35) (16 - 20)  SpO2: 100% (2021 18:35) (100% - 100%)    GENERAL: NAD, well-developed  HEAD:  Atraumatic, Normocephalic  EYES: EOMI, PERRLA, conjunctiva and sclera clear  NECK: Supple, No JVD  CHEST/LUNG: Clear to auscultation bilaterally; No wheeze  HEART: Regular rate and rhythm; No murmurs, rubs, or gallops  ABDOMEN: Soft, Nontender, Nondistended; Bowel sounds present  EXTREMITIES:  2+ Peripheral Pulses, No clubbing, cyanosis, or edema  PSYCH: AAOx3  NEUROLOGY: non-focal  SKIN: No rashes or lesions    LABS:                        7.8    2.08  )-----------( 68       ( 2021 04:58 )             23.2     11-23    140  |  102  |  18  ----------------------------<  92  3.2<L>   |  22  |  1.12    Ca    8.9      2021 04:58    TPro  6.7  /  Alb  3.7  /  TBili  1.5<H>  /  DBili  x   /  AST  18  /  ALT  18  /  AlkPhos  96  11-22    PT/INR - ( 2021 16:10 )   PT: 26.7 sec;   INR: 2.42 ratio         PTT - ( 2021 16:10 )  PTT:41.6 sec          RADIOLOGY & ADDITIONAL TESTS:    Imaging Personally Reviewed:    Consultant(s) Notes Reviewed:      Care Discussed with Consultants/Other Providers:  
Pt has been doing better, lip and mouth sores are less bothersome, not dizzy now, no fevers or chills and a detailed ROS unremarkable. Seen by GI.        Meds:  acetaminophen     Tablet .. 650 milliGRAM(s) Oral every 6 hours PRN  aluminum hydroxide/magnesium hydroxide/simethicone Suspension 30 milliLiter(s) Oral every 4 hours PRN  apixaban 5 milliGRAM(s) Oral every 12 hours  aspirin  chewable 81 milliGRAM(s) Oral daily  atorvastatin 40 milliGRAM(s) Oral at bedtime  budesonide  80 MICROgram(s)/formoterol 4.5 MICROgram(s) Inhaler 2 Puff(s) Inhalation two times a day  carvedilol 6.25 milliGRAM(s) Oral every 12 hours  melatonin 3 milliGRAM(s) Oral at bedtime PRN  ondansetron Injectable 4 milliGRAM(s) IV Push every 8 hours PRN  pantoprazole  Injectable 40 milliGRAM(s) IV Push every 12 hours  potassium chloride    Tablet ER 40 milliEquivalent(s) Oral every 4 hours  tetracaine/benzocaine/butamben Spray 1 Spray(s) Topical three times a day  tiotropium 18 MICROgram(s) Capsule 1 Capsule(s) Inhalation daily      Vital Signs Last 24 Hrs  T(C): 36.8 (24 Nov 2021 17:15), Max: 36.9 (23 Nov 2021 18:35)  T(F): 98.3 (24 Nov 2021 17:15), Max: 98.4 (23 Nov 2021 18:35)  HR: 77 (24 Nov 2021 17:15) (76 - 89)  BP: 151/73 (24 Nov 2021 17:15) (122/54 - 176/73)  BP(mean): --  RR: 16 (24 Nov 2021 17:15) (16 - 19)  SpO2: 98% (24 Nov 2021 17:15) (98% - 100%)                          9.5    2.71  )-----------( 106      ( 24 Nov 2021 05:15 )             27.3       11-24    136  |  101  |  18  ----------------------------<  127<H>  3.3<L>   |  22  |  1.04    Ca    8.9      24 Nov 2021 13:15  Phos  2.9     11-24  Mg     2.20     11-24            
Pt is feeling better, ambulating, no pain, no significant cough, SOB, does have slight swelling of the left hand. Rest of the detailed ROS unremarkable.       Meds:  acetaminophen     Tablet .. 650 milliGRAM(s) Oral every 6 hours PRN  aluminum hydroxide/magnesium hydroxide/simethicone Suspension 30 milliLiter(s) Oral every 4 hours PRN  apixaban 5 milliGRAM(s) Oral every 12 hours  aspirin  chewable 81 milliGRAM(s) Oral daily  atorvastatin 40 milliGRAM(s) Oral at bedtime  budesonide  80 MICROgram(s)/formoterol 4.5 MICROgram(s) Inhaler 2 Puff(s) Inhalation two times a day  carvedilol 6.25 milliGRAM(s) Oral every 12 hours  FIRST- Mouthwash  BLM 5 milliLiter(s) Swish and Swallow five times a day  melatonin 3 milliGRAM(s) Oral at bedtime PRN  ondansetron Injectable 4 milliGRAM(s) IV Push every 8 hours PRN  pantoprazole  Injectable 40 milliGRAM(s) IV Push every 12 hours  sucralfate suspension 1 Gram(s) Oral two times a day  tetracaine/benzocaine/butamben Spray 1 Spray(s) Topical three times a day  tiotropium 18 MICROgram(s) Capsule 1 Capsule(s) Inhalation daily      Vital Signs Last 24 Hrs  T(C): 36.9 (26 Nov 2021 05:14), Max: 37.4 (25 Nov 2021 17:57)  T(F): 98.5 (26 Nov 2021 05:14), Max: 99.3 (25 Nov 2021 17:57)  HR: 73 (26 Nov 2021 05:14) (73 - 83)  BP: 142/61 (26 Nov 2021 05:14) (139/61 - 153/60)  BP(mean): --  RR: 18 (26 Nov 2021 05:14) (18 - 18)  SpO2: 97% (26 Nov 2021 05:14) (96% - 100%)                          9.3    3.42  )-----------( 179      ( 26 Nov 2021 08:57 )             27.9       11-26    139  |  104  |  13  ----------------------------<  111<H>  3.7   |  26  |  1.13    Ca    8.9      26 Nov 2021 08:57  Phos  2.9     11-26  Mg     1.80     11-26            
Patient is a 66y old  Female who presents with a chief complaint of weakness (2021 14:39)      HPI:  No SOB or dizziness        PAST MEDICAL & SURGICAL HISTORY:  Hypertension    Hyperlipidemia    Pulmonary nodules  yearly CT scans    Carotid artery disease  monitored by [facility]ier doctor Samuel    Emphysema    Hip pain    Obesity    Edema of both legs    Lung cancer  right, , completed chemotherapy 2019    Stented coronary artery    Localized enlarged lymph nodes    Scoliosis    Lumbar disc disorder    COPD (chronic obstructive pulmonary disease)    History of  section  x2 ,     S/P lobectomy of lung  RULobectomy     History of hip replacement  right 2021    Stented coronary artery  x2 stents 2018        Review of Systems:   CONSTITUTIONAL: No fever, weight loss, or fatigue  EYES: No eye pain, visual disturbances, or discharge  ENMT:  No difficulty hearing, tinnitus, vertigo; No sinus or throat pain  NECK: No pain or stiffness  BREASTS: No pain, masses, or nipple discharge  RESPIRATORY: No cough, wheezing, chills or hemoptysis; No shortness of breath  CARDIOVASCULAR: No chest pain, palpitations, dizziness, or leg swelling  GASTROINTESTINAL: No abdominal or epigastric pain. No nausea, vomiting, or hematemesis; No diarrhea or constipation. No melena or hematochezia.  GENITOURINARY: No dysuria, frequency, hematuria, or incontinence  NEUROLOGICAL: No headaches, memory loss, loss of strength, numbness, or tremors  SKIN: No itching, burning, rashes, or lesions   LYMPH NODES: No enlarged glands  ENDOCRINE: No heat or cold intolerance; No hair loss  MUSCULOSKELETAL: No joint pain or swelling; No muscle, back, or extremity pain  PSYCHIATRIC: No depression, anxiety, mood swings, or difficulty sleeping  HEME/LYMPH: No easy bruising, or bleeding gums  ALLERY AND IMMUNOLOGIC: No hives or eczema    Allergies    penicillin (Short breath; Hives)  tetracycline (Short breath; Hives)    Intolerances        Social History:     FAMILY HISTORY:  Family history of CHF (congestive heart failure) (Aunt)        MEDICATIONS  (STANDING):  atorvastatin 40 milliGRAM(s) Oral at bedtime  budesonide  80 MICROgram(s)/formoterol 4.5 MICROgram(s) Inhaler 2 Puff(s) Inhalation two times a day  carvedilol 6.25 milliGRAM(s) Oral every 12 hours  pantoprazole  Injectable 40 milliGRAM(s) IV Push every 12 hours  tetracaine/benzocaine/butamben Spray 1 Spray(s) Topical three times a day  tiotropium 18 MICROgram(s) Capsule 1 Capsule(s) Inhalation daily    MEDICATIONS  (PRN):  acetaminophen     Tablet .. 650 milliGRAM(s) Oral every 6 hours PRN Temp greater or equal to 38C (100.4F), Mild Pain (1 - 3)  aluminum hydroxide/magnesium hydroxide/simethicone Suspension 30 milliLiter(s) Oral every 4 hours PRN Dyspepsia  melatonin 3 milliGRAM(s) Oral at bedtime PRN Insomnia  ondansetron Injectable 4 milliGRAM(s) IV Push every 8 hours PRN Nausea and/or Vomiting        CAPILLARY BLOOD GLUCOSE        I&O's Summary      PHYSICAL EXAM:  Vital Signs Last 24 Hrs  T(C): 36.9 (2021 18:35), Max: 37.3 (2021 02:15)  T(F): 98.4 (2021 18:35), Max: 99.2 (2021 02:15)  HR: 81 (2021 18:35) (74 - 87)  BP: 157/65 (2021 18:35) (124/57 - 176/73)  BP(mean): --  RR: 19 (2021 18:35) (16 - 20)  SpO2: 100% (2021 18:35) (100% - 100%)    GENERAL: NAD, well-developed  HEAD:  Atraumatic, Normocephalic  EYES: EOMI, PERRLA, conjunctiva and sclera clear  NECK: Supple, No JVD  CHEST/LUNG: Clear to auscultation bilaterally; No wheeze  HEART: Regular rate and rhythm; No murmurs, rubs, or gallops  ABDOMEN: Soft, Nontender, Nondistended; Bowel sounds present  EXTREMITIES:  2+ Peripheral Pulses, No clubbing, cyanosis, or edema  PSYCH: AAOx3  NEUROLOGY: non-focal  SKIN: No rashes or lesions    LABS:                        7.8    2.08  )-----------( 68       ( 2021 04:58 )             23.2     11-23    140  |  102  |  18  ----------------------------<  92  3.2<L>   |  22  |  1.12    Ca    8.9      2021 04:58    TPro  6.7  /  Alb  3.7  /  TBili  1.5<H>  /  DBili  x   /  AST  18  /  ALT  18  /  AlkPhos  96  11-22    PT/INR - ( 2021 16:10 )   PT: 26.7 sec;   INR: 2.42 ratio         PTT - ( 2021 16:10 )  PTT:41.6 sec          RADIOLOGY & ADDITIONAL TESTS:    Imaging Personally Reviewed:    Consultant(s) Notes Reviewed:      Care Discussed with Consultants/Other Providers:  
Pt feels OK, has burning in the throat and chest especially on swallowing (ora potasium), none other active or new or bothersome symptoms on detailed ROS questions.        Meds:  acetaminophen     Tablet .. 650 milliGRAM(s) Oral every 6 hours PRN  aluminum hydroxide/magnesium hydroxide/simethicone Suspension 30 milliLiter(s) Oral every 4 hours PRN  apixaban 5 milliGRAM(s) Oral every 12 hours  aspirin  chewable 81 milliGRAM(s) Oral daily  atorvastatin 40 milliGRAM(s) Oral at bedtime  budesonide  80 MICROgram(s)/formoterol 4.5 MICROgram(s) Inhaler 2 Puff(s) Inhalation two times a day  carvedilol 6.25 milliGRAM(s) Oral every 12 hours  FIRST- Mouthwash  BLM 5 milliLiter(s) Swish and Swallow five times a day  melatonin 3 milliGRAM(s) Oral at bedtime PRN  ondansetron Injectable 4 milliGRAM(s) IV Push every 8 hours PRN  pantoprazole  Injectable 40 milliGRAM(s) IV Push every 12 hours  sucralfate suspension 1 Gram(s) Oral two times a day  tetracaine/benzocaine/butamben Spray 1 Spray(s) Topical three times a day  tiotropium 18 MICROgram(s) Capsule 1 Capsule(s) Inhalation daily      Vital Signs Last 24 Hrs  T(C): 36.7 (25 Nov 2021 05:30), Max: 36.8 (24 Nov 2021 12:18)  T(F): 98 (25 Nov 2021 05:30), Max: 98.3 (24 Nov 2021 12:18)  HR: 80 (25 Nov 2021 05:30) (77 - 89)  BP: 147/61 (25 Nov 2021 05:30) (133/66 - 159/71)  BP(mean): --  RR: 18 (25 Nov 2021 05:30) (16 - 18)  SpO2: 98% (25 Nov 2021 05:30) (98% - 99%)                          9.0    2.56  )-----------( 142      ( 25 Nov 2021 07:53 )             25.9       11-25    138  |  104  |  15  ----------------------------<  100<H>  3.5   |  25  |  1.08    Ca    8.9      25 Nov 2021 07:53  Phos  3.1     11-25  Mg     2.00     11-25

## 2021-11-26 NOTE — DISCHARGE NOTE PROVIDER - NSDCMRMEDTOKEN_GEN_ALL_CORE_FT
acetaminophen 325 mg oral tablet: 2 tab(s) orally every 6 hours, As needed, Temp greater or equal to 38.5C (101.3F), Mild Pain (1 - 3)  aluminum hydroxide-magnesium hydroxide 200 mg-200 mg/5 mL oral suspension: 30 milliliter(s) orally every 4 hours, As needed, Dyspepsia  apixaban 5 mg oral tablet: 1 tab(s) orally 2 times a day  aspirin 81 mg oral tablet, chewable: 1 tab(s) orally once a day  atorvastatin 40 mg oral tablet: 1 tab(s) orally once a day  carvedilol 6.25 mg oral tablet: 1 tab(s) orally every 12 hours  diphenhydramine/lidocaine/aluminum hydroxide/magnesium hydroxide/simethicone mucous membrane suspension: 5 milliliter(s) mucous membrane 5 times a day  swish and swallow   polyethylene glycol 3350 oral powder for reconstitution: 17 gram(s) orally once a day  Protonix 40 mg oral delayed release tablet: 1 tab(s) orally 2 times a day   sucralfate 1 g/10 mL oral suspension: 10 milliliter(s) orally 2 times a day  Trelegy Ellipta inhalation powder: 1 puff(s) inhaled once a day

## 2021-11-26 NOTE — PROGRESS NOTE ADULT - PROBLEM SELECTOR PLAN 2
Chronic moderate exacerbation   /63  Coreg 6.25mg BID with hold parameters  Holding home lasix for the time being as patient did present with , and her oral intake is poor
Coreg 6.25mg BID with hold parameters
Coreg 6.25mg BID with hold parameters  Holding home lasix for the time being as patient did present with , and her oral intake is poor

## 2021-11-29 ENCOUNTER — NON-APPOINTMENT (OUTPATIENT)
Age: 66
End: 2021-11-29

## 2021-11-29 VITALS
OXYGEN SATURATION: 100 % | RESPIRATION RATE: 18 BRPM | DIASTOLIC BLOOD PRESSURE: 92 MMHG | HEART RATE: 75 BPM | SYSTOLIC BLOOD PRESSURE: 173 MMHG

## 2021-11-29 PROCEDURE — 77387B: CUSTOM | Mod: 26

## 2021-11-29 NOTE — PRE-OP CHECKLIST - TO WHOM
Date last seen: 6/4/21  Date of next visit: 12/3/21    Medication Requested:     Outpatient Current Medications as of as of 11/26/2021       Disp Refills Start End    atorvastatin (LIPITOR) 10 MG tablet 30 tablet 6 6/4/2021     Sig - Route: Take 1 tablet by mouth daily. - Oral    Class: Eprescribe    metoPROLOL succinate (TOPROL-XL) 100 MG 24 hr tablet        Sig - Route: Take 100 mg by mouth 2 times daily. - Oral    Class: Historical Med       BP Readings from Last 1 Encounters:   06/04/21 138/84     Refilled per protocol.     Georgia Tomlinson

## 2021-11-30 PROCEDURE — 77387B: CUSTOM | Mod: 26

## 2021-11-30 NOTE — PHYSICAL EXAM
[General Appearance - Well Developed] : well developed [General Appearance - Well Nourished] : well nourished [Sclera] : the sclera and conjunctiva were normal [Extraocular Movements] : extraocular movements were intact [Hearing Threshold Finger Rub Not Garrett] : hearing was normal [Exaggerated Use Of Accessory Muscles For Inspiration] : no accessory muscle use [Arterial Pulses Normal] : the arterial pulses were normal [Edema] : no peripheral edema present [Nondistended] : nondistended [] : no rash [Normal] : oriented to person, place and time, the affect was normal, the mood was normal and not anxious [de-identified] : no pallor

## 2021-11-30 NOTE — HISTORY OF PRESENT ILLNESS
[FreeTextEntry1] : 65 year old female, former smoker (1/2 PPD x 30 years, quit Jan 2018), now 2yr s/p RULobectomy with MLND on 9/3/19. 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 Pathology of right rib excision was benign, revealed bone. Visceral pleura invasion and HYACINTH present. She completed chemotherapy on 12/10/19 with Dr. Guevara. Later with SVC syndrome s/p stenting, cervical LN bx positive, g3 NSCLC\par \par 10/28/21\par -Tolerating tx\par -Eating ok\par -Voice a little hoarse\par -no complaints of pain noted\par \par 11/4/21\par -Tolerating tx\par -Has dysphagia and hoarse voice, mild pain swallowing\par -Eating liquids\par -No coughing but SOB\par -Uses a walker to get around.\par \par 11/11/21\par -Tolerated tx\par -Has increase dysphagia. ,using Carafate.\par -No coughing or SOB at this time\par -Pt feeling fatigued \par \par 11/18/21\par -tolerated tx\par -will complete in 2 weeks\par -Eating liquids and small amt soft solids\par -Pt having pain in throats and ears. Taking Carafate and magic mixture daily. Odynophagia causing dietary disturbance. Is trying softs, grits, smoothies. Tolerating with some difficulty\par -Fatigue noted\par -SOB due to COPD\par \par 11/29/21 \par Patient seen today for pre-treatment OTV post inpatient discharge. Went to [facility] 11/21/21 due to fatigue, was found to have Hb 6.6 and pancytopenia, was given 2 units PRBCs, discharged 11.26.21. Was evaluated by GI, did not have biopsy or EGD, was treated "like an ulcer" per patient. Was given PPI, carafate, magic mouthwash. Believes she had stool FOBT which was negative. Unfortunately no documentation available in [place] at this time. \par Patient stated she feels better today.  Tolerating soft solids and some gentle solids. Less fatigued but still somewhat weak. No hematemesis, hemoptysis or melena. Saw Dr Guevara today, states repeat Hb 10. Not in chart however. Was told she would still get her last chemo next week. Cough with clear sputum. No SOB beyond her baseline COPD. Denies palps, dizziness on standing, lightheadedness.

## 2021-11-30 NOTE — DISEASE MANAGEMENT
[Clinical] : TNM Stage: c [N/A] : Currently not applicable [FreeTextEntry4] : NSCLC w/ SVC syndrome s/p stenting, + cervical LN [TTNM] : - [NTNM] : - [MTNM] : - [de-identified] : 19 tx/3800cGy [de-identified] : 30 tx/6000cGy [de-identified] : mediastinum

## 2021-12-01 PROCEDURE — 77387B: CUSTOM | Mod: 26

## 2021-12-02 ENCOUNTER — NON-APPOINTMENT (OUTPATIENT)
Age: 66
End: 2021-12-02

## 2021-12-02 VITALS
OXYGEN SATURATION: 98 % | BODY MASS INDEX: 35.61 KG/M2 | HEART RATE: 80 BPM | TEMPERATURE: 98.6 F | SYSTOLIC BLOOD PRESSURE: 154 MMHG | WEIGHT: 182.32 LBS | RESPIRATION RATE: 16 BRPM | DIASTOLIC BLOOD PRESSURE: 77 MMHG

## 2021-12-02 PROCEDURE — 77387B: CUSTOM | Mod: 26

## 2021-12-02 NOTE — REVIEW OF SYSTEMS
[Constipation: Grade 0] : Constipation: Grade 0 [Diarrhea: Grade 0] : Diarrhea: Grade 0 [Nausea: Grade 0] : Nausea: Grade 0 [Cough: Grade 1 - Mild symptoms; nonprescription intervention indicated] : Cough: Grade 1 - Mild symptoms; nonprescription intervention indicated [Dyspnea: Grade 1 - Shortness of breath with moderate exertion] : Dyspnea: Grade 1 - Shortness of breath with moderate exertion

## 2021-12-03 PROCEDURE — 77427 RADIATION TX MANAGEMENT X5: CPT

## 2021-12-03 PROCEDURE — 77014: CPT | Mod: 26

## 2021-12-06 PROCEDURE — 77387B: CUSTOM | Mod: 26

## 2021-12-07 PROCEDURE — 77387B: CUSTOM | Mod: 26

## 2021-12-08 PROCEDURE — 77014: CPT | Mod: 26

## 2021-12-09 ENCOUNTER — NON-APPOINTMENT (OUTPATIENT)
Age: 66
End: 2021-12-09

## 2021-12-09 VITALS
WEIGHT: 183.65 LBS | RESPIRATION RATE: 18 BRPM | SYSTOLIC BLOOD PRESSURE: 142 MMHG | TEMPERATURE: 98.6 F | DIASTOLIC BLOOD PRESSURE: 76 MMHG | OXYGEN SATURATION: 98 % | BODY MASS INDEX: 35.87 KG/M2 | HEART RATE: 86 BPM

## 2021-12-09 PROCEDURE — 77387B: CUSTOM | Mod: 26

## 2021-12-09 NOTE — REVIEW OF SYSTEMS
[Constipation: Grade 0] : Constipation: Grade 0 [Diarrhea: Grade 0] : Diarrhea: Grade 0 [Nausea: Grade 0] : Nausea: Grade 0 [Fatigue: Grade 1 - Fatigue relieved by rest] : Fatigue: Grade 1 - Fatigue relieved by rest [Cough: Grade 1 - Mild symptoms; nonprescription intervention indicated] : Cough: Grade 1 - Mild symptoms; nonprescription intervention indicated [Dyspnea: Grade 1 - Shortness of breath with moderate exertion] : Dyspnea: Grade 1 - Shortness of breath with moderate exertion

## 2021-12-10 NOTE — ASU PATIENT PROFILE, ADULT - ABLE TO REACH PT
yes Gabapentin Counseling: I discussed with the patient the risks of gabapentin including but not limited to dizziness, somnolence, fatigue and ataxia.

## 2021-12-13 ENCOUNTER — NON-APPOINTMENT (OUTPATIENT)
Age: 66
End: 2021-12-13

## 2021-12-13 DIAGNOSIS — K20.80 OTHER ESOPHAGITIS WITHOUT BLEEDING: ICD-10-CM

## 2021-12-13 DIAGNOSIS — T66.XXXA OTHER ESOPHAGITIS WITHOUT BLEEDING: ICD-10-CM

## 2021-12-19 ENCOUNTER — INPATIENT (INPATIENT)
Facility: HOSPITAL | Age: 66
LOS: 2 days | Discharge: HOME HEALTH | End: 2021-12-22
Attending: INTERNAL MEDICINE | Admitting: INTERNAL MEDICINE
Payer: MEDICARE

## 2021-12-19 VITALS
SYSTOLIC BLOOD PRESSURE: 152 MMHG | OXYGEN SATURATION: 99 % | HEART RATE: 102 BPM | DIASTOLIC BLOOD PRESSURE: 52 MMHG | RESPIRATION RATE: 20 BRPM | HEIGHT: 60 IN | TEMPERATURE: 97 F

## 2021-12-19 DIAGNOSIS — Z96.649 PRESENCE OF UNSPECIFIED ARTIFICIAL HIP JOINT: Chronic | ICD-10-CM

## 2021-12-19 DIAGNOSIS — D61.818 OTHER PANCYTOPENIA: ICD-10-CM

## 2021-12-19 DIAGNOSIS — D64.9 ANEMIA, UNSPECIFIED: ICD-10-CM

## 2021-12-19 DIAGNOSIS — Z29.9 ENCOUNTER FOR PROPHYLACTIC MEASURES, UNSPECIFIED: ICD-10-CM

## 2021-12-19 DIAGNOSIS — Z95.5 PRESENCE OF CORONARY ANGIOPLASTY IMPLANT AND GRAFT: Chronic | ICD-10-CM

## 2021-12-19 DIAGNOSIS — I10 ESSENTIAL (PRIMARY) HYPERTENSION: ICD-10-CM

## 2021-12-19 DIAGNOSIS — Z90.2 ACQUIRED ABSENCE OF LUNG [PART OF]: Chronic | ICD-10-CM

## 2021-12-19 DIAGNOSIS — C34.90 MALIGNANT NEOPLASM OF UNSPECIFIED PART OF UNSPECIFIED BRONCHUS OR LUNG: ICD-10-CM

## 2021-12-19 DIAGNOSIS — Z02.9 ENCOUNTER FOR ADMINISTRATIVE EXAMINATIONS, UNSPECIFIED: ICD-10-CM

## 2021-12-19 DIAGNOSIS — R60.0 LOCALIZED EDEMA: ICD-10-CM

## 2021-12-19 DIAGNOSIS — Z98.891 HISTORY OF UTERINE SCAR FROM PREVIOUS SURGERY: Chronic | ICD-10-CM

## 2021-12-19 DIAGNOSIS — E78.5 HYPERLIPIDEMIA, UNSPECIFIED: ICD-10-CM

## 2021-12-19 LAB
ALBUMIN SERPL ELPH-MCNC: 3.9 G/DL — SIGNIFICANT CHANGE UP (ref 3.3–5)
ALP SERPL-CCNC: 88 U/L — SIGNIFICANT CHANGE UP (ref 40–120)
ALT FLD-CCNC: 21 U/L — SIGNIFICANT CHANGE UP (ref 4–33)
ANION GAP SERPL CALC-SCNC: 16 MMOL/L — HIGH (ref 7–14)
APTT BLD: 37.5 SEC — HIGH (ref 27–36.3)
AST SERPL-CCNC: 22 U/L — SIGNIFICANT CHANGE UP (ref 4–32)
B PERT DNA SPEC QL NAA+PROBE: SIGNIFICANT CHANGE UP
B PERT+PARAPERT DNA PNL SPEC NAA+PROBE: SIGNIFICANT CHANGE UP
BASOPHILS # BLD AUTO: 0 K/UL — SIGNIFICANT CHANGE UP (ref 0–0.2)
BASOPHILS NFR BLD AUTO: 0 % — SIGNIFICANT CHANGE UP (ref 0–2)
BILIRUB SERPL-MCNC: 3.3 MG/DL — HIGH (ref 0.2–1.2)
BLD GP AB SCN SERPL QL: NEGATIVE — SIGNIFICANT CHANGE UP
BLOOD GAS VENOUS COMPREHENSIVE RESULT: SIGNIFICANT CHANGE UP
BORDETELLA PARAPERTUSSIS (RAPRVP): SIGNIFICANT CHANGE UP
BUN SERPL-MCNC: 25 MG/DL — HIGH (ref 7–23)
C PNEUM DNA SPEC QL NAA+PROBE: SIGNIFICANT CHANGE UP
CALCIUM SERPL-MCNC: 8.9 MG/DL — SIGNIFICANT CHANGE UP (ref 8.4–10.5)
CHLORIDE SERPL-SCNC: 101 MMOL/L — SIGNIFICANT CHANGE UP (ref 98–107)
CO2 SERPL-SCNC: 23 MMOL/L — SIGNIFICANT CHANGE UP (ref 22–31)
CREAT SERPL-MCNC: 1.14 MG/DL — SIGNIFICANT CHANGE UP (ref 0.5–1.3)
EOSINOPHIL # BLD AUTO: 0 K/UL — SIGNIFICANT CHANGE UP (ref 0–0.5)
EOSINOPHIL NFR BLD AUTO: 0 % — SIGNIFICANT CHANGE UP (ref 0–6)
FLUAV SUBTYP SPEC NAA+PROBE: SIGNIFICANT CHANGE UP
FLUBV RNA SPEC QL NAA+PROBE: SIGNIFICANT CHANGE UP
GLUCOSE SERPL-MCNC: 137 MG/DL — HIGH (ref 70–99)
HADV DNA SPEC QL NAA+PROBE: SIGNIFICANT CHANGE UP
HCOV 229E RNA SPEC QL NAA+PROBE: SIGNIFICANT CHANGE UP
HCOV HKU1 RNA SPEC QL NAA+PROBE: SIGNIFICANT CHANGE UP
HCOV NL63 RNA SPEC QL NAA+PROBE: SIGNIFICANT CHANGE UP
HCOV OC43 RNA SPEC QL NAA+PROBE: SIGNIFICANT CHANGE UP
HCT VFR BLD CALC: 13.8 % — CRITICAL LOW (ref 34.5–45)
HCT VFR BLD CALC: 17.8 % — CRITICAL LOW (ref 34.5–45)
HGB BLD-MCNC: 4.7 G/DL — CRITICAL LOW (ref 11.5–15.5)
HGB BLD-MCNC: 6.1 G/DL — CRITICAL LOW (ref 11.5–15.5)
HMPV RNA SPEC QL NAA+PROBE: SIGNIFICANT CHANGE UP
HPIV1 RNA SPEC QL NAA+PROBE: SIGNIFICANT CHANGE UP
HPIV2 RNA SPEC QL NAA+PROBE: SIGNIFICANT CHANGE UP
HPIV3 RNA SPEC QL NAA+PROBE: SIGNIFICANT CHANGE UP
HPIV4 RNA SPEC QL NAA+PROBE: SIGNIFICANT CHANGE UP
IANC: 0.14 K/UL — LOW (ref 1.5–8.5)
INR BLD: 1.87 RATIO — HIGH (ref 0.88–1.16)
LYMPHOCYTES # BLD AUTO: 0.07 K/UL — LOW (ref 1–3.3)
LYMPHOCYTES # BLD AUTO: 14 % — SIGNIFICANT CHANGE UP (ref 13–44)
M PNEUMO DNA SPEC QL NAA+PROBE: SIGNIFICANT CHANGE UP
MCHC RBC-ENTMCNC: 30.3 PG — SIGNIFICANT CHANGE UP (ref 27–34)
MCHC RBC-ENTMCNC: 30.5 PG — SIGNIFICANT CHANGE UP (ref 27–34)
MCHC RBC-ENTMCNC: 34.1 GM/DL — SIGNIFICANT CHANGE UP (ref 32–36)
MCHC RBC-ENTMCNC: 34.3 GM/DL — SIGNIFICANT CHANGE UP (ref 32–36)
MCV RBC AUTO: 88.6 FL — SIGNIFICANT CHANGE UP (ref 80–100)
MCV RBC AUTO: 89.6 FL — SIGNIFICANT CHANGE UP (ref 80–100)
MONOCYTES # BLD AUTO: 0.17 K/UL — SIGNIFICANT CHANGE UP (ref 0–0.9)
MONOCYTES NFR BLD AUTO: 34.6 % — HIGH (ref 2–14)
NEUTROPHILS # BLD AUTO: 0.18 K/UL — LOW (ref 1.8–7.4)
NEUTROPHILS NFR BLD AUTO: 29.9 % — LOW (ref 43–77)
NRBC # BLD: 0 /100 WBCS — SIGNIFICANT CHANGE UP
NRBC # FLD: 0 K/UL — SIGNIFICANT CHANGE UP
NT-PROBNP SERPL-SCNC: 1550 PG/ML — HIGH
OB PNL STL: NEGATIVE — SIGNIFICANT CHANGE UP
PLATELET # BLD AUTO: 22 K/UL — LOW (ref 150–400)
PLATELET # BLD AUTO: 55 K/UL — LOW (ref 150–400)
POTASSIUM SERPL-MCNC: 3.1 MMOL/L — LOW (ref 3.5–5.3)
POTASSIUM SERPL-SCNC: 3.1 MMOL/L — LOW (ref 3.5–5.3)
PROT SERPL-MCNC: 6.7 G/DL — SIGNIFICANT CHANGE UP (ref 6–8.3)
PROTHROM AB SERPL-ACNC: 20.7 SEC — HIGH (ref 10.6–13.6)
RAPID RVP RESULT: SIGNIFICANT CHANGE UP
RBC # BLD: 1.54 M/UL — LOW (ref 3.8–5.2)
RBC # BLD: 2.01 M/UL — LOW (ref 3.8–5.2)
RBC # FLD: 15.1 % — HIGH (ref 10.3–14.5)
RBC # FLD: 15.9 % — HIGH (ref 10.3–14.5)
RH IG SCN BLD-IMP: POSITIVE — SIGNIFICANT CHANGE UP
RSV RNA SPEC QL NAA+PROBE: SIGNIFICANT CHANGE UP
RV+EV RNA SPEC QL NAA+PROBE: SIGNIFICANT CHANGE UP
SARS-COV-2 RNA SPEC QL NAA+PROBE: SIGNIFICANT CHANGE UP
SODIUM SERPL-SCNC: 140 MMOL/L — SIGNIFICANT CHANGE UP (ref 135–145)
TROPONIN T, HIGH SENSITIVITY RESULT: 24 NG/L — SIGNIFICANT CHANGE UP
WBC # BLD: 0.5 K/UL — CRITICAL LOW (ref 3.8–10.5)
WBC # BLD: 0.93 K/UL — CRITICAL LOW (ref 3.8–10.5)
WBC # FLD AUTO: 0.5 K/UL — CRITICAL LOW (ref 3.8–10.5)
WBC # FLD AUTO: 0.93 K/UL — CRITICAL LOW (ref 3.8–10.5)

## 2021-12-19 PROCEDURE — 99223 1ST HOSP IP/OBS HIGH 75: CPT

## 2021-12-19 PROCEDURE — 93010 ELECTROCARDIOGRAM REPORT: CPT

## 2021-12-19 PROCEDURE — 71045 X-RAY EXAM CHEST 1 VIEW: CPT | Mod: 26

## 2021-12-19 PROCEDURE — 99285 EMERGENCY DEPT VISIT HI MDM: CPT | Mod: 25

## 2021-12-19 RX ORDER — PANTOPRAZOLE SODIUM 20 MG/1
40 TABLET, DELAYED RELEASE ORAL
Refills: 0 | Status: DISCONTINUED | OUTPATIENT
Start: 2021-12-19 | End: 2021-12-22

## 2021-12-19 RX ORDER — FILGRASTIM 480MCG/1.6
480 VIAL (ML) INJECTION DAILY
Refills: 0 | Status: DISCONTINUED | OUTPATIENT
Start: 2021-12-19 | End: 2021-12-22

## 2021-12-19 RX ORDER — SUCRALFATE 1 G
1 TABLET ORAL
Refills: 0 | Status: DISCONTINUED | OUTPATIENT
Start: 2021-12-19 | End: 2021-12-22

## 2021-12-19 RX ORDER — FOLIC ACID 0.8 MG
1 TABLET ORAL DAILY
Refills: 0 | Status: DISCONTINUED | OUTPATIENT
Start: 2021-12-19 | End: 2021-12-22

## 2021-12-19 RX ORDER — ATORVASTATIN CALCIUM 80 MG/1
40 TABLET, FILM COATED ORAL AT BEDTIME
Refills: 0 | Status: DISCONTINUED | OUTPATIENT
Start: 2021-12-19 | End: 2021-12-22

## 2021-12-19 RX ORDER — CARVEDILOL PHOSPHATE 80 MG/1
6.25 CAPSULE, EXTENDED RELEASE ORAL EVERY 12 HOURS
Refills: 0 | Status: DISCONTINUED | OUTPATIENT
Start: 2021-12-19 | End: 2021-12-22

## 2021-12-19 RX ORDER — DIPHENHYDRAMINE HYDROCHLORIDE AND LIDOCAINE HYDROCHLORIDE AND ALUMINUM HYDROXIDE AND MAGNESIUM HYDRO
5 KIT
Refills: 0 | Status: DISCONTINUED | OUTPATIENT
Start: 2021-12-19 | End: 2021-12-22

## 2021-12-19 RX ORDER — ACETAMINOPHEN 500 MG
650 TABLET ORAL EVERY 6 HOURS
Refills: 0 | Status: DISCONTINUED | OUTPATIENT
Start: 2021-12-19 | End: 2021-12-22

## 2021-12-19 RX ORDER — PREGABALIN 225 MG/1
1000 CAPSULE ORAL DAILY
Refills: 0 | Status: DISCONTINUED | OUTPATIENT
Start: 2021-12-19 | End: 2021-12-22

## 2021-12-19 RX ORDER — POLYETHYLENE GLYCOL 3350 17 G/17G
17 POWDER, FOR SOLUTION ORAL DAILY
Refills: 0 | Status: DISCONTINUED | OUTPATIENT
Start: 2021-12-19 | End: 2021-12-22

## 2021-12-19 RX ORDER — LANOLIN ALCOHOL/MO/W.PET/CERES
3 CREAM (GRAM) TOPICAL AT BEDTIME
Refills: 0 | Status: DISCONTINUED | OUTPATIENT
Start: 2021-12-19 | End: 2021-12-22

## 2021-12-19 RX ADMIN — Medication 480 MICROGRAM(S): at 19:24

## 2021-12-19 RX ADMIN — PANTOPRAZOLE SODIUM 40 MILLIGRAM(S): 20 TABLET, DELAYED RELEASE ORAL at 17:46

## 2021-12-19 RX ADMIN — Medication 1 MILLIGRAM(S): at 21:23

## 2021-12-19 RX ADMIN — ATORVASTATIN CALCIUM 40 MILLIGRAM(S): 80 TABLET, FILM COATED ORAL at 21:23

## 2021-12-19 RX ADMIN — CARVEDILOL PHOSPHATE 6.25 MILLIGRAM(S): 80 CAPSULE, EXTENDED RELEASE ORAL at 19:23

## 2021-12-19 NOTE — H&P ADULT - NSHPLABSRESULTS_GEN_ALL_CORE
LABS:                         4.7    0.50  )-----------( 22       ( 19 Dec 2021 10:48 )             13.8     12-19    140  |  101  |  25<H>  ----------------------------<  137<H>  3.1<L>   |  23  |  1.14    Ca    8.9      19 Dec 2021 10:48    TPro  6.7  /  Alb  3.9  /  TBili  3.3<H>  /  DBili  x   /  AST  22  /  ALT  21  /  AlkPhos  88  12-19    PT/INR - ( 19 Dec 2021 10:48 )   PT: 20.7 sec;   INR: 1.87 ratio         PTT - ( 19 Dec 2021 10:48 )  PTT:37.5 sec        Serum Pro-Brain Natriuretic Peptide: 1550 pg/mL (12-19 @ 10:48)    EKG read by me normal sinus rhythm 96 BPM     CXR read by me small right pleural effusion         RADIOLOGY, EKG & ADDITIONAL TESTS: Reviewed.

## 2021-12-19 NOTE — H&P ADULT - PROBLEM SELECTOR PLAN 1
Patient presenting w/ symptomatic anemia. Hemoglobin 4.7. Likely 2/2 to chemo and underlying malignancy.     - maintain hemoglobin > 8 per heme/onc  - 2 PRBC ordered  - b12 1000 mcg daily, folic acid 1mg daily   - FOBT negative but will continue home protonix 40mg BID PO   - hold off home AC for now  - heme/onc recs appreciated

## 2021-12-19 NOTE — ED PROVIDER NOTE - OBJECTIVE STATEMENT
65 y/o f pmhx htn, COPD, emphysema, lung CA, c\CAD with stent, anemia, c/o lightheadedness and shortness of breath on exertion for the last 4 days. she states that she felt similar to this when she was admitted a month ago for anemia with blood transfusions. she states that her legs have been more swollen than usual bilaterally since she has been told to stop her lasix on her last admission. patient also complains of increased phlegm production since completing chemo and radiation on 12/9. she was told this is likely secondary to irritation to her esophagus from the radiation. also reports blood in her urine, denies blood in stool. she denies fever, chills, chest pain, n/v/d.

## 2021-12-19 NOTE — ED PROVIDER NOTE - ENMT, MLM
Airway patent, Nasal mucosa clear. Mouth with normal mucosa. Throat has no vesicles, no oropharyngeal exudates and uvula is midline. patient with increased mucous production.

## 2021-12-19 NOTE — ED PROVIDER NOTE - NEUROLOGICAL, MLM
Arrives ambulatory from work for c/o using a chop saw that kicked back onto his chest around 0900 this morning.      12\"laceration to chest, minimal bleeding.
Alert and oriented, no focal deficits, no motor or sensory deficits.

## 2021-12-19 NOTE — ED PROVIDER NOTE - CLINICAL SUMMARY MEDICAL DECISION MAKING FREE TEXT BOX
67 y/o f pmhx htn, COPD, emphysema, lung CA, c\CAD with stent, anemia, c/o lightheadedness and shortness of breath on exertion for the last 4 days.-- hx of anemia with blood transfusions. -- labs, xray

## 2021-12-19 NOTE — ED ADULT NURSE NOTE - NSIMPLEMENTINTERV_GEN_ALL_ED
Implemented All Fall Risk Interventions:  [place] to call system. Call bell, personal items and telephone within reach. Instruct patient to call for assistance. Room bathroom lighting operational. Non-slip footwear when patient is off stretcher. Physically safe environment: no spills, clutter or unnecessary equipment. Stretcher in lowest position, wheels locked, appropriate side rails in place. Provide visual cue, wrist band, yellow gown, etc. Monitor gait and stability. Monitor for mental status changes and reorient to person, place, and time. Review medications for side effects contributing to fall risk. Reinforce activity limits and safety measures with patient and family.

## 2021-12-19 NOTE — H&P ADULT - PROBLEM SELECTOR PLAN 3
Patient w/ history of lung ca s/p radiation, chemo and lobectomy     - heme/onc recs appreciated, outpatient follow up

## 2021-12-19 NOTE — H&P ADULT - PROBLEM SELECTOR PLAN 7
no AC plts low     F no IVF   E K>4 Mg>2 P>3  N Pureed diet thin liquids (home diet)     Speech and swallow eval due to post radiation secretions   Fall Precautions  Ambulate w/ assistance

## 2021-12-19 NOTE — ED ADULT NURSE NOTE - OBJECTIVE STATEMENT
Receive pt in spot 28 alert and oriented x  3 c./o feeling lightheaded, weak, worse when standing.  DEnies chest pain, sob, cough, chills, N/V/D.  Resp even and unlabored.  Pt NSR on cardiac monitoring.   RCW port accessed.  Labs sent. Skin intact Receive pt in spot 28 alert and oriented x  3 c./o feeling lightheaded, weak, worse when standing.  DEnies chest pain, sob, cough, chills, N/V/D.  Resp even and unlabored.  Pt NSR on cardiac monitoring.   RCW port accessed.  B/L LE edema and B/L FA edema noted. Labs sent. Skin intact

## 2021-12-19 NOTE — H&P ADULT - NSHPREVIEWOFSYSTEMS_GEN_ALL_CORE
REVIEW OF SYSTEMS:  CONSTITUTIONAL: + generalized weakness, denies fevers or chills  EYES/ENT: Patient denies visual changes;  denies vertigo or throat pain   NECK: + neck pain around site of radiation   RESPIRATORY: + clear phlegm, + SOB 2/2 to phlegm   CARDIOVASCULAR: + right sided chest wall pain around radiation site   GASTROINTESTINAL: Patient denies abdominal or epigastric pain, nausea, vomiting, or hematemesis, diarrhea or constipation, melena or hematochezia.  GENITOURINARY: Patient denies dysuria, frequency or hematuria  NEUROLOGICAL: Patient denies numbness or weakness  SKIN: Patient denies rashes  All other review of systems is negative unless indicated above.

## 2021-12-19 NOTE — H&P ADULT - PROBLEM SELECTOR PLAN 2
Patient w/ plts 22k, w/ wbc .50; absolute neutrophils 180. Patient afebrile.     - keep pts >50k per heme/onc, patient w/ prior history of GI bleed   - zarxio 480 mcg daily for 5 days or until ANC ~ 5K  - if any fevers or shaking chills, low threshold to panculture and start broad spectrum abx   - management of anemia as above   - heme/onc recs appreciated

## 2021-12-19 NOTE — ED PROVIDER NOTE - ATTENDING CONTRIBUTION TO CARE
DR. OWUSU, ATTENDING MD-  I performed a face to face bedside interview with patient regarding history of present illness, review of symptoms and past medical history. I completed an independent physical exam.  I have discussed patient's plan of care with the NP.   Documentation as above in the note.    65 y/o female h/o lung ca no longer on chemo/rads here with alonso, fatigue, pallor.  Denies black/bloody stools.  Feels similar to when she recently required blood xfusion.  Eval for anemia, lyte abn, occult infection.  Obtain cbc cmp t&s coags ua ucx cxr covid swab likely admission for further care and evaluation.

## 2021-12-19 NOTE — H&P ADULT - NSICDXPASTMEDICALHX_GEN_ALL_CORE_FT
PAST MEDICAL HISTORY:  Carotid artery disease monitored by vascluar doctor Samuel    COPD (chronic obstructive pulmonary disease)     Edema of both legs     Emphysema     Hip pain     Hyperlipidemia     Hypertension     Localized enlarged lymph nodes     Lumbar disc disorder     Lung cancer right, 2019, completed chemotherapy 12/2019    Obesity     Scoliosis     Stented coronary artery

## 2021-12-19 NOTE — H&P ADULT - HISTORY OF PRESENT ILLNESS
Patient is a 66 year old woman w/ past medical history of HTN, COPD, lung ca s/p R lobectomy and radiation, CAD w/ stents, anemia presenting with one week of generalized weakness. Patient states she has become progressively week for the past week. The day of admission she had presyncopal symptoms which frightened her prompting ER visit. Patient denies any fever, chills, or bloody stools. Patient states since her radiation, last session 12/9 she has had a lot of clear phlegm. She was told this was expected w/ radiation treatment. She is able to cough up the phlegm. She does endorse some pain with swallowing which she was told was also expected w/ her radiation treatment. Patient able to swallow thin liquids and pureed foods without difficulty. Patient states that her neck and right side of chest wall hurt at sites of radiation. Patient denies any left sided chest pain. Last chemo per patient was around the week of 11/28-12/4. Of note patient admitted 11/2021 for anemia requiring blood transfusion as well. She states her lasix was stopped that admission and since then has had some lower extremity swelling.     ED Course:   ED Vitals: T 97.1, HR 102m /52, S 99% on RA.   ED labs: WBC .50. Hemoglobin 4.7. Plt 22k. Neutrophils 180. INR 1.87. Potassium 2.1. BNP 1550.   ED meds: 1 units of plts, PRBC pending.     FOBT negative     Admitted for symptomatic anemia requiring blood transfusion.

## 2021-12-19 NOTE — PATIENT PROFILE ADULT - FALL HARM RISK - HARM RISK INTERVENTIONS
Assistance with ambulation/Assistance OOB with selected safe patient handling equipment/Communicate Risk of Fall with Harm to all staff/Monitor gait and stability/Reinforce activity limits and safety measures with patient and family/Sit up slowly, dangle for a short time, stand at bedside before walking/Tailored Fall Risk Interventions/Visual Cue: Yellow wristband and red socks/Bed in lowest position, wheels locked, appropriate side rails in place/Call bell, personal items and telephone in reach/Instruct patient to call for assistance before getting out of bed or chair/Non-slip footwear when patient is out of bed/[place] to call system/Physically safe environment - no spills, clutter or unnecessary equipment/Purposeful Proactive Rounding/Room/bathroom lighting operational, light cord in reach

## 2021-12-19 NOTE — ED ADULT TRIAGE NOTE - CHIEF COMPLAINT QUOTE
Patient arrives from home with ems , h/o lung ca receiving chemo and radiation, c/o weakness, light headed with standing up, h/o anemia requiring blood transfusions, had one in November for the same. Appears pale in triage.

## 2021-12-19 NOTE — H&P ADULT - NSHPPHYSICALEXAM_GEN_ALL_CORE
VITAL SIGNS:  T(F): 97.1 (12-19-21 @ 09:26), Max: 97.1 (12-19-21 @ 09:26)  HR: 102 (12-19-21 @ 09:26) (102 - 102)  BP: 152/52 (12-19-21 @ 09:26) (152/52 - 152/52)  BP(mean): --  RR: 20 (12-19-21 @ 09:26) (20 - 20)  SpO2: 99% (12-19-21 @ 09:26) (99% - 99%)    PHYSICAL EXAM:  Constitutional: Middle aged woman appears older than stated age, resting comfortably in bed, intermittently cough up clear phlegm   HEENT: Post radiation changes of her neck, PERRL, EOMI, anicteric sclera, no nasal discharge, slightly dry mucous membranes  Neck: post radiation changes of her neck   Respiratory: CTA B/L; no W/R/R, no retractions  Cardiac: +S1/S2; RRR; no M/R/G; PMI non-displaced  Gastrointestinal: soft, NT/ND; no rebound or guarding; +BSx4  Genitourinary: no suprapubic tenderness   Back: spine midline   Extremities: WWP, no clubbing or cyanosis; 1+ peripheral edema   Musculoskeletal: NROM x4; no joint swelling, tenderness or erythema  Vascular: 2+ radial   Dermatologic: post radiation changes of the neck, port on chest wall C/D/I    Neurologic: AAOx3; CNII-XII grossly intact; no focal deficits  Psychiatric: affect and characteristics of appearance, verbalizations, behaviors are appropriate

## 2021-12-19 NOTE — H&P ADULT - PROBLEM SELECTOR PLAN 6
patient w/ history of edema of lower extremities. Echo from 2019 appreciated w/ hyperdynamic LV, no reduced EF. Was on lasix prior to last admission but seems to have been discontinued due to low BP.     - continue holding lasix as patient w/ presyncopal symptoms, poor PO  - reassess throughout admission ability to restart  - lungs clear on exam

## 2021-12-19 NOTE — ED PROVIDER NOTE - PROGRESS NOTE DETAILS
NP Marla: patient with Hgb 4.7 Hct 13.8% WBC 0.5. discussed with patient's PCP Dr. Gibson will admit for transfusion and further workup. patient remains hemodynamically stable

## 2021-12-19 NOTE — PROVIDER CONTACT NOTE (CRITICAL VALUE NOTIFICATION) - BACKGROUND
Admitted with anemia. S/p 1 unit PRBCs and 1 unit platelets in ED. Pt currently ordered for 2nd unit PRBCs. As per Tara Ospina NP draw stat CBC prior to hanging 2nd unit.

## 2021-12-19 NOTE — H&P ADULT - ASSESSMENT
Patient is a 66 year old woman w/ past medical history of HTN, COPD, lung ca s/p R lobectomy and radiation, CAD w/ stents, anemia presenting with one week of generalized weakness found to have hemoglobin of 4.7 likely 2/2 chemo and underlying malignancy.

## 2021-12-20 LAB
ALBUMIN SERPL ELPH-MCNC: 3.5 G/DL — SIGNIFICANT CHANGE UP (ref 3.3–5)
ALP SERPL-CCNC: 84 U/L — SIGNIFICANT CHANGE UP (ref 40–120)
ALT FLD-CCNC: 18 U/L — SIGNIFICANT CHANGE UP (ref 4–33)
ANION GAP SERPL CALC-SCNC: 13 MMOL/L — SIGNIFICANT CHANGE UP (ref 7–14)
ANION GAP SERPL CALC-SCNC: 13 MMOL/L — SIGNIFICANT CHANGE UP (ref 7–14)
APTT BLD: 31.4 SEC — SIGNIFICANT CHANGE UP (ref 27–36.3)
AST SERPL-CCNC: 16 U/L — SIGNIFICANT CHANGE UP (ref 4–32)
BASOPHILS # BLD AUTO: 0 K/UL — SIGNIFICANT CHANGE UP (ref 0–0.2)
BASOPHILS NFR BLD AUTO: 0 % — SIGNIFICANT CHANGE UP (ref 0–2)
BILIRUB SERPL-MCNC: 4.1 MG/DL — HIGH (ref 0.2–1.2)
BUN SERPL-MCNC: 24 MG/DL — HIGH (ref 7–23)
BUN SERPL-MCNC: 26 MG/DL — HIGH (ref 7–23)
CALCIUM SERPL-MCNC: 9 MG/DL — SIGNIFICANT CHANGE UP (ref 8.4–10.5)
CALCIUM SERPL-MCNC: 9.1 MG/DL — SIGNIFICANT CHANGE UP (ref 8.4–10.5)
CHLORIDE SERPL-SCNC: 103 MMOL/L — SIGNIFICANT CHANGE UP (ref 98–107)
CHLORIDE SERPL-SCNC: 103 MMOL/L — SIGNIFICANT CHANGE UP (ref 98–107)
CO2 SERPL-SCNC: 25 MMOL/L — SIGNIFICANT CHANGE UP (ref 22–31)
CO2 SERPL-SCNC: 25 MMOL/L — SIGNIFICANT CHANGE UP (ref 22–31)
CREAT SERPL-MCNC: 1.06 MG/DL — SIGNIFICANT CHANGE UP (ref 0.5–1.3)
CREAT SERPL-MCNC: 1.18 MG/DL — SIGNIFICANT CHANGE UP (ref 0.5–1.3)
EOSINOPHIL # BLD AUTO: 0 K/UL — SIGNIFICANT CHANGE UP (ref 0–0.5)
EOSINOPHIL NFR BLD AUTO: 0 % — SIGNIFICANT CHANGE UP (ref 0–6)
GIANT PLATELETS BLD QL SMEAR: PRESENT — SIGNIFICANT CHANGE UP
GLUCOSE SERPL-MCNC: 111 MG/DL — HIGH (ref 70–99)
GLUCOSE SERPL-MCNC: 123 MG/DL — HIGH (ref 70–99)
HCT VFR BLD CALC: 20.5 % — CRITICAL LOW (ref 34.5–45)
HGB BLD-MCNC: 7.4 G/DL — LOW (ref 11.5–15.5)
IANC: 0.36 K/UL — LOW (ref 1.5–8.5)
INR BLD: 1.63 RATIO — HIGH (ref 0.88–1.16)
LYMPHOCYTES # BLD AUTO: 0.37 K/UL — LOW (ref 1–3.3)
LYMPHOCYTES # BLD AUTO: 18 % — SIGNIFICANT CHANGE UP (ref 13–44)
MAGNESIUM SERPL-MCNC: 1.5 MG/DL — LOW (ref 1.6–2.6)
MAGNESIUM SERPL-MCNC: 1.9 MG/DL — SIGNIFICANT CHANGE UP (ref 1.6–2.6)
MANUAL SMEAR VERIFICATION: SIGNIFICANT CHANGE UP
MCHC RBC-ENTMCNC: 30.7 PG — SIGNIFICANT CHANGE UP (ref 27–34)
MCHC RBC-ENTMCNC: 36.1 GM/DL — HIGH (ref 32–36)
MCV RBC AUTO: 85.1 FL — SIGNIFICANT CHANGE UP (ref 80–100)
METAMYELOCYTES # FLD: 3.6 % — HIGH (ref 0–1)
MONOCYTES # BLD AUTO: 0.22 K/UL — SIGNIFICANT CHANGE UP (ref 0–0.9)
MONOCYTES NFR BLD AUTO: 10.8 % — SIGNIFICANT CHANGE UP (ref 2–14)
MYELOCYTES NFR BLD: 3.6 % — HIGH (ref 0–0)
NEUTROPHILS # BLD AUTO: 1.26 K/UL — LOW (ref 1.8–7.4)
NEUTROPHILS NFR BLD AUTO: 55.9 % — SIGNIFICANT CHANGE UP (ref 43–77)
NEUTS BAND # BLD: 5.4 % — SIGNIFICANT CHANGE UP (ref 0–6)
NRBC # BLD: 1 /100 — HIGH (ref 0–0)
PHOSPHATE SERPL-MCNC: 1.8 MG/DL — LOW (ref 2.5–4.5)
PHOSPHATE SERPL-MCNC: 2.3 MG/DL — LOW (ref 2.5–4.5)
PLAT MORPH BLD: NORMAL — SIGNIFICANT CHANGE UP
PLATELET # BLD AUTO: 64 K/UL — LOW (ref 150–400)
PLATELET COUNT - ESTIMATE: ABNORMAL
POTASSIUM SERPL-MCNC: 2.9 MMOL/L — CRITICAL LOW (ref 3.5–5.3)
POTASSIUM SERPL-MCNC: 3.4 MMOL/L — LOW (ref 3.5–5.3)
POTASSIUM SERPL-SCNC: 2.9 MMOL/L — CRITICAL LOW (ref 3.5–5.3)
POTASSIUM SERPL-SCNC: 3.4 MMOL/L — LOW (ref 3.5–5.3)
PROT SERPL-MCNC: 6.7 G/DL — SIGNIFICANT CHANGE UP (ref 6–8.3)
PROTHROM AB SERPL-ACNC: 18.2 SEC — HIGH (ref 10.6–13.6)
RBC # BLD: 2.41 M/UL — LOW (ref 3.8–5.2)
RBC # FLD: 15.7 % — HIGH (ref 10.3–14.5)
RBC BLD AUTO: NORMAL — SIGNIFICANT CHANGE UP
SODIUM SERPL-SCNC: 141 MMOL/L — SIGNIFICANT CHANGE UP (ref 135–145)
SODIUM SERPL-SCNC: 141 MMOL/L — SIGNIFICANT CHANGE UP (ref 135–145)
VARIANT LYMPHS # BLD: 2.7 % — SIGNIFICANT CHANGE UP (ref 0–6)
WBC # BLD: 2.05 K/UL — LOW (ref 3.8–10.5)
WBC # FLD AUTO: 2.05 K/UL — LOW (ref 3.8–10.5)

## 2021-12-20 RX ORDER — TIOTROPIUM BROMIDE 18 UG/1
1 CAPSULE ORAL; RESPIRATORY (INHALATION) DAILY
Refills: 0 | Status: DISCONTINUED | OUTPATIENT
Start: 2021-12-20 | End: 2021-12-22

## 2021-12-20 RX ORDER — OXYCODONE HYDROCHLORIDE 5 MG/1
5 TABLET ORAL EVERY 8 HOURS
Refills: 0 | Status: DISCONTINUED | OUTPATIENT
Start: 2021-12-20 | End: 2021-12-22

## 2021-12-20 RX ORDER — POTASSIUM CHLORIDE 20 MEQ
10 PACKET (EA) ORAL
Refills: 0 | Status: COMPLETED | OUTPATIENT
Start: 2021-12-20 | End: 2021-12-20

## 2021-12-20 RX ORDER — POTASSIUM CHLORIDE 20 MEQ
40 PACKET (EA) ORAL ONCE
Refills: 0 | Status: COMPLETED | OUTPATIENT
Start: 2021-12-20 | End: 2021-12-20

## 2021-12-20 RX ORDER — OXYCODONE HYDROCHLORIDE 5 MG/1
5 TABLET ORAL EVERY 12 HOURS
Refills: 0 | Status: DISCONTINUED | OUTPATIENT
Start: 2021-12-20 | End: 2021-12-20

## 2021-12-20 RX ORDER — ASPIRIN/CALCIUM CARB/MAGNESIUM 324 MG
81 TABLET ORAL DAILY
Refills: 0 | Status: DISCONTINUED | OUTPATIENT
Start: 2021-12-20 | End: 2021-12-22

## 2021-12-20 RX ORDER — CHLORHEXIDINE GLUCONATE 213 G/1000ML
1 SOLUTION TOPICAL DAILY
Refills: 0 | Status: DISCONTINUED | OUTPATIENT
Start: 2021-12-20 | End: 2021-12-22

## 2021-12-20 RX ORDER — SODIUM,POTASSIUM PHOSPHATES 278-250MG
1 POWDER IN PACKET (EA) ORAL
Refills: 0 | Status: COMPLETED | OUTPATIENT
Start: 2021-12-20 | End: 2021-12-21

## 2021-12-20 RX ORDER — MAGNESIUM SULFATE 500 MG/ML
1 VIAL (ML) INJECTION ONCE
Refills: 0 | Status: COMPLETED | OUTPATIENT
Start: 2021-12-20 | End: 2021-12-20

## 2021-12-20 RX ORDER — POTASSIUM CHLORIDE 20 MEQ
40 PACKET (EA) ORAL ONCE
Refills: 0 | Status: DISCONTINUED | OUTPATIENT
Start: 2021-12-20 | End: 2021-12-20

## 2021-12-20 RX ORDER — POTASSIUM PHOSPHATE, MONOBASIC POTASSIUM PHOSPHATE, DIBASIC 236; 224 MG/ML; MG/ML
15 INJECTION, SOLUTION INTRAVENOUS ONCE
Refills: 0 | Status: DISCONTINUED | OUTPATIENT
Start: 2021-12-20 | End: 2021-12-20

## 2021-12-20 RX ADMIN — Medication 1 PACKET(S): at 17:23

## 2021-12-20 RX ADMIN — ATORVASTATIN CALCIUM 40 MILLIGRAM(S): 80 TABLET, FILM COATED ORAL at 22:07

## 2021-12-20 RX ADMIN — DIPHENHYDRAMINE HYDROCHLORIDE AND LIDOCAINE HYDROCHLORIDE AND ALUMINUM HYDROXIDE AND MAGNESIUM HYDRO 5 MILLILITER(S): KIT at 19:19

## 2021-12-20 RX ADMIN — Medication 100 MILLIEQUIVALENT(S): at 10:08

## 2021-12-20 RX ADMIN — PANTOPRAZOLE SODIUM 40 MILLIGRAM(S): 20 TABLET, DELAYED RELEASE ORAL at 17:25

## 2021-12-20 RX ADMIN — Medication 100 MILLIEQUIVALENT(S): at 09:07

## 2021-12-20 RX ADMIN — Medication 1 MILLIGRAM(S): at 11:38

## 2021-12-20 RX ADMIN — Medication 1 GRAM(S): at 07:57

## 2021-12-20 RX ADMIN — Medication 480 MICROGRAM(S): at 17:26

## 2021-12-20 RX ADMIN — Medication 40 MILLIEQUIVALENT(S): at 17:23

## 2021-12-20 RX ADMIN — TIOTROPIUM BROMIDE 1 CAPSULE(S): 18 CAPSULE ORAL; RESPIRATORY (INHALATION) at 10:08

## 2021-12-20 RX ADMIN — DIPHENHYDRAMINE HYDROCHLORIDE AND LIDOCAINE HYDROCHLORIDE AND ALUMINUM HYDROXIDE AND MAGNESIUM HYDRO 5 MILLILITER(S): KIT at 12:00

## 2021-12-20 RX ADMIN — CHLORHEXIDINE GLUCONATE 1 APPLICATION(S): 213 SOLUTION TOPICAL at 11:01

## 2021-12-20 RX ADMIN — Medication 100 GRAM(S): at 09:10

## 2021-12-20 RX ADMIN — CARVEDILOL PHOSPHATE 6.25 MILLIGRAM(S): 80 CAPSULE, EXTENDED RELEASE ORAL at 07:57

## 2021-12-20 RX ADMIN — CARVEDILOL PHOSPHATE 6.25 MILLIGRAM(S): 80 CAPSULE, EXTENDED RELEASE ORAL at 17:26

## 2021-12-20 RX ADMIN — OXYCODONE HYDROCHLORIDE 5 MILLIGRAM(S): 5 TABLET ORAL at 09:15

## 2021-12-20 RX ADMIN — PANTOPRAZOLE SODIUM 40 MILLIGRAM(S): 20 TABLET, DELAYED RELEASE ORAL at 07:57

## 2021-12-20 RX ADMIN — DIPHENHYDRAMINE HYDROCHLORIDE AND LIDOCAINE HYDROCHLORIDE AND ALUMINUM HYDROXIDE AND MAGNESIUM HYDRO 5 MILLILITER(S): KIT at 08:26

## 2021-12-20 RX ADMIN — DIPHENHYDRAMINE HYDROCHLORIDE AND LIDOCAINE HYDROCHLORIDE AND ALUMINUM HYDROXIDE AND MAGNESIUM HYDRO 5 MILLILITER(S): KIT at 15:11

## 2021-12-20 RX ADMIN — Medication 40 MILLIEQUIVALENT(S): at 09:10

## 2021-12-20 RX ADMIN — Medication 81 MILLIGRAM(S): at 19:39

## 2021-12-20 RX ADMIN — Medication 100 MILLIEQUIVALENT(S): at 11:09

## 2021-12-20 RX ADMIN — PREGABALIN 1000 MICROGRAM(S): 225 CAPSULE ORAL at 11:38

## 2021-12-20 RX ADMIN — OXYCODONE HYDROCHLORIDE 5 MILLIGRAM(S): 5 TABLET ORAL at 08:25

## 2021-12-20 RX ADMIN — Medication 1 GRAM(S): at 17:23

## 2021-12-20 NOTE — PHYSICAL THERAPY INITIAL EVALUATION ADULT - PATIENT PROFILE REVIEW, REHAB EVAL
ACTIVITY: Ambulate with Assistance; Spoke with RN Latricia Self prior to PT evaluation--> Pt OK for PT consult/OOB activity./yes

## 2021-12-20 NOTE — SWALLOW BEDSIDE ASSESSMENT ADULT - ADDITIONAL RECOMMENDATIONS
1. Monitor PO tolerance/intake 2. Reconsult this department as patient medically optimizes for possible diet advancement 3. This department to follow up as schedule permits

## 2021-12-20 NOTE — CONSULT NOTE ADULT - SUBJECTIVE AND OBJECTIVE BOX
Chief Complaint:  Patient is a 66y old  Female who presents with a chief complaint of Symptomatic Anemia (20 Dec 2021 12:27)    Hypertension    Hyperlipidemia    Pulmonary nodules    Carotid artery disease    Emphysema    Hip pain    Obesity    Edema of both legs    Lung cancer    Stented coronary artery    Localized enlarged lymph nodes    Scoliosis    Lumbar disc disorder    COPD (chronic obstructive pulmonary disease)    History of  section    H/O: lung cancer    S/P lobectomy of lung    History of hip replacement    Stented coronary artery       HPI:  Patient is a 66 year old woman w/ past medical history of HTN, COPD, lung ca s/p R lobectomy and radiation, CAD w/ stents, anemia presenting with one week of generalized weakness. Patient states she has become progressively week for the past week. The day of admission she had presyncopal symptoms which frightened her prompting ER visit. Patient denies any fever, chills, or bloody stools. Patient states since her radiation, last session  she has had a lot of clear phlegm. She was told this was expected w/ radiation treatment. She is able to cough up the phlegm. She does endorse some pain with swallowing which she was told was also expected w/ her radiation treatment. Patient able to swallow thin liquids and pureed foods without difficulty. Patient states that her neck and right side of chest wall hurt at sites of radiation. Patient denies any left sided chest pain. Last chemo per patient was around the week of -. Of note patient admitted 2021 for anemia requiring blood transfusion as well. She states her lasix was stopped that admission and since then has had some lower extremity swelling.     GI consulted for anemia. Pt seen with  bedside. Pt with brown stools, no melena, hematochezia or hematemesis. She reports increased phlegm production. She recently had radiation on  and Chemo the week prior. She has similar presentation last admission post chemo/radiation requiring prbc.       ED Course:   ED Vitals: T 97.1, HR 102m /52, S 99% on RA.   ED labs: WBC .50. Hemoglobin 4.7. Plt 22k. Neutrophils 180. INR 1.87. Potassium 2.1. BNP 1550.   ED meds: 1 units of plts, PRBC pending.     FOBT negative     Admitted for symptomatic anemia requiring blood transfusion.  (19 Dec 2021 15:09)      penicillin (Short breath; Hives)  tetracycline (Short breath; Hives)      acetaminophen     Tablet .. 650 milliGRAM(s) Oral every 6 hours PRN  aluminum hydroxide/magnesium hydroxide/simethicone Suspension 30 milliLiter(s) Oral every 4 hours PRN  atorvastatin 40 milliGRAM(s) Oral at bedtime  carvedilol 6.25 milliGRAM(s) Oral every 12 hours  chlorhexidine 2% Cloths 1 Application(s) Topical daily  cyanocobalamin 1000 MICROGram(s) Oral daily  filgrastim-sndz (ZARXIO) SubCutaneous Injection - Peds 480 MICROGram(s) SubCutaneous daily  FIRST- Mouthwash  BLM 5 milliLiter(s) Swish and Swallow five times a day  folic acid 1 milliGRAM(s) Oral daily  melatonin 3 milliGRAM(s) Oral at bedtime PRN  oxyCODONE    IR 5 milliGRAM(s) Oral every 12 hours PRN  pantoprazole    Tablet 40 milliGRAM(s) Oral two times a day  polyethylene glycol 3350 17 Gram(s) Oral daily PRN  sucralfate suspension 1 Gram(s) Oral two times a day  tiotropium 18 MICROgram(s) Capsule 1 Capsule(s) Inhalation daily        FAMILY HISTORY:  Family history of CHF (congestive heart failure) (Aunt)          Review of Systems:    General:  No wt loss, fevers, chills, night sweats, fatigue  Eyes:  Good vision, no reported pain  ENT:  No sore throat, pain, runny nose, dysphagia  CV:  No pain, palpitations, no lightheadedness  Resp:  No dyspnea, cough, tachypnea, wheezing  GI: denies n/v/d/c, abdominal pain, melena or brbpr   :  No pain, bleeding, incontinence, nocturia  Muscle:  No pain, weakness  Neuro:  No weakness, tingling, memory problems  Psych:  No fatigue, insomnia, mood problems, depression  Endocrine:  No polyuria, polydypsia, cold/heat intolerance  Heme:  No petechiae, ecchymosis, easy bruisability  Skin:  No rash, tattoos, scars, edema    Relevant Family History:   n/c    Relevant Social History: n/c      Physical Exam:    Vital Signs:  Vital Signs Last 24 Hrs  T(C): 36.4 (20 Dec 2021 13:04), Max: 37.2 (19 Dec 2021 15:00)  T(F): 97.6 (20 Dec 2021 13:04), Max: 99 (19 Dec 2021 15:00)  HR: 79 (20 Dec 2021 13:04) (79 - 108)  BP: 139/50 (20 Dec 2021 13:04) (110/56 - 147/65)  BP(mean): --  RR: 19 (20 Dec 2021 06:12) (17 - 21)  SpO2: 98% (20 Dec 2021 13:04) (97% - 99%)  Daily Height in cm: 152.4 (19 Dec 2021 18:32)    Daily     General:  Appears stated age, well-groomed, nad  HEENT:  NC/AT,  conjunctivae clear and pink, no thyromegaly, nodules, adenopathy, no JVD  Chest:  Full & symmetric excursion, no increased effort, breath sounds clear  Cardiovascular:  Regular rhythm, S1, S2, no murmur/rub/S3/S4, no abdominal bruit, no edema  Abdomen:  Soft, non-tender, non-distended, normoactive bowel sounds,  no masses ,no hepatosplenomeagaly, no signs of chronic liver disease  Extremities:  no cyanosis,clubbing or edema  Skin:  No rash/erythema/ecchymoses/petechiae/wounds/abscess/warm/dry  Neuro/Psych:  A&Ox3  , no asterixis, no tremor, no encephalopathy    Laboratory:                            7.4    2.05  )-----------( 64       ( 20 Dec 2021 07:49 )             20.5     12-20    141  |  103  |  24<H>  ----------------------------<  111<H>  2.9<LL>   |  25  |  1.06    Ca    9.0      20 Dec 2021 07:49  Phos  2.3     12-20  Mg     1.50     12-20    TPro  6.7  /  Alb  3.5  /  TBili  4.1<H>  /  DBili  x   /  AST  16  /  ALT  18  /  AlkPhos  84  12-20    LIVER FUNCTIONS - ( 20 Dec 2021 07:49 )  Alb: 3.5 g/dL / Pro: 6.7 g/dL / ALK PHOS: 84 U/L / ALT: 18 U/L / AST: 16 U/L / GGT: x           PT/INR - ( 20 Dec 2021 07:49 )   PT: 18.2 sec;   INR: 1.63 ratio         PTT - ( 20 Dec 2021 07:49 )  PTT:31.4 sec      Imaging:          
    HISTORY OF PRESENT ILLNESS: HPI:    Patient is a 66 year old woman w/ past medical history of HTN, COPD, lung ca s/p R lobectomy and radiation, now recurrent, SVC syndrome s/p stent, CAD w/ stents, anemia presenting with one week of generalized weakness. Patient states she has become progressively week for the past week. The day of admission she had presyncopal symptoms which frightened her prompting ER visit. Patient denies any fever, chills, or bloody stools. Patient states since her radiation, last session  she has had a lot of clear phlegm. She was told this was expected w/ radiation treatment. She is able to cough up the phlegm. She does endorse some pain with swallowing which she was told was also expected w/ her radiation treatment. Patient able to swallow thin liquids and pureed foods without difficulty. Patient states that her neck and right side of chest wall hurt at sites of radiation. Patient denies any left sided chest pain. Last chemo per patient was around the week of -. Of note patient admitted 2021 for anemia requiring blood transfusion as well. She states her lasix was stopped that admission and since then has had some lower extremity swelling.     FOBT negative     Admitted for symptomatic anemia requiring blood transfusion.  (19 Dec 2021 15:09)      PAST MEDICAL & SURGICAL HISTORY:  Hypertension    Hyperlipidemia    Carotid artery disease  monitored by vascular doctor Samuel    Emphysema    Hip pain    Obesity    Edema of both legs    Lung cancer  right, , completed chemotherapy 2019    Stented coronary artery    Localized enlarged lymph nodes    Scoliosis    Lumbar disc disorder    COPD (chronic obstructive pulmonary disease)    History of  section  x2 ,     S/P lobectomy of lung  RULobectomy     History of hip replacement  right 2021    Stented coronary artery  x2 stents 2018      MEDICATIONS  (STANDING):  atorvastatin 40 milliGRAM(s) Oral at bedtime  carvedilol 6.25 milliGRAM(s) Oral every 12 hours  chlorhexidine 2% Cloths 1 Application(s) Topical daily  cyanocobalamin 1000 MICROGram(s) Oral daily  filgrastim-sndz (ZARXIO) SubCutaneous Injection - Peds 480 MICROGram(s) SubCutaneous daily  FIRST- Mouthwash  BLM 5 milliLiter(s) Swish and Swallow five times a day  folic acid 1 milliGRAM(s) Oral daily  pantoprazole    Tablet 40 milliGRAM(s) Oral two times a day  sucralfate suspension 1 Gram(s) Oral two times a day  tiotropium 18 MICROgram(s) Capsule 1 Capsule(s) Inhalation daily      Allergies  penicillin (Short breath; Hives)  tetracycline (Short breath; Hives)      FAMILY HISTORY:  Family history of CHF (congestive heart failure) (Aunt)  Non-contributary for premature coronary disease or sudden cardiac death    SOCIAL HISTORY:    [x ] Non-smoker  [ ] Smoker  [ ] Alcohol    FLU VACCINE THIS YEAR STARTS IN AUGUST:  [ ] Yes    [ ] No    IF OVER 65 HAVE YOU EVER HAD A PNA VACCINE:  [ ] Yes    [ ] No       [ ] N/A      REVIEW OF SYSTEMS:  [ ]chest pain  [  ]shortness of breath  [  ]palpitations  [  ]syncope  [ ]near syncope [ ]upper extremity weakness   [ x] lower extremity weakness  [  ]diplopia  [  ]altered mental status   [  ]fevers  [ ]chills [ ]nausea  [ ]vomiting  [  ]dysphagia    [ ]abdominal pain  [ ]melena  [ ]BRBPR    [  ]epistaxis  [  ]rash    [ ]lower extremity edema        [x ] All others negative	  [ ] Unable to obtain      LABS:	 	    CARDIAC MARKERS:  Troponin T, High Sensitivity (12.19.21 @ 10:48)    Troponin T, High Sensitivity Result: 24: Rapid changes upward or downward in high-sensitivity troponin levels  strongly suggest acute myocardial injury. Hemolysis may falsely lower  results. Renal impairment may increase results.  Normal: <6 - 14 ng/L  Indeterminate: 15 - 51 ng/L  Elevated:>51 ng/L  Please see "[email or web address]" on the [facility] intranet for  more information. ng/L                            7.4    2.05  )-----------( 64       ( 20 Dec 2021 07:49 )             20.5     Hb Trend: 7.4<--, 6.1<--    12-20    141  |  103  |  24<H>  ----------------------------<  111<H>  2.9<LL>   |  25  |  1.06    Ca    9.0      20 Dec 2021 07:49  Phos  2.3     12-20  Mg     1.50     12-20    TPro  6.7  /  Alb  3.5  /  TBili  4.1<H>  /  DBili  x   /  AST  16  /  ALT  18  /  AlkPhos  84  12-    Creatinine Trend: 1.06<--, 1.14<--, 1.13<--, 1.08<--, 1.04<--, 1.09<--    Coags:  PT/INR - ( 20 Dec 2021 07:49 )   PT: 18.2 sec;   INR: 1.63 ratio       PTT - ( 20 Dec 2021 07:49 )  PTT:31.4 sec    PHYSICAL EXAM:  T(C): 36.7 (21 @ 06:12), Max: 37.2 (21 @ 15:00)  HR: 101 (21 @ 06:12) (80 - 108)  BP: 146/49 (21 @ 06:12) (110/56 - 147/65)  RR: 19 (21 @ 06:12) (17 - 21)  SpO2: 97% (21 @ 06:12) (97% - 99%)  Wt(kg): --   BMI (kg/m2): 31.8 (21 @ 18:32)  I&O's Summary    20 Dec 2021 07:01  -  20 Dec 2021 12:28  --------------------------------------------------------  IN: 237 mL / OUT: 0 mL / NET: 237 mL        Gen: Appears well in NAD  HEENT:  (-)icterus (-)pallor  CV: N S1 S2 1/6 MIKO (+)2 Pulses B/l  Resp:  Clear to ausculatation B/L, normal effort  GI: (+) BS Soft, NT, ND  Lymph:  (-)Edema, (-)obvious lymphadenopathy  Skin: Warm to touch, Normal turgor  Psych: Appropriate mood and affect        ECG:  	NSR    < from: Xray Chest 1 View- PORTABLE-Urgent (21 @ 10:52) >  IMPRESSION: Trace right pleural effusion and/or right basilar atelectasis.    < end of copied text >      ASSESSMENT/PLAN: 	Patient is a 66 year old woman w/ past medical history of HTN, COPD, lung ca s/p R lobectomy and radiation, now recurrent, SVC syndrome s/p stent, CAD w/ stents, anemia presenting with one week of generalized weakness.      --found to be anemic, s/p PRBCs  --f/u heme onc  --no further cardiac work up planned  --ideally would like her to be on SAPT for prior PCI  --s/p SVC stenting in IR 2021, f/u IR reccs for AC vs APT management when stable    
Patient is a 66y old  Female who presents with a chief complaint of feeling of fatigue, lightheadeness, no fevers or chills, no visible bleeding and a detailed ROS otherwise unremarkable.     PAST MEDICAL & SURGICAL HISTORY:  Hypertension    Hyperlipidemia    Pulmonary nodules  yearly CT scans    Carotid artery disease  monitored by vascluar doctor Samuel    Emphysema    Hip pain    Obesity    Edema of both legs    Lung cancer  right, 2019, completed chemotherapy 2019    Stented coronary artery    Localized enlarged lymph nodes    Scoliosis    Lumbar disc disorder    COPD (chronic obstructive pulmonary disease)    History of  section  x2 ,     S/P lobectomy of lung  RULobectomy     History of hip replacement  right 2021    Stented coronary artery  x2 stents 2018        acetaminophen     Tablet .. 650 milliGRAM(s) Oral every 6 hours PRN  aluminum hydroxide/magnesium hydroxide/simethicone Suspension 30 milliLiter(s) Oral every 4 hours PRN  filgrastim-sndz (ZARXIO) SubCutaneous Injection - Peds 480 MICROGram(s) SubCutaneous daily  melatonin 3 milliGRAM(s) Oral at bedtime PRN      penicillin (Short breath; Hives)  tetracycline (Short breath; Hives)      FAMILY HISTORY:  Family history of CHF (congestive heart failure) (Aunt)        Vital Signs Last 24 Hrs  T(C): 36.2 (19 Dec 2021 09:26), Max: 36.2 (19 Dec 2021 09:26)  T(F): 97.1 (19 Dec 2021 09:26), Max: 97.1 (19 Dec 2021 09:26)  HR: 102 (19 Dec 2021 09:26) (102 - 102)  BP: 152/52 (19 Dec 2021 09:26) (152/52 - 152/52)  BP(mean): --  RR: 20 (19 Dec 2021 09:26) (20 - 20)  SpO2: 99% (19 Dec 2021 09:26) (99% - 99%)                          4.7    0.50  )-----------( 22       ( 19 Dec 2021 10:48 )             13.8       12-    140  |  101  |  25<H>  ----------------------------<  137<H>  3.1<L>   |  23  |  1.14    Ca    8.9      19 Dec 2021 10:48    TPro  6.7  /  Alb  3.9  /  TBili  3.3<H>  /  DBili  x   /  AST  22  /  ALT  21  /  AlkPhos  88  12-19      PT/INR - ( 19 Dec 2021 10:48 )   PT: 20.7 sec;   INR: 1.87 ratio         PTT - ( 19 Dec 2021 10:48 )  PTT:37.5 sec

## 2021-12-20 NOTE — PHYSICAL THERAPY INITIAL EVALUATION ADULT - PERTINENT HX OF CURRENT PROBLEM, REHAB EVAL
Patient is a 66 year old woman w/ past medical history of HTN, COPD, lung ca s/p Right lobectomy and radiation, CAD w/ stents, anemia presenting with one week of generalized weakness found to have hemoglobin of 4.7 likely 2/2 chemo and underlying malignancy.

## 2021-12-20 NOTE — SWALLOW BEDSIDE ASSESSMENT ADULT - COMMENTS
Per H&P report, patient is a "66 year old woman w/ past medical history of HTN, COPD, lung ca s/p R lobectomy and radiation, CAD w/ stents, anemia presenting with one week of generalized weakness found to have hemoglobin of 4.7 likely 2/2 chemo and underlying malignancy."    WBC is reduced. Most recent CXR revealed "Trace right pleural effusion and/or right basilar atelectasis.".    Patient seen at bedside this AM for an initial assessment of the swallow function, at which time patient was alert and cooperative. Patient is known to this department as patient was seen during a previous admission on 9/4/21 (please see report for details), at which time regular solids with thin liquids was recommended. Patient reporting recently completing radiation and chemotherapy. Patient has been experiencing severe odynophagia, rating pain a 10 (out of a 10 pain scale), which as a result has led to patient having decreased PO intake. Patient also presenting/reporting increased phlegm with volitional expectoration to clear phlegm from throat. Patient also stating coughing on thin liquids at times. Patient denies globus sensation while eating/drinking.

## 2021-12-20 NOTE — PHYSICAL THERAPY INITIAL EVALUATION ADULT - ADDITIONAL COMMENTS
Pt reports that she lives in a private house with her daughter, son-in law, grandchildren, and son with 2 steps to enter; (+)bilateral handrails. Prior to hospital admission pt was completely independent and used a single axis cane PRN with household ambulation and used a rollator with community ambulation. Pt denies any recent falls.    Pt left comfortable seated @ edge of bed, NAD, all lines intact, all precautions maintained, with call bell in reach, and RN aware.

## 2021-12-20 NOTE — PHYSICAL THERAPY INITIAL EVALUATION ADULT - DIAGNOSIS, PT EVAL
Pt admitted for Anemia; pt presents with decreased strength, decreased balance, and decreased aerobic capacity/endurance.

## 2021-12-20 NOTE — SWALLOW BEDSIDE ASSESSMENT ADULT - ASR SWALLOW RECOMMEND DIAG
Cinesophagram NOT warranted given patient with overt signs of penetration/aspiration on thin liquids

## 2021-12-20 NOTE — CONSULT NOTE ADULT - ASSESSMENT
66 year old woman w/ past medical history of HTN, COPD, lung ca s/p R lobectomy and radiation, CAD w/ stents, anemia presenting with one week of generalized weakness. GI consulted for anemia     Anemia   without overt gi bleeding   favor related to recent Chemo/Radiation Tx; similar presentation last admission post tx  protonix 40mg BID w/Carafate Syrup BID   will avoid endoscopy as she is likely a difficult airway in the event that intubation is emergently needed   no objection to resuming A/C or SAPT (gi ppx as above)  puree diet per MBS       Throat Ca  per oncology and medicine teams    I reviewed the overnight course of events on the unit, re-confirming the patient history. I discussed the care with the patient and their family. The plan of care was discussed with the physician assistant and modifications were made to the notation where appropriate. Differential diagnosis and plan of care discussed with patient after the evaluation. Advanced care planning was discussed with patient and family.  Advanced care planning forms were reviewed and discussed.  Risks, benefits and alternatives of gastroenterologic procedures were discussed in detail and all questions were answered. 35 minutes spent on total encounter of which more than fifty percent of the encounter was spent counseling and/or coordinating care by the attending physician.   
CARDIOLOGY ATTENDING    Patient seen and examined. Agree with above. No further inpatient cardiac workup expected.
66F wit recurrent stage 3c adenoacarcinoma, s/p RT and last dose of chemo with pemetrexed + carbo a few days ago, here with pancytopenia, will recommend:    - transfuse PRBCs to keep hgb ~ 8  - transfuse plts to keep plts > 50K as pt had some GI bleeding before and has not been taking protonix regularly as per her  - ALL BLOOD PRODUCTS TO BE LD, IRRADIATED  - zarxio 480 mcg daily for 5 days or until ANC ~ 5K  - if any fevers or shaking chills, pancultures and start broad spectrum abx (pt has a port)  - b12 1000 mcg dialy and folate 1 mg daily  - protonix 40 mg bid PO or infusion  - all other supportive RX  - hold off anticoagulation for now  - discussed in detail with her , ER team and Dr Gibson as well as the covering PA ([id number])

## 2021-12-21 ENCOUNTER — APPOINTMENT (OUTPATIENT)
Dept: ORTHOPEDIC SURGERY | Facility: CLINIC | Age: 66
End: 2021-12-21

## 2021-12-21 LAB
ALBUMIN SERPL ELPH-MCNC: 3.2 G/DL — LOW (ref 3.3–5)
ALP SERPL-CCNC: 86 U/L — SIGNIFICANT CHANGE UP (ref 40–120)
ALT FLD-CCNC: 16 U/L — SIGNIFICANT CHANGE UP (ref 4–33)
ANION GAP SERPL CALC-SCNC: 13 MMOL/L — SIGNIFICANT CHANGE UP (ref 7–14)
APPEARANCE UR: ABNORMAL
AST SERPL-CCNC: 16 U/L — SIGNIFICANT CHANGE UP (ref 4–32)
BASOPHILS # BLD AUTO: 0 K/UL — SIGNIFICANT CHANGE UP (ref 0–0.2)
BASOPHILS NFR BLD AUTO: 0 % — SIGNIFICANT CHANGE UP (ref 0–2)
BILIRUB SERPL-MCNC: 2.6 MG/DL — HIGH (ref 0.2–1.2)
BILIRUB UR-MCNC: ABNORMAL
BUN SERPL-MCNC: 26 MG/DL — HIGH (ref 7–23)
CALCIUM SERPL-MCNC: 8.8 MG/DL — SIGNIFICANT CHANGE UP (ref 8.4–10.5)
CHLORIDE SERPL-SCNC: 104 MMOL/L — SIGNIFICANT CHANGE UP (ref 98–107)
CO2 SERPL-SCNC: 24 MMOL/L — SIGNIFICANT CHANGE UP (ref 22–31)
COLOR SPEC: ABNORMAL
CREAT SERPL-MCNC: 1.17 MG/DL — SIGNIFICANT CHANGE UP (ref 0.5–1.3)
DIFF PNL FLD: ABNORMAL
EOSINOPHIL # BLD AUTO: 0.04 K/UL — SIGNIFICANT CHANGE UP (ref 0–0.5)
EOSINOPHIL NFR BLD AUTO: 0.9 % — SIGNIFICANT CHANGE UP (ref 0–6)
GLUCOSE SERPL-MCNC: 107 MG/DL — HIGH (ref 70–99)
GLUCOSE UR QL: NEGATIVE — SIGNIFICANT CHANGE UP
HCT VFR BLD CALC: 20.3 % — CRITICAL LOW (ref 34.5–45)
HGB BLD-MCNC: 7.2 G/DL — LOW (ref 11.5–15.5)
IANC: 3.14 K/UL — SIGNIFICANT CHANGE UP (ref 1.5–8.5)
KETONES UR-MCNC: ABNORMAL
LEUKOCYTE ESTERASE UR-ACNC: ABNORMAL
LYMPHOCYTES # BLD AUTO: 0.25 K/UL — LOW (ref 1–3.3)
LYMPHOCYTES # BLD AUTO: 5.3 % — LOW (ref 13–44)
MAGNESIUM SERPL-MCNC: 1.8 MG/DL — SIGNIFICANT CHANGE UP (ref 1.6–2.6)
MCHC RBC-ENTMCNC: 30.5 PG — SIGNIFICANT CHANGE UP (ref 27–34)
MCHC RBC-ENTMCNC: 35.5 GM/DL — SIGNIFICANT CHANGE UP (ref 32–36)
MCV RBC AUTO: 86 FL — SIGNIFICANT CHANGE UP (ref 80–100)
MONOCYTES # BLD AUTO: 0.33 K/UL — SIGNIFICANT CHANGE UP (ref 0–0.9)
MONOCYTES NFR BLD AUTO: 7.1 % — SIGNIFICANT CHANGE UP (ref 2–14)
NEUTROPHILS # BLD AUTO: 3.48 K/UL — SIGNIFICANT CHANGE UP (ref 1.8–7.4)
NEUTROPHILS NFR BLD AUTO: 69 % — SIGNIFICANT CHANGE UP (ref 43–77)
NITRITE UR-MCNC: NEGATIVE — SIGNIFICANT CHANGE UP
PH UR: 6 — SIGNIFICANT CHANGE UP (ref 5–8)
PHOSPHATE SERPL-MCNC: 2 MG/DL — LOW (ref 2.5–4.5)
PLATELET # BLD AUTO: 95 K/UL — LOW (ref 150–400)
POTASSIUM SERPL-MCNC: 3 MMOL/L — LOW (ref 3.5–5.3)
POTASSIUM SERPL-SCNC: 3 MMOL/L — LOW (ref 3.5–5.3)
PROT SERPL-MCNC: 6.4 G/DL — SIGNIFICANT CHANGE UP (ref 6–8.3)
PROT UR-MCNC: ABNORMAL
RBC # BLD: 2.36 M/UL — LOW (ref 3.8–5.2)
RBC # FLD: 16.2 % — HIGH (ref 10.3–14.5)
SODIUM SERPL-SCNC: 141 MMOL/L — SIGNIFICANT CHANGE UP (ref 135–145)
SP GR SPEC: 1.03 — SIGNIFICANT CHANGE UP (ref 1–1.05)
UROBILINOGEN FLD QL: ABNORMAL
WBC # BLD: 4.68 K/UL — SIGNIFICANT CHANGE UP (ref 3.8–10.5)
WBC # FLD AUTO: 4.68 K/UL — SIGNIFICANT CHANGE UP (ref 3.8–10.5)

## 2021-12-21 RX ORDER — APIXABAN 2.5 MG/1
5 TABLET, FILM COATED ORAL EVERY 12 HOURS
Refills: 0 | Status: DISCONTINUED | OUTPATIENT
Start: 2021-12-21 | End: 2021-12-22

## 2021-12-21 RX ORDER — POTASSIUM CHLORIDE 20 MEQ
10 PACKET (EA) ORAL
Refills: 0 | Status: COMPLETED | OUTPATIENT
Start: 2021-12-21 | End: 2021-12-21

## 2021-12-21 RX ADMIN — Medication 100 MILLIEQUIVALENT(S): at 13:17

## 2021-12-21 RX ADMIN — Medication 1 PACKET(S): at 06:15

## 2021-12-21 RX ADMIN — DIPHENHYDRAMINE HYDROCHLORIDE AND LIDOCAINE HYDROCHLORIDE AND ALUMINUM HYDROXIDE AND MAGNESIUM HYDRO 5 MILLILITER(S): KIT at 19:47

## 2021-12-21 RX ADMIN — DIPHENHYDRAMINE HYDROCHLORIDE AND LIDOCAINE HYDROCHLORIDE AND ALUMINUM HYDROXIDE AND MAGNESIUM HYDRO 5 MILLILITER(S): KIT at 08:53

## 2021-12-21 RX ADMIN — Medication 480 MICROGRAM(S): at 19:47

## 2021-12-21 RX ADMIN — OXYCODONE HYDROCHLORIDE 5 MILLIGRAM(S): 5 TABLET ORAL at 19:03

## 2021-12-21 RX ADMIN — Medication 1 GRAM(S): at 06:15

## 2021-12-21 RX ADMIN — TIOTROPIUM BROMIDE 1 CAPSULE(S): 18 CAPSULE ORAL; RESPIRATORY (INHALATION) at 10:12

## 2021-12-21 RX ADMIN — ATORVASTATIN CALCIUM 40 MILLIGRAM(S): 80 TABLET, FILM COATED ORAL at 22:08

## 2021-12-21 RX ADMIN — Medication 100 MILLIEQUIVALENT(S): at 10:57

## 2021-12-21 RX ADMIN — Medication 1 GRAM(S): at 18:03

## 2021-12-21 RX ADMIN — OXYCODONE HYDROCHLORIDE 5 MILLIGRAM(S): 5 TABLET ORAL at 18:03

## 2021-12-21 RX ADMIN — CARVEDILOL PHOSPHATE 6.25 MILLIGRAM(S): 80 CAPSULE, EXTENDED RELEASE ORAL at 06:15

## 2021-12-21 RX ADMIN — PANTOPRAZOLE SODIUM 40 MILLIGRAM(S): 20 TABLET, DELAYED RELEASE ORAL at 18:03

## 2021-12-21 RX ADMIN — Medication 1 MILLIGRAM(S): at 11:19

## 2021-12-21 RX ADMIN — PANTOPRAZOLE SODIUM 40 MILLIGRAM(S): 20 TABLET, DELAYED RELEASE ORAL at 06:15

## 2021-12-21 RX ADMIN — PREGABALIN 1000 MICROGRAM(S): 225 CAPSULE ORAL at 11:18

## 2021-12-21 RX ADMIN — DIPHENHYDRAMINE HYDROCHLORIDE AND LIDOCAINE HYDROCHLORIDE AND ALUMINUM HYDROXIDE AND MAGNESIUM HYDRO 5 MILLILITER(S): KIT at 15:33

## 2021-12-21 RX ADMIN — DIPHENHYDRAMINE HYDROCHLORIDE AND LIDOCAINE HYDROCHLORIDE AND ALUMINUM HYDROXIDE AND MAGNESIUM HYDRO 5 MILLILITER(S): KIT at 00:59

## 2021-12-21 RX ADMIN — CARVEDILOL PHOSPHATE 6.25 MILLIGRAM(S): 80 CAPSULE, EXTENDED RELEASE ORAL at 18:04

## 2021-12-21 RX ADMIN — CHLORHEXIDINE GLUCONATE 1 APPLICATION(S): 213 SOLUTION TOPICAL at 11:20

## 2021-12-21 RX ADMIN — DIPHENHYDRAMINE HYDROCHLORIDE AND LIDOCAINE HYDROCHLORIDE AND ALUMINUM HYDROXIDE AND MAGNESIUM HYDRO 5 MILLILITER(S): KIT at 11:19

## 2021-12-21 RX ADMIN — Medication 81 MILLIGRAM(S): at 11:18

## 2021-12-21 RX ADMIN — Medication 100 MILLIEQUIVALENT(S): at 13:16

## 2021-12-21 NOTE — PROGRESS NOTE ADULT - PROBLEM SELECTOR PLAN 1
Patient presenting w/ symptomatic anemia. Pancytopenia from Chemo for underlying malignancy.     - maintain hemoglobin > 7 per heme/onc  Can restart Eliquis
Patient presenting w/ symptomatic anemia. Pancytopenia from Chemo for underlying malignancy.     PRBC transfused, Hb improved

## 2021-12-21 NOTE — PROGRESS NOTE ADULT - PROBLEM SELECTOR PLAN 2
- keep pts >50k per heme/onc, patient w/ prior history of GI bleed   - zarxio 480 mcg daily for 5 days or until ANC ~ 5K  - if any fevers or shaking chills, low threshold to panculture and start broad spectrum abx   - management of anemia as above   - heme/onc recs appreciated
- keep pts >50k per heme/onc, patient w/ prior history of GI bleed   - zarxio 480 mcg daily for 5 days or until ANC ~ 5K  - if any fevers or shaking chills, low threshold to panculture and start broad spectrum abx   - management of anemia as above   - heme/onc recs appreciated

## 2021-12-21 NOTE — PROGRESS NOTE ADULT - PROBLEM SELECTOR PLAN 6
patient w/ history of edema of lower extremities. Echo from 2019 appreciated w/ hyperdynamic LV, no reduced EF. Was on lasix prior to last admission but seems to have been discontinued due to low BP.     - continue holding lasix as patient w/ presyncopal symptoms, poor PO  - reassess throughout admission ability to restart  - lungs clear on exam
patient w/ history of edema of lower extremities. Echo from 2019 appreciated w/ hyperdynamic LV, no reduced EF. Was on lasix prior to last admission but seems to have been discontinued due to low BP.     - continue holding lasix as patient w/ presyncopal symptoms, poor PO  - reassess throughout admission ability to restart  - lungs clear on exam

## 2021-12-21 NOTE — CHART NOTE - NSCHARTNOTEFT_GEN_A_CORE
Per GI- no objection to resuming A/C or SAPT w/ GI PPX. Per discussion with Dr. Gibson, will resume ASA tonight and follow up repeat CBC in am. Pt remaining hemodynamically stable and no sx of bleeding at this time.
Per discussion with Dr. Vivian presley to replete electrolytes. Will repeat labs later on today.
Case reviewed with Dr. Boyd, labs stable - will resume Eliquis.  Pt with worsening RUE swelling, ordered [facility] duplex.
Ref number: [id number]    Rx Written	Rx Dispensed	Drug	Quantity	Days Supply	Prescriber Name  10/11/2021	10/12/2021	alprazolam 0.25 mg tablet	10	10	Gera Guevara MD  Prescriber [place] # [id number]  Payment Method Insurance  Dispenser [facility] #[id number]  12/13/2021	12/13/2021	oxycodone hcl (ir) 5 mg tablet	60	30	Donte Duncan  Prescriber [place] # [id number]  Payment Method Insurance  Dispenser [facility] #[id number]  05/25/2021	05/25/2021	tramadol hcl 50 mg tablet	28	7	Angelic Duron A, [facility]  Prescriber [place] # [id number]  Payment Method Cash  Dispenser [facility] #[id number]    Patient Name: Kimberly Carlisle Date: 1955  Address: [id number] [place] [id number]: Female  Rx Written	Rx Dispensed	Drug	Quantity	Days Supply	Prescriber Name  07/27/2021	07/28/2021	oxycodone hcl 5 mg tablet	8	2	Alpa Huang S  Prescriber [facility] # [id number]  Payment Method Insurance  Dispenser [facility] #[id number]  05/25/2021	05/25/2021	oxycodone hcl 5 mg tablet	42	7	Angelic Duron A [facility]  Prescriber [place] # [id number]  Payment Method Insurance  Dispenser [facility] #[id number]    Patient Name: Kimberly Carlisle Date: 1955  Address: [address] [address] [place] [id number]: Female  Rx Written	Rx Dispensed	Drug	Quantity	Days Supply	Prescriber Name	Prescriber [place] #  01/09/2021	01/10/2021	tramadol hcl 50 mg tablet	60	30	Jen Gibson	[id number]  Payment Method Insurance  Dispenser [facility] #[id number]

## 2021-12-21 NOTE — HISTORY OF PRESENT ILLNESS
[FreeTextEntry1] : 65 year old female, former smoker (1/2 PPD x 30 years, quit Jan 2018), now 2yr s/p RULobectomy with MLND on 9/3/19. 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 Pathology of right rib excision was benign, revealed bone. Visceral pleura invasion and HYACINTH present. She completed chemotherapy on 12/10/19 with Dr. Guevara. Later with SVC syndrome s/p stenting, cervical LN bx positive, g3 NSCLC\par \par 10/28/21\par -Tolerating tx\par -Eating ok\par -Voice a little hoarse\par -no complaints of pain noted\par \par 11/4/21\par -Tolerating tx\par -Has dysphagia and hoarse voice, mild pain swallowing\par -Eating liquids\par -No coughing but SOB\par -Uses a walker to get around.\par \par 11/11/21\par -Tolerated tx\par -Has increase dysphagia. ,using Carafate.\par -No coughing or SOB at this time\par -Pt feeling fatigued \par \par 11/18/21\par -tolerated tx\par -will complete in 2 weeks\par -Eating liquids and small amt soft solids\par -Pt having pain in throats and ears. Taking Carafate and magic mixture daily. Odynophagia causing dietary disturbance. Is trying softs, grits, smoothies. Tolerating with some difficulty\par -Fatigue noted\par -SOB due to COPD\par \par 11/29/21 \par Patient seen today for pre-treatment OTV post inpatient discharge. Went to [facility] 11/21/21 due to fatigue, was found to have Hb 6.6 and pancytopenia, was given 2 units PRBCs, discharged 11.26.21. Was evaluated by GI, did not have biopsy or EGD, was treated "like an ulcer" per patient. Was given PPI, carafate, magic mouthwash. Believes she had stool FOBT which was negative. Unfortunately no documentation available in [place] at this time. \par Patient stated she feels better today.  Tolerating soft solids and some gentle solids. Less fatigued but still somewhat weak. No hematemesis, hemoptysis or melena. Saw Dr Guevara today, states repeat Hb 10. Not in chart however. Was told she would still get her last chemo next week. Cough with clear sputum. No SOB beyond her baseline COPD. Denies palps, dizziness on standing, lightheadedness. \par \par 12/2/21\par -Tolerated tx\par -will complete next week-given discharge packet\par -Pt very fatigued but feeling stronger than last week\par -Eating soft foods and liquids\par \par 12/9/21\par -Tolerating tx and completed today. Still fatigued, no change from prior. Primarily attributed to chemo. \par -Able to eat a little better. Using magic mouthwash at home prn. Also on protonix. Uses carafate prn. \par -Still having dry cough in am\par -SOB on exertion\par -No complaint of pain

## 2021-12-21 NOTE — DISEASE MANAGEMENT
[Clinical] : TNM Stage: c [N/A] : Currently not applicable [FreeTextEntry4] : NSCLC w/ SVC syndrome s/p stenting, + cervical LN [TTNM] : - [NTNM] : - [MTNM] : - [de-identified] : 25 tx/5000cGy [de-identified] : 30 tx/6000cGy [de-identified] : mediastinum

## 2021-12-21 NOTE — PHYSICAL EXAM
[General Appearance - Well Developed] : well developed [General Appearance - Well Nourished] : well nourished [Extraocular Movements] : extraocular movements were intact [Hearing Threshold Finger Rub Not [place]] : hearing was normal [] : no respiratory distress [Exaggerated Use Of Accessory Muscles For Inspiration] : no accessory muscle use [Edema] : no peripheral edema present [Abdomen Soft] : soft [Nondistended] : nondistended [Abdomen Tenderness] : non-tender [Cervical Lymph Nodes Enlarged Posterior Bilaterally] : posterior cervical [Cervical Lymph Nodes Enlarged Anterior Bilaterally] : anterior cervical [Nail Clubbing] : no clubbing  or cyanosis of the fingernails [Normal] : oriented to person, place and time, the affect was normal, the mood was normal and not anxious [de-identified] : grade 1 RT dermatitis and hyperpigmentation on anterior neck.

## 2021-12-21 NOTE — DISEASE MANAGEMENT
[Clinical] : TNM Stage: c [N/A] : Currently not applicable [FreeTextEntry4] : NSCLC w/ SVC syndrome s/p stenting, + cervical LN [TTNM] : - [NTNM] : - [MTNM] : - [de-identified] : 30tx/6000cGy [de-identified] : 30 tx/6000cGy [de-identified] : mediastinum

## 2021-12-21 NOTE — PROVIDER CONTACT NOTE (CRITICAL VALUE NOTIFICATION) - SITUATION
S/P 2 U PRBC
WBC 0/93, hemoglobin 6.1, hematocrit 17.8
Hemoglobin 7.2, Hematocrit 20.3  Patient asymptomatic

## 2021-12-21 NOTE — HISTORY OF PRESENT ILLNESS
[FreeTextEntry1] : 65 year old female, former smoker (1/2 PPD x 30 years, quit Jan 2018), now 2yr s/p RULobectomy with MLND on 9/3/19. 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 Pathology of right rib excision was benign, revealed bone. Visceral pleura invasion and HYACINTH present. She completed chemotherapy on 12/10/19 with Dr. Guevara. Later with SVC syndrome s/p stenting, cervical LN bx positive, g3 NSCLC\par \par 10/28/21\par -Tolerating tx\par -Eating ok\par -Voice a little hoarse\par -no complaints of pain noted\par \par 11/4/21\par -Tolerating tx\par -Has dysphagia and hoarse voice, mild pain swallowing\par -Eating liquids\par -No coughing but SOB\par -Uses a walker to get around.\par \par 11/11/21\par -Tolerated tx\par -Has increase dysphagia. ,using Carafate.\par -No coughing or SOB at this time\par -Pt feeling fatigued \par \par 11/18/21\par -tolerated tx\par -will complete in 2 weeks\par -Eating liquids and small amt soft solids\par -Pt having pain in throats and ears. Taking Carafate and magic mixture daily. Odynophagia causing dietary disturbance. Is trying softs, grits, smoothies. Tolerating with some difficulty\par -Fatigue noted\par -SOB due to COPD\par \par 11/29/21 \par Patient seen today for pre-treatment OTV post inpatient discharge. Went to [facility] 11/21/21 due to fatigue, was found to have Hb 6.6 and pancytopenia, was given 2 units PRBCs, discharged 11.26.21. Was evaluated by GI, did not have biopsy or EGD, was treated "like an ulcer" per patient. Was given PPI, carafate, magic mouthwash. Believes she had stool FOBT which was negative. Unfortunately no documentation available in [place] at this time. \par Patient stated she feels better today.  Tolerating soft solids and some gentle solids. Less fatigued but still somewhat weak. No hematemesis, hemoptysis or melena. Saw Dr Guevara today, states repeat Hb 10. Not in chart however. Was told she would still get her last chemo next week. Cough with clear sputum. No SOB beyond her baseline COPD. Denies palps, dizziness on standing, lightheadedness. \par \par 12/2/21\par -Tolerated tx\par -will complete next week-given discharge packet\par -Pt very fatigued but feeling stronger than last week\par -Eating soft foods and liquids

## 2021-12-21 NOTE — PROGRESS NOTE ADULT - PROBLEM SELECTOR PLAN 5
patient with history of hld on atorvastatin     - c/w home statin
patient with history of hld on atorvastatin     - c/w home statin

## 2021-12-21 NOTE — PROGRESS NOTE ADULT - PROBLEM SELECTOR PLAN 4
Patient w/ history of hypertension on coreg  - c/w home coreg w/ hold parameters
Patient w/ history of hypertension on coreg  - c/w home coreg w/ hold parameters

## 2021-12-21 NOTE — PROGRESS NOTE ADULT - PROBLEM SELECTOR PLAN 3
Patient w/ history of lung ca s/p radiation, chemo and lobectomy     - heme/onc recs appreciated, outpatient follow up
Patient w/ history of lung ca s/p radiation, chemo and lobectomy     - heme/onc recs appreciated, outpatient follow up

## 2021-12-22 ENCOUNTER — TRANSCRIPTION ENCOUNTER (OUTPATIENT)
Age: 66
End: 2021-12-22

## 2021-12-22 VITALS — HEART RATE: 82 BPM | SYSTOLIC BLOOD PRESSURE: 152 MMHG | DIASTOLIC BLOOD PRESSURE: 64 MMHG

## 2021-12-22 DIAGNOSIS — D70.9 NEUTROPENIA, UNSPECIFIED: ICD-10-CM

## 2021-12-22 LAB
ALBUMIN SERPL ELPH-MCNC: 3.4 G/DL — SIGNIFICANT CHANGE UP (ref 3.3–5)
ALP SERPL-CCNC: 113 U/L — SIGNIFICANT CHANGE UP (ref 40–120)
ALT FLD-CCNC: 14 U/L — SIGNIFICANT CHANGE UP (ref 4–33)
ANION GAP SERPL CALC-SCNC: 13 MMOL/L — SIGNIFICANT CHANGE UP (ref 7–14)
AST SERPL-CCNC: 17 U/L — SIGNIFICANT CHANGE UP (ref 4–32)
BASOPHILS # BLD AUTO: 0.09 K/UL — SIGNIFICANT CHANGE UP (ref 0–0.2)
BASOPHILS NFR BLD AUTO: 1 % — SIGNIFICANT CHANGE UP (ref 0–2)
BILIRUB SERPL-MCNC: 2 MG/DL — HIGH (ref 0.2–1.2)
BLD GP AB SCN SERPL QL: NEGATIVE — SIGNIFICANT CHANGE UP
BUN SERPL-MCNC: 26 MG/DL — HIGH (ref 7–23)
CALCIUM SERPL-MCNC: 9.2 MG/DL — SIGNIFICANT CHANGE UP (ref 8.4–10.5)
CHLORIDE SERPL-SCNC: 102 MMOL/L — SIGNIFICANT CHANGE UP (ref 98–107)
CO2 SERPL-SCNC: 25 MMOL/L — SIGNIFICANT CHANGE UP (ref 22–31)
CREAT SERPL-MCNC: 1.28 MG/DL — SIGNIFICANT CHANGE UP (ref 0.5–1.3)
EOSINOPHIL # BLD AUTO: 0.01 K/UL — SIGNIFICANT CHANGE UP (ref 0–0.5)
EOSINOPHIL NFR BLD AUTO: 0.1 % — SIGNIFICANT CHANGE UP (ref 0–6)
GLUCOSE SERPL-MCNC: 86 MG/DL — SIGNIFICANT CHANGE UP (ref 70–99)
HCT VFR BLD CALC: 21.9 % — LOW (ref 34.5–45)
HGB BLD-MCNC: 7.6 G/DL — LOW (ref 11.5–15.5)
IANC: 6.01 K/UL — SIGNIFICANT CHANGE UP (ref 1.5–8.5)
IMM GRANULOCYTES NFR BLD AUTO: 15.2 % — HIGH (ref 0–1.5)
LYMPHOCYTES # BLD AUTO: 0.62 K/UL — LOW (ref 1–3.3)
LYMPHOCYTES # BLD AUTO: 6.9 % — LOW (ref 13–44)
MAGNESIUM SERPL-MCNC: 1.7 MG/DL — SIGNIFICANT CHANGE UP (ref 1.6–2.6)
MCHC RBC-ENTMCNC: 31.1 PG — SIGNIFICANT CHANGE UP (ref 27–34)
MCHC RBC-ENTMCNC: 34.7 GM/DL — SIGNIFICANT CHANGE UP (ref 32–36)
MCV RBC AUTO: 89.8 FL — SIGNIFICANT CHANGE UP (ref 80–100)
MONOCYTES # BLD AUTO: 0.84 K/UL — SIGNIFICANT CHANGE UP (ref 0–0.9)
MONOCYTES NFR BLD AUTO: 9.4 % — SIGNIFICANT CHANGE UP (ref 2–14)
NEUTROPHILS # BLD AUTO: 6.01 K/UL — SIGNIFICANT CHANGE UP (ref 1.8–7.4)
NEUTROPHILS NFR BLD AUTO: 67.4 % — SIGNIFICANT CHANGE UP (ref 43–77)
NRBC # BLD: 0 /100 WBCS — SIGNIFICANT CHANGE UP
NRBC # FLD: 0.02 K/UL — HIGH
PHOSPHATE SERPL-MCNC: 1.9 MG/DL — LOW (ref 2.5–4.5)
PLATELET # BLD AUTO: 131 K/UL — LOW (ref 150–400)
POTASSIUM SERPL-MCNC: 3.1 MMOL/L — LOW (ref 3.5–5.3)
POTASSIUM SERPL-SCNC: 3.1 MMOL/L — LOW (ref 3.5–5.3)
PROT SERPL-MCNC: 6.8 G/DL — SIGNIFICANT CHANGE UP (ref 6–8.3)
RBC # BLD: 2.44 M/UL — LOW (ref 3.8–5.2)
RBC # FLD: 16 % — HIGH (ref 10.3–14.5)
RH IG SCN BLD-IMP: POSITIVE — SIGNIFICANT CHANGE UP
SODIUM SERPL-SCNC: 140 MMOL/L — SIGNIFICANT CHANGE UP (ref 135–145)
WBC # BLD: 8.93 K/UL — SIGNIFICANT CHANGE UP (ref 3.8–10.5)
WBC # FLD AUTO: 8.93 K/UL — SIGNIFICANT CHANGE UP (ref 3.8–10.5)

## 2021-12-22 PROCEDURE — 93971 EXTREMITY STUDY: CPT | Mod: 26

## 2021-12-22 RX ORDER — PREGABALIN 225 MG/1
1 CAPSULE ORAL
Qty: 0 | Refills: 0 | DISCHARGE
Start: 2021-12-22

## 2021-12-22 RX ORDER — FOLIC ACID 0.8 MG
1 TABLET ORAL
Qty: 0 | Refills: 0 | DISCHARGE
Start: 2021-12-22

## 2021-12-22 RX ORDER — SODIUM,POTASSIUM PHOSPHATES 278-250MG
1 POWDER IN PACKET (EA) ORAL
Refills: 0 | Status: DISCONTINUED | OUTPATIENT
Start: 2021-12-22 | End: 2021-12-22

## 2021-12-22 RX ORDER — FOLIC ACID 0.8 MG
1 TABLET ORAL
Qty: 30 | Refills: 0
Start: 2021-12-22 | End: 2022-01-20

## 2021-12-22 RX ORDER — POTASSIUM CHLORIDE 20 MEQ
10 PACKET (EA) ORAL
Refills: 0 | Status: COMPLETED | OUTPATIENT
Start: 2021-12-22 | End: 2021-12-22

## 2021-12-22 RX ORDER — PREGABALIN 225 MG/1
1 CAPSULE ORAL
Qty: 30 | Refills: 0
Start: 2021-12-22

## 2021-12-22 RX ADMIN — DIPHENHYDRAMINE HYDROCHLORIDE AND LIDOCAINE HYDROCHLORIDE AND ALUMINUM HYDROXIDE AND MAGNESIUM HYDRO 5 MILLILITER(S): KIT at 12:16

## 2021-12-22 RX ADMIN — TIOTROPIUM BROMIDE 1 CAPSULE(S): 18 CAPSULE ORAL; RESPIRATORY (INHALATION) at 09:06

## 2021-12-22 RX ADMIN — CARVEDILOL PHOSPHATE 6.25 MILLIGRAM(S): 80 CAPSULE, EXTENDED RELEASE ORAL at 18:23

## 2021-12-22 RX ADMIN — CHLORHEXIDINE GLUCONATE 1 APPLICATION(S): 213 SOLUTION TOPICAL at 14:22

## 2021-12-22 RX ADMIN — CARVEDILOL PHOSPHATE 6.25 MILLIGRAM(S): 80 CAPSULE, EXTENDED RELEASE ORAL at 05:45

## 2021-12-22 RX ADMIN — Medication 100 MILLIEQUIVALENT(S): at 14:22

## 2021-12-22 RX ADMIN — Medication 1 PACKET(S): at 12:08

## 2021-12-22 RX ADMIN — DIPHENHYDRAMINE HYDROCHLORIDE AND LIDOCAINE HYDROCHLORIDE AND ALUMINUM HYDROXIDE AND MAGNESIUM HYDRO 5 MILLILITER(S): KIT at 00:34

## 2021-12-22 RX ADMIN — Medication 81 MILLIGRAM(S): at 12:10

## 2021-12-22 RX ADMIN — Medication 1 MILLIGRAM(S): at 12:09

## 2021-12-22 RX ADMIN — Medication 1 GRAM(S): at 18:22

## 2021-12-22 RX ADMIN — PREGABALIN 1000 MICROGRAM(S): 225 CAPSULE ORAL at 12:09

## 2021-12-22 RX ADMIN — APIXABAN 5 MILLIGRAM(S): 2.5 TABLET, FILM COATED ORAL at 05:45

## 2021-12-22 RX ADMIN — APIXABAN 5 MILLIGRAM(S): 2.5 TABLET, FILM COATED ORAL at 18:22

## 2021-12-22 RX ADMIN — Medication 100 MILLIEQUIVALENT(S): at 13:19

## 2021-12-22 RX ADMIN — Medication 1 GRAM(S): at 05:45

## 2021-12-22 RX ADMIN — PANTOPRAZOLE SODIUM 40 MILLIGRAM(S): 20 TABLET, DELAYED RELEASE ORAL at 05:45

## 2021-12-22 RX ADMIN — Medication 100 MILLIEQUIVALENT(S): at 12:08

## 2021-12-22 RX ADMIN — PANTOPRAZOLE SODIUM 40 MILLIGRAM(S): 20 TABLET, DELAYED RELEASE ORAL at 18:22

## 2021-12-22 NOTE — PROGRESS NOTE ADULT - PROVIDER SPECIALTY LIST ADULT
Gastroenterology
Cardiology
Heme/Onc
Cardiology
Gastroenterology
Heme/Onc
Heme/Onc
Internal Medicine
Internal Medicine

## 2021-12-22 NOTE — DISCHARGE NOTE PROVIDER - CARE PROVIDER_API CALL
pcp,   Phone: (   )    -  Fax: (   )    -  Follow Up Time: 1 week    Cora,   Phone: [phone]  Fax: (   )    -  Follow Up Time: 1 week    Ismael,   Phone: (   )    -  Fax: (   )    -  Follow Up Time: 1 week

## 2021-12-22 NOTE — PROGRESS NOTE ADULT - ASSESSMENT
66 year old woman w/ past medical history of HTN, COPD, lung ca s/p R lobectomy and radiation, CAD w/ stents, anemia presenting with one week of generalized weakness. GI consulted for anemia     Anemia   without overt gi bleeding; pt endorsing (+) Hematuria   likely related to recent Chemo/Radiation Tx; similar presentation last admission post tx  cont Protonix 40mg BID w/Carafate Syrup BID   will avoid endoscopy as she is likely a difficult airway in the event that intubation is emergently needed   no objection to resuming A/C or SAPT (gi ppx as above)  diet per MBS     Throat Ca  per oncology and medicine teams    I reviewed the overnight course of events on the unit, re-confirming the patient history. I discussed the care with the patient and their family. The plan of care was discussed with the physician assistant and modifications were made to the notation where appropriate. Differential diagnosis and plan of care discussed with patient after the evaluation. Advanced care planning was discussed with patient and family.  Advanced care planning forms were reviewed and discussed.  Risks, benefits and alternatives of gastroenterologic procedures were discussed in detail and all questions were answered. 35 minutes spent on total encounter of which more than fifty percent of the encounter was spent counseling and/or coordinating care by the attending physician.   
ERIKA CORRIGAN is a 66y Female who presents with a chief complaint of anemia.    Pancytopenia in the Setting of Chemotherapy for Stage IIIC Lung Adenocarcinoma  - Patient with pancytopenia. Last chemotherapy with carboplatin + pemetrexed was on December 6th.  - December 6th CBC: WBC 6.6, HGB 9.4, and .  - Transfuse to HGB goal of 7 and PLT goal of 10.   - Continue filgrastim 480 mcg daily for 5 days or until ANC is > 5K.   - No occult blood noted.    On discharge, she will follow-up with Dr. Guevara for potential start of durvalumab consolidation.    Matteo Nuñez MD  Hematology/Oncology  C: [phone]  O: [phone]
ERIKA CORRIGAN is a 66y Female who presents with a chief complaint of anemia.    Pancytopenia in the Setting of Chemotherapy for Stage IIIC Lung Adenocarcinoma  - Patient with pancytopenia. Last chemotherapy with carboplatin + pemetrexed was on December 6th.  - December 6th CBC: WBC 6.6, HGB 9.4, and .  - Transfuse to HGB goal of 7 and PLT goal of 50, had gi bleed in past    - wbc adequate , holding neupogen   - No occult blood noted.    pls repeat cbc today wbc 8.9 plt 131 hg 7.6   On discharge, she will follow-up with Dr. Guevara for potential start of durvalumab consolidation.    right arm swelling   - had this in past from svc syndrome   - getting duplex     Sebastian Jeffrey MD  Hematology Oncology   O: [phone]  C: [phone] 
ERIKA CORRIGAN is a 66y Female who presents with a chief complaint of anemia.    Pancytopenia in the Setting of Chemotherapy for Stage IIIC Lung Adenocarcinoma  - Patient with pancytopenia. Last chemotherapy with carboplatin + pemetrexed was on December 6th.  - December 6th CBC: WBC 6.6, HGB 9.4, and .  - Transfuse to HGB goal of 7 and PLT goal of 50, had gi bleed in past    - Continue filgrastim 480 mcg daily for 5 days or until ANC is >2k   - No occult blood noted.    pls repeat cbc today   On discharge, she will follow-up with Dr. Guevara for potential start of durvalumab consolidation.    Sebastian Jeffrey MD  Hematology Oncology   O: [phone]  C: [phone] 
66 year old woman w/ past medical history of HTN, COPD, lung ca s/p R lobectomy and radiation, CAD w/ stents, anemia presenting with one week of generalized weakness. GI consulted for anemia     Anemia   without overt gi bleeding; pt endorsing (+) Hematuria   related to recent Chemo/Radiation Tx; similar presentation last admission post tx  cont Protonix 40mg BID w/Carafate Syrup BID   will avoid endoscopy as she is a difficult airway in the event that intubation is emergently needed   monitor closely on A/C or SAPT (gi ppx as above)  diet per MBS w/strict aspiration precautions    Throat Ca  per oncology and medicine teams    I reviewed the overnight course of events on the unit, re-confirming the patient history. I discussed the care with the patient and their family. The plan of care was discussed with the physician assistant and modifications were made to the notation where appropriate. Differential diagnosis and plan of care discussed with patient after the evaluation. Advanced care planning was discussed with patient and family.  Advanced care planning forms were reviewed and discussed.  Risks, benefits and alternatives of gastroenterologic procedures were discussed in detail and all questions were answered. 35 minutes spent on total encounter of which more than fifty percent of the encounter was spent counseling and/or coordinating care by the attending physician.   
Patient is a 66 year old woman w/ past medical history of HTN, COPD, lung ca s/p R lobectomy and radiation, CAD w/ stents, anemia presenting with one week of generalized weakness found to have hemoglobin of 4.7 likely 2/2 chemo and underlying malignancy. 
Patient is a 66 year old woman w/ past medical history of HTN, COPD, lung ca s/p R lobectomy and radiation, CAD w/ stents, anemia presenting with one week of generalized weakness found to have hemoglobin of 4.7 likely 2/2 chemo and underlying malignancy.

## 2021-12-22 NOTE — PROGRESS NOTE ADULT - ATTENDING COMMENTS
Patient seen and examined.  Agree with above.   SAPT for history of CAD with PCI if no contraindications   No further inpatient cardiac workup needed at this time.     Tip Hernandez MD
Patient seen and examined.  Agree with above.   AC and sapt resumed  No further inpatient cardiac workup needed at this time.     Tip Hernandez MD

## 2021-12-22 NOTE — DISCHARGE NOTE NURSING/CASE MANAGEMENT/SOCIAL WORK - NSDCPEFALRISK_GEN_ALL_CORE
For information on Fall & Injury Prevention, visit: [email or web address] OR  [email or web address] OR  https://www.cdc.gov/steadi/patient.html

## 2021-12-22 NOTE — PROGRESS NOTE ADULT - SUBJECTIVE AND OBJECTIVE BOX
Date of service 12/21/21    chief complaint: weakness    extended hpi: Patient is a 66 year old woman w/ past medical history of HTN, COPD, lung ca s/p R lobectomy and radiation, now recurrent, SVC syndrome s/p stent, CAD w/ stents, anemia presenting with one week of generalized weakness.    Review of Systems:   Constitutional: [ ] fevers, [ ] chills.   Skin: [ ] dry skin. [ ] rashes.  Psychiatric: [ ] depression, [ ] anxiety.   Gastrointestinal: [ ] BRBPR, [ ] melena.   Neurological: [ ] confusion. [ ] seizures. [ ] shuffling gait.   Ears,Nose,Mouth and Throat: [ ] ear pain [ ] sore throat.   Eyes: [ ] diplopia.   Respiratory: [ ] hemoptysis. [ ] shortness of breath  Cardiovascular: See HPI above  Hematologic/Lymphatic: [ ] anemia. [ ] painful nodes. [ ] prolonged bleeding.   Genitourinary: [ ] hematuria. [ ] flank pain.   Endocrine: [ ] significant change in weight. [ ] intolerance to heat and cold.     Review of systems [x ] otherwise negative, [ ] otherwise unable to obtain    FH: no family history of sudden cardiac death in first degree relatives    SH: [ ] tobacco, [ ] alcohol, [ ] drugs    acetaminophen     Tablet .. 650 milliGRAM(s) Oral every 6 hours PRN  aluminum hydroxide/magnesium hydroxide/simethicone Suspension 30 milliLiter(s) Oral every 4 hours PRN  aspirin enteric coated 81 milliGRAM(s) Oral daily  atorvastatin 40 milliGRAM(s) Oral at bedtime  carvedilol 6.25 milliGRAM(s) Oral every 12 hours  chlorhexidine 2% Cloths 1 Application(s) Topical daily  cyanocobalamin 1000 MICROGram(s) Oral daily  filgrastim-sndz (ZARXIO) SubCutaneous Injection - Peds 480 MICROGram(s) SubCutaneous daily  FIRST- Mouthwash  BLM 5 milliLiter(s) Swish and Swallow five times a day  folic acid 1 milliGRAM(s) Oral daily  melatonin 3 milliGRAM(s) Oral at bedtime PRN  oxyCODONE    IR 5 milliGRAM(s) Oral every 8 hours PRN  pantoprazole    Tablet 40 milliGRAM(s) Oral two times a day  polyethylene glycol 3350 17 Gram(s) Oral daily PRN  sucralfate suspension 1 Gram(s) Oral two times a day  tiotropium 18 MICROgram(s) Capsule 1 Capsule(s) Inhalation daily                            7.2    4.68  )-----------( 95       ( 21 Dec 2021 07:52 )             20.3     141  |  104  |  26<H>  ----------------------------<  107<H>  3.0<L>   |  24  |  1.17    Ca    8.8      21 Dec 2021 07:52  Phos  2.0     12-21  Mg     1.80     12-21    TPro  6.4  /  Alb  3.2<L>  /  TBili  2.6<H>  /  DBili  x   /  AST  16  /  ALT  16  /  AlkPhos  86  12-21      T(C): 36.6 (12-21-21 @ 13:46), Max: 36.6 (12-21-21 @ 13:46)  HR: 76 (12-21-21 @ 13:46) (76 - 80)  BP: 137/51 (12-21-21 @ 13:46) (134/53 - 137/51)  RR: 18 (12-21-21 @ 13:46) (16 - 18)  SpO2: 98% (12-21-21 @ 13:46) (98% - 100%)    General: Well nourished in no acute distress. Alert and Oriented * 3.   Head: Normocephalic and atraumatic.   Neck: No JVD. No bruits. Supple. Does not appear to be enlarged.   Cardiovascular: + S1,S2 ; RRR Soft systolic murmur at the left lower sternal border. No rubs noted.    Lungs: CTA b/l. No rhonchi, rales or wheezes.   Abdomen: + BS, soft. Non tender. Non distended. No rebound. No guarding.   Extremities: No clubbing/cyanosis/edema.   Neurologic: Moves all four extremities. Full range of motion.   Skin: Warm and moist. The patient's skin has normal elasticity and good skin turgor.   Psychiatric: Appropriate mood and affect.  Musculoskeletal: Normal range of motion, normal strength    DATA       ASSESSMENT/PLAN: 	Patient is a 66 year old woman w/ past medical history of HTN, COPD, lung ca s/p R lobectomy and radiation, now recurrent, SVC syndrome s/p stent, CAD w/ stents, anemia presenting with one week of generalized weakness.    --found to be anemic, s/p PRBCs  --f/u heme onc  --no further cardiac work up planned  --ideally would like her to be on SAPT for prior PCI  --s/p SVC stenting in IR 9/2021, f/u IR reccs for AC vs APT management when stable  --f/u Dr Shepherd after DC as scheduled [phone]
Date of service 12/22/21    chief complaint: weakness    extended hpi: Patient is a 66 year old woman w/ past medical history of HTN, COPD, lung ca s/p R lobectomy and radiation, now recurrent, SVC syndrome s/p stent, CAD w/ stents, anemia presenting with one week of generalized weakness.    Review of Systems:   Constitutional: [ ] fevers, [ ] chills.   Skin: [ ] dry skin. [ ] rashes.  Psychiatric: [ ] depression, [ ] anxiety.   Gastrointestinal: [ ] BRBPR, [ ] melena.   Neurological: [ ] confusion. [ ] seizures. [ ] shuffling gait.   Ears,Nose,Mouth and Throat: [ ] ear pain [ ] sore throat.   Eyes: [ ] diplopia.   Respiratory: [ ] hemoptysis. [ ] shortness of breath  Cardiovascular: See HPI above  Hematologic/Lymphatic: [ ] anemia. [ ] painful nodes. [ ] prolonged bleeding.   Genitourinary: [ ] hematuria. [ ] flank pain.   Endocrine: [ ] significant change in weight. [ ] intolerance to heat and cold.     Review of systems [x ] otherwise negative, [ ] otherwise unable to obtain    FH: no family history of sudden cardiac death in first degree relatives    SH: [ ] tobacco, [ ] alcohol, [ ] drugs    acetaminophen     Tablet .. 650 milliGRAM(s) Oral every 6 hours PRN  aluminum hydroxide/magnesium hydroxide/simethicone Suspension 30 milliLiter(s) Oral every 4 hours PRN  apixaban 5 milliGRAM(s) Oral every 12 hours  aspirin enteric coated 81 milliGRAM(s) Oral daily  atorvastatin 40 milliGRAM(s) Oral at bedtime  carvedilol 6.25 milliGRAM(s) Oral every 12 hours  chlorhexidine 2% Cloths 1 Application(s) Topical daily  cyanocobalamin 1000 MICROGram(s) Oral daily  FIRST- Mouthwash  BLM 5 milliLiter(s) Swish and Swallow five times a day  folic acid 1 milliGRAM(s) Oral daily  melatonin 3 milliGRAM(s) Oral at bedtime PRN  oxyCODONE    IR 5 milliGRAM(s) Oral every 8 hours PRN  pantoprazole    Tablet 40 milliGRAM(s) Oral two times a day  polyethylene glycol 3350 17 Gram(s) Oral daily PRN  potassium chloride  10 mEq/100 mL IVPB 10 milliEquivalent(s) IV Intermittent every 1 hour  potassium phosphate / sodium phosphate Powder (PHOS-NaK) 1 Packet(s) Oral three times a day before meals  sucralfate suspension 1 Gram(s) Oral two times a day  tiotropium 18 MICROgram(s) Capsule 1 Capsule(s) Inhalation daily                            7.6    8.93  )-----------( 131      ( 22 Dec 2021 07:21 )             21.9     140  |  102  |  26<H>  ----------------------------<  86  3.1<L>   |  25  |  1.28    Ca    9.2      22 Dec 2021 07:21  Phos  1.9     12-22  Mg     1.70     12-22    TPro  6.8  /  Alb  3.4  /  TBili  2.0<H>  /  DBili  x   /  AST  17  /  ALT  14  /  AlkPhos  113  12-22      T(C): 36.4 (12-22-21 @ 05:29), Max: 36.6 (12-21-21 @ 13:46)  HR: 77 (12-22-21 @ 05:29) (74 - 77)  BP: 127/61 (12-22-21 @ 05:29) (115/53 - 137/51)  RR: 16 (12-22-21 @ 05:29) (16 - 18)  SpO2: 95% (12-22-21 @ 05:29) (95% - 98%)  Wt(kg): -    General: Well nourished in no acute distress. Alert and Oriented * 3.   Head: Normocephalic and atraumatic.   Neck: No JVD. No bruits. Supple. Does not appear to be enlarged.   Cardiovascular: + S1,S2 ; RRR Soft systolic murmur at the left lower sternal border. No rubs noted.    Lungs: CTA b/l. No rhonchi, rales or wheezes.   Abdomen: + BS, soft. Non tender. Non distended. No rebound. No guarding.   Extremities: No clubbing/cyanosis/edema.   Neurologic: Moves all four extremities. Full range of motion.   Skin: Warm and moist. The patient's skin has normal elasticity and good skin turgor.   Psychiatric: Appropriate mood and affect.  Musculoskeletal: Normal range of motion, normal strength    DATA       ASSESSMENT/PLAN: 	Patient is a 66 year old woman w/ past medical history of HTN, COPD, lung ca s/p R lobectomy and radiation, now recurrent, SVC syndrome s/p stent, CAD w/ stents, anemia presenting with one week of generalized weakness.    --found to be anemic, s/p PRBCs  --monitor H/H  --no further cardiac work up planned  --ASA and Eliquis resumed  --f/u Dr Shepherd after DC as scheduled [phone]
Patient seen and examined at bedside. pt hjas some mild cough     MEDICATIONS  (STANDING):  aspirin enteric coated 81 milliGRAM(s) Oral daily  atorvastatin 40 milliGRAM(s) Oral at bedtime  carvedilol 6.25 milliGRAM(s) Oral every 12 hours  chlorhexidine 2% Cloths 1 Application(s) Topical daily  cyanocobalamin 1000 MICROGram(s) Oral daily  filgrastim-sndz (ZARXIO) SubCutaneous Injection - Peds 480 MICROGram(s) SubCutaneous daily  FIRST- Mouthwash  BLM 5 milliLiter(s) Swish and Swallow five times a day  folic acid 1 milliGRAM(s) Oral daily  pantoprazole    Tablet 40 milliGRAM(s) Oral two times a day  potassium phosphate / sodium phosphate Powder (PHOS-NaK) 1 Packet(s) Oral three times a day before meals  sucralfate suspension 1 Gram(s) Oral two times a day  tiotropium 18 MICROgram(s) Capsule 1 Capsule(s) Inhalation daily    MEDICATIONS  (PRN):  acetaminophen     Tablet .. 650 milliGRAM(s) Oral every 6 hours PRN Temp greater or equal to 38C (100.4F), Mild Pain (1 - 3)  aluminum hydroxide/magnesium hydroxide/simethicone Suspension 30 milliLiter(s) Oral every 4 hours PRN Dyspepsia  melatonin 3 milliGRAM(s) Oral at bedtime PRN Insomnia  oxyCODONE    IR 5 milliGRAM(s) Oral every 8 hours PRN Severe Pain (7 - 10)  polyethylene glycol 3350 17 Gram(s) Oral daily PRN Constipation        Vital Signs Last 24 Hrs  T(C): 36.3 (21 Dec 2021 06:25), Max: 36.4 (20 Dec 2021 13:04)  T(F): 97.4 (21 Dec 2021 06:25), Max: 97.6 (20 Dec 2021 13:04)  HR: 80 (21 Dec 2021 06:25) (79 - 80)  BP: 135/53 (21 Dec 2021 06:25) (134/53 - 139/50)  BP(mean): --  RR: 16 (21 Dec 2021 06:25) (16 - 18)  SpO2: 100% (21 Dec 2021 06:25) (98% - 100%)      PHYSICAL EXAM:     GENERAL:  Appears stated age, well-groomed  HEENT:  NC/AT,   CHEST:  CTA b/l  HEART:  S1 s2+   ABDOMEN:  Soft, non-tender, non-distended  EXTEREMITIES:  no cyanosis,clubbing or edema  NEURO:  Alert, oriented, no asterixis                            7.4    2.05  )-----------( 64       ( 20 Dec 2021 07:49 )             20.5       12-21    141  |  104  |  26<H>  ----------------------------<  107<H>  3.0<L>   |  24  |  1.17    Ca    8.8      21 Dec 2021 07:52  Phos  2.0     12-21  Mg     1.80     12-21    TPro  6.4  /  Alb  3.2<L>  /  TBili  2.6<H>  /  DBili  x   /  AST  16  /  ALT  16  /  AlkPhos  86  12-21      
INTERVAL HPI/OVERNIGHT EVENTS:    no bm, no reports of rectal bleeding; no c/o abd pain   reports hematuria     MEDICATIONS  (STANDING):  aspirin enteric coated 81 milliGRAM(s) Oral daily  atorvastatin 40 milliGRAM(s) Oral at bedtime  carvedilol 6.25 milliGRAM(s) Oral every 12 hours  chlorhexidine 2% Cloths 1 Application(s) Topical daily  cyanocobalamin 1000 MICROGram(s) Oral daily  filgrastim-sndz (ZARXIO) SubCutaneous Injection - Peds 480 MICROGram(s) SubCutaneous daily  FIRST- Mouthwash  BLM 5 milliLiter(s) Swish and Swallow five times a day  folic acid 1 milliGRAM(s) Oral daily  pantoprazole    Tablet 40 milliGRAM(s) Oral two times a day  potassium chloride  10 mEq/100 mL IVPB 10 milliEquivalent(s) IV Intermittent every 1 hour  potassium phosphate / sodium phosphate Powder (PHOS-NaK) 1 Packet(s) Oral three times a day before meals  sucralfate suspension 1 Gram(s) Oral two times a day  tiotropium 18 MICROgram(s) Capsule 1 Capsule(s) Inhalation daily    MEDICATIONS  (PRN):  acetaminophen     Tablet .. 650 milliGRAM(s) Oral every 6 hours PRN Temp greater or equal to 38C (100.4F), Mild Pain (1 - 3)  aluminum hydroxide/magnesium hydroxide/simethicone Suspension 30 milliLiter(s) Oral every 4 hours PRN Dyspepsia  melatonin 3 milliGRAM(s) Oral at bedtime PRN Insomnia  oxyCODONE    IR 5 milliGRAM(s) Oral every 8 hours PRN Severe Pain (7 - 10)  polyethylene glycol 3350 17 Gram(s) Oral daily PRN Constipation      Allergies    penicillin (Short breath; Hives)  tetracycline (Short breath; Hives)    Intolerances        Review of Systems:    General:  No wt loss, fevers, chills, night sweats, fatigue   Eyes:  Good vision, no reported pain  ENT:  No sore throat, pain, runny nose, dysphagia  CV:  No pain, palpitations, hypo/hypertension  Resp:  No dyspnea, cough, tachypnea, wheezing  GI:  No pain, No nausea, No vomiting, No diarrhea, No constipation, No weight loss, No fever, No pruritis, No rectal bleeding, No melena, No dysphagia  :  No pain, bleeding, incontinence, nocturia  Muscle:  No pain, weakness  Neuro:  No weakness, tingling, memory problems  Psych:  No fatigue, insomnia, mood problems, depression  Endocrine:  No polyuria, polydypsia, cold/heat intolerance  Heme:  No petechiae, ecchymosis, easy bruisability  Skin:  No rash, tattoos, scars, edema      Vital Signs Last 24 Hrs  T(C): 36.3 (21 Dec 2021 06:25), Max: 36.4 (20 Dec 2021 13:04)  T(F): 97.4 (21 Dec 2021 06:25), Max: 97.6 (20 Dec 2021 13:04)  HR: 80 (21 Dec 2021 06:25) (79 - 80)  BP: 135/53 (21 Dec 2021 06:25) (134/53 - 139/50)  BP(mean): --  RR: 16 (21 Dec 2021 06:25) (16 - 18)  SpO2: 100% (21 Dec 2021 06:25) (98% - 100%)    PHYSICAL EXAM:    Constitutional: NAD  HEENT: EOMI, throat clear  Neck: No LAD, supple  Respiratory: CTA and P  Cardiovascular: S1 and S2, RRR, no M  Gastrointestinal: BS+, soft, NT/ND, neg HSM,  Extremities: No peripheral edema, neg clubbing, cyanosis  Vascular: 2+ peripheral pulses  Neurological: A/O x 3, no focal deficits  Psychiatric: Normal mood, normal affect  Skin: No rashes      LABS:                        7.2    4.68  )-----------( 95       ( 21 Dec 2021 07:52 )             20.3     12-21    141  |  104  |  26<H>  ----------------------------<  107<H>  3.0<L>   |  24  |  1.17    Ca    8.8      21 Dec 2021 07:52  Phos  2.0     12-21  Mg     1.80     12-21    TPro  6.4  /  Alb  3.2<L>  /  TBili  2.6<H>  /  DBili  x   /  AST  16  /  ALT  16  /  AlkPhos  86  12-21    PT/INR - ( 20 Dec 2021 07:49 )   PT: 18.2 sec;   INR: 1.63 ratio         PTT - ( 20 Dec 2021 07:49 )  PTT:31.4 sec      RADIOLOGY & ADDITIONAL TESTS:  
INTERVAL HPI/OVERNIGHT EVENTS:    remains free of gi bleeding   no abd pain   cbc stabilizing     MEDICATIONS  (STANDING):  apixaban 5 milliGRAM(s) Oral every 12 hours  aspirin enteric coated 81 milliGRAM(s) Oral daily  atorvastatin 40 milliGRAM(s) Oral at bedtime  carvedilol 6.25 milliGRAM(s) Oral every 12 hours  chlorhexidine 2% Cloths 1 Application(s) Topical daily  cyanocobalamin 1000 MICROGram(s) Oral daily  FIRST- Mouthwash  BLM 5 milliLiter(s) Swish and Swallow five times a day  folic acid 1 milliGRAM(s) Oral daily  pantoprazole    Tablet 40 milliGRAM(s) Oral two times a day  sucralfate suspension 1 Gram(s) Oral two times a day  tiotropium 18 MICROgram(s) Capsule 1 Capsule(s) Inhalation daily    MEDICATIONS  (PRN):  acetaminophen     Tablet .. 650 milliGRAM(s) Oral every 6 hours PRN Temp greater or equal to 38C (100.4F), Mild Pain (1 - 3)  aluminum hydroxide/magnesium hydroxide/simethicone Suspension 30 milliLiter(s) Oral every 4 hours PRN Dyspepsia  melatonin 3 milliGRAM(s) Oral at bedtime PRN Insomnia  oxyCODONE    IR 5 milliGRAM(s) Oral every 8 hours PRN Severe Pain (7 - 10)  polyethylene glycol 3350 17 Gram(s) Oral daily PRN Constipation      Allergies    penicillin (Short breath; Hives)  tetracycline (Short breath; Hives)    Intolerances        Review of Systems:    General:  No wt loss, fevers, chills, night sweats, fatigue   Eyes:  Good vision, no reported pain  ENT:  No sore throat, pain, runny nose, dysphagia  CV:  No pain, palpitations, hypo/hypertension  Resp:  No dyspnea, cough, tachypnea, wheezing  GI:  No pain, No nausea, No vomiting, No diarrhea, No constipation, No weight loss, No fever, No pruritis, No rectal bleeding, No melena, No dysphagia  :  No pain, bleeding, incontinence, nocturia  Muscle:  No pain, weakness  Neuro:  No weakness, tingling, memory problems  Psych:  No fatigue, insomnia, mood problems, depression  Endocrine:  No polyuria, polydypsia, cold/heat intolerance  Heme:  No petechiae, ecchymosis, easy bruisability  Skin:  No rash, tattoos, scars, edema      Vital Signs Last 24 Hrs  T(C): 36.4 (22 Dec 2021 05:29), Max: 36.6 (21 Dec 2021 13:46)  T(F): 97.6 (22 Dec 2021 05:29), Max: 97.9 (21 Dec 2021 13:46)  HR: 77 (22 Dec 2021 05:29) (74 - 77)  BP: 127/61 (22 Dec 2021 05:29) (115/53 - 137/51)  BP(mean): --  RR: 16 (22 Dec 2021 05:29) (16 - 18)  SpO2: 95% (22 Dec 2021 05:29) (95% - 98%)    PHYSICAL EXAM:    Constitutional: NAD  HEENT: EOMI, throat clear  Neck: No LAD, supple  Respiratory: CTA and P  Cardiovascular: S1 and S2, RRR, no M  Gastrointestinal: BS+, soft, NT/ND, neg HSM,  Extremities: No peripheral edema, neg clubbing, cyanosis  Vascular: 2+ peripheral pulses  Neurological: A/O x 3, no focal deficits  Psychiatric: Normal mood, normal affect  Skin: No rashes      LABS:                        7.6    8.93  )-----------( 131      ( 22 Dec 2021 07:21 )             21.9     12-22    140  |  102  |  26<H>  ----------------------------<  86  3.1<L>   |  25  |  1.28    Ca    9.2      22 Dec 2021 07:21  Phos  1.9     -  Mg     1.70         TPro  6.8  /  Alb  3.4  /  TBili  2.0<H>  /  DBili  x   /  AST  17  /  ALT  14  /  AlkPhos  113  12-22      Urinalysis Basic - ( 21 Dec 2021 15:48 )    Color: Orange / Appearance: Turbid / S.027 / pH: x  Gluc: x / Ketone: Trace  / Bili: Small / Urobili: >12 mg/dL   Blood: x / Protein: 100 mg/dL / Nitrite: Negative   Leuk Esterase: Large / RBC: 2 /HPF / WBC 45 /HPF   Sq Epi: x / Non Sq Epi: 6 /HPF / Bacteria: Many        RADIOLOGY & ADDITIONAL TESTS:  
CHIEF COMPLAINT  Anemia    HISTORY OF PRESENT ILLNESS  ERIKA CORRIGAN is a 66y Female who presents with a chief complaint of anemia.    No acute events. No complaints.    REVIEW OF SYSTEMS  A complete review of systems was performed; negative except per HPI    PHYSICAL EXAM  T(C): 36.7 (12-20-21 @ 06:12), Max: 37.2 (12-19-21 @ 15:00)  HR: 101 (12-20-21 @ 06:12) (80 - 108)  BP: 146/49 (12-20-21 @ 06:12) (110/56 - 147/65)  RR: 19 (12-20-21 @ 06:12) (17 - 21)  SpO2: 97% (12-20-21 @ 06:12) (97% - 99%)  Constitutional: alert, awake, in no acute distress  Eyes: PERRL, EOMI  HEENT: normocephalic, atraumatic  Neck: supple, non-tender  Cardiovascular: normal perfusion, no peripheral edema  Respiratory: normal respiratory efforts; no increased use of accessory muscles  Gastrointestinal: soft, non-tender  Musculoskeletal: normal range of motion, no deformities noted  Neurological: alert, CN II to XI grossly intact  Skin: warm, dry    LABORATORY DATA                        7.4    2.05  )-----------( 64       ( 20 Dec 2021 07:49 )             20.5     12-20    141  |  103  |  24<H>  ----------------------------<  111<H>  2.9<LL>   |  25  |  1.06    Ca    9.0      20 Dec 2021 07:49  Phos  2.3     12-20  Mg     1.50     12-20    TPro  6.7  /  Alb  3.5  /  TBili  4.1<H>  /  DBili  x   /  AST  16  /  ALT  18  /  AlkPhos  84  12-20  
Patient seen and examined at bedside. pt feels well.     MEDICATIONS  (STANDING):  apixaban 5 milliGRAM(s) Oral every 12 hours  aspirin enteric coated 81 milliGRAM(s) Oral daily  atorvastatin 40 milliGRAM(s) Oral at bedtime  carvedilol 6.25 milliGRAM(s) Oral every 12 hours  chlorhexidine 2% Cloths 1 Application(s) Topical daily  cyanocobalamin 1000 MICROGram(s) Oral daily  FIRST- Mouthwash  BLM 5 milliLiter(s) Swish and Swallow five times a day  folic acid 1 milliGRAM(s) Oral daily  pantoprazole    Tablet 40 milliGRAM(s) Oral two times a day  sucralfate suspension 1 Gram(s) Oral two times a day  tiotropium 18 MICROgram(s) Capsule 1 Capsule(s) Inhalation daily    MEDICATIONS  (PRN):  acetaminophen     Tablet .. 650 milliGRAM(s) Oral every 6 hours PRN Temp greater or equal to 38C (100.4F), Mild Pain (1 - 3)  aluminum hydroxide/magnesium hydroxide/simethicone Suspension 30 milliLiter(s) Oral every 4 hours PRN Dyspepsia  melatonin 3 milliGRAM(s) Oral at bedtime PRN Insomnia  oxyCODONE    IR 5 milliGRAM(s) Oral every 8 hours PRN Severe Pain (7 - 10)  polyethylene glycol 3350 17 Gram(s) Oral daily PRN Constipation        Vital Signs Last 24 Hrs  T(C): 36.4 (22 Dec 2021 05:29), Max: 36.6 (21 Dec 2021 13:46)  T(F): 97.6 (22 Dec 2021 05:29), Max: 97.9 (21 Dec 2021 13:46)  HR: 77 (22 Dec 2021 05:29) (74 - 77)  BP: 127/61 (22 Dec 2021 05:29) (115/53 - 137/51)  BP(mean): --  RR: 16 (22 Dec 2021 05:29) (16 - 18)  SpO2: 95% (22 Dec 2021 05:29) (95% - 98%)      PHYSICAL EXAM:     GENERAL:  Appears stated age, well-groomed  CHEST:  CTA b/l  HEART:  S1 s2+   ABDOMEN:  Soft, non-tender, non-distended  EXTEREMITIES:  right arm swelling   SKIN:  No rash/erythema/ecchymoses  NEURO:  Alert, oriented, no asterixis                            7.6    8.93  )-----------( 131      ( 22 Dec 2021 07:21 )             21.9       12-22    140  |  102  |  26<H>  ----------------------------<  86  3.1<L>   |  25  |  1.28    Ca    9.2      22 Dec 2021 07:21  Phos  1.9     12-22  Mg     1.70     12-22    TPro  6.8  /  Alb  3.4  /  TBili  2.0<H>  /  DBili  x   /  AST  17  /  ALT  14  /  AlkPhos  113  12-22      
Patient is a 66y old  Female who presents with a chief complaint of Symptomatic Anemia (20 Dec 2021 13:06)      HPI:  No SOB or dizziness. Tolerated PRBC      PAST MEDICAL & SURGICAL HISTORY:  Hypertension    Hyperlipidemia    Carotid artery disease  monitored by vascluar doctor Samuel    Emphysema    Hip pain    Obesity    Edema of both legs    Lung cancer  right, , completed chemotherapy 2019    Stented coronary artery    Localized enlarged lymph nodes    Scoliosis    Lumbar disc disorder    COPD (chronic obstructive pulmonary disease)    History of  section  x2 ,     S/P lobectomy of lung  RULobectomy     History of hip replacement  right 2021    Stented coronary artery  x2 stents 2018        Review of Systems:   CONSTITUTIONAL: No fever, weight loss, or fatigue  EYES: No eye pain, visual disturbances, or discharge  ENMT:  No difficulty hearing, tinnitus, vertigo; No sinus or throat pain  NECK: No pain or stiffness  BREASTS: No pain, masses, or nipple discharge  RESPIRATORY: No cough, wheezing, chills or hemoptysis; No shortness of breath  CARDIOVASCULAR: No chest pain, palpitations, dizziness, or leg swelling  GASTROINTESTINAL: No abdominal or epigastric pain. No nausea, vomiting, or hematemesis; No diarrhea or constipation. No melena or hematochezia.  GENITOURINARY: No dysuria, frequency, hematuria, or incontinence  NEUROLOGICAL: No headaches, memory loss, loss of strength, numbness, or tremors  SKIN: No itching, burning, rashes, or lesions   LYMPH NODES: No enlarged glands  ENDOCRINE: No heat or cold intolerance; No hair loss  MUSCULOSKELETAL: No joint pain or swelling; No muscle, back, or extremity pain  PSYCHIATRIC: No depression, anxiety, mood swings, or difficulty sleeping  HEME/LYMPH: No easy bruising, or bleeding gums  ALLERY AND IMMUNOLOGIC: No hives or eczema    Allergies    penicillin (Short breath; Hives)  tetracycline (Short breath; Hives)    Intolerances        Social History:     FAMILY HISTORY:  Family history of CHF (congestive heart failure) (Aunt)        MEDICATIONS  (STANDING):  aspirin enteric coated 81 milliGRAM(s) Oral daily  atorvastatin 40 milliGRAM(s) Oral at bedtime  carvedilol 6.25 milliGRAM(s) Oral every 12 hours  chlorhexidine 2% Cloths 1 Application(s) Topical daily  cyanocobalamin 1000 MICROGram(s) Oral daily  filgrastim-sndz (ZARXIO) SubCutaneous Injection - Peds 480 MICROGram(s) SubCutaneous daily  FIRST- Mouthwash  BLM 5 milliLiter(s) Swish and Swallow five times a day  folic acid 1 milliGRAM(s) Oral daily  pantoprazole    Tablet 40 milliGRAM(s) Oral two times a day  potassium phosphate / sodium phosphate Powder (PHOS-NaK) 1 Packet(s) Oral three times a day before meals  sucralfate suspension 1 Gram(s) Oral two times a day  tiotropium 18 MICROgram(s) Capsule 1 Capsule(s) Inhalation daily    MEDICATIONS  (PRN):  acetaminophen     Tablet .. 650 milliGRAM(s) Oral every 6 hours PRN Temp greater or equal to 38C (100.4F), Mild Pain (1 - 3)  aluminum hydroxide/magnesium hydroxide/simethicone Suspension 30 milliLiter(s) Oral every 4 hours PRN Dyspepsia  melatonin 3 milliGRAM(s) Oral at bedtime PRN Insomnia  oxyCODONE    IR 5 milliGRAM(s) Oral every 8 hours PRN Severe Pain (7 - 10)  polyethylene glycol 3350 17 Gram(s) Oral daily PRN Constipation        CAPILLARY BLOOD GLUCOSE        I&O's Summary    20 Dec 2021 07:01  -  20 Dec 2021 19:13  --------------------------------------------------------  IN: 474 mL / OUT: 0 mL / NET: 474 mL        PHYSICAL EXAM:  Vital Signs Last 24 Hrs  T(C): 36.4 (20 Dec 2021 13:04), Max: 37.2 (19 Dec 2021 21:23)  T(F): 97.6 (20 Dec 2021 13:04), Max: 99 (19 Dec 2021 21:23)  HR: 79 (20 Dec 2021 13:04) (79 - 101)  BP: 139/50 (20 Dec 2021 13:04) (110/56 - 146/49)  BP(mean): --  RR: 18 (20 Dec 2021 13:04) (17 - 19)  SpO2: 98% (20 Dec 2021 13:04) (97% - 98%)    GENERAL: NAD, well-developed  HEAD:  Atraumatic, Normocephalic  EYES: EOMI, PERRLA, conjunctiva and sclera clear  NECK: Supple, No JVD  CHEST/LUNG: Clear to auscultation bilaterally; No wheeze  HEART: Regular rate and rhythm; No murmurs, rubs, or gallops  ABDOMEN: Soft, Nontender, Nondistended; Bowel sounds present  EXTREMITIES:  2+ Peripheral Pulses, No clubbing, cyanosis, or edema  PSYCH: AAOx3  NEUROLOGY: non-focal  SKIN: No rashes or lesions    LABS:                        7.4    2.05  )-----------( 64       ( 20 Dec 2021 07:49 )             20.5     12-20    141  |  103  |  26<H>  ----------------------------<  123<H>  3.4<L>   |  25  |  1.18    Ca    9.1      20 Dec 2021 16:03  Phos  1.8     12-20  Mg     1.90     12-20    TPro  6.7  /  Alb  3.5  /  TBili  4.1<H>  /  DBili  x   /  AST  16  /  ALT  18  /  AlkPhos  84  12-20    PT/INR - ( 20 Dec 2021 07:49 )   PT: 18.2 sec;   INR: 1.63 ratio         PTT - ( 20 Dec 2021 07:49 )  PTT:31.4 sec          RADIOLOGY & ADDITIONAL TESTS:    Imaging Personally Reviewed:    Consultant(s) Notes Reviewed:      Care Discussed with Consultants/Other Providers:  
Patient is a 66y old  Female who presents with a chief complaint of Symptomatic Anemia (20 Dec 2021 13:06)      HPI:  No SOB or dizziness.    PAST MEDICAL & SURGICAL HISTORY:  Hypertension    Hyperlipidemia    Carotid artery disease  monitored by vascluar doctor Samuel    Emphysema    Hip pain    Obesity    Edema of both legs    Lung cancer  right, , completed chemotherapy 2019    Stented coronary artery    Localized enlarged lymph nodes    Scoliosis    Lumbar disc disorder    COPD (chronic obstructive pulmonary disease)    History of  section  x2 ,     S/P lobectomy of lung  RULobectomy     History of hip replacement  right 2021    Stented coronary artery  x2 stents 2018        Review of Systems:   CONSTITUTIONAL: No fever, weight loss, or fatigue  EYES: No eye pain, visual disturbances, or discharge  ENMT:  No difficulty hearing, tinnitus, vertigo; No sinus or throat pain  NECK: No pain or stiffness  BREASTS: No pain, masses, or nipple discharge  RESPIRATORY: No cough, wheezing, chills or hemoptysis; No shortness of breath  CARDIOVASCULAR: No chest pain, palpitations, dizziness, or leg swelling  GASTROINTESTINAL: No abdominal or epigastric pain. No nausea, vomiting, or hematemesis; No diarrhea or constipation. No melena or hematochezia.  GENITOURINARY: No dysuria, frequency, hematuria, or incontinence  NEUROLOGICAL: No headaches, memory loss, loss of strength, numbness, or tremors  SKIN: No itching, burning, rashes, or lesions   LYMPH NODES: No enlarged glands  ENDOCRINE: No heat or cold intolerance; No hair loss  MUSCULOSKELETAL: No joint pain or swelling; No muscle, back, or extremity pain  PSYCHIATRIC: No depression, anxiety, mood swings, or difficulty sleeping  HEME/LYMPH: No easy bruising, or bleeding gums  ALLERY AND IMMUNOLOGIC: No hives or eczema    Allergies    penicillin (Short breath; Hives)  tetracycline (Short breath; Hives)    Intolerances        Social History:     FAMILY HISTORY:  Family history of CHF (congestive heart failure) (Aunt)        MEDICATIONS  (STANDING):  aspirin enteric coated 81 milliGRAM(s) Oral daily  atorvastatin 40 milliGRAM(s) Oral at bedtime  carvedilol 6.25 milliGRAM(s) Oral every 12 hours  chlorhexidine 2% Cloths 1 Application(s) Topical daily  cyanocobalamin 1000 MICROGram(s) Oral daily  filgrastim-sndz (ZARXIO) SubCutaneous Injection - Peds 480 MICROGram(s) SubCutaneous daily  FIRST- Mouthwash  BLM 5 milliLiter(s) Swish and Swallow five times a day  folic acid 1 milliGRAM(s) Oral daily  pantoprazole    Tablet 40 milliGRAM(s) Oral two times a day  potassium phosphate / sodium phosphate Powder (PHOS-NaK) 1 Packet(s) Oral three times a day before meals  sucralfate suspension 1 Gram(s) Oral two times a day  tiotropium 18 MICROgram(s) Capsule 1 Capsule(s) Inhalation daily    MEDICATIONS  (PRN):  acetaminophen     Tablet .. 650 milliGRAM(s) Oral every 6 hours PRN Temp greater or equal to 38C (100.4F), Mild Pain (1 - 3)  aluminum hydroxide/magnesium hydroxide/simethicone Suspension 30 milliLiter(s) Oral every 4 hours PRN Dyspepsia  melatonin 3 milliGRAM(s) Oral at bedtime PRN Insomnia  oxyCODONE    IR 5 milliGRAM(s) Oral every 8 hours PRN Severe Pain (7 - 10)  polyethylene glycol 3350 17 Gram(s) Oral daily PRN Constipation        CAPILLARY BLOOD GLUCOSE        I&O's Summary    20 Dec 2021 07:01  -  20 Dec 2021 19:13  --------------------------------------------------------  IN: 474 mL / OUT: 0 mL / NET: 474 mL        PHYSICAL EXAM:  Vital Signs Last 24 Hrs  T(C): 36.4 (20 Dec 2021 13:04), Max: 37.2 (19 Dec 2021 21:23)  T(F): 97.6 (20 Dec 2021 13:04), Max: 99 (19 Dec 2021 21:23)  HR: 79 (20 Dec 2021 13:04) (79 - 101)  BP: 139/50 (20 Dec 2021 13:04) (110/56 - 146/49)  BP(mean): --  RR: 18 (20 Dec 2021 13:04) (17 - 19)  SpO2: 98% (20 Dec 2021 13:04) (97% - 98%)    GENERAL: NAD, well-developed  HEAD:  Atraumatic, Normocephalic  EYES: EOMI, PERRLA, conjunctiva and sclera clear  NECK: Supple, No JVD  CHEST/LUNG: Clear to auscultation bilaterally; No wheeze  HEART: Regular rate and rhythm; No murmurs, rubs, or gallops  ABDOMEN: Soft, Nontender, Nondistended; Bowel sounds present  EXTREMITIES:  2+ Peripheral Pulses, No clubbing, cyanosis, or edema  PSYCH: AAOx3  NEUROLOGY: non-focal  SKIN: No rashes or lesions    LABS:                        7.4    2.05  )-----------( 64       ( 20 Dec 2021 07:49 )             20.5     12-20    141  |  103  |  26<H>  ----------------------------<  123<H>  3.4<L>   |  25  |  1.18    Ca    9.1      20 Dec 2021 16:03  Phos  1.8     12-20  Mg     1.90     12-20    TPro  6.7  /  Alb  3.5  /  TBili  4.1<H>  /  DBili  x   /  AST  16  /  ALT  18  /  AlkPhos  84  12-20    PT/INR - ( 20 Dec 2021 07:49 )   PT: 18.2 sec;   INR: 1.63 ratio         PTT - ( 20 Dec 2021 07:49 )  PTT:31.4 sec          RADIOLOGY & ADDITIONAL TESTS:    Imaging Personally Reviewed:    Consultant(s) Notes Reviewed:      Care Discussed with Consultants/Other Providers:

## 2021-12-22 NOTE — PROGRESS NOTE ADULT - REASON FOR ADMISSION
Symptomatic Anemia

## 2021-12-22 NOTE — DISCHARGE NOTE NURSING/CASE MANAGEMENT/SOCIAL WORK - NSSCCARECORD_GEN_ALL_CORE
Oncology Navigator Psychosocial AssessmentReason for Assessment:   
[]Depression  []Anxiety  []Caregiver [place]  []Maladaptive Coping with Serious Illness   [x]Other: New dx Sources of Information:   
[x]Patient  []Family  []Staff  []Medical Record Advance Care Planning: 
Advance Care Planning 11/1/2018 Confirm Advance Directive None Mental Status:   
[x]Alert  []Lethargic  []Unresponsive Oriented to:  [x]Person  [x]Place  [x]Time  [x]Situation Barriers to Learning:   
[]Language  []Developmental  []Cognitive  []Altered Mental Status  []Visual/Hearing Impairment  []Unable to Read/Write  []Motivational   []No Barriers Identified  []Other: 
 
Relationship Status: 
[]Single  []  []Significant Other/Life Partner  []  []  [] Living Circumstances: 
[]Lives Alone  [x]Family/Significant Other in Household  []Roommates  []Children in the Home  []Paid Caregivers  []Assisted Living Facility/Group Home  []Skilled [address]  []Homeless  []Incarcerated  []Environmental/Care Concerns  []Other:  Childrens ages t (28,26,25, 24, 21, 23 twins, 7)  High Lighted are her children. Support System:   
[]Strong  []Fair  [x]Limited Financial/Legal Concerns:   
[x]Uninsured  []Limited Income/Resources  []Non-Citizen  []No Concerns Identified  []Financial POA:   
[]Other:  Spouse Caodaism/Spiritual/Existential: 
[]Strong Sense of Spirituality  []Involved in [employer] []Request  Visit  []Expressing Spiritual/Existential Angst  [x]No Concerns Identified D Does not go to [employer] currently Coping with Illness:       
 Patient: Family/Caregiver:  
Understanding and Acceptance of Illness/Prognosis  [x] [x] Strong Sense of Resilience [] []  
Self Reflection [] [] Engaged Support System [] [] Does not Readily Discuss Illness [] [] Denial of Terminal Status [] [] Anger [] [] Depression [] [] Anxiety/Fear [x] [] Bargaining [] [] Recent Diagnosis/Prognosis [x] [] Difficulties with Body Image [] [] Loss of Identity [] [] Excessive Substance Use [] [] Mental Health History [] []  
Enmeshed Relationships [] [] History of Loss [] [] Anticipatory Grief [] [] Concern for Complicated Grief [] [] Suicidal Ideation or Plan [] [] Unable to assess [] []  
 
            
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 Referrals:  
 
I. Transportation Medicaid ([place]) [] [facility] [x] Regional organization  [] Financial Assistance/Medication Access Patient assistance program (Care Card) [x] Co-pay assistance  [] Leukemia & Lymphoma Society [] [address]  [x] [address] [x] CancerCare  [x] Emotional support Peer support group [] Local counseling [] Online support group [] Coordination of psychiatry consult [] Goals/Plan:  SW and pt began card card application. Discussed her family deductible is $3000, unsure how much she has met. Pt/pt spouse may need to call. Pt concerned about cost of treatment. Pt also is ok with treatment but very concerned if it will come back(pt has a fear of reoccurrence). Otherwise pt seems upbeat and positive. [facility]

## 2021-12-22 NOTE — DISCHARGE NOTE PROVIDER - NSDCMRMEDTOKEN_GEN_ALL_CORE_FT
acetaminophen 325 mg oral tablet: 2 tab(s) orally every 6 hours, As needed, Temp greater or equal to 38.5C (101.3F), Mild Pain (1 - 3)  aluminum hydroxide-magnesium hydroxide 200 mg-200 mg/5 mL oral suspension: 30 milliliter(s) orally every 4 hours, As needed, Dyspepsia  apixaban 5 mg oral tablet: 1 tab(s) orally 2 times a day  aspirin 81 mg oral tablet, chewable: 1 tab(s) orally once a day  atorvastatin 40 mg oral tablet: 1 tab(s) orally once a day  carvedilol 6.25 mg oral tablet: 1 tab(s) orally every 12 hours  diphenhydramine/lidocaine/aluminum hydroxide/magnesium hydroxide/simethicone mucous membrane suspension: 5 milliliter(s) mucous membrane 5 times a day  swish and swallow   polyethylene glycol 3350 oral powder for reconstitution: 17 gram(s) orally once a day  Protonix 40 mg oral delayed release tablet: 1 tab(s) orally 2 times a day   sucralfate 1 g/10 mL oral suspension: 10 milliliter(s) orally 2 times a day  Trelegy Ellipta inhalation powder: 1 puff(s) inhaled once a day   acetaminophen 325 mg oral tablet: 2 tab(s) orally every 6 hours, As needed, Temp greater or equal to 38.5C (101.3F), Mild Pain (1 - 3)  aluminum hydroxide-magnesium hydroxide 200 mg-200 mg/5 mL oral suspension: 30 milliliter(s) orally every 4 hours, As needed, Dyspepsia  apixaban 5 mg oral tablet: 1 tab(s) orally 2 times a day  aspirin 81 mg oral tablet, chewable: 1 tab(s) orally once a day  atorvastatin 40 mg oral tablet: 1 tab(s) orally once a day  carvedilol 6.25 mg oral tablet: 1 tab(s) orally every 12 hours  cyanocobalamin 1000 mcg oral tablet: 1 tab(s) orally once a day  diphenhydramine/lidocaine/aluminum hydroxide/magnesium hydroxide/simethicone mucous membrane suspension: 5 milliliter(s) mucous membrane 5 times a day  swish and swallow   folic acid 1 mg oral tablet: 1 tab(s) orally once a day  polyethylene glycol 3350 oral powder for reconstitution: 17 gram(s) orally once a day  Protonix 40 mg oral delayed release tablet: 1 tab(s) orally 2 times a day   sucralfate 1 g/10 mL oral suspension: 10 milliliter(s) orally 2 times a day  Trelegy Ellipta inhalation powder: 1 puff(s) inhaled once a day

## 2021-12-22 NOTE — DISCHARGE NOTE NURSING/CASE MANAGEMENT/SOCIAL WORK - PATIENT PORTAL LINK FT
You can access the FollowMyHealth Patient Portal offered by [facility] by registering at the following website: [email or web address]. By joining [employer]’s FollowMyHealth portal, you will also be able to view your health information using other applications (apps) compatible with our system.

## 2021-12-22 NOTE — DISCHARGE NOTE PROVIDER - HOSPITAL COURSE
Patient is a 66 year old woman w/ past medical history of HTN, COPD, lung ca s/p R lobectomy and radiation, CAD w/ stents, anemia presenting with one week of generalized weakness found to have hemoglobin of 4.7 likely 2/2 chemo and underlying malignancy.     Symptomatic anemia.   -Patient presenting w/ symptomatic anemia. Pancytopenia from Chemo for underlying malignancy.   -PRBC transfused, Hb improved.    Pancytopenia.   - keep pts >50k per heme/onc, patient w/ prior history of GI bleed   - zarxio 480 mcg daily for 5 days or until ANC ~ 5K; zarxio is d/c with ANC improved 6.01  - if any fevers or shaking chills, low threshold to panculture and start broad spectrum abx   - management of anemia as above   - heme/onc following; On discharge, she will follow-up with Dr. Guevara for potential start of durvalumab consolidation.  - Last chemotherapy with carboplatin + pemetrexed was on December 6th.  - Transfuse to HGB goal of 7 and PLT goal of 50, had gi bleed in past    - wbc adequate , holding neupogen   - No occult blood noted.  -On discharge, she will follow-up with Dr. Guevara for potential start of durvalumab consolidation.    right arm swelling   - had this in past from SVC syndrome  - RUE duplex done and negative for DVT     Lung cancer.   - Patient w/ history of lung ca s/p radiation, chemo and lobectomy   - heme/onc following and will have outpatient follow up. Pt will follow up with Dr. Guevara for potential start of durvalumab consolidation.    Hypertension.   - Patient w/ history of hypertension on coreg  - c/w home coreg w/ hold parameters.  - per cardiology, no further inpt workup. f/u Dr Shepherd after DC as scheduled [phone]      Hyperlipidemia.   - patient with history of hld on atorvastatin   - c/w home statin.    Edema of both legs.   - patient w/ history of edema of lower extremities. Echo from 2019 appreciated w/ hyperdynamic LV, no reduced EF. Was on lasix prior to last admission but seems to have been discontinued due to low BP.   - continue holding lasix as patient w/ presyncopal symptoms, poor PO  - reassess throughout admission ability to restart  - lungs clear on exam.    Prophylactic measure.    - no AC plts low     F no IVF   E K>4 Mg>2 P>3  N Pureed diet thin liquids (home diet)     Speech and swallow eval due to post radiation secretions   Fall Precautions  Ambulate w/ assistance.    Discharge planning issues.   -d/c to home     Case discussed with Dr. Gibson on December 22, 2021. Pt is medically clearedprogress  for d/c. Pt agreed with d/c planning.  to pick pt up for d/c. Patient is a 66 year old woman w/ past medical history of HTN, COPD, lung ca s/p R lobectomy and radiation, CAD w/ stents, anemia presenting with one week of generalized weakness found to have hemoglobin of 4.7 likely 2/2 chemo and underlying malignancy.     Symptomatic anemia.   -Patient presenting w/ symptomatic anemia. Pancytopenia from Chemo for underlying malignancy.   -PRBC transfused, Hb improved.    Pancytopenia.   - keep pts >50k per heme/onc, patient w/ prior history of GI bleed   - zarxio 480 mcg daily for 5 days or until ANC ~ 5K; zarxio d/c'd 12/22 with ANC improved 6.01  - heme/onc consulted; On discharge, she will follow-up with Dr. Guevara for potential start of durvalumab consolidation.  - Transfuse to HGB goal of 7 and PLT goal of 50, had gi bleed in past --> received plts during hospitalization  - No occult blood noted.  - On discharge, she will follow-up with Dr. Guevara for potential start of durvalumab consolidation.    right arm swelling   - had this in past from SVC syndrome  - RUE duplex done and negative for DVT     Lung cancer.   - Patient w/ history of lung ca s/p radiation, chemo and lobectomy   - heme/onc consulted, plan for follow up with Dr. Guevara for potential start of durvalumab consolidation.    Hypertension.   - Patient w/ history of hypertension on coreg  - continued with home coreg w/ hold parameters.  - per cardiology, no further inpt workup. f/u Dr Shepherd after DC as scheduled [phone]    Hyperlipidemia.   - patient with history of hld on atorvastatin   - c/w home statin.    Edema of both legs.   - patient w/ history of edema of lower extremities. Echo from 2019 appreciated w/ hyperdynamic LV, no reduced EF. Was on lasix prior to last admission but seems to have been discontinued due to low BP.   - held lasix as patient w/ presyncopal symptoms, poor PO  - lungs clear on exam.  - Per discussion with apolinar Rushing on day of discharge ok to resume lasix    Prophylactic measure.    - no AC plts low   - Speech and swallow eval due to post radiation secretions: Pureed diet thin liquids (home diet)       Case discussed with Dr. Gibson on December 22, 2021. Pt is medically cleared for d/c. Pt agreed with d/c planning.  to pick pt up for d/c. Patient is a 66 year old woman w/ past medical history of HTN, COPD, lung ca s/p R lobectomy and radiation, CAD w/ stents, anemia presenting with one week of generalized weakness found to have hemoglobin of 4.7 likely 2/2 chemo and underlying malignancy.     Symptomatic anemia.   -Patient presenting w/ symptomatic anemia. Pancytopenia from Chemo for underlying malignancy.   -PRBC transfused, Hb improved.    Pancytopenia.   - keep pts >50k per heme/onc, patient w/ prior history of GI bleed   - zarxio 480 mcg daily for 5 days or until ANC ~ 5K; zarxio d/c'd 12/22 with ANC improved 6.01  - heme/onc consulted; On discharge, she will follow-up with Dr. Guevara for potential start of durvalumab consolidation.  - Transfuse to HGB goal of 7 and PLT goal of 50, had gi bleed in past --> received plts during hospitalization  - No occult blood noted.  - On discharge, she will follow-up with Dr. Guevara for potential start of durvalumab consolidation.    right arm swelling   - had this in past from SVC syndrome  - RUE duplex done and negative for DVT     Lung cancer.   - Patient w/ history of lung ca s/p radiation, chemo and lobectomy   - heme/onc consulted, plan for follow up with Dr. Guevara for potential start of durvalumab consolidation.    Hypertension.   - Patient w/ history of hypertension on coreg  - continued with home coreg w/ hold parameters.  - per cardiology, no further inpt workup. f/u Dr Shepherd after DC as scheduled [phone]    Hyperlipidemia.   - patient with history of hld on atorvastatin   - c/w home statin.    Edema of both legs.   - patient w/ history of edema of lower extremities. Echo from 2019 appreciated w/ hyperdynamic LV, no reduced EF. Was on lasix prior to last admission but seems to have been discontinued due to low BP.   - held lasix as patient w/ presyncopal symptoms, poor PO  - lungs clear on exam.  - Pt stated her lasix has been on hold for over a month as per out patient doctors, discussed with cardiology RUTH Rushing on day of discharge, will continue to hold lasix    Prophylactic measure.    - no AC plts low   - Speech and swallow eval due to post radiation secretions: Pureed diet thin liquids (home diet)       Case discussed with Dr. Gibson on December 22, 2021. Pt is medically cleared for d/c. Pt agreed with d/c planning.  to pick pt up for d/c.

## 2021-12-24 LAB
-  AMIKACIN: SIGNIFICANT CHANGE UP
-  AMIKACIN: SIGNIFICANT CHANGE UP
-  AMOXICILLIN/CLAVULANIC ACID: SIGNIFICANT CHANGE UP
-  AMOXICILLIN/CLAVULANIC ACID: SIGNIFICANT CHANGE UP
-  AMPICILLIN/SULBACTAM: SIGNIFICANT CHANGE UP
-  AMPICILLIN/SULBACTAM: SIGNIFICANT CHANGE UP
-  AMPICILLIN: SIGNIFICANT CHANGE UP
-  AMPICILLIN: SIGNIFICANT CHANGE UP
-  AZTREONAM: SIGNIFICANT CHANGE UP
-  AZTREONAM: SIGNIFICANT CHANGE UP
-  CEFAZOLIN: SIGNIFICANT CHANGE UP
-  CEFAZOLIN: SIGNIFICANT CHANGE UP
-  CEFEPIME: SIGNIFICANT CHANGE UP
-  CEFEPIME: SIGNIFICANT CHANGE UP
-  CEFOXITIN: SIGNIFICANT CHANGE UP
-  CEFOXITIN: SIGNIFICANT CHANGE UP
-  CEFTRIAXONE: SIGNIFICANT CHANGE UP
-  CEFTRIAXONE: SIGNIFICANT CHANGE UP
-  CIPROFLOXACIN: SIGNIFICANT CHANGE UP
-  CIPROFLOXACIN: SIGNIFICANT CHANGE UP
-  ERTAPENEM: SIGNIFICANT CHANGE UP
-  ERTAPENEM: SIGNIFICANT CHANGE UP
-  GENTAMICIN: SIGNIFICANT CHANGE UP
-  GENTAMICIN: SIGNIFICANT CHANGE UP
-  IMIPENEM: SIGNIFICANT CHANGE UP
-  IMIPENEM: SIGNIFICANT CHANGE UP
-  LEVOFLOXACIN: SIGNIFICANT CHANGE UP
-  LEVOFLOXACIN: SIGNIFICANT CHANGE UP
-  MEROPENEM: SIGNIFICANT CHANGE UP
-  MEROPENEM: SIGNIFICANT CHANGE UP
-  NITROFURANTOIN: SIGNIFICANT CHANGE UP
-  NITROFURANTOIN: SIGNIFICANT CHANGE UP
-  PIPERACILLIN/TAZOBACTAM: SIGNIFICANT CHANGE UP
-  PIPERACILLIN/TAZOBACTAM: SIGNIFICANT CHANGE UP
-  TIGECYCLINE: SIGNIFICANT CHANGE UP
-  TIGECYCLINE: SIGNIFICANT CHANGE UP
-  TOBRAMYCIN: SIGNIFICANT CHANGE UP
-  TOBRAMYCIN: SIGNIFICANT CHANGE UP
-  TRIMETHOPRIM/SULFAMETHOXAZOLE: SIGNIFICANT CHANGE UP
-  TRIMETHOPRIM/SULFAMETHOXAZOLE: SIGNIFICANT CHANGE UP
CULTURE RESULTS: SIGNIFICANT CHANGE UP
METHOD TYPE: SIGNIFICANT CHANGE UP
METHOD TYPE: SIGNIFICANT CHANGE UP
ORGANISM # SPEC MICROSCOPIC CNT: SIGNIFICANT CHANGE UP
SPECIMEN SOURCE: SIGNIFICANT CHANGE UP

## 2022-01-12 NOTE — PRE-ANESTHESIA EVALUATION ADULT - NSANTHNECKRD_ENT_A_CORE
I have personally evaluated and examined the patient. The Attending was available to me as a supervising provider if needed.
No

## 2022-01-14 NOTE — DISCHARGE NOTE PROVIDER - NSDCACTIVITY_GEN_ALL_CORE
[address]  LOCATION NAME  [address] [place] [address] Cardiology at [facility]    Encounter Providers Encounter Date   Leelee Mckinney MD (Attending)  [address], [facility]  Cardiology      Date of Interrogation: 14-Jan-2022 11:54:05. DERREK transmission received for patients CRT-D. Transmission shows normal sensing and pacing function. EP physician will review. See interrogation under the cardiology tab in the [address] [place] [address] [address] field for more details. Will continue to monitor remotely. NANCY @ 8 MONTHS. Pacing (% of Time Since 07-Mar-2021)  Total  100.0%. Possible OptiVol fluid accumulation: 04-Jun-2021 -- 14-Jun-2021. Thoracic impedance trend below reference and trending down. Episodes Since: 07-Mar-2021  1 Non-sustained VT (coreg)  1 Monitored AT/AF-egm shows early atrial beat-Atrial sensing in RP followed by AP. (double counting). Thoracic impedance trend stable. CARELINK Q 6 MONTHS.
Do not drive or operate machinery/Follow Instructions Provided by your Surgical Team

## 2022-01-15 NOTE — H&P PST ADULT - PRIMARY CARE PROVIDER
Dr. Rowe [facility] [phone];  Dr. Shepherd, cardio, [phone]
Rehabilitation services

## 2022-01-19 ENCOUNTER — APPOINTMENT (OUTPATIENT)
Dept: RADIATION ONCOLOGY | Facility: CLINIC | Age: 67
End: 2022-01-19
Payer: MEDICARE

## 2022-01-19 PROCEDURE — 99024 POSTOP FOLLOW-UP VISIT: CPT | Mod: GC

## 2022-01-19 RX ORDER — SUCRALFATE 1 G/10ML
1 SUSPENSION ORAL 4 TIMES DAILY
Qty: 400 | Refills: 0 | Status: DISCONTINUED | COMMUNITY
Start: 2021-11-04 | End: 2022-01-19

## 2022-01-19 RX ORDER — FERROUS SULFATE 325(65) MG
TABLET ORAL
Refills: 0 | Status: DISCONTINUED | COMMUNITY
End: 2022-01-19

## 2022-01-19 RX ORDER — SUCRALFATE 1 G/10ML
1 SUSPENSION ORAL 4 TIMES DAILY
Qty: 1 | Refills: 0 | Status: DISCONTINUED | COMMUNITY
Start: 2021-12-13 | End: 2022-01-19

## 2022-01-19 RX ORDER — DIPHENHYDRAMINE HYDROCHLORIDE AND LIDOCAINE HYDROCHLORIDE AND ALUMINUM HYDROXIDE AND MAGNESIUM HYDRO
KIT
Qty: 1 | Refills: 0 | Status: DISCONTINUED | COMMUNITY
Start: 2021-12-13 | End: 2022-01-19

## 2022-01-19 RX ORDER — DIPHENHYDRAMINE HYDROCHLORIDE AND LIDOCAINE HYDROCHLORIDE AND ALUMINUM HYDROXIDE AND MAGNESIUM HYDRO
KIT
Qty: 1 | Refills: 5 | Status: DISCONTINUED | COMMUNITY
Start: 2021-11-11 | End: 2022-01-19

## 2022-01-20 NOTE — REVIEW OF SYSTEMS
[Constipation: Grade 0] : Constipation: Grade 0 [Diarrhea: Grade 0] : Diarrhea: Grade 0 [Nausea: Grade 0] : Nausea: Grade 0 [Vomiting: Grade 0] : Vomiting: Grade 0 [Dysphagia: Grade 0] : Dysphagia: Grade 0 [Cough: Grade 0] : Cough: Grade 0 [Dyspnea: Grade 0] : Dyspnea: Grade 0

## 2022-03-18 NOTE — PATIENT PROFILE ADULT - NSPROMUTINFOINDIVIDFT_GEN_A_NUR
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  Had been going to therapist //  But stopped /  Still has depression  Wants to go back to counselling  // procedure explained/    Trazodone  UP  MG at night   3-18-22     Allergic sinusitis Allergist itching eyes and some sneezing cetirizine helps      3-18-22     bmi 60   Over on exam  : diet and exercise can assist with lowering weight and also improving cardiovascular fitness ., (bariatric complicated due to the whipples surgery )   /  we have put in a  referral to life weigh   PENDING      3-18-22     See in three months with meds   
Likes the room cold

## 2022-03-27 NOTE — HISTORY OF PRESENT ILLNESS
[Home] : at home, [unfilled] , at the time of the visit. [Medical Office: ([facility])___] : at the medical office located in  [Verbal consent obtained from patient] : the patient, [unfilled] [FreeTextEntry4] : Sajan Griffith RN [FreeTextEntry1] : Kimberly Parmar is a 66 year old female, former smoker (1/2 PPD x 30 years, quit Jan 2018), now 2yr s/p RULobectomy with MLND on 9/3/19. 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 Pathology of right rib excision was benign, revealed bone. Visceral pleura invasion and HYACINTH present. She completed chemotherapy on 12/10/19 with Dr. Guevara. Later with SVC syndrome s/p stenting, cervical LN bx positive, g3 NSCLC.  Pt was treated from 10/25 - 12/2/21. In 30 fractions pt received  6000cGy to mediastinum. Tolerated treatment well without any unscheduled breaks.\par \par Voice getting better.Eating regular food now. She was hospitalized twice due to chemo side effects and anemia requiring blood transfusions. Just started immunotherapy, tolerating well. No pain. No SOB at rest. Continues to see Dr. Guevara.

## 2022-03-27 NOTE — DISEASE MANAGEMENT
[Clinical] : TNM Stage: c [N/A] : Currently not applicable [FreeTextEntry4] : SVC syndrome [TTNM] : - [NTNM] : - [MTNM] : -

## 2022-04-14 NOTE — H&P PST ADULT - TEACHING/LEARNING LEARNER
[facility]   Progress Note                   Date:   4/14/2022  Patient name: Nata Sims  Date of admission:  4/11/2022  9:17 AM  MRN:   [id number]  YOB: 1972  PCP: Lois Adame MD    Reason for Admission:     Subjective:       Clinical Changes / Abnormalities: seen and examined in room. No acute CV issues/concern overnight. Labs,vitals, & tele reviewed. Remains SR.  Denies any CP or SOB      Medications:   Scheduled Meds:   Current Facility-Administered Medications:     warfarin (COUMADIN) tablet 5 mg, 5 mg, Oral, Once, Maeve Alegria MD    oxyCODONE-acetaminophen (PERCOCET) 5-325 MG per tablet 1 tablet, 1 tablet, Oral, Q4H PRN, Gisselle Blankenship MD, 1 tablet at 04/14/22 1231    atorvastatin (LIPITOR) tablet 20 mg, 20 mg, Oral, Nightly, Phillip Cloud MD, 20 mg at 04/13/22 1953    metoprolol succinate (TOPROL XL) extended release tablet 50 mg, 50 mg, Oral, Daily, eRné Mendez DO, 50 mg at 04/14/22 0759    melatonin tablet 3 mg, 3 mg, Oral, Nightly PRN, Clementlenore Seats, APRN - CNP, 3 mg at 04/13/22 2204    warfarin placeholder: dosing by pharmacy, , Other, RX Placeholder, Maeve Alegria MD    morphine (PF) injection 2 mg, 2 mg, IntraVENous, Q8H PRN, Phillip Cloud MD, 2 mg at 04/14/22 4826    bumetanide (BUMEX) tablet 1 mg, 1 mg, Oral, BID, Phillip Cloud MD, 1 mg at 04/14/22 0759    DULoxetine (CYMBALTA) extended release capsule 30 mg, 30 mg, Oral, Daily, Phillip Cloud MD, 30 mg at 04/14/22 0759    losartan (COZAAR) tablet 25 mg, 25 mg, Oral, Daily, Phillip Cloud MD, 25 mg at 04/14/22 0759    pantoprazole (PROTONIX) tablet 40 mg, 40 mg, Oral, QAM AC, Phillip Cloud MD, 40 mg at 04/14/22 0516    sodium chloride flush 0.9 % injection 5-40 mL, 5-40 mL, IntraVENous, 2 times per day, Phillip Cloud MD, 10 mL at 04/14/22 0759    sodium chloride flush 0.9 % injection 5-40 mL, 5-40 mL, IntraVENous, PRN, Phillip Cloud MD    0.9 % sodium chloride infusion, , IntraVENous, PRN, Phillip Cloud MD    polyethylene glycol [facility]) packet 17 g, 17 g, Oral, Daily PRN, Kala Rondon MD    acetaminophen (TYLENOL) tablet 650 mg, 650 mg, Oral, Q6H PRN **OR** acetaminophen (TYLENOL) suppository 650 mg, 650 mg, Rectal, Q6H PRN, Kala Rondon MD    potassium chloride (KLOR-CON M) extended release tablet 40 mEq, 40 mEq, Oral, PRN **OR** potassium bicarb-citric acid (EFFER-K) effervescent tablet 40 mEq, 40 mEq, Oral, PRN **OR** potassium chloride 10 mEq/100 mL IVPB (Peripheral Line), 10 mEq, IntraVENous, PRN, Kala Rondon MD    cefTRIAXone (ROCEPHIN) 1000 mg IVPB in 50 mL D5W minibag, 1,000 mg, IntraVENous, Q24H, Jon Fernandez MD, Stopped at 04/14/22 1246    metronidazole (FLAGYL) 500 mg in NaCl 100 mL IVPB premix, 500 mg, IntraVENous, Q8H, Jon Fernandez MD, Stopped at 04/14/22 1429   Continuous Infusions:   sodium chloride       CBC:   Recent Labs     04/12/22  0540 04/13/22  0535 04/14/22  0533   WBC 7.0 7.5 7.4   HGB 9.4* 9.6* 9.6*    412 368     BMP:    Recent Labs     04/12/22  0540 04/13/22  0535 04/14/22  0533    136 136   K 4.6 4.6 3.8   CL 96* 95* 96*   CO2 28 30 27   BUN 20 19 20   CREATININE 1.50* 1.61* 1.46*   GLUCOSE 94 100* 93     Hepatic:   No results for input(s): AST, ALT, ALB, BILITOT, ALKPHOS in the last 72 hours. Troponin: No results for input(s): TROPHS in the last 72 hours. BNP: No results for input(s): BNP in the last 72 hours. Lipids: No results for input(s): CHOL, HDL in the last 72 hours. Invalid input(s): LDLCALCU  INR:   Recent Labs     04/12/22  1557 04/13/22  0535 04/14/22  0533   INR 2.3 2.3 2.2       Objective:   Vitals: /67   Pulse 66   Temp 98.5 °F (36.9 °C) (Oral)   Resp 18   Ht 5' 7\" (1.702 m)   Wt 216 lb 7.9 oz (98.2 kg)   SpO2 95%   BMI 33.91 kg/m²   General appearance: alert and cooperative with exam  HEENT: Head: Normocephalic, no lesions, without obvious abnormality.   Neck: no adenopathy, no carotid bruit, no JVD, supple, symmetrical, trachea midline and thyroid not enlarged, symmetric, no tenderness/mass/nodules  Lungs: clear to auscultation bilaterally throughout  Heart: regular rate and rhythm, S1, S2 normal, no murmur, click, rub or gallop  Abdomen: soft, non-tender; bowel sounds normal; no masses,  no organomegaly  Extremities: extremities normal, atraumatic, no cyanosis or edema  Neurologic: Mental status: Alert, oriented, thought content appropriate    EKG:   Results for orders placed or performed during the hospital encounter of 04/11/22   EKG 12 Lead   Result Value Ref Range    Ventricular Rate 71 BPM    Atrial Rate 71 BPM    P-R Interval 202 ms    QRS Duration 108 ms    Q-T Interval 456 ms    QTc Calculation (Bazett) 495 ms    P Axis -84 degrees    R Axis -13 degrees    T Axis 8 degrees    Narrative    Unusual P axis, possible ectopic atrial rhythm  Borderline prolonged ND interwal    Nonspecific T wave changes  Prolonged QT  Abnormal ECG     ECHO:   Results for orders placed or performed during the hospital encounter of 02/19/22   ECHO Complete 2D W Doppler W Color   Result Value Ref Range    Left Ventricular Ejection Fraction 28     LVEF MODALITY ECHO     Narrative    [address]    Transthoracic Echocardiography Report (TTE)     Patient Name [facility] Date of Study               02/21/2022                [facility]      Date of      1972  Gender                      Male   Birth      Age          48 year(s)  Race                              Room Number  ORPO      Height:                     67 inch, 170.18 cm      Corporate ID [id number]    Weight:                     254 pounds, 115.2 kg   #      Patient Acct [de-identified]   BSA:          2.24 m^2      BMI:      39.78   #                                                              kg/m^2      MR #         [id number]      Sonographer                 Nancie Abel      Accession #  [id number]  Interpreting Physician      Michelle Gaitan      Fellow                   Referring Nurse Practitioner      Interpreting             Referring Physician         Caitie Zaman     Type of Study      TTE procedure:2D Echocardiogram, M-Mode, Doppler, Color Doppler, Contrast   study. Procedure Date  Date: 02/21/2022 Start: 12:47 PM    Study Location: [address] [place] [address]  Technical Quality: Fair visualization    Indications:Elevated BNP. Patient Status: Inpatient    Height: 67 inches Weight: 254.01 pounds BSA: 2.24 m^2 BMI: 39.78 kg/m^2    Rhythm: Atrial fibrillation HR: 122 bpm BP: 109/87 mmHg    CONCLUSIONS    Summary  Contrast was utilized on this technically difficult study. Left ventricle is normal in size. Estimated LV EF 25-30%. Anteroseptum hypokinesis. Moderate left ventricular hypertrophy. Left atrial dilatation. Right atrial dilatation. Right ventricular dilatation with reduced systolic function. Severe mitral regurgitation. Mild tricuspid regurgitation. Normal right ventricular systolic pressure. IVC Increased diameter, but still has inspiratory variation. Aortic root is mildly dilated. (4.0 m)  No significant pericardial effusion is seen. Signature  ----------------------------------------------------------------------------   Electronically signed by Nancie Abel(Sonographer) on 02/21/2022 03:47   PM  ----------------------------------------------------------------------------    ----------------------------------------------------------------------------   Electronically signed by Tucker Giordano(Interpreting physician) on   02/21/2022 03:53 PM  ----------------------------------------------------------------------------  FINDINGS  Left Atrium  Left atrial dilatation. Left Ventricle  Left ventricle is normal in size. Estimated LV EF 25-30%. Anteroseptum hypokinesis. Moderate left ventricular hypertrophy. Right Atrium  Right atrial dilatation. Right Ventricle  Right ventricular dilatation with reduced systolic function.   Mitral Valve  Severe mitral regurgitation. Aortic Valve  Normal aortic valve structure and function without stenosis or  regurgitation. Tricuspid Valve  Normal tricuspid valve structure and function. Mild tricuspid regurgitation. Normal right ventricular systolic pressure. Pulmonic Valve  Pulmonic valve not well visualized but Doppler velocities are normal. No  pulmonic insufficiency. Pericardial Effusion  No significant pericardial effusion is seen. Miscellaneous  Aortic root is mildly dilated. (4.0 m)  E/e\" average was not obtained. IVC Increased diameter, but still has inspiratory variation.     M-mode / 2D Measurements & Calculations:      LVIDd:5.83 cm(3.7 - 5.6 cm)      Diastolic ODQDF ml   DMXXL:5.7 cm(2.2 - 4.0 cm)       Systolic YLGICK:348 ml   PZQ.80 cm(0.6 - 1.1 cm)       Aortic Root:4 cm(2.0 - 3.7 cm)   LVPWd:1.66 cm(0.6 - 1.1 cm)      LA Dimension: 5.7 cm(1.9 - 4.0 cm)   Fractional Shortening:15.95 %    LA volume/Index: 93 ml /42m^2   Calculated LVEF (%): 25.85 %     LVOT:2.2 cm      Mitral:                                Aortic      Peak E-Wave: 1.43 m/s                  Peak Velocity: 1.16 m/s                                          Mean Velocity: 0.71 m/s   Peak Gradient: 8.18 mmHg               Peak Gradient: 5.38 mmHg   Deceleration Time: 95 msec             Mean Gradient: 2 mmHg      MR Alias Velocity: 0.35 m/s   MR Velocity: 4.88 m/s                  Area (continuity): 2.87 cm^2   BAHMAN Volumetric: 0.17 cm^2              AV VTI: 17.9 cm      MR VTI: 106 cm      Tricuspid:                             Pulmonic:      Estimated RVSP: 28 mmHg   Peak TR Velocity: 2.24 m/s   Peak TR Gradient: 20.0704 mmHg   Estimated RA Pressure: 8 mmHg                                          Estimated PASP: 28.07 mmHg          Results for orders placed during the hospital encounter of 22    Echocardiogram Transesophageal (ALLAN)    Narrative  [address] [facility]    Transesophageal Echocardiography Report Atrium  Left atrial appendage showed no evidence of clot. No intracardiac shunt via color Doppler. Thrombus was not visualized within the left atrium. Left Ventricle  Left ventricular systolic function is severely reduced. EF 25%. Left ventricle is dilated. Right Atrium  Normal right atrium. Right Ventricle  Normal right ventricle. Mitral Valve  Moderate mitral regurgitation. Structurally normal.  Aortic Valve  The aortic valve is trileaflet and opens adequately. No regurgitation is  identified. Tricuspid Valve  Tricuspid valve is structurally normal. Mild regurgitation is identified. Pericardial Effusion  No pericardial effusion. Miscellaneous  Mild atheromatous disease of arch. Cardiac Angiography:   Results for orders placed or performed during the hospital encounter of 09/01/19   Diagnostic Cardiac Cath Lab Procedure   Result Value Ref Range    Left Ventricular Ejection Fraction 35     LVEF MODALITY CATH     Narrative    Cardiac Diagnostic Report     Demographics      Patient    STRUFFOLINO   Date of Study           09/03/2019   Name       ROSEMARY SKY      Date of    1972    Gender                  Male   Birth      Age        52 year(s)    Race                          Room       303           Height:                 67 inch, 170.18 cm   Number      Corporate  [id number]      Weight:                 243 pounds, 110.2 kg   ID #      Patient    [id number]     BSA:        2.2 m^2     BMI:       38.06 kg/m^2   Acct #      MR #       [de-identified]       Performing Physician    Lawerance Dryer      Accession  [id number]     Referring Physician   #      Case ID #  [id number]         Assisting Physician     Additional Comments  H&P reviewed and patient examined by performing physician prior to the  procedure on 9/3/19 at  No changes noted. If changes, see note below. Mallampati Classification 2 / ASA Classification II : per Physician . Patient medications reviewed by Physician prior to procedure.     Merged to order 9/4/19 jjw    Procedure  Procedure Type:     Diagnostic procedure: Lt Heart, Coronary Angio, LVgram     Complications:    - No complication    Indications:    - Dyspnea      - CHF      - Cardiomyopathy - Dilated     Conclusions      Procedure Summary      Normal coronary arteries   LV dysfunction with EF 35%      Recommendations      Medical treatments   Assess LV function in 8=90 days      Signature      ----------------------------------------------------------------   Electronically signed by Daniella Ashby(Performing Physician) on   09/04/2019 10:10   ----------------------------------------------------------------      Angiographic Findings      Cardiac Arteries and Lesion Findings     LMCA: Normal 0% stenosis. LAD: Normal 0% stenosis. LCx: Normal 0% stenosis. RCA: Normal 0% stenosis. Coronary Tree      Dominance:     LV Analysis  LV function assessed as:Abnormal.  Ejection Fraction  +----------------------------------------------------------------------+---+  ! Method                                                                ! EF%! +----------------------------------------------------------------------+---+  ! LV gram                                                               !35 !  +----------------------------------------------------------------------+---+     LV Segment Contractility      1 - Normal    3 - Mild         5 - Severe       7 - Dyskinesis   hypokinesis      hypokinesis      2 -           4 - Moderate     6 - Akinesis     8 - Aneurysm   Hypokinesis   hypokinesis     Procedure Data  Procedure Start Time: 09/03/2019 16:40. Procedure End Time: 09/03/2019  16:49. The procedure was explained in detail to the patient. Risks, complications  and alternative treatments were reviewed. Written consent was obtained. Diagnostic Cath Status: Urgent    Entry Locations    - Retrograde Percutaneous access was performed through the Right Radial      artery.  A 6 Fr sheath was inserted. Hemostasis was successfully obtained      using TR Band. Procedure Medications:    - Lidocaine HCl 1% 10mg/ml S.Q. 5 ml. - Versed I.V. 2 mg.      - Fentanyl I.V. 50 mcg.      - Heparin I.A. 2000 units. - Verapamil I.A. 2.5 mg.      - Nitroglycerin I.A. 400 mcg.      - Zofran I.V. 4 mg. - Benadryl I.V. 50 mg. Catheters and Wires:    - 6F Catheter JR 4 was used for Left ventriculography. - 6F Catheter JR 4 was used for Right coronary angiography. - 6F Catheter JL 3.5 was used for Left coronary angiography. Contrast Material:    - Optiray 57484 ml    Fluoroscopy Time: Diagnostic: 1:02 minutes. Total: 1:02 minutes. Estimated Blood Loss: 3 ml. Medical History      Risk Factors      The patient risk factors include:obesity, insulin-treated diabetes   mellitus, last creatinine: 1.2 mg/dl and creatinine clearance: 118.64   ml/min. Admission Data  Admission Date: 09/01/2019    Admission Status: Inpatient.        -The patient's anginal syndrome was assessed as CCS II according to the      [facility] clinical classification. Hemodynamics      Condition: Baseline Room Air      Estimated: 286. 33Heart Rate: 96 bpm     Pressure  +----------------------------------+---------------------------------------+  ! Site                              ! Pressure                               ! +----------------------------------+---------------------------------------+  ! LV                                !88/8 ,13                               ! +----------------------------------+---------------------------------------+  ! LV                                !106/6 ,7                               !  +----------------------------------+---------------------------------------+    Shunts     Oxygen Values      O2 [id number]          Assessment / Acute Cardiac Problems:     - Chronic HFrEF- not in exacerbation  - H/o PAF s/p CV- in NSR  - Long QT  - HTN  - Sacral decub    Plan of Treatment:   1. Stable. Continue PO BB & Coumadin. 2. Clinically no volume overload. Continue PO BB, ARB, & Bumex. Discussed importance of med compliance, diet, activity, and f/u. Will consider Aldactone and Jardiance/Farxiga as OP  3. Continue LV. F/U in clinic for OP repeat limited echo next month. 4. Will sign off. F/U in clinic in 2 weeks upon discharge.     Jerson Gabriel, VANGIE - CNP spouse

## 2022-04-21 ENCOUNTER — APPOINTMENT (OUTPATIENT)
Dept: RADIATION ONCOLOGY | Facility: CLINIC | Age: 67
End: 2022-04-21
Payer: MEDICARE

## 2022-04-21 VITALS
WEIGHT: 166.6 LBS | HEIGHT: 60 IN | RESPIRATION RATE: 16 BRPM | DIASTOLIC BLOOD PRESSURE: 89 MMHG | OXYGEN SATURATION: 98 % | SYSTOLIC BLOOD PRESSURE: 179 MMHG | BODY MASS INDEX: 32.71 KG/M2 | TEMPERATURE: 98.6 F | HEART RATE: 84 BPM

## 2022-04-21 PROCEDURE — 99213 OFFICE O/P EST LOW 20 MIN: CPT

## 2022-04-21 NOTE — REVIEW OF SYSTEMS
[Shortness Of Breath] : shortness of breath [Joint Pain] : joint pain [Muscle Weakness] : muscle weakness [Easy Bruising] : a tendency for easy bruising [Negative] : Neurological [Constipation: Grade 0] : Constipation: Grade 0 [Diarrhea: Grade 0] : Diarrhea: Grade 0 [Dysphagia: Grade 0] : Dysphagia: Grade 0 [Nausea: Grade 0] : Nausea: Grade 0 [Vomiting: Grade 0] : Vomiting: Grade 0 [Cough: Grade 0] : Cough: Grade 0 [Dyspnea: Grade 1 - Shortness of breath with moderate exertion] : Dyspnea: Grade 1 - Shortness of breath with moderate exertion

## 2022-04-21 NOTE — DISEASE MANAGEMENT
[Clinical] : TNM Stage: c [N/A] : Currently not applicable [FreeTextEntry4] : lung cancer [TTNM] : - [NTNM] : - [MTNM] : -

## 2022-04-21 NOTE — HISTORY OF PRESENT ILLNESS
[FreeTextEntry1] : Kimberly Parmar is a 66 year old female, former smoker (1/2 PPD x 30 years, quit Jan 2018), now 2yr s/p RULobectomy with MLND on 9/3/19. 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 Pathology of right rib excision was benign, revealed bone. Visceral pleura invasion and HYACINTH present. She completed chemotherapy on 12/10/19 with Dr. Guevara. Later with SVC syndrome s/p stenting, cervical LN bx positive, g3 NSCLC. Pt was treated from 10/25 - 12/2/21. In 30 fractions pt received 6000cGy to mediastinum. Tolerated treatment well without any unscheduled breaks.\par \par Pt here for followup. Feeling much better. Eating well,no dysphagia. Saw primary care md yesterday. Had CT of chest in 3/22 by  Dr. Guevara. Getting results but reportedly with continued positive response. No cough but SOB noted. Pt started on Immunotherapy in 1/22 ,Imfinzi and tolerating it well.\par

## 2022-07-19 NOTE — PROGRESS NOTE ADULT - PROBLEM SELECTOR PROBLEM 3
Evelin Parks  1982   Chief Complaint   Patient presents with    Knee Pain     left        HISTORY OF PRESENT ILLNESS  Evelin Parks is a 36 y.o. male who presents today for evaluation of left knee pain after he fell off a knee scooter while trying to ride it to work 2-3 weeks ago. He hit a crack and flew over the top. He landed directly on his knee. [facility] in a knee immobilizer. Pain is a 5/10. Has tried following treatments: Injections:NO; Brace:YES; Therapy:NO; Cane/Crutch:NO       No Known Allergies     Past Medical History:   Diagnosis Date    Ankle deformity     Bilateral    Depression     IBS (irritable bowel syndrome)     MDD (major depressive disorder), recurrent severe, without psychosis ([facility] 3/4/2018    Osteoarthritis     both knees      Social History     Socioeconomic History    Marital status:      Spouse name: Not on file    Number of children: Not on file    Years of education: Not on file    Highest education level: Not on file   Occupational History    Not on file   Tobacco Use    Smoking status: Never Smoker    Smokeless tobacco: Never Used   Vaping Use    Vaping Use: Never used   Substance and Sexual Activity    Alcohol use: No    Drug use: No    Sexual activity: Not on file   Other Topics Concern    Not on file   Social History Narrative    Not on file     Social Determinants of Health     Financial Resource Strain:     Difficulty of Paying Living Expenses: Not on file   Food Insecurity:     Worried About Running Out of Food in the Last Year: Not on file    Sonia of Food in the Last Year: Not on file   Transportation Needs:     Lack of Transportation (Medical): Not on file    Lack of Transportation (Non-Medical):  Not on file   Physical Activity:     Days of Exercise per Week: Not on file    Minutes of Exercise per Session: Not on file   Stress:     Feeling of Stress : Not on file   Social Connections:    
Frequency of Communication with Friends and Family: Not on file    Frequency of Social Gatherings with Friends and Family: Not on file    Attends Taoism Services: Not on file    Active Member of Clubs or Organizations: Not on file    Attends Club or Organization Meetings: Not on file    Marital Status: Not on file   Intimate Partner Violence:     Fear of Current or Ex-Partner: Not on file    Emotionally Abused: Not on file    Physically Abused: Not on file    Sexually Abused: Not on file   Housing Stability:     Unable to Pay for Housing in the Last Year: Not on file    Number of [place] in the Last Year: Not on file    Unstable Housing in the Last Year: Not on file      Past Surgical History:   Procedure Laterality Date    COLONOSCOPY N/A 12/13/2017    COLONOSCOPY (Con-Sed) performed by [facility]i Narvaez [facility] at [facility] ENDOSCOPY    HX CHOLECYSTECTOMY  2011    HX ORTHOPAEDIC      scope right knee      Family History   Problem Relation Age of Onset    Other Mother         RHEM ARTHRITIS     Hypertension Mother     Diabetes Father       Current Outpatient Medications   Medication Sig    LORazepam (Ativan) 0.5 mg tablet Take 0.5 mg by mouth every eight (8) hours as needed for Anxiety.  polyethylene glycol (Miralax) 17 gram/dose powder Take 17 g by mouth daily.  sertraline (Zoloft) 50 mg tablet Take 1 Tab by mouth daily.  hydrocortisone (Anusol-HC) 25 mg supp Insert 1 Suppository into rectum every twelve (12) hours. Indications: hemorrhoids    ergocalciferol (ERGOCALCIFEROL) 1,250 mcg (50,000 unit) capsule Take 1 Cap by mouth every seven (7) days. (Patient not taking: Reported on 7/19/2022)     No current facility-administered medications for this visit. REVIEW OF SYSTEM   Patient denies: Weight loss, Fever/Chills, HA, Visual changes, Fatigue, Chest pain, SOB, Abdominal pain, N/V/D/C, Blood in stool or urine, Edema. Pertinent positive as above in HPI.  All others were
negative    PHYSICAL EXAM:   Visit Vitals  /89 (BP 1 Location: Right arm, BP Patient Position: Sitting, BP Cuff Size: Adult)   Pulse 85   Temp 97.3 °F (36.3 °C)   Ht 6' 1\" (1.854 m)   Wt 246 lb (111.6 kg)   SpO2 98%   BMI 32.46 kg/m²     The patient is a well-developed, well-nourished male   in no acute distress. The patient is alert and oriented times three. The patient is alert and oriented times three. Mood and affect are normal.  LYMPHATIC: lymph nodes are not enlarged and are within normal limits  SKIN: normal in color and non tender to palpation. There are no bruises or abrasions noted. NEUROLOGICAL: Motor sensory exam is within normal limits. Reflexes are equal bilaterally. There is normal sensation to pinprick and light touch  MUSCULOSKELETAL:  Examination Left knee   Skin Intact   Range of motion    Effusion +   Medial joint line tenderness +   Lateral joint line tenderness -   Tenderness Pes Bursa -   Tenderness insertion MCL -   Tenderness insertion LCL -   Nashs +   Patella crepitus -   Patella grind -   Lachman -   Pivot shift -   Anterior drawer -   Posterior drawer -   Varus stress -   Valgus stress -   Neurovascular Intact   Calf Swelling and Tenderness to Palpation -   Geoffrey's Test -   Hamstring Cord Tightness -   ttp patella       PROCEDURE: none    IMAGIN view xray of left knee taken in office on 22 read and reviewed by myself reveal no acute abnormalities    IMPRESSION:      ICD-10-CM ICD-9-CM    1. Chronic pain of left knee  M25.562 719.46 AMB POC XRAY, KNEE; 1/2 VIEWS    G89.29 338.29    2. Contusion of left knee, initial encounter  S80. 02XA 924.11 MRI KNEE LT WO CONT      REFERRAL TO PHYSICAL THERAPY        PLAN:   1. Patient with acute left knee contusion. Given the small amount of swelling we will order a stat MRI to rule out a medial meniscus tear. Continue with ibuprofen 800 mg. Will refer to PT. He is WBAT  Risk factors include: n/a  2.  No cortisone
injection indicated today   3. Yes Physical/Occupational Therapy indicated today  4. Yes diagnostic test indicated today:   5. No durable medical equipment indicated today  6. No referral indicated today   7. No medications indicated today:   8. Yes Narcotic indicated today for short term acute pain secondary to acute knee injury. Patient given pain medication for short term acute pain relief. Goal is to treat patient according to above plan and to ultimately have patient off all pain medications once appropriate. If chronic pain management is required beyond what is expected for current orthopedic problem, will refer patient to pain management.   was reviewed and will be reviewed with every medication refill request.     RTC after MRI     NEHEMIAS Dietz [facility] Specialist
Hypertension

## 2022-10-27 ENCOUNTER — APPOINTMENT (OUTPATIENT)
Dept: RADIATION ONCOLOGY | Facility: CLINIC | Age: 67
End: 2022-10-27

## 2022-10-27 VITALS
HEART RATE: 78 BPM | SYSTOLIC BLOOD PRESSURE: 171 MMHG | BODY MASS INDEX: 35.13 KG/M2 | RESPIRATION RATE: 16 BRPM | TEMPERATURE: 98.78 F | OXYGEN SATURATION: 97 % | WEIGHT: 179.9 LBS | DIASTOLIC BLOOD PRESSURE: 83 MMHG

## 2022-10-27 PROCEDURE — 99213 OFFICE O/P EST LOW 20 MIN: CPT

## 2022-10-27 NOTE — HISTORY OF PRESENT ILLNESS
[FreeTextEntry1] : Kimberly Parmar is a 67 year old female, former smoker (1/2 PPD x 30 years, quit Jan 2018), now 2yr s/p RULobectomy with MLND on 9/3/19. 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 Pathology of right rib excision was benign, revealed bone. Visceral pleura invasion and HYACINTH present. She completed chemotherapy on 12/10/19 with Dr. Guevara. Later with SVC syndrome s/p stenting, cervical LN bx positive, g3 NSCLC. Pt was treated from 10/25 - 12/2/21. In 30 fractions pt received 6000cGy to mediastinum. Tolerated treatment well without any unscheduled breaks.\par \par 1/19/22:  Voice getting better.Eating regular food now. She was hospitalized twice due to chemo side effects and anemia requiring blood transfusions. Just started immunotherapy, tolerating well. No pain. No SOB at rest. Continues to see Dr. Guevara. \par \par 10/27/2022:  Patient comes in for 6 month follow up and continues to get treated at [facility] on Durvalumab w/[place] to complete one year in 01/2023 and had repeat PET Sep to f/u on lung findings likely related to COVID infection in May which is reported response to treatment.  She has good appetite, has gained weight since April 2022, continues to have grade 2 COLÓN and has residual cough since Covid.

## 2022-10-27 NOTE — PHYSICAL EXAM
[Sclera] : the sclera and conjunctiva were normal [Normal] : oriented to person, place and time, the affect was normal, the mood was normal and not anxious [de-identified] : 2+ pitting edema

## 2022-10-27 NOTE — REVIEW OF SYSTEMS
[Shortness Of Breath] : shortness of breath [Joint Pain] : joint pain [Muscle Weakness] : muscle weakness [Negative] : Neurological [Constipation: Grade 0] : Constipation: Grade 0 [Diarrhea: Grade 0] : Diarrhea: Grade 0 [Dysphagia: Grade 0] : Dysphagia: Grade 0 [Nausea: Grade 0] : Nausea: Grade 0 [Vomiting: Grade 0] : Vomiting: Grade 0 [Cough: Grade 0] : Cough: Grade 0 [Dyspnea: Grade 2 - Shortness of breath with minimal exertion; limiting instrumental ADL] : Dyspnea: Grade 2 - Shortness of breath with minimal exertion; limiting instrumental ADL [Hoarseness: Grade 1 - Mild or intermittent voice change; fully understandable; self-resolves] : Hoarseness: Grade 1 - Mild or intermittent voice change; fully understandable; self-resolves [Hypoxia: Grade 0] : Hypoxia: Grade 0 [Easy Bruising] : a tendency for easy bruising [FreeTextEntry4] : partial vocal cord paralysis

## 2022-11-08 NOTE — PATIENT PROFILE ADULT - FUNCTIONAL SCREEN CURRENT LEVEL: SWALLOWING (IF SCORE 2 OR MORE FOR ANY ITEM, CONSULT REHAB SERVICES), MLM)
Final Anesthesia Post-op Assessment    Patient: Amelia Beltre  Procedure(s) Performed: COLONOSCOPY  Anesthesia type: MAC    Vitals Value Taken Time   Temp 36.7 °C (98.1 °F) 11/08/22 1201   Pulse 75 11/08/22 1201   Resp 13 11/08/22 1201   SpO2 100 % 11/08/22 1201   /75 11/08/22 1201         Patient Location: Phase II  Post-op Vital Signs:stable  Level of Consciousness: awake and alert  Respiratory Status: spontaneous ventilation  Cardiovascular stable  Hydration: euvolemic  Pain Management: adequately controlled  Handoff: Handoff to receiving clinician was performed and questions were answered  Vomiting: none  Nausea: None  Airway Patency:patent  Post-op Assessment: no complications and patient tolerated procedure well with no complications      No complications documented.   
due to swelling of neck/2 = difficulty swallowing liquids/foods

## 2022-11-16 NOTE — PATIENT PROFILE ADULT - NS PRO AD ANY ON CHART
11/16/22 1014   Discharge Reassessment   Assessment Type Discharge Planning Reassessment   Did the patient's condition or plan change since previous assessment? Yes   Discharge Plan discussed with: Adult children   Discharge Plan A Inpatient Hospice  ([facility])   DME Needed Upon Discharge  none   Discharge Barriers Identified None   Post-Acute Status   Post-Acute Authorization Hospice   Hospice Status Referrals Sent   Hospice referral submitted to [facility] via [employer]. Fly requesting meeting at 1:00 pm.   Yes

## 2023-02-06 NOTE — PATIENT PROFILE ADULT - NSPROREFERSVCHOMECARDIO_GEN_A_NUR
-- DO NOT REPLY / DO NOT REPLY ALL --  -- Message is from Engagement Center Operations (ECO) --    ONLY TO BE USED WITHIN A REFILL MEDICATION ENCOUNTER    Med Refill  Is the patient currently having any symptoms?: No/Non-Emergent symptoms    Name of medication requested: See pended med    Has patient contacted the pharmacy? Yes    Is this the first request for the medication in the last 48 hours?: Yes    Patient is requesting a medication refill - medication is on active medication list    Patient is currently OUT of the requested medication - sent as HIGH priority      Full name of the provider who ordered the medication: [facility] site name / Account # for provider: [facility] [place] [facility]     Preferred Pharmacy: Pharmacy  [id number] - [place] - [address] [facility] [facility]    Patient confirmed the above pharmacy as correct?  Yes      Caller Information       Type Contact Phone/Fax    02/03/2023 04:30 PM CST Phone (Incoming) Eli Escalante (Self) [phone] (M)          Alternative phone number: none     Can a detailed message be left?: Yes    Patient is completely out of medication: Verify if patient is currently experiencing symptoms. If patient is symptomatic, proceed with front end triage instead of medication refill. If patient is not symptomatic but is completely out of medication, chandrika as High priority when routing. Inform patient: “Please call back with any questions or concerns and if your condition becomes life threatening, you should seek immediate medical assistance by calling 911 or going to the Emergency Department for evaluation.”    Inform all patients: \"If the clinical team needs to contact you regarding this refill, please be aware the return phone call may come from an unidentified or out of state phone number and your refill request will be addressed as soon as the clinical team reviews your message.\"  
Medication name: amphetamine-dextroamphetamine (Adderall) 15 MG tablet  Last Refill Details: Date 10/26/2022, Quantity:  60, refills 0  Ordering provider name: Vicenta Resendez MD  Last office visit: 12/28/2022 with provider Jaja Goldsmith PSYD    Unable to refill medication per established guidelines, request forwarded to provider for review.  Caller advised to contact office for follow-up.      Medication name: hydrOXYzine (VISTARIL) 50 MG capsule  Last Refill Details: Date 10/18/2022, Quantity:  120, refills 1  Ordering provider name: Vicenta Resendez MD  Last office visit: 12/28/2022 with provider Jaja Goldsmith PSYD     Unable to refill medication per established guidelines, request forwarded to provider for review.  Caller advised to contact office for follow-up.      Medication name: clonazePAM (KlonoPIN) 2 MG tablet  Last Refill Details: Date 10/18/2022, Quantity:  180, refills 0  Ordering provider name: Vicenta Resendez MD  Last office visit: 12/28/2022 with provider Jaja Goldsmith PSYD    Unable to refill medication per established guidelines, request forwarded to provider for review.  Caller advised to contact office for follow-up.      Medication name: atenolol (TENORMIN) 25 MG tablet  Last Refill Details: Date 1/17/2023, Quantity:  30, refills  3   Ordering provider name: MITESH Henderson  Last office visit: 12/28/2022 with provider MITESH Henderson     This writer called and spoke with patient who was informed pharmacy has refills on file. Patient expressed understanding.      
She had not refilled adderall 15mg since October, so will not resume for her. She was discharged in August, 2022. Will allow 1 more month of refills to get her til her next visit with her new psychiatrist. PDMP reviewed.  adderall refill refused. 1 month of hydroxyzine and clonazepam sent in. Thanks  
no

## 2023-03-21 NOTE — ED ADULT NURSE NOTE - CADM POA URETHRAL CATHETER
Angelic Borden, RN  Cardiology     No

## 2023-04-04 NOTE — H&P PST ADULT - ASSESSMENT
Total contrast given (ml) 170 CAPRINI SCORE [CLOT]    AGE RELATED RISK FACTORS                                                       MOBILITY RELATED FACTORS  [ ] Age 41-60 years                                            (1 Point)                  [ ] Bed rest                                                        (1 Point)  [x ] Age: 61-74 years                                           (2 Points)                 [ ] Plaster cast                                                   (2 Points)  [ ] Age= 75 years                                              (3 Points)                 [ ] Bed bound for more than 72 hours                 (2 Points)    DISEASE RELATED RISK FACTORS                                               GENDER SPECIFIC FACTORS  [x ] Edema in the lower extremities                       (1 Point)                  [ ] Pregnancy                                                     (1 Point)  [ ] Varicose veins                                               (1 Point)                  [ ] Post-partum < 6 weeks                                   (1 Point)             [x ] BMI > 25 Kg/m2                                            (1 Point)                  [ ] Hormonal therapy  or oral contraception          (1 Point)                 [ ] Sepsis (in the previous month)                        (1 Point)                  [ ] History of pregnancy complications                 (1 point)  [ ] Pneumonia or serious lung disease                                               [ ] Unexplained or recurrent                     (1 Point)           (in the previous month)                               (1 Point)  [ ] Abnormal pulmonary function test                     (1 Point)                 SURGERY RELATED RISK FACTORS  [ ] Acute myocardial infarction                              (1 Point)                 [ ]  Section                                             (1 Point)  [ ] Congestive heart failure (in the previous month)  (1 Point)               [ ] Minor surgery                                                  (1 Point)   [ ] Inflammatory bowel disease                             (1 Point)                 [ ] Arthroscopic surgery                                        (2 Points)  [ ] Central venous access                                      (2 Points)                [ ] General surgery lasting more than 45 minutes   (2 Points)       [ ] Stroke (in the previous month)                          (5 Points)               [x ] Elective arthroplasty                                         (5 Points)                                                                                                                                               HEMATOLOGY RELATED FACTORS                                                 TRAUMA RELATED RISK FACTORS  [ ] Prior episodes of VTE                                     (3 Points)                 [ ] Fracture of the hip, pelvis, or leg                       (5 Points)  [ ] Positive family history for VTE                         (3 Points)                 [ ] Acute spinal cord injury (in the previous month)  (5 Points)  [ ] Prothrombin 25776 A                                     (3 Points)                 [ ] Paralysis  (less than 1 month)                             (5 Points)  [ ] Factor V Leiden                                             (3 Points)                  [ ] Multiple Trauma within 1 month                        (5 Points)  [ ] Lupus anticoagulants                                     (3 Points)                                                           [ ] Anticardiolipin antibodies                               (3 Points)                                                       [ ] High homocysteine in the blood                      (3 Points)                                             [ ] Other congenital or acquired thrombophilia      (3 Points)                                                [ ] Heparin induced thrombocytopenia                  (3 Points)                                          Total Score [  9        ]

## 2023-04-22 ENCOUNTER — NON-APPOINTMENT (OUTPATIENT)
Age: 68
End: 2023-04-22

## 2023-04-27 ENCOUNTER — APPOINTMENT (OUTPATIENT)
Dept: RADIATION ONCOLOGY | Facility: CLINIC | Age: 68
End: 2023-04-27
Payer: MEDICARE

## 2023-04-27 VITALS
HEART RATE: 73 BPM | WEIGHT: 185.63 LBS | BODY MASS INDEX: 36.44 KG/M2 | RESPIRATION RATE: 17 BRPM | OXYGEN SATURATION: 95 % | TEMPERATURE: 98.78 F | DIASTOLIC BLOOD PRESSURE: 93 MMHG | HEIGHT: 60 IN | SYSTOLIC BLOOD PRESSURE: 183 MMHG

## 2023-04-27 PROCEDURE — 99213 OFFICE O/P EST LOW 20 MIN: CPT

## 2023-04-27 RX ORDER — OXYCODONE 5 MG/1
5 TABLET ORAL
Qty: 60 | Refills: 0 | Status: DISCONTINUED | COMMUNITY
Start: 2021-12-13 | End: 2023-04-27

## 2023-04-27 NOTE — REVIEW OF SYSTEMS
[Dyspepsia: Grade 0] : Dyspepsia: Grade 0 [Dysphagia: Grade 0] : Dysphagia: Grade 0 [Esophagitis: Grade 0] : Esophagitis: Grade 0 [Nausea: Grade 0] : Nausea: Grade 0 [Vomiting: Grade 0] : Vomiting: Grade 0 [Fatigue: Grade 1 - Fatigue relieved by rest] : Fatigue: Grade 1 - Fatigue relieved by rest [Xerostomia: Grade 0] : Xerostomia: Grade 0 [Cognitive Disturbance: Grade 0] : Cognitive Disturbance: Grade 0 [Dizziness: Grade 0] : Dizziness: Grade 0  [Headache: Grade 0] : Headache: Grade 0 [Cough: Grade 1 - Mild symptoms; nonprescription intervention indicated] : Cough: Grade 1 - Mild symptoms; nonprescription intervention indicated [Dyspnea: Grade 2 - Shortness of breath with minimal exertion; limiting instrumental ADL] : Dyspnea: Grade 2 - Shortness of breath with minimal exertion; limiting instrumental ADL [Voice Alteration: Grade 1 - Mild or intermittent change from normal voice] : Voice Alteration: Grade 1 - Mild or intermittent change from normal voice [FreeTextEntry3] : Baseline [FreeTextEntry1] : states at baseline with mucous production [FreeTextEntry2] : States COLÓN increasing, denies SOB at rest.

## 2023-04-27 NOTE — HISTORY OF PRESENT ILLNESS
[FreeTextEntry1] : Kimberly Parmar is a 67 year old female, former smoker (1/2 PPD x 30 years, quit Jan 2018), now 2yr s/p RULobectomy with MLND on 9/3/19. 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 Pathology of right rib excision was benign, revealed bone. Visceral pleura invasion and HYACINTH present. She completed chemotherapy on 12/10/19 with Dr. Guevara. Later with SVC syndrome s/p stenting, cervical LN bx positive, g3 NSCLC. Pt was treated from 10/25 - 12/2/21. In 30 fractions pt received 6000cGy to mediastinum. Tolerated treatment well without any unscheduled breaks.\par \par 1/19/22:  Voice getting better.Eating regular food now. She was hospitalized twice due to chemo side effects and anemia requiring blood transfusions. Just started immunotherapy, tolerating well. No pain. No SOB at rest. Continues to see Dr. Guevara. \par \par 10/27/2022:  Patient comes in for 6 month follow up and continues to get treated at [facility] on Durvalumab w/[place] to complete one year in 01/2023 and had repeat PET Sep to f/u on lung findings likely related to COVID infection in May which is reported response to treatment.  She has good appetite, has gained weight since April 2022, continues to have grade 2 COLÓN and has residual cough since Covid. \par \par 4/27/2024: Patient presents to clinic for follow-up. Since last visit, patient had a PET-CT performed on 3/30/2023- Impression: Intense activity of the right vocal cord which currently extends to the region of the soft tissues posterior to the right arytenoid cartilage. Recommend correlation on ENT exam to assess for underlying pathology there. Asymmetric activity in the region of the left palatine tonsil which should also be correlated on ENT exam. No significantly changed post treatment changes in the right lung. Otherwise no evidence of FDG avid malignant disease elsewhere in the field of view. \par \par Patient continues to be followed by [facility], last sen on 3/28/2023. Patient was scheduled to have 5 teeth extractions on 4/3 with Dr. Gudelia Gibson recent upper dentures.  Patient reports increase of COLÓN, denies SOB at rest. Patient had recent visit on 4/21/2023 to f/u PET-CT report with Dr. Jean Claude Almaraz ENT with a flexible laryngoscopy and did not report any concerns for malignancy, patient with left vocal cord paralysis, right vocal cord limited involvement. patient will start visits with voice specialist and Mucinex per ENT recommendation.

## 2023-09-13 NOTE — ED ADULT TRIAGE NOTE - TEMPERATURE IN FAHRENHEIT (DEGREES F)
Anesthesia Post-op Note    Patient: Anna Zamora  Procedure(s) Performed: COLONOSCOPY  Anesthesia type: MAC    Vitals Value Taken Time   Temp 36.7 °C (98 °F) 09/13/23 0911   Pulse 72 09/13/23 0911   Resp 19 09/13/23 0911   SpO2 98 % 09/13/23 0911   BP 94/51 09/13/23 0911         Patient Location: Phase II  Post-op Vital Signs:stable  Level of Consciousness: awake  Respiratory Status: unassisted and spontaneous ventilation  Cardiovascular stable  Hydration: euvolemic  Pain Management: well controlled  Handoff: Handoff to receiving clinician was performed and questions were answered  Vomiting: none  Nausea: None  Airway Patency:patent  Post-op Assessment: awake, alert, appropriately conversant, or baseline      No notable events documented.  
97.8

## 2023-09-15 ASSESSMENT — HOOS JR
HOOS JR RAW SCORE: 1
LYING IN BED (TURNING OVER, MAINTAINING HIP POSITION): MILD
IMPORTED LATERALITY: RIGHT
IMPORTED FORM: YES
IMPORTED HOOS JR SCORE: 92.34
WALKING ON UNEVEN SURFACE: MILD

## 2023-10-17 NOTE — H&P PST ADULT - NEGATIVE NEUROLOGICAL SYMPTOMS
Quality 226: Preventive Care And Screening: Tobacco Use: Screening And Cessation Intervention: Patient screened for tobacco use and is an ex/non-smoker no transient paralysis/no generalized seizures

## 2023-10-30 ENCOUNTER — OUTPATIENT (OUTPATIENT)
Dept: OUTPATIENT SERVICES | Facility: HOSPITAL | Age: 68
LOS: 1 days | Discharge: HOME | End: 2023-10-30
Payer: MEDICARE

## 2023-10-30 DIAGNOSIS — Z96.649 PRESENCE OF UNSPECIFIED ARTIFICIAL HIP JOINT: Chronic | ICD-10-CM

## 2023-10-30 DIAGNOSIS — Z90.2 ACQUIRED ABSENCE OF LUNG [PART OF]: Chronic | ICD-10-CM

## 2023-10-30 DIAGNOSIS — R00.2 PALPITATIONS: ICD-10-CM

## 2023-10-30 DIAGNOSIS — Z95.5 PRESENCE OF CORONARY ANGIOPLASTY IMPLANT AND GRAFT: Chronic | ICD-10-CM

## 2023-10-30 DIAGNOSIS — Z98.891 HISTORY OF UTERINE SCAR FROM PREVIOUS SURGERY: Chronic | ICD-10-CM

## 2023-10-30 LAB
ANION GAP SERPL CALC-SCNC: 14 MMOL/L — SIGNIFICANT CHANGE UP (ref 7–14)
ANION GAP SERPL CALC-SCNC: 14 MMOL/L — SIGNIFICANT CHANGE UP (ref 7–14)
BUN SERPL-MCNC: 20 MG/DL — SIGNIFICANT CHANGE UP (ref 7–23)
BUN SERPL-MCNC: 20 MG/DL — SIGNIFICANT CHANGE UP (ref 7–23)
CALCIUM SERPL-MCNC: 9.5 MG/DL — SIGNIFICANT CHANGE UP (ref 8.4–10.5)
CALCIUM SERPL-MCNC: 9.5 MG/DL — SIGNIFICANT CHANGE UP (ref 8.4–10.5)
CHLORIDE SERPL-SCNC: 98 MMOL/L — SIGNIFICANT CHANGE UP (ref 98–107)
CHLORIDE SERPL-SCNC: 98 MMOL/L — SIGNIFICANT CHANGE UP (ref 98–107)
CO2 SERPL-SCNC: 24 MMOL/L — SIGNIFICANT CHANGE UP (ref 22–31)
CO2 SERPL-SCNC: 24 MMOL/L — SIGNIFICANT CHANGE UP (ref 22–31)
CREAT SERPL-MCNC: 1.18 MG/DL — SIGNIFICANT CHANGE UP (ref 0.5–1.3)
CREAT SERPL-MCNC: 1.18 MG/DL — SIGNIFICANT CHANGE UP (ref 0.5–1.3)
EGFR: 50 ML/MIN/1.73M2 — LOW
EGFR: 50 ML/MIN/1.73M2 — LOW
GLUCOSE SERPL-MCNC: 109 MG/DL — HIGH (ref 70–99)
GLUCOSE SERPL-MCNC: 109 MG/DL — HIGH (ref 70–99)
HCT VFR BLD CALC: 37.6 % — SIGNIFICANT CHANGE UP (ref 34.5–45)
HCT VFR BLD CALC: 37.6 % — SIGNIFICANT CHANGE UP (ref 34.5–45)
HGB BLD-MCNC: 13.1 G/DL — SIGNIFICANT CHANGE UP (ref 11.5–15.5)
HGB BLD-MCNC: 13.1 G/DL — SIGNIFICANT CHANGE UP (ref 11.5–15.5)
MCHC RBC-ENTMCNC: 31.6 PG — SIGNIFICANT CHANGE UP (ref 27–34)
MCHC RBC-ENTMCNC: 31.6 PG — SIGNIFICANT CHANGE UP (ref 27–34)
MCHC RBC-ENTMCNC: 34.8 GM/DL — SIGNIFICANT CHANGE UP (ref 32–36)
MCHC RBC-ENTMCNC: 34.8 GM/DL — SIGNIFICANT CHANGE UP (ref 32–36)
MCV RBC AUTO: 90.6 FL — SIGNIFICANT CHANGE UP (ref 80–100)
MCV RBC AUTO: 90.6 FL — SIGNIFICANT CHANGE UP (ref 80–100)
NRBC # BLD: 0 /100 WBCS — SIGNIFICANT CHANGE UP (ref 0–0)
NRBC # BLD: 0 /100 WBCS — SIGNIFICANT CHANGE UP (ref 0–0)
NRBC # FLD: 0 K/UL — SIGNIFICANT CHANGE UP (ref 0–0)
NRBC # FLD: 0 K/UL — SIGNIFICANT CHANGE UP (ref 0–0)
PLATELET # BLD AUTO: 278 K/UL — SIGNIFICANT CHANGE UP (ref 150–400)
PLATELET # BLD AUTO: 278 K/UL — SIGNIFICANT CHANGE UP (ref 150–400)
POTASSIUM SERPL-MCNC: 3.5 MMOL/L — SIGNIFICANT CHANGE UP (ref 3.5–5.3)
POTASSIUM SERPL-MCNC: 3.5 MMOL/L — SIGNIFICANT CHANGE UP (ref 3.5–5.3)
POTASSIUM SERPL-SCNC: 3.5 MMOL/L — SIGNIFICANT CHANGE UP (ref 3.5–5.3)
POTASSIUM SERPL-SCNC: 3.5 MMOL/L — SIGNIFICANT CHANGE UP (ref 3.5–5.3)
RBC # BLD: 4.15 M/UL — SIGNIFICANT CHANGE UP (ref 3.8–5.2)
RBC # BLD: 4.15 M/UL — SIGNIFICANT CHANGE UP (ref 3.8–5.2)
RBC # FLD: 12.8 % — SIGNIFICANT CHANGE UP (ref 10.3–14.5)
RBC # FLD: 12.8 % — SIGNIFICANT CHANGE UP (ref 10.3–14.5)
SODIUM SERPL-SCNC: 136 MMOL/L — SIGNIFICANT CHANGE UP (ref 135–145)
SODIUM SERPL-SCNC: 136 MMOL/L — SIGNIFICANT CHANGE UP (ref 135–145)
WBC # BLD: 8.96 K/UL — SIGNIFICANT CHANGE UP (ref 3.8–10.5)
WBC # BLD: 8.96 K/UL — SIGNIFICANT CHANGE UP (ref 3.8–10.5)
WBC # FLD AUTO: 8.96 K/UL — SIGNIFICANT CHANGE UP (ref 3.8–10.5)
WBC # FLD AUTO: 8.96 K/UL — SIGNIFICANT CHANGE UP (ref 3.8–10.5)

## 2023-10-30 PROCEDURE — 93010 ELECTROCARDIOGRAM REPORT: CPT

## 2023-10-30 RX ORDER — SODIUM CHLORIDE 9 MG/ML
3 INJECTION INTRAMUSCULAR; INTRAVENOUS; SUBCUTANEOUS EVERY 8 HOURS
Refills: 0 | Status: DISCONTINUED | OUTPATIENT
Start: 2023-10-30 | End: 2023-11-13

## 2023-10-30 NOTE — H&P CARDIOLOGY - HISTORY OF PRESENT ILLNESS
68 year old female with HTN, COPD, lung ca s/p R lobectomy and radiation, SVC syndrome of right arm, known CAD w/ stents, anemia requiring PRBC transfusions 2021 likely in setting of chemo/underlying malignancy who presented to her cardiologist for   In light of patients cardiac risk factors, symptoms and abnormal noninvasive test findings there is high suspicion for CAD. Patient is now referred to [facility] for a cardiac catheterization with possible PTCA/stent.  68 year old female with HTN, COPD, lung ca s/p R lobectomy and radiation, SVC syndrome of right arm, known CAD w/ stents, anemia requiring PRBC transfusions 2021 likely in setting of chemo/underlying malignancy who presented to her cardiologist for   In light of patients cardiac risk factors, symptoms and abnormal noninvasive test findings there is high suspicion for CAD. Patient is now referred to [facility] for a cardiac catheterization with possible PTCA/stent.  68 year old female with HTN, COPD, lung ca s/p R lobectomy and radiation now on maintenance immunotherapy, known CAD w/ stents, anemia requiring PRBC transfusions 2021 likely in setting of chemo/underlying malignancy who presented to her cardiologist with complaints of progressive SOB over the past 1 year.  Patient initially related this to her COPD, but recently with progressive symptoms she underwent a Stress test with reported abnormal results. States she has slept sitting up in a recliner for the past few years secondary to her COPD and s/p lung CA treatment. Denies chest pain, PND, Dizziness, syncope and edema.   In light of patients CAD history, symptoms and abnormal noninvasive test findings there is high suspicion for CAD progression. Patient is now referred to [facility] for a cardiac catheterization with possible PTCA/stent.  68 year old female with HTN, COPD, lung ca s/p R lobectomy and radiation now on maintenance immunotherapy, known CAD w/ stents, anemia requiring PRBC transfusions 2021 likely in setting of chemo/underlying malignancy who presented to her cardiologist with complaints of progressive SOB over the past 1 year.  Patient initially related this to her COPD, but recently with progressive symptoms she underwent a Stress test with reported abnormal results. States she has slept sitting up in a recliner for the past few years secondary to her COPD and s/p lung CA treatment. Denies chest pain, PND, Dizziness, syncope and edema.   In light of patients CAD history, symptoms and abnormal noninvasive test findings there is high suspicion for CAD progression. Patient is now referred to [facility] for a cardiac catheterization with possible PTCA/stent.  68 year old female with HTN, COPD, lung ca s/p R lobectomy and radiation now on maintenance immunotherapy, known CAD w/ stents, anemia requiring PRBC transfusions 2021 likely in setting of chemo/underlying malignancy who presented to her cardiologist with complaints of progressive SOB over the past 1 year.  Patient initially related this to her COPD, but recently with progressive symptoms she underwent a Stress test with reported abnormal results. States she has slept sitting up in a recliner for the past few years secondary to her COPD and s/p lung CA treatment. Denies chest pain, PND, Dizziness, syncope and edema.   In light of patients CAD history, symptoms and abnormal noninvasive test findings there is high suspicion for CAD progression. Patient is now referred to [facility] for a cardiac catheterization with possible PTCA/stent.

## 2023-10-30 NOTE — H&P CARDIOLOGY - NS ATTEND AMEND GEN_ALL_CORE FT
Patient seen and examined. Agree with plan as detailed in PA/NP Note.     Julio Alcaraz MD  Pager: [phone] Patient seen and examined. Agree with plan as detailed in PA/NP Note.     Julio Alcaraz MD  Pager: [phone] Patient seen and examined. Agree with plan as detailed in PA/NP Note.     Julio Alcaraz MD  Pager: [phone]

## 2023-10-30 NOTE — H&P CARDIOLOGY - NSICDXPASTMEDICALHX_GEN_ALL_CORE_FT
PAST MEDICAL HISTORY:  Anemia due to chemotherapy     Carotid artery disease monitored by vascluar doctor Samuel    COPD (chronic obstructive pulmonary disease)     Edema of both legs     Emphysema     Hip pain     Hyperlipidemia     Hypertension     Localized enlarged lymph nodes     Lumbar disc disorder     Lung cancer right, 2019, completed chemotherapy 12/2019    Obesity     Scoliosis     Stented coronary artery      PAST MEDICAL HISTORY:  Anemia due to chemotherapy     Carotid artery disease monitored by vascluar doctor Samuel    COPD (chronic obstructive pulmonary disease)     Edema of both legs     Emphysema     Glottic insufficiency     Hip pain     Hyperlipidemia     Hypertension     Localized enlarged lymph nodes     Lumbar disc disorder     Lung cancer right, 2019, completed chemotherapy 12/2019    Obesity     Paralysis of left vocal cord     Scoliosis     Stented coronary artery     SVC syndrome

## 2023-11-09 ENCOUNTER — APPOINTMENT (OUTPATIENT)
Dept: RADIATION ONCOLOGY | Facility: CLINIC | Age: 68
End: 2023-11-09
Payer: MEDICARE

## 2023-11-09 VITALS
DIASTOLIC BLOOD PRESSURE: 79 MMHG | BODY MASS INDEX: 36.18 KG/M2 | TEMPERATURE: 96.98 F | WEIGHT: 184.3 LBS | OXYGEN SATURATION: 94 % | HEART RATE: 73 BPM | RESPIRATION RATE: 17 BRPM | SYSTOLIC BLOOD PRESSURE: 163 MMHG | HEIGHT: 60 IN

## 2023-11-09 PROCEDURE — 99213 OFFICE O/P EST LOW 20 MIN: CPT | Mod: GC,25

## 2024-02-14 NOTE — CONSULT NOTE ADULT - PROBLEM SELECTOR RECOMMENDATION 9
"Nail removal    Date/Time: 2/14/2024 11:00 AM    Performed by: Sinan Basilio DPM  Authorized by: Sinan Basilio DPM    Patient location:  ClinicUniversal Protocol:  Consent: Verbal consent obtained.  Risks and benefits: risks, benefits and alternatives were discussed  Consent given by: patient and parent  Time out: Immediately prior to procedure a \"time out\" was called to verify the correct patient, procedure, equipment, support staff and site/side marked as required.  Timeout called at: 2/14/2024 11:13 AM.  Patient understanding: patient states understanding of the procedure being performed  Patient identity confirmed: verbally with patient    Location:     Foot:  L big toe  Pre-procedure details:     Skin preparation:  Betadine    Preparation: Patient was prepped and draped in the usual sterile fashion    Anesthesia (see MAR for exact dosages):     Anesthesia method: Hallux nerve block performed with 6 cc 2% lidocaine plain.  Nail Removal:     Nail removed:  Complete    Nail bed sutured: no    Ingrown nail:     Wedge excision of skin: yes      Nail matrix removed or ablated:  None  Post-procedure details:     Dressing:  4x4 sterile gauze, antibiotic ointment and gauze roll    Patient tolerance of procedure:  Tolerated well, no immediate complications  Comments:      Written aftercare instruction given.  Return for follow-up.  Patient will take vitamin B 5 as directed.     Foot Exam    General  General Appearance: appears stated age and healthy   Orientation: alert and oriented to person, place, and time   Affect: appropriate   Gait: antalgic       Right Foot/Ankle     Neurovascular  Dorsalis pedis: 3+  Posterior tibial: 3+      Left Foot/Ankle      Neurovascular  Dorsalis pedis: 3+  Posterior tibial: 3+    Comments  Left hallux demonstrates distal lysis of nail with dried hematoma.     "
1. Monitor on Continuous pulse ox. If patient is to desaturate call ENT ASAP for eval with Laryngoscopy.   2. Discussed with ENT attending Dr. Harvey, Will review CT scan. No ENT surgical intervention at this time.
Bilateral lower facial and upper neck swelling, left greater than right, with retropharyngeal edema is presumed to be related to venous outlet obstruction. There is suggestion of complete/near-complete occlusion of the SVC, left brachiocephalic, and left internal jugular veins, however evaluation is limited secondary to IV contrast timing. Vascular ultrasound versus repeat neck/chest imaging with more delayed IV contrast timing may be helpful for complete evaluation. Mass effect on the supraglottic airway with mild narrowing. Airway precautions recommended.  Supraclavicular and mediastinal adenopathy likely on a malignant basis, particularly given history of adenocarcinoma. for ir intervention on thursday:     her upper air way seems to be compromised: josi attending: to call ENT : sheis not in any resp distress at this time and is not wheezing!

## 2024-02-20 ENCOUNTER — INPATIENT (INPATIENT)
Facility: HOSPITAL | Age: 69
LOS: 1 days | Discharge: HOME | End: 2024-02-22
Attending: HOSPITALIST | Admitting: HOSPITALIST
Payer: MEDICARE

## 2024-02-20 VITALS
HEART RATE: 68 BPM | DIASTOLIC BLOOD PRESSURE: 58 MMHG | OXYGEN SATURATION: 100 % | RESPIRATION RATE: 18 BRPM | TEMPERATURE: 98 F | SYSTOLIC BLOOD PRESSURE: 123 MMHG

## 2024-02-20 DIAGNOSIS — I10 ESSENTIAL (PRIMARY) HYPERTENSION: ICD-10-CM

## 2024-02-20 DIAGNOSIS — Z90.2 ACQUIRED ABSENCE OF LUNG [PART OF]: Chronic | ICD-10-CM

## 2024-02-20 DIAGNOSIS — Z96.649 PRESENCE OF UNSPECIFIED ARTIFICIAL HIP JOINT: Chronic | ICD-10-CM

## 2024-02-20 DIAGNOSIS — C34.90 MALIGNANT NEOPLASM OF UNSPECIFIED PART OF UNSPECIFIED BRONCHUS OR LUNG: ICD-10-CM

## 2024-02-20 DIAGNOSIS — Z95.5 PRESENCE OF CORONARY ANGIOPLASTY IMPLANT AND GRAFT: Chronic | ICD-10-CM

## 2024-02-20 DIAGNOSIS — Z29.9 ENCOUNTER FOR PROPHYLACTIC MEASURES, UNSPECIFIED: ICD-10-CM

## 2024-02-20 DIAGNOSIS — I31.39 OTHER PERICARDIAL EFFUSION (NONINFLAMMATORY): ICD-10-CM

## 2024-02-20 DIAGNOSIS — R60.0 LOCALIZED EDEMA: ICD-10-CM

## 2024-02-20 DIAGNOSIS — I25.10 ATHEROSCLEROTIC HEART DISEASE OF NATIVE CORONARY ARTERY WITHOUT ANGINA PECTORIS: ICD-10-CM

## 2024-02-20 DIAGNOSIS — J44.9 CHRONIC OBSTRUCTIVE PULMONARY DISEASE, UNSPECIFIED: ICD-10-CM

## 2024-02-20 DIAGNOSIS — Z98.891 HISTORY OF UTERINE SCAR FROM PREVIOUS SURGERY: Chronic | ICD-10-CM

## 2024-02-20 DIAGNOSIS — R06.00 DYSPNEA, UNSPECIFIED: ICD-10-CM

## 2024-02-20 LAB
ALBUMIN SERPL ELPH-MCNC: 3.7 G/DL — SIGNIFICANT CHANGE UP (ref 3.3–5)
ALP SERPL-CCNC: 128 U/L — HIGH (ref 40–120)
ALT FLD-CCNC: 16 U/L — SIGNIFICANT CHANGE UP (ref 4–33)
ANION GAP SERPL CALC-SCNC: 10 MMOL/L — SIGNIFICANT CHANGE UP (ref 7–14)
AST SERPL-CCNC: 18 U/L — SIGNIFICANT CHANGE UP (ref 4–32)
BASOPHILS # BLD AUTO: 0.05 K/UL — SIGNIFICANT CHANGE UP (ref 0–0.2)
BASOPHILS NFR BLD AUTO: 0.5 % — SIGNIFICANT CHANGE UP (ref 0–2)
BILIRUB SERPL-MCNC: 0.5 MG/DL — SIGNIFICANT CHANGE UP (ref 0.2–1.2)
BUN SERPL-MCNC: 16 MG/DL — SIGNIFICANT CHANGE UP (ref 7–23)
CALCIUM SERPL-MCNC: 8.7 MG/DL — SIGNIFICANT CHANGE UP (ref 8.4–10.5)
CHLORIDE SERPL-SCNC: 105 MMOL/L — SIGNIFICANT CHANGE UP (ref 98–107)
CO2 SERPL-SCNC: 23 MMOL/L — SIGNIFICANT CHANGE UP (ref 22–31)
CREAT SERPL-MCNC: 1.1 MG/DL — SIGNIFICANT CHANGE UP (ref 0.5–1.3)
EGFR: 55 ML/MIN/1.73M2 — LOW
EOSINOPHIL # BLD AUTO: 0.1 K/UL — SIGNIFICANT CHANGE UP (ref 0–0.5)
EOSINOPHIL NFR BLD AUTO: 1 % — SIGNIFICANT CHANGE UP (ref 0–6)
GLUCOSE SERPL-MCNC: 101 MG/DL — HIGH (ref 70–99)
HCT VFR BLD CALC: 35.7 % — SIGNIFICANT CHANGE UP (ref 34.5–45)
HGB BLD-MCNC: 12.4 G/DL — SIGNIFICANT CHANGE UP (ref 11.5–15.5)
IANC: 7.44 K/UL — HIGH (ref 1.8–7.4)
IMM GRANULOCYTES NFR BLD AUTO: 0.4 % — SIGNIFICANT CHANGE UP (ref 0–0.9)
LYMPHOCYTES # BLD AUTO: 1.34 K/UL — SIGNIFICANT CHANGE UP (ref 1–3.3)
LYMPHOCYTES # BLD AUTO: 14 % — SIGNIFICANT CHANGE UP (ref 13–44)
MCHC RBC-ENTMCNC: 31.5 PG — SIGNIFICANT CHANGE UP (ref 27–34)
MCHC RBC-ENTMCNC: 34.7 GM/DL — SIGNIFICANT CHANGE UP (ref 32–36)
MCV RBC AUTO: 90.6 FL — SIGNIFICANT CHANGE UP (ref 80–100)
MONOCYTES # BLD AUTO: 0.61 K/UL — SIGNIFICANT CHANGE UP (ref 0–0.9)
MONOCYTES NFR BLD AUTO: 6.4 % — SIGNIFICANT CHANGE UP (ref 2–14)
NEUTROPHILS # BLD AUTO: 7.44 K/UL — HIGH (ref 1.8–7.4)
NEUTROPHILS NFR BLD AUTO: 77.7 % — HIGH (ref 43–77)
NRBC # BLD: 0 /100 WBCS — SIGNIFICANT CHANGE UP (ref 0–0)
NRBC # FLD: 0 K/UL — SIGNIFICANT CHANGE UP (ref 0–0)
NT-PROBNP SERPL-SCNC: 471 PG/ML — HIGH
PLATELET # BLD AUTO: 253 K/UL — SIGNIFICANT CHANGE UP (ref 150–400)
POTASSIUM SERPL-MCNC: 3.6 MMOL/L — SIGNIFICANT CHANGE UP (ref 3.5–5.3)
POTASSIUM SERPL-SCNC: 3.6 MMOL/L — SIGNIFICANT CHANGE UP (ref 3.5–5.3)
PROT SERPL-MCNC: 6.7 G/DL — SIGNIFICANT CHANGE UP (ref 6–8.3)
RBC # BLD: 3.94 M/UL — SIGNIFICANT CHANGE UP (ref 3.8–5.2)
RBC # FLD: 13.6 % — SIGNIFICANT CHANGE UP (ref 10.3–14.5)
SODIUM SERPL-SCNC: 138 MMOL/L — SIGNIFICANT CHANGE UP (ref 135–145)
TROPONIN T, HIGH SENSITIVITY RESULT: 15 NG/L — SIGNIFICANT CHANGE UP
WBC # BLD: 9.58 K/UL — SIGNIFICANT CHANGE UP (ref 3.8–10.5)
WBC # FLD AUTO: 9.58 K/UL — SIGNIFICANT CHANGE UP (ref 3.8–10.5)

## 2024-02-20 PROCEDURE — 99291 CRITICAL CARE FIRST HOUR: CPT

## 2024-02-20 PROCEDURE — 71046 X-RAY EXAM CHEST 2 VIEWS: CPT | Mod: 26

## 2024-02-20 PROCEDURE — 99223 1ST HOSP IP/OBS HIGH 75: CPT

## 2024-02-20 PROCEDURE — 93970 EXTREMITY STUDY: CPT | Mod: 26

## 2024-02-20 RX ORDER — FUROSEMIDE 40 MG
20 TABLET ORAL ONCE
Refills: 0 | Status: COMPLETED | OUTPATIENT
Start: 2024-02-20 | End: 2024-02-20

## 2024-02-20 RX ORDER — ATORVASTATIN CALCIUM 80 MG/1
40 TABLET, FILM COATED ORAL AT BEDTIME
Refills: 0 | Status: DISCONTINUED | OUTPATIENT
Start: 2024-02-20 | End: 2024-02-22

## 2024-02-20 RX ORDER — LANOLIN ALCOHOL/MO/W.PET/CERES
3 CREAM (GRAM) TOPICAL AT BEDTIME
Refills: 0 | Status: DISCONTINUED | OUTPATIENT
Start: 2024-02-20 | End: 2024-02-22

## 2024-02-20 RX ORDER — ENOXAPARIN SODIUM 100 MG/ML
40 INJECTION SUBCUTANEOUS EVERY 24 HOURS
Refills: 0 | Status: DISCONTINUED | OUTPATIENT
Start: 2024-02-20 | End: 2024-02-22

## 2024-02-20 RX ORDER — CARVEDILOL PHOSPHATE 80 MG/1
1 CAPSULE, EXTENDED RELEASE ORAL
Refills: 0 | DISCHARGE

## 2024-02-20 RX ORDER — TIOTROPIUM BROMIDE 18 UG/1
2 CAPSULE ORAL; RESPIRATORY (INHALATION) DAILY
Refills: 0 | Status: DISCONTINUED | OUTPATIENT
Start: 2024-02-20 | End: 2024-02-22

## 2024-02-20 RX ORDER — ATORVASTATIN CALCIUM 80 MG/1
1 TABLET, FILM COATED ORAL
Qty: 0 | Refills: 0 | DISCHARGE

## 2024-02-20 RX ORDER — ACETAMINOPHEN 500 MG
650 TABLET ORAL EVERY 6 HOURS
Refills: 0 | Status: DISCONTINUED | OUTPATIENT
Start: 2024-02-20 | End: 2024-02-22

## 2024-02-20 RX ORDER — FAMOTIDINE 10 MG/ML
1 INJECTION INTRAVENOUS
Refills: 0 | DISCHARGE

## 2024-02-20 RX ORDER — FUROSEMIDE 40 MG
20 TABLET ORAL DAILY
Refills: 0 | Status: DISCONTINUED | OUTPATIENT
Start: 2024-02-21 | End: 2024-02-22

## 2024-02-20 RX ORDER — CARVEDILOL PHOSPHATE 80 MG/1
12.5 CAPSULE, EXTENDED RELEASE ORAL EVERY 12 HOURS
Refills: 0 | Status: DISCONTINUED | OUTPATIENT
Start: 2024-02-20 | End: 2024-02-22

## 2024-02-20 RX ORDER — FAMOTIDINE 10 MG/ML
40 INJECTION INTRAVENOUS AT BEDTIME
Refills: 0 | Status: DISCONTINUED | OUTPATIENT
Start: 2024-02-20 | End: 2024-02-22

## 2024-02-20 RX ORDER — ASPIRIN/CALCIUM CARB/MAGNESIUM 324 MG
81 TABLET ORAL DAILY
Refills: 0 | Status: DISCONTINUED | OUTPATIENT
Start: 2024-02-20 | End: 2024-02-22

## 2024-02-20 RX ORDER — FLUTICASONE FUROATE, UMECLIDINIUM BROMIDE AND VILANTEROL TRIFENATATE 200; 62.5; 25 UG/1; UG/1; UG/1
1 POWDER RESPIRATORY (INHALATION)
Qty: 0 | Refills: 0 | DISCHARGE

## 2024-02-20 RX ORDER — BUDESONIDE AND FORMOTEROL FUMARATE DIHYDRATE 160; 4.5 UG/1; UG/1
2 AEROSOL RESPIRATORY (INHALATION)
Refills: 0 | Status: DISCONTINUED | OUTPATIENT
Start: 2024-02-20 | End: 2024-02-22

## 2024-02-20 RX ADMIN — Medication 20 MILLIGRAM(S): at 20:04

## 2024-02-20 NOTE — H&P ADULT - PROBLEM SELECTOR PLAN 4
S/p R lobectomy, chemo, RT, IT and in remission per patient. Follows Dr. Guevara. Last seen last week

## 2024-02-20 NOTE — H&P ADULT - NSHPPHYSICALEXAM_GEN_ALL_CORE
PHYSICAL EXAM:  Vital Signs Last 24 Hrs  T(C): 36.5 (02-20-24 @ 18:13)  T(F): 97.7 (02-20-24 @ 18:13), Max: 98.4 (02-20-24 @ 17:28)  HR: 64 (02-20-24 @ 18:37) (64 - 76)  BP: 124/40 (02-20-24 @ 18:37)  BP(mean): --  RR: 20 (02-20-24 @ 18:37) (18 - 20)  SpO2: 100% (02-20-24 @ 18:37) (93% - 100%)  Wt(kg): --    Constitutional: NAD, awake and alert, well developed  EYES: EOMI, conjunctiva clear  ENT:  Normal Hearing, no tonsillar exudates   Neck: Soft and supple , no thyromegaly   Respiratory: Breath sounds are clear bilaterally, No wheezing, rales or rhonchi, no tachypnea, no accessory muscle use  Cardiovascular: S1 and S2, regular rate and rhythm, no Murmurs, gallops or rubs, no JVD, bilateral 2+ pitting edema  Gastrointestinal: Bowel Sounds present, soft, nontender, nondistended, no guarding, no rebound  Extremities: No cyanosis or clubbing; warm to touch  Vascular: 2+ peripheral pulses lower ex  Neurological: No focal deficits, CN II-XII intact bilaterally, sensation to light touch intact in all extremities.   Musculoskeletal: 5/5 strength b/l upper and lower extremities; no joint swelling.  Skin: No rashes, no ulcerations

## 2024-02-20 NOTE — ED PROVIDER NOTE - PROGRESS NOTE DETAILS
D/w Dr. Hernandez, new pericardial effusion on bedside echo, plan to admit to his service, diurese. Will obtain CT chest to better image new effusion.

## 2024-02-20 NOTE — ED ADULT TRIAGE NOTE - CHIEF COMPLAINT QUOTE
Patient arrives from cardiologist for sob x1 weeks with new pitting edema on bilateral legs. MD office did a bedside echo and showed small pericardial effusion. PHX cardiac stents, lung CA with taras blood, htn.

## 2024-02-20 NOTE — ED PROVIDER NOTE - NSICDXPASTMEDICALHX_GEN_ALL_CORE_FT
PAST MEDICAL HISTORY:  Anemia due to chemotherapy     Carotid artery disease monitored by vascluar doctor Samuel    COPD (chronic obstructive pulmonary disease)     Edema of both legs     Emphysema     Glottic insufficiency     Hip pain     Hyperlipidemia     Hypertension     Localized enlarged lymph nodes     Lumbar disc disorder     Lung cancer right, 2019, completed chemotherapy 12/2019    Obesity     Paralysis of left vocal cord     Scoliosis     Stented coronary artery     SVC syndrome

## 2024-02-20 NOTE — H&P ADULT - ASSESSMENT
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

## 2024-02-20 NOTE — ED PROVIDER NOTE - CARE PLAN
1 Principal Discharge DX:	Dyspnea  Secondary Diagnosis:	Acute pericardial effusion  Secondary Diagnosis:	Peripheral edema

## 2024-02-20 NOTE — H&P ADULT - NSHPREVIEWOFSYSTEMS_GEN_ALL_CORE
ROS:    Constitutional: [ ] fevers [ ] chills   HEENT: [ ] postnasal drip [ ] nasal congestion  CV: [ ] chest pain [ ] orthopnea [ ] palpitations [ ] murmur [x] edema  Resp: [ ] cough [ x] shortness of breath [x ] dyspnea   GI: [ ] nausea [ ] vomiting [ ] diarrhea [ ] constipation [ ] abd pain  : [ ] dysuria [ ] increased urinary frequency  Musculoskeletal: [ ] back pain [ ] myalgias [ ] arthralgias  Skin: [ ] rash [ ] itch  Neurological: [ ] headache [ ] dizziness [ ] syncope   Endocrine: [ ] diabetes [ ] thyroid problem  Hematologic/Lymphatic: [ ] anemia [ ] bleeding problem  [ x] All other systems negative

## 2024-02-20 NOTE — ED ADULT NURSE NOTE - OBJECTIVE STATEMENT
Patient received into my care, alert and oriented x 4, no verbal complaints at this time. Reports she gets SOB on exertion, not in any distress. Noted to have bilateral edema just below the knee area. Has a port on her left upper chest. Patient put on telemetry, denies chest pain.

## 2024-02-20 NOTE — ED PROVIDER NOTE - IV ALTEPLASE ADMIN OUTSIDE HIDDEN

## 2024-02-20 NOTE — ED PROVIDER NOTE - OBJECTIVE STATEMENT
68 F with history of CHF, CRI, HTN complaining of recent leg edema and worsened SOB.  Patient states her BP had been elevated and so her PMD had added amlodipine 2.5 Mg.  There was some improvement but still had elevated pressures so she was raised to 5 mg daily.  Following this the patient noted increased leg edema bilateral, no asymmetry, no pain at rest.  MD saw her in office and felt this could be related to her medication change but patient had also developed several days of increased COLÓN.  Patient has chronic orthopnea always sleeps on lounger chair.  No SOB at rest, but has increased difficulty with ADLs at home.  States PMD did ultrasound at bedside to the chest and noted "fluid."  Was sent to ED.

## 2024-02-20 NOTE — H&P ADULT - HISTORY OF PRESENT ILLNESS
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 The patient has been following Dr. Hernandez and recently was started on amlodipine for BP control. After her BP was still elevated to 150-160s it was increased from 2.5-->5mg about two weeks ago. She began noticing LE edema over the last couple of weeks. She was seen in the office today and had a bedside echo that revealed small pericardial effusion without tamponade based off papers brought with her. Recommendation was to admit for CHF and diuresis by cardiologist sending in. She currently denies fevers, chills, CP, abdominal pain, vomiting, diarrhea. She has LE edema and notes she believes she had a duplex with oncology last week that was negative. She has chronic SOB that she feels has been worsening over the last several months. Denies hx of PE but has hx of IVC clot. Eliquis was discontinued 5/2023 as she was told she no longer needs to be on it    In ED given lasix 20mg IV  Denies CP, SOB, dizziness at this time

## 2024-02-20 NOTE — H&P ADULT - NSHPLABSRESULTS_GEN_ALL_CORE
Personally reviewed labs:                        12.4   9.58  )-----------( 253      ( 20 Feb 2024 19:05 )             35.7     02-20-24 @ 19:05    138  |  105  |  16             --------------------------< 101<H>     3.6  |  23  | 1.10    eGFR AA: --  eGFR N-AA: --    Calcium: 8.7  Phosphorus: --  Magnesium: --    AST: 18    ALT: 16  AlkPhos: 128<H>  Protein: 6.7  Albumin: 3.7  TBili: 0.5  D-Bili: --    Troponin 15      RADIOLOGY & ADDITIONAL TESTS:    EKG my independent interpretation: NSR with PVC, no STD or PAM    CXR my independent interpretation: clear lungs

## 2024-02-20 NOTE — H&P ADULT - PROBLEM SELECTOR PLAN 1
LE edema x2 weeks. Possibly related to amlodipine vs CHF. Also with small pericardial effusion w/o tamponade per paperwork brought in  -lasix 20mg IV qd for now. Hold if low SBP or DBPs  -echo  -duplex  -Worsening of chronic SOB - CTA chest to r/o PE and evaluate pericardial effusion

## 2024-02-20 NOTE — ED PROVIDER NOTE - ATTENDING CONTRIBUTION TO CARE
Seen and examined, H&P by me, signed over to resident for further management.  Patient states sees Dr. Hernandez of cardiology who did her visit today.  Per patient, history of CHF and diuretic use and chronic orthopnea but worsened dyspnea now.  Patient was told had fluid "around heart."  Possible pericardial effusion, as opposed to pulmonary edema or pleural effusion.  Chest x-ray, possible bedside echo.

## 2024-02-20 NOTE — ED PROVIDER NOTE - CLINICAL SUMMARY MEDICAL DECISION MAKING FREE TEXT BOX
68F c/o inc. dyspnea, pedal edema, and today found to have new pericardial effusion. D/w PMD, advised IV lasix, admission and CT chest non-contrast. Will admit to Tele. Pt. NAD at time of admission.

## 2024-02-21 PROBLEM — I87.1 COMPRESSION OF VEIN: Chronic | Status: ACTIVE | Noted: 2023-10-30

## 2024-02-21 PROBLEM — J38.3 OTHER DISEASES OF VOCAL CORDS: Chronic | Status: ACTIVE | Noted: 2023-10-30

## 2024-02-21 PROBLEM — D64.81 ANEMIA DUE TO ANTINEOPLASTIC CHEMOTHERAPY: Chronic | Status: ACTIVE | Noted: 2023-10-30

## 2024-02-21 PROBLEM — J38.01 PARALYSIS OF VOCAL CORDS AND LARYNX, UNILATERAL: Chronic | Status: ACTIVE | Noted: 2023-10-30

## 2024-02-21 LAB
CK MB BLD-MCNC: 3.3 % — HIGH (ref 0–2.5)
CK MB CFR SERPL CALC: 1.9 NG/ML — SIGNIFICANT CHANGE UP
CK SERPL-CCNC: 57 U/L — SIGNIFICANT CHANGE UP (ref 25–170)
TROPONIN T, HIGH SENSITIVITY RESULT: 16 NG/L — SIGNIFICANT CHANGE UP

## 2024-02-21 PROCEDURE — 71275 CT ANGIOGRAPHY CHEST: CPT | Mod: 26

## 2024-02-21 RX ADMIN — ATORVASTATIN CALCIUM 40 MILLIGRAM(S): 80 TABLET, FILM COATED ORAL at 22:45

## 2024-02-21 RX ADMIN — ENOXAPARIN SODIUM 40 MILLIGRAM(S): 100 INJECTION SUBCUTANEOUS at 05:21

## 2024-02-21 RX ADMIN — BUDESONIDE AND FORMOTEROL FUMARATE DIHYDRATE 2 PUFF(S): 160; 4.5 AEROSOL RESPIRATORY (INHALATION) at 10:21

## 2024-02-21 RX ADMIN — Medication 81 MILLIGRAM(S): at 10:21

## 2024-02-21 RX ADMIN — CARVEDILOL PHOSPHATE 12.5 MILLIGRAM(S): 80 CAPSULE, EXTENDED RELEASE ORAL at 05:21

## 2024-02-21 RX ADMIN — BUDESONIDE AND FORMOTEROL FUMARATE DIHYDRATE 2 PUFF(S): 160; 4.5 AEROSOL RESPIRATORY (INHALATION) at 22:45

## 2024-02-21 RX ADMIN — FAMOTIDINE 40 MILLIGRAM(S): 10 INJECTION INTRAVENOUS at 22:45

## 2024-02-21 RX ADMIN — TIOTROPIUM BROMIDE 2 PUFF(S): 18 CAPSULE ORAL; RESPIRATORY (INHALATION) at 17:19

## 2024-02-21 RX ADMIN — CARVEDILOL PHOSPHATE 12.5 MILLIGRAM(S): 80 CAPSULE, EXTENDED RELEASE ORAL at 17:19

## 2024-02-21 NOTE — CONSULT NOTE ADULT - SUBJECTIVE AND OBJECTIVE BOX
Reason for consult: lung ca, new lung nodule    HPI:  68F with PMHx CHF?, HTN, COPD, lung cancer s/p R lobectomy s/p chemo/RT/IT in remission per patient, CAD s/p stents, hx SVC syndrome s/p stent, hx IVC thrombus no longer on AC presenting with SOB and LE edema. The patient has been following Dr. Hernandez and recently was started on amlodipine for BP control. After her BP was still elevated to 150-160s it was increased from 2.5-->5mg about two weeks ago. She began noticing LE edema over the last couple of weeks. She was seen in the office today and had a bedside echo that revealed small pericardial effusion without tamponade based off papers brought with her. Recommendation was to admit for CHF and diuresis by cardiologist sending in. She currently denies fevers, chills, CP, abdominal pain, vomiting, diarrhea. She has LE edema and notes she believes she had a duplex with oncology last week that was negative. She has chronic SOB that she feels has been worsening over the last several months. Denies hx of PE but has hx of IVC clot. Eliquis was discontinued 2023 as she was told she no longer needs to be on it    In ED given lasix 20mg IV  Denies CP, SOB, dizziness at this time (2024 22:39)    Hematology/Oncology consulted on this 68 year old female with h/o lung cancer who presented with lower extremity edema. Patient is under care of Dr[facility] Gera [facility] of [facility] for surveillance of her lung cancer. She has completed treatment with chemo + RT followed by durvalumab, completed on 2023. She has since been receiving surveillance scans q3 months, currently SAHIL.   Patient seen this afternoon, with her  at bedside. She feels generally well, states that on outpatient imaging she was found to have small pericardial effusion, and had significant bilateral lower extremity edema after she began to take amlodipine. Currently states her edema is improved.     PAST MEDICAL & SURGICAL HISTORY:  Hypertension      Hyperlipidemia      Carotid artery disease  monitored by vascluar doctor Samuel      Emphysema      Hip pain      Obesity      Edema of both legs      Lung cancer  right, , completed chemotherapy 2019      Stented coronary artery      Localized enlarged lymph nodes      Scoliosis      Lumbar disc disorder      COPD (chronic obstructive pulmonary disease)      Anemia due to chemotherapy      Paralysis of left vocal cord      Glottic insufficiency      SVC syndrome      History of  section  x2 ,       S/P lobectomy of lung  RULobectomy 2019      History of hip replacement  right 2021      Stented coronary artery  x2 stents 2018          FAMILY HISTORY:  Family history of CHF (congestive heart failure) (Aunt)        Alochol: Denied  Smoking: Nonsmoker  Drug Use: Denied  Marital Status:         Allergies    tetracycline (Short breath; Hives)  penicillin (Short breath; Hives)    Intolerances        MEDICATIONS  (STANDING):  aspirin  chewable 81 milliGRAM(s) Oral daily  atorvastatin 40 milliGRAM(s) Oral at bedtime  budesonide  80 MICROgram(s)/formoterol 4.5 MICROgram(s) Inhaler 2 Puff(s) Inhalation two times a day  carvedilol 12.5 milliGRAM(s) Oral every 12 hours  enoxaparin Injectable 40 milliGRAM(s) SubCutaneous every 24 hours  famotidine    Tablet 40 milliGRAM(s) Oral at bedtime  furosemide   Injectable 20 milliGRAM(s) IV Push daily  tiotropium 2.5 MICROgram(s) Inhaler 2 Puff(s) Inhalation daily    MEDICATIONS  (PRN):  acetaminophen     Tablet .. 650 milliGRAM(s) Oral every 6 hours PRN Temp greater or equal to 38C (100.4F), Mild Pain (1 - 3)  melatonin 3 milliGRAM(s) Oral at bedtime PRN Insomnia      ROS  No fever, sweats, chills  No epistaxis, HA, sore throat  +dyspnea   No n/v/d, abd pain, melena, hematochezia  +LE edema   No rash  No anxiety  No back pain, joint pain  No bleeding, bruising  No dysuria, hematuria    T(C): 36.4 (24 @ 05:15), Max: 36.9 (24 @ 17:28)  HR: 73 (24 @ 10:19) (64 - 76)  BP: 127/60 (24 @ 10:19) (116/54 - 129/60)  RR: 16 (24 @ 10:19) (16 - 20)  SpO2: 98% (24 @ 10:19) (93% - 100%)  Wt(kg): --    PE  NAD  Awake, alert  Anicteric, MMM  +mild bilateral LE edema   No rash grossly  FROM                          12.4   9.58  )-----------( 253      ( 2024 19:05 )             35.7           138  |  105  |  16  ----------------------------<  101<H>  3.6   |  23  |  1.10    Ca    8.7      2024 19:05    TPro  6.7  /  Alb  3.7  /  TBili  0.5  /  DBili  x   /  AST  18  /  ALT  16  /  AlkPhos  128<H>  -          ACC: [id number] EXAM:  CT ANGIO CHEST PULM ART WAWI   ORDERED BY: MATT ELKINS     PROCEDURE DATE:  2024          INTERPRETATION:  CLINICAL INFORMATION: Shortness of breath and lower   extremity edema. History of lung cancer    COMPARISON: PET/CT 2022 and CT of the chest 2021    CONTRAST/COMPLICATIONS:  IV Contrast: Omnipaque 350  35 cc administered   65 cc discarded  Oral Contrast: NONE  Complications: None reported at time of study completion    PROCEDURE:  CT Angiogram of the chest was obtained with intravenous contrast. Three   dimensional maximum intensity projection (MIP) images were generated.    FINDINGS:    PULMONARY ANGIOGRAM: No pulmonary embolism.    LYMPH NODES: No thoracic lymphadenopathy.    HEART/VASCULATURE: Heart is normal in size. Trace pericardial effusion.   Aortic and coronary artery calcifications. Status post stenting of the   left brachiocephalic vein and SVC. Left chest wall MediPort catheter   traverses the stent and terminates in the right atrium.    AIRWAYS/LUNGS/PLEURA: Antidependent thickening of the anterior wall of   the left mainstem bronchus, new from 2022. Circumferential thickening   of the right mainstem bronchus and trachea, unchanged from 2022.   Status post right upper lobectomy with surgical clips and postsurgical   changes in the right hemithorax. Partial clearing of previously seen   consolidative opacity in the right middle lobe and a linear consolidative   opacity in the right lower lobe. A 0.6 cm solid nodule in the left lower   lobe (series 302, image 229) is new since PET CT 2022. Right   pleural thickening, unchanged. Mild clustered centrilobular nodular   opacities in the left lower lobe.    UPPER ABDOMEN: Nodular thickening of the bilateral adrenal glands,   unchanged from 2017. Bilateral perinephric fat stranding. Mild   cortical atrophy of the bilateral kidneys.    BONES/SOFT TISSUES: Postsurgical changes in the right posterior chest   wall with stable intrathoracic herniation of subcutaneous fat.   Degenerative changes.    IMPRESSION:    No pulmonary embolism.    A 0.6 cm solid nodule in the left lower lobe is new from 2022.   Attention on follow up imaging is suggested.    New anti-dependent focal thickening of the anterior wall of the left   mainstem bronchus, of uncertain etiology.    --- End of Report ---          ACC: [id number] EXAM:  US DPLX LWR EXT VEINS COMPL BI   ORDERED BY: MATT ELKINS     PROCEDURE DATE:  2024          INTERPRETATION:  CLINICAL INFORMATION: Lower extremity edema    COMPARISON: Bilateral lower extremity soft tissue edema 2019.    TECHNIQUE: Duplex sonography of the BILATERAL LOWER extremity veins with   color and spectral Doppler, with and without compression.    FINDINGS:    Bilateral common femoral, femoral, and popliteal veins are patent and   compressible. Bilateral posterior tibial veins are patent and   compressible. Remaining bilateral calf veins are not visualized due to   soft tissue edema.    Edema of the superficial soft tissues of the lower extremities. Correlate   clinically.    IMPRESSION:    No acute DVT of the lower extremities of the visualized venous segments   as above. Limited calf vein evaluation due to soft tissue edema.   Correlate clinically.    --- End of Report ---

## 2024-02-21 NOTE — PROGRESS NOTE ADULT - ASSESSMENT
2020 68F with PMHx CHF?, HTN, COPD, lung cancer s/p R lobectomy s/p chemo/RT/IT in remission per patient, CAD s/p stents, hx SVC syndrome s/p stent, hx IVC thrombus no longer on AC presenting with SOB and LE edema.

## 2024-02-21 NOTE — CONSULT NOTE ADULT - ASSESSMENT
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 The patient has been following Dr. Hernandez and recently was started on amlodipine for BP control. After her BP was still elevated to 150-160s it was increased from 2.5-->5mg about two weeks ago. She began noticing LE edema over the last couple of weeks. She was seen in the office today and had a bedside echo that revealed small pericardial effusion without tamponade based off papers brought with her. Recommendation was to admit for CHF and diuresis by cardiologist sending in. She currently denies fevers, chills, CP, abdominal pain, vomiting, diarrhea. She has LE edema and notes she believes she had a duplex with oncology last week that was negative. She has chronic SOB that she feels has been worsening over the last several months. Denies hx of PE but has hx of IVC clot. Eliquis was discontinued 5/2023 as she was told she no longer needs to be on it  In ED given lasix 20mg IV  Denies CP, SOB, dizziness at this time (20 Feb 2024 22:39):  above noted:  the major reason to come to hospital was increasing ankle edema and some SOB      Increasing leg edema  HTN  COPD  Lung cancer s/p r lobectomy  CADs/p pCI      Increasing leg edema  -the reason could be multifactorial :  no dvt:  ? secondary to amlodipine  or chf:  -echo awaited   -if no reason is found, then she would need ct abd with contrast:   -there is no pe on cta  chest    -on ct chest there is also A 0.6 cm solid nodule in the left lower lobe is new from 9/9/2022. Attention on follow up imaging is suggested.  New anti-dependent focal thickening of the anterior wall of the left mainstem bronchus, of uncertain etiology. This needs to ne followed   HTN  -controlled  COPD  -cont Symbicort and spiriva  Lung cancer s/p r lobectomy  -per primary  team   CADs/p pCI  -cont home meds:     josi pmd 
This is a 68 year old female with h/o lung cancer currently SAHIL who presented with lower extremity edema.    1. Lung cancer   -- s/p chemo RT followed by durvalumab completed 01/17/2023   -- On surveillance, most recent outpatient scans with SAHIL   -- CTA chest here shows 0.6cm solid nodule in LLL  -- No plan for inpatient workup or treatment   -- Follow up with Dr. Gera [facility] of [facility] after discharge     2. Dyspnea, LE edema   -- No PE on CTA   -- LE dopplers neg for DVT   -- Poss related to amlodipine, pt w/o known hx CHF   -- Getting diuresis w improvement     Will continue to follow.    Magui Rodriguez PA-C  Hematology/Oncology  [facility]  [phone] (office)  [phone] (alt office)  Evenings and weekends please call MD on call or office

## 2024-02-21 NOTE — PATIENT PROFILE ADULT - FALL HARM RISK - HARM RISK INTERVENTIONS

## 2024-02-21 NOTE — CONSULT NOTE ADULT - SUBJECTIVE AND OBJECTIVE BOX
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 The patient has been following Dr. Hernandez and recently was started on amlodipine for BP control. After her BP was still elevated to 150-160s it was increased from 2.5-->5mg about two weeks ago. She began noticing LE edema over the last couple of weeks. She was seen in the office today and had a bedside echo that revealed small pericardial effusion without tamponade based off papers brought with her. Recommendation was to admit for CHF and diuresis by cardiologist sending in. She currently denies fevers, chills, CP, abdominal pain, vomiting, diarrhea. She has LE edema and notes she believes she had a duplex with oncology last week that was negative. She has chronic SOB that she feels has been worsening over the last several months. Denies hx of PE but has hx of IVC clot. Eliquis was discontinued 2023 as she was told she no longer needs to be on it    In ED given lasix 20mg IV  Denies CP, SOB, dizziness at this time (2024 22:39):    above noted:  the major reason to come to hospital was increasing ankle edema and some SOB      ?FOLLOWING PRESENT  [ x] Hx of PE/DVT, [y ] Hx COPD, [x ] Hx of Asthma, [y] Hx of Hospitalization, [ x]  Hx of BiPAP/CPAP use, [x ] Hx of DOMINIC    Allergies    tetracycline (Short breath; Hives)  penicillin (Short breath; Hives)    Intolerances        PAST MEDICAL & SURGICAL HISTORY:  Hypertension      Hyperlipidemia      Carotid artery disease  monitored by vascluar doctor Samuel      Emphysema      Hip pain      Obesity      Edema of both legs      Lung cancer  right, , completed chemotherapy 2019      Stented coronary artery      Localized enlarged lymph nodes      Scoliosis      Lumbar disc disorder      COPD (chronic obstructive pulmonary disease)      Anemia due to chemotherapy      Paralysis of left vocal cord      Glottic insufficiency      SVC syndrome      History of  section  x2 ,       S/P lobectomy of lung  RULobectomy       History of hip replacement  right 2021      Stented coronary artery  x2 stents 2018          FAMILY HISTORY:  Family history of CHF (congestive heart failure) (Aunt)        Social History: [ former smoker ] TOBACCO                  [ x ] ETOH                                 [ x ] IVDA/DRUGS    REVIEW OF SYSTEMS      General:	x    Skin/Breast:x  	  Ophthalmologic:x  	  ENMT:	x    Respiratory and Thorax:  sob,  increasing leg edema  	  Cardiovascular:	x    Gastrointestinal:	x    Genitourinary:	x    Musculoskeletal:	x    Neurological:	x    Psychiatric:	x    Hematology/Lymphatics:	x    Endocrine:	x    Allergic/Immunologic:	x    MEDICATIONS  (STANDING):  aspirin  chewable 81 milliGRAM(s) Oral daily  atorvastatin 40 milliGRAM(s) Oral at bedtime  budesonide  80 MICROgram(s)/formoterol 4.5 MICROgram(s) Inhaler 2 Puff(s) Inhalation two times a day  carvedilol 12.5 milliGRAM(s) Oral every 12 hours  enoxaparin Injectable 40 milliGRAM(s) SubCutaneous every 24 hours  famotidine    Tablet 40 milliGRAM(s) Oral at bedtime  furosemide   Injectable 20 milliGRAM(s) IV Push daily  tiotropium 2.5 MICROgram(s) Inhaler 2 Puff(s) Inhalation daily    MEDICATIONS  (PRN):  acetaminophen     Tablet .. 650 milliGRAM(s) Oral every 6 hours PRN Temp greater or equal to 38C (100.4F), Mild Pain (1 - 3)  melatonin 3 milliGRAM(s) Oral at bedtime PRN Insomnia       Vital Signs Last 24 Hrs  T(C): 36.4 (2024 05:15), Max: 36.9 (2024 17:28)  T(F): 97.6 (2024 05:15), Max: 98.4 (2024 17:28)  HR: 73 (2024 10:19) (64 - 76)  BP: 127/60 (2024 10:19) (116/54 - 129/60)  BP(mean): --  RR: 16 (2024 10:19) (16 - 20)  SpO2: 98% (2024 10:19) (93% - 100%)    Parameters below as of 2024 10:19  Patient On (Oxygen Delivery Method): room air    Orthostatic VS          I&O's Summary      Physical Exam:   GENERAL: NAD, well-groomed, well-developed  HEENT: BAYLEE/   Atraumatic, Normocephalic  ENMT: No tonsillar erythema, exudates, or enlargement; Moist mucous membranes, Good dentition, No lesions  NECK: Supple, No JVD, Normal thyroid  CHEST/LUNG: Clear to auscultation bilaterally- no edema  CVS: Regular rate and rhythm; No murmurs, rubs, or gallops  GI: : Soft, Nontender, Nondistended; Bowel sounds present  NERVOUS SYSTEM:  Alert & Oriented X3  EXTREMITIES: + edema  LYMPH: No lymphadenopathy noted  SKIN: No rashes or lesions  ENDOCRINOLOGY: No Thyromegaly  PSYCH: Appropriate    Labs:    CARDIAC MARKERS ( 2024 01:49 )  x     / x     / 57 U/L / x     / 1.9 ng/mL                            12.4   9.58  )-----------( 253      ( 2024 19:05 )             35.7     02-    138  |  105  |  16  ----------------------------<  101<H>  3.6   |  23  |  1.10    Ca    8.7      2024 19:05    TPro  6.7  /  Alb  3.7  /  TBili  0.5  /  DBili  x   /  AST  18  /  ALT  16  /  AlkPhos  128<H>  02-20    CAPILLARY BLOOD GLUCOSE        LIVER FUNCTIONS - ( 2024 19:05 )  Alb: 3.7 g/dL / Pro: 6.7 g/dL / ALK PHOS: 128 U/L / ALT: 16 U/L / AST: 18 U/L / GGT: x             Urinalysis Basic - ( 2024 19:05 )    Color: x / Appearance: x / SG: x / pH: x  Gluc: 101 mg/dL / Ketone: x  / Bili: x / Urobili: x   Blood: x / Protein: x / Nitrite: x   Leuk Esterase: x / RBC: x / WBC x   Sq Epi: x / Non Sq Epi: x / Bacteria: x      D DImer      Studies  Chest X-RAY  CT SCAN Chest   CT Abdomen  Venous Dopplers: LE:   Others      r< from: CT Angio Chest PE Protocol w/ IV Cont (24 @ 02:43) >    BONES/SOFT TISSUES: Postsurgical changes in the right posterior chest   wall with stable intrathoracic herniationof subcutaneous fat.   Degenerative changes.    IMPRESSION:    No pulmonary embolism.    A 0.6 cm solid nodule in the left lower lobe is new from 2022.   Attention on follow up imaging is suggested.    New anti-dependent focal thickening of the anterior wall of the left   mainstem bronchus, of uncertain etiology.    --- End of Report ---    < end of copied text >  < from: CT Chest w/ IV Cont (21 @ 02:06) >  secondary to IV contrast timing.  HEART: Heart size is enlarged. No pericardial effusion.  CHEST WALL AND LOWER NECK: There is bilateral supraclavicular lymphadenopathy, for reference a left supraclavicular lymph node measures 1.6 x 2.3 cm (AP X TR). Please refer to CT neck report of same day for detailed evaluation of the neck.  VISUALIZED UPPER ABDOMEN:  Similar nodular thickening of the bilateral adrenal glands.  BONES: Surgical defect of the right posterior seventh rib. Degenerative changes.    IMPRESSION:  No main, left main, right main, lobar, or segmental pulmonary embolism. Evaluation of subsegmental pulmonary arteries is limited secondary to poor contrast opacification.    There is extensive supraclavicular and mediastinal lymphadenopathy.  Suggestion of complete/near-complete occlusion of the SVC, left brachiocephalic, and left internal jugular veins, however evaluation is limited secondary to IV contrast timing. Vascular ultrasound versus repeat neck/chest imaging with more delayed IV contrast timing may be helpful for complete evaluation.        --- End of Report ---    < end of copied text >      < from: Transthoracic Echocardiogram (19 @ 10:37) >  Mitral Valve: Mitral valve not well visualized, probably  normal.  Aortic Root: Normal aortic root.  Aortic Valve: Aortic valve not well visualized.  Left Atrium: LA volume index = 14 cc/m2.  Left Ventricle: Hyperdynamic left ventricle. Normal left  ventricular internal dimensions and wall thicknesses.  Right Heart: Normal right atrium. The right ventricle is  not well visualized; grossly normal right ventricular  systolic function.  Pericardium/PleuraNormal pericardium with no pericardial  effusion.Right pleural effusion. Pericardial fat pad seen.  ------------------------------------------------------------------------  CONCLUSIONS:  1. Mitral valve not well visualized, probably normal.  2. Hyperdynamic left ventricle.  3. The right ventricle is not well visualized; grossly  normal right ventricular systolic function.  4. Normal pericardium with no pericardial effusion.  5. Right pleural effusion.  6. Pericardial fat pad seen.  ------------------------------------------------------------------------  Confirmed on  2019 - 13:28:53 by Ruel Ceballos M.D. RPVI  ------------------------------------------------------------------------    < end of copied text >

## 2024-02-21 NOTE — CONSULT NOTE ADULT - SUBJECTIVE AND OBJECTIVE BOX
C A R D I O L O G Y  *********************    DATE OF SERVICE: 24    HISTORY OF PRESENT ILLNESS: HPI: Pt is a 68-year-old female with past medical history of hypertension, hyperlipidemia, CAD status post drug-eluting  stent placement to the RCA in 2018, history of right lung adenocarcinoma status post right lung resection and chemotherapy,  history of SVC syndrome status post stent, and history of IVC thrombus previously on anticoagulation with Eliquis who presents  today for cardiovascular follow-up.  The patient comes in today for a cardiovascular followup. Since her last visit, the patient reports dyspnea, orthopnea, dyspnea on  exertion, and lower extremity swelling. She states her swelling has been progressively getting worse. She has a history of venous  insufficiency and she normally raises her legs with improvement of her symptoms, however, her swelling has not gone down. Since  that time, the patient also reports worsening dyspnea and cough. She reports compliance with her medications and denies any  bleeding or easy bruising.                  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 The patient has been following Dr. Hernandez and recently was started on amlodipine for BP control. After her BP was still elevated to 150-160s it was increased from 2.5-->5mg about two weeks ago. She began noticing LE edema over the last couple of weeks. She was seen in the office today and had a bedside echo that revealed small pericardial effusion without tamponade based off papers brought with her. Recommendation was to admit for CHF and diuresis by cardiologist sending in. She currently denies fevers, chills, CP, abdominal pain, vomiting, diarrhea. She has LE edema and notes she believes she had a duplex with oncology last week that was negative. She has chronic SOB that she feels has been worsening over the last several months. Denies hx of PE but has hx of IVC clot. Eliquis was discontinued 2023 as she was told she no longer needs to be on it    In ED given lasix 20mg IV  Denies CP, SOB, dizziness at this time (2024 22:39)      PAST MEDICAL & SURGICAL HISTORY:  Hypertension      Hyperlipidemia      Carotid artery disease  monitored by vascluar doctor Samuel      Emphysema      Hip pain      Obesity      Edema of both legs      Lung cancer  right, , completed chemotherapy 2019      Stented coronary artery      Localized enlarged lymph nodes      Scoliosis      Lumbar disc disorder      COPD (chronic obstructive pulmonary disease)      Anemia due to chemotherapy      Paralysis of left vocal cord      Glottic insufficiency      SVC syndrome      History of  section  x2 ,       S/P lobectomy of lung  RULobectomy       History of hip replacement  right 2021      Stented coronary artery  x2 stents 2018              MEDICATIONS:  MEDICATIONS  (STANDING):  aspirin  chewable 81 milliGRAM(s) Oral daily  atorvastatin 40 milliGRAM(s) Oral at bedtime  budesonide  80 MICROgram(s)/formoterol 4.5 MICROgram(s) Inhaler 2 Puff(s) Inhalation two times a day  carvedilol 12.5 milliGRAM(s) Oral every 12 hours  enoxaparin Injectable 40 milliGRAM(s) SubCutaneous every 24 hours  famotidine    Tablet 40 milliGRAM(s) Oral at bedtime  furosemide   Injectable 20 milliGRAM(s) IV Push daily  tiotropium 2.5 MICROgram(s) Inhaler 2 Puff(s) Inhalation daily      Allergies    tetracycline (Short breath; Hives)  penicillin (Short breath; Hives)    Intolerances        FAMILY HISTORY:  Family history of CHF (congestive heart failure) (Aunt)      Non-contributary for premature coronary disease or sudden cardiac death    SOCIAL HISTORY:    [ ] Non-smoker  [ ] Smoker  [ ] Alcohol    FLU VACCINE THIS YEAR STARTS IN AUGUST:  [ ] Yes    [ ] No    IF OVER 65 HAVE YOU EVER HAD A PNA VACCINE:  [ ] Yes    [ ] No       [ ] N/A      REVIEW OF SYSTEMS:  [ ]chest pain  [  ]shortness of breath  [  ]palpitations  [  ]syncope  [ ]near syncope [ ]upper extremity weakness   [ ] lower extremity weakness  [  ]diplopia  [  ]altered mental status   [  ]fevers  [ ]chills [ ]nausea  [ ]vomiting  [  ]dysphagia    [ ]abdominal pain  [ ]melena  [ ]BRBPR    [  ]epistaxis  [  ]rash    [ ]lower extremity edema        [X] All others negative	  [ ] Unable to obtain      LABS:	 	    CARDIAC MARKERS:  CARDIAC MARKERS ( 2024 01:49 )  x     / x     / 57 U/L / x     / 1.9 ng/mL                              12.4   9.58  )-----------( 253      ( 2024 19:05 )             35.7     Hb Trend: 12.4<--        138  |  105  |  16  ----------------------------<  101<H>  3.6   |  23  |  1.10    Ca    8.7      2024 19:05    TPro  6.7  /  Alb  3.7  /  TBili  0.5  /  DBili  x   /  AST  18  /  ALT  16  /  AlkPhos  128<H>  02-20    Creatinine Trend: 1.10<--    Coags:      proBNP:   Lipid Profile:   HgA1c:   TSH:         PHYSICAL EXAM:  T(C): 36.4 (24 @ 05:15), Max: 36.9 (24 @ 17:28)  HR: 73 (24 @ 10:19) (64 - 76)  BP: 127/60 (24 @ 10:19) (116/54 - 129/60)  RR: 16 (24 @ 10:19) (16 - 20)  SpO2: 98% (24 @ 10:19) (93% - 100%)  Wt(kg): --     I&O's Summary      Gen: NAD  HEENT:  (-)icterus (-)pallor  CV: N S1 S2 1/6 MIKO (+)2 Pulses B/l  Resp:  Clear to auscultation B/L, normal effort  GI: (+) BS Soft, NT, ND  Lymph:  (-)Edema, (-)obvious lymphadenopathy  Skin: Warm to touch, Normal turgor  Psych: Appropriate mood and affect      TELEMETRY: 	      ECG:  	    RADIOLOGY:         CXR:     ASSESSMENT/PLAN: 	68y Female     C A R D I O L O G Y  *********************    DATE OF SERVICE: 24    HISTORY OF PRESENT ILLNESS: HPI: Pt is a 68-year-old female with a pmh of HTN, HLD, CAD s/p PCI with ANETTE to RCA in 2018, Adenocarcinoma of right lung s/p right lung resection and chemotherapy, radiation and immunotherapy, history of SVC syndrome s/p stent, IVC thrombus previously on AC presents with  dyspnea, orthopnea, dyspnea on exertion, and lower extremity swelling. She states her swelling has been progressively getting worse. She has a history of venous insufficiency and she normally raises her legs with improvement of her symptoms, however, her swelling has not gone down. Since that time, the patient also reports worsening dyspnea and cough. She reports compliance with her medications and denies any bleeding or easy bruising. Sent to Ed from office for these symptoms and also a pericardial effusion that was seen on outpatient TTE.  Recently was started on amlodipine for BP control. After her BP was still elevated to 150-160s it was increased from 2.5-->5mg about two weeks ago. She began noticing LE edema over the last couple of weeks. Denies fevers, chills, CP, abdominal pain, vomiting, diarrhea. In ED given lasix 20mg IV    PAST MEDICAL & SURGICAL HISTORY:  Hypertension  Hyperlipidemia  Carotid artery disease  Emphysema  Hip pain  Obesity  Edema of both legs  Lung cancer right, , completed chemotherapy 2019  Stented coronary artery  Localized enlarged lymph nodes  Scoliosis  Lumbar disc disorder  COPD (chronic obstructive pulmonary disease)  Anemia due to chemotherapy  Paralysis of left vocal cord  Glottic insufficiency  SVC syndrome  History of  section x2 ,   S/P lobectomy of lung RULobectomy   History of hip replacement right 2021  Stented coronary artery x2 stents 2018        MEDICATIONS:  MEDICATIONS  (STANDING):  aspirin  chewable 81 milliGRAM(s) Oral daily  atorvastatin 40 milliGRAM(s) Oral at bedtime  budesonide  80 MICROgram(s)/formoterol 4.5 MICROgram(s) Inhaler 2 Puff(s) Inhalation two times a day  carvedilol 12.5 milliGRAM(s) Oral every 12 hours  enoxaparin Injectable 40 milliGRAM(s) SubCutaneous every 24 hours  famotidine    Tablet 40 milliGRAM(s) Oral at bedtime  furosemide   Injectable 20 milliGRAM(s) IV Push daily  tiotropium 2.5 MICROgram(s) Inhaler 2 Puff(s) Inhalation daily      Allergies  tetracycline (Short breath; Hives)  penicillin (Short breath; Hives)    FAMILY HISTORY:  Family history of CHF (congestive heart failure) (Aunt)    SOCIAL HISTORY:    [ ] Non-smoker  [ ] Smoker  [ ] Alcohol    FLU VACCINE THIS YEAR STARTS IN AUGUST:  [ ] Yes    [ ] No    IF OVER 65 HAVE YOU EVER HAD A PNA VACCINE:  [ ] Yes    [ ] No       [ ] N/A      REVIEW OF SYSTEMS:  [ ]chest pain  [  ]shortness of breath  [  ]palpitations  [  ]syncope  [ ]near syncope [ ]upper extremity weakness   [ ] lower extremity weakness  [  ]diplopia  [  ]altered mental status   [  ]fevers  [ ]chills [ ]nausea  [ ]vomiting  [  ]dysphagia    [ ]abdominal pain  [ ]melena  [ ]BRBPR    [  ]epistaxis  [  ]rash    [ ]lower extremity edema        [X] All others negative	  [ ] Unable to obtain      LABS:	 	    CARDIAC MARKERS:  CARDIAC MARKERS ( 2024 01:49 )  x     / x     / 57 U/L / x     / 1.9 ng/mL                  12.4   9.58  )-----------( 253      ( 2024 19:05 )             35.7     Hb Trend: 12.4<--        138  |  105  |  16  ----------------------------<  101<H>  3.6   |  23  |  1.10    Ca    8.7      2024 19:05    TPro  6.7  /  Alb  3.7  /  TBili  0.5  /  DBili  x   /  AST  18  /  ALT  16  /  AlkPhos  128<H>  0220    Creatinine Trend: 1.10<--        PHYSICAL EXAM:  T(C): 36.4 (24 @ 05:15), Max: 36.9 (24 @ 17:28)  HR: 73 (24 @ 10:19) (64 - 76)  BP: 127/60 (24 @ 10:19) (116/54 - 129/60)  RR: 16 (24 @ 10:19) (16 - 20)  SpO2: 98% (24 @ 10:19) (93% - 100%)  Wt(kg): --     I&O's Summary      General:  Alert and Oriented * 3.   Head: Normocephalic and atraumatic.   Neck: No JVD. No bruits. Supple. Does not appear to be enlarged.   Cardiovascular: + S1,S2 ; RRR Soft systolic murmur at the left lower sternal border. No rubs noted.    Lungs: CTA b/l. No rhonchi, rales or wheezes.   Abdomen: + BS, soft. Non tender. Non distended. No rebound. No guarding.   Extremities: +1 edema  Neurologic: Moves all four extremities. Full range of motion.   Skin: Warm and moist. The patient's skin has normal elasticity and good skin turgor.   Psychiatric: Appropriate mood and affect.  Musculoskeletal: Normal range of motion, normal strength     TELEMETRY: 	  NSR    ECG:  	SR, PVC    RADIOLOGY:  CXR:  IMPRESSION: No acute pulmonary disease  Mild right apical pleural thickening and postsurgical changes with volume loss.     < from: CT Angio Chest PE Protocol w/ IV Cont (24 @ 02:43) >  IMPRESSION:    No pulmonary embolism.    A 0.6 cm solid nodule in the left lower lobe is new from 2022. Attention on follow up imaging is suggested.    New anti-dependent focal thickening of the anterior wall of the left mainstem bronchus, of uncertain etiology.        < from: Cardiac Catheterization (10.30.23 @ 12:50) >    LM   Left main artery: Angiography shows no disease.      LAD   Left anterior descending artery: Angiography shows mild atherosclerosis. There is a 30 % stenosis in the middle third portion of the segment. First diagonal: Angiography shows mild atherosclerosis.      CX     Circumflex: Angiography shows minor irregularities.      RCA   Right coronary artery: Angiography shows complete occlusion. stent stenosis. . There is a 100 % stenosis in the proximal third portion of the segment. Distal right coronary artery: supplied by collaterals. Right posterior descending artery: Angiography shows mild atherosclerosis. supplied by collaterals. Right  Posterolateral Segment: Angiography shows mild atherosclerosis. supplied by collaterals.          ASSESSMENT/PLAN: 	Pt is a 68-year-old female with a pmh of HTN, HLD, CAD s/p PCI with ANETTE to RCA in 2018, Adenocarcinoma of right lung s/p right lung resection and chemotherapy, radiation and immunotherapy, history of SVC syndrome s/p stent, IVC thrombus previously on AC presents with  dyspnea, orthopnea, dyspnea on exertion, and lower extremity swelling. She states her swelling has been progressively getting worse. She has a history of venous insufficiency and she normally raises her legs with improvement of her symptoms, however, her swelling has not gone down. Since that time, the patient also reports worsening dyspnea and cough. She reports compliance with her medications and denies any bleeding or easy bruising. Sent to Ed from office for these symptoms and also a pericardial effusion that was seen on outpatient TTE.Recently was started on amlodipine for BP control. After her BP was still elevated to 150-160s it was increased from 2.5-->5mg about two weeks ago. She began noticing LE edema over the last couple of weeks. Denies fevers, chills, CP, abdominal pain, vomiting, diarrhea. In ED given lasix 20mg IV.    SOB/LE edema/CAD/HTN/  - still with LE edema, doubt due to amlodipine as improved with IV lasix and CCB edema usually happens with higher dose  - check TTE for pericardial effusion in setting of previosu malignancy, not in ttamponade on exam  - c/w Lasix  - stop amlodipine  - c/w Coreg  - will start either valsartan or chlorthalidone for further bP control depending on TTE  - f/u pulm and oncology for abnormal CT chest    Julio Alcaraz MD  Pager: [phone]

## 2024-02-21 NOTE — PATIENT PROFILE ADULT - LEGAL HELP
"Chief Complaint   Patient presents with     RECHECK       Initial /64   Wt 84.4 kg (186 lb)   LMP 1997   BMI 30.02 kg/m   Estimated body mass index is 30.02 kg/m  as calculated from the following:    Height as of 19: 1.676 m (5' 6\").    Weight as of this encounter: 84.4 kg (186 lb).  BP completed using cuff size: regular    Questioned patient about current smoking habits.  Pt. quit smoking some time ago.          The following HM Due: NONE      The following patient reported/Care Every where data was sent to:  P ABSTRACT QUALITY INITIATIVES [[id number]]        Yolande Lynn [facility]                 "
no

## 2024-02-22 ENCOUNTER — RESULT REVIEW (OUTPATIENT)
Age: 69
End: 2024-02-22

## 2024-02-22 ENCOUNTER — TRANSCRIPTION ENCOUNTER (OUTPATIENT)
Age: 69
End: 2024-02-22

## 2024-02-22 VITALS
DIASTOLIC BLOOD PRESSURE: 55 MMHG | HEART RATE: 65 BPM | SYSTOLIC BLOOD PRESSURE: 132 MMHG | OXYGEN SATURATION: 97 % | TEMPERATURE: 98 F | RESPIRATION RATE: 18 BRPM

## 2024-02-22 LAB
ANION GAP SERPL CALC-SCNC: 13 MMOL/L — SIGNIFICANT CHANGE UP (ref 7–14)
APPEARANCE UR: CLEAR — SIGNIFICANT CHANGE UP
BACTERIA # UR AUTO: NEGATIVE /HPF — SIGNIFICANT CHANGE UP
BILIRUB UR-MCNC: NEGATIVE — SIGNIFICANT CHANGE UP
BUN SERPL-MCNC: 20 MG/DL — SIGNIFICANT CHANGE UP (ref 7–23)
CALCIUM SERPL-MCNC: 9.3 MG/DL — SIGNIFICANT CHANGE UP (ref 8.4–10.5)
CAST: 2 /LPF — SIGNIFICANT CHANGE UP (ref 0–4)
CHLORIDE SERPL-SCNC: 101 MMOL/L — SIGNIFICANT CHANGE UP (ref 98–107)
CO2 SERPL-SCNC: 25 MMOL/L — SIGNIFICANT CHANGE UP (ref 22–31)
COLOR SPEC: YELLOW — SIGNIFICANT CHANGE UP
CREAT SERPL-MCNC: 1.15 MG/DL — SIGNIFICANT CHANGE UP (ref 0.5–1.3)
DIFF PNL FLD: NEGATIVE — SIGNIFICANT CHANGE UP
EGFR: 52 ML/MIN/1.73M2 — LOW
GLUCOSE SERPL-MCNC: 95 MG/DL — SIGNIFICANT CHANGE UP (ref 70–99)
GLUCOSE UR QL: NEGATIVE MG/DL — SIGNIFICANT CHANGE UP
HCT VFR BLD CALC: 36.5 % — SIGNIFICANT CHANGE UP (ref 34.5–45)
HGB BLD-MCNC: 12.5 G/DL — SIGNIFICANT CHANGE UP (ref 11.5–15.5)
KETONES UR-MCNC: NEGATIVE MG/DL — SIGNIFICANT CHANGE UP
LEUKOCYTE ESTERASE UR-ACNC: ABNORMAL
MAGNESIUM SERPL-MCNC: 2.1 MG/DL — SIGNIFICANT CHANGE UP (ref 1.6–2.6)
MCHC RBC-ENTMCNC: 31.3 PG — SIGNIFICANT CHANGE UP (ref 27–34)
MCHC RBC-ENTMCNC: 34.2 GM/DL — SIGNIFICANT CHANGE UP (ref 32–36)
MCV RBC AUTO: 91.5 FL — SIGNIFICANT CHANGE UP (ref 80–100)
NITRITE UR-MCNC: NEGATIVE — SIGNIFICANT CHANGE UP
NRBC # BLD: 0 /100 WBCS — SIGNIFICANT CHANGE UP (ref 0–0)
NRBC # FLD: 0 K/UL — SIGNIFICANT CHANGE UP (ref 0–0)
PH UR: 7 — SIGNIFICANT CHANGE UP (ref 5–8)
PHOSPHATE SERPL-MCNC: 3.6 MG/DL — SIGNIFICANT CHANGE UP (ref 2.5–4.5)
PLATELET # BLD AUTO: 250 K/UL — SIGNIFICANT CHANGE UP (ref 150–400)
POTASSIUM SERPL-MCNC: 4.1 MMOL/L — SIGNIFICANT CHANGE UP (ref 3.5–5.3)
POTASSIUM SERPL-SCNC: 4.1 MMOL/L — SIGNIFICANT CHANGE UP (ref 3.5–5.3)
PROT UR-MCNC: NEGATIVE MG/DL — SIGNIFICANT CHANGE UP
RBC # BLD: 3.99 M/UL — SIGNIFICANT CHANGE UP (ref 3.8–5.2)
RBC # FLD: 13.7 % — SIGNIFICANT CHANGE UP (ref 10.3–14.5)
RBC CASTS # UR COMP ASSIST: 0 /HPF — SIGNIFICANT CHANGE UP (ref 0–4)
SODIUM SERPL-SCNC: 139 MMOL/L — SIGNIFICANT CHANGE UP (ref 135–145)
SP GR SPEC: 1.01 — SIGNIFICANT CHANGE UP (ref 1–1.03)
SQUAMOUS # UR AUTO: 4 /HPF — SIGNIFICANT CHANGE UP (ref 0–5)
UROBILINOGEN FLD QL: 0.2 MG/DL — SIGNIFICANT CHANGE UP (ref 0.2–1)
WBC # BLD: 7.28 K/UL — SIGNIFICANT CHANGE UP (ref 3.8–10.5)
WBC # FLD AUTO: 7.28 K/UL — SIGNIFICANT CHANGE UP (ref 3.8–10.5)
WBC UR QL: 9 /HPF — HIGH (ref 0–5)

## 2024-02-22 PROCEDURE — 93306 TTE W/DOPPLER COMPLETE: CPT | Mod: 26

## 2024-02-22 RX ORDER — AMLODIPINE BESYLATE 2.5 MG/1
1 TABLET ORAL
Refills: 0 | DISCHARGE

## 2024-02-22 RX ORDER — FUROSEMIDE 40 MG
1 TABLET ORAL
Refills: 0 | DISCHARGE

## 2024-02-22 RX ORDER — POTASSIUM CHLORIDE 20 MEQ
1 PACKET (EA) ORAL
Refills: 0 | DISCHARGE

## 2024-02-22 RX ORDER — CHLORHEXIDINE GLUCONATE 213 G/1000ML
1 SOLUTION TOPICAL DAILY
Refills: 0 | Status: DISCONTINUED | OUTPATIENT
Start: 2024-02-22 | End: 2024-02-22

## 2024-02-22 RX ADMIN — ENOXAPARIN SODIUM 40 MILLIGRAM(S): 100 INJECTION SUBCUTANEOUS at 05:15

## 2024-02-22 RX ADMIN — CHLORHEXIDINE GLUCONATE 1 APPLICATION(S): 213 SOLUTION TOPICAL at 12:15

## 2024-02-22 RX ADMIN — Medication 20 MILLIGRAM(S): at 05:15

## 2024-02-22 RX ADMIN — Medication 81 MILLIGRAM(S): at 12:12

## 2024-02-22 RX ADMIN — CARVEDILOL PHOSPHATE 12.5 MILLIGRAM(S): 80 CAPSULE, EXTENDED RELEASE ORAL at 05:15

## 2024-02-22 RX ADMIN — TIOTROPIUM BROMIDE 2 PUFF(S): 18 CAPSULE ORAL; RESPIRATORY (INHALATION) at 14:57

## 2024-02-22 RX ADMIN — BUDESONIDE AND FORMOTEROL FUMARATE DIHYDRATE 2 PUFF(S): 160; 4.5 AEROSOL RESPIRATORY (INHALATION) at 12:14

## 2024-02-22 NOTE — PROGRESS NOTE ADULT - PROBLEM SELECTOR PLAN 2
Not tachycardic, no CP, no dizziness. No SOB at rest. Per paperwork no tamponade  -echo  -CT chest noted, Pulmonary is following.
Not tachycardic, no CP, no dizziness. No SOB at rest. Per paperwork no tamponade  -echo  -CT chest noted, Pulmonary is following.

## 2024-02-22 NOTE — DISCHARGE NOTE PROVIDER - NSDCCPCAREPLAN_GEN_ALL_CORE_FT
PRINCIPAL DISCHARGE DIAGNOSIS  Diagnosis: Lower extremity edema  Assessment and Plan of Treatment: You came into the hospital with leg swelling likely due to side effects of amlodipine. This was stopped. you were given iv lasix. CTA Chest and sonogram of your legs showed no blood clots. Echocardiogram showed your heart pumped normally with ejection fraction of 70% without any effusion. Repeat lab work with pcp in 1-2 weeks. Follow up with your cardiologist      SECONDARY DISCHARGE DIAGNOSES  Diagnosis: Lung cancer  Assessment and Plan of Treatment: follow up with your oncologist. CTA Chest showed 0.6 cm solid nodule in left lower lobe - for further monitoring  follow up with your pulmonologist also

## 2024-02-22 NOTE — DISCHARGE NOTE NURSING/CASE MANAGEMENT/SOCIAL WORK - NSDCPEFALRISK_GEN_ALL_CORE
For information on Fall & Injury Prevention, visit: [email or web address] OR  [email or web address] OR  https://www.cdc.gov/steadi/patient.html

## 2024-02-22 NOTE — PROGRESS NOTE ADULT - ASSESSMENT
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 The patient has been following Dr. Hernandez and recently was started on amlodipine for BP control. After her BP was still elevated to 150-160s it was increased from 2.5-->5mg about two weeks ago. She began noticing LE edema over the last couple of weeks. She was seen in the office today and had a bedside echo that revealed small pericardial effusion without tamponade based off papers brought with her. Recommendation was to admit for CHF and diuresis by cardiologist sending in. She currently denies fevers, chills, CP, abdominal pain, vomiting, diarrhea. She has LE edema and notes she believes she had a duplex with oncology last week that was negative. She has chronic SOB that she feels has been worsening over the last several months. Denies hx of PE but has hx of IVC clot. Eliquis was discontinued 5/2023 as she was told she no longer needs to be on it  In ED given lasix 20mg IV  Denies CP, SOB, dizziness at this time (20 Feb 2024 22:39):  above noted:  the major reason to come to hospital was increasing ankle edema and some SOB      Increasing leg edema  HTN  COPD  Lung cancer s/p r lobectomy  CADs/p pCI      Increasing leg edema  -the reason could be multifactorial :  no dvt:  ? secondary to amlodipine  or chf:  -echo awaited   -if no reason is found, then she would need ct abd with contrast:   -there is no pe on cta  chest    -on ct chest there is also A 0.6 cm solid nodule in the left lower lobe is new from 9/9/2022. Attention on follow up imaging is suggested.  New anti-dependent focal thickening of the anterior wall of the left mainstem bronchus, of uncertain etiology. This needs to ne followed   2/22:  -no sob,  no cough ; no phlegm : no wheezing:   -echo is still pending   -dopplers are negative  HTN  -controlled  COPD  -cont Symbicort and spiriva  Lung cancer s/p r lobectomy  -per primary  team   CADs/p pCI  -cont home meds:     josi pmd  68F with PMHx CHF?, HTN, COPD, lung cancer s/p R lobectomy s/p chemo/RT/IT in remission per patient, CAD s/p stents, hx SVC syndrome s/p stent, hx IVC thrombus no longer on AC presenting with SOB and LE edema. The patient has been following Dr. Hernandez and recently was started on amlodipine for BP control. After her BP was still elevated to 150-160s it was increased from 2.5-->5mg about two weeks ago. She began noticing LE edema over the last couple of weeks. She was seen in the office today and had a bedside echo that revealed small pericardial effusion without tamponade based off papers brought with her. Recommendation was to admit for CHF and diuresis by cardiologist sending in. She currently denies fevers, chills, CP, abdominal pain, vomiting, diarrhea. She has LE edema and notes she believes she had a duplex with oncology last week that was negative. She has chronic SOB that she feels has been worsening over the last several months. Denies hx of PE but has hx of IVC clot. Eliquis was discontinued 5/2023 as she was told she no longer needs to be on it  In ED given lasix 20mg IV  Denies CP, SOB, dizziness at this time (20 Feb 2024 22:39):  above noted:  the major reason to come to hospital was increasing ankle edema and some SOB      Increasing leg edema  HTN  COPD  Lung cancer s/p r lobectomy  CADs/p pCI      Increasing leg edema  -the reason could be multifactorial :  no dvt:  ? secondary to amlodipine  or chf:  -echo awaited   -if no reason is found, then she would need ct abd with contrast:   -there is no pe on cta  chest    -on ct chest there is also A 0.6 cm solid nodule in the left lower lobe is new from 9/9/2022. Attention on follow up imaging is suggested.  New anti-dependent focal thickening of the anterior wall of the left mainstem bronchus, of uncertain etiology. This needs to ne followed   2/22:  -no sob,  no cough ; no phlegm : no wheezing:   -echo is still pending   -dopplers are negative  -dw rake about the thickening  of the tracheal wall : it is likely secondary to radiation therapy prior   HTN  -controlled  COPD  -cont Symbicort and spiriva  Lung cancer s/p r lobectomy  -per primary  team   CADs/p pCI  -cont home meds:     josi pmd

## 2024-02-22 NOTE — DISCHARGE NOTE PROVIDER - NSDCMRMEDTOKEN_GEN_ALL_CORE_FT
aspirin 81 mg oral tablet, chewable: 1 tab(s) orally once a day  atorvastatin 40 mg oral tablet: 1 tab(s) orally once a day  carvedilol 12.5 mg oral tablet: 1 tab(s) orally 2 times a day  famotidine 40 mg oral tablet: 1 tab(s) orally once a day (at bedtime)  Trelegy Ellipta inhalation powder: 1 puff(s) inhaled once a day

## 2024-02-22 NOTE — DISCHARGE NOTE PROVIDER - PROVIDER TOKENS
PROVIDER:[TOKEN:[[id number]]],PROVIDER:[TOKEN:[[id number]]],PROVIDER:[TOKEN:[[id number]]],PROVIDER:[TOKEN:[[id number]]]

## 2024-02-22 NOTE — PROGRESS NOTE ADULT - PROBLEM SELECTOR PLAN 4
S/p R lobectomy, chemo, RT, IT and in remission per patient. Follows Dr. Guevara. Last seen last week
S/p R lobectomy, chemo, RT, IT and in remission per patient. Follows Dr. Guevara. Last seen last week

## 2024-02-22 NOTE — PROGRESS NOTE ADULT - SUBJECTIVE AND OBJECTIVE BOX
DATE OF SERVICE: 02-22-24    Patient denies chest pain or shortness of breath.   Review of symptoms otherwise negative.    MEDICATIONS:  acetaminophen     Tablet .. 650 milliGRAM(s) Oral every 6 hours PRN  aspirin  chewable 81 milliGRAM(s) Oral daily  atorvastatin 40 milliGRAM(s) Oral at bedtime  budesonide  80 MICROgram(s)/formoterol 4.5 MICROgram(s) Inhaler 2 Puff(s) Inhalation two times a day  carvedilol 12.5 milliGRAM(s) Oral every 12 hours  chlorhexidine 2% Cloths 1 Application(s) Topical daily  enoxaparin Injectable 40 milliGRAM(s) SubCutaneous every 24 hours  famotidine    Tablet 40 milliGRAM(s) Oral at bedtime  melatonin 3 milliGRAM(s) Oral at bedtime PRN  tiotropium 2.5 MICROgram(s) Inhaler 2 Puff(s) Inhalation daily      LABS:                        12.5   7.28  )-----------( 250      ( 22 Feb 2024 06:00 )             36.5       Hemoglobin: 12.5 g/dL (02-22 @ 06:00)  Hemoglobin: 12.4 g/dL (02-20 @ 19:05)      02-22    139  |  101  |  20  ----------------------------<  95  4.1   |  25  |  1.15    Ca    9.3      22 Feb 2024 06:00  Phos  3.6     02-22  Mg     2.10     02-22    TPro  6.7  /  Alb  3.7  /  TBili  0.5  /  DBili  x   /  AST  18  /  ALT  16  /  AlkPhos  128<H>  02-20    Creatinine Trend: 1.15<--, 1.10<--    COAGS:     CARDIAC MARKERS ( 21 Feb 2024 01:49 )  x     / x     / 57 U/L / x     / 1.9 ng/mL        PHYSICAL EXAM:  T(C): 36.6 (02-22-24 @ 12:09), Max: 36.8 (02-21-24 @ 17:50)  HR: 65 (02-22-24 @ 12:09) (65 - 76)  BP: 132/55 (02-22-24 @ 12:09) (101/52 - 150/60)  RR: 18 (02-22-24 @ 12:09) (15 - 18)  SpO2: 97% (02-22-24 @ 12:09) (96% - 100%)  Wt(kg): --    I&O's Summary      General:  Alert and Oriented * 3.   Head: Normocephalic and atraumatic.   Neck: No JVD. No bruits. Supple. Does not appear to be enlarged.   Cardiovascular: + S1,S2 ; RRR Soft systolic murmur at the left lower sternal border. No rubs noted.    Lungs: CTA b/l. No rhonchi, rales or wheezes.   Abdomen: + BS, soft. Non tender. Non distended. No rebound. No guarding.   Extremities: No clubbing/cyanosis/edema.   Neurologic: Moves all four extremities. Full range of motion.   Skin: Warm and moist. The patient's skin has normal elasticity and good skin turgor.   Psychiatric: Appropriate mood and affect.  Musculoskeletal: Normal range of motion, normal strength     < from: CT Angio Chest PE Protocol w/ IV Cont (02.21.24 @ 02:43) >  FINDINGS:    PULMONARY ANGIOGRAM: No pulmonary embolism.    LYMPH NODES: No thoracic lymphadenopathy.    HEART/VASCULATURE: Heart is normal in size. Trace pericardial effusion.   Aortic and coronary artery calcifications. Status post stenting of the   left brachiocephalic vein and SVC. Left chest wall MediPort catheter   traverses the stent and terminates in the right atrium.    AIRWAYS/LUNGS/PLEURA: Antidependent thickening of the anterior wall of   the left mainstem bronchus, new from 9/9/2022. Circumferential thickening   of the right mainstem bronchus and trachea, unchanged from 9/9/2022.   Status post right upper lobectomy with surgical clips and postsurgical   changes in the right hemithorax. Partial clearing of previously seen   consolidative opacity in the right middle lobe and a linear consolidative   opacity in the right lower lobe. A 0.6 cm solid nodule in the left lower   lobe (series 302, image 229) is new since PET CT September 9, 2022. Right   pleural thickening, unchanged. Mild clustered centrilobular nodular   opacities in the left lower lobe.    UPPER ABDOMEN: Nodular thickening of the bilateral adrenal glands,   unchanged from 12/27/2017. Bilateral perinephric fat stranding. Mild   cortical atrophy of the bilateral kidneys.    BONES/SOFT TISSUES: Postsurgical changes in the right posterior chest   wall with stable intrathoracic herniationof subcutaneous fat.   Degenerative changes.    IMPRESSION:    No pulmonary embolism.    A 0.6 cm solid nodule in the left lower lobe is new from 9/9/2022.   Attention on follow up imaging is suggested.    New anti-dependent focal thickening of the anterior wall of the left   mainstem bronchus, of uncertain etiology.        < from: TTE W or WO Ultrasound Enhancing Agent (02.22.24 @ 10:21) >  CONCLUSIONS:      1. Left ventricular systolic function is normal.   2. Normal right ventricular cavity size, with wall thickness, and probably normal systolic function.   3. Technically difficult image quality.    < end of copied text >        < from: Cardiac Catheterization (10.30.23 @ 12:50) >    LM   Left main artery: Angiography shows no disease.      LAD   Left anterior descending artery: Angiography shows mild atherosclerosis. There is a 30 % stenosis in the middle third portion of the segment. First diagonal: Angiography shows mild atherosclerosis.      CX     Circumflex: Angiography shows minor irregularities.      RCA   Right coronary artery: Angiography shows complete occlusion. stent stenosis. . There is a 100 % stenosis in the proximal third portion of the segment. Distal right coronary artery: supplied by collaterals. Right posterior descending artery: Angiography shows mild atherosclerosis. supplied by collaterals. Right  Posterolateral Segment: Angiography shows mild atherosclerosis. supplied by collaterals.          ASSESSMENT/PLAN: 	Pt is a 68-year-old female with a pmh of HTN, HLD, CAD s/p PCI with ANETTE to RCA in January 2018, Adenocarcinoma of right lung s/p right lung resection and chemotherapy, radiation and immunotherapy, history of SVC syndrome s/p stent, IVC thrombus previously on AC presents with  dyspnea, orthopnea, dyspnea on exertion, and lower extremity swelling. She states her swelling has been progressively getting worse. She has a history of venous insufficiency and she normally raises her legs with improvement of her symptoms, however, her swelling has not gone down. Since that time, the patient also reports worsening dyspnea and cough. She reports compliance with her medications and denies any bleeding or easy bruising. Sent to Ed from office for these symptoms and also a pericardial effusion that was seen on outpatient TTE.Recently was started on amlodipine for BP control. After her BP was still elevated to 150-160s it was increased from 2.5-->5mg about two weeks ago. She began noticing LE edema over the last couple of weeks. Denies fevers, chills, CP, abdominal pain, vomiting, diarrhea. In ED given lasix 20mg IV.    SOB/LE edema/CAD/HTN/  - edema improved, stop lasix  - unable to see effusion on TTE, trace effusion on CTA  - stop amlodipine  - c/w Coreg  - f/u pulm and oncology for abnormal CT chest  - can posibly start imdur as outpatient currently BP controlled    Julio Alcaraz MD  Pager: [phone]   
Date of Service: 02-22-24 @ 10:06    Patient is a 68y old  Female who presents with a chief complaint of SOB, LE edema (21 Feb 2024 15:41)      Any change in ROS: seems OK: no sob:  no cough : no phlegm     MEDICATIONS  (STANDING):  aspirin  chewable 81 milliGRAM(s) Oral daily  atorvastatin 40 milliGRAM(s) Oral at bedtime  budesonide  80 MICROgram(s)/formoterol 4.5 MICROgram(s) Inhaler 2 Puff(s) Inhalation two times a day  carvedilol 12.5 milliGRAM(s) Oral every 12 hours  chlorhexidine 2% Cloths 1 Application(s) Topical daily  enoxaparin Injectable 40 milliGRAM(s) SubCutaneous every 24 hours  famotidine    Tablet 40 milliGRAM(s) Oral at bedtime  tiotropium 2.5 MICROgram(s) Inhaler 2 Puff(s) Inhalation daily    MEDICATIONS  (PRN):  acetaminophen     Tablet .. 650 milliGRAM(s) Oral every 6 hours PRN Temp greater or equal to 38C (100.4F), Mild Pain (1 - 3)  melatonin 3 milliGRAM(s) Oral at bedtime PRN Insomnia    Vital Signs Last 24 Hrs  T(C): 36.4 (22 Feb 2024 05:12), Max: 36.8 (21 Feb 2024 17:50)  T(F): 97.6 (22 Feb 2024 05:12), Max: 98.3 (21 Feb 2024 17:50)  HR: 76 (22 Feb 2024 05:12) (66 - 76)  BP: 130/63 (22 Feb 2024 05:12) (101/52 - 150/60)  BP(mean): --  RR: 18 (22 Feb 2024 05:12) (15 - 18)  SpO2: 96% (22 Feb 2024 05:12) (96% - 100%)    Parameters below as of 22 Feb 2024 05:12  Patient On (Oxygen Delivery Method): room air        I&O's Summary        Physical Exam:   GENERAL: NAD, well-groomed, well-developed  HEENT: BAYLEE/   Atraumatic, Normocephalic  ENMT: No tonsillar erythema, exudates, or enlargement; Moist mucous membranes, Good dentition, No lesions  NECK: Supple, No JVD, Normal thyroid  CHEST/LUNG: Clear to auscultaion  CVS: Regular rate and rhythm; No murmurs, rubs, or gallops  GI: : Soft, Nontender, Nondistended; Bowel sounds present  NERVOUS SYSTEM:  Alert & Oriented X3  EXTREMITIES: - edema  LYMPH: No lymphadenopathy noted  SKIN: No rashes or lesions  ENDOCRINOLOGY: No Thyromegaly  PSYCH: Appropriate    Labs:    CARDIAC MARKERS ( 21 Feb 2024 01:49 )  x     / x     / 57 U/L / x     / 1.9 ng/mL                            12.5   7.28  )-----------( 250      ( 22 Feb 2024 06:00 )             36.5                         12.4   9.58  )-----------( 253      ( 20 Feb 2024 19:05 )             35.7     02-22    139  |  101  |  20  ----------------------------<  95  4.1   |  25  |  1.15  02-20    138  |  105  |  16  ----------------------------<  101<H>  3.6   |  23  |  1.10    Ca    9.3      22 Feb 2024 06:00  Ca    8.7      20 Feb 2024 19:05  Phos  3.6     02-22  Mg     2.10     02-22    TPro  6.7  /  Alb  3.7  /  TBili  0.5  /  DBili  x   /  AST  18  /  ALT  16  /  AlkPhos  128<H>  02-20    CAPILLARY BLOOD GLUCOSE          LIVER FUNCTIONS - ( 20 Feb 2024 19:05 )  Alb: 3.7 g/dL / Pro: 6.7 g/dL / ALK PHOS: 128 U/L / ALT: 16 U/L / AST: 18 U/L / GGT: x             Urinalysis Basic - ( 22 Feb 2024 06:00 )    Color: x / Appearance: x / SG: x / pH: x  Gluc: 95 mg/dL / Ketone: x  / Bili: x / Urobili: x   Blood: x / Protein: x / Nitrite: x   Leuk Esterase: x / RBC: x / WBC x   Sq Epi: x / Non Sq Epi: x / Bacteria: x        rad< from: CT Angio Chest PE Protocol w/ IV Cont (02.21.24 @ 02:43) >  lobe (series 302, image 229) is new since PET CT September 9, 2022. Right   pleural thickening, unchanged. Mild clustered centrilobular nodular   opacities in the left lower lobe.    UPPER ABDOMEN: Nodular thickening of the bilateral adrenal glands,   unchanged from 12/27/2017. Bilateral perinephric fat stranding. Mild   cortical atrophy of the bilateral kidneys.    BONES/SOFT TISSUES: Postsurgical changes in the right posterior chest   wall with stable intrathoracic herniationof subcutaneous fat.   Degenerative changes.    IMPRESSION:    No pulmonary embolism.    A 0.6 cm solid nodule in the left lower lobe is new from 9/9/2022.   Attention on follow up imaging is suggested.    New anti-dependent focal thickening of the anterior wall of the left   mainstem bronchus, of uncertain etiology.    < end of copied text >      RECENT CULTURES:        RESPIRATORY CULTURES:          Studies  Chest X-RAY  CT SCAN Chest   Venous Dopplers: LE:   CT Abdomen  Others        ct< from: CT Angio Chest PE Protocol w/ IV Cont (02.21.24 @ 02:43) >  Status post right upper lobectomy with surgical clips and postsurgical   changes in the right hemithorax. Partial clearing of previously seen   consolidative opacity in the right middle lobe and a linear consolidative   opacity in the right lower lobe. A 0.6 cm solid nodule in the left lower   lobe (series 302, image 229) is new since PET CT September 9, 2022. Right   pleural thickening, unchanged. Mild clustered centrilobular nodular   opacities in the left lower lobe.    UPPER ABDOMEN: Nodular thickening of the bilateral adrenal glands,   unchanged from 12/27/2017. Bilateral perinephric fat stranding. Mild   cortical atrophy of the bilateral kidneys.    BONES/SOFT TISSUES: Postsurgical changes in the right posterior chest   wall with stable intrathoracic herniationof subcutaneous fat.   Degenerative changes.    IMPRESSION:    No pulmonary embolism.    A 0.6 cm solid nodule in the left lower lobe is new from 9/9/2022.   Attention on follow up imaging is suggested.    New anti-dependent focal thickening of the anterior wall of the left     < end of copied text >  · Note Type	Event Note  CTS Chart Note      Pt resting in bed. No complaints of SOB.     Plan for IR stenting, possibly tomorrow.  Case d/w Dr. Urbano Beaulieu PAC [id number].      Electronic Signatures:  Kee Beaulieu)  (Signed 01-Sep-2021 18:39)  	Authored: Note Type, Note      Last Updated: 01-Sep-2021 18:39 by Kee Beaulieu (PA)      < from: Transthoracic Echocardiogram (09.19.19 @ 10:37) >  OBSERVATIONS:  Mitral Valve: Mitral valve not well visualized, probably  normal.  Aortic Root: Normal aortic root.  Aortic Valve: Aortic valve not well visualized.  Left Atrium: LA volume index = 14 cc/m2.  Left Ventricle: Hyperdynamic left ventricle. Normal left  ventricular internal dimensions and wall thicknesses.  Right Heart: Normal right atrium. The right ventricle is  not well visualized; grossly normal right ventricular  systolic function.  Pericardium/PleuraNormal pericardium with no pericardial  effusion.Right pleural effusion. Pericardial fat pad seen.  ------------------------------------------------------------------------  CONCLUSIONS:  1. Mitral valve not well visualized, probably normal.  2. Hyperdynamic left ventricle.  3. The right ventricle is not well visualized; grossly  normal right ventricular systolic function.  4. Normal pericardium with no pericardial effusion.  5. Right pleural effusion.  6. Pericardial fat pad seen.  ------------------------------------------------------------------------  Confirmed on  9/19/2019 - 13:28:53 by BENY Roman  ------------------------------------------------------------------------    < end of copied text >  d
Patient is a 68y old  Female who presents with a chief complaint of SOB, LE edema (2024 22:39)      HPI:  Denies orthopnea, edema in legs +      PAST MEDICAL & SURGICAL HISTORY:  Hypertension      Hyperlipidemia      Carotid artery disease  monitored by vascluar doctor Samuel      Emphysema      Hip pain      Obesity      Edema of both legs      Lung cancer  right, , completed chemotherapy 2019      Stented coronary artery      Localized enlarged lymph nodes      Scoliosis      Lumbar disc disorder      COPD (chronic obstructive pulmonary disease)      Anemia due to chemotherapy      Paralysis of left vocal cord      Glottic insufficiency      SVC syndrome      History of  section  x2 ,       S/P lobectomy of lung  RULobectomy       History of hip replacement  right 2021      Stented coronary artery  x2 stents 2018          Review of Systems:   CONSTITUTIONAL: No fever, weight loss, or fatigue  EYES: No eye pain, visual disturbances, or discharge  ENMT:  No difficulty hearing, tinnitus, vertigo; No sinus or throat pain  NECK: No pain or stiffness  BREASTS: No pain, masses, or nipple discharge  RESPIRATORY: No cough, wheezing, chills or hemoptysis; No shortness of breath  CARDIOVASCULAR: No chest pain, palpitations, dizziness, or leg swelling  GASTROINTESTINAL: No abdominal or epigastric pain. No nausea, vomiting, or hematemesis; No diarrhea or constipation. No melena or hematochezia.  GENITOURINARY: No dysuria, frequency, hematuria, or incontinence  NEUROLOGICAL: No headaches, memory loss, loss of strength, numbness, or tremors  SKIN: No itching, burning, rashes, or lesions   LYMPH NODES: No enlarged glands  ENDOCRINE: No heat or cold intolerance; No hair loss  MUSCULOSKELETAL: No joint pain or swelling; No muscle, back, or extremity pain  PSYCHIATRIC: No depression, anxiety, mood swings, or difficulty sleeping  HEME/LYMPH: No easy bruising, or bleeding gums  ALLERY AND IMMUNOLOGIC: No hives or eczema    Allergies    tetracycline (Short breath; Hives)  penicillin (Short breath; Hives)    Intolerances        Social History:     FAMILY HISTORY:  Family history of CHF (congestive heart failure) (Aunt)        MEDICATIONS  (STANDING):  aspirin  chewable 81 milliGRAM(s) Oral daily  atorvastatin 40 milliGRAM(s) Oral at bedtime  budesonide  80 MICROgram(s)/formoterol 4.5 MICROgram(s) Inhaler 2 Puff(s) Inhalation two times a day  carvedilol 12.5 milliGRAM(s) Oral every 12 hours  enoxaparin Injectable 40 milliGRAM(s) SubCutaneous every 24 hours  famotidine    Tablet 40 milliGRAM(s) Oral at bedtime  furosemide   Injectable 20 milliGRAM(s) IV Push daily  tiotropium 2.5 MICROgram(s) Inhaler 2 Puff(s) Inhalation daily    MEDICATIONS  (PRN):  acetaminophen     Tablet .. 650 milliGRAM(s) Oral every 6 hours PRN Temp greater or equal to 38C (100.4F), Mild Pain (1 - 3)  melatonin 3 milliGRAM(s) Oral at bedtime PRN Insomnia        CAPILLARY BLOOD GLUCOSE        I&O's Summary      PHYSICAL EXAM:  Vital Signs Last 24 Hrs  T(C): 36.4 (2024 05:15), Max: 36.9 (2024 17:28)  T(F): 97.6 (2024 05:15), Max: 98.4 (2024 17:28)  HR: 73 (2024 10:19) (64 - 76)  BP: 127/60 (2024 10:19) (116/54 - 129/60)  BP(mean): --  RR: 16 (2024 10:19) (16 - 20)  SpO2: 98% (2024 10:19) (93% - 100%)    Parameters below as of 2024 10:19  Patient On (Oxygen Delivery Method): room air        GENERAL: NAD, well-developed  HEAD:  Atraumatic, Normocephalic  EYES: EOMI, PERRLA, conjunctiva and sclera clear  NECK: Supple, No JVD  CHEST/LUNG: Clear to auscultation bilaterally; No wheeze  HEART: Regular rate and rhythm; No murmurs, rubs, or gallops  ABDOMEN: Soft, Nontender, Nondistended; Bowel sounds present  EXTREMITIES:  2+ Peripheral Pulses, No clubbing, cyanosis, ++edema  PSYCH: AAOx3  NEUROLOGY: non-focal  SKIN: No rashes or lesions    LABS:                        12.4   9.58  )-----------( 253      ( 2024 19:05 )             35.7     02-20    138  |  105  |  16  ----------------------------<  101<H>  3.6   |  23  |  1.10    Ca    8.7      2024 19:05    TPro  6.7  /  Alb  3.7  /  TBili  0.5  /  DBili  x   /  AST  18  /  ALT  16  /  AlkPhos  128<H>  02-20      CARDIAC MARKERS ( 2024 01:49 )  x     / x     / 57 U/L / x     / 1.9 ng/mL      Urinalysis Basic - ( 2024 19:05 )    Color: x / Appearance: x / SG: x / pH: x  Gluc: 101 mg/dL / Ketone: x  / Bili: x / Urobili: x   Blood: x / Protein: x / Nitrite: x   Leuk Esterase: x / RBC: x / WBC x   Sq Epi: x / Non Sq Epi: x / Bacteria: x        RADIOLOGY & ADDITIONAL TESTS:    Imaging Personally Reviewed:    Consultant(s) Notes Reviewed:      Care Discussed with Consultants/Other Providers:  
Patient is a 68y old  Female who presents with a chief complaint of SOB, LE edema (2024 22:39)  2024    HPI:  Denies orthopnea, edema in legs +      PAST MEDICAL & SURGICAL HISTORY:  Hypertension      Hyperlipidemia      Carotid artery disease  monitored by vascluar doctor Doscher      Emphysema      Hip pain      Obesity      Edema of both legs      Lung cancer  right, , completed chemotherapy 2019      Stented coronary artery      Localized enlarged lymph nodes      Scoliosis      Lumbar disc disorder      COPD (chronic obstructive pulmonary disease)      Anemia due to chemotherapy      Paralysis of left vocal cord      Glottic insufficiency      SVC syndrome      History of  section  x2 ,       S/P lobectomy of lung  RULobectomy       History of hip replacement  right 2021      Stented coronary artery  x2 stents 2018          Review of Systems:   CONSTITUTIONAL: No fever, weight loss, or fatigue  EYES: No eye pain, visual disturbances, or discharge  ENMT:  No difficulty hearing, tinnitus, vertigo; No sinus or throat pain  NECK: No pain or stiffness  BREASTS: No pain, masses, or nipple discharge  RESPIRATORY: No cough, wheezing, chills or hemoptysis; No shortness of breath  CARDIOVASCULAR: No chest pain, palpitations, dizziness, or leg swelling  GASTROINTESTINAL: No abdominal or epigastric pain. No nausea, vomiting, or hematemesis; No diarrhea or constipation. No melena or hematochezia.  GENITOURINARY: No dysuria, frequency, hematuria, or incontinence  NEUROLOGICAL: No headaches, memory loss, loss of strength, numbness, or tremors  SKIN: No itching, burning, rashes, or lesions   LYMPH NODES: No enlarged glands  ENDOCRINE: No heat or cold intolerance; No hair loss  MUSCULOSKELETAL: No joint pain or swelling; No muscle, back, or extremity pain  PSYCHIATRIC: No depression, anxiety, mood swings, or difficulty sleeping  HEME/LYMPH: No easy bruising, or bleeding gums  ALLERY AND IMMUNOLOGIC: No hives or eczema    Allergies    tetracycline (Short breath; Hives)  penicillin (Short breath; Hives)    Intolerances        Social History:     FAMILY HISTORY:  Family history of CHF (congestive heart failure) (Aunt)        MEDICATIONS  (STANDING):  aspirin  chewable 81 milliGRAM(s) Oral daily  atorvastatin 40 milliGRAM(s) Oral at bedtime  budesonide  80 MICROgram(s)/formoterol 4.5 MICROgram(s) Inhaler 2 Puff(s) Inhalation two times a day  carvedilol 12.5 milliGRAM(s) Oral every 12 hours  enoxaparin Injectable 40 milliGRAM(s) SubCutaneous every 24 hours  famotidine    Tablet 40 milliGRAM(s) Oral at bedtime  furosemide   Injectable 20 milliGRAM(s) IV Push daily  tiotropium 2.5 MICROgram(s) Inhaler 2 Puff(s) Inhalation daily    MEDICATIONS  (PRN):  acetaminophen     Tablet .. 650 milliGRAM(s) Oral every 6 hours PRN Temp greater or equal to 38C (100.4F), Mild Pain (1 - 3)  melatonin 3 milliGRAM(s) Oral at bedtime PRN Insomnia        CAPILLARY BLOOD GLUCOSE        I&O's Summary      PHYSICAL EXAM:  Vital Signs Last 24 Hrs  T(C): 36.6 (2024 12:09), Max: 36.8 (2024 17:50)  T(F): 97.9 (2024 12:09), Max: 98.3 (2024 17:50)  HR: 65 (2024 12:09) (65 - 76)  BP: 132/55 (2024 12:09) (101/52 - 150/60)  BP(mean): --  RR: 18 (2024 12:09) (16 - 18)  SpO2: 97% (2024 12:09) (96% - 100%)    Parameters below as of 2024 12:09  Patient On (Oxygen Delivery Method): room air      GENERAL: NAD, well-developed  HEAD:  Atraumatic, Normocephalic  EYES: EOMI, PERRLA, conjunctiva and sclera clear  NECK: Supple, No JVD  CHEST/LUNG: Clear to auscultation bilaterally; No wheeze  HEART: Regular rate and rhythm; No murmurs, rubs, or gallops  ABDOMEN: Soft, Nontender, Nondistended; Bowel sounds present  EXTREMITIES:  2+ Peripheral Pulses, No clubbing, cyanosis, ++edema  PSYCH: AAOx3  NEUROLOGY: non-focal  SKIN: No rashes or lesions    LABS:                        12.4   9.58  )-----------( 253      ( 2024 19:05 )             35.7     02-20    138  |  105  |  16  ----------------------------<  101<H>  3.6   |  23  |  1.10    Ca    8.7      2024 19:05    TPro  6.7  /  Alb  3.7  /  TBili  0.5  /  DBili  x   /  AST  18  /  ALT  16  /  AlkPhos  128<H>  02-20      CARDIAC MARKERS ( 2024 01:49 )  x     / x     / 57 U/L / x     / 1.9 ng/mL      Urinalysis Basic - ( 2024 19:05 )    Color: x / Appearance: x / SG: x / pH: x  Gluc: 101 mg/dL / Ketone: x  / Bili: x / Urobili: x   Blood: x / Protein: x / Nitrite: x   Leuk Esterase: x / RBC: x / WBC x   Sq Epi: x / Non Sq Epi: x / Bacteria: x        RADIOLOGY & ADDITIONAL TESTS:    Imaging Personally Reviewed:    Consultant(s) Notes Reviewed:      Care Discussed with Consultants/Other Providers:

## 2024-02-22 NOTE — PROGRESS NOTE ADULT - PROBLEM SELECTOR PLAN 1
LE edema x2 weeks. Possibly related to amlodipine vs CHF. Also with small pericardial effusion w/o tamponade per paperwork brought in  DC on lasix since TTE is unremarkable.
LE edema x2 weeks. Possibly related to amlodipine vs CHF. Also with small pericardial effusion w/o tamponade per paperwork brought in  -lasix 20mg IV qd for now. Hold if low SBP or DBPs  -echo  -duplex

## 2024-02-22 NOTE — DISCHARGE NOTE PROVIDER - NSDCFUADDAPPT_GEN_ALL_CORE_FT
you will receive an email or text with telehealth appointment for pulmonology Pulmonary:  virtual appointment on 02/23 @ 01:30 PM w/ Dr. Lr. Follow up care will be established at the time of the virtual appointment.    Telehealth Instructions: At the time of your appointment, you will receive a text/email invite for your telehealth session. Please click on the link, enter the patient's name. You will then be redirected to a virtual waiting room, where you will wait until the doctor has connected with you.

## 2024-02-22 NOTE — DISCHARGE NOTE PROVIDER - NSDCFUSCHEDAPPT_GEN_ALL_CORE_FT
Kandice Lr  [facility]  [facility] [address] [place] [address]  Scheduled Appointment: 02/23/2024

## 2024-02-22 NOTE — PROGRESS NOTE ADULT - PROBLEM SELECTOR PLAN 5
-hold amlodipine  -resume coreg with hold parameters
-hold amlodipine  -resume coreg with hold parameters

## 2024-02-22 NOTE — DISCHARGE NOTE PROVIDER - CARE PROVIDER_API CALL
Tip Hernandez  Cardiovascular Disease  [address] [place] [address]  Phone: [phone]  Fax: [phone]  Follow Up Time:     Gera Guevara  Hematology  [address] [place] [address]  Phone: [phone]  Fax: [phone]  Follow Up Time:     Jen Gibson  Internal Medicine  [address] [place] [address]  Phone: [phone]  Fax: [phone]  Follow Up Time:     Lisker, Gita Naomi  Internal Medicine  [address]  [place] [address]  Phone: [phone]  Fax: [phone]  Follow Up Time:

## 2024-02-22 NOTE — DISCHARGE NOTE PROVIDER - HOSPITAL COURSE
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     1. Lung cancer   -- s/p chemo RT followed by durvalumab completed 01/17/2023   -- On surveillance, most recent outpatient scans with [facility]   -- CTA chest here shows 0.6cm solid nodule in LLL  -- No plan for inpatient workup or treatment   -- Follow up with Dr. Gera [facility]uevara of [facility] after discharge     2. Dyspnea, LE edema   -- No PE on CTA   -- LE dopplers neg for DVT   -- Poss related to amlodipine, pt w/o known hx CHF   -- Getting diuresis w improvement  - edema now improved, stop lasix  - unable to see effusion on TTE, trace effusion on CTA  - stop amlodipine  - c/w Coreg  - f/u pulm and oncology for abnormal CT chest  - can possibly start imdur as outpatient currently BP controlled  - TTE with EF 70%, nwma, no evidence of HF or significant effusion   - s/p iv lasix  - repeat bmp in 1 week to monitor potassium and SCr with pcp fu    Patient seen and evaluated, no acute somatic complaints. Reviewed discharge medications with patient; All new medications requiring new prescriptions were sent to the pharmacy of patient's choice. Reviewed need for prescription for previous home medications and new prescriptions sent if requested. Medically cleared/stable for discharge as per Dr. Alcaraz on 2/22/24 with close outpatient follow up within 1-2 weeks of discharge. Patient understands and agrees with plan of care.

## 2024-02-22 NOTE — DISCHARGE NOTE PROVIDER - CARE PROVIDERS DIRECT ADDRESSES
,DirectAddress_Unknown,DirectAddress_Unknown,[email or web address]

## 2024-02-22 NOTE — DISCHARGE NOTE NURSING/CASE MANAGEMENT/SOCIAL WORK - PATIENT PORTAL LINK FT
You can access the FollowMyHealth Patient Portal offered by [facility] by registering at the following website: [email or web address]. By joining [employer]s FollowMyHealth portal, you will also be able to view your health information using other applications (apps) compatible with our system.

## 2024-02-23 ENCOUNTER — APPOINTMENT (OUTPATIENT)
Dept: PULMONOLOGY | Facility: CLINIC | Age: 69
End: 2024-02-23
Payer: MEDICARE

## 2024-02-23 DIAGNOSIS — R91.8 OTHER NONSPECIFIC ABNORMAL FINDING OF LUNG FIELD: ICD-10-CM

## 2024-02-23 PROCEDURE — 99496 TRANSJ CARE MGMT HIGH F2F 7D: CPT

## 2024-02-28 NOTE — H&P PST ADULT - TEACHING/LEARNING EDUCATIONAL LEVEL
02/28/2024 MRI brain complete, R thalamic stroke; begin asa, plavix, sqh; transfer to [employer]     high school

## 2024-03-04 PROBLEM — R91.8 LUNG NODULES: Status: ACTIVE | Noted: 2018-10-15

## 2024-03-04 NOTE — PHYSICAL EXAM
[de-identified] : On exam via video she is awake, alert, and in no acute distress. She is speaking in full sentences. There is no overt wheezing or frequent cough. She does not appear dyspneic or in any respiratory distress. She is answering questions appropriately.

## 2024-03-04 NOTE — ASSESSMENT
S/P FALL DOWN STAIRS, HIT BACK OF HEAD. NO LOC.  C/O HEADACHE [FreeTextEntry1] : 67 yo F w/ PMH of COPD, HTN, lung cancer sp R lobectomy sp chemo/RT, CAD status post PCI with stents, SVC syndrome status post stent IVC thrombus no longer on AC who presents after hospital admission for dyspnea.  Imaging significant for 0.6 mm lung nodule and thickening of trachea and left mainstem bronchus.  I informed her of the CAT scan results and possibility for malignancy.  She is aware that she will require repeat CAT scan and possibly bronchoscopy if findings worsen.  Follows with Dr. Joshi as outpatient, seen by their group in the [facility].   Provided them with our contact information and phone number in case they want to follow-up with our office or if they have any questions or concerns.

## 2024-03-04 NOTE — HISTORY OF PRESENT ILLNESS
[Medical Office: ([facility])___] : at the medical office located in  [Home] : at home, [unfilled] , at the time of the visit. [Verbal consent obtained from patient] : the patient, [unfilled] [[facility]] : Post-hospitalization from [facility] [Yes] : Yes [Admitted on: ___] : The patient was admitted on [unfilled] [Discharged on ___] : discharged on [unfilled] [Pertinent Labs] : pertinent labs [Discharge Summary] : discharge summary [Radiology Findings] : radiology findings [Discharge Med List] : discharge medication list [Patient Contacted By: ____] : and contacted by [unfilled] [FreeTextEntry2] : 67 yo F w/ PMH of COPD, HTN, lung cancer sp R lobectomy sp chemo/RT, CAD status post PCI with stents, SVC syndrome status post stent IVC thrombus no longer on AC who presents after hospital admission for dyspnea.  Patient was most recently on durvalumab last dose January 17, 2023.  On surveillance with most recent scans with no evidence of disease.  CTA during hospital admission was 0.6 cm solid nodule in the left lower lobe.  Has follow-up with Dr. Guevara of [facility].  I discussed these results and patient is aware of 0.6 cm nodule and the need for repeat imaging.  CT did not show any evidence of pulmonary embolism while admitted.  Lower extremity Dopplers were negative for DVT.  She was briefly treated with diuretics however she had symptomatic improvement and so Lasix was stopped upon discharge.  Unclear etiology of lower extremity edema.  Thought to be due to amlodipine versus heart failure.  Of note CAT scan showed in addition to 0.6 cm nodule there was focal thickening of the anterior wall of the left mainstem bronchus of uncertain etiology.  I discussed these findings with the patient and she is aware that she requires repeat imaging and possibly bronchoscopy.  She has Trelegy inhaler which she is adherent with.  She has not required any albuterol PRNs though she has it at home.  No signs or symptoms of COPD exacerbation at this time.

## 2024-04-03 ENCOUNTER — APPOINTMENT (OUTPATIENT)
Dept: PULMONOLOGY | Facility: CLINIC | Age: 69
End: 2024-04-03
Payer: MEDICARE

## 2024-04-03 VITALS
WEIGHT: 180 LBS | TEMPERATURE: 98 F | SYSTOLIC BLOOD PRESSURE: 128 MMHG | OXYGEN SATURATION: 91 % | DIASTOLIC BLOOD PRESSURE: 74 MMHG | HEART RATE: 78 BPM | RESPIRATION RATE: 15 BRPM | HEIGHT: 60 IN | BODY MASS INDEX: 35.34 KG/M2

## 2024-04-03 DIAGNOSIS — C34.91 MALIGNANT NEOPLASM OF UNSPECIFIED PART OF RIGHT BRONCHUS OR LUNG: ICD-10-CM

## 2024-04-03 DIAGNOSIS — R09.82 POSTNASAL DRIP: ICD-10-CM

## 2024-04-03 DIAGNOSIS — R06.02 SHORTNESS OF BREATH: ICD-10-CM

## 2024-04-03 PROCEDURE — 99215 OFFICE O/P EST HI 40 MIN: CPT

## 2024-04-03 PROCEDURE — G2211 COMPLEX E/M VISIT ADD ON: CPT

## 2024-04-03 NOTE — HISTORY OF PRESENT ILLNESS
[>= 20 pack years] : >= 20 pack years [Former] : former [Never] : never [TextBox_4] : Patient is s 69yo F w/ PMH of COPD, HTN, lung cancer sp R lobectomy sp chemo/RT, CAD status post PCI with stents, SVC syndrome status post stent IVC thrombus no longer on AC, with recent hospitalization for dyspnea, presents for F/U.  Most recently on durvalumab last dose January 17, 2023. On surveillance with most recent scans with no evidence of disease. CTA during hospital admission was 0.6 cm solid nodule in the left lower lobe. Has follow-up with Dr. Guevara of [facility]. I discussed these results and patient is aware of 0.6 cm nodule and the need for repeat imaging. CT did not show any evidence of pulmonary embolism while admitted. Lower extremity Dopplers were negative for DVT. She was briefly treated with diuretics however she had symptomatic improvement and so Lasix was stopped upon discharge. Unclear etiology of lower extremity edema. Thought to be due to amlodipine versus heart failure. Of note CAT scan showed in addition to 0.6 cm nodule there was focal thickening of the anterior wall of the left mainstem bronchus of uncertain etiology.   Upon today's presentation endorses sob on exertion, postnasal drip, cough, and reduced activity tolerance. Is using Trelegy inhaler daily, is not using albuterol. Seen and followed ENT, prescribed fluticasone, not using as it gives her headaches, does not want to use Azelastine. Uses normal saline nasal solution only.  [TextBox_11] : 1/2

## 2024-04-03 NOTE — ASSESSMENT
[FreeTextEntry1] : -Patient with regularly scheduled q3-month surveillance CT scans at [facility]; last CT during hospitalization, next CT scan June. Copy of CD provided to patient's Dr. Guevara for comparison -With regular follow up with [place] and Dr. Duncan -Continue daily use of Trelegy -Albuterol PRN and before walks -Referred for PFT and Pulmonary rehab; patient's  recently diagnosed with prostate CA, unsure if she will be able to start within 3 months  Attending: Agree with above Patient was previously following with Dr. Joshi for pulmonary, however not accepting her insurance Admission to [facility] in February, followed by Dr Joshi during admission as well Her CT chest at [facility] did not have her more recent studies for comparison She will continue to follow up with Dr. Guevara, says she usually gets scans every 3 months. She has the CD from [facility] to bring to [facility] for comparison Continue Trelegy daily for COPD. She has been sedentary because of COLÓN, but admits that makes it worse Recommend walking, with albuterol prior Recommend pulmonary rehab eval as well

## 2024-04-03 NOTE — REVIEW OF SYSTEMS
[Fatigue] : fatigue [Fever] : no fever [Recent Wt Gain (___ Lbs)] : ~T no recent weight gain [Chills] : no chills [Poor Appetite] : no poor appetite [Recent Wt Loss (___ Lbs)] : ~T no recent weight loss [Dry Eyes] : no dry eyes [Ear Disturbance] : no ear disturbance [Epistaxis] : no epistaxis [Sore Throat] : no sore throat [Eye Irritation] : no eye irritation [Nasal Congestion] : no nasal congestion [Dry Mouth] : no dry mouth [Sinus Problems] : no sinus problems [Mouth Ulcers] : no mouth ulcers [Poor Dentition] : no poor dentition [Edentulous] : no edentulous [Cough] : no cough [Hemoptysis] : no hemoptysis [Chest Tightness] : no chest tightness [Frequent URIs] : no frequent URIs [Sputum] : no sputum [Dyspnea] : no dyspnea [Wheezing] : no wheezing [Pleuritic Pain] : no pleuritic pain [A.M. Dry Mouth] : no a.m. dry mouth

## 2024-04-03 NOTE — REASON FOR VISIT
[Lung Cancer] : lung cancer [Follow-Up] : a follow-up visit [Shortness of Breath] : shortness of breath

## 2024-04-14 NOTE — CONSULT NOTE ADULT - NSHPATTENDINGPLANDISCUSS_GEN_ALL_CORE
Patient seen at bedside resting comfortably offers no current complaints. Ambulating and voiding without difficulty.  Passing flatus and tolerating regular diet. Pt both breast/bottle feeding. Pt denies headache, chest pain, weakness, dizziness, N/V/D, palpitations,  worsening vaginal bleeding, or any other concerns.      Vital Signs Last 24 Hrs  T(C): 36.7 (13 Apr 2024 06:40), Max: 37.1 (12 Apr 2024 13:08)  T(F): 98 (13 Apr 2024 06:40), Max: 98.8 (12 Apr 2024 13:08)  HR: 91 (13 Apr 2024 06:40) (72 - 104)  BP: 108/63 (13 Apr 2024 06:40) (108/63 - 143/63)  BP(mean): 85 (12 Apr 2024 23:11) (79 - 90)  RR: 16 (13 Apr 2024 06:40) (16 - 18)  SpO2: 97% (13 Apr 2024 06:40) (97% - 100%)    Parameters below as of 13 Apr 2024 06:40  Patient On (Oxygen Delivery Method): room air        Physical Exam:     Gen: A&Ox 3, NAD  Abdomen: +BS, Soft, nontender, ND; Fundus firm below umbilicus  Gyn: mod lochia, repaired  Ext: Nontender, no worsening edema                          9.6    x     )-----------( x        ( 13 Apr 2024 06:35 )             30.3     
Patient seen at bedside resting comfortably offers no current complaints.  Ambulating and voiding without difficulty.  Passing flatus and tolerating regular diet.  both breast/bottle feeding.  Denies HA, CP, SOB, N/V/D, dizziness, palpitations, worsening abdominal pain, worsening vaginal bleeding, or any other concerns.      Vital Signs Last 24 Hrs  T(C): 36.4 (2024 04:34), Max: 36.6 (2024 18:06)  T(F): 97.6 (2024 04:34), Max: 97.9 (2024 18:06)  HR: 66 (2024 04:34) (66 - 88)  BP: 95/60 (2024 04:34) (95/60 - 106/61)  BP(mean): --  RR: 18 (2024 04:34) (18 - 18)  SpO2: 98% (2024 04:34) (95% - 98%)    Parameters below as of 2024 04:34  Patient On (Oxygen Delivery Method): room air        Physical Exam:     Gen: A&Ox 3, NAD  Chest: CTA B/L  Cardiac: S1,S2; RRR  Breast: Soft, nontender, nonengorged  Abdomen: +BS; Soft, nontender, ND; Fundus firm below umbilicus  Gyn: Min lochia  Ext: Nontender, DTRS 2+, no worsening edema                          10.6   10.28 )-----------( 176      ( 2024 06:00 )             32.9        A/P:  PPD#2 s/p       - Discharge home with instructions  -Follow up in office in 5-6 weeks for postpartum visit  -Breastfeeding encouraged   -d/w dr. Delano britton
Patient
pT

## 2024-06-20 NOTE — H&P PST ADULT - NEGATIVE NEUROLOGICAL SYMPTOMS
Continued Stay SW/CM Assessment/Plan of Care Note       Active Substitute Decision Maker (SDM)       Jose Roach Health Care Agent 2 - Son   Primary Phone: [phone] (Mobile)                     Progress note:  Dimitri Garcia is now SDM. PC APN met with Jose and spoke with vesna Zepeda over the phone. Both would like to pursue hospice for pt. Pt is service connected to [facility]. ZARINA NAVARRETE will email [facility] Steven [phone] to assist with hospice. If pt is service connected, then [facility] can provide hosp[facility] care at a [facility] contracted facility.     [facility] updated family will be meeting with hospice     Email with clinicals and orders sent to [email or web address]    Referrals have been initiated to [facility] contracted facilities    CM spoke with dimitri Garcia and updated on above. He confirmed plan is for hospice at [facility] contracted facility. He also says he has difficulty hearing so if he does not answer the phone, leave a VM and if he does not call back to \"keep calling him\"               no syncope/no headache/no focal seizures/no generalized seizures/no paresthesias

## 2024-07-09 ENCOUNTER — APPOINTMENT (OUTPATIENT)
Dept: PULMONOLOGY | Facility: CLINIC | Age: 69
End: 2024-07-09
Payer: MEDICARE

## 2024-07-09 VITALS
HEIGHT: 60 IN | SYSTOLIC BLOOD PRESSURE: 170 MMHG | RESPIRATION RATE: 15 BRPM | WEIGHT: 186.13 LBS | BODY MASS INDEX: 36.54 KG/M2 | HEART RATE: 70 BPM | DIASTOLIC BLOOD PRESSURE: 79 MMHG | OXYGEN SATURATION: 92 % | TEMPERATURE: 97.9 F

## 2024-07-09 DIAGNOSIS — R91.8 OTHER NONSPECIFIC ABNORMAL FINDING OF LUNG FIELD: ICD-10-CM

## 2024-07-09 DIAGNOSIS — J43.9 EMPHYSEMA, UNSPECIFIED: ICD-10-CM

## 2024-07-09 DIAGNOSIS — C34.91 MALIGNANT NEOPLASM OF UNSPECIFIED PART OF RIGHT BRONCHUS OR LUNG: ICD-10-CM

## 2024-07-09 PROCEDURE — 99213 OFFICE O/P EST LOW 20 MIN: CPT

## 2024-08-12 NOTE — H&P PST ADULT - BP NONINVASIVE DIASTOLIC (MM HG)
You are being tested for FH - familial hypercholesterolemia, which is a mutation in the gene for the LDL (or \"bad\") cholesterol, which is involved in passing LDL from the blood into the cells for use by, or removal from, the body. There are other inherited genes including PCKS9 gene and the gene for Apolipoprotein B.     Currently there are 2 types of FH  Heterozygous FH (HeFH). This is a more common type, it is inherited by one parent.  if left untreated HeFH people may develop cardiovascular disease as early as 30.     Homozygous FH (HoFH). This a rare form and is inherited by both parents. If not detected and treated early people with HoFH may develop cardiovascular disease by age 10.     FH is treatable with medications and lifestyle changes.      What is an Inherited Heart Condition?  There are many different conditions of the heart. Some are more likely to run in families. Inherited heart conditions are caused by a change or mutation in one gene or in a number of genes.  Types of inherited heart conditions include cardiomyopathies, arrhythmias, thoracic aortic aneurysms and dissections, and familial hypercholesterolemia (FH). One of the most important reasons for identifying your risk early for a hereditary heart condition is because many of these conditions are often asymptomatic, and sudden death can be the first and only symptom.    Please visit the following website below for more detailed information on genetic testing that is recommended today    [email or web address]      Possible Genetic Test Results with [facility]  + Positive - a mutation was found in at least one of your genes tested    - Negative - no genetic mutations were found in any of your genes tested    ? Variant of Unknown significance (VUS)  At least one genetic change was found but it is unclear if this change causes the disease or not.     [employer] has a policy called \"Sample for Life\" this means that they  periodically review variants and let you know when there is updated information, such as a reclassification. This is part of our commitment to finding answers and if something changes we will notify you!.    Thank you!   TIM Calderon    80

## 2024-08-21 NOTE — OCCUPATIONAL THERAPY INITIAL EVALUATION ADULT - REHAB POTENTIAL, OT EVAL
GET FLU AND COVID SHOTS THIS SEASON.    MEDICARE AWV DUE 27 SEPT 2024.  COMPLETE FASTED LABS FOR THAT VISIT SEPTEMBER 2024.    USE [facility].  CALL FOR NEEDS [phone].   KEEP ON MEDICATIONS  KEEP SPECIALTY APPOINTMENTS.     
none

## 2024-09-04 NOTE — H&P ADULT - [facility] HE PE MLT D E PC
Initiate Treatment: Mineral Based Sunscreen with a minimum of 30 SPF. Reapplication every 2 hrs or sooner depending on activity.\\nMake sure to discard  sunscreen. Detail Level: Zone regular rate and rhythm

## 2024-09-06 ENCOUNTER — EMERGENCY (EMERGENCY)
Facility: HOSPITAL | Age: 69
LOS: 1 days | Discharge: HOME | End: 2024-09-06
Attending: EMERGENCY MEDICINE | Admitting: EMERGENCY MEDICINE
Payer: MEDICARE

## 2024-09-06 VITALS
DIASTOLIC BLOOD PRESSURE: 79 MMHG | OXYGEN SATURATION: 95 % | HEART RATE: 75 BPM | WEIGHT: 164.91 LBS | SYSTOLIC BLOOD PRESSURE: 144 MMHG | TEMPERATURE: 98 F | RESPIRATION RATE: 16 BRPM

## 2024-09-06 DIAGNOSIS — Z96.649 PRESENCE OF UNSPECIFIED ARTIFICIAL HIP JOINT: Chronic | ICD-10-CM

## 2024-09-06 DIAGNOSIS — Z98.891 HISTORY OF UTERINE SCAR FROM PREVIOUS SURGERY: Chronic | ICD-10-CM

## 2024-09-06 DIAGNOSIS — Z95.5 PRESENCE OF CORONARY ANGIOPLASTY IMPLANT AND GRAFT: Chronic | ICD-10-CM

## 2024-09-06 DIAGNOSIS — Z90.2 ACQUIRED ABSENCE OF LUNG [PART OF]: Chronic | ICD-10-CM

## 2024-09-06 LAB
ALBUMIN SERPL ELPH-MCNC: 3.3 G/DL — SIGNIFICANT CHANGE UP (ref 3.3–5)
ALP SERPL-CCNC: 95 U/L — SIGNIFICANT CHANGE UP (ref 40–120)
ALT FLD-CCNC: 9 U/L — SIGNIFICANT CHANGE UP (ref 4–33)
ANION GAP SERPL CALC-SCNC: 14 MMOL/L — SIGNIFICANT CHANGE UP (ref 7–14)
APPEARANCE UR: CLEAR — SIGNIFICANT CHANGE UP
APTT BLD: 38.3 SEC — HIGH (ref 24.5–35.6)
AST SERPL-CCNC: 18 U/L — SIGNIFICANT CHANGE UP (ref 4–32)
BASOPHILS # BLD AUTO: 0.02 K/UL — SIGNIFICANT CHANGE UP (ref 0–0.2)
BASOPHILS NFR BLD AUTO: 0.3 % — SIGNIFICANT CHANGE UP (ref 0–2)
BILIRUB SERPL-MCNC: 0.6 MG/DL — SIGNIFICANT CHANGE UP (ref 0.2–1.2)
BILIRUB UR-MCNC: NEGATIVE — SIGNIFICANT CHANGE UP
BLOOD GAS VENOUS COMPREHENSIVE RESULT: SIGNIFICANT CHANGE UP
BUN SERPL-MCNC: 18 MG/DL — SIGNIFICANT CHANGE UP (ref 7–23)
CALCIUM SERPL-MCNC: 8.7 MG/DL — SIGNIFICANT CHANGE UP (ref 8.4–10.5)
CHLORIDE SERPL-SCNC: 100 MMOL/L — SIGNIFICANT CHANGE UP (ref 98–107)
CO2 SERPL-SCNC: 21 MMOL/L — LOW (ref 22–31)
COLOR SPEC: YELLOW — SIGNIFICANT CHANGE UP
CREAT SERPL-MCNC: 1.29 MG/DL — SIGNIFICANT CHANGE UP (ref 0.5–1.3)
DIFF PNL FLD: NEGATIVE — SIGNIFICANT CHANGE UP
EGFR: 45 ML/MIN/1.73M2 — LOW
EOSINOPHIL # BLD AUTO: 0.08 K/UL — SIGNIFICANT CHANGE UP (ref 0–0.5)
EOSINOPHIL NFR BLD AUTO: 1.1 % — SIGNIFICANT CHANGE UP (ref 0–6)
FLUAV AG NPH QL: SIGNIFICANT CHANGE UP
FLUBV AG NPH QL: SIGNIFICANT CHANGE UP
GLUCOSE SERPL-MCNC: 106 MG/DL — HIGH (ref 70–99)
GLUCOSE UR QL: NEGATIVE MG/DL — SIGNIFICANT CHANGE UP
HCT VFR BLD CALC: 36 % — SIGNIFICANT CHANGE UP (ref 34.5–45)
HGB BLD-MCNC: 12.2 G/DL — SIGNIFICANT CHANGE UP (ref 11.5–15.5)
IANC: 5.6 K/UL — SIGNIFICANT CHANGE UP (ref 1.8–7.4)
IMM GRANULOCYTES NFR BLD AUTO: 0.5 % — SIGNIFICANT CHANGE UP (ref 0–0.9)
INR BLD: 1.09 RATIO — SIGNIFICANT CHANGE UP (ref 0.85–1.18)
KETONES UR-MCNC: NEGATIVE MG/DL — SIGNIFICANT CHANGE UP
LEUKOCYTE ESTERASE UR-ACNC: NEGATIVE — SIGNIFICANT CHANGE UP
LYMPHOCYTES # BLD AUTO: 0.84 K/UL — LOW (ref 1–3.3)
LYMPHOCYTES # BLD AUTO: 11.4 % — LOW (ref 13–44)
MCHC RBC-ENTMCNC: 31.3 PG — SIGNIFICANT CHANGE UP (ref 27–34)
MCHC RBC-ENTMCNC: 33.9 GM/DL — SIGNIFICANT CHANGE UP (ref 32–36)
MCV RBC AUTO: 92.3 FL — SIGNIFICANT CHANGE UP (ref 80–100)
MONOCYTES # BLD AUTO: 0.76 K/UL — SIGNIFICANT CHANGE UP (ref 0–0.9)
MONOCYTES NFR BLD AUTO: 10.4 % — SIGNIFICANT CHANGE UP (ref 2–14)
NEUTROPHILS # BLD AUTO: 5.6 K/UL — SIGNIFICANT CHANGE UP (ref 1.8–7.4)
NEUTROPHILS NFR BLD AUTO: 76.3 % — SIGNIFICANT CHANGE UP (ref 43–77)
NITRITE UR-MCNC: NEGATIVE — SIGNIFICANT CHANGE UP
NRBC # BLD: 0 /100 WBCS — SIGNIFICANT CHANGE UP (ref 0–0)
NRBC # FLD: 0 K/UL — SIGNIFICANT CHANGE UP (ref 0–0)
PH UR: 6.5 — SIGNIFICANT CHANGE UP (ref 5–8)
PLATELET # BLD AUTO: 211 K/UL — SIGNIFICANT CHANGE UP (ref 150–400)
POTASSIUM SERPL-MCNC: 3.5 MMOL/L — SIGNIFICANT CHANGE UP (ref 3.5–5.3)
POTASSIUM SERPL-SCNC: 3.5 MMOL/L — SIGNIFICANT CHANGE UP (ref 3.5–5.3)
PROT SERPL-MCNC: 7 G/DL — SIGNIFICANT CHANGE UP (ref 6–8.3)
PROT UR-MCNC: NEGATIVE MG/DL — SIGNIFICANT CHANGE UP
PROTHROM AB SERPL-ACNC: 12.3 SEC — SIGNIFICANT CHANGE UP (ref 9.5–13)
RBC # BLD: 3.9 M/UL — SIGNIFICANT CHANGE UP (ref 3.8–5.2)
RBC # FLD: 12.5 % — SIGNIFICANT CHANGE UP (ref 10.3–14.5)
RSV RNA NPH QL NAA+NON-PROBE: SIGNIFICANT CHANGE UP
SARS-COV-2 RNA SPEC QL NAA+PROBE: SIGNIFICANT CHANGE UP
SODIUM SERPL-SCNC: 135 MMOL/L — SIGNIFICANT CHANGE UP (ref 135–145)
SP GR SPEC: 1.01 — SIGNIFICANT CHANGE UP (ref 1–1.03)
UROBILINOGEN FLD QL: 0.2 MG/DL — SIGNIFICANT CHANGE UP (ref 0.2–1)
WBC # BLD: 7.34 K/UL — SIGNIFICANT CHANGE UP (ref 3.8–10.5)
WBC # FLD AUTO: 7.34 K/UL — SIGNIFICANT CHANGE UP (ref 3.8–10.5)

## 2024-09-06 PROCEDURE — 93010 ELECTROCARDIOGRAM REPORT: CPT

## 2024-09-06 PROCEDURE — 99285 EMERGENCY DEPT VISIT HI MDM: CPT

## 2024-09-06 RX ORDER — AZITHROMYCIN 500 MG/1
500 TABLET, FILM COATED ORAL ONCE
Refills: 0 | Status: COMPLETED | OUTPATIENT
Start: 2024-09-06 | End: 2024-09-06

## 2024-09-06 RX ORDER — DEXAMETHASONE 0.75 MG
6 TABLET ORAL ONCE
Refills: 0 | Status: COMPLETED | OUTPATIENT
Start: 2024-09-06 | End: 2024-09-06

## 2024-09-06 RX ADMIN — Medication 2.5 MILLIGRAM(S): at 23:40

## 2024-09-06 RX ADMIN — Medication 6 MILLIGRAM(S): at 22:51

## 2024-09-06 RX ADMIN — AZITHROMYCIN 255 MILLIGRAM(S): 500 TABLET, FILM COATED ORAL at 23:14

## 2024-09-06 RX ADMIN — Medication 2.5 MILLIGRAM(S): at 22:51

## 2024-09-06 RX ADMIN — Medication 2.5 MILLIGRAM(S): at 23:14

## 2024-09-06 RX ADMIN — Medication 100 MILLIGRAM(S): at 22:52

## 2024-09-06 NOTE — ED PROVIDER NOTE - GASTROINTESTINAL, MLM
Goal Outcome Evaluation:  Plan of Care Reviewed With: patient        Progress: improving  Outcome Evaluation: VSS, I & O adequate, positive bonding observed. pain controlled with oral medication.   Abdomen soft, non-tender, no guarding.

## 2024-09-06 NOTE — ED PROVIDER NOTE - NSFOLLOWUPINSTRUCTIONS_ED_ALL_ED_FT
Advance activity as tolerated.  Continue all previously prescribed medications as directed unless otherwise instructed.  Take Prednisone 50 mg one pill once a day.  Take Azithromycin 250 mg one pill once a day.  Follow up with your primary care physician in 48-72 hours- bring copies of your results.  Return to the Emergency Department for worsening or persistent symptoms, including but not limited to worsening/persistent headaches, neck stiffness, difficulty swallowing, chest pain, shortness of breath, passing out, numbness, weakness, and/or ANY NEW OR CONCERNING SYMPTOMS. If you have issues obtaining follow up, please call: [phone] to obtain a doctor or specialist who takes your insurance in your area.  You may call [phone] to make an appointment with the internal medicine clinic.    DYSPNEA - General Information    Dyspnea    WHAT YOU NEED TO KNOW:    What is dyspnea? Dyspnea is breathing difficulty or discomfort. You may have labored, painful, or shallow breathing. You may feel breathless or short of breath. Dyspnea can occur during rest or with activity. You may have dyspnea for a short time, or it might become chronic. Dyspnea is often a symptom of a disease or condition.    What signs and symptoms can occur with dyspnea?    Chest tightness or pain    A cough, or a coarse or high-pitched noise when you breathe    Pale and sweaty, cool skin    Confusion and tiredness    Bluish-gray lips or nails  What increases my risk for dyspnea?    Swelling in the throat from an infection or allergic reaction    Lung conditions such as asthma, COPD, cancer, infection, or a blood clot    Heart conditions such as abnormal heartbeats, heart failure, or coronary artery disease    Smoking, exposure to chemicals such as carbon monoxide, or too much aspirin    A condition that affects your central nervous system, such as a spinal cord injury or nerve damage    Enlarged abdomen because you are overweight, pregnant, or have ascites (fluid in the abdomen) from liver disease    Anemia (low red blood cell count), anxiety, panic, or going to a high altitude  How is the cause of dyspnea diagnosed? Your healthcare provider may ask when your dyspnea began and what you were doing. Tell him or her how often you have dyspnea and what makes it worse or better. Tell your healthcare provider about medicines you take. Describe any other symptoms, such as pain or a fever. Your healthcare provider will listen to you breathe and watch for irregular breathing. You may also need the following:    A pulse oximeter is a device that measures the amount of oxygen in your blood. A cord with a clip or sticky strip is placed on your finger, ear, or toe. The other end of the cord is hooked to a machine.    Blood tests may show your healthcare provider if you are at risk for blood clots or heart failure. Blood tests can also show if you have anemia or an infection.    X-ray pictures may show signs of infection or fluid around your heart or lungs.    Exercise tests help your healthcare provider learn if you have symptoms, along with dyspnea, that limit activity. Symptoms include leg pain, fatigue, and weakness. Exercise tests can also show if your dyspnea is caused by heart problems.    CT scan pictures may show blood clots or an area of disease in your lungs. You may be given contract liquid to help your lungs show up better in the pictures. Tell the healthcare provider if you have ever had an allergic reaction to contrast liquid.    An echocardiogram is a type of ultrasound. Sound waves are used to show the structure and function of your heart.    An EKG is a test that measures the electrical activity of your heart.  How is dyspnea treated? You may need treatment if your symptoms prevent you from doing your daily activities. The condition causing your dyspnea may need to be treated. You may also need the following to improve your symptoms:    Oxygen therapy may be used to help you breathe easier. You may need oxygen if your blood oxygen level is lower than it should be.    Medicines may be used to treat the cause of your dyspnea. Medicines may reduce swelling in your airway or decrease extra fluid from around your heart or lungs. Other medicines may be used to decrease anxiety and help you feel calm and relaxed.    Pulmonary rehabilitation is used to reduce your symptoms while keeping you active. You may learn breathing techniques, muscle strengthening, and how to pace yourself when you are active.  How can I manage long-term dyspnea?    Create an action plan. You and your healthcare provider can work together to create a plan for how to handle episodes of dyspnea. The plan can include daily activities, treatment changes, and what to do if you have severe breathing problems.    Lean forward on your elbows when you sit. This helps your lungs expand and may make it easier to breathe.    Use pursed-lip breathing any time you feel short of breath. Breathe in through your nose and then slowly breathe out through your mouth with your lips slightly puckered. It should take you twice as long to breathe out as it did to breathe in.  Breathe in Breathe out      Do not smoke. Nicotine and other chemicals in cigarettes and cigars can cause lung damage and make it harder to breathe. Ask your healthcare provider for information if you currently smoke and need help to quit. E-cigarettes or smokeless tobacco still contain nicotine. Talk to your healthcare provider before you use these products.    Reach or maintain a healthy weight. Your healthcare provider can help you create a safe weight loss plan if you are overweight.    Exercise as directed. Exercise can help your lungs work more easily. Exercise can also help you lose weight if needed. Try to get at least 30 minutes of exercise most days of the week. Your healthcare provider can help you create an exercise plan that is safe for you.  When should I seek immediate care?    Your signs and symptoms are the same or worse within 24 hours of treatment.    You have shaking chills or a fever over 102°F.    You have new pain, pressure, or tightness in your chest.    You have a new or worse cough or wheezing, or you cough up blood.    You feel like you cannot get enough air.    The skin over your ribs or on your neck sinks in when you breathe.    You have a severe headache with vomiting and abdominal pain.    You feel confused or dizzy.  When should I call my doctor or specialist?    You have questions or concerns about your condition or care.    CARE AGREEMENT:    You have the right to help plan your care. Learn about your health condition and how it may be treated. Discuss treatment options with your healthcare providers to decide what care you want to receive. You always have the right to refuse treatment.    © [employer] 1973, 2023     ACUTE COUGH - [facility](R) Instructions(ER/ED)    Acute Cough    WHAT YOU NEED TO KNOW:    An acute cough can last up to 3 weeks. Common causes of an acute cough include a cold, allergies, or a lung infection.    DISCHARGE INSTRUCTIONS:    Return to the emergency department if:    You have trouble breathing or feel short of breath.    You cough up blood, or you see blood in your mucus.    You faint or feel weak or dizzy.    You have chest pain when you cough or take a deep breath.    You have new wheezing.  Contact your healthcare provider if:    You have a fever.    Your cough lasts longer than 4 weeks.    Your symptoms do not improve with treatment.    You have questions or concerns about your condition or care.  Medicines:    Medicines may be needed to stop the cough, decrease swelling in your airways, or help open your airways. Medicine may also be given to help you cough up mucus. Ask your healthcare provider what over-the-counter medicines you can take. If you have an infection caused by bacteria, you may need antibiotics.    Take your medicine as directed. Contact your healthcare provider if you think your medicine is not helping or if you have side effects. Tell your provider if you are allergic to any medicine. Keep a list of the medicines, vitamins, and herbs you take. Include the amounts, and when and why you take them. Bring the list or the pill bottles to follow-up visits. Carry your medicine list with you in case of an emergency.  Manage your symptoms:    Do not smoke and stay away from others who smoke. Nicotine and other chemicals in cigarettes and cigars can cause lung damage and make your cough worse. Ask your healthcare provider for information if you currently smoke and need help to quit. E-cigarettes or smokeless tobacco still contain nicotine. Talk to your healthcare provider before you use these products.    Drink extra liquids as directed. Liquids will help thin and loosen mucus so you can cough it up. Liquids will also help prevent dehydration. Examples of good liquids to drink include water, fruit juice, and broth. Do not drink liquids that contain caffeine. Caffeine can increase your risk for dehydration. Ask your healthcare provider how much liquid to drink each day.    Rest as directed. Do not do activities that make your cough worse, such as exercise.    Use a humidifier or vaporizer. Use a cool mist humidifier or a vaporizer to increase air moisture in your home. This may make it easier for you to breathe and help decrease your cough.    Eat 2 to 5 mL of honey 2 times each day. Honey can help thin mucus and decrease your cough.    Use cough drops or lozenges. These can help decrease throat irritation and your cough.  Follow up with your healthcare provider as directed: Write down your questions so you remember to ask them during your visits.    © [employer] 1973, 2023

## 2024-09-06 NOTE — ED PROVIDER NOTE - CLINICAL SUMMARY MEDICAL DECISION MAKING FREE TEXT BOX
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 Stented coronary artery  Scoliosis Lumbar disc disorder  LtTHR PTED with persistent SOB over the past weeks assocated with SOB that worsens with activity despite outpt management with levaquin cough is sparse relates no fever chills sputum is on low dose lasix for intermittent leg swelling no chest pain diaphoresis palpitations or syncope had cxr as outpt = "fluid" no UTI s/s    Vital Signs Last 24 Hrs  T(F): 98.6 HR: 77 BP: 140/63 (06 Sep 2024 18:52) (140/63 - 144/79) RR: 18 SpO2: 98% (06 Sep 2024 18:52) (95% - 98%)    PE: as described;     DATA:  EKG: pending at time of evaluation  LAB: Pending at time of evaluation    IMPRESSION/RISK:  Dx=  ? COPD   Considerations "fluid" and other lung findings le findings could merely represent worsening of baseline lung dz; unsure if patient also received aggressive BD/steroid tx with antibiotics   Plan  Will tx with nebs alb antibioitcs   Will perform PA/Lat CXR   Will reassess

## 2024-09-06 NOTE — ED PROVIDER NOTE - PATIENT PORTAL LINK FT
You can access the FollowMyHealth Patient Portal offered by [facility] by registering at the following website: [email or web address]. By joining [employer]’s FollowMyHealth portal, you will also be able to view your health information using other applications (apps) compatible with our system.
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
## 2024-09-06 NOTE — ED PROVIDER NOTE - OBJECTIVE STATEMENT
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

## 2024-09-06 NOTE — ED PROVIDER NOTE - HEME LYMPH, MLM
No adenopathy or splenomegaly. No cervical or inguinal lymphadenopathy. minimum edema no pitting noted

## 2024-09-06 NOTE — ED PROVIDER NOTE - PROGRESS NOTE DETAILS
RUTH Prieto: I had a detailed discussion with the patient and/or guardian regarding the historical points, exam findings, and any diagnostic results supporting the discharge diagnosis. Results endorsed including unexpected incidental findings (copy of reports provided to patient).   Pt educated on care and need for follow up. Strict return instructions and red flag signs and symptoms discussed with patient. Questions answered. Pt shows understanding of discharge information and agrees to follow. Pt medically stable for discharge.  Strict return precautions given.  Pt to follow up with PMD.  Reassessment performed and plan for discharge discussed with Dr. Valerio who agrees with disposition and discharge plan.

## 2024-09-06 NOTE — ED ADULT TRIAGE NOTE - CHIEF COMPLAINT QUOTE
Pt history of COPD, lung ca(in remission), presents from MD office, CXR confirmed pleural effusion, sent in for treatment for possible CHF exacerbation, pt has diminished lung sounds to rt lung field, pt has swelling to b/l lower extremities progressing over the past week.

## 2024-09-07 VITALS
DIASTOLIC BLOOD PRESSURE: 94 MMHG | RESPIRATION RATE: 16 BRPM | HEART RATE: 74 BPM | SYSTOLIC BLOOD PRESSURE: 176 MMHG | OXYGEN SATURATION: 99 % | TEMPERATURE: 98 F

## 2024-09-07 LAB
BLD GP AB SCN SERPL QL: NEGATIVE — SIGNIFICANT CHANGE UP
RH IG SCN BLD-IMP: POSITIVE — SIGNIFICANT CHANGE UP
TROPONIN T, HIGH SENSITIVITY RESULT: 27 NG/L — SIGNIFICANT CHANGE UP
TROPONIN T, HIGH SENSITIVITY RESULT: 30 NG/L — SIGNIFICANT CHANGE UP

## 2024-09-07 PROCEDURE — 71275 CT ANGIOGRAPHY CHEST: CPT | Mod: 26,MC

## 2024-09-07 PROCEDURE — 71046 X-RAY EXAM CHEST 2 VIEWS: CPT | Mod: 26

## 2024-09-07 RX ORDER — PREDNISONE 10 MG
1 TABLET, DOSE PACK ORAL
Qty: 4 | Refills: 0
Start: 2024-09-07 | End: 2024-09-10

## 2024-09-07 RX ORDER — AZITHROMYCIN 500 MG/1
1 TABLET, FILM COATED ORAL
Qty: 4 | Refills: 0
Start: 2024-09-07 | End: 2024-09-10

## 2024-09-07 NOTE — ED ADULT NURSE REASSESSMENT NOTE - NS ED NURSE REASSESS COMMENT FT1
0810hts: Patient received into my care from [facility] night shift. Awake and oriented x 4. States she is much improved, no SOB noted, waiting for CT scan.

## 2024-09-30 ENCOUNTER — MED ADMIN CHARGE (OUTPATIENT)
Age: 69
End: 2024-09-30

## 2024-09-30 ENCOUNTER — APPOINTMENT (OUTPATIENT)
Dept: PULMONOLOGY | Facility: CLINIC | Age: 69
End: 2024-09-30
Payer: MEDICARE

## 2024-09-30 ENCOUNTER — APPOINTMENT (OUTPATIENT)
Dept: PULMONOLOGY | Facility: CLINIC | Age: 69
End: 2024-09-30

## 2024-09-30 VITALS
WEIGHT: 183.6 LBS | OXYGEN SATURATION: 98 % | HEIGHT: 59.84 IN | BODY MASS INDEX: 36.05 KG/M2 | DIASTOLIC BLOOD PRESSURE: 83 MMHG | SYSTOLIC BLOOD PRESSURE: 145 MMHG | HEART RATE: 60 BPM

## 2024-09-30 DIAGNOSIS — R91.8 OTHER NONSPECIFIC ABNORMAL FINDING OF LUNG FIELD: ICD-10-CM

## 2024-09-30 DIAGNOSIS — Z13.83 ENCOUNTER FOR SCREENING FOR RESPIRATORY DISORDER NEC: ICD-10-CM

## 2024-09-30 DIAGNOSIS — Z23 ENCOUNTER FOR IMMUNIZATION: ICD-10-CM

## 2024-09-30 DIAGNOSIS — R06.02 SHORTNESS OF BREATH: ICD-10-CM

## 2024-09-30 DIAGNOSIS — C34.91 MALIGNANT NEOPLASM OF UNSPECIFIED PART OF RIGHT BRONCHUS OR LUNG: ICD-10-CM

## 2024-09-30 DIAGNOSIS — J44.9 CHRONIC OBSTRUCTIVE PULMONARY DISEASE, UNSPECIFIED: ICD-10-CM

## 2024-09-30 PROCEDURE — 90662 IIV NO PRSV INCREASED AG IM: CPT

## 2024-09-30 PROCEDURE — ZZZZZ: CPT

## 2024-09-30 PROCEDURE — 94729 DIFFUSING CAPACITY: CPT

## 2024-09-30 PROCEDURE — 94726 PLETHYSMOGRAPHY LUNG VOLUMES: CPT

## 2024-09-30 PROCEDURE — 90677 PCV20 VACCINE IM: CPT

## 2024-09-30 PROCEDURE — 94060 EVALUATION OF WHEEZING: CPT

## 2024-09-30 PROCEDURE — G0009: CPT

## 2024-09-30 PROCEDURE — 99215 OFFICE O/P EST HI 40 MIN: CPT | Mod: 25

## 2024-09-30 PROCEDURE — G0008: CPT

## 2024-09-30 RX ORDER — FLUTICASONE FUROATE, UMECLIDINIUM BROMIDE AND VILANTEROL TRIFENATATE 200; 62.5; 25 UG/1; UG/1; UG/1
200-62.5-25 POWDER RESPIRATORY (INHALATION)
Qty: 1 | Refills: 4 | Status: ACTIVE | COMMUNITY
Start: 2024-09-30 | End: 1900-01-01

## 2024-09-30 NOTE — HISTORY OF PRESENT ILLNESS
[Former] : former [Never] : never [TextBox_4] : Patient is a 69yo F w/ PMH of COPD, HTN, lung cancer sp R lobectomy sp chemo/RT, CAD status post PCI with stents, SVC syndrome status post stent IVC thrombus no longer on AC, with recent hospitalization for dyspnea, presents for F/U.  Endorses sob on exertion, postnasal drip, cough, and reduced activity tolerance, report sitting for most of day. Is using Trelegy inhaler daily, feels it is not helping her as she would like. Is not using albuterol. Seen and followed by ENT, using saline nasal solution only. With Left VC paralysis, followed by ENT, stopped Pantoprazole after 30 days for treatment of GERD. Has not followed through on pulmonary rehab as she feels she is unable to comply due to leg pain.    Chest CT in 9/7/24: stable post RT changed and nodules. .

## 2024-09-30 NOTE — ASSESSMENT
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  Endorses sob on exertion, postnasal drip, cough, and reduced activity tolerance, report sitting for most of day. Is using Trelegy inhaler daily, feels it is not helping her as she would like. Is not using albuterol. Seen and followed by ENT, using saline nasal solution only. With Left VC paralysis, followed by ENT, stopped Pantoprazole after 30 days for treatment of GERD. Has not followed through on pulmonary rehab as she feels she is unable to comply due to leg pain.    -Chest CT in 9/7/24: stable post RT changed and nodules -Patient with regularly scheduled q3-month surveillance CT scans at [facility]. Followed by Dr. Guevara  -With regular follow up with [place] and Dr. Duncan -Continue daily use of Trelegy, dosage increased -Albuterol PRN and before walks -PFT done today: Severely decreased lung function, mixed obstruction and restriction, severely decreased DLCO -Referred for Pulmonary rehab; needs cardiology clearance -Flu and PNA vaccines administered today, RSV vaccinated 2023, pending Covid vaccination  attenidng: agree with above

## 2024-09-30 NOTE — ASSESSMENT
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  Endorses sob on exertion, postnasal drip, cough, and reduced activity tolerance, report sitting for most of day. Is using Trelegy inhaler daily, feels it is not helping her as she would like. Is not using albuterol. Seen and followed by ENT, using saline nasal solution only. With Left VC paralysis, followed by ENT, stopped Pantoprazole after 30 days for treatment of GERD. Has not followed through on pulmonary rehab as she feels she is unable to comply due to leg pain.    -Chest CT in 9/7/24: stable post RT changed and nodules -Patient with regularly scheduled q3-month surveillance CT scans at [facility]. Followed by Dr. Guevara  -With regular follow up with [place] and Dr. Duncan -Continue daily use of Trelegy, dosage increased -Albuterol PRN and before walks -PFT done today: Severely decreased lung function, mixed obstruction and restriction, severely decreased DLCO -Referred for Pulmonary rehab; needs cardiology clearance -Flu and PNA vaccines administered today, RSV vaccinated 2023, pending Covid vaccination  attenidng: agree with above

## 2024-09-30 NOTE — PHYSICAL EXAM
[No Acute Distress] : no acute distress [Normal Oropharynx] : normal oropharynx [Normal Appearance] : normal appearance [No Neck Mass] : no neck mass [Normal Rate/Rhythm] : normal rate/rhythm [Normal S1, S2] : normal s1, s2 [No Resp Distress] : no resp distress [Clear to Auscultation Bilaterally] : clear to auscultation bilaterally [No Abnormalities] : no abnormalities [Benign] : benign [No Clubbing] : no clubbing [No Cyanosis] : no cyanosis [Normal Color/ Pigmentation] : normal color/ pigmentation [No Focal Deficits] : no focal deficits [Oriented x3] : oriented x3 [Normal Affect] : normal affect [TextBox_99] : Ambulates with walker

## 2024-09-30 NOTE — REVIEW OF SYSTEMS
[Fatigue] : fatigue [Postnasal Drip] : postnasal drip [SOB on Exertion] : sob on exertion [Negative] : Psychiatric [Cough] : cough [Obesity] : obesity [Fever] : no fever [Recent Wt Gain (___ Lbs)] : ~T no recent weight gain [Chills] : no chills [Poor Appetite] : no poor appetite [Recent Wt Loss (___ Lbs)] : ~T no recent weight loss [Dry Eyes] : no dry eyes [Ear Disturbance] : no ear disturbance [Epistaxis] : no epistaxis [Sore Throat] : no sore throat [Eye Irritation] : no eye irritation [Nasal Congestion] : no nasal congestion [Dry Mouth] : no dry mouth [Sinus Problems] : no sinus problems [Mouth Ulcers] : no mouth ulcers [Poor Dentition] : no poor dentition [Edentulous] : no edentulous [Hemoptysis] : no hemoptysis [Chest Tightness] : no chest tightness [Frequent URIs] : no frequent URIs [Sputum] : no sputum [Dyspnea] : no dyspnea [Pleuritic Pain] : no pleuritic pain [Wheezing] : no wheezing [A.M. Dry Mouth] : no a.m. dry mouth [Diabetes] : no diabetes [Thyroid Problem] : no thyroid problem [TextBox_94] : Ambulates with walker-Leg pain

## 2024-10-02 NOTE — PRE-OP CHECKLIST - WAS PATIENT ON BETA BLOCKER?
[address] [address]   MAIN OR                                  [phone]    MAIN PRE OP             [phone]                                                                                AMBULATORY PRE OP          [phone]  PRE-ADMISSION TESTING    [phone]     Surgery Date:  10/3/24       Is surgery arrival time given by surgeon?  YES  NO    If “NO”, [facility] staff will call you between 4 and 7pm the day before your surgery with your arrival time. (If your surgery is on a Monday, we will call you the Friday before.)    Call [phone] after 7pm Monday-Friday if you did not receive this call.    INSTRUCTIONS BEFORE YOUR SURGERY   When You  Arrive Arrive at [facility] Patient Access on 1st floor the day of your surgery.  Have your insurance card, photo ID,living will/advanced directive/POA (if applicable),  and any copayment (if needed)   Food   and   Drink NO solid food after midnight the night before surgery. You can drink clear liquids from midnight until ONE hour prior to your arrival at the hospital on the day of your surgery. Clear liquids include:  Water  Fruit juices without pulp  NO ORANGE JUICE  Carbonated beverages  Black coffee(no cream/milk)  Tea(no cream/milk)  Gatorade    No alcohol (beer, wine, liquor) or marijuana (smoking) 24 hours, edibles (3 days). Stop smoking cigarettes 14 days before surgery (helps w/healing and breathing).   Medications to   TAKE   Morning of Surgery MEDICATIONS TO TAKE THE MORNING OF SURGERY WITH A SIP OF WATER:   LEVOTHYROXINE  You may take these medications, IF NEEDED, the morning of surgery: NONE  Ask your surgeon/prescribing doctor for instructions on taking or stopping these medications prior to surgery: NONE   Medications to STOP  before surgery Non-Steroidal anti-inflammatory Drugs (NSAID's): for example, Ibuprofen (Advil, Motrin), Naproxen (Aleve)  HOLD NOW FOR SURGERY  STOP  Yes

## 2024-10-28 NOTE — H&P ADULT - ASSESSMENT
66yo Female PMH HTN, HLD, CAD s/p stent on ASA, COPD, Adenocarcinoma of R lung s/p surgery 9/3/2019 has serial CTs annually, former smoker, s/p R EDY by Dr. Ross on 5/24/21 p/w bleeding from surgical site.
No

## 2024-11-19 ENCOUNTER — NON-APPOINTMENT (OUTPATIENT)
Age: 69
End: 2024-11-19

## 2024-12-06 NOTE — H&P PST ADULT - MAMMOGRAM, LAST, PROFILE
2016 Render Risk Assessment In Note?: no Additional Notes: Will treat with topical below nightly. Detail Level: Simple Additional Notes: Pink papules on right abdomen, pubis and perineum today. Some with slight umbilication. More consistent with molluscum today but also possible wart. Willl biopsy one lesion on abdomen today for confirmation. Plan to start compouding cream as below on affected area. Given pt's numerous lesions, also plan to start OTC PO cimetidine 400 mg TID.

## 2025-01-08 ENCOUNTER — APPOINTMENT (OUTPATIENT)
Dept: PULMONOLOGY | Facility: CLINIC | Age: 70
End: 2025-01-08

## 2025-01-11 ENCOUNTER — NON-APPOINTMENT (OUTPATIENT)
Age: 70
End: 2025-01-11

## 2025-01-13 ENCOUNTER — APPOINTMENT (OUTPATIENT)
Dept: PULMONOLOGY | Facility: CLINIC | Age: 70
End: 2025-01-13

## 2025-01-17 ENCOUNTER — NON-APPOINTMENT (OUTPATIENT)
Age: 70
End: 2025-01-17

## 2025-01-22 ENCOUNTER — APPOINTMENT (OUTPATIENT)
Dept: PULMONOLOGY | Facility: CLINIC | Age: 70
End: 2025-01-22
Payer: MEDICARE

## 2025-01-22 VITALS
HEART RATE: 70 BPM | DIASTOLIC BLOOD PRESSURE: 76 MMHG | OXYGEN SATURATION: 98 % | SYSTOLIC BLOOD PRESSURE: 150 MMHG | BODY MASS INDEX: 36.12 KG/M2 | TEMPERATURE: 97.2 F | HEIGHT: 60 IN | RESPIRATION RATE: 16 BRPM | WEIGHT: 184 LBS

## 2025-01-22 DIAGNOSIS — J44.9 CHRONIC OBSTRUCTIVE PULMONARY DISEASE, UNSPECIFIED: ICD-10-CM

## 2025-01-22 DIAGNOSIS — C34.91 MALIGNANT NEOPLASM OF UNSPECIFIED PART OF RIGHT BRONCHUS OR LUNG: ICD-10-CM

## 2025-01-22 PROCEDURE — 99213 OFFICE O/P EST LOW 20 MIN: CPT

## 2025-01-22 PROCEDURE — G2211 COMPLEX E/M VISIT ADD ON: CPT

## 2025-02-11 NOTE — ED ADULT NURSE NOTE - NSICDXPASTSURGICALHX_GEN_ALL_CORE_FT
You Saw a couple weeks ago - she is better except cough- it is still so bad - asking if you can call something in to help relieve the cough.    Herminia gonsalez was her request?   PAST SURGICAL HISTORY:  History of  section x2 ,     History of hip replacement right 2021    S/P lobectomy of lung RULobectomy 2019    Stented coronary artery x2 stents 2018

## 2025-02-12 ENCOUNTER — INPATIENT (INPATIENT)
Facility: HOSPITAL | Age: 70
LOS: 1 days | Discharge: HOME | End: 2025-02-14
Attending: INTERNAL MEDICINE | Admitting: INTERNAL MEDICINE
Payer: MEDICARE

## 2025-02-12 VITALS
RESPIRATION RATE: 26 BRPM | HEART RATE: 88 BPM | DIASTOLIC BLOOD PRESSURE: 67 MMHG | WEIGHT: 182.1 LBS | OXYGEN SATURATION: 97 % | TEMPERATURE: 98 F | SYSTOLIC BLOOD PRESSURE: 145 MMHG

## 2025-02-12 DIAGNOSIS — Z96.649 PRESENCE OF UNSPECIFIED ARTIFICIAL HIP JOINT: Chronic | ICD-10-CM

## 2025-02-12 DIAGNOSIS — I25.10 ATHEROSCLEROTIC HEART DISEASE OF NATIVE CORONARY ARTERY WITHOUT ANGINA PECTORIS: ICD-10-CM

## 2025-02-12 DIAGNOSIS — R06.02 SHORTNESS OF BREATH: ICD-10-CM

## 2025-02-12 DIAGNOSIS — Z98.891 HISTORY OF UTERINE SCAR FROM PREVIOUS SURGERY: Chronic | ICD-10-CM

## 2025-02-12 DIAGNOSIS — Z95.5 PRESENCE OF CORONARY ANGIOPLASTY IMPLANT AND GRAFT: Chronic | ICD-10-CM

## 2025-02-12 DIAGNOSIS — R60.0 LOCALIZED EDEMA: ICD-10-CM

## 2025-02-12 DIAGNOSIS — Z29.9 ENCOUNTER FOR PROPHYLACTIC MEASURES, UNSPECIFIED: ICD-10-CM

## 2025-02-12 DIAGNOSIS — I10 ESSENTIAL (PRIMARY) HYPERTENSION: ICD-10-CM

## 2025-02-12 DIAGNOSIS — Z90.2 ACQUIRED ABSENCE OF LUNG [PART OF]: Chronic | ICD-10-CM

## 2025-02-12 DIAGNOSIS — J44.1 CHRONIC OBSTRUCTIVE PULMONARY DISEASE WITH (ACUTE) EXACERBATION: ICD-10-CM

## 2025-02-12 DIAGNOSIS — I50.9 HEART FAILURE, UNSPECIFIED: ICD-10-CM

## 2025-02-12 DIAGNOSIS — R09.89 OTHER SPECIFIED SYMPTOMS AND SIGNS INVOLVING THE CIRCULATORY AND RESPIRATORY SYSTEMS: ICD-10-CM

## 2025-02-12 DIAGNOSIS — E78.5 HYPERLIPIDEMIA, UNSPECIFIED: ICD-10-CM

## 2025-02-12 LAB
ALBUMIN SERPL ELPH-MCNC: 4 G/DL — SIGNIFICANT CHANGE UP (ref 3.3–5)
ALP SERPL-CCNC: 103 U/L — SIGNIFICANT CHANGE UP (ref 40–120)
ALT FLD-CCNC: 9 U/L — SIGNIFICANT CHANGE UP (ref 4–33)
ANION GAP SERPL CALC-SCNC: 13 MMOL/L — SIGNIFICANT CHANGE UP (ref 7–14)
AST SERPL-CCNC: 21 U/L — SIGNIFICANT CHANGE UP (ref 4–32)
BASOPHILS # BLD AUTO: 0.05 K/UL — SIGNIFICANT CHANGE UP (ref 0–0.2)
BASOPHILS NFR BLD AUTO: 0.4 % — SIGNIFICANT CHANGE UP (ref 0–2)
BILIRUB SERPL-MCNC: 0.9 MG/DL — SIGNIFICANT CHANGE UP (ref 0.2–1.2)
BLOOD GAS VENOUS COMPREHENSIVE RESULT: SIGNIFICANT CHANGE UP
BUN SERPL-MCNC: 25 MG/DL — HIGH (ref 7–23)
CALCIUM SERPL-MCNC: 9.3 MG/DL — SIGNIFICANT CHANGE UP (ref 8.4–10.5)
CHLORIDE SERPL-SCNC: 101 MMOL/L — SIGNIFICANT CHANGE UP (ref 98–107)
CO2 SERPL-SCNC: 22 MMOL/L — SIGNIFICANT CHANGE UP (ref 22–31)
CREAT SERPL-MCNC: 1.24 MG/DL — SIGNIFICANT CHANGE UP (ref 0.5–1.3)
EGFR: 47 ML/MIN/1.73M2 — LOW
EOSINOPHIL # BLD AUTO: 0.02 K/UL — SIGNIFICANT CHANGE UP (ref 0–0.5)
EOSINOPHIL NFR BLD AUTO: 0.1 % — SIGNIFICANT CHANGE UP (ref 0–6)
FLUAV AG NPH QL: SIGNIFICANT CHANGE UP
FLUBV AG NPH QL: SIGNIFICANT CHANGE UP
GLUCOSE SERPL-MCNC: 98 MG/DL — SIGNIFICANT CHANGE UP (ref 70–99)
HCT VFR BLD CALC: 37.6 % — SIGNIFICANT CHANGE UP (ref 34.5–45)
HGB BLD-MCNC: 12.8 G/DL — SIGNIFICANT CHANGE UP (ref 11.5–15.5)
IANC: 11.71 K/UL — HIGH (ref 1.8–7.4)
IMM GRANULOCYTES NFR BLD AUTO: 0.4 % — SIGNIFICANT CHANGE UP (ref 0–0.9)
LYMPHOCYTES # BLD AUTO: 1.06 K/UL — SIGNIFICANT CHANGE UP (ref 1–3.3)
LYMPHOCYTES # BLD AUTO: 7.7 % — LOW (ref 13–44)
MCHC RBC-ENTMCNC: 31.4 PG — SIGNIFICANT CHANGE UP (ref 27–34)
MCHC RBC-ENTMCNC: 34 G/DL — SIGNIFICANT CHANGE UP (ref 32–36)
MCV RBC AUTO: 92.4 FL — SIGNIFICANT CHANGE UP (ref 80–100)
MONOCYTES # BLD AUTO: 0.91 K/UL — HIGH (ref 0–0.9)
MONOCYTES NFR BLD AUTO: 6.6 % — SIGNIFICANT CHANGE UP (ref 2–14)
NEUTROPHILS # BLD AUTO: 11.71 K/UL — HIGH (ref 1.8–7.4)
NEUTROPHILS NFR BLD AUTO: 84.8 % — HIGH (ref 43–77)
NRBC # BLD AUTO: 0 K/UL — SIGNIFICANT CHANGE UP (ref 0–0)
NRBC # FLD: 0 K/UL — SIGNIFICANT CHANGE UP (ref 0–0)
NRBC BLD AUTO-RTO: 0 /100 WBCS — SIGNIFICANT CHANGE UP (ref 0–0)
NT-PROBNP SERPL-SCNC: 1256 PG/ML — HIGH
PLATELET # BLD AUTO: 253 K/UL — SIGNIFICANT CHANGE UP (ref 150–400)
POTASSIUM SERPL-MCNC: 4.5 MMOL/L — SIGNIFICANT CHANGE UP (ref 3.5–5.3)
POTASSIUM SERPL-SCNC: 4.5 MMOL/L — SIGNIFICANT CHANGE UP (ref 3.5–5.3)
PROT SERPL-MCNC: 7.6 G/DL — SIGNIFICANT CHANGE UP (ref 6–8.3)
RBC # BLD: 4.07 M/UL — SIGNIFICANT CHANGE UP (ref 3.8–5.2)
RBC # FLD: 13.2 % — SIGNIFICANT CHANGE UP (ref 10.3–14.5)
RSV RNA NPH QL NAA+NON-PROBE: SIGNIFICANT CHANGE UP
SARS-COV-2 RNA SPEC QL NAA+PROBE: SIGNIFICANT CHANGE UP
SODIUM SERPL-SCNC: 136 MMOL/L — SIGNIFICANT CHANGE UP (ref 135–145)
TROPONIN T, HIGH SENSITIVITY RESULT: 19 NG/L — SIGNIFICANT CHANGE UP
TROPONIN T, HIGH SENSITIVITY RESULT: 22 NG/L — SIGNIFICANT CHANGE UP
WBC # BLD: 13.81 K/UL — HIGH (ref 3.8–10.5)
WBC # FLD AUTO: 13.81 K/UL — HIGH (ref 3.8–10.5)

## 2025-02-12 PROCEDURE — 99285 EMERGENCY DEPT VISIT HI MDM: CPT

## 2025-02-12 PROCEDURE — 99223 1ST HOSP IP/OBS HIGH 75: CPT

## 2025-02-12 PROCEDURE — 71045 X-RAY EXAM CHEST 1 VIEW: CPT | Mod: 26

## 2025-02-12 RX ORDER — AZITHROMYCIN DIHYDRATE 500 MG/1
500 TABLET, FILM COATED ORAL EVERY 24 HOURS
Refills: 0 | Status: DISCONTINUED | OUTPATIENT
Start: 2025-02-13 | End: 2025-02-14

## 2025-02-12 RX ORDER — ONDANSETRON 4 MG/1
4 TABLET, ORALLY DISINTEGRATING ORAL EVERY 8 HOURS
Refills: 0 | Status: DISCONTINUED | OUTPATIENT
Start: 2025-02-12 | End: 2025-02-14

## 2025-02-12 RX ORDER — ATORVASTATIN CALCIUM 80 MG/1
40 TABLET, FILM COATED ORAL AT BEDTIME
Refills: 0 | Status: DISCONTINUED | OUTPATIENT
Start: 2025-02-12 | End: 2025-02-14

## 2025-02-12 RX ORDER — METHYLPREDNISOLONE ACETATE 40 MG/ML
125 VIAL (ML) INJECTION ONCE
Refills: 0 | Status: COMPLETED | OUTPATIENT
Start: 2025-02-12 | End: 2025-02-12

## 2025-02-12 RX ORDER — FLUTICASONE PROPIONATE 50 UG/1
1 SPRAY, METERED NASAL
Refills: 0 | Status: DISCONTINUED | OUTPATIENT
Start: 2025-02-12 | End: 2025-02-14

## 2025-02-12 RX ORDER — BACTERIOSTATIC SODIUM CHLORIDE 0.9 %
4 VIAL (ML) INJECTION EVERY 12 HOURS
Refills: 0 | Status: DISCONTINUED | OUTPATIENT
Start: 2025-02-12 | End: 2025-02-14

## 2025-02-12 RX ORDER — ASPIRIN 81 MG/1
81 TABLET, COATED ORAL DAILY
Refills: 0 | Status: DISCONTINUED | OUTPATIENT
Start: 2025-02-12 | End: 2025-02-14

## 2025-02-12 RX ORDER — LOSARTAN POTASSIUM 100 MG
1 TABLET ORAL
Refills: 0 | DISCHARGE

## 2025-02-12 RX ORDER — PREDNISONE 5 MG/1
50 TABLET ORAL DAILY
Refills: 0 | Status: DISCONTINUED | OUTPATIENT
Start: 2025-02-12 | End: 2025-02-13

## 2025-02-12 RX ORDER — MAGNESIUM, ALUMINUM HYDROXIDE 200-225/5
30 SUSPENSION, ORAL (FINAL DOSE FORM) ORAL EVERY 4 HOURS
Refills: 0 | Status: DISCONTINUED | OUTPATIENT
Start: 2025-02-12 | End: 2025-02-14

## 2025-02-12 RX ORDER — ASPIRIN 81 MG/1
0 TABLET, COATED ORAL
Refills: 0 | DISCHARGE

## 2025-02-12 RX ORDER — AZITHROMYCIN DIHYDRATE 500 MG/1
TABLET, FILM COATED ORAL
Refills: 0 | Status: DISCONTINUED | OUTPATIENT
Start: 2025-02-12 | End: 2025-02-14

## 2025-02-12 RX ORDER — IPRATROPIUM BROMIDE AND ALBUTEROL SULFATE .5; 2.5 MG/3ML; MG/3ML
3 SOLUTION RESPIRATORY (INHALATION) EVERY 6 HOURS
Refills: 0 | Status: DISCONTINUED | OUTPATIENT
Start: 2025-02-12 | End: 2025-02-14

## 2025-02-12 RX ORDER — IPRATROPIUM BROMIDE AND ALBUTEROL SULFATE .5; 2.5 MG/3ML; MG/3ML
3 SOLUTION RESPIRATORY (INHALATION)
Refills: 0 | Status: COMPLETED | OUTPATIENT
Start: 2025-02-12 | End: 2025-02-12

## 2025-02-12 RX ORDER — LOSARTAN POTASSIUM 100 MG
25 TABLET ORAL DAILY
Refills: 0 | Status: DISCONTINUED | OUTPATIENT
Start: 2025-02-12 | End: 2025-02-14

## 2025-02-12 RX ORDER — HEPARIN SODIUM,PORCINE 10000/ML
5000 VIAL (ML) INJECTION EVERY 8 HOURS
Refills: 0 | Status: DISCONTINUED | OUTPATIENT
Start: 2025-02-12 | End: 2025-02-14

## 2025-02-12 RX ORDER — ACETAMINOPHEN, DIPHENHYDRAMINE HCL, PHENYLEPHRINE HCL 325; 25; 5 MG/1; MG/1; MG/1
3 TABLET ORAL AT BEDTIME
Refills: 0 | Status: DISCONTINUED | OUTPATIENT
Start: 2025-02-12 | End: 2025-02-14

## 2025-02-12 RX ORDER — AZITHROMYCIN DIHYDRATE 500 MG/1
500 TABLET, FILM COATED ORAL ONCE
Refills: 0 | Status: COMPLETED | OUTPATIENT
Start: 2025-02-12 | End: 2025-02-12

## 2025-02-12 RX ORDER — CARVEDILOL 6.25 MG
12.5 TABLET ORAL EVERY 12 HOURS
Refills: 0 | Status: DISCONTINUED | OUTPATIENT
Start: 2025-02-12 | End: 2025-02-14

## 2025-02-12 RX ORDER — ACETAMINOPHEN 160 MG/5ML
650 SUSPENSION ORAL EVERY 6 HOURS
Refills: 0 | Status: DISCONTINUED | OUTPATIENT
Start: 2025-02-12 | End: 2025-02-14

## 2025-02-12 RX ADMIN — Medication 125 MILLIGRAM(S): at 18:25

## 2025-02-12 RX ADMIN — IPRATROPIUM BROMIDE AND ALBUTEROL SULFATE 3 MILLILITER(S): .5; 2.5 SOLUTION RESPIRATORY (INHALATION) at 18:25

## 2025-02-12 RX ADMIN — IPRATROPIUM BROMIDE AND ALBUTEROL SULFATE 3 MILLILITER(S): .5; 2.5 SOLUTION RESPIRATORY (INHALATION) at 19:15

## 2025-02-12 RX ADMIN — AZITHROMYCIN DIHYDRATE 250 MILLIGRAM(S): 500 TABLET, FILM COATED ORAL at 23:10

## 2025-02-12 RX ADMIN — Medication 600 MILLIGRAM(S): at 23:11

## 2025-02-12 RX ADMIN — IPRATROPIUM BROMIDE AND ALBUTEROL SULFATE 3 MILLILITER(S): .5; 2.5 SOLUTION RESPIRATORY (INHALATION) at 19:16

## 2025-02-12 NOTE — H&P ADULT - HISTORY OF PRESENT ILLNESS
69F with PMH CHF?, HTN, COPD not on O2, lung ca s/p R lobectomy s/p chemo/RT/IT in remission, CAD s/p stents, hx SVC syndrome s/p stent, hx IVC thrombus not on AC who presents with SOB. She reports she went to her outpatient cardiologist today where she was found to have O2 in 80's and was sent to the ED. She does not use oxygen at home and notes her home O2 saturation is usually in the 90's. 69F with PMH CHF?, HTN, COPD not on O2, lung ca s/p R lobectomy s/p chemo/RT/IT in remission, CAD s/p stents, hx SVC syndrome s/p stent, hx IVC thrombus not on AC who presents with SOB. She reports she was in her usual state of health prior to today but noted some mild SOB as she was going to her outpatient cardiologist. VS were stable when at her cardiologists's, however she noted worsening SOB at rest and with exertion afterwards when she returned home and called her PCP where she was able to be evaluated. There she was found to have O2 in 80's and was sent to the ED. She does not use oxygen at home and notes her home O2 saturation is usually in the 90's as she checks daily. Denies any fevers/chills, sick contacts, but notes that she has had longstanding cough/mucus that she is not relieved with medications. Had previous admission in Feb 2024 for SOB and LE edema thought to be CHF exacerbation improved w/ lasix.    In the ED patient arrived on 2L NC. She was given duonebs and solu-medrol with improvement in O2 and was able to be weaned to RA. Now reports improvement in SOB and is breathing comfortably. Labs notable for WBC 13.81, BNP 1256, trop 22, Cr 1.24. EKG NSR, RVP negative, CXR showing small unchanged R pleural effusion. CTA chest ordered to r/o PE though pt. clinically improved after medications.

## 2025-02-12 NOTE — ED PROVIDER NOTE - OBJECTIVE STATEMENT
Patient is pending transfer for inpatient psychiatry. NOELLE contacted [facility] ([phone]) and spoke with Tasia, no adult male bed available. NOELLE spoke with Lennie at [facility] ([phone]), no male bed available. NOELLE spoke with Hannah at [facility] ([phone]) no beds available. NOELLE spoke with Cristel at [facility] ([phone]), bed is available but there is a 4pm cutoff for admissions today. NOELLE faxed referral to [facility] F: [phone] for review. NOELLE will continue to follow. 69-year-old female history of COPD, lung cancer status post radiation, lobectomy, CAD, hypertension, hyperlipidemia, presenting for shortness of breath.  Patient reports that she went to her cardiologist today where she was found to have oxygen in the high 80s and sent to the emergency department.  She is having worsening cough, productive.  No fevers night sweats or sore throat.  Reports that she uses albuterol at home as needed.  Typically checks her oxygen is in the 90s at home and is not on home O2.  Denies any chest pain.

## 2025-02-12 NOTE — ED PROVIDER NOTE - PROGRESS NOTE DETAILS
Fariba Palafox PGY3 - sign out -69-year-old female with a history of COPD, lung cancer s/p radiation, lobectomy, CAD, HTN, HLD presenting with shortness of breath and hypoxia.  Concern for COPD versus HF exacerbation.  Discussed with Dr. Flores.  CTA of the chest ordered.  Admit to medicine for further workup.

## 2025-02-12 NOTE — ED PROVIDER NOTE - ATTENDING CONTRIBUTION TO CARE
69-year-old female past medical history COPD, lung cancer status post radiation, lumpectomy, CAD, hypertension, hyperlipidemia, presents from cardiology family hypoxic today.  Patient reports worsening shortness of breath, cough.  Patient reporting difficulty bringing up sputum.  Patient denies chest pain, abdominal pain.  Has lower extremity swelling which is baseline.  Patient denies nausea, vomiting, sick contacts.    Exam as above, diffuse wheezing throughout all posterior lung fields, good air movement, no retractions..    Suspect COPD exacerbation +/- viral illness.  Will assess for pneumonia, CHF.  Plan: Labs, x-ray, nebs, steroids, reassess.

## 2025-02-12 NOTE — H&P ADULT - NSHPLABSRESULTS_GEN_ALL_CORE
LABS:                          12.8   13.81 )-----------( 253      ( 12 Feb 2025 18:17 )             37.6     02-12    136  |  101  |  25[H]  ----------------------------<  98  4.5   |  22  |  1.24    Ca    9.3      12 Feb 2025 18:17    TPro  7.6  /  Alb  4.0  /  TBili  0.9  /  DBili  x   /  AST  21  /  ALT  9   /  AlkPhos  103  02-12

## 2025-02-12 NOTE — H&P ADULT - PROBLEM SELECTOR PLAN 5
-pt with LE edema nonpitting, notes is baseline for patient  -on furosemide 20mg PO, can continue daily med at this time  -if worsening edema or SOB not improved with duonebs/steroids, can consider 40 IV lasix PRN.

## 2025-02-12 NOTE — H&P ADULT - SOCIAL HISTORY
Presents with complaint of LUQ burning pain and nausea and vomiting.  States that started yesterday.  History of intestinal blockage.  Liver transplant patient.  Patient history of UC with ostomy placement.  Also has bilary drain.  
No

## 2025-02-12 NOTE — ED ADULT TRIAGE NOTE - CHIEF COMPLAINT QUOTE
Pt coming from Dr office c/o shortness of breath worse on exertion x 2 days. Pt found to be 83% on RA. Spo2 improved on 2L NC. +productive cough. Denies sick contacts. Hx emphysema, lung Ca s/p lobectomy

## 2025-02-12 NOTE — PATIENT PROFILE ADULT - FALL HARM RISK - HARM RISK INTERVENTIONS

## 2025-02-12 NOTE — H&P ADULT - PROBLEM SELECTOR PLAN 1
-pt endorsing 1 day history of SOB noted to be worsening after going to cardiologist appointment this AM.  -followed up with PCP same day and noted to be mildly hypoxic with O2 sat in high 80's and was recommended to come to ED  -sxs improved with duonebs and IV steroids in ED, now weaned off 2L to RA with clinical improvement in patient's work of breathing  -f/u CTA to r/o PE though lower suspicion given improvement w/ medications -pt endorsing 1 day history of SOB noted to be worsening after going to cardiologist appointment this AM.  -followed up with PCP same day and noted to be mildly hypoxic with O2 sat in high 80's and was recommended to come to ED  -sxs improved with duonebs and IV steroids in ED, now weaned off 2L to RA with clinical improvement in patient's work of breathing  -c/w mucinex for cough, flonase for nasal congestion  -f/u CTA to r/o PE though lower suspicion given improvement w/ medications

## 2025-02-12 NOTE — H&P ADULT - ASSESSMENT
69F with PMH CHF?, HTN, COPD not on O2, lung ca s/p R lobectomy s/p chemo/RT/IT in remission, CAD s/p stents, hx SVC syndrome s/p stent, hx IVC thrombus not on AC who presents with SOB.

## 2025-02-12 NOTE — ED ADULT NURSE NOTE - OBJECTIVE STATEMENT
Pt received in room 3. 69 year old female c/o SOB. Pt's family member is at the bedside providing additional information. Pt states when she was walking in her house, she became short of breath and her SpO2 dropped to 89%. Pt states SOB was relieved with rest. Pt endorses wet, nonproductive cough x multiple days. Pt denies fever, chills, body aches, diarrhea, constipation, or any pain at this time.    AxOx4 and ambulatory at baseline. Pt speaking in full sentences. RR even and unlabored on room air, SpO2 99%. Slight expiratory wheezing heard on bilateral bases of lungs. S1 and S2 noted, rate and rhythm regular. Abdomen soft, nondistended, and nontender. Normoactive bowel sounds heard in all 4 quadrants. Safety maintained. Medications given per MD Heard orders. All questions and concerns answered.

## 2025-02-12 NOTE — ED PROVIDER NOTE - CLINICAL SUMMARY MEDICAL DECISION MAKING FREE TEXT BOX
69-year-old female presenting with shortness of breath with multiple medical comorbidities.  Arrived on 2 L nasal cannula 100% turned off oxygen and maintaining mid 90s we will continue to observe.  Patient is in no acute distress however does have expiratory wheezing and crackles throughout will obtain cardiac and pulm workup

## 2025-02-12 NOTE — ED PROVIDER NOTE - PHYSICAL EXAMINATION
Physical Exam:  General: NAD, Conversive  Eyes: EOMI, Conjunctiva and sclera clear. Pupils equal and reactive  Neck: No JVD  Lungs: No respiratory distress, crackles in the lower lobes and slight ex wheezing  Heart: Normal peripheral perfusion  Abdomen: Soft, nontender, nondistended, no CVA tenderness  Extremities: 2+ peripheral pulses, 2+ edema bilateral lower ex  Psych: AAO X3  Neurologic: Non-focal, ambulating, CN I-XII intact

## 2025-02-12 NOTE — H&P ADULT - PROBLEM SELECTOR PLAN 2
-imaging as above  -monitor on   -will give azithromycin 500mg  -continue steroids w/ prednisone 50mg qd  -duonebs q6 PRN as needed

## 2025-02-12 NOTE — H&P ADULT - NSHPPHYSICALEXAM_GEN_ALL_CORE
VITALS:   T(C): 36.7 (02-12-25 @ 19:22), Max: 36.9 (02-12-25 @ 17:02)  HR: 86 (02-12-25 @ 19:22) (86 - 88)  BP: 174/80 (02-12-25 @ 19:22) (145/67 - 174/80)  RR: 22 (02-12-25 @ 19:22) (22 - 26)  SpO2: 100% (02-12-25 @ 19:22) (97% - 100%)    GENERAL: NAD, lying in bed comfortably  HEAD:  Atraumatic, normocephalic  EYES: EOMI, PERRLA, conjunctiva and sclera clear  ENT: Moist mucous membranes  NECK: Supple, no JVD  HEART: Regular rate and rhythm, no murmurs, rubs, or gallops  LUNGS: Unlabored respirations.  Clear to auscultation bilaterally, no crackles, wheezing, or rhonchi  ABDOMEN: Soft, nontender, nondistended, +BS  EXTREMITIES: 2+ peripheral pulses bilaterally. No clubbing, cyanosis, or edema  NERVOUS SYSTEM:  A&Ox3, no focal deficits   SKIN: No rashes or lesions VITALS:   T(C): 36.7 (02-12-25 @ 19:22), Max: 36.9 (02-12-25 @ 17:02)  HR: 86 (02-12-25 @ 19:22) (86 - 88)  BP: 174/80 (02-12-25 @ 19:22) (145/67 - 174/80)  RR: 22 (02-12-25 @ 19:22) (22 - 26)  SpO2: 100% (02-12-25 @ 19:22) (97% - 100%)    GENERAL: NAD, lying in bed comfortably  HEAD:  Atraumatic, normocephalic  EYES: EOMI, PERRLA, conjunctiva and sclera clear  ENT: Moist mucous membranes  NECK: Supple, no JVD  HEART: Regular rate and rhythm, no murmurs, rubs, or gallops  LUNGS: Unlabored respirations. +nonproductive wet cough. Mild end expiratory wheezing noted diffusely, no crackles or rhonchi  ABDOMEN: Soft, nontender, nondistended, +BS  EXTREMITIES: 2+ peripheral pulses bilaterally. No clubbing, cyanosis, + nonpitting edema of LE which patient reports is her baseline  NERVOUS SYSTEM:  A&Ox3, no focal deficits   SKIN: No rashes or lesions

## 2025-02-12 NOTE — H&P ADULT - NSHPREVIEWOFSYSTEMS_GEN_ALL_CORE

## 2025-02-13 LAB
ALBUMIN SERPL ELPH-MCNC: 3.8 G/DL — SIGNIFICANT CHANGE UP (ref 3.3–5)
ALP SERPL-CCNC: 104 U/L — SIGNIFICANT CHANGE UP (ref 40–120)
ALT FLD-CCNC: 9 U/L — SIGNIFICANT CHANGE UP (ref 4–33)
ANION GAP SERPL CALC-SCNC: 17 MMOL/L — HIGH (ref 7–14)
AST SERPL-CCNC: 16 U/L — SIGNIFICANT CHANGE UP (ref 4–32)
BILIRUB SERPL-MCNC: 0.9 MG/DL — SIGNIFICANT CHANGE UP (ref 0.2–1.2)
BUN SERPL-MCNC: 25 MG/DL — HIGH (ref 7–23)
CALCIUM SERPL-MCNC: 9.4 MG/DL — SIGNIFICANT CHANGE UP (ref 8.4–10.5)
CHLORIDE SERPL-SCNC: 98 MMOL/L — SIGNIFICANT CHANGE UP (ref 98–107)
CO2 SERPL-SCNC: 18 MMOL/L — LOW (ref 22–31)
CREAT SERPL-MCNC: 1.08 MG/DL — SIGNIFICANT CHANGE UP (ref 0.5–1.3)
EGFR: 56 ML/MIN/1.73M2 — LOW
GLUCOSE SERPL-MCNC: 178 MG/DL — HIGH (ref 70–99)
HCT VFR BLD CALC: 36.6 % — SIGNIFICANT CHANGE UP (ref 34.5–45)
HGB BLD-MCNC: 12.2 G/DL — SIGNIFICANT CHANGE UP (ref 11.5–15.5)
MAGNESIUM SERPL-MCNC: 2.1 MG/DL — SIGNIFICANT CHANGE UP (ref 1.6–2.6)
MCHC RBC-ENTMCNC: 30.7 PG — SIGNIFICANT CHANGE UP (ref 27–34)
MCHC RBC-ENTMCNC: 33.3 G/DL — SIGNIFICANT CHANGE UP (ref 32–36)
MCV RBC AUTO: 92 FL — SIGNIFICANT CHANGE UP (ref 80–100)
NRBC # BLD AUTO: 0 K/UL — SIGNIFICANT CHANGE UP (ref 0–0)
NRBC # FLD: 0 K/UL — SIGNIFICANT CHANGE UP (ref 0–0)
NRBC BLD AUTO-RTO: 0 /100 WBCS — SIGNIFICANT CHANGE UP (ref 0–0)
PHOSPHATE SERPL-MCNC: 3 MG/DL — SIGNIFICANT CHANGE UP (ref 2.5–4.5)
PLATELET # BLD AUTO: 294 K/UL — SIGNIFICANT CHANGE UP (ref 150–400)
POTASSIUM SERPL-MCNC: 4.2 MMOL/L — SIGNIFICANT CHANGE UP (ref 3.5–5.3)
POTASSIUM SERPL-SCNC: 4.2 MMOL/L — SIGNIFICANT CHANGE UP (ref 3.5–5.3)
PROT SERPL-MCNC: 7.3 G/DL — SIGNIFICANT CHANGE UP (ref 6–8.3)
RBC # BLD: 3.98 M/UL — SIGNIFICANT CHANGE UP (ref 3.8–5.2)
RBC # FLD: 13.2 % — SIGNIFICANT CHANGE UP (ref 10.3–14.5)
SODIUM SERPL-SCNC: 133 MMOL/L — LOW (ref 135–145)
WBC # BLD: 11.06 K/UL — HIGH (ref 3.8–10.5)
WBC # FLD AUTO: 11.06 K/UL — HIGH (ref 3.8–10.5)

## 2025-02-13 RX ORDER — METHYLPREDNISOLONE ACETATE 40 MG/ML
VIAL (ML) INJECTION
Refills: 0 | Status: DISCONTINUED | OUTPATIENT
Start: 2025-02-13 | End: 2025-02-14

## 2025-02-13 RX ORDER — METHYLPREDNISOLONE ACETATE 40 MG/ML
20 VIAL (ML) INJECTION EVERY 12 HOURS
Refills: 0 | Status: CANCELLED | OUTPATIENT
Start: 2025-02-16 | End: 2025-02-14

## 2025-02-13 RX ORDER — METHYLPREDNISOLONE ACETATE 40 MG/ML
20 VIAL (ML) INJECTION EVERY 8 HOURS
Refills: 0 | Status: DISCONTINUED | OUTPATIENT
Start: 2025-02-13 | End: 2025-02-14

## 2025-02-13 RX ADMIN — Medication 4 MILLILITER(S): at 11:15

## 2025-02-13 RX ADMIN — Medication 12.5 MILLIGRAM(S): at 05:13

## 2025-02-13 RX ADMIN — Medication 20 MILLIGRAM(S): at 05:13

## 2025-02-13 RX ADMIN — IPRATROPIUM BROMIDE AND ALBUTEROL SULFATE 3 MILLILITER(S): .5; 2.5 SOLUTION RESPIRATORY (INHALATION) at 11:15

## 2025-02-13 RX ADMIN — ATORVASTATIN CALCIUM 40 MILLIGRAM(S): 80 TABLET, FILM COATED ORAL at 22:09

## 2025-02-13 RX ADMIN — ASPIRIN 81 MILLIGRAM(S): 81 TABLET, COATED ORAL at 13:56

## 2025-02-13 RX ADMIN — IPRATROPIUM BROMIDE AND ALBUTEROL SULFATE 3 MILLILITER(S): .5; 2.5 SOLUTION RESPIRATORY (INHALATION) at 15:40

## 2025-02-13 RX ADMIN — AZITHROMYCIN DIHYDRATE 250 MILLIGRAM(S): 500 TABLET, FILM COATED ORAL at 22:09

## 2025-02-13 RX ADMIN — Medication 4 MILLILITER(S): at 22:32

## 2025-02-13 RX ADMIN — Medication 20 MILLIGRAM(S): at 22:09

## 2025-02-13 RX ADMIN — Medication 5000 UNIT(S): at 05:14

## 2025-02-13 RX ADMIN — PREDNISONE 50 MILLIGRAM(S): 5 TABLET ORAL at 05:14

## 2025-02-13 RX ADMIN — Medication 5000 UNIT(S): at 22:09

## 2025-02-13 RX ADMIN — Medication 5000 UNIT(S): at 13:57

## 2025-02-13 RX ADMIN — Medication 25 MILLIGRAM(S): at 05:13

## 2025-02-13 RX ADMIN — Medication 12.5 MILLIGRAM(S): at 16:42

## 2025-02-13 RX ADMIN — IPRATROPIUM BROMIDE AND ALBUTEROL SULFATE 3 MILLILITER(S): .5; 2.5 SOLUTION RESPIRATORY (INHALATION) at 22:32

## 2025-02-13 NOTE — CONSULT NOTE ADULT - NS ATTEND OPT1 GEN_ALL_CORE
I attest my time as attending is greater than 50% of the total combined time spent on qualifying patient care activities by the PA/NP and attending.
I attest my time as attending is greater than 50% of the total combined time spent on qualifying patient care activities by the PA/NP and attending.
H Plasty Text: Given the location of the defect, shape of the defect and the proximity to free margins a H-plasty was deemed most appropriate for repair.  Using a sterile surgical marker, the appropriate advancement arms of the H-plasty were drawn incorporating the defect and placing the expected incisions within the relaxed skin tension lines where possible. The area thus outlined was incised deep to adipose tissue with a #15c scalpel blade. The skin margins were undermined to an appropriate distance in all directions utilizing iris scissors.  The opposing advancement arms were then advanced into place in opposite direction and anchored with interrupted buried subcutaneous sutures.

## 2025-02-13 NOTE — CONSULT NOTE ADULT - NS ATTEND AMEND GEN_ALL_CORE FT
Patient seen and examined. Agree with plan as detailed in PA/NP Note.     cont Coreg and Losartan for HTN  C/w ASA for h/o CAD    Julio Alcaraz MD  Pager: [phone]
69 y/o F who follows with Dr. Guevara in our practice for a history of stage III NSCLC s/p chemoRT and durvalumab (completed 1/2023, currently SAHIL) who presented for SOB. She is being managed for suspected COPD exacerbation. She continues on po lasix for CHF, but is not felt to be in an acute exacerbation by cardiology. A CTA chest is pending to rule out a PE.

## 2025-02-13 NOTE — CONSULT NOTE ADULT - ASSESSMENT
69F with PMH CHF?, HTN, COPD not on O2, lung ca s/p R lobectomy s/p chemo/RT/IT in remission, CAD s/p stents, hx SVC syndrome s/p stent, hx IVC thrombus not on AC who presents with SOB. She reports she was in her usual state of health prior to today but noted some mild SOB as she was going to her outpatient cardiologist. VS were stable when at her cardiologists's, however she noted worsening SOB at rest and with exertion afterwards when she returned home and called her PCP where she was able to be evaluated. There she was found to have O2 in 80's and was sent to the ED. She does not use oxygen at home and notes her home O2 saturation is usually in the 90's as she checks daily. Denies any fevers/chills, sick contacts, but notes that she has had longstanding cough/mucus that she is not relieved with medications. Had previous admission in Feb 2024 for SOB and LE edema thought to be CHF exacerbation improved w/ lasix.  In the ED patient arrived on 2L NC. She was given duonebs and solu-medrol with improvement in O2 and was able to be weaned to RA. Now reports improvement in SOB and is breathing comfortably. Labs notable for WBC 13.81, BNP 1256, trop 22, Cr 1.24. EKG NSR, RVP negative, CXR showing small unchanged R pleural effusion. CTA chest ordered to r/o PE though pt. clinically improved after medications. (12 Feb 2025 20:59):  seems better today  : she got db in am :  sh is not wheezing at this time:  she is on room air at this time     Shortness of breath likely due to copd exacerbation   -pt endorsing 1 day history of SOB noted to be worsening after going to cardiologist appointment this AM.  -followed up with PCP same day and noted to be mildly hypoxic with O2 sat in high 80's and was recommended to come to ED  -sxs improved with duonebs and IV steroids in ED, now weaned off 2L to RA with clinical improvement in patient's work of breathing  -c/w mucinex for cough, flonase for nasal congestion  - cta chest showed: No evidence acute pulmonary emboli Otherwise no significant change from prior CT of 2/21/2024 as outlined  above.  -on zithroimax for copd   -change to  iv steroids for a few days   VBG is normal:  last ct chest in 2024 showed LUNGS AND LARGE AIRWAYS: Unchanged right-sided postsurgical changes and volume loss with unchanged the medial right upper paramediastinal probable post-surgical/post-radiation change. Unchanged deformity of right mainstem bronchus with unchanged right paratracheal and   peribronchial mediastinal soft tissue thickening..PLEURA: Unchanged mild right posterobasilar pleural thickening.  RVP is negative     CAD (coronary artery disease).   atorvastatin, aspirin.    Chronic kidney disease, unspecified CKD stage.   pt's baseline Cr appears to be 1.18-1.25  -dose renal medications accordingly.     Leg edema.   -pt with LE edema nonpitting, notes is baseline for patient  -on furosemide 20mg PO, can continue daily med at this time  -if worsening edema or SOB not improved with duonebs/steroids,    HTN (hypertension).   coreg.    dvt prophylaxis    dw team 
This is a 68 year old female with h/o lung cancer currently SAHIL who presented with shortness of breath.    1. Lung cancer   -- s/p chemo RT followed by durvalumab completed 01/17/2023   -- On surveillance, currently SAHIL.  -- Follow up with Dr. Gera Guevara of [facility] after discharge     2. Shortness of breath   -- Pt with history of lung cancer, COPD, and CHF which all may be contributing   -- Symptoms improved following nebulizers and steroids   -- Currently saturating well on room air   -- CXR clear. Pending CTA chest to r/o PE    Will continue to follow.    Magui Rodriguez PA-C  Hematology/Oncology  [facility]  [phone] (office)  [phone] (alt office)

## 2025-02-13 NOTE — CONSULT NOTE ADULT - SUBJECTIVE AND OBJECTIVE BOX
date of consult 25    HISTORY OF PRESENT ILLNESS: HPI:    69 year-old female with a pmh of HTN, HLD, CAD s/p PCI with ANETTE to RCA in 2018, Adenocarcinoma of right lung s/p right lung resection and chemotherapy, radiation and immunotherapy, history of SVC syndrome s/p stent, IVC thrombus no longer on AC, presents with worsening COLÓN.  Was found to be hypoxemic in the office and sent to ER for eval.  Reports increased congestion with cough/ unable to expectorate the mucous.  CXR with no focal consolidations.  Last TTE here 2024 with normal LV function.  Denies LE edema, orthopnea, palps.    PAST MEDICAL & SURGICAL HISTORY:  Hypertension    Hyperlipidemia    Carotid artery disease  monitored by vascular doctor Samuel    Emphysema    Hip pain    Obesity    Edema of both legs    Lung cancer  right, , completed chemotherapy 2019    Stented coronary artery    Localized enlarged lymph nodes    Scoliosis    Lumbar disc disorder    COPD (chronic obstructive pulmonary disease)    Anemia due to chemotherapy    Paralysis of left vocal cord    Glottic insufficiency    SVC syndrome    History of  section  x2 ,     S/P lobectomy of lung  RULobectomy     History of hip replacement  right 2021    Stented coronary artery  x2 stents 2018      MEDICATIONS  (STANDING):  albuterol/ipratropium for Nebulization 3 milliLiter(s) Nebulizer every 6 hours  aspirin  chewable 81 milliGRAM(s) Oral daily  atorvastatin 40 milliGRAM(s) Oral at bedtime  azithromycin  IVPB 500 milliGRAM(s) IV Intermittent every 24 hours  azithromycin  IVPB      carvedilol 12.5 milliGRAM(s) Oral every 12 hours  furosemide    Tablet 20 milliGRAM(s) Oral daily  heparin   Injectable 5000 Unit(s) SubCutaneous every 8 hours  losartan 25 milliGRAM(s) Oral daily  predniSONE   Tablet 50 milliGRAM(s) Oral daily  sodium chloride 3%  Inhalation 4 milliLiter(s) Inhalation every 12 hours      Allergies  tetracycline (Short breath; Hives)  penicillin (Short breath; Hives)    Intolerances    FAMILY HISTORY:  Family history of CHF (congestive heart failure) (Aunt)  Noncontributory for premature coronary disease or sudden cardiac death    SOCIAL HISTORY:    [x ] Non-smoker  [ ] Smoker  [ ] Alcohol    FLU VACCINE THIS YEAR STARTS IN AUGUST:  [ ] Yes    [ ] No    IF OVER 65 HAVE YOU EVER HAD A PNA VACCINE:  [ ] Yes    [ ] No       [ ] N/A      REVIEW OF SYSTEMS:  [ ]chest pain  [ x ]shortness of breath  [  ]palpitations  [  ]syncope  [ ]near syncope [ ]upper extremity weakness   [ ] lower extremity weakness  [  ]diplopia  [  ]altered mental status   [  ]fevers  [ ]chills [ ]nausea  [ ]vomiting  [  ]dysphagia    [ ]abdominal pain  [ ]melena  [ ]BRBPR    [  ]epistaxis  [  ]rash    [ ]lower extremity edema        [x ] All others negative	  [ ] Unable to obtain      LABS:	 	    CARDIAC MARKERS:  Troponin T, High Sensitivity Result: 22, 19                          12.2   11.06 )-----------( 294      ( 2025 07:05 )             36.6     Hb Trend: 12.2<--, 12.8<--        133[L]  |  98  |  25[H]  ----------------------------<  178[H]  4.2   |  18[L]  |  1.08    Ca    9.4      2025 07:05  Phos  3.0       Mg     2.10         TPro  7.3  /  Alb  3.8  /  TBili  0.9  /  DBili  x   /  AST  16  /  ALT  9   /  AlkPhos  104      Creatinine Trend: 1.08<--, 1.24<--    PHYSICAL EXAM:  T(C): 36.4 (25 @ 11:51), Max: 36.9 (25 @ 17:02)  HR: 71 (25 @ 11:51) (70 - 90)  BP: 151/72 (25 @ 11:51) (145/66 - 174/80)  RR: 18 (25 @ 11:51) (17 - 26)  SpO2: 97% (25 @ 11:51) (95% - 100%)  Wt(kg): --   BMI (kg/m2): 35.6 (25 @ 21:50)  I&O's Summary      Gen: Appears well in NAD  HEENT:  (-)icterus (-)pallor  CV: N S1 S2 1/6 MIKO (+)2 Pulses B/l  Resp:  decreased BS bases  GI: (+) BS Soft, NT, ND  Lymph:  (-)Edema, (-)obvious lymphadenopathy  Skin: Warm to touch, Normal turgor  Psych: Appropriate mood and affect  	      ECG:  NSR	      < from: Xray Chest 1 View AP/PA (25 @ 19:11) >  IMPRESSION: No focal consolidations.    < end of copied text >    ASSESSMENT/PLAN: 	69 year-old female with a pmh of HTN, HLD, CAD s/p PCI with ANETTE to RCA in 2018, Adenocarcinoma of right lung s/p right lung resection and chemotherapy, radiation and immunotherapy, history of SVC syndrome s/p stent, IVC thrombus no longer on AC, presents with worsening COLÓN.  Was found to be hypoxemic in the office and sent to ER for eval.  Reports increased congestion with cough/ unable to expectorate the mucous.  CXR with no focal consolidations.  Last TTE here 2024 with normal LV function    --Not in clinical CHF  --cont PO Lasix  --F/U CTPA  --cont Coreg and Losartan for HTN  --Nebs/steroids per pulm, suspect post radiation changes  --cont ASA 81mg and Statin for hx CAD    Thank you for allowing us to participate in the care of our mutual patient.  Please do not hesitate to call with any questions.     Kristan MIRANDA  [phone]         
  -- @ 12:15    Patient is a 69y old  Female who presents with a chief complaint of SOB (2025 20:59)      HPI:  69F with PMH CHF?, HTN, COPD not on O2, lung ca s/p R lobectomy s/p chemo/RT/IT in remission, CAD s/p stents, hx SVC syndrome s/p stent, hx IVC thrombus not on AC who presents with SOB. She reports she was in her usual state of health prior to today but noted some mild SOB as she was going to her outpatient cardiologist. VS were stable when at her cardiologists's, however she noted worsening SOB at rest and with exertion afterwards when she returned home and called her PCP where she was able to be evaluated. There she was found to have O2 in 80's and was sent to the ED. She does not use oxygen at home and notes her home O2 saturation is usually in the 90's as she checks daily. Denies any fevers/chills, sick contacts, but notes that she has had longstanding cough/mucus that she is not relieved with medications. Had previous admission in 2024 for SOB and LE edema thought to be CHF exacerbation improved w/ lasix.    In the ED patient arrived on 2L NC. She was given duonebs and solu-medrol with improvement in O2 and was able to be weaned to RA. Now reports improvement in SOB and is breathing comfortably. Labs notable for WBC 13.81, BNP 1256, trop 22, Cr 1.24. EKG NSR, RVP negative, CXR showing small unchanged R pleural effusion. CTA chest ordered to r/o PE though pt. clinically improved after medications. (2025 20:59):    seems better today  : she got db in am :  sh is not wheezing at this time:  she is on room air at this time       ?FOLLOWING PRESENT  [x ] Hx of PE/DVT, [y ] Hx COPD, x ] Hx of Asthma, [ y] Hx of Hospitalization, [x  Hx of BiPAP/CPAP use, [ x] Hx of DOMINIC    Allergies    tetracycline (Short breath; Hives)  penicillin (Short breath; Hives)    Intolerances        PAST MEDICAL & SURGICAL HISTORY:  Hypertension      Hyperlipidemia      Carotid artery disease  monitored by vascluar doctor Samuel      Emphysema      Hip pain      Obesity      Edema of both legs      Lung cancer  right, , completed chemotherapy 2019      Stented coronary artery      Localized enlarged lymph nodes      Scoliosis      Lumbar disc disorder      COPD (chronic obstructive pulmonary disease)      Anemia due to chemotherapy      Paralysis of left vocal cord      Glottic insufficiency      SVC syndrome      History of  section  x2 ,       S/P lobectomy of lung  RULobectomy 2019      History of hip replacement  right 2021      Stented coronary artery  x2 stents 2018          FAMILY HISTORY:  Family history of CHF (congestive heart failure) (Aunt)        Social History: [ former smoker  ] TOBACCO                  [  x] ETOH                                 [  x] IVDA/DRUGS    REVIEW OF SYSTEMS      General:	x    Skin/Breast:x  	  Ophthalmologic:x  	  ENMT:	x    Respiratory and Thorax:  sob,  alonso wheezing  	  Cardiovascular:	x    Gastrointestinal:	x    Genitourinary:	x    Musculoskeletal:	x    Neurological:	x    Psychiatric:	x    Hematology/Lymphatics:	xx    Endocrine:	x    Allergic/Immunologic:	x    MEDICATIONS  (STANDING):  albuterol/ipratropium for Nebulization 3 milliLiter(s) Nebulizer every 6 hours  aspirin  chewable 81 milliGRAM(s) Oral daily  atorvastatin 40 milliGRAM(s) Oral at bedtime  azithromycin  IVPB 500 milliGRAM(s) IV Intermittent every 24 hours  azithromycin  IVPB      carvedilol 12.5 milliGRAM(s) Oral every 12 hours  furosemide    Tablet 20 milliGRAM(s) Oral daily  heparin   Injectable 5000 Unit(s) SubCutaneous every 8 hours  losartan 25 milliGRAM(s) Oral daily  predniSONE   Tablet 50 milliGRAM(s) Oral daily  sodium chloride 3%  Inhalation 4 milliLiter(s) Inhalation every 12 hours    MEDICATIONS  (PRN):  acetaminophen     Tablet .. 650 milliGRAM(s) Oral every 6 hours PRN Temp greater or equal to 38C (100.4F), Mild Pain (1 - 3)  aluminum hydroxide/magnesium hydroxide/simethicone Suspension 30 milliLiter(s) Oral every 4 hours PRN Dyspepsia  fluticasone propionate 50 MICROgram(s)/spray Nasal Spray 1 Spray(s) Both Nostrils two times a day PRN Nasal congestion  melatonin 3 milliGRAM(s) Oral at bedtime PRN Insomnia  ondansetron Injectable 4 milliGRAM(s) IV Push every 8 hours PRN Nausea and/or Vomiting       Vital Signs Last 24 Hrs  T(C): 36.4 (2025 11:51), Max: 36.9 (2025 17:02)  T(F): 97.6 (2025 11:51), Max: 98.4 (2025 17:02)  HR: 71 (2025 11:51) (70 - 90)  BP: 151/72 (2025 11:51) (145/66 - 174/80)  BP(mean): --  RR: 18 (2025 11:51) (17 - 26)  SpO2: 97% (2025 11:51) (95% - 100%)    Parameters below as of 2025 11:51  Patient On (Oxygen Delivery Method): room air    Orthostatic VS          I&O's Summary      Physical Exam:   GENERAL: NAD, well-groomed, well-developed  HEENT: BAYLEE/   Atraumatic, Normocephalic  ENMT: No tonsillar erythema, exudates, or enlargement; Moist mucous membranes, Good dentition, No lesions  NECK: Supple, No JVD, Normal thyroid  CHEST/LUNG: poor air entry bilaterally:   CVS: Regular rate and rhythm; No murmurs, rubs, or gallops  GI: : Soft, Nontender, Nondistended; Bowel sounds present  NERVOUS SYSTEM:  Alert & Oriented X3  EXTREMITIES: - edema  LYMPH: No lymphadenopathy noted  SKIN: No rashes or lesions  ENDOCRINOLOGY: No Thyromegaly  PSYCH: Appropriate    Labs:  0.7<40<4>>42<<7.415>>0.7<<3><<4><<5<<429>>                            12.2   11.06 )-----------( 294      ( 2025 07:05 )             36.6                         12.8   13.81 )-----------( 253      ( 2025 18:17 )             37.6     02-13    133[L]  |  98  |  25[H]  ----------------------------<  178[H]  4.2   |  18[L]  |  1.08  02-12    136  |  101  |  25[H]  ----------------------------<  98  4.5   |  22  |  1.24    Ca    9.4      2025 07:05  Ca    9.3      2025 18:17  Phos  3.0     02-13  Mg     2.10     02-13    TPro  7.3  /  Alb  3.8  /  TBili  0.9  /  DBili  x   /  AST  16  /  ALT  9   /  AlkPhos  104  02-13  TPro  7.6  /  Alb  4.0  /  TBili  0.9  /  DBili  x   /  AST  21  /  ALT  9   /  AlkPhos  103  02-12    CAPILLARY BLOOD GLUCOSE        LIVER FUNCTIONS - ( 2025 07:05 )  Alb: 3.8 g/dL / Pro: 7.3 g/dL / ALK PHOS: 104 U/L / ALT: 9 U/L / AST: 16 U/L / GGT: x             Urinalysis Basic - ( 2025 07:05 )    Color: x / Appearance: x / SG: x / pH: x  Gluc: 178 mg/dL / Ketone: x  / Bili: x / Urobili: x   Blood: x / Protein: x / Nitrite: x   Leuk Esterase: x / RBC: x / WBC x   Sq Epi: x / Non Sq Epi: x / Bacteria: x      D DImer      Studies  Chest X-RAY  CT SCAN Chest   CT Abdomen  Venous Dopplers: LE:   Others      < from: Xray Chest 1 View AP/PA (25 @ 19:11) >        INTERPRETATION:  EXAMINATION: XR CHEST    CLINICAL INDICATION: Shortness of Breath    TECHNIQUE: Frontal images of the chest from 57:11 PM    COMPARISON: Chest x-ray from 2024.    FINDINGS:  Left brachiocephalic/SVC vascular stent, left chest wall port and   coronary artery stents.    The heart size is not accurately assessed on this projection.  Right basilar linear opacities.  Small right pleural effusion, unchanged. No pneumothorax.    IMPRESSION: No focal consolidations.      --- End of Report ---          KITA YO MD; Resident Radiologist  This document has been electronically signed.  DARRYL WHITTAKER MD; AttendingRadiologist  This document has been electronically signed. 2025  9:03AM    < end of copied text >  < from: CT Angio Chest PE Protocol w/ IV Cont (24 @ 09:12) >  ACC: [id number] EXAM:  CT ANGIO CHEST PULM ART WAWIC   ORDERED BY: GENESIS GRANT     PROCEDURE DATE:  2024          INTERPRETATION:  CLINICAL INFORMATION: shortness of breath; evaluate for   pulmonary emboli    COMPARISON: CTA chest 2024    CONTRAST/COMPLICATIONS:  IV Contrast: Omnipaque 350  40 cc administered   60 cc discarded  Oral Contrast: NONE  Complications: None reported at time of study completion    PROCEDURE:  CT Angiography of the Chest.  Sagittal and coronal reformats were performed as well as 3D (MIP)   reconstructions.    FINDINGS:    LUNGS AND LARGE AIRWAYS: Unchanged right-sided postsurgical changes and   volume loss with unchanged the medial right upper paramediastinal   probable post-surgical/post-radiation change. Unchanged deformity of   right mainstem bronchus with unchanged right paratracheal and   peribronchial mediastinal soft tissue thickening..  PLEURA: Unchanged mild right posterobasilar pleural thickening.  VESSELS: No pulmonary arterial filling defects. Unchanged tortuosity and   calcification of the aorta and brachiocephalic vessels. No change in   position of left innominate and superior vena caval stent or in   endoluminal density within stent lumen.  HEART: Normal in size. Coronary arterycalcification. No pericardial   effusion.  MEDIASTINUM AND FARIHA: No significant lymphadenopathy.  CHEST WALL AND LOWER NECK: Left -sided port catheter.  VISUALIZED UPPER ABDOMEN: Unchanged mild adrenal thickening (left greater   than right). Incompletely imaged small right renal cyst. Incompletely   imaged symmetric bilateral perinephric stranding.  BONES: Unchanged in appearance without fracture or focal destructive or   blastic lesion.    IMPRESSION:    No evidence acute pulmonary emboli    Otherwise no significant change from prior CT of 2024 as outlined   above.    --- End of Report ---            TOPHER CALDERÓN MD; Attending Nuclear Medicine  This document has been electronically signed. Sep  7 2024  9:51AM    < end of copied text >              
Reason for consult:    HPI:  69F with PMH CHF?, HTN, COPD not on O2, lung ca s/p R lobectomy s/p chemo/RT/IT in remission, CAD s/p stents, hx SVC syndrome s/p stent, hx IVC thrombus not on AC who presents with SOB. She reports she was in her usual state of health prior to today but noted some mild SOB as she was going to her outpatient cardiologist. VS were stable when at her cardiologists's, however she noted worsening SOB at rest and with exertion afterwards when she returned home and called her PCP where she was able to be evaluated. There she was found to have O2 in 80's and was sent to the ED. She does not use oxygen at home and notes her home O2 saturation is usually in the 90's as she checks daily. Denies any fevers/chills, sick contacts, but notes that she has had longstanding cough/mucus that she is not relieved with medications. Had previous admission in 2024 for SOB and LE edema thought to be CHF exacerbation improved w/ lasix.    In the ED patient arrived on 2L NC. She was given duonebs and solu-medrol with improvement in O2 and was able to be weaned to RA. Now reports improvement in SOB and is breathing comfortably. Labs notable for WBC 13.81, BNP 1256, trop 22, Cr 1.24. EKG NSR, RVP negative, CXR showing small unchanged R pleural effusion. CTA chest ordered to r/o PE though pt. clinically improved after medications. (2025 20:59)    Hematology/Oncology consulted on this 68 year old female with h/o lung cancer who presented with shortness of breath and hypoxia. Patient is under care of Dr. Gera Guevara of [facility] for surveillance of her lung cancer. She has completed treatment with chemo + RT followed by durvalumab, completed on 2023, currently SAHIL.  Patient seen this morning. States that yesterday she began to experience worsening shortness of breath and was found to be hypoxic to mid-80s, so advised to come to ER. Currently breathing feels improved.      PAST MEDICAL & SURGICAL HISTORY:  Hypertension      Hyperlipidemia      Carotid artery disease  monitored by vascluar doctor Samuel      Emphysema      Hip pain      Obesity      Edema of both legs      Lung cancer  right, , completed chemotherapy 2019      Stented coronary artery      Localized enlarged lymph nodes      Scoliosis      Lumbar disc disorder      COPD (chronic obstructive pulmonary disease)      Anemia due to chemotherapy      Paralysis of left vocal cord      Glottic insufficiency      SVC syndrome      History of  section  x2 ,       S/P lobectomy of lung  RULobectomy 2019      History of hip replacement  right 2021      Stented coronary artery  x2 stents 2018          FAMILY HISTORY:  Family history of CHF (congestive heart failure) (Aunt)        Alochol: Denied  Smoking: Nonsmoker  Drug Use: Denied  Marital Status:         Allergies    tetracycline (Short breath; Hives)  penicillin (Short breath; Hives)    Intolerances        MEDICATIONS  (STANDING):  albuterol/ipratropium for Nebulization 3 milliLiter(s) Nebulizer every 6 hours  aspirin  chewable 81 milliGRAM(s) Oral daily  atorvastatin 40 milliGRAM(s) Oral at bedtime  azithromycin  IVPB 500 milliGRAM(s) IV Intermittent every 24 hours  azithromycin  IVPB      carvedilol 12.5 milliGRAM(s) Oral every 12 hours  furosemide    Tablet 20 milliGRAM(s) Oral daily  heparin   Injectable 5000 Unit(s) SubCutaneous every 8 hours  losartan 25 milliGRAM(s) Oral daily  predniSONE   Tablet 50 milliGRAM(s) Oral daily  sodium chloride 3%  Inhalation 4 milliLiter(s) Inhalation every 12 hours    MEDICATIONS  (PRN):  acetaminophen     Tablet .. 650 milliGRAM(s) Oral every 6 hours PRN Temp greater or equal to 38C (100.4F), Mild Pain (1 - 3)  aluminum hydroxide/magnesium hydroxide/simethicone Suspension 30 milliLiter(s) Oral every 4 hours PRN Dyspepsia  fluticasone propionate 50 MICROgram(s)/spray Nasal Spray 1 Spray(s) Both Nostrils two times a day PRN Nasal congestion  melatonin 3 milliGRAM(s) Oral at bedtime PRN Insomnia  ondansetron Injectable 4 milliGRAM(s) IV Push every 8 hours PRN Nausea and/or Vomiting      ROS  No fever, sweats, chills  No epistaxis, HA, sore throat  +shortness of breath  No n/v/d, abd pain, melena, hematochezia  No rash  No anxiety  No back pain, joint pain  No bleeding, bruising  No dysuria, hematuria    T(C): 36.4 (25 @ 11:51), Max: 36.9 (02-12-25 @ 17:02)  HR: 71 (25 @ 11:51) (70 - 90)  BP: 151/72 (25 @ 11:51) (145/66 - 174/80)  RR: 18 (25 @ 11:51) (17 - 26)  SpO2: 97% (25 @ 11:51) (95% - 100%)  Wt(kg): --    PE  NAD  Awake, alert  Anicteric, MMM  +LE edema  No rash grossly  FROM                          12.2   11.06 )-----------( 294      ( 2025 07:05 )             36.6           133[L]  |  98  |  25[H]  ----------------------------<  178[H]  4.2   |  18[L]  |  1.08    Ca    9.4      2025 07:05  Phos  3.0       Mg     2.10         TPro  7.3  /  Alb  3.8  /  TBili  0.9  /  DBili  x   /  AST  16  /  ALT  9   /  AlkPhos  104            ACC: [id number] EXAM:  XR CHEST AP OR PA 1V   ORDERED BY: MARQUISE PETERS     PROCEDURE DATE:  2025          INTERPRETATION:  EXAMINATION: XR CHEST    CLINICAL INDICATION: Shortness of Breath    TECHNIQUE: Frontal images of the chest from 2025 7:11 PM    COMPARISON: Chest x-ray from 2024.    FINDINGS:  Left brachiocephalic/SVC vascular stent, left chest wall port and   coronary artery stents.    The heart size is not accurately assessed on this projection.  Right basilar linear opacities.  Small right pleural effusion, unchanged. No pneumothorax.    IMPRESSION: No focal consolidations.      --- End of Report ---

## 2025-02-14 ENCOUNTER — TRANSCRIPTION ENCOUNTER (OUTPATIENT)
Age: 70
End: 2025-02-14

## 2025-02-14 ENCOUNTER — APPOINTMENT (OUTPATIENT)
Dept: PULMONOLOGY | Facility: CLINIC | Age: 70
End: 2025-02-14

## 2025-02-14 VITALS
TEMPERATURE: 98 F | SYSTOLIC BLOOD PRESSURE: 145 MMHG | HEART RATE: 71 BPM | RESPIRATION RATE: 18 BRPM | OXYGEN SATURATION: 97 % | DIASTOLIC BLOOD PRESSURE: 74 MMHG

## 2025-02-14 LAB
ANION GAP SERPL CALC-SCNC: 16 MMOL/L — HIGH (ref 7–14)
BUN SERPL-MCNC: 34 MG/DL — HIGH (ref 7–23)
CALCIUM SERPL-MCNC: 9.6 MG/DL — SIGNIFICANT CHANGE UP (ref 8.4–10.5)
CHLORIDE SERPL-SCNC: 98 MMOL/L — SIGNIFICANT CHANGE UP (ref 98–107)
CO2 SERPL-SCNC: 19 MMOL/L — LOW (ref 22–31)
CREAT SERPL-MCNC: 1.01 MG/DL — SIGNIFICANT CHANGE UP (ref 0.5–1.3)
EGFR: 60 ML/MIN/1.73M2 — SIGNIFICANT CHANGE UP
GLUCOSE SERPL-MCNC: 154 MG/DL — HIGH (ref 70–99)
HCT VFR BLD CALC: 34.9 % — SIGNIFICANT CHANGE UP (ref 34.5–45)
HGB BLD-MCNC: 12.3 G/DL — SIGNIFICANT CHANGE UP (ref 11.5–15.5)
MCHC RBC-ENTMCNC: 31.9 PG — SIGNIFICANT CHANGE UP (ref 27–34)
MCHC RBC-ENTMCNC: 35.2 G/DL — SIGNIFICANT CHANGE UP (ref 32–36)
MCV RBC AUTO: 90.4 FL — SIGNIFICANT CHANGE UP (ref 80–100)
NRBC # BLD AUTO: 0 K/UL — SIGNIFICANT CHANGE UP (ref 0–0)
NRBC # FLD: 0 K/UL — SIGNIFICANT CHANGE UP (ref 0–0)
NRBC BLD AUTO-RTO: 0 /100 WBCS — SIGNIFICANT CHANGE UP (ref 0–0)
PLATELET # BLD AUTO: 313 K/UL — SIGNIFICANT CHANGE UP (ref 150–400)
POTASSIUM SERPL-MCNC: 4.7 MMOL/L — SIGNIFICANT CHANGE UP (ref 3.5–5.3)
POTASSIUM SERPL-SCNC: 4.7 MMOL/L — SIGNIFICANT CHANGE UP (ref 3.5–5.3)
RBC # BLD: 3.86 M/UL — SIGNIFICANT CHANGE UP (ref 3.8–5.2)
RBC # FLD: 13.2 % — SIGNIFICANT CHANGE UP (ref 10.3–14.5)
SODIUM SERPL-SCNC: 133 MMOL/L — LOW (ref 135–145)
WBC # BLD: 17.52 K/UL — HIGH (ref 3.8–10.5)
WBC # FLD AUTO: 17.52 K/UL — HIGH (ref 3.8–10.5)

## 2025-02-14 PROCEDURE — 71275 CT ANGIOGRAPHY CHEST: CPT | Mod: 26

## 2025-02-14 RX ADMIN — Medication 20 MILLIGRAM(S): at 05:10

## 2025-02-14 RX ADMIN — Medication 12.5 MILLIGRAM(S): at 05:11

## 2025-02-14 RX ADMIN — Medication 25 MILLIGRAM(S): at 05:11

## 2025-02-14 RX ADMIN — ASPIRIN 81 MILLIGRAM(S): 81 TABLET, COATED ORAL at 09:37

## 2025-02-14 RX ADMIN — Medication 5000 UNIT(S): at 05:10

## 2025-02-14 RX ADMIN — IPRATROPIUM BROMIDE AND ALBUTEROL SULFATE 3 MILLILITER(S): .5; 2.5 SOLUTION RESPIRATORY (INHALATION) at 16:22

## 2025-02-14 RX ADMIN — Medication 20 MILLIGRAM(S): at 13:04

## 2025-02-14 RX ADMIN — Medication 12.5 MILLIGRAM(S): at 18:01

## 2025-02-14 RX ADMIN — Medication 20 MILLIGRAM(S): at 05:11

## 2025-02-14 RX ADMIN — Medication 5000 UNIT(S): at 13:03

## 2025-02-14 NOTE — DISCHARGE NOTE NURSING/CASE MANAGEMENT/SOCIAL WORK - PATIENT PORTAL LINK FT
You can access the FollowMyHealth Patient Portal offered by [facility] by registering at the following website: [email or web address]. By joining [employer] [facility]’s FollowMyHealth portal, you will also be able to view your health information using other applications (apps) compatible with our system.

## 2025-02-14 NOTE — CHART NOTE - NSCHARTNOTEFT_GEN_A_CORE
Night Coverage Med ACP    Notified by RN pt would like to go over CTA result.  Pt was seen at bedside A & Ox4 in no apparent acute distress speech clear states she would like to leave tonight, pt is very anxious and insisting on leaving tonight  Dr Flores made aware.  Pt will be discharging tonight resuming all her home meds, and sent prednisone 40 mg qd x 5 days.      Britni KATZP-C ACP

## 2025-02-14 NOTE — PROGRESS NOTE ADULT - PROBLEM SELECTOR PLAN 5
resolved  resume low dose furosemide on discharge
-pt with LE edema nonpitting, notes is baseline for patient  -on furosemide 20mg PO, can continue daily med at this time  -if worsening edema or SOB not improved with duonebs/steroids, can consider 40 IV lasix PRN.

## 2025-02-14 NOTE — PROGRESS NOTE ADULT - SUBJECTIVE AND OBJECTIVE BOX
Date of service  2/14/25    SOB improved, no pain or palps    Review of Systems:   Constitutional: [ ] fevers, [ ] chills.   Skin: [ ] dry skin. [ ] rashes.  Psychiatric: [ ] depression, [ ] anxiety.   Gastrointestinal: [ ] BRBPR, [ ] melena.   Neurological: [ ] confusion. [ ] seizures. [ ] shuffling gait.   Ears,Nose,Mouth and Throat: [ ] ear pain [ ] sore throat.   Eyes: [ ] diplopia.   Respiratory: [ ] hemoptysis. [ ] shortness of breath  Cardiovascular: See HPI above  Hematologic/Lymphatic: [ ] anemia. [ ] painful nodes. [ ] prolonged bleeding.   Genitourinary: [ ] hematuria. [ ] flank pain.   Endocrine: [ ] significant change in weight. [ ] intolerance to heat and cold.     Review of systems [x ] otherwise negative, [ ] otherwise unable to obtain    FH: no family history of sudden cardiac death in first degree relatives    SH: [ ] tobacco, [ ] alcohol, [ ] drugs    acetaminophen     Tablet .. 650 milliGRAM(s) Oral every 6 hours PRN  albuterol/ipratropium for Nebulization 3 milliLiter(s) Nebulizer every 6 hours  aluminum hydroxide/magnesium hydroxide/simethicone Suspension 30 milliLiter(s) Oral every 4 hours PRN  aspirin  chewable 81 milliGRAM(s) Oral daily  atorvastatin 40 milliGRAM(s) Oral at bedtime  azithromycin  IVPB 500 milliGRAM(s) IV Intermittent every 24 hours  azithromycin  IVPB      carvedilol 12.5 milliGRAM(s) Oral every 12 hours  fluticasone propionate 50 MICROgram(s)/spray Nasal Spray 1 Spray(s) Both Nostrils two times a day PRN  furosemide    Tablet 20 milliGRAM(s) Oral daily  heparin   Injectable 5000 Unit(s) SubCutaneous every 8 hours  losartan 25 milliGRAM(s) Oral daily  melatonin 3 milliGRAM(s) Oral at bedtime PRN  methylPREDNISolone sodium succinate Injectable 20 milliGRAM(s) IV Push every 8 hours  methylPREDNISolone sodium succinate Injectable   IV Push   ondansetron Injectable 4 milliGRAM(s) IV Push every 8 hours PRN  sodium chloride 3%  Inhalation 4 milliLiter(s) Inhalation every 12 hours                            12.3   17.52 )-----------( 313      ( 14 Feb 2025 05:04 )             34.9       02-14    133[L]  |  98  |  34[H]  ----------------------------<  154[H]  4.7   |  19[L]  |  1.01    Ca    9.6      14 Feb 2025 05:04  Phos  3.0     02-13  Mg     2.10     02-13    TPro  7.3  /  Alb  3.8  /  TBili  0.9  /  DBili  x   /  AST  16  /  ALT  9   /  AlkPhos  104  02-13      T(C): 36.8 (02-14-25 @ 04:10), Max: 36.8 (02-14-25 @ 04:10)  HR: 81 (02-14-25 @ 04:10) (69 - 81)  BP: 145/69 (02-14-25 @ 04:10) (139/65 - 145/69)  RR: 18 (02-14-25 @ 04:10) (18 - 18)  SpO2: 98% (02-14-25 @ 04:10) (95% - 98%)    General: Well nourished in no acute distress. Alert and Oriented * 3.   Head: Normocephalic and atraumatic.   Neck: No JVD. No bruits. Supple. Does not appear to be enlarged.   Cardiovascular: + S1,S2 ; RRR Soft systolic murmur at the left lower sternal border. No rubs noted.    Lungs: CTA b/l. No rhonchi, rales or wheezes.   Abdomen: + BS, soft. Non tender. Non distended. No rebound. No guarding.   Extremities: No clubbing/cyanosis/edema.   Neurologic: Moves all four extremities. Full range of motion.   Skin: Warm and moist. The patient's skin has normal elasticity and good skin turgor.   Psychiatric: Appropriate mood and affect.  Musculoskeletal: Normal range of motion, normal strength    DATA    ECG:  NSR	      < from: Xray Chest 1 View AP/PA (02.12.25 @ 19:11) >  IMPRESSION: No focal consolidations.    < end of copied text >    < from: CT Angio Chest PE Protocol w/ IV Cont (02.14.25 @ 11:08) >  IMPRESSION: No pulmonary embolus is noted.    Stable small groundglass nodule in the right lower lobe when compared to   previous exam.    < end of copied text >      ASSESSMENT/PLAN: 	69 year-old female with a pmh of HTN, HLD, CAD s/p PCI with ANETTE to RCA in January 2018, Adenocarcinoma of right lung s/p right lung resection and chemotherapy, radiation and immunotherapy, history of SVC syndrome s/p stent, IVC thrombus no longer on AC, presents with worsening COLÓN.  Was found to be hypoxemic in the office and sent to ER for eval.  Reports increased congestion with cough/ unable to expectorate the mucous.  CXR with no focal consolidations.  Last TTE here 2/2024 with normal LV function    --Not in clinical CHF  --cont PO Lasix  --CTPA noted, no PE  --cont Coreg and Losartan for HTN  --Nebs/steroids per pulm, suspect post radiation changes  --cont ASA 81mg and Statin for hx CAD    Thank you for allowing us to participate in the care of our mutual patient.  Please do not hesitate to call with any questions.   F/U with Dr Quinn in 2 weeks, [phone]    Kristan MIRANDA  [phone] 
Date of Service: 02-14-25 @ 14:25    Patient is a 69y old  Female who presents with a chief complaint of SOB (14 Feb 2025 11:52)      Any change in ROS: seems to be doing good:  sob is ernestina r    MEDICATIONS  (STANDING):  albuterol/ipratropium for Nebulization 3 milliLiter(s) Nebulizer every 6 hours  aspirin  chewable 81 milliGRAM(s) Oral daily  atorvastatin 40 milliGRAM(s) Oral at bedtime  azithromycin  IVPB 500 milliGRAM(s) IV Intermittent every 24 hours  azithromycin  IVPB      carvedilol 12.5 milliGRAM(s) Oral every 12 hours  furosemide    Tablet 20 milliGRAM(s) Oral daily  heparin   Injectable 5000 Unit(s) SubCutaneous every 8 hours  losartan 25 milliGRAM(s) Oral daily  methylPREDNISolone sodium succinate Injectable 20 milliGRAM(s) IV Push every 8 hours  methylPREDNISolone sodium succinate Injectable   IV Push   sodium chloride 3%  Inhalation 4 milliLiter(s) Inhalation every 12 hours    MEDICATIONS  (PRN):  acetaminophen     Tablet .. 650 milliGRAM(s) Oral every 6 hours PRN Temp greater or equal to 38C (100.4F), Mild Pain (1 - 3)  aluminum hydroxide/magnesium hydroxide/simethicone Suspension 30 milliLiter(s) Oral every 4 hours PRN Dyspepsia  fluticasone propionate 50 MICROgram(s)/spray Nasal Spray 1 Spray(s) Both Nostrils two times a day PRN Nasal congestion  melatonin 3 milliGRAM(s) Oral at bedtime PRN Insomnia  ondansetron Injectable 4 milliGRAM(s) IV Push every 8 hours PRN Nausea and/or Vomiting    Vital Signs Last 24 Hrs  T(C): 36.3 (14 Feb 2025 12:20), Max: 36.8 (14 Feb 2025 04:10)  T(F): 97.3 (14 Feb 2025 12:20), Max: 98.2 (14 Feb 2025 04:10)  HR: 66 (14 Feb 2025 12:20) (66 - 81)  BP: 129/87 (14 Feb 2025 12:20) (129/87 - 145/69)  BP(mean): --  RR: 18 (14 Feb 2025 12:20) (18 - 18)  SpO2: 98% (14 Feb 2025 12:20) (95% - 98%)    Parameters below as of 14 Feb 2025 12:20  Patient On (Oxygen Delivery Method): room air        I&O's Summary        Physical Exam:   GENERAL: NAD, well-groomed, well-developed  HEENT: BAYLEE/   Atraumatic, Normocephalic  ENMT: No tonsillar erythema, exudates, or enlargement; Moist mucous membranes, Good dentition, No lesions  NECK: Supple, No JVD, Normal thyroid  CHEST/LUNG: poor air en nell with mild wheezing  CVS: Regular rate and rhythm; No murmurs, rubs, or gallops  GI: : Soft, Nontender, Nondistended; Bowel sounds present  NERVOUS SYSTEM:  Alert & Oriented X3  EXTREMITIES:  -edema  LYMPH: No lymphadenopathy noted  SKIN: No rashes or lesions  ENDOCRINOLOGY: No Thyromegaly  PSYCH: Appropriate    Labs:  25                            12.3   17.52 )-----------( 313      ( 14 Feb 2025 05:04 )             34.9                         12.2   11.06 )-----------( 294      ( 13 Feb 2025 07:05 )             36.6                         12.8   13.81 )-----------( 253      ( 12 Feb 2025 18:17 )             37.6     02-14    133[L]  |  98  |  34[H]  ----------------------------<  154[H]  4.7   |  19[L]  |  1.01  02-13    133[L]  |  98  |  25[H]  ----------------------------<  178[H]  4.2   |  18[L]  |  1.08  02-12    136  |  101  |  25[H]  ----------------------------<  98  4.5   |  22  |  1.24    Ca    9.6      14 Feb 2025 05:04  Ca    9.4      13 Feb 2025 07:05  Ca    9.3      12 Feb 2025 18:17  Phos  3.0     02-13  Mg     2.10     02-13    TPro  7.3  /  Alb  3.8  /  TBili  0.9  /  DBili  x   /  AST  16  /  ALT  9   /  AlkPhos  104  02-13  TPro  7.6  /  Alb  4.0  /  TBili  0.9  /  DBili  x   /  AST  21  /  ALT  9   /  AlkPhos  103  02-12    CAPILLARY BLOOD GLUCOSE          LIVER FUNCTIONS - ( 13 Feb 2025 07:05 )  Alb: 3.8 g/dL / Pro: 7.3 g/dL / ALK PHOS: 104 U/L / ALT: 9 U/L / AST: 16 U/L / GGT: x             Urinalysis Basic - ( 14 Feb 2025 05:04 )    Color: x / Appearance: x / SG: x / pH: x  Gluc: 154 mg/dL / Ketone: x  / Bili: x / Urobili: x   Blood: x / Protein: x / Nitrite: x   Leuk Esterase: x / RBC: x / WBC x   Sq Epi: x / Non Sq Epi: x / Bacteria: x    rad< from: CT Angio Chest PE Protocol w/ IV Cont (02.14.25 @ 11:08) >    Heart is normal in size. Calcification of the coronary arteries is noted.   Trace pericardial effusion is noted. Left-sided Mediport catheter is   noted in place. Stent is noted within the leftbrachiocephalic vein/SVC.   Pulmonary arteries are normal in caliber. No filling defects are noted.    No endobronchial lesions are noted. Patient is status post right upper   lobectomy. Postradiation therapy changes are present in the right   paramediastinal location. Emphysematous changes are present in both   lungs. 0.8 cm groundglass nodule is noted in the right lower lobe. It is   unchanged when compared to previous exam. No pleural effusions are noted.   Right pleural thickening is noted.    Below the diaphragm, visualized portions of the abdomen demonstrate   low-attenuation lesion in the right kidney which is too small to be   adequately characterized on this exam. Probable stones are noted within   the gallbladder.    Degenerative changes of the spine are noted.    IMPRESSION: No pulmonary embolus is noted.    Stable small groundglass nodule in the right lower lobe when compared to   previous exam.    --- End of Report ---            KYLE SHIELDS MD; Attending Radiologist  This document has been electronically signed. Feb 14 2025 11:38AM    < end of copied text >          RECENT CULTURES:        RESPIRATORY CULTURES:          Studies  Chest X-RAY  CT SCAN Chest   Venous Dopplers: LE:   CT Abdomen  Others              
Patient is a 69y old  Female who presents with a chief complaint of SOB (13 Feb 2025 13:24)  patient not known to me, assigned this AM to assume care  chart reviewed and events thus far noted  admitted overnight by hospitalist colleague     DATE OF SERVICE: 02-13-25 @ 15:36    SUBJECTIVE / OVERNIGHT EVENTS: overnight events noted    ROS:  Resp: + cough no sputum production  CVS: No chest pain no palpitations no orthopnea  GI: no N/V/D          MEDICATIONS  (STANDING):  albuterol/ipratropium for Nebulization 3 milliLiter(s) Nebulizer every 6 hours  aspirin  chewable 81 milliGRAM(s) Oral daily  atorvastatin 40 milliGRAM(s) Oral at bedtime  azithromycin  IVPB 500 milliGRAM(s) IV Intermittent every 24 hours  azithromycin  IVPB      carvedilol 12.5 milliGRAM(s) Oral every 12 hours  furosemide    Tablet 20 milliGRAM(s) Oral daily  heparin   Injectable 5000 Unit(s) SubCutaneous every 8 hours  losartan 25 milliGRAM(s) Oral daily  predniSONE   Tablet 50 milliGRAM(s) Oral daily  sodium chloride 3%  Inhalation 4 milliLiter(s) Inhalation every 12 hours    MEDICATIONS  (PRN):  acetaminophen     Tablet .. 650 milliGRAM(s) Oral every 6 hours PRN Temp greater or equal to 38C (100.4F), Mild Pain (1 - 3)  aluminum hydroxide/magnesium hydroxide/simethicone Suspension 30 milliLiter(s) Oral every 4 hours PRN Dyspepsia  fluticasone propionate 50 MICROgram(s)/spray Nasal Spray 1 Spray(s) Both Nostrils two times a day PRN Nasal congestion  melatonin 3 milliGRAM(s) Oral at bedtime PRN Insomnia  ondansetron Injectable 4 milliGRAM(s) IV Push every 8 hours PRN Nausea and/or Vomiting        CAPILLARY BLOOD GLUCOSE        I&O's Summary      Vital Signs Last 24 Hrs  T(C): 36.4 (13 Feb 2025 11:51), Max: 36.9 (12 Feb 2025 17:02)  T(F): 97.6 (13 Feb 2025 11:51), Max: 98.4 (12 Feb 2025 17:02)  HR: 71 (13 Feb 2025 11:51) (70 - 90)  BP: 151/72 (13 Feb 2025 11:51) (145/66 - 174/80)  BP(mean): --  RR: 18 (13 Feb 2025 11:51) (17 - 26)  SpO2: 97% (13 Feb 2025 11:51) (95% - 100%)    PHYSICAL EXAM:  NECK: Supple,  CHEST/LUNG: clear  HEART: S1 S2; no murmurs   ABDOMEN: Soft, Nontender  EXTREMITIES:   no edema  NEUROLOGY: AO x 3 non-focal  SKIN: No rashes or lesions    LABS:                        12.2   11.06 )-----------( 294      ( 13 Feb 2025 07:05 )             36.6     02-13    133[L]  |  98  |  25[H]  ----------------------------<  178[H]  4.2   |  18[L]  |  1.08    Ca    9.4      13 Feb 2025 07:05  Phos  3.0     02-13  Mg     2.10     02-13    TPro  7.3  /  Alb  3.8  /  TBili  0.9  /  DBili  x   /  AST  16  /  ALT  9   /  AlkPhos  104  02-13          Urinalysis Basic - ( 13 Feb 2025 07:05 )    Color: x / Appearance: x / SG: x / pH: x  Gluc: 178 mg/dL / Ketone: x  / Bili: x / Urobili: x   Blood: x / Protein: x / Nitrite: x   Leuk Esterase: x / RBC: x / WBC x   Sq Epi: x / Non Sq Epi: x / Bacteria: x          All consultant(s) notes reviewed and care discussed with other providers        Contact Number, Dr Flores [phone]
Patient is a 69y old  Female who presents with a chief complaint of SOB (13 Feb 2025 15:36)      DATE OF SERVICE: 02-14-25 @ 09:31    SUBJECTIVE / OVERNIGHT EVENTS: overnight events noted    ROS:  Resp: No cough no sputum production  CVS: No chest pain no palpitations no orthopnea  GI: no N/V/D  "I feel better" "I want to go home"     MEDICATIONS  (STANDING):  albuterol/ipratropium for Nebulization 3 milliLiter(s) Nebulizer every 6 hours  aspirin  chewable 81 milliGRAM(s) Oral daily  atorvastatin 40 milliGRAM(s) Oral at bedtime  azithromycin  IVPB 500 milliGRAM(s) IV Intermittent every 24 hours  azithromycin  IVPB      carvedilol 12.5 milliGRAM(s) Oral every 12 hours  furosemide    Tablet 20 milliGRAM(s) Oral daily  heparin   Injectable 5000 Unit(s) SubCutaneous every 8 hours  losartan 25 milliGRAM(s) Oral daily  methylPREDNISolone sodium succinate Injectable 20 milliGRAM(s) IV Push every 8 hours  methylPREDNISolone sodium succinate Injectable   IV Push   sodium chloride 3%  Inhalation 4 milliLiter(s) Inhalation every 12 hours    MEDICATIONS  (PRN):  acetaminophen     Tablet .. 650 milliGRAM(s) Oral every 6 hours PRN Temp greater or equal to 38C (100.4F), Mild Pain (1 - 3)  aluminum hydroxide/magnesium hydroxide/simethicone Suspension 30 milliLiter(s) Oral every 4 hours PRN Dyspepsia  fluticasone propionate 50 MICROgram(s)/spray Nasal Spray 1 Spray(s) Both Nostrils two times a day PRN Nasal congestion  melatonin 3 milliGRAM(s) Oral at bedtime PRN Insomnia  ondansetron Injectable 4 milliGRAM(s) IV Push every 8 hours PRN Nausea and/or Vomiting        CAPILLARY BLOOD GLUCOSE        I&O's Summary      Vital Signs Last 24 Hrs  T(C): 36.8 (14 Feb 2025 04:10), Max: 36.8 (14 Feb 2025 04:10)  T(F): 98.2 (14 Feb 2025 04:10), Max: 98.2 (14 Feb 2025 04:10)  HR: 81 (14 Feb 2025 04:10) (69 - 81)  BP: 145/69 (14 Feb 2025 04:10) (139/65 - 151/72)  BP(mean): --  RR: 18 (14 Feb 2025 04:10) (18 - 18)  SpO2: 98% (14 Feb 2025 04:10) (95% - 98%)      PHYSICAL EXAM:  CHEST/LUNG: clear  HEART: S1 S2; no murmurs   ABDOMEN: Soft, Nontender  EXTREMITIES:   no edema  NEUROLOGY: AO x 3 non-focal  SKIN: No rashes or lesions    LABS:                        12.3   17.52 )-----------( 313      ( 14 Feb 2025 05:04 )             34.9     02-14    133[L]  |  98  |  34[H]  ----------------------------<  154[H]  4.7   |  19[L]  |  1.01    Ca    9.6      14 Feb 2025 05:04  Phos  3.0     02-13  Mg     2.10     02-13    TPro  7.3  /  Alb  3.8  /  TBili  0.9  /  DBili  x   /  AST  16  /  ALT  9   /  AlkPhos  104  02-13          Urinalysis Basic - ( 14 Feb 2025 05:04 )    Color: x / Appearance: x / SG: x / pH: x  Gluc: 154 mg/dL / Ketone: x  / Bili: x / Urobili: x   Blood: x / Protein: x / Nitrite: x   Leuk Esterase: x / RBC: x / WBC x   Sq Epi: x / Non Sq Epi: x / Bacteria: x          All consultant(s) notes reviewed and care discussed with other providers        Contact Number, Dr Flores [phone]

## 2025-02-14 NOTE — PROGRESS NOTE ADULT - ASSESSMENT
69F with PMH CHF?, HTN, COPD not on O2, lung ca s/p R lobectomy s/p chemo/RT/IT in remission, CAD s/p stents, hx SVC syndrome s/p stent, hx IVC thrombus not on AC who presents with SOB. She reports she was in her usual state of health prior to today but noted some mild SOB as she was going to her outpatient cardiologist. VS were stable when at her cardiologists's, however she noted worsening SOB at rest and with exertion afterwards when she returned home and called her PCP where she was able to be evaluated. There she was found to have O2 in 80's and was sent to the ED. She does not use oxygen at home and notes her home O2 saturation is usually in the 90's as she checks daily. Denies any fevers/chills, sick contacts, but notes that she has had longstanding cough/mucus that she is not relieved with medications. Had previous admission in Feb 2024 for SOB and LE edema thought to be CHF exacerbation improved w/ lasix.  In the ED patient arrived on 2L NC. She was given duonebs and solu-medrol with improvement in O2 and was able to be weaned to RA. Now reports improvement in SOB and is breathing comfortably. Labs notable for WBC 13.81, BNP 1256, trop 22, Cr 1.24. EKG NSR, RVP negative, CXR showing small unchanged R pleural effusion. CTA chest ordered to r/o PE though pt. clinically improved after medications. (12 Feb 2025 20:59):  seems better today  : she got db in am :  sh is not wheezing at this time:  she is on room air at this time     Shortness of breath likely due to copd exacerbation   -pt endorsing 1 day history of SOB noted to be worsening after going to cardiologist appointment this AM.  -followed up with PCP same day and noted to be mildly hypoxic with O2 sat in high 80's and was recommended to come to ED  -sxs improved with duonebs and IV steroids in ED, now weaned off 2L to RA with clinical improvement in patient's work of breathing  -c/w mucinex for cough, flonase for nasal congestion  - cta chest showed: No evidence acute pulmonary emboli Otherwise no significant change from prior CT of 2/21/2024 as outlined  above.  -on zithroimax for copd   -change to  iv steroids for a few days   VBG is normal:  last ct chest in 2024 showed LUNGS AND LARGE AIRWAYS: Unchanged right-sided postsurgical changes and volume loss with unchanged the medial right upper paramediastinal probable post-surgical/post-radiation change. Unchanged deformity of right mainstem bronchus with unchanged right paratracheal and   peribronchial mediastinal soft tissue thickening..PLEURA: Unchanged mild right posterobasilar pleural thickening.  RVP is negative     2/14:  changed to iv steroids:   cta showed: No pulmonary embolus is noted. Stable small groundglass nodule in the right lower lobe when compared to previous exam.  this can be followed up as an outpt    CAD (coronary artery disease).   atorvastatin, aspirin.    Chronic kidney disease, unspecified CKD stage.   pt's baseline Cr appears to be 1.18-1.25  -dose renal medications accordingly.     Leg edema.   -pt with LE edema nonpitting, notes is baseline for patient  -on furosemide 20mg PO, can continue daily med at this time  -if worsening edema or SOB not improved with duonebs/steroids,    HTN (hypertension).   coreg.    dvt prophylaxis    dw team 
69F with PMH CHF?, HTN, COPD not on O2, lung ca s/p R lobectomy s/p chemo/RT/IT in remission, CAD s/p stents, hx SVC syndrome s/p stent, hx IVC thrombus not on AC who presents with SOB.
69F with PMH CHF?, HTN, COPD not on O2, lung ca s/p R lobectomy s/p chemo/RT/IT in remission, CAD s/p stents, hx SVC syndrome s/p stent, hx IVC thrombus not on AC who presents with SOB.

## 2025-02-14 NOTE — DISCHARGE NOTE PROVIDER - CARE PROVIDER_API CALL
Wesley Joshi  Pulmonary Disease  [address] [place] [address]  Phone: [phone]  Fax: [phone]  Established Patient  Follow Up Time:

## 2025-02-14 NOTE — PROGRESS NOTE ADULT - PROBLEM SELECTOR PLAN 4
-pt's baseline Cr appears to be 1.18-1.25  -dose renal medications accordingly
stage 2 CKD  no intervention at this time   outpatient follow up PCP

## 2025-02-14 NOTE — DISCHARGE NOTE PROVIDER - HOSPITAL COURSE
Shortness of breath.   ·  2/14 CTA negative PE    COPD exacerbation.   · continue azithromycin 500mg  pulmonary c/s  likely discharge on po prednisone taper       CAD (coronary artery disease).   · continue atorvastatin, aspirin.     Chronic kidney disease, unspecified CKD stage.   ·  stage 2 CKD  no intervention at this time   outpatient follow up PCP.     Leg edema.   ·  resolved  resume low dose furosemide on discharge.    HTN (hypertension).   ·  Plan: -c/w coreg.    HLD (hyperlipidemia).   ·  Plan: -c/w atorvastatin.     Preventive measure.   ·  Plan: continue heparin SQ.   69F with PMH CHF?, HTN, COPD not on O2, lung ca s/p R lobectomy s/p chemo/RT/IT in remission, CAD s/p stents, hx SVC syndrome s/p stent, hx IVC thrombus not on AC who presents with SOB.       Problem/Plan - 1:  ·  Problem: Shortness of breath.   ·  Plan: somewhat improved overnight  will continue to monitor   CTA negative .     Problem/Plan - 2:  ·  Problem: COPD exacerbation.   ·  Plan: continue azithromycin 500mg  pulmonary help appreciated  likely discharge on po prednisone 40 po qd x 5 days      Problem/Plan - 3:  ·  Problem: CAD (coronary artery disease).   ·  Plan: continue atorvastatin, aspirin.     Problem/Plan - 4:  ·  Problem: Chronic kidney disease, unspecified CKD stage.   ·  Plan: stage 2 CKD  no intervention at this time   outpatient follow up PCP.     Problem/Plan - 5:  ·  Problem: Leg edema.   ·  Plan: resolved  resume low dose furosemide on discharge.     Problem/Plan - 6:  ·  Problem: HTN (hypertension).   ·  Plan: -c/w coreg.     Problem/Plan - 7:  ·  Problem: HLD (hyperlipidemia).   ·  Plan: -c/w atorvastatin    patient declined further inpatient hospitalization and insisted she be discharged. F/U with Dr Gibson PCP    discussed with PCP

## 2025-02-14 NOTE — PROGRESS NOTE ADULT - NS ATTEND AMEND GEN_ALL_CORE FT
Patient seen and examined. Agree with plan as detailed in PA/NP Note.     Cont Coreg and Losartan for HTN  Steroids per pulm    Julio Alcaraz MD  Pager: [phone]

## 2025-02-14 NOTE — PROGRESS NOTE ADULT - PROBLEM SELECTOR PLAN 2
continue azithromycin 500mg  pulmonary help appreciated  likely discharge on po prednisone taper  awaiting CTA

## 2025-02-14 NOTE — DISCHARGE NOTE NURSING/CASE MANAGEMENT/SOCIAL WORK - FINANCIAL ASSISTANCE
[facility] provides services at a reduced cost to those who are determined to be eligible through [facility]’s financial assistance program. Information regarding [facility]’s financial assistance program can be found by going to [email or web address] or by calling [phone].

## 2025-02-14 NOTE — PROGRESS NOTE ADULT - PROBLEM SELECTOR PROBLEM 6
[phone]  Okay to leave messages with test results.  Patient accompanied by mother.    
HTN (hypertension)
HTN (hypertension)

## 2025-02-14 NOTE — PROGRESS NOTE ADULT - PROBLEM SELECTOR PLAN 1
somewhat improved overnight  will continue to monitor   saturation normal on RA
somewhat improved overnight  will continue to monitor   CTA pending expedited
Patient Refused

## 2025-02-14 NOTE — DISCHARGE NOTE PROVIDER - NSDCCPCAREPLAN_GEN_ALL_CORE_FT
PRINCIPAL DISCHARGE DIAGNOSIS  Diagnosis: COPD exacerbation  Assessment and Plan of Treatment: you were diagnosed with COPD and was started on IV steroids.      SECONDARY DISCHARGE DIAGNOSES  Diagnosis: Hypoxia  Assessment and Plan of Treatment: you were given nebulizer treatments and steroids

## 2025-02-14 NOTE — DISCHARGE NOTE NURSING/CASE MANAGEMENT/SOCIAL WORK - NSDCPEFALRISK_GEN_ALL_CORE
For information on Fall & Injury Prevention, visit: [email or web address] OR  [email or web address] OR  https://www.cdc.gov/steadi/patient.html

## 2025-02-15 RX ORDER — PREDNISONE 5 MG/1
2 TABLET ORAL
Qty: 10 | Refills: 0
Start: 2025-02-15 | End: 2025-02-19

## 2025-02-18 ENCOUNTER — TRANSCRIPTION ENCOUNTER (OUTPATIENT)
Age: 70
End: 2025-02-18

## 2025-02-24 ENCOUNTER — APPOINTMENT (OUTPATIENT)
Dept: CARE COORDINATION | Facility: HOME HEALTH | Age: 70
End: 2025-02-24
Payer: MEDICARE

## 2025-02-24 DIAGNOSIS — J44.9 CHRONIC OBSTRUCTIVE PULMONARY DISEASE, UNSPECIFIED: ICD-10-CM

## 2025-02-24 DIAGNOSIS — I10 ESSENTIAL (PRIMARY) HYPERTENSION: ICD-10-CM

## 2025-02-24 DIAGNOSIS — I50.9 HEART FAILURE, UNSPECIFIED: ICD-10-CM

## 2025-02-24 PROCEDURE — 99348 HOME/RES VST EST LOW MDM 30: CPT | Mod: 2W

## 2025-03-03 PROBLEM — I10 ESSENTIAL HYPERTENSION: Status: ACTIVE | Noted: 2025-03-03

## 2025-03-03 PROBLEM — I50.9 CONGESTIVE HEART FAILURE, UNSPECIFIED HF CHRONICITY, UNSPECIFIED HEART FAILURE TYPE: Status: ACTIVE | Noted: 2025-03-03

## 2025-03-03 RX ORDER — LOSARTAN POTASSIUM 25 MG/1
25 TABLET, FILM COATED ORAL DAILY
Refills: 0 | Status: ACTIVE | COMMUNITY
Start: 2025-03-03

## 2025-03-03 RX ORDER — ALBUTEROL SULFATE 90 UG/1
108 (90 BASE) INHALANT RESPIRATORY (INHALATION)
Refills: 0 | Status: ACTIVE | COMMUNITY
Start: 2025-03-03

## 2025-03-05 ENCOUNTER — TRANSCRIPTION ENCOUNTER (OUTPATIENT)
Age: 70
End: 2025-03-05

## 2025-03-11 ENCOUNTER — TRANSCRIPTION ENCOUNTER (OUTPATIENT)
Age: 70
End: 2025-03-11

## 2025-04-11 NOTE — BRIEF OPERATIVE NOTE - PRIMARY SURGEON
[place]
(ICF Model) Pt. present w/deficits in Body Structures/Function (Impairments), incl: Strength, Balance, WB status, Pain, leading to deficits in performing the below noted Activities (Limitations).

## 2025-04-29 NOTE — PRE-OP CHECKLIST - NS PREOP CHK MONITOR ANESTHESIA CONSENT
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  
no
done

## 2025-07-17 NOTE — H&P PST ADULT - MALLAMPATI CLASS
- Generalized fatigue, weakness, and multiple complaints involving multiple systems.  - PT/OT evaluations. Labs overall unrevealing for acute abnormality. Clinically appears improved and in better spirits though reports significant symptoms remain. Monitor with repeat labs.  - Anticipate likely DC with home health / additional support at home to assist.   Class III - visualization of the soft palate and the base of the uvula

## 2025-07-18 ENCOUNTER — EMERGENCY (EMERGENCY)
Facility: HOSPITAL | Age: 70
LOS: 1 days | End: 2025-07-18
Attending: STUDENT IN AN ORGANIZED HEALTH CARE EDUCATION/TRAINING PROGRAM | Admitting: STUDENT IN AN ORGANIZED HEALTH CARE EDUCATION/TRAINING PROGRAM
Payer: MEDICARE

## 2025-07-18 VITALS
TEMPERATURE: 98 F | RESPIRATION RATE: 18 BRPM | WEIGHT: 184.09 LBS | HEART RATE: 83 BPM | HEIGHT: 61 IN | DIASTOLIC BLOOD PRESSURE: 93 MMHG | SYSTOLIC BLOOD PRESSURE: 208 MMHG | OXYGEN SATURATION: 95 %

## 2025-07-18 VITALS — HEART RATE: 70 BPM | TEMPERATURE: 99 F | DIASTOLIC BLOOD PRESSURE: 78 MMHG | SYSTOLIC BLOOD PRESSURE: 123 MMHG

## 2025-07-18 DIAGNOSIS — Z95.5 PRESENCE OF CORONARY ANGIOPLASTY IMPLANT AND GRAFT: Chronic | ICD-10-CM

## 2025-07-18 DIAGNOSIS — Z98.891 HISTORY OF UTERINE SCAR FROM PREVIOUS SURGERY: Chronic | ICD-10-CM

## 2025-07-18 DIAGNOSIS — Z90.2 ACQUIRED ABSENCE OF LUNG [PART OF]: Chronic | ICD-10-CM

## 2025-07-18 DIAGNOSIS — Z96.649 PRESENCE OF UNSPECIFIED ARTIFICIAL HIP JOINT: Chronic | ICD-10-CM

## 2025-07-18 LAB
ALBUMIN SERPL ELPH-MCNC: 4.2 G/DL — SIGNIFICANT CHANGE UP (ref 3.3–5)
ALP SERPL-CCNC: 109 U/L — SIGNIFICANT CHANGE UP (ref 40–120)
ALT FLD-CCNC: 17 U/L — SIGNIFICANT CHANGE UP (ref 4–33)
ANION GAP SERPL CALC-SCNC: 16 MMOL/L — HIGH (ref 7–14)
APTT BLD: 34.1 SEC — SIGNIFICANT CHANGE UP (ref 26.1–36.8)
AST SERPL-CCNC: 27 U/L — SIGNIFICANT CHANGE UP (ref 4–32)
BASOPHILS # BLD AUTO: 0.04 K/UL — SIGNIFICANT CHANGE UP (ref 0–0.2)
BASOPHILS NFR BLD AUTO: 0.3 % — SIGNIFICANT CHANGE UP (ref 0–2)
BILIRUB SERPL-MCNC: 0.5 MG/DL — SIGNIFICANT CHANGE UP (ref 0.2–1.2)
BLOOD GAS VENOUS COMPREHENSIVE RESULT: SIGNIFICANT CHANGE UP
BUN SERPL-MCNC: 28 MG/DL — HIGH (ref 7–23)
CALCIUM SERPL-MCNC: 9.6 MG/DL — SIGNIFICANT CHANGE UP (ref 8.4–10.5)
CHLORIDE SERPL-SCNC: 100 MMOL/L — SIGNIFICANT CHANGE UP (ref 98–107)
CO2 SERPL-SCNC: 20 MMOL/L — LOW (ref 22–31)
CREAT SERPL-MCNC: 1.18 MG/DL — SIGNIFICANT CHANGE UP (ref 0.5–1.3)
EGFR: 50 ML/MIN/1.73M2 — LOW
EGFR: 50 ML/MIN/1.73M2 — LOW
EOSINOPHIL # BLD AUTO: 0.03 K/UL — SIGNIFICANT CHANGE UP (ref 0–0.5)
EOSINOPHIL NFR BLD AUTO: 0.2 % — SIGNIFICANT CHANGE UP (ref 0–6)
GLUCOSE SERPL-MCNC: 122 MG/DL — HIGH (ref 70–99)
HCT VFR BLD CALC: 40.6 % — SIGNIFICANT CHANGE UP (ref 34.5–45)
HGB BLD-MCNC: 13.8 G/DL — SIGNIFICANT CHANGE UP (ref 11.5–15.5)
IMM GRANULOCYTES # BLD AUTO: 0.11 K/UL — HIGH (ref 0–0.07)
IMM GRANULOCYTES NFR BLD AUTO: 0.9 % — SIGNIFICANT CHANGE UP (ref 0–0.9)
INR BLD: 0.94 RATIO — SIGNIFICANT CHANGE UP (ref 0.85–1.16)
LIDOCAIN IGE QN: 23 U/L — SIGNIFICANT CHANGE UP (ref 7–60)
LYMPHOCYTES # BLD AUTO: 1.01 K/UL — SIGNIFICANT CHANGE UP (ref 1–3.3)
LYMPHOCYTES NFR BLD AUTO: 8.1 % — LOW (ref 13–44)
MAGNESIUM SERPL-MCNC: 2.1 MG/DL — SIGNIFICANT CHANGE UP (ref 1.6–2.6)
MCHC RBC-ENTMCNC: 32.1 PG — SIGNIFICANT CHANGE UP (ref 27–34)
MCHC RBC-ENTMCNC: 34 G/DL — SIGNIFICANT CHANGE UP (ref 32–36)
MCV RBC AUTO: 94.4 FL — SIGNIFICANT CHANGE UP (ref 80–100)
MONOCYTES # BLD AUTO: 0.71 K/UL — SIGNIFICANT CHANGE UP (ref 0–0.9)
MONOCYTES NFR BLD AUTO: 5.7 % — SIGNIFICANT CHANGE UP (ref 2–14)
NEUTROPHILS # BLD AUTO: 10.52 K/UL — HIGH (ref 1.8–7.4)
NEUTROPHILS NFR BLD AUTO: 84.8 % — HIGH (ref 43–77)
NRBC # BLD AUTO: 0 K/UL — SIGNIFICANT CHANGE UP (ref 0–0)
NRBC # FLD: 0 K/UL — SIGNIFICANT CHANGE UP (ref 0–0)
NRBC BLD AUTO-RTO: 0 /100 WBCS — SIGNIFICANT CHANGE UP (ref 0–0)
NT-PROBNP SERPL-SCNC: 1694 PG/ML — HIGH
PLATELET # BLD AUTO: 329 K/UL — SIGNIFICANT CHANGE UP (ref 150–400)
PMV BLD: 11.1 FL — SIGNIFICANT CHANGE UP (ref 7–13)
POTASSIUM SERPL-MCNC: 4.5 MMOL/L — SIGNIFICANT CHANGE UP (ref 3.5–5.3)
POTASSIUM SERPL-SCNC: 4.5 MMOL/L — SIGNIFICANT CHANGE UP (ref 3.5–5.3)
PROT SERPL-MCNC: 7.8 G/DL — SIGNIFICANT CHANGE UP (ref 6–8.3)
PROTHROM AB SERPL-ACNC: 10.9 SEC — SIGNIFICANT CHANGE UP (ref 9.9–13.4)
RBC # BLD: 4.3 M/UL — SIGNIFICANT CHANGE UP (ref 3.8–5.2)
RBC # FLD: 13 % — SIGNIFICANT CHANGE UP (ref 10.3–14.5)
SODIUM SERPL-SCNC: 136 MMOL/L — SIGNIFICANT CHANGE UP (ref 135–145)
TROPONIN T, HIGH SENSITIVITY RESULT: 17 NG/L — SIGNIFICANT CHANGE UP
TROPONIN T, HIGH SENSITIVITY RESULT: 18 NG/L — SIGNIFICANT CHANGE UP
WBC # BLD: 12.42 K/UL — HIGH (ref 3.8–10.5)
WBC # FLD AUTO: 12.42 K/UL — HIGH (ref 3.8–10.5)

## 2025-07-18 PROCEDURE — 71045 X-RAY EXAM CHEST 1 VIEW: CPT | Mod: 26

## 2025-07-18 PROCEDURE — 93010 ELECTROCARDIOGRAM REPORT: CPT

## 2025-07-18 PROCEDURE — 71275 CT ANGIOGRAPHY CHEST: CPT | Mod: 26

## 2025-07-18 PROCEDURE — 99285 EMERGENCY DEPT VISIT HI MDM: CPT

## 2025-07-18 RX ADMIN — Medication 3 MILLILITER(S): at 16:44

## 2025-07-18 NOTE — ED PROVIDER NOTE - OBJECTIVE STATEMENT
69-year-old female history of CHF?,  HTN, COPD not on home O2, lung cancer in remission x 2 years status post lobectomy and chemo/RT/IT, CAD status post stents, history of SVC syndrome status post stent, history of IVC thrombus previously on AC however no longer currently presenting sent in by outpatient medical team for concerns for rule out of pulmonary embolism.  Patient was getting routine MR imaging on Tuesday requiring her to lay flat hold her breath.  Since then she has had progressive shortness of breath.  She is also had 2 episodes of scant hemoptysis in the setting of chronic cough on Tuesday.  She denies any chest pain.  At rest she feels no major symptoms, symptoms are primarily exertional.  She reports she had a otherwise reassuring echocardiogram with her cardiologist within the last few months.  Denies fever/chills.  Reports her cough is chronic and slightly worse in the last few days.  She is a former smoker.  Suffers from chronic lower extremity edema however reports no significant worsening of her lower extremity pain.  Had routine screening blood test performed by her outpatient medical team and was notified of an elevated D-dimer which prompted her presenting to the emergency department.

## 2025-07-18 NOTE — ED PROVIDER NOTE - PROGRESS NOTE DETAILS
Kuldip Roy MD, Chief Resident: Patient reassessed, stable.  Patient servando hemodynamically stable, normal.  No hypoxia.  No acute respiratory distress.  Her cardiac enzymes were normal and stable, flat.  VBG within normal limits.  CTA shows no pericardial effusion, no pleural effusion or pulmonary embolism.  Stable fibrosis findings and groundglass opacities.  Patient has very good follow-up with pulmonology outpatient as well as her other subspecialist.  Shared decision-making was held, patient given strict return precautions, will discharge home with prompt follow-up with her outpatient team for which she has numerous scheduled appointments.  Patient is agreeable to disposition plan. Lab and imaging results were discussed with the patient. Shared decision making was held between provider and patient with regards to disposition decision. All questions and concerns were addressed. Patient is agreeable to disposition plan.

## 2025-07-18 NOTE — ED ADULT TRIAGE NOTE - CHIEF COMPLAINT QUOTE
pt with HX of lung cancer had   MRI of abdomen  2 days ago  for abdominal pain states has gallstones,   states has been more sob then normal ever since. pt states has episode  of bloody vomit.  pt. sent in by her oncologist ro R/o PE. Pt anxious now also co headache . Pt is on steroids was started on prednisone yesterday. pt with no co chest pain

## 2025-07-18 NOTE — ED PROVIDER NOTE - NSFOLLOWUPINSTRUCTIONS_ED_ALL_ED_FT
You were seen in the emergency department sent in by your outpatient team for an elevated blood test called a D-dimer.  Their concern was that you had a blood clot in your lungs.    Your EKG was otherwise normal.  Your CT scan of your chest showed no blood clot, stable fibrosis in your lungs.  You were never with a low oxygen level in the emergency department.  Your vital signs were normal.    We had a shared decision making with regards to you going home and you are agreeable to going home provided that you return to the emergency department for new or worsening symptoms, worsening chest pain, shortness of breath, dizziness/lightheadedness.    Please follow-up with your pulmonologist within the next week or sooner if your symptoms persist or worsen.    Follow up with your primary physician in 1-2 days. If needed call [phone] to find a primary care physician or call  [phone] to schedule an appointment with the general medicine.    1. TAKE ALL MEDICATIONS AS DIRECTED.    2. FOR PAIN OR FEVER YOU CAN TAKE IBUPROFEN (MOTRIN, ADVIL) OR ACETAMINOPHEN (TYLENOL) AS NEEDED, AS DIRECTED ON PACKAGING.  3. FOLLOW UP WITH YOUR PRIMARY DOCTOR WITHIN 5 DAYS AS DIRECTED.  4. IF YOU HAD LABS OR IMAGING DONE, YOU WERE GIVEN COPIES OF ALL LABS AND/OR IMAGING RESULTS FROM YOUR ER VISIT--PLEASE TAKE THEM WITH YOU TO YOUR FOLLOW UP APPOINTMENTS.  5. RETURN TO THE ER FOR ANY WORSENING SYMPTOMS OR CONCERNS.

## 2025-07-18 NOTE — ED PROVIDER NOTE - CLINICAL SUMMARY MEDICAL DECISION MAKING FREE TEXT BOX
69-year-old female history of questionable CHF per outpatient records, HTN, COPD on home O2, lung cancer remission x 2 years status post lobectomy and chemo/RT/RT with associate fibrosis, CAD status post stents, history of SVT syndrome, history of IVC thrombus previously on AC but no longer presenting after outpatient team with concerns for pulmonary embolism rule out after having an elevated D-dimer after she has been having progressive shortness of breath that is exertional since an MRI on Tuesday which she was required to lay flat and hold her breath many times.  She also had 2 episodes of scant hemoptysis although these are in the setting of a chronic cough.  She has reported no significant interval change in her cough frequency but she reports she has more mucus.  She denies fever/chills.  She had otherwise reassuring echocardiogram per self report with her cardiologist within the last few months.  She is afebrile and hemodynamically normal.  She is not tachycardic or hypoxic.  Her lungs are clear to auscultation bilaterally and she is not in any signs of respite distress.  She has chronic lower extremity edema at its baseline without any asymmetrical changes and it is pitting.  Given her previous history of thrombus, SVC syndrome, previous malignancy and elevated D-dimer outpatient we will pursue a pulmonary embolism rule out although clinical suspicion is fairly low at this time.  Will get screening labs, cardiac biomarkers, CTA.  Disposition pending workup.  Her EKG was normal sinus rhythm without acute diagnostic ischemic changes or relevant right heart strain pattern.

## 2025-07-18 NOTE — ED ADULT NURSE NOTE - OBJECTIVE STATEMENT
Patient presenting to the ED with c/o of shortness of breath. Her PMD sent her to the ER to rule out PE. Patient verbalized she had a pulmonary exam ( patient does not know the name of the exam but verbalized she had to hold her breath. Might be a diffusion study) . Denies any chest pain, palpitations, headache, fever, chills, or dizziness. Patient verbalized she had a NE in the past and was placed on blood thinner but no longer takes them. Hx of lung ca( port to right chest wall). SVC syndrome, COPD, Lumbar disc disorder, anemia, Stent placement, HLD and HTN

## 2025-07-18 NOTE — ED PROVIDER NOTE - ATTENDING CONTRIBUTION TO CARE
Patient is nontoxic-appearing.  No respiratory distress.  Clear to auscultation bilaterally.  Abdomen is soft and nontender.  Patient has 2+ pitting edema to mid lower extremities.  No overlying erythema or increased warmth to touch.    Ddx include but not limited to electrolyte abnormality, anemia, pe, hf exacerbation.

## 2025-07-18 NOTE — ED PROVIDER NOTE - PATIENT PORTAL LINK FT
You can access the FollowMyHealth Patient Portal offered by [facility] by registering at the following website: [email or web address]. By joining [facility] FollowMyHealth portal, you will also be able to view your health information using other applications (apps) compatible with our system.

## 2025-07-18 NOTE — ED PROVIDER NOTE - CHIEF COMPLAINT
The patient is a 69y Female complaining of  The patient is a 69y Female complaining of exertional dyspnea.

## 2025-07-18 NOTE — ED PROVIDER NOTE - WR ORDER NAME 1
[facility]  Department of Hospital Medicine    Discharge Summary    Patient: Stacy Rivera   female, 56 year old      ADMIT DATE: 5/3/2022    DISCHARGE DATE: 5/6/2022    PRIMARY CARE PROVIDER  Braulio Jones MD     DISHARGE PROVIDER   Varghese Anderson MD    ADMITTING PROVIDER  Jono Polo DO     PRIMARY DIAGNOSIS    Principal Problem:    Dyspnea on exertion  Active Problems:    Congestive heart failure (CMS/HCC)    Pulmonary hypertension (CMS/HCC)    Intracardiac thrombus  Resolved Problems:    * No resolved hospital problems. *       SECONDARY DIAGNOSIS   Past Medical History:   Diagnosis Date   • Blood clot associated with vein wall inflammation 2021    Pulmonary embolism   • Chronic pain    • Fracture    • History of adverse response to anesthesia     H/A after surgery in the past   • Intracardiac thrombus 5/6/2022   • Malignant neoplasm (CMS/HCC)    • Pulmonary hypertension (CMS/HCC) 5/6/2022             Admission HPI     Stacy Rivera is a 56 year old female with PMHx adenocarcinoma stage III of the lung on durvalumab, DVT, bilateral PE on Eliquis, prior history tobacco abuse     Patient presents to the ER after having approximately 2-3 week onset of progressive shortness of breath, dyspnea on exertion.  Was recently on vacation in [place], flew by airplane.  While there with activity noted her oxygen saturation occasionally going down into the 70/80s.  Contacted her oncologist who recommend she go to the ER in [place] but she felt more comfortable obtaining her healthcare here so flew back and presented to the ER afterwards.  Symptoms have progressed.  She will occasionally get lower substernal chest pain, sometimes randomly, not always with exertion.  Mild nonproductive cough.  No bleeding or hemoptysis.  Has ongoing leg pain and swelling, right greater than left.  She has been adherent with her Eliquis medication, is due to take it  tonight around 8:00 p.m..  No fevers chills or night sweats.     Afebrile, heart rate low 100s, respiratory rate 22, blood pressure 128/78, 93% on room air at rest, 84% with activity per ER provider     Creatinine 0.84, potassium 4, magnesium 2.2, LFTs negative, NT proBNP 2058, troponin I negative x1, hemoglobin 10.3, WBC negative, COVID negative, chest x-ray no acute findings, EKG sinus tachycardia     Patient was given 40 mg IV Lasix x1 in the ER, due to nationwide IV contrast shortage nuclear med scan was ordered will be performed around midnight.       Hospital Course     Hospital Course By Problem List :     Acute hypoxic respiratory failure and RETANA due to Drug Induced pneumonitis: known PE which was improving on last imaging in 2/2022  · NM scan: intermediate to high probability PE, correlate with CTA chest  · CTA- unable to complete due to national shortage of contrast media/material  · D-dimer 0.85 but improved from prior 24.71, make acute clot less likely   · Bilateral LE dopplers- negative for DVT  · ECHO Limited echocardiogram performed.   ~ 1.8 x 1 cm mass density attached to the anterior tricuspid leaflet, decreased in size compared to study dated 12/16/2021. Mass density smaller when measured on the images with Definity contrast. Prior study did not use Definity contrast. Mildly increased right ventricular chamber size. Moderately decreased right ventricular systolic function.   Severe pulmonary hypertension; RVSP 61 mmHg.  Flattening of the septum throughout the cardiac cycle consistent with RV pressure & volume overload.  Moderate tricuspid valve regurgitation.  Increased right atrial chamber size.  Normal left ventricular chamber size, wall thickness and systolic function, EF ~ 55%.  · Troponin negative x 2  · procalcitonin <0.05  · Oncology consulted  · CT chest: New bilateral groundglass opacities. Findings are nonspecific but may represent autoimmune reaction or atypical infection such as Covid  19 pneumonia.  · Pulmonary Consult: consistent with drug induced pneumonitis from her cancer treatment.  Recommended starting steroids with slow taper.   Follow up in clinic in 1-2 weeks  · Home with home oxygen- 1L at rest and 2L with activity.       H/o Cardiac thrombus, now possible mass vs remaining thombus  · ECHO: Mass improved/decreased in size from 12/2021  · Unable to complete Cardiac MRI as HR was elevated due to anxiety.  Oncology plans to follow up as outpatient with repeat imaging today to determine if mass vs thrombus  · Continue Lovenox BID + warfarin 5mg daily until recheck 5/9/2022 with anticoagulation clinic     Severe Pulmonary Hypertension  · Pulmonary consulted     Anemia: near baseline.  No evidence of acute blood loss     adenocarcinoma stage III of the lung   · Follows Oncology- durvalumab     H/o DVT, bilateral PE   · Was on Eliquis (changed from lovenox in 2/2022)  · Lovenox + coumadin     Prior history tobacco abuse        Consults   IP Consult Orders (From admission, onward)             Start     Ordered    05/04/22 1242  Inpatient consult to Pulmonology  ONE TIME        Provider:  Will Walters MD    05/04/22 1242    05/04/22 0955  Inpatient consult to Oncology  ONE TIME        Provider:  Chuy Madison MD    05/04/22 0954                 Procedures Performed   XR Chest AP or PA    Result Date: 5/3/2022  EXAM:  XR CHEST AP OR PA HISTORY:  SOB COMPARISON:  12/3/2021 TECHNIQUE: Portable chest radiograph was obtained.      FINDINGS AND IMPRESSION: Right IJ port catheter in place with its tip in the distal SVC. EKG leads are seen overlying the chest. The cardiomediastinal silhouette is well-maintained. Borderline prominence of heart size is stable. Pulmonary vascular congestion changes have improved. Previously seen prominence of pulmonary interstitial markings has decreased. Ill-defined haziness in the left perihilar region and left mid to lower lung has mostly resolved. No airspace  consolidation. No lobar or segmental atelectasis. There is no pleural effusion or pneumothorax bilaterally. The visualized bones and soft tissues are unremarkable.     CT CHEST WO CONTRAST    Result Date: 5/4/2022  CT CHEST WO CONTRAST COMPARISON: 3/20/2022 HISTORY: Shortness of breath. Dyspnea on exertion. PROCEDURE: 3 mm helical images were obtained through the chest without contrast. FINDINGS: There are new groundglass opacities in both upper lobes left greater than right and the left lower lobe. There are no pleural or pericardial effusions. There is no axillary, mediastinal or hilar adenopathy. There no adrenal masses.     IMPRESSION: New bilateral groundglass opacities. Findings are nonspecific but may represent autoimmune reaction or atypical infection such as Covid 19 pneumonia.     NM Lung Scan    Result Date: 5/3/2022  NUCLEAR MEDICINE PULMONARY VENTILATION AND PERFUSION STUDY: INDICATION:Shortness of breath COMPARISON:Chest x-ray 5/3/2022 TECHNIQUE:  NM pulmonary ventilation and perfusion study performed using 40.5 mCi of DTPA inhaled for the ventilatory study, and using 5.61 mCi of Tc-99m MAA injected intravenously for the perfusion study. Anterior, posterior, oblique and lateral images were obtained. FINDINGS: Heterogeneous ventilation and perfusion to both lungs more marked on the left. Bilateral perfusion defects appear largely matched with the adjacent ventilation images although at least 2 small defects are not clearly matched with a ventilation abnormality.     IMPRESSION: 1. Intermediate to high probability for pulmonary embolus. Recommend correlation with CT angiogram with pulmonary embolus protocol.     US Lower Extremity Venous Duplex Bilat    Result Date: 5/4/2022  EXAM: US LOWER EXTREMITY VENOUS DUPLEX BILATERAL CLINICAL INDICATION:  leg pain/swelling. COMPARISON: None. TECHNIQUE: Gray-scale and color/spectral Doppler ultrasound of bilateral lower extremity deep venous systems was performed.  FINDINGS: Normal compression sonography was documented in bilateral common femoral, superficial femoral, profunda femoral, saphenous, and popliteal veins. Normal color Doppler flow was seen within these vessels, and there is normal spectral Doppler response to augmentation. The proximal calf vessels are patent.     IMPRESSION: No evidence of deep venous thrombosis in the right or left lower extremity deep venous system. A secure EPIC CHAT message was sent to Dr. Polo on 5/4/2022 7:19 AM.            Physical Exam     Visit Vitals  BP 93/67 (BP Location: LUE - Left upper extremity, Patient Position: Semi-Colvin's)   Pulse (!) 101   Temp 97.7 °F (36.5 °C) (Oral)   Resp 18   Ht 5' 8\" (1.727 m)   Wt 87.4 kg (192 lb 10.9 oz)   SpO2 96%   BMI 29.30 kg/m²     GENERAL: female in no acute distress  HEENT: Normocephalic, Atraumatic. EOMI, No icterus or oral lesions.    CARDIOVASCULAR: RRR  RESPIRATORY:Clear and unlabored  EXTREMITIES: warm, no cyanosis, no clubbing, no edema  NEUROLOGIC: Alert and Oriented x 3 no focal motor or sensory deficits appreciated.      Code Status: Full Resuscitation          Discharge Medications        Summary of your Discharge Medications      Take these Medications      Details   acetaminophen 500 MG tablet  Commonly known as: TYLENOL   Take 1,000 mg by mouth every 6 hours as needed for Pain.     ascorbic acid 500 MG tablet  Commonly known as: VITAMIN C   Take 500 mg by mouth daily.     Calcium 600 600 MG Tab   Generic drug: calcium carbonate  Take 600 mg by mouth daily.     CANNABIDIOL PO   Take 1 capsule by mouth daily.     Claritin 10 MG tablet   Generic drug: loratadine  Take 10 mg by mouth daily.     custom magic mouthwash oral suspension   Magic Mouthwash- Take 10 mLs by mouth and gargle and swallow 15 mins prior to eating.  Comment: Ingredients Kenalog 40mg/mL 200 mg- okay to substitute Triamcinolone powder 200 mg; Nystantin suspension 60 mL; Benadryl Elixir 30 mL; Lidocaine viscous  2% 40 mL; Maalox suspension 200 mL.     dexAMETHasone 4 MG tablet  Commonly known as: DECADRON   Take 1 tablet by mouth 2 times daily (with meals). Take for 3 days starting the day before chemotherapy.  Comment: May dispense maximum allowable quantity per insurance     ELDERBERRY PO   Take 1 packet by mouth daily.     enoxaparin 100 MG/ML injectable solution  Commonly known as: LOVENOX   Dispel 0.1 mLs, and then inject 0.9 mLs into the skin every 12 hours for 7 days.     lidocaine-prilocaine cream  Commonly known as: EMLA   Apply to Mediport site 1 hour prior to access procedure     OLANZapine 5 MG tablet  Commonly known as: ZyPREXA   Take 1 tablet by mouth nightly. Start the day of chemotherapy x 4 days for prevention of nausea/vomiting.  Comment: Cycle length = 21 days.     pantoprazole 40 MG tablet  Commonly known as: PROTONIX  Start taking on: May 7, 2022   Take 1 tablet by mouth daily (before breakfast). Do not start before May 7, 2022.     predniSONE 20 MG tablet  Commonly known as: DELTASONE  Start taking on: May 7, 2022   Take 4 tablets by mouth daily (with breakfast) for 2 days, THEN 3 tablets daily (with breakfast) for 14 days. Do not start before May 7, 2022. Ongoing Taper to be determined by Pulmonary Clinic     prochlorperazine 10 MG tablet  Commonly known as: COMPAZINE   Take 1 tablet by mouth every 6 hours as needed for Nausea or Vomiting.     vitamin - therapeutic multivitamins w/minerals tablet   Take 1 tablet by mouth daily.     Vitamin D3 125 mcg (5,000 units) tablet   Take 125 mcg by mouth daily.     warfarin 5 MG tablet  Commonly known as: COUMADIN   Take 1 tablet by mouth daily. Recheck of INR on 5/9/2022               Disposition     Disposition: patient is being discharged to home/self care         Follow Up     Pending issues to be followed up by PCP   • Follow up hospitalization and medication changes.          Follow-up with:   Braulio Jones MD  [address] [place] [address] [place]   Jacey VILCHIS  [id number]    In 1 week  Follow up hospitalization and medication changes    Chuy Madison MD  [address]  [facility] [id number] [phone]      As previously scheduled on 5/12/2022    [facility]  [address]  [facility] [id number] [phone]  On 5/9/2022  For repeat INR    Will Walters MD  [address]  [facility] [id number] [phone]    In 1 week  Follow up Pneumonitis       Future Appointments   Date Time Provider Department Center   5/12/2022  9:35 AM [facility]   5/12/2022 10:15 AM Chuy Madison MD [facility]        Time spent on discharge was greater than 45 minutes     Electronically Signed 5/6/2022 12:54 PM   Varghese Anderson MD  Hospitalist  Department of Hospital Medicine     Xray Chest 1 View- PORTABLE-Urgent

## 2025-07-18 NOTE — ED PROVIDER NOTE - PHYSICAL EXAMINATION
GEN: Patient awake and alert. No acute distress, non-toxic. Persistent dry cough.   Head: Normocephalic, atraumatic.  Neck: Nontender, full ROM.   Eyes: PERRLA b/l. EOMI, no scleral icterus, no conjunctival injection. Moist mucous membranes.  CARDIAC: RRR. Normal S1, S2. No murmur, rubs, or gallops. 2+ peripheral edema to knees.   PULM: Speaking in full sentences, w/ persistent dry cough.  CTA B/L no wheeze, rales or rhonchi. No signs of respiratory distress, no accessory muscle usage or nasal flaring.  ABD: Soft, nontender, nondistended. No rebound, no involuntary guarding.   NEURO: A&Ox3, no focal neurological deficits, CN 2-12 grossly intact. Following simple commands.  SKIN: Warm, dry, no rash, no lesions, no open wounds. No urticaria. No jaundice.

## 2025-08-27 ENCOUNTER — LABORATORY RESULT (OUTPATIENT)
Age: 70
End: 2025-08-27

## 2025-08-27 ENCOUNTER — NON-APPOINTMENT (OUTPATIENT)
Age: 70
End: 2025-08-27

## 2025-08-27 ENCOUNTER — APPOINTMENT (OUTPATIENT)
Dept: DERMATOLOGY | Facility: CLINIC | Age: 70
End: 2025-08-27
Payer: MEDICARE

## 2025-08-27 DIAGNOSIS — L13.9 BULLOUS DISORDER, UNSPECIFIED: ICD-10-CM

## 2025-08-27 DIAGNOSIS — R21 RASH AND OTHER NONSPECIFIC SKIN ERUPTION: ICD-10-CM

## 2025-08-27 DIAGNOSIS — L29.9 PRURITUS, UNSPECIFIED: ICD-10-CM

## 2025-08-27 PROCEDURE — 99204 OFFICE O/P NEW MOD 45 MIN: CPT

## 2025-08-27 RX ORDER — SULFAMETHOXAZOLE AND TRIMETHOPRIM 800; 160 MG/1; MG/1
800-160 TABLET ORAL TWICE DAILY
Qty: 28 | Refills: 0 | Status: ACTIVE | COMMUNITY
Start: 2025-08-27 | End: 1900-01-01

## 2025-08-27 RX ORDER — CLOBETASOL PROPIONATE 0.5 MG/G
0.05 OINTMENT TOPICAL
Qty: 1 | Refills: 1 | Status: ACTIVE | COMMUNITY
Start: 2025-08-27 | End: 1900-01-01

## 2025-08-27 RX ORDER — DOXYCYCLINE HYCLATE 100 MG/1
100 TABLET, COATED ORAL
Qty: 28 | Refills: 0 | Status: ACTIVE | COMMUNITY
Start: 2025-08-27 | End: 1900-01-01